# Patient Record
Sex: MALE | Race: ASIAN | NOT HISPANIC OR LATINO | URBAN - METROPOLITAN AREA
[De-identification: names, ages, dates, MRNs, and addresses within clinical notes are randomized per-mention and may not be internally consistent; named-entity substitution may affect disease eponyms.]

---

## 2017-03-09 ENCOUNTER — OUTPATIENT (OUTPATIENT)
Dept: OUTPATIENT SERVICES | Facility: HOSPITAL | Age: 67
LOS: 1 days | End: 2017-03-09
Payer: MEDICARE

## 2017-03-09 ENCOUNTER — APPOINTMENT (OUTPATIENT)
Dept: CARDIOLOGY | Facility: CLINIC | Age: 67
End: 2017-03-09

## 2017-03-09 VITALS
HEART RATE: 67 BPM | SYSTOLIC BLOOD PRESSURE: 155 MMHG | OXYGEN SATURATION: 98 % | DIASTOLIC BLOOD PRESSURE: 77 MMHG | WEIGHT: 195 LBS | RESPIRATION RATE: 18 BRPM | BODY MASS INDEX: 32.45 KG/M2 | TEMPERATURE: 98.2 F

## 2017-03-09 DIAGNOSIS — Z95.828 PRESENCE OF OTHER VASCULAR IMPLANTS AND GRAFTS: Chronic | ICD-10-CM

## 2017-03-09 DIAGNOSIS — Z95.1 PRESENCE OF AORTOCORONARY BYPASS GRAFT: Chronic | ICD-10-CM

## 2017-03-09 DIAGNOSIS — I20.9 ANGINA PECTORIS, UNSPECIFIED: ICD-10-CM

## 2017-03-09 DIAGNOSIS — Z00.8 ENCOUNTER FOR OTHER GENERAL EXAMINATION: ICD-10-CM

## 2017-03-09 DIAGNOSIS — Z95.5 PRESENCE OF CORONARY ANGIOPLASTY IMPLANT AND GRAFT: Chronic | ICD-10-CM

## 2017-03-09 PROCEDURE — G0463: CPT | Mod: 25

## 2017-03-09 PROCEDURE — 93005 ELECTROCARDIOGRAM TRACING: CPT

## 2017-03-09 PROCEDURE — 93010 ELECTROCARDIOGRAM REPORT: CPT

## 2017-03-30 ENCOUNTER — APPOINTMENT (OUTPATIENT)
Dept: VASCULAR SURGERY | Facility: CLINIC | Age: 67
End: 2017-03-30

## 2017-03-30 VITALS
BODY MASS INDEX: 32.49 KG/M2 | TEMPERATURE: 98.5 F | DIASTOLIC BLOOD PRESSURE: 71 MMHG | WEIGHT: 195 LBS | SYSTOLIC BLOOD PRESSURE: 137 MMHG | HEART RATE: 68 BPM | HEIGHT: 65 IN

## 2017-03-30 DIAGNOSIS — Z95.9 PRESENCE OF CARDIAC AND VASCULAR IMPLANT AND GRAFT, UNSPECIFIED: ICD-10-CM

## 2017-03-31 DIAGNOSIS — I20.9 ANGINA PECTORIS, UNSPECIFIED: ICD-10-CM

## 2017-03-31 DIAGNOSIS — I25.10 ATHEROSCLEROTIC HEART DISEASE OF NATIVE CORONARY ARTERY WITHOUT ANGINA PECTORIS: ICD-10-CM

## 2017-04-12 ENCOUNTER — RX RENEWAL (OUTPATIENT)
Age: 67
End: 2017-04-12

## 2017-04-15 ENCOUNTER — EMERGENCY (EMERGENCY)
Facility: HOSPITAL | Age: 67
LOS: 1 days | Discharge: HOME | End: 2017-04-15
Attending: EMERGENCY MEDICINE | Admitting: EMERGENCY MEDICINE
Payer: MEDICARE

## 2017-04-15 VITALS
DIASTOLIC BLOOD PRESSURE: 77 MMHG | TEMPERATURE: 98 F | HEART RATE: 68 BPM | OXYGEN SATURATION: 100 % | RESPIRATION RATE: 16 BRPM | SYSTOLIC BLOOD PRESSURE: 133 MMHG

## 2017-04-15 VITALS
HEART RATE: 62 BPM | RESPIRATION RATE: 16 BRPM | DIASTOLIC BLOOD PRESSURE: 59 MMHG | OXYGEN SATURATION: 100 % | TEMPERATURE: 98 F | SYSTOLIC BLOOD PRESSURE: 135 MMHG

## 2017-04-15 DIAGNOSIS — Z95.1 PRESENCE OF AORTOCORONARY BYPASS GRAFT: Chronic | ICD-10-CM

## 2017-04-15 DIAGNOSIS — Z95.828 PRESENCE OF OTHER VASCULAR IMPLANTS AND GRAFTS: Chronic | ICD-10-CM

## 2017-04-15 DIAGNOSIS — Z95.5 PRESENCE OF CORONARY ANGIOPLASTY IMPLANT AND GRAFT: Chronic | ICD-10-CM

## 2017-04-15 LAB
ALBUMIN SERPL ELPH-MCNC: 4 G/DL — SIGNIFICANT CHANGE UP (ref 3.3–5)
ALP SERPL-CCNC: 88 U/L — SIGNIFICANT CHANGE UP (ref 40–120)
ALT FLD-CCNC: 28 U/L — SIGNIFICANT CHANGE UP (ref 4–41)
APTT BLD: 29.6 SEC — SIGNIFICANT CHANGE UP (ref 27.5–37.4)
AST SERPL-CCNC: 18 U/L — SIGNIFICANT CHANGE UP (ref 4–40)
BASOPHILS # BLD AUTO: 0.05 K/UL — SIGNIFICANT CHANGE UP (ref 0–0.2)
BASOPHILS NFR BLD AUTO: 0.6 % — SIGNIFICANT CHANGE UP (ref 0–2)
BILIRUB SERPL-MCNC: 0.4 MG/DL — SIGNIFICANT CHANGE UP (ref 0.2–1.2)
BUN SERPL-MCNC: 24 MG/DL — HIGH (ref 7–23)
CALCIUM SERPL-MCNC: 9.6 MG/DL — SIGNIFICANT CHANGE UP (ref 8.4–10.5)
CHLORIDE SERPL-SCNC: 103 MMOL/L — SIGNIFICANT CHANGE UP (ref 98–107)
CK MB BLD-MCNC: 2.7 NG/ML — SIGNIFICANT CHANGE UP (ref 1–6.6)
CK SERPL-CCNC: 138 U/L — SIGNIFICANT CHANGE UP (ref 30–200)
CK SERPL-CCNC: 159 U/L — SIGNIFICANT CHANGE UP (ref 30–200)
CO2 SERPL-SCNC: 24 MMOL/L — SIGNIFICANT CHANGE UP (ref 22–31)
CREAT SERPL-MCNC: 1.11 MG/DL — SIGNIFICANT CHANGE UP (ref 0.5–1.3)
EOSINOPHIL # BLD AUTO: 0.2 K/UL — SIGNIFICANT CHANGE UP (ref 0–0.5)
EOSINOPHIL NFR BLD AUTO: 2.4 % — SIGNIFICANT CHANGE UP (ref 0–6)
GLUCOSE SERPL-MCNC: 166 MG/DL — HIGH (ref 70–99)
HCT VFR BLD CALC: 38.3 % — LOW (ref 39–50)
HGB BLD-MCNC: 12.4 G/DL — LOW (ref 13–17)
IMM GRANULOCYTES NFR BLD AUTO: 0.7 % — SIGNIFICANT CHANGE UP (ref 0–1.5)
INR BLD: 0.92 — SIGNIFICANT CHANGE UP (ref 0.88–1.17)
LYMPHOCYTES # BLD AUTO: 1.99 K/UL — SIGNIFICANT CHANGE UP (ref 1–3.3)
LYMPHOCYTES # BLD AUTO: 23.5 % — SIGNIFICANT CHANGE UP (ref 13–44)
MCHC RBC-ENTMCNC: 27.6 PG — SIGNIFICANT CHANGE UP (ref 27–34)
MCHC RBC-ENTMCNC: 32.4 % — SIGNIFICANT CHANGE UP (ref 32–36)
MCV RBC AUTO: 85.1 FL — SIGNIFICANT CHANGE UP (ref 80–100)
MONOCYTES # BLD AUTO: 0.58 K/UL — SIGNIFICANT CHANGE UP (ref 0–0.9)
MONOCYTES NFR BLD AUTO: 6.8 % — SIGNIFICANT CHANGE UP (ref 2–14)
NEUTROPHILS # BLD AUTO: 5.59 K/UL — SIGNIFICANT CHANGE UP (ref 1.8–7.4)
NEUTROPHILS NFR BLD AUTO: 66 % — SIGNIFICANT CHANGE UP (ref 43–77)
PLATELET # BLD AUTO: 263 K/UL — SIGNIFICANT CHANGE UP (ref 150–400)
PMV BLD: 9.9 FL — SIGNIFICANT CHANGE UP (ref 7–13)
POTASSIUM SERPL-MCNC: 3.9 MMOL/L — SIGNIFICANT CHANGE UP (ref 3.5–5.3)
POTASSIUM SERPL-SCNC: 3.9 MMOL/L — SIGNIFICANT CHANGE UP (ref 3.5–5.3)
PROT SERPL-MCNC: 7.4 G/DL — SIGNIFICANT CHANGE UP (ref 6–8.3)
PROTHROM AB SERPL-ACNC: 10.3 SEC — SIGNIFICANT CHANGE UP (ref 9.8–13.1)
RBC # BLD: 4.5 M/UL — SIGNIFICANT CHANGE UP (ref 4.2–5.8)
RBC # FLD: 15.9 % — HIGH (ref 10.3–14.5)
SODIUM SERPL-SCNC: 143 MMOL/L — SIGNIFICANT CHANGE UP (ref 135–145)
TROPONIN T SERPL-MCNC: < 0.06 NG/ML — SIGNIFICANT CHANGE UP (ref 0–0.06)
TROPONIN T SERPL-MCNC: < 0.06 NG/ML — SIGNIFICANT CHANGE UP (ref 0–0.06)
WBC # BLD: 8.47 K/UL — SIGNIFICANT CHANGE UP (ref 3.8–10.5)
WBC # FLD AUTO: 8.47 K/UL — SIGNIFICANT CHANGE UP (ref 3.8–10.5)

## 2017-04-15 PROCEDURE — 99285 EMERGENCY DEPT VISIT HI MDM: CPT | Mod: 25,GC

## 2017-04-15 PROCEDURE — 70450 CT HEAD/BRAIN W/O DYE: CPT | Mod: 26

## 2017-04-15 PROCEDURE — 71020: CPT | Mod: 26

## 2017-04-15 PROCEDURE — 72125 CT NECK SPINE W/O DYE: CPT | Mod: 26

## 2017-04-15 PROCEDURE — 70486 CT MAXILLOFACIAL W/O DYE: CPT | Mod: 26

## 2017-04-15 PROCEDURE — 93010 ELECTROCARDIOGRAM REPORT: CPT | Mod: 59

## 2017-04-15 PROCEDURE — 12013 RPR F/E/E/N/L/M 2.6-5.0 CM: CPT | Mod: GC

## 2017-04-15 RX ORDER — TETANUS TOXOID, REDUCED DIPHTHERIA TOXOID AND ACELLULAR PERTUSSIS VACCINE, ADSORBED 5; 2.5; 8; 8; 2.5 [IU]/.5ML; [IU]/.5ML; UG/.5ML; UG/.5ML; UG/.5ML
0.5 SUSPENSION INTRAMUSCULAR ONCE
Qty: 0 | Refills: 0 | Status: COMPLETED | OUTPATIENT
Start: 2017-04-15 | End: 2017-04-15

## 2017-04-15 RX ORDER — ACETAMINOPHEN 500 MG
650 TABLET ORAL ONCE
Qty: 0 | Refills: 0 | Status: COMPLETED | OUTPATIENT
Start: 2017-04-15 | End: 2017-04-15

## 2017-04-15 RX ADMIN — Medication 650 MILLIGRAM(S): at 10:21

## 2017-04-15 RX ADMIN — TETANUS TOXOID, REDUCED DIPHTHERIA TOXOID AND ACELLULAR PERTUSSIS VACCINE, ADSORBED 0.5 MILLILITER(S): 5; 2.5; 8; 8; 2.5 SUSPENSION INTRAMUSCULAR at 10:21

## 2017-04-15 NOTE — ED ADULT TRIAGE NOTE - CHIEF COMPLAINT QUOTE
Pt c/o L sided facial pain sp trip & fall hitting head/face on concrete floor. Denies LOC. Also c/o chest discomfort. States baby ASA daily. Swelling & bruising noted to L eye. Dried blood noted above L eye.

## 2017-04-15 NOTE — ED PROVIDER NOTE - PSH
S/P bypass graft of extremity  RLE- 11/30/15  S/P CABG x 3  3/28/16  S/P prostatectomy  1996  Stented coronary artery  x 3 cardiac stenrs placed in 2006

## 2017-04-15 NOTE — ED PROVIDER NOTE - PROGRESS NOTE DETAILS
pt evaluated by cardiology, pt now states that "my chest is sore from the fall" Daughter at bedside also states now "I think the pain is from the fall" .  Re exam tender, reproducible tenderness on ACW. Cardiology states pt may go home. Consult appreciated. Plan check second cardiac enzyme now. If neg dc home. w/o CP, CE neg x 2, stable for d/c

## 2017-04-15 NOTE — ED PROVIDER NOTE - PMH
Bronchospasm    CAD (coronary artery disease)  2 cardiac stents placed in 2006, 3V CABG 3/28/16  CHF (congestive heart failure)    Diabetic neuropathy    DM (diabetes mellitus)  type 2 - on insulin pump  HTN (hypertension)    Hypercholesteremia    Hypothyroid    Intermittent claudication    Iron deficiency anemia, unspecified iron deficiency anemia type    Mild intermittent asthma without complication    Obesity (BMI 30-39.9)    PAD (peripheral artery disease)  RLE bypass 11/30/15  Prostate cancer

## 2017-04-15 NOTE — ED PROVIDER NOTE - PHYSICAL EXAMINATION
Attending   pt in bed no acute distress. EOMI. +ecchymosis, swelling of the left eyebrow. +linear horizontal 1cm long lac along the left eyebrow not bleeding. no septal hematoma. nontender along the nose. nl jaw alignment. supple neck. nontender along midline cervical/thoracic and lumbar spine. normal S1-S2 No resp distress. able to speak in full and clear sentences. no wheeze, rales or stridor. soft nt abdomen. nl  bl hands. nl rom of bl hips/knees.   no focal deficits. no pedal edema. no calf tenderness. normal pulses bilateral feet. Attending   pt in bed no acute distress. EOMI. +ecchymosis, swelling of the left eyebrow. +linear horizontal 1cm long lac along the left eyebrow not bleeding. no septal hematoma. nontender along the nose. nl jaw alignment. supple neck. nontender along midline cervical/thoracic and lumbar spine. normal S1-S2 No resp distress. able to speak in full and clear sentences. no wheeze, rales or stridor.  nontender along chest wall, no ecchymosis. no crepitus. soft nt abdomen. nl  bl hands. nl rom of bl hips/knees.   no focal deficits. no pedal edema. no calf tenderness. normal pulses bilateral feet. Attending   pt in bed no acute distress. EOMI. +ecchymosis, swelling of the left eyebrow. +linear horizontal 1cm long lac along the left eyebrow not bleeding. no septal hematoma. nontender along the nose. nl jaw alignment. supple neck. nontender along midline cervical/thoracic and lumbar spine. normal S1-S2 No resp distress. able to speak in full and clear sentences. no wheeze, rales or stridor.  +tender along chest wall, no ecchymosis. no crepitus. soft nt abdomen. nl  bl hands. nl rom of bl hips/knees.   no focal deficits. no pedal edema. no calf tenderness. normal pulses bilateral feet.

## 2017-04-15 NOTE — ED PROVIDER NOTE - OBJECTIVE STATEMENT
Attending  65yo M from home with daughter sp fall last night at 1130pm.pt was with family, missed step and fell forward in the street. No loc. No cp/sob/palpitations before the fall. Able to get up, walk with daughter to the house, states he has had midsternal heaviness since last night. It was 4-10 max intensity  now its 2-10 heavy, no sob. not diaphoretic. pt on an aspirin.  also endorses pain of the head and neck stiffness from the fall. no numbness or weakness. no change in vision. no double vision or blurred vision. no slurred speech. unknown last tetanus. last stress test/echo was a year ago. PMHX HTN, HLD, CAD, hx CABG, DM Attending  67yo M from home with daughter sp fall last night at 1130pm.pt was with family, missed step and fell forward in the street. Hit face on the cement.  No loc. No cp/sob/palpitations before the fall. Able to get up, walk with daughter to the house, states he has had midsternal heaviness since last night. It was 4-10 max intensity  now its 2-10 heavy, no sob. not diaphoretic. pt on an aspirin.  also endorses pain of the head and neck stiffness from the fall. no numbness or weakness. no change in vision. no double vision or blurred vision. no slurred speech. unknown last tetanus. last stress test/echo was a year ago. PMHX HTN, HLD, CAD, hx CABG, DM

## 2017-04-17 ENCOUNTER — OUTPATIENT (OUTPATIENT)
Dept: OUTPATIENT SERVICES | Facility: HOSPITAL | Age: 67
LOS: 1 days | End: 2017-04-17

## 2017-04-17 VITALS
RESPIRATION RATE: 16 BRPM | SYSTOLIC BLOOD PRESSURE: 146 MMHG | WEIGHT: 201.06 LBS | OXYGEN SATURATION: 97 % | DIASTOLIC BLOOD PRESSURE: 60 MMHG | HEIGHT: 64 IN | TEMPERATURE: 98 F | HEART RATE: 66 BPM

## 2017-04-17 DIAGNOSIS — E03.9 HYPOTHYROIDISM, UNSPECIFIED: ICD-10-CM

## 2017-04-17 DIAGNOSIS — E11.9 TYPE 2 DIABETES MELLITUS WITHOUT COMPLICATIONS: ICD-10-CM

## 2017-04-17 DIAGNOSIS — I10 ESSENTIAL (PRIMARY) HYPERTENSION: ICD-10-CM

## 2017-04-17 DIAGNOSIS — Z95.5 PRESENCE OF CORONARY ANGIOPLASTY IMPLANT AND GRAFT: Chronic | ICD-10-CM

## 2017-04-17 DIAGNOSIS — I70.219 ATHEROSCLEROSIS OF NATIVE ARTERIES OF EXTREMITIES WITH INTERMITTENT CLAUDICATION, UNSPECIFIED EXTREMITY: ICD-10-CM

## 2017-04-17 DIAGNOSIS — Z95.828 PRESENCE OF OTHER VASCULAR IMPLANTS AND GRAFTS: Chronic | ICD-10-CM

## 2017-04-17 DIAGNOSIS — I70.209 UNSPECIFIED ATHEROSCLEROSIS OF NATIVE ARTERIES OF EXTREMITIES, UNSPECIFIED EXTREMITY: ICD-10-CM

## 2017-04-17 DIAGNOSIS — J45.20 MILD INTERMITTENT ASTHMA, UNCOMPLICATED: ICD-10-CM

## 2017-04-17 DIAGNOSIS — Z95.1 PRESENCE OF AORTOCORONARY BYPASS GRAFT: Chronic | ICD-10-CM

## 2017-04-17 LAB — HBA1C BLD-MCNC: 9.9 % — HIGH (ref 4–5.6)

## 2017-04-17 RX ORDER — SODIUM CHLORIDE 9 MG/ML
1000 INJECTION, SOLUTION INTRAVENOUS
Qty: 0 | Refills: 0 | Status: DISCONTINUED | OUTPATIENT
Start: 2017-04-25 | End: 2017-05-10

## 2017-04-17 NOTE — H&P PST ADULT - NEGATIVE OPHTHALMOLOGIC SYMPTOMS
no diplopia/no lacrimation L/no photophobia/no lacrimation R no photophobia/no blurred vision R/no pain R/no discharge L/no diplopia/no pain L

## 2017-04-17 NOTE — H&P PST ADULT - FAMILY HISTORY
Mother  Still living? Unknown  Family history of diabetes mellitus, Age at diagnosis: Age Unknown     Father  Still living? No  Family history of lung disease, Age at diagnosis: Age Unknown

## 2017-04-17 NOTE — H&P PST ADULT - NEGATIVE NEUROLOGICAL SYMPTOMS
no weakness/no paresthesias/no difficulty walking/no loss of sensation no tremors/no paresthesias/no facial palsy/no headache/no focal seizures/no weakness/no loss of sensation/no syncope/no transient paralysis/no vertigo/no generalized seizures/no difficulty walking/no confusion

## 2017-04-17 NOTE — H&P PST ADULT - NEGATIVE GENERAL SYMPTOMS
no anorexia/no sweating/no fever/no chills no malaise/no chills/no fever/no polyuria/no weight gain/no weight loss/no fatigue/no sweating/no polydipsia/no polyphagia/no anorexia

## 2017-04-17 NOTE — H&P PST ADULT - PROBLEM SELECTOR PLAN 3
instructed to consult endocrinologist for preop instructions for insulin pump. Pending endocrinology clearance.

## 2017-04-17 NOTE — H&P PST ADULT - FALLEN IN THE PAST
no 4/15/17 tripped and fell, evaluated in the ED at [facility], CXR CT done,, no head injury, had stiches placed above left eye./yes

## 2017-04-17 NOTE — H&P PST ADULT - NEGATIVE RESPIRATORY AND THORAX SYMPTOMS
no dyspnea/no wheezing/no cough no dyspnea/no hemoptysis/no wheezing/no pleuritic chest pain/no cough

## 2017-04-17 NOTE — H&P PST ADULT - MUSCULOSKELETAL
details… detailed exam ROM intact/normal strength/no joint swelling/no joint erythema ROM intact/no joint warmth/no calf tenderness/normal strength/no joint swelling/no joint erythema

## 2017-04-17 NOTE — H&P PST ADULT - PSH
S/P prostatectomy  1996  Stented coronary artery  x 3 cardiac stenrs placed in 2006 S/P bypass graft of extremity  RLE- 11/30/15  S/P CABG x 3  3/28/16  S/P prostatectomy  1996  Stented coronary artery  x 3 cardiac stenrs placed in 2006

## 2017-04-17 NOTE — H&P PST ADULT - PMH
Bronchospasm  uses proventil PRN, last used one yr ago  CAD (coronary artery disease)  2 cardiac stents placed in 2006, on baby aspirin  CHF (congestive heart failure)    Diabetic neuropathy    DM (diabetes mellitus)  type 2 - on insulin pump for 4 yrs  HTN (hypertension)    Hypercholesteremia    Hypothyroid    Intermittent claudication    Obesity (BMI 30-39.9)    PAD (peripheral artery disease)    Prostate cancer Anemia    Atherosclerosis of arteries of extremities  left leg  Bronchospasm    CAD (coronary artery disease)  3 cardiac stents placed in 2006 (mid LAD, Prox LAD, Prox to #2), 3V CABG 3/28/16  CHF (congestive heart failure)    Diabetic neuropathy    DM (diabetes mellitus)  type 2 - on insulin pump  HTN (hypertension)    Hypercholesteremia    Hypothyroid    Intermittent claudication    Iron deficiency anemia, unspecified iron deficiency anemia type    Mild intermittent asthma without complication    Obesity (BMI 30-39.9)    PAD (peripheral artery disease)  RLE bypass 11/30/15  Prostate cancer

## 2017-04-17 NOTE — H&P PST ADULT - NEGATIVE GASTROINTESTINAL SYMPTOMS
no nausea/no constipation/no diarrhea/no vomiting no melena/no nausea/no change in bowel habits/no vomiting/no diarrhea/no abdominal pain

## 2017-04-17 NOTE — H&P PST ADULT - NEGATIVE PSYCHIATRIC SYMPTOMS
no suicidal ideation/no depression/no anxiety/no insomnia no anxiety/no depression/no suicidal ideation

## 2017-04-17 NOTE — H&P PST ADULT - NSANTHOSAYNRD_GEN_A_CORE
No. REAGAN screening performed.  STOP BANG Legend: 0-2 = LOW Risk; 3-4 = INTERMEDIATE Risk; 5-8 = HIGH Risk

## 2017-04-17 NOTE — H&P PST ADULT - NEGATIVE ALLERGY TYPES
no outdoor environmental allergies/no reactions to medicines/no reactions to animals/no reactions to food/no reactions to insect bites/no indoor environmental allergies no reactions to food/no reactions to insect bites/no reactions to animals

## 2017-04-17 NOTE — H&P PST ADULT - NEGATIVE GENERAL GENITOURINARY SYMPTOMS
no nocturia/normal urinary frequency/no flank pain R/no dysuria/no hematuria/no renal colic/no flank pain L normal urinary frequency/no flank pain L/no bladder infections/no flank pain R/no incontinence/no dysuria/no nocturia/no renal colic/no hematuria

## 2017-04-17 NOTE — H&P PST ADULT - LAST CARDIAC ANGIOGRAM/IMAGING
2006, had cardiac angioplasty and 3 cardiac stents were placed. 2016 cardiac cath, followed by CABG 3/2016

## 2017-04-17 NOTE — H&P PST ADULT - PROBLEM SELECTOR PLAN 2
instructed to take blood pressure medication on the morning of procedure (amlodipine metoprolol and losartan). Pending cardiology clearance. Pt to continue aspirin.

## 2017-04-17 NOTE — H&P PST ADULT - NEGATIVE BREAST SYMPTOMS
no breast tenderness R/no breast tenderness L no breast lump R/no breast tenderness L/no breast lump L/no breast tenderness R

## 2017-04-17 NOTE — H&P PST ADULT - CONSTITUTIONAL DETAILS
well-groomed/no distress/well-developed/obese well-developed/no distress/well-nourished/obese/well-groomed

## 2017-04-17 NOTE — H&P PST ADULT - HISTORY OF PRESENT ILLNESS
66 year old male with Hx of atherosclerosis of arteries of extremities, with intermittent claudication, left, s/p right lower extremity angiogram with stent placement in 2016, developing left leg pain with ambulation, can walk 1/2 block, referred for surgical evaluation. Doppler done revealing decreased circulation to left leg as per patient. Pt is scheduled for left leg angiogram possible angioplasty possible stent for 4/25/17 66 year old male with Hx CAD, s/p three coronary stents in 2006, and CABG x3 in 3/2016, with atherosclerosis of arteries of extremities, with intermittent claudication, left, s/p RLE bypass 11/30/15, s/p failed angiogram to secondary to hypotension in 2016 at [facility],, developing left leg pain with ambulation, can walk 1/2 block, referred for surgical evaluation. Doppler done revealing decreased circulation to left leg as per patient. Pt is scheduled for left leg angiogram possible angioplasty possible stent for 4/25/17

## 2017-04-17 NOTE — H&P PST ADULT - GASTROINTESTINAL DETAILS
nontender/soft no masses palpable/soft/nontender/bowel sounds normal/no guarding/no rebound tenderness/no rigidity/no distention

## 2017-04-17 NOTE — H&P PST ADULT - VISION (WITH CORRECTIVE LENSES IF THE PATIENT USUALLY WEARS THEM):
Normal vision: sees adequately in most situations; can see medication labels, newsprint Partially impaired: cannot see medication labels or newsprint, but can see obstacles in path, and the surrounding layout; can count fingers at arm's length/glasses for reading and distance

## 2017-04-17 NOTE — H&P PST ADULT - NEGATIVE CARDIOVASCULAR SYMPTOMS
no dyspnea on exertion/no orthopnea/no palpitations no chest pain/no palpitations/no paroxysmal nocturnal dyspnea/no dyspnea on exertion/no orthopnea/no peripheral edema

## 2017-04-17 NOTE — H&P PST ADULT - NEGATIVE ENDOCRINE SYMPTOMS
DM type 2 on insulin pump for 4 yrs/no cold intolerance/no heat intolerance no heat intolerance/DM type 2 on insulin pump for 6 years, FS /no cold intolerance

## 2017-04-17 NOTE — H&P PST ADULT - NEGATIVE ENMT SYMPTOMS
no vertigo/no nasal congestion/no tinnitus/no dysphagia/no recurrent cold sores/no ear pain/no nose bleeds/no nasal obstruction/no dry mouth/no hearing difficulty/no nasal discharge/h/o bronchospasm/no throat pain no sinus symptoms/no nasal obstruction/no dysphagia/no ear pain/no nasal discharge/no recurrent cold sores/no hearing difficulty/no vertigo/no nasal congestion/no nose bleeds/no throat pain/no tinnitus

## 2017-04-17 NOTE — H&P PST ADULT - RS GEN PE MLT RESP DETAILS PC
respirations non-labored/no wheezes/airway patent/no chest wall tenderness/breath sounds equal no rales/no wheezes/no subcutaneous emphysema/airway patent/respirations non-labored/no rhonchi/good air movement/no intercostal retractions/breath sounds equal/no chest wall tenderness/clear to auscultation bilaterally

## 2017-04-17 NOTE — H&P PST ADULT - ASSESSMENT
64 yo male with multiple comorbidities and has DM type 2 on insulin pump  is scheduled for right leg femoral endarterectomy, possible distal bypass on 11/30/15. left lower extremity atherosclerosis with intermittent claudication

## 2017-04-17 NOTE — H&P PST ADULT - PROBLEM SELECTOR PLAN 1
Pt is scheduled for left leg angiogram possible angioplasty possible stent for 4/25/17. Preop instructions provided. Meets REAGAN risk criteria, OR booking notified. Surgical scrub given and pepcid. Pt stated understanding.

## 2017-04-25 ENCOUNTER — APPOINTMENT (OUTPATIENT)
Dept: VASCULAR SURGERY | Facility: HOSPITAL | Age: 67
End: 2017-04-25

## 2017-04-25 ENCOUNTER — OUTPATIENT (OUTPATIENT)
Dept: OUTPATIENT SERVICES | Facility: HOSPITAL | Age: 67
LOS: 1 days | Discharge: HOME | End: 2017-04-25
Payer: MEDICARE

## 2017-04-25 VITALS
DIASTOLIC BLOOD PRESSURE: 59 MMHG | OXYGEN SATURATION: 98 % | WEIGHT: 201.06 LBS | SYSTOLIC BLOOD PRESSURE: 134 MMHG | RESPIRATION RATE: 14 BRPM | TEMPERATURE: 98 F | HEART RATE: 62 BPM | HEIGHT: 64 IN

## 2017-04-25 VITALS
DIASTOLIC BLOOD PRESSURE: 59 MMHG | OXYGEN SATURATION: 98 % | HEART RATE: 59 BPM | SYSTOLIC BLOOD PRESSURE: 132 MMHG | RESPIRATION RATE: 16 BRPM

## 2017-04-25 DIAGNOSIS — I70.219 ATHEROSCLEROSIS OF NATIVE ARTERIES OF EXTREMITIES WITH INTERMITTENT CLAUDICATION, UNSPECIFIED EXTREMITY: ICD-10-CM

## 2017-04-25 DIAGNOSIS — Z95.828 PRESENCE OF OTHER VASCULAR IMPLANTS AND GRAFTS: Chronic | ICD-10-CM

## 2017-04-25 DIAGNOSIS — Z95.5 PRESENCE OF CORONARY ANGIOPLASTY IMPLANT AND GRAFT: Chronic | ICD-10-CM

## 2017-04-25 DIAGNOSIS — Z95.1 PRESENCE OF AORTOCORONARY BYPASS GRAFT: Chronic | ICD-10-CM

## 2017-04-25 PROCEDURE — 75710 ARTERY X-RAYS ARM/LEG: CPT | Mod: 26,GC

## 2017-04-25 PROCEDURE — 75625 CONTRAST EXAM ABDOMINL AORTA: CPT | Mod: 26

## 2017-04-25 PROCEDURE — 36246 INS CATH ABD/L-EXT ART 2ND: CPT | Mod: LT

## 2017-04-25 RX ORDER — FENTANYL CITRATE 50 UG/ML
25 INJECTION INTRAVENOUS
Qty: 0 | Refills: 0 | Status: DISCONTINUED | OUTPATIENT
Start: 2017-04-25 | End: 2017-04-25

## 2017-04-25 RX ORDER — SODIUM CHLORIDE 9 MG/ML
1000 INJECTION INTRAMUSCULAR; INTRAVENOUS; SUBCUTANEOUS
Qty: 0 | Refills: 0 | Status: DISCONTINUED | OUTPATIENT
Start: 2017-04-25 | End: 2017-05-10

## 2017-04-25 RX ORDER — ONDANSETRON 8 MG/1
4 TABLET, FILM COATED ORAL ONCE
Qty: 0 | Refills: 0 | Status: DISCONTINUED | OUTPATIENT
Start: 2017-04-25 | End: 2017-04-25

## 2017-04-25 RX ADMIN — SODIUM CHLORIDE 75 MILLILITER(S): 9 INJECTION INTRAMUSCULAR; INTRAVENOUS; SUBCUTANEOUS at 10:27

## 2017-04-25 NOTE — ASU PREOP CHECKLIST - COMMENTS
AAmoldapine-Losartan Metopropolol po taken today AAmoldapine-Losartan Metopropolol po taken today synthroid taken today

## 2017-04-25 NOTE — ASU DISCHARGE PLAN (ADULT/PEDIATRIC). - CONDITIONS AT DISCHARGE
call MD if fever greater than 101, increase in pain not relieved by medicine , any redness, swelling or drainage from incision site , numbness, tingling, unable to tolerate food, difficulty in urination

## 2017-04-25 NOTE — ASU DISCHARGE PLAN (ADULT/PEDIATRIC). - NOTIFY
Swelling that continues/Bleeding that does not stop/Numbness, color, or temperature change to extremity Persistent Nausea and Vomiting/Numbness, color, or temperature change to extremity/Increased Irritability or Sluggishness/Inability to Tolerate Liquids or Foods/Pain not relieved by Medications/Swelling that continues/Unable to Urinate/Bleeding that does not stop/Fever greater than 101/Numbness, tingling

## 2017-05-06 ENCOUNTER — NON-APPOINTMENT (OUTPATIENT)
Age: 67
End: 2017-05-06

## 2017-05-06 ENCOUNTER — APPOINTMENT (OUTPATIENT)
Dept: CARDIOLOGY | Facility: CLINIC | Age: 67
End: 2017-05-06

## 2017-05-06 VITALS
BODY MASS INDEX: 28.88 KG/M2 | OXYGEN SATURATION: 96 % | HEIGHT: 69 IN | DIASTOLIC BLOOD PRESSURE: 72 MMHG | SYSTOLIC BLOOD PRESSURE: 151 MMHG | HEART RATE: 63 BPM | WEIGHT: 195 LBS

## 2017-05-08 ENCOUNTER — OUTPATIENT (OUTPATIENT)
Dept: OUTPATIENT SERVICES | Facility: HOSPITAL | Age: 67
LOS: 1 days | End: 2017-05-08
Payer: COMMERCIAL

## 2017-05-08 ENCOUNTER — APPOINTMENT (OUTPATIENT)
Dept: CV DIAGNOSITCS | Facility: HOSPITAL | Age: 67
End: 2017-05-08

## 2017-05-08 ENCOUNTER — TRANSCRIPTION ENCOUNTER (OUTPATIENT)
Age: 67
End: 2017-05-08

## 2017-05-08 DIAGNOSIS — Z95.5 PRESENCE OF CORONARY ANGIOPLASTY IMPLANT AND GRAFT: Chronic | ICD-10-CM

## 2017-05-08 DIAGNOSIS — Z95.1 PRESENCE OF AORTOCORONARY BYPASS GRAFT: Chronic | ICD-10-CM

## 2017-05-08 DIAGNOSIS — R60.0 LOCALIZED EDEMA: ICD-10-CM

## 2017-05-08 DIAGNOSIS — Z95.828 PRESENCE OF OTHER VASCULAR IMPLANTS AND GRAFTS: Chronic | ICD-10-CM

## 2017-05-08 PROCEDURE — C8929: CPT

## 2017-05-08 PROCEDURE — 93306 TTE W/DOPPLER COMPLETE: CPT | Mod: 26

## 2017-05-10 ENCOUNTER — RX RENEWAL (OUTPATIENT)
Age: 67
End: 2017-05-10

## 2017-05-11 ENCOUNTER — APPOINTMENT (OUTPATIENT)
Dept: VASCULAR SURGERY | Facility: CLINIC | Age: 67
End: 2017-05-11

## 2017-05-11 VITALS
BODY MASS INDEX: 30.86 KG/M2 | WEIGHT: 192 LBS | SYSTOLIC BLOOD PRESSURE: 107 MMHG | HEART RATE: 64 BPM | DIASTOLIC BLOOD PRESSURE: 64 MMHG | TEMPERATURE: 97.6 F | HEIGHT: 66 IN

## 2017-05-11 VITALS — SYSTOLIC BLOOD PRESSURE: 109 MMHG | HEART RATE: 96 BPM | DIASTOLIC BLOOD PRESSURE: 67 MMHG

## 2017-05-11 DIAGNOSIS — R60.0 LOCALIZED EDEMA: ICD-10-CM

## 2017-05-11 RX ORDER — BUDESONIDE AND FORMOTEROL FUMARATE DIHYDRATE 160; 4.5 UG/1; UG/1
AEROSOL RESPIRATORY (INHALATION)
Refills: 0 | Status: ACTIVE | COMMUNITY

## 2017-05-24 ENCOUNTER — OUTPATIENT (OUTPATIENT)
Dept: OUTPATIENT SERVICES | Facility: HOSPITAL | Age: 67
LOS: 1 days | End: 2017-05-24

## 2017-05-24 VITALS
SYSTOLIC BLOOD PRESSURE: 120 MMHG | HEART RATE: 69 BPM | RESPIRATION RATE: 16 BRPM | WEIGHT: 199.96 LBS | HEIGHT: 64 IN | TEMPERATURE: 207 F | OXYGEN SATURATION: 97 % | DIASTOLIC BLOOD PRESSURE: 68 MMHG

## 2017-05-24 DIAGNOSIS — I70.219 ATHEROSCLEROSIS OF NATIVE ARTERIES OF EXTREMITIES WITH INTERMITTENT CLAUDICATION, UNSPECIFIED EXTREMITY: ICD-10-CM

## 2017-05-24 DIAGNOSIS — I70.209 UNSPECIFIED ATHEROSCLEROSIS OF NATIVE ARTERIES OF EXTREMITIES, UNSPECIFIED EXTREMITY: ICD-10-CM

## 2017-05-24 DIAGNOSIS — E11.9 TYPE 2 DIABETES MELLITUS WITHOUT COMPLICATIONS: ICD-10-CM

## 2017-05-24 DIAGNOSIS — Z95.828 PRESENCE OF OTHER VASCULAR IMPLANTS AND GRAFTS: Chronic | ICD-10-CM

## 2017-05-24 DIAGNOSIS — E03.9 HYPOTHYROIDISM, UNSPECIFIED: ICD-10-CM

## 2017-05-24 DIAGNOSIS — I10 ESSENTIAL (PRIMARY) HYPERTENSION: ICD-10-CM

## 2017-05-24 DIAGNOSIS — Z95.5 PRESENCE OF CORONARY ANGIOPLASTY IMPLANT AND GRAFT: Chronic | ICD-10-CM

## 2017-05-24 DIAGNOSIS — J44.9 CHRONIC OBSTRUCTIVE PULMONARY DISEASE, UNSPECIFIED: ICD-10-CM

## 2017-05-24 DIAGNOSIS — Z95.1 PRESENCE OF AORTOCORONARY BYPASS GRAFT: Chronic | ICD-10-CM

## 2017-05-24 LAB
BLD GP AB SCN SERPL QL: NEGATIVE — SIGNIFICANT CHANGE UP
BUN SERPL-MCNC: 25 MG/DL — HIGH (ref 7–23)
CALCIUM SERPL-MCNC: 10.2 MG/DL — SIGNIFICANT CHANGE UP (ref 8.4–10.5)
CHLORIDE SERPL-SCNC: 100 MMOL/L — SIGNIFICANT CHANGE UP (ref 98–107)
CO2 SERPL-SCNC: 25 MMOL/L — SIGNIFICANT CHANGE UP (ref 22–31)
CREAT SERPL-MCNC: 1.17 MG/DL — SIGNIFICANT CHANGE UP (ref 0.5–1.3)
GLUCOSE SERPL-MCNC: 132 MG/DL — HIGH (ref 70–99)
HBA1C BLD-MCNC: 9.3 % — HIGH (ref 4–5.6)
HCT VFR BLD CALC: 39.9 % — SIGNIFICANT CHANGE UP (ref 39–50)
HGB BLD-MCNC: 12.4 G/DL — LOW (ref 13–17)
MCHC RBC-ENTMCNC: 27.2 PG — SIGNIFICANT CHANGE UP (ref 27–34)
MCHC RBC-ENTMCNC: 31.1 % — LOW (ref 32–36)
MCV RBC AUTO: 87.5 FL — SIGNIFICANT CHANGE UP (ref 80–100)
PLATELET # BLD AUTO: 276 K/UL — SIGNIFICANT CHANGE UP (ref 150–400)
PMV BLD: 11 FL — SIGNIFICANT CHANGE UP (ref 7–13)
POTASSIUM SERPL-MCNC: 4.2 MMOL/L — SIGNIFICANT CHANGE UP (ref 3.5–5.3)
POTASSIUM SERPL-SCNC: 4.2 MMOL/L — SIGNIFICANT CHANGE UP (ref 3.5–5.3)
RBC # BLD: 4.56 M/UL — SIGNIFICANT CHANGE UP (ref 4.2–5.8)
RBC # FLD: 15.9 % — HIGH (ref 10.3–14.5)
RH IG SCN BLD-IMP: POSITIVE — SIGNIFICANT CHANGE UP
SODIUM SERPL-SCNC: 143 MMOL/L — SIGNIFICANT CHANGE UP (ref 135–145)
WBC # BLD: 6.2 K/UL — SIGNIFICANT CHANGE UP (ref 3.8–10.5)
WBC # FLD AUTO: 6.2 K/UL — SIGNIFICANT CHANGE UP (ref 3.8–10.5)

## 2017-05-24 RX ORDER — SODIUM CHLORIDE 9 MG/ML
1000 INJECTION, SOLUTION INTRAVENOUS
Qty: 0 | Refills: 0 | Status: DISCONTINUED | OUTPATIENT
Start: 2017-06-06 | End: 2017-06-07

## 2017-05-24 RX ORDER — INSULIN ASPART 100 [IU]/ML
0 INJECTION, SOLUTION SUBCUTANEOUS
Qty: 0 | Refills: 0 | COMMUNITY

## 2017-05-24 NOTE — H&P PST ADULT - VISION (WITH CORRECTIVE LENSES IF THE PATIENT USUALLY WEARS THEM):
glasses for reading and distance/Partially impaired: cannot see medication labels or newsprint, but can see obstacles in path, and the surrounding layout; can count fingers at arm's length

## 2017-05-24 NOTE — H&P PST ADULT - NEGATIVE NEUROLOGICAL SYMPTOMS
no difficulty walking/no tremors/no loss of consciousness/no hemiparesis/no syncope/no generalized seizures/no weakness/no transient paralysis/no headache/no focal seizures/no vertigo/no paresthesias/no confusion

## 2017-05-24 NOTE — H&P PST ADULT - PMH
Anemia    Atherosclerosis of arteries of extremities  left leg  Bronchospasm    CAD (coronary artery disease)  3 cardiac stents placed in 2006 (mid LAD, Prox LAD, Prox to #2), 3V CABG 3/28/16  CHF (congestive heart failure)    Diabetic neuropathy    DM (diabetes mellitus)  type 2 - on insulin pump  HTN (hypertension)    Hypercholesteremia    Hypothyroid    Intermittent claudication    Iron deficiency anemia, unspecified iron deficiency anemia type    Mild intermittent asthma without complication    Obesity (BMI 30-39.9)    PAD (peripheral artery disease)  RLE bypass 11/30/15  Prostate cancer Anemia    Atherosclerosis of arteries of extremities  left leg  Bronchospasm    CAD (coronary artery disease)  3 cardiac stents placed in 2006 (mid LAD, Prox LAD, Prox to #2), 3V CABG 3/28/16  CHF (congestive heart failure)    Diabetic neuropathy    DM (diabetes mellitus)  type 2 - on insulin pump  HTN (hypertension)    Hypercholesteremia    Hypothyroid    Intermittent claudication    Iron deficiency anemia, unspecified iron deficiency anemia type    Mild intermittent asthma without complication    Obesity (BMI 30-39.9)    PAD (peripheral artery disease)  RLE bypass 11/30/15  Prostate cancer    Sleep apnea  non-compliant

## 2017-05-24 NOTE — H&P PST ADULT - PROBLEM SELECTOR PLAN 1
Pt given pre-op instructions and Famotidine and Chlorhexidine and verbalized understanding.  Endo instructions for Pump requested - pt to see Endo 5/25/17.   CC requested with recent Stress/Echo   Pt to stay on ASA 81 mg po OD pre-op  OR Booking notified of Sleep apnea - non-compliant , Insulin pump and CAD stents x3 via fax.

## 2017-05-24 NOTE — H&P PST ADULT - HISTORY OF PRESENT ILLNESS
66 year old male with Hx CAD, s/p three coronary stents in 2006, and CABG x3 in 3/2016, with atherosclerosis of arteries of extremities, with intermittent claudication, left, s/p RLE bypass 11/30/15, s/p failed angiogram to secondary to hypotension in 2016 at [facility],, developing left leg pain with ambulation, can walk 1/2 block, referred for surgical evaluation. Doppler done revealing decreased circulation to left leg as per patient. Pt is scheduled for left leg angiogram possible angioplasty possible stent for 4/25/17 66 year old male with Hx CAD, s/p three coronary stents in 2006, and CABG x3 in 3/2016, with atherosclerosis of arteries of extremities, with intermittent claudication, left, s/p RLE bypass 11/30/15, s/p failed angiogram to secondary to hypotension in 2016 at [facility],, developing left leg pain with ambulation, can walk 1/2 block, referred for surgical evaluation. Doppler done revealing decreased circulation to left leg as per patient with left leg angiogram 4/25/17 and now for Left Femoral Endarterectomy Fem-Pop Bypass with Dr Loya 6/6/17.

## 2017-05-24 NOTE — H&P PST ADULT - GASTROINTESTINAL COMMENTS
Hx of small ventral hernia - pt denies c/o of pain or GI symptoms Noted small ventral hernia noted and easily reducible  - pt denies pain or GI symptoms

## 2017-05-24 NOTE — H&P PST ADULT - NEUROLOGICAL DETAILS
sensation intact/responds to verbal commands/normal strength/alert and oriented x 3/responds to pain

## 2017-05-24 NOTE — H&P PST ADULT - ENDOCRINE COMMENTS
Hx of Hypothyroid r/t KAY tx many years ago and regular f/u and TFT's - pt stable on current dose of Levothyroxine. Hx of DM T2 - on Insulin pump . Pt uncertain of basal dose but bolus 12-15u TID and followed regularly by Endo who will give pump instructions - A1c in "9's"

## 2017-05-24 NOTE — H&P PST ADULT - CARDIOVASCULAR COMMENTS
Pt c/o of PAD with right fem bypass - now to have left fem bypass. Pt c/o of pain with walking short distances for past several months.

## 2017-05-24 NOTE — H&P PST ADULT - MALLAMPATI CLASS
Class III - visualization of the soft palate and the base of the uvula Class III - visualization of the soft palate and the base of the uvula/with phonation

## 2017-05-31 ENCOUNTER — APPOINTMENT (OUTPATIENT)
Dept: CARDIOLOGY | Facility: CLINIC | Age: 67
End: 2017-05-31

## 2017-05-31 VITALS
SYSTOLIC BLOOD PRESSURE: 127 MMHG | HEART RATE: 62 BPM | HEIGHT: 66 IN | BODY MASS INDEX: 30.86 KG/M2 | OXYGEN SATURATION: 100 % | WEIGHT: 192 LBS | DIASTOLIC BLOOD PRESSURE: 68 MMHG

## 2017-05-31 RX ORDER — FUROSEMIDE 40 MG/1
40 TABLET ORAL DAILY
Qty: 30 | Refills: 3 | Status: ACTIVE | COMMUNITY

## 2017-05-31 RX ORDER — GUAIFENESIN AND DEXTROMETHORPHAN HYDROBROMIDE 100; 10 MG/5ML; MG/5ML
100-10 SOLUTION ORAL
Qty: 236 | Refills: 0 | Status: DISCONTINUED | COMMUNITY
Start: 2017-01-14 | End: 2017-05-31

## 2017-06-05 NOTE — ASU PATIENT PROFILE, ADULT - PMH
Anemia    Atherosclerosis of arteries of extremities  left leg  Bronchospasm    CAD (coronary artery disease)  3 cardiac stents placed in 2006 (mid LAD, Prox LAD, Prox to #2), 3V CABG 3/28/16  CHF (congestive heart failure)    Diabetic neuropathy    DM (diabetes mellitus)  type 2 - on insulin pump  HTN (hypertension)    Hypercholesteremia    Hypothyroid    Intermittent claudication    Iron deficiency anemia, unspecified iron deficiency anemia type    Mild intermittent asthma without complication    Obesity (BMI 30-39.9)    PAD (peripheral artery disease)  RLE bypass 11/30/15  Prostate cancer    Sleep apnea  non-compliant

## 2017-06-05 NOTE — ASU PATIENT PROFILE, ADULT - FALLEN IN THE PAST
yes/4/15/17 tripped and fell, evaluated in the ED at [facility], CXR CT done,, no head injury, had stiches placed above left eye.

## 2017-06-06 ENCOUNTER — APPOINTMENT (OUTPATIENT)
Dept: VASCULAR SURGERY | Facility: HOSPITAL | Age: 67
End: 2017-06-06

## 2017-06-06 ENCOUNTER — INPATIENT (INPATIENT)
Facility: HOSPITAL | Age: 67
LOS: 2 days | Discharge: HOME | End: 2017-06-09
Attending: SURGERY | Admitting: SURGERY
Payer: MEDICARE

## 2017-06-06 ENCOUNTER — RESULT REVIEW (OUTPATIENT)
Age: 67
End: 2017-06-06

## 2017-06-06 VITALS
RESPIRATION RATE: 16 BRPM | HEIGHT: 64 IN | DIASTOLIC BLOOD PRESSURE: 58 MMHG | OXYGEN SATURATION: 99 % | HEART RATE: 61 BPM | TEMPERATURE: 99 F | WEIGHT: 199.96 LBS | SYSTOLIC BLOOD PRESSURE: 145 MMHG

## 2017-06-06 DIAGNOSIS — Z95.828 PRESENCE OF OTHER VASCULAR IMPLANTS AND GRAFTS: Chronic | ICD-10-CM

## 2017-06-06 DIAGNOSIS — Z95.5 PRESENCE OF CORONARY ANGIOPLASTY IMPLANT AND GRAFT: Chronic | ICD-10-CM

## 2017-06-06 DIAGNOSIS — Z95.1 PRESENCE OF AORTOCORONARY BYPASS GRAFT: Chronic | ICD-10-CM

## 2017-06-06 DIAGNOSIS — I70.219 ATHEROSCLEROSIS OF NATIVE ARTERIES OF EXTREMITIES WITH INTERMITTENT CLAUDICATION, UNSPECIFIED EXTREMITY: ICD-10-CM

## 2017-06-06 PROCEDURE — 35355 RECHANNELING OF ARTERY: CPT | Mod: LT

## 2017-06-06 PROCEDURE — 88311 DECALCIFY TISSUE: CPT | Mod: 26

## 2017-06-06 PROCEDURE — 35302 RECHANNELING OF ARTERY: CPT | Mod: LT

## 2017-06-06 PROCEDURE — 88304 TISSUE EXAM BY PATHOLOGIST: CPT | Mod: 26

## 2017-06-06 RX ORDER — ATORVASTATIN CALCIUM 80 MG/1
80 TABLET, FILM COATED ORAL AT BEDTIME
Qty: 0 | Refills: 0 | Status: DISCONTINUED | OUTPATIENT
Start: 2017-06-06 | End: 2017-06-09

## 2017-06-06 RX ORDER — LOSARTAN POTASSIUM 100 MG/1
100 TABLET, FILM COATED ORAL DAILY
Qty: 0 | Refills: 0 | Status: DISCONTINUED | OUTPATIENT
Start: 2017-06-06 | End: 2017-06-09

## 2017-06-06 RX ORDER — DEXTROSE 50 % IN WATER 50 %
12.5 SYRINGE (ML) INTRAVENOUS ONCE
Qty: 0 | Refills: 0 | Status: DISCONTINUED | OUTPATIENT
Start: 2017-06-06 | End: 2017-06-07

## 2017-06-06 RX ORDER — HYDROMORPHONE HYDROCHLORIDE 2 MG/ML
0.5 INJECTION INTRAMUSCULAR; INTRAVENOUS; SUBCUTANEOUS
Qty: 0 | Refills: 0 | Status: DISCONTINUED | OUTPATIENT
Start: 2017-06-06 | End: 2017-06-06

## 2017-06-06 RX ORDER — DEXTROSE 50 % IN WATER 50 %
25 SYRINGE (ML) INTRAVENOUS ONCE
Qty: 0 | Refills: 0 | Status: DISCONTINUED | OUTPATIENT
Start: 2017-06-06 | End: 2017-06-07

## 2017-06-06 RX ORDER — INSULIN LISPRO 100/ML
VIAL (ML) SUBCUTANEOUS
Qty: 0 | Refills: 0 | Status: DISCONTINUED | OUTPATIENT
Start: 2017-06-06 | End: 2017-06-07

## 2017-06-06 RX ORDER — LEVOTHYROXINE SODIUM 125 MCG
150 TABLET ORAL DAILY
Qty: 0 | Refills: 0 | Status: DISCONTINUED | OUTPATIENT
Start: 2017-06-06 | End: 2017-06-09

## 2017-06-06 RX ORDER — METOPROLOL TARTRATE 50 MG
25 TABLET ORAL
Qty: 0 | Refills: 0 | Status: DISCONTINUED | OUTPATIENT
Start: 2017-06-06 | End: 2017-06-07

## 2017-06-06 RX ORDER — MORPHINE SULFATE 50 MG/1
2 CAPSULE, EXTENDED RELEASE ORAL EVERY 4 HOURS
Qty: 0 | Refills: 0 | Status: DISCONTINUED | OUTPATIENT
Start: 2017-06-06 | End: 2017-06-07

## 2017-06-06 RX ORDER — INSULIN LISPRO 100/ML
VIAL (ML) SUBCUTANEOUS AT BEDTIME
Qty: 0 | Refills: 0 | Status: DISCONTINUED | OUTPATIENT
Start: 2017-06-06 | End: 2017-06-07

## 2017-06-06 RX ORDER — ONDANSETRON 8 MG/1
4 TABLET, FILM COATED ORAL ONCE
Qty: 0 | Refills: 0 | Status: DISCONTINUED | OUTPATIENT
Start: 2017-06-06 | End: 2017-06-06

## 2017-06-06 RX ORDER — AMLODIPINE BESYLATE 2.5 MG/1
5 TABLET ORAL DAILY
Qty: 0 | Refills: 0 | Status: DISCONTINUED | OUTPATIENT
Start: 2017-06-06 | End: 2017-06-09

## 2017-06-06 RX ORDER — HEPARIN SODIUM 5000 [USP'U]/ML
5000 INJECTION INTRAVENOUS; SUBCUTANEOUS EVERY 8 HOURS
Qty: 0 | Refills: 0 | Status: DISCONTINUED | OUTPATIENT
Start: 2017-06-06 | End: 2017-06-09

## 2017-06-06 RX ORDER — SODIUM CHLORIDE 9 MG/ML
3 INJECTION INTRAMUSCULAR; INTRAVENOUS; SUBCUTANEOUS EVERY 8 HOURS
Qty: 0 | Refills: 0 | Status: DISCONTINUED | OUTPATIENT
Start: 2017-06-06 | End: 2017-06-09

## 2017-06-06 RX ORDER — GABAPENTIN 400 MG/1
300 CAPSULE ORAL EVERY 6 HOURS
Qty: 0 | Refills: 0 | Status: DISCONTINUED | OUTPATIENT
Start: 2017-06-06 | End: 2017-06-09

## 2017-06-06 RX ORDER — GLUCAGON INJECTION, SOLUTION 0.5 MG/.1ML
1 INJECTION, SOLUTION SUBCUTANEOUS ONCE
Qty: 0 | Refills: 0 | Status: DISCONTINUED | OUTPATIENT
Start: 2017-06-06 | End: 2017-06-07

## 2017-06-06 RX ORDER — ASPIRIN/CALCIUM CARB/MAGNESIUM 324 MG
81 TABLET ORAL DAILY
Qty: 0 | Refills: 0 | Status: DISCONTINUED | OUTPATIENT
Start: 2017-06-06 | End: 2017-06-09

## 2017-06-06 RX ORDER — SODIUM CHLORIDE 9 MG/ML
1000 INJECTION, SOLUTION INTRAVENOUS
Qty: 0 | Refills: 0 | Status: DISCONTINUED | OUTPATIENT
Start: 2017-06-06 | End: 2017-06-07

## 2017-06-06 RX ORDER — DEXTROSE 50 % IN WATER 50 %
1 SYRINGE (ML) INTRAVENOUS ONCE
Qty: 0 | Refills: 0 | Status: DISCONTINUED | OUTPATIENT
Start: 2017-06-06 | End: 2017-06-07

## 2017-06-06 RX ORDER — CEFAZOLIN SODIUM 1 G
2000 VIAL (EA) INJECTION EVERY 8 HOURS
Qty: 0 | Refills: 0 | Status: COMPLETED | OUTPATIENT
Start: 2017-06-06 | End: 2017-06-07

## 2017-06-06 RX ADMIN — SODIUM CHLORIDE 30 MILLILITER(S): 9 INJECTION, SOLUTION INTRAVENOUS at 10:47

## 2017-06-06 RX ADMIN — ATORVASTATIN CALCIUM 80 MILLIGRAM(S): 80 TABLET, FILM COATED ORAL at 21:32

## 2017-06-06 RX ADMIN — Medication: at 19:47

## 2017-06-06 RX ADMIN — Medication 100 MILLIGRAM(S): at 21:32

## 2017-06-06 RX ADMIN — Medication 25 MILLIGRAM(S): at 21:31

## 2017-06-06 RX ADMIN — Medication 1: at 23:33

## 2017-06-06 RX ADMIN — SODIUM CHLORIDE 3 MILLILITER(S): 9 INJECTION INTRAMUSCULAR; INTRAVENOUS; SUBCUTANEOUS at 21:31

## 2017-06-06 RX ADMIN — HEPARIN SODIUM 5000 UNIT(S): 5000 INJECTION INTRAVENOUS; SUBCUTANEOUS at 21:32

## 2017-06-06 NOTE — PROGRESS NOTE ADULT - ASSESSMENT
66M s/p LLE endarterectomy, doing well.  - Pain control  - Ambulate as tolerated  - Monitor access site for hematoma or swelling  - ISS  - IS/pulm toilet    Georgiana Randolph PGY1

## 2017-06-06 NOTE — PROGRESS NOTE ADULT - SUBJECTIVE AND OBJECTIVE BOX
Surgery Post-Op Note    Pre-Op Dx: Claudication  Procedure: Left femoral endarterectomy  Surgeon: Vincenzo    Subjective:   Pt seen and examined at the bedside. Pt w/ no complaints. Denies F/C/N/V. Pain controlled with medication.     Vital Signs Last 24 Hrs  T(C): 36.5, Max: 37 (06-06 @ 09:40)  T(F): 97.7, Max: 98.6 (06-06 @ 09:40)  HR: 72 (52 - 72)  BP: 125/59 (117/50 - 145/58)  BP(mean): --  RR: 15 (11 - 20)  SpO2: 98% (97% - 100%)    Physical Exam:  General: NAD, resting comfortably in bed  Neuro: A/O x 3, no focal deficits  Pulmonary: Nonlabored breathing, no respiratory distress  Incision: C/D/I; dressing taken down and replaced. No evidence of hematoma or swelling.   Extremities: Dopplerable Left PT pulse

## 2017-06-06 NOTE — ASU PREOP CHECKLIST - COMMENTS
symbicort, levothyroxine, losartan, metoprolol. famotidine symbicort, levothyroxine, losartan, metoprolol. famotidine at 7 am

## 2017-06-07 RX ORDER — INSULIN LISPRO 100/ML
VIAL (ML) SUBCUTANEOUS
Qty: 0 | Refills: 0 | Status: DISCONTINUED | OUTPATIENT
Start: 2017-06-07 | End: 2017-06-07

## 2017-06-07 RX ORDER — INSULIN ASPART 100 [IU]/ML
1 INJECTION, SOLUTION SUBCUTANEOUS
Qty: 0 | Refills: 0 | Status: DISCONTINUED | OUTPATIENT
Start: 2017-06-07 | End: 2017-06-08

## 2017-06-07 RX ORDER — TAMSULOSIN HYDROCHLORIDE 0.4 MG/1
0.4 CAPSULE ORAL AT BEDTIME
Qty: 0 | Refills: 0 | Status: DISCONTINUED | OUTPATIENT
Start: 2017-06-07 | End: 2017-06-09

## 2017-06-07 RX ORDER — TAMSULOSIN HYDROCHLORIDE 0.4 MG/1
0.4 CAPSULE ORAL ONCE
Qty: 0 | Refills: 0 | Status: COMPLETED | OUTPATIENT
Start: 2017-06-07 | End: 2017-06-07

## 2017-06-07 RX ORDER — INSULIN LISPRO 100/ML
VIAL (ML) SUBCUTANEOUS AT BEDTIME
Qty: 0 | Refills: 0 | Status: DISCONTINUED | OUTPATIENT
Start: 2017-06-07 | End: 2017-06-07

## 2017-06-07 RX ORDER — METOPROLOL TARTRATE 50 MG
25 TABLET ORAL
Qty: 0 | Refills: 0 | Status: DISCONTINUED | OUTPATIENT
Start: 2017-06-07 | End: 2017-06-09

## 2017-06-07 RX ORDER — SODIUM CHLORIDE 9 MG/ML
1000 INJECTION INTRAMUSCULAR; INTRAVENOUS; SUBCUTANEOUS
Qty: 0 | Refills: 0 | Status: DISCONTINUED | OUTPATIENT
Start: 2017-06-07 | End: 2017-06-09

## 2017-06-07 RX ADMIN — HEPARIN SODIUM 5000 UNIT(S): 5000 INJECTION INTRAVENOUS; SUBCUTANEOUS at 05:36

## 2017-06-07 RX ADMIN — Medication 2: at 07:52

## 2017-06-07 RX ADMIN — SODIUM CHLORIDE 3 MILLILITER(S): 9 INJECTION INTRAMUSCULAR; INTRAVENOUS; SUBCUTANEOUS at 15:45

## 2017-06-07 RX ADMIN — SODIUM CHLORIDE 30 MILLILITER(S): 9 INJECTION, SOLUTION INTRAVENOUS at 01:31

## 2017-06-07 RX ADMIN — TAMSULOSIN HYDROCHLORIDE 0.4 MILLIGRAM(S): 0.4 CAPSULE ORAL at 15:43

## 2017-06-07 RX ADMIN — SODIUM CHLORIDE 3 MILLILITER(S): 9 INJECTION INTRAMUSCULAR; INTRAVENOUS; SUBCUTANEOUS at 07:31

## 2017-06-07 RX ADMIN — LOSARTAN POTASSIUM 100 MILLIGRAM(S): 100 TABLET, FILM COATED ORAL at 05:34

## 2017-06-07 RX ADMIN — Medication 100 MILLIGRAM(S): at 05:34

## 2017-06-07 RX ADMIN — HEPARIN SODIUM 5000 UNIT(S): 5000 INJECTION INTRAVENOUS; SUBCUTANEOUS at 15:44

## 2017-06-07 RX ADMIN — Medication 4: at 11:55

## 2017-06-07 RX ADMIN — AMLODIPINE BESYLATE 5 MILLIGRAM(S): 2.5 TABLET ORAL at 05:35

## 2017-06-07 RX ADMIN — ATORVASTATIN CALCIUM 80 MILLIGRAM(S): 80 TABLET, FILM COATED ORAL at 23:53

## 2017-06-07 RX ADMIN — Medication 12: at 16:54

## 2017-06-07 RX ADMIN — SODIUM CHLORIDE 3 MILLILITER(S): 9 INJECTION INTRAMUSCULAR; INTRAVENOUS; SUBCUTANEOUS at 23:47

## 2017-06-07 RX ADMIN — Medication 25 MILLIGRAM(S): at 05:35

## 2017-06-07 RX ADMIN — HEPARIN SODIUM 5000 UNIT(S): 5000 INJECTION INTRAVENOUS; SUBCUTANEOUS at 23:53

## 2017-06-07 RX ADMIN — Medication 25 MILLIGRAM(S): at 19:44

## 2017-06-07 RX ADMIN — Medication 150 MICROGRAM(S): at 06:21

## 2017-06-07 RX ADMIN — Medication 81 MILLIGRAM(S): at 11:55

## 2017-06-07 RX ADMIN — TAMSULOSIN HYDROCHLORIDE 0.4 MILLIGRAM(S): 0.4 CAPSULE ORAL at 23:52

## 2017-06-07 NOTE — DISCHARGE NOTE ADULT - SECONDARY DIAGNOSIS.
Anemia CAD (coronary artery disease) CHF (congestive heart failure) HTN (hypertension) Hypothyroid DM (diabetes mellitus)

## 2017-06-07 NOTE — DISCHARGE NOTE ADULT - MEDICATION SUMMARY - MEDICATIONS TO TAKE
I will START or STAY ON the medications listed below when I get home from the hospital:    test strips  -- Monitor fingersticks 3x/day and at bedtime dispense 1 box  -- Indication: For Diabetes    alcohol swabs  -- Monitor fingersticks 3x/day and at bedtime dispense 1 box  -- Indication: For Diabetes    lancets  -- Monitor fingersticks 3x/day and at bedtime dispense 1 box  -- Indication: For Diabetes    3mm maria g needles  -- 3x/day and at bedtime dispense 1 box  -- Indication: For Diabetes    Aspirin Low Dose 81 mg oral delayed release tablet  -- 1 tab(s) by mouth once a day in morning  -- Indication: For Prevent ischemia    Tylenol 500 mg oral tablet  -- 1-2 tab(s) by mouth every 6 hours prn post procedural pain.  DO NOT EXCEED 4grams of Tylenol in any 24 hour period.  -- Indication: For PAin    losartan 100 mg oral tablet  -- 1 tab(s) by mouth once a day in morning  -- Indication: For HTN (hypertension)    gabapentin 300 mg oral capsule  -- 1 cap(s) by mouth , As Needed  -- Indication: For PAin    HumaLOG KwikPen 100 units/mL subcutaneous solution  -- 23 unit(s) subcutaneous 3 times a day (before meals). Dispense 3 pens  -- Indication: For Diabetes    Lantus Solostar Pen 100 units/mL subcutaneous solution  -- 60 unit(s) subcutaneous once a day (at bedtime)  -- Indication: For Diabetes    atorvastatin 80 mg oral tablet  -- 1 tab(s) by mouth once a day (at bedtime)  -- Indication: For HLD    metoprolol tartrate 25 mg oral tablet  -- 1 tab(s) by mouth 2 times a day  -- Indication: For HTN (hypertension)    budesonide-formoterol 160 mcg-4.5 mcg/inh inhalation aerosol  -- 2 puff(s) inhaled 2 times a day  -- Indication: For bronchodilation    amLODIPine 5 mg oral tablet  -- 1 tab(s) by mouth once a day in morning  -- Indication: For HTN (hypertension)    Feosol 325 mg (65 mg elemental iron) oral tablet  -- 1 tab(s) by mouth once a day in morning  -- Indication: For Supplement    Colace 100 mg oral capsule  -- 1 cap(s) by mouth once a day in morning  -- Indication: For Stool softner    Synthroid 150 mcg (0.15 mg) oral tablet  -- 1 tab(s) by mouth once a day in morning  -- Indication: For thyroid    cyanocobalamin 1000 mcg oral tablet  -- 1 tab(s) by mouth once a day in morning  -- Indication: For vitamin

## 2017-06-07 NOTE — DISCHARGE NOTE ADULT - HOSPITAL COURSE
66 year old male with Hx CAD, s/p three coronary stents in 2006, and CABG x3 in 3/2016, with atherosclerosis of arteries of extremities, with intermittent claudication, left, s/p RLE bypass 11/30/15, s/p failed angiogram to secondary to hypotension in 2016 at [facility], developing left leg pain with ambulation, can walk 1/2 block, referred for surgical evaluation. Doppler done revealing decreased circulation to left leg as per patient with left leg angiogram 4/25/17.   On 6/6 pt underwent elective Left Femoral Endarterectomy with Dr Loya.  Postoperatively, pt had PT signals.  On POD1, Marx was removed and pt voided.  Pt discharged home in stable condition. 66 year old male with Hx CAD, s/p three coronary stents in 2006, and CABG x3 in 3/2016, with atherosclerosis of arteries of extremities, with intermittent claudication, left, s/p RLE bypass 11/30/15, s/p failed angiogram to secondary to hypotension in 2016 at [facility], developing left leg pain with ambulation, can walk 1/2 block, referred for surgical evaluation. Doppler done revealing decreased circulation to left leg as per patient with left leg angiogram 4/25/17.   On 6/6 pt underwent elective Left Femoral Endarterectomy with Dr Loya.  Postoperatively, pt had PT signals.  On POD1, Marx was removed and pt voided.      Patient's hospital stay was complicated by hyperglycemia postoperatively. Patient is a type 2 diabetic on an insulin pump at home. During the inpatient stay an attempt was made to restart patient's home insulin pump however the pump was defective. Dr. Dexter (home Endocrinologist) assessed the patient while in patient and recommended starting patient on insulin injections until a new pump could be ordered. Patient received the new insulin regimen until fingersticks were below 200. Patient will be discharged home with lantus and humalog. Instructed to follow-up with Dr. Dexter in the outpatient setting within 7 days.     Pt discharged home in stable condition. 66 year old male with Hx CAD, s/p three coronary stents in 2006, and CABG x3 in 3/2016, with atherosclerosis of arteries of extremities, with intermittent claudication, left, s/p RLE bypass 11/30/15, s/p failed angiogram to secondary to hypotension in 2016 at [facility], developing left leg pain with ambulation, can walk 1/2 block, referred for surgical evaluation. Doppler done revealing decreased circulation to left leg as per patient with left leg angiogram 4/25/17.   On 6/6 pt underwent elective Left Femoral Endarterectomy with Dr Loya.  Postoperatively, pt had PT signals.  On POD1, Marx was removed and pt voided.      Patient's hospital stay was complicated by hyperglycemia postoperatively. Patient is a type 2 diabetic on an insulin pump at home. During the inpatient stay an attempt was made to restart patient's home insulin pump however the pump was defective. Dr. Dexter (home Endocrinologist) assessed the patient while in patient and recommended starting patient on insulin injections until a new pump could be ordered. Patient received the new insulin regimen until fingersticks were below 200. Patient will be discharged home with lantus and humalog. Instructed to follow-up with Dr. Dexter in the outpatient setting within 3 days.     Pt discharged home in stable condition. 66 year old male with Hx CAD, s/p three coronary stents in 2006, and CABG x3 in 3/2016, with atherosclerosis of arteries of extremities, with intermittent claudication, left, s/p RLE bypass 11/30/15, s/p failed angiogram to secondary to hypotension in 2016 at [facility], developing left leg pain with ambulation, can walk 1/2 block, referred for surgical evaluation. Doppler done revealing decreased circulation to left leg as per patient with left leg angiogram 4/25/17.   On 6/6 pt underwent elective Left Femoral Endarterectomy with Dr Loya.  Postoperatively, pt had PT signals.  On POD1, Marx was removed and pt voided.      Patient's hospital stay was complicated by hyperglycemia postoperatively. Patient is a type 2 diabetic on an insulin pump at home. During the inpatient stay an attempt was made to restart patient's home insulin pump however the pump was defective. Dr. Dexter (home Endocrinologist) assessed the patient while in patient and recommended starting patient on insulin injections until a new pump could be ordered. Patient received the new insulin regimen until fingersticks were approximately 200. Patient will be discharged home with lantus and humalog. Instructed to follow-up with Dr. Dexter in the outpatient setting within 3 days.     Pt discharged home in stable condition.

## 2017-06-07 NOTE — DISCHARGE NOTE ADULT - CARE PROVIDERS DIRECT ADDRESSES
,[email or web address]

## 2017-06-07 NOTE — DISCHARGE NOTE ADULT - PLAN OF CARE
s/p femoral endarterectomy WOUND CARE:  Keep incision clean and dry.  Cover with dry sterile gauze and change daily.  BATHING: Please do not submerge wound underwater. You may shower and/or sponge bathe.  DIET: Return to your usual diet.  NOTIFY YOUR SURGEON IF: You have any bleeding that does not stop, any pus draining from your wound(s), any fever (over 100.4 F) or chills, or if your pain is not controlled on your discharge pain medications.  FOLLOW-UP: Please follow up with your primary care physician in one week regarding your hospitalization   Please call Dr. Loya to make an appointment in one week [phone] Please follow-up with Dr. Dexter for continued management of diabetes. You will discharged on new insulin medications, please continue until instructed otherwise by Dr. Dexter. WOUND CARE:  Keep incision clean and dry.  Staples to be removed outpatient.   BATHING: Please do not submerge wound underwater. You may shower and/or sponge bathe.  DIET: Return to your usual diet.  NOTIFY YOUR SURGEON IF: You have any bleeding that does not stop, any pus draining from your wound(s), any fever (over 100.4 F) or chills, or if your pain is not controlled on your discharge pain medications.  FOLLOW-UP: Please follow up with your primary care physician in one week regarding your hospitalization   Please call Dr. Loya to make an appointment in one week [phone]

## 2017-06-07 NOTE — PROGRESS NOTE ADULT - ASSESSMENT
A/P 66M s/p LLE femoral endarterectomy  - C/w ASA/SQH  - c/w ancef A/P 66M s/p LLE femoral endarterectomy  - C/w ASA/SQH  - c/w ancef  - d/c salas   - adv to reg diet  - d/c planning

## 2017-06-07 NOTE — PROGRESS NOTE ADULT - SUBJECTIVE AND OBJECTIVE BOX
C Team Surgery    SUBJECTIVE:   EVELYN O/N    VITALS  T(C): 36.7, Max: 37 (06-06 @ 09:40)  HR: 89 (52 - 89)  BP: 122/58 (117/50 - 145/58)  BP(mean): --  RR: 18 (11 - 20)  SpO2: 98% (97% - 100%)  Wt(kg): --  CAPILLARY BLOOD GLUCOSE  261 (06 Jun 2017 23:09)  274 (06 Jun 2017 19:30)  167 (06 Jun 2017 09:40)      Is/Os    I & Os for current day (as of 06-07 @ 04:51)  =============================================  IN:    Oral Fluid: 440 ml    lactated ringers.: 135 ml    Total IN: 575 ml  ---------------------------------------------  OUT:    Indwelling Catheter - Urethral: 655 ml    Total OUT: 655 ml  ---------------------------------------------  Total NET: -80 ml      PHYSICAL EXAM:   General: NAD, Lying in bed comfortably  Neuro: alert, oriented x3  HEENT: NC/AT, EOMI  Cardio: RRR, nml S1/S2  Resp: Good effort, CTA b/l  GI/Abd: Soft, NT/ND, no rebound/guarding, no masses palpated    MEDICATIONS (STANDING): lactated ringers. 1000milliLiter(s) IV Continuous <Continuous>  heparin  Injectable 5000Unit(s) SubCutaneous every 8 hours  ceFAZolin   IVPB 2000milliGRAM(s) IV Intermittent every 8 hours  aspirin enteric coated 81milliGRAM(s) Oral daily  losartan 100milliGRAM(s) Oral daily  atorvastatin 80milliGRAM(s) Oral at bedtime  metoprolol 25milliGRAM(s) Oral two times a day  amLODIPine   Tablet 5milliGRAM(s) Oral daily  levothyroxine 150MICROGram(s) Oral daily  insulin lispro (HumaLOG) corrective regimen sliding scale  SubCutaneous three times a day before meals  insulin lispro (HumaLOG) corrective regimen sliding scale  SubCutaneous at bedtime  dextrose 5%. 1000milliLiter(s) IV Continuous <Continuous>  dextrose 50% Injectable 12.5Gram(s) IV Push once  dextrose 50% Injectable 25Gram(s) IV Push once  dextrose 50% Injectable 25Gram(s) IV Push once    MEDICATIONS (PRN):oxyCODONE  5 mG/acetaminophen 325 mG 1Tablet(s) Oral every 4 hours PRN Moderate Pain  morphine  - Injectable 2milliGRAM(s) IV Push every 4 hours PRN Severe Pain  gabapentin 300milliGRAM(s) Oral every 6 hours PRN neuropathy  dextrose Gel 1Dose(s) Oral once PRN Blood Glucose LESS THAN 70 milliGRAM(s)/deciliter  glucagon  Injectable 1milliGRAM(s) IntraMuscular once PRN Glucose LESS THAN 70 milligrams/deciliter C Team Surgery    SUBJECTIVE:   EVELYN O/N    VITALS  Vital Signs Last 24 Hrs  T(C): 37, Max: 37 (06-06 @ 09:40)  T(F): 98.6, Max: 98.6 (06-06 @ 09:40)  HR: 78 (52 - 89)  BP: 117/59 (117/50 - 145/58)  BP(mean): --  RR: 16 (11 - 20)  SpO2: 99% (97% - 100%)    I&O's Detail  I & Os for 24h ending 07 Jun 2017 07:00  =============================================  IN:    Oral Fluid: 440 ml    lactated ringers.: 135 ml    Total IN: 575 ml  ---------------------------------------------  OUT:    Indwelling Catheter - Urethral: 1055 ml    Total OUT: 1055 ml  ---------------------------------------------  Total NET: -480 ml    I & Os for current day (as of 07 Jun 2017 09:08)  =============================================  IN:    lactated ringers.: 240 ml    IV PiggyBack: 50 ml    Total IN: 290 ml  ---------------------------------------------  OUT:    Total OUT: 0 ml  ---------------------------------------------  Total NET: 290 ml      CAPILLARY BLOOD GLUCOSE  247 (07 Jun 2017 07:23)  261 (06 Jun 2017 23:09)  274 (06 Jun 2017 19:30)  167 (06 Jun 2017 09:40)      PHYSICAL EXAM:   General: NAD, Lying in bed comfortably  Neuro: alert, oriented x3  HEENT: NC/AT, EOMI  Cardio: RRR, nml S1/S2  Resp: Good effort, CTA b/l  GI/Abd: Soft, NT/ND, no rebound/guarding, no masses palpated  Incision: No evidence of hematoma or swelling.   Extremities: Dopplerable Left PT pulse    MEDICATIONS (STANDING): lactated ringers. 1000milliLiter(s) IV Continuous <Continuous>  heparin  Injectable 5000Unit(s) SubCutaneous every 8 hours  ceFAZolin   IVPB 2000milliGRAM(s) IV Intermittent every 8 hours  aspirin enteric coated 81milliGRAM(s) Oral daily  losartan 100milliGRAM(s) Oral daily  atorvastatin 80milliGRAM(s) Oral at bedtime  metoprolol 25milliGRAM(s) Oral two times a day  amLODIPine   Tablet 5milliGRAM(s) Oral daily  levothyroxine 150MICROGram(s) Oral daily  insulin lispro (HumaLOG) corrective regimen sliding scale  SubCutaneous three times a day before meals  insulin lispro (HumaLOG) corrective regimen sliding scale  SubCutaneous at bedtime  dextrose 5%. 1000milliLiter(s) IV Continuous <Continuous>  dextrose 50% Injectable 12.5Gram(s) IV Push once  dextrose 50% Injectable 25Gram(s) IV Push once  dextrose 50% Injectable 25Gram(s) IV Push once    MEDICATIONS (PRN):oxyCODONE  5 mG/acetaminophen 325 mG 1Tablet(s) Oral every 4 hours PRN Moderate Pain  morphine  - Injectable 2milliGRAM(s) IV Push every 4 hours PRN Severe Pain  gabapentin 300milliGRAM(s) Oral every 6 hours PRN neuropathy  dextrose Gel 1Dose(s) Oral once PRN Blood Glucose LESS THAN 70 milliGRAM(s)/deciliter  glucagon  Injectable 1milliGRAM(s) IntraMuscular once PRN Glucose LESS THAN 70 milligrams/deciliter

## 2017-06-07 NOTE — DISCHARGE NOTE ADULT - MEDICATION SUMMARY - MEDICATIONS TO STOP TAKING
I will STOP taking the medications listed below when I get home from the hospital:    NovoLOG 100 units/mL subcutaneous solution  --   5 units subcutaneous  via insulin pump every hour -----  pt. boluses prior to meals (depending on finger stick) 8 to 12 units  5/23/17 ; dosage to be verified    NovoLOG 100 units/mL subcutaneous solution  --  subcutaneous  - Insulin pump - basal dose uncertain - pt takes Bolus dose of 12-15u TID

## 2017-06-07 NOTE — PROVIDER CONTACT NOTE (OTHER) - ASSESSMENT
; Patient asymptomatic; Generally uses insulin pump but has been off of pump during shahzad=op periods; FS and ISS during this admit

## 2017-06-07 NOTE — DISCHARGE NOTE ADULT - CARE PROVIDER_API CALL
Enrico Loya), Surgery; Vascular Surgery  [address] [place] [address]  Phone: [phone]  Fax: [phone] Enrico Loya), Surgery; Vascular Surgery  [address] [place] [address]  Phone: [phone]  Fax: [phone]    Patti Dexter), EndocrinologyMetabDiabetes; Internal Medicine  [address] [place] [address]  Phone: [phone]  Fax: [phone]

## 2017-06-07 NOTE — PROVIDER CONTACT NOTE (OTHER) - ASSESSMENT
patient unable to adequately void post Marx removal @ 0730; abdomen soft; bladder non distended/nontender/ and non pain; patient has a hx of bladder CA and reports occasional difficulty voiding in the past  Bladder scan reveals 278 cc residual

## 2017-06-07 NOTE — DISCHARGE NOTE ADULT - CARE PLAN
Principal Discharge DX:	Atherosclerosis of arteries of extremities  Goal:	s/p femoral endarterectomy  Instructions for follow-up, activity and diet:	WOUND CARE:  Keep incision clean and dry.  Cover with dry sterile gauze and change daily.  BATHING: Please do not submerge wound underwater. You may shower and/or sponge bathe.  DIET: Return to your usual diet.  NOTIFY YOUR SURGEON IF: You have any bleeding that does not stop, any pus draining from your wound(s), any fever (over 100.4 F) or chills, or if your pain is not controlled on your discharge pain medications.  FOLLOW-UP: Please follow up with your primary care physician in one week regarding your hospitalization   Please call Dr. Loya to make an appointment in one week [phone]  Secondary Diagnosis:	Anemia  Secondary Diagnosis:	CAD (coronary artery disease)  Secondary Diagnosis:	CHF (congestive heart failure)  Secondary Diagnosis:	HTN (hypertension)  Secondary Diagnosis:	Hypothyroid  Secondary Diagnosis:	DM (diabetes mellitus) Principal Discharge DX:	Atherosclerosis of arteries of extremities  Goal:	s/p femoral endarterectomy  Instructions for follow-up, activity and diet:	WOUND CARE:  Keep incision clean and dry.  Cover with dry sterile gauze and change daily.  BATHING: Please do not submerge wound underwater. You may shower and/or sponge bathe.  DIET: Return to your usual diet.  NOTIFY YOUR SURGEON IF: You have any bleeding that does not stop, any pus draining from your wound(s), any fever (over 100.4 F) or chills, or if your pain is not controlled on your discharge pain medications.  FOLLOW-UP: Please follow up with your primary care physician in one week regarding your hospitalization   Please call Dr. Loya to make an appointment in one week [phone]  Secondary Diagnosis:	Anemia  Secondary Diagnosis:	CAD (coronary artery disease)  Secondary Diagnosis:	CHF (congestive heart failure)  Secondary Diagnosis:	HTN (hypertension)  Secondary Diagnosis:	Hypothyroid  Secondary Diagnosis:	DM (diabetes mellitus) Principal Discharge DX:	Atherosclerosis of arteries of extremities  Goal:	s/p femoral endarterectomy  Instructions for follow-up, activity and diet:	WOUND CARE:  Keep incision clean and dry.  Cover with dry sterile gauze and change daily.  BATHING: Please do not submerge wound underwater. You may shower and/or sponge bathe.  DIET: Return to your usual diet.  NOTIFY YOUR SURGEON IF: You have any bleeding that does not stop, any pus draining from your wound(s), any fever (over 100.4 F) or chills, or if your pain is not controlled on your discharge pain medications.  FOLLOW-UP: Please follow up with your primary care physician in one week regarding your hospitalization   Please call Dr. Loya to make an appointment in one week [phone]  Secondary Diagnosis:	Anemia  Secondary Diagnosis:	CAD (coronary artery disease)  Secondary Diagnosis:	CHF (congestive heart failure)  Secondary Diagnosis:	HTN (hypertension)  Secondary Diagnosis:	DM (diabetes mellitus)  Instructions for follow-up, activity and diet:	Please follow-up with Dr. Dexter for continued management of diabetes. You will discharged on new insulin medications, please continue until instructed otherwise by Dr. Dexter. Principal Discharge DX:	Atherosclerosis of arteries of extremities  Goal:	s/p femoral endarterectomy  Instructions for follow-up, activity and diet:	WOUND CARE:  Keep incision clean and dry.  Staples to be removed outpatient.   BATHING: Please do not submerge wound underwater. You may shower and/or sponge bathe.  DIET: Return to your usual diet.  NOTIFY YOUR SURGEON IF: You have any bleeding that does not stop, any pus draining from your wound(s), any fever (over 100.4 F) or chills, or if your pain is not controlled on your discharge pain medications.  FOLLOW-UP: Please follow up with your primary care physician in one week regarding your hospitalization   Please call Dr. Loya to make an appointment in one week [phone]  Secondary Diagnosis:	Anemia  Secondary Diagnosis:	CAD (coronary artery disease)  Secondary Diagnosis:	CHF (congestive heart failure)  Secondary Diagnosis:	HTN (hypertension)  Secondary Diagnosis:	DM (diabetes mellitus)  Instructions for follow-up, activity and diet:	Please follow-up with Dr. Dexter for continued management of diabetes. You will discharged on new insulin medications, please continue until instructed otherwise by Dr. Dexter. Principal Discharge DX:	Atherosclerosis of arteries of extremities  Goal:	s/p femoral endarterectomy  Instructions for follow-up, activity and diet:	WOUND CARE:  Keep incision clean and dry.  Staples to be removed outpatient.   BATHING: Please do not submerge wound underwater. You may shower and/or sponge bathe.  DIET: Return to your usual diet.  NOTIFY YOUR SURGEON IF: You have any bleeding that does not stop, any pus draining from your wound(s), any fever (over 100.4 F) or chills, or if your pain is not controlled on your discharge pain medications.  FOLLOW-UP: Please follow up with your primary care physician in one week regarding your hospitalization   Please call Dr. Loya to make an appointment in one week [phone]  Secondary Diagnosis:	Anemia  Secondary Diagnosis:	CAD (coronary artery disease)  Secondary Diagnosis:	CHF (congestive heart failure)  Secondary Diagnosis:	HTN (hypertension)  Secondary Diagnosis:	DM (diabetes mellitus)  Instructions for follow-up, activity and diet:	Please follow-up with Dr. Dexter for continued management of diabetes. You will discharged on new insulin medications, please continue until instructed otherwise by Dr. Dexter. Principal Discharge DX:	Atherosclerosis of arteries of extremities  Goal:	s/p femoral endarterectomy  Instructions for follow-up, activity and diet:	WOUND CARE:  Keep incision clean and dry.  Staples to be removed outpatient.   BATHING: Please do not submerge wound underwater. You may shower and/or sponge bathe.  DIET: Return to your usual diet.  NOTIFY YOUR SURGEON IF: You have any bleeding that does not stop, any pus draining from your wound(s), any fever (over 100.4 F) or chills, or if your pain is not controlled on your discharge pain medications.  FOLLOW-UP: Please follow up with your primary care physician in one week regarding your hospitalization   Please call Dr. Loya to make an appointment in one week [phone]  Secondary Diagnosis:	Anemia  Secondary Diagnosis:	CAD (coronary artery disease)  Secondary Diagnosis:	CHF (congestive heart failure)  Secondary Diagnosis:	HTN (hypertension)  Secondary Diagnosis:	DM (diabetes mellitus)  Instructions for follow-up, activity and diet:	Please follow-up with Dr. Dexter for continued management of diabetes. You will discharged on new insulin medications, please continue until instructed otherwise by Dr. Dexter. Principal Discharge DX:	Atherosclerosis of arteries of extremities  Goal:	s/p femoral endarterectomy  Instructions for follow-up, activity and diet:	WOUND CARE:  Keep incision clean and dry.  Staples to be removed outpatient.   BATHING: Please do not submerge wound underwater. You may shower and/or sponge bathe.  DIET: Return to your usual diet.  NOTIFY YOUR SURGEON IF: You have any bleeding that does not stop, any pus draining from your wound(s), any fever (over 100.4 F) or chills, or if your pain is not controlled on your discharge pain medications.  FOLLOW-UP: Please follow up with your primary care physician in one week regarding your hospitalization   Please call Dr. Loya to make an appointment in one week [phone]  Secondary Diagnosis:	Anemia  Secondary Diagnosis:	CAD (coronary artery disease)  Secondary Diagnosis:	CHF (congestive heart failure)  Secondary Diagnosis:	HTN (hypertension)  Secondary Diagnosis:	DM (diabetes mellitus)  Instructions for follow-up, activity and diet:	Please follow-up with Dr. Dexter for continued management of diabetes. You will discharged on new insulin medications, please continue until instructed otherwise by Dr. Dexter.

## 2017-06-08 DIAGNOSIS — I73.9 PERIPHERAL VASCULAR DISEASE, UNSPECIFIED: ICD-10-CM

## 2017-06-08 DIAGNOSIS — I25.10 ATHEROSCLEROTIC HEART DISEASE OF NATIVE CORONARY ARTERY WITHOUT ANGINA PECTORIS: ICD-10-CM

## 2017-06-08 DIAGNOSIS — I50.9 HEART FAILURE, UNSPECIFIED: ICD-10-CM

## 2017-06-08 LAB
ACETONE SERPL-MCNC: SIGNIFICANT CHANGE UP
B-OH-BUTYR SERPL-SCNC: 0.1 MMOL/L — SIGNIFICANT CHANGE UP (ref 0–0.4)
BASE EXCESS BLDV CALC-SCNC: 4.1 MMOL/L — SIGNIFICANT CHANGE UP
BUN SERPL-MCNC: 27 MG/DL — HIGH (ref 7–23)
BUN SERPL-MCNC: 30 MG/DL — HIGH (ref 7–23)
BUN SERPL-MCNC: 31 MG/DL — HIGH (ref 7–23)
CALCIUM SERPL-MCNC: 8.3 MG/DL — LOW (ref 8.4–10.5)
CALCIUM SERPL-MCNC: 8.7 MG/DL — SIGNIFICANT CHANGE UP (ref 8.4–10.5)
CALCIUM SERPL-MCNC: 9.1 MG/DL — SIGNIFICANT CHANGE UP (ref 8.4–10.5)
CHLORIDE SERPL-SCNC: 88 MMOL/L — LOW (ref 98–107)
CHLORIDE SERPL-SCNC: 92 MMOL/L — LOW (ref 98–107)
CHLORIDE SERPL-SCNC: 93 MMOL/L — LOW (ref 98–107)
CO2 SERPL-SCNC: 20 MMOL/L — LOW (ref 22–31)
CO2 SERPL-SCNC: 23 MMOL/L — SIGNIFICANT CHANGE UP (ref 22–31)
CO2 SERPL-SCNC: 23 MMOL/L — SIGNIFICANT CHANGE UP (ref 22–31)
CREAT SERPL-MCNC: 1.3 MG/DL — SIGNIFICANT CHANGE UP (ref 0.5–1.3)
CREAT SERPL-MCNC: 1.46 MG/DL — HIGH (ref 0.5–1.3)
CREAT SERPL-MCNC: 1.59 MG/DL — HIGH (ref 0.5–1.3)
GLUCOSE SERPL-MCNC: 317 MG/DL — HIGH (ref 70–99)
GLUCOSE SERPL-MCNC: 375 MG/DL — HIGH (ref 70–99)
GLUCOSE SERPL-MCNC: 378 MG/DL — HIGH (ref 70–99)
HCO3 BLDV-SCNC: 27 MMOL/L — SIGNIFICANT CHANGE UP (ref 20–27)
HCT VFR BLD CALC: 31.1 % — LOW (ref 39–50)
HCT VFR BLD CALC: 34.6 % — LOW (ref 39–50)
HGB BLD-MCNC: 10.8 G/DL — LOW (ref 13–17)
HGB BLD-MCNC: 9.7 G/DL — LOW (ref 13–17)
MAGNESIUM SERPL-MCNC: 1.5 MG/DL — LOW (ref 1.6–2.6)
MAGNESIUM SERPL-MCNC: 2.1 MG/DL — SIGNIFICANT CHANGE UP (ref 1.6–2.6)
MCHC RBC-ENTMCNC: 26.8 PG — LOW (ref 27–34)
MCHC RBC-ENTMCNC: 27.1 PG — SIGNIFICANT CHANGE UP (ref 27–34)
MCHC RBC-ENTMCNC: 31.2 % — LOW (ref 32–36)
MCHC RBC-ENTMCNC: 31.2 % — LOW (ref 32–36)
MCV RBC AUTO: 85.9 FL — SIGNIFICANT CHANGE UP (ref 80–100)
MCV RBC AUTO: 86.7 FL — SIGNIFICANT CHANGE UP (ref 80–100)
PCO2 BLDV: 58 MMHG — HIGH (ref 41–51)
PH BLDV: 7.33 PH — SIGNIFICANT CHANGE UP (ref 7.32–7.43)
PHOSPHATE SERPL-MCNC: 3.5 MG/DL — SIGNIFICANT CHANGE UP (ref 2.5–4.5)
PHOSPHATE SERPL-MCNC: 3.8 MG/DL — SIGNIFICANT CHANGE UP (ref 2.5–4.5)
PLATELET # BLD AUTO: 135 K/UL — LOW (ref 150–400)
PLATELET # BLD AUTO: 176 K/UL — SIGNIFICANT CHANGE UP (ref 150–400)
PMV BLD: 10.7 FL — SIGNIFICANT CHANGE UP (ref 7–13)
PMV BLD: 11.1 FL — SIGNIFICANT CHANGE UP (ref 7–13)
PO2 BLDV: 41 MMHG — HIGH (ref 35–40)
POTASSIUM SERPL-MCNC: 4.1 MMOL/L — SIGNIFICANT CHANGE UP (ref 3.5–5.3)
POTASSIUM SERPL-MCNC: 4.3 MMOL/L — SIGNIFICANT CHANGE UP (ref 3.5–5.3)
POTASSIUM SERPL-MCNC: 4.9 MMOL/L — SIGNIFICANT CHANGE UP (ref 3.5–5.3)
POTASSIUM SERPL-SCNC: 4.1 MMOL/L — SIGNIFICANT CHANGE UP (ref 3.5–5.3)
POTASSIUM SERPL-SCNC: 4.3 MMOL/L — SIGNIFICANT CHANGE UP (ref 3.5–5.3)
POTASSIUM SERPL-SCNC: 4.9 MMOL/L — SIGNIFICANT CHANGE UP (ref 3.5–5.3)
RBC # BLD: 3.62 M/UL — LOW (ref 4.2–5.8)
RBC # BLD: 3.99 M/UL — LOW (ref 4.2–5.8)
RBC # FLD: 15.4 % — HIGH (ref 10.3–14.5)
RBC # FLD: 15.6 % — HIGH (ref 10.3–14.5)
SAO2 % BLDV: 65.8 % — SIGNIFICANT CHANGE UP (ref 60–85)
SODIUM SERPL-SCNC: 130 MMOL/L — LOW (ref 135–145)
SODIUM SERPL-SCNC: 132 MMOL/L — LOW (ref 135–145)
SODIUM SERPL-SCNC: 132 MMOL/L — LOW (ref 135–145)
WBC # BLD: 6.23 K/UL — SIGNIFICANT CHANGE UP (ref 3.8–10.5)
WBC # BLD: 6.73 K/UL — SIGNIFICANT CHANGE UP (ref 3.8–10.5)
WBC # FLD AUTO: 6.23 K/UL — SIGNIFICANT CHANGE UP (ref 3.8–10.5)
WBC # FLD AUTO: 6.73 K/UL — SIGNIFICANT CHANGE UP (ref 3.8–10.5)

## 2017-06-08 RX ORDER — INSULIN LISPRO 100/ML
8 VIAL (ML) SUBCUTANEOUS
Qty: 0 | Refills: 0 | Status: DISCONTINUED | OUTPATIENT
Start: 2017-06-08 | End: 2017-06-08

## 2017-06-08 RX ORDER — INSULIN LISPRO 100/ML
16 VIAL (ML) SUBCUTANEOUS
Qty: 0 | Refills: 0 | Status: DISCONTINUED | OUTPATIENT
Start: 2017-06-08 | End: 2017-06-08

## 2017-06-08 RX ORDER — INSULIN LISPRO 100/ML
VIAL (ML) SUBCUTANEOUS AT BEDTIME
Qty: 0 | Refills: 0 | Status: DISCONTINUED | OUTPATIENT
Start: 2017-06-08 | End: 2017-06-09

## 2017-06-08 RX ORDER — INSULIN GLARGINE 100 [IU]/ML
45 INJECTION, SOLUTION SUBCUTANEOUS AT BEDTIME
Qty: 0 | Refills: 0 | Status: DISCONTINUED | OUTPATIENT
Start: 2017-06-08 | End: 2017-06-08

## 2017-06-08 RX ORDER — INSULIN LISPRO 100/ML
VIAL (ML) SUBCUTANEOUS
Qty: 0 | Refills: 0 | Status: DISCONTINUED | OUTPATIENT
Start: 2017-06-08 | End: 2017-06-09

## 2017-06-08 RX ORDER — SODIUM CHLORIDE 9 MG/ML
1000 INJECTION INTRAMUSCULAR; INTRAVENOUS; SUBCUTANEOUS ONCE
Qty: 0 | Refills: 0 | Status: COMPLETED | OUTPATIENT
Start: 2017-06-08 | End: 2017-06-08

## 2017-06-08 RX ORDER — INSULIN LISPRO 100/ML
8 VIAL (ML) SUBCUTANEOUS ONCE
Qty: 0 | Refills: 0 | Status: COMPLETED | OUTPATIENT
Start: 2017-06-08 | End: 2017-06-08

## 2017-06-08 RX ORDER — INSULIN LISPRO 100/ML
20 VIAL (ML) SUBCUTANEOUS
Qty: 0 | Refills: 0 | Status: DISCONTINUED | OUTPATIENT
Start: 2017-06-08 | End: 2017-06-09

## 2017-06-08 RX ORDER — MAGNESIUM SULFATE 500 MG/ML
2 VIAL (ML) INJECTION ONCE
Qty: 0 | Refills: 0 | Status: COMPLETED | OUTPATIENT
Start: 2017-06-08 | End: 2017-06-08

## 2017-06-08 RX ORDER — GLUCAGON INJECTION, SOLUTION 0.5 MG/.1ML
1 INJECTION, SOLUTION SUBCUTANEOUS ONCE
Qty: 0 | Refills: 0 | Status: DISCONTINUED | OUTPATIENT
Start: 2017-06-08 | End: 2017-06-09

## 2017-06-08 RX ORDER — INSULIN LISPRO 100/ML
VIAL (ML) SUBCUTANEOUS
Qty: 0 | Refills: 0 | Status: DISCONTINUED | OUTPATIENT
Start: 2017-06-08 | End: 2017-06-08

## 2017-06-08 RX ORDER — SODIUM CHLORIDE 9 MG/ML
1000 INJECTION, SOLUTION INTRAVENOUS
Qty: 0 | Refills: 0 | Status: DISCONTINUED | OUTPATIENT
Start: 2017-06-08 | End: 2017-06-09

## 2017-06-08 RX ORDER — INSULIN GLARGINE 100 [IU]/ML
60 INJECTION, SOLUTION SUBCUTANEOUS AT BEDTIME
Qty: 0 | Refills: 0 | Status: DISCONTINUED | OUTPATIENT
Start: 2017-06-08 | End: 2017-06-09

## 2017-06-08 RX ORDER — INSULIN LISPRO 100/ML
VIAL (ML) SUBCUTANEOUS AT BEDTIME
Qty: 0 | Refills: 0 | Status: DISCONTINUED | OUTPATIENT
Start: 2017-06-08 | End: 2017-06-08

## 2017-06-08 RX ORDER — DEXTROSE 50 % IN WATER 50 %
25 SYRINGE (ML) INTRAVENOUS ONCE
Qty: 0 | Refills: 0 | Status: DISCONTINUED | OUTPATIENT
Start: 2017-06-08 | End: 2017-06-09

## 2017-06-08 RX ORDER — HUMAN INSULIN 100 [IU]/ML
12 INJECTION, SUSPENSION SUBCUTANEOUS ONCE
Qty: 0 | Refills: 0 | Status: COMPLETED | OUTPATIENT
Start: 2017-06-08 | End: 2017-06-08

## 2017-06-08 RX ORDER — INSULIN LISPRO 100/ML
4 VIAL (ML) SUBCUTANEOUS ONCE
Qty: 0 | Refills: 0 | Status: COMPLETED | OUTPATIENT
Start: 2017-06-08 | End: 2017-06-08

## 2017-06-08 RX ORDER — DEXTROSE 50 % IN WATER 50 %
1 SYRINGE (ML) INTRAVENOUS ONCE
Qty: 0 | Refills: 0 | Status: DISCONTINUED | OUTPATIENT
Start: 2017-06-08 | End: 2017-06-09

## 2017-06-08 RX ORDER — DEXTROSE 50 % IN WATER 50 %
12.5 SYRINGE (ML) INTRAVENOUS ONCE
Qty: 0 | Refills: 0 | Status: DISCONTINUED | OUTPATIENT
Start: 2017-06-08 | End: 2017-06-09

## 2017-06-08 RX ORDER — INSULIN GLARGINE 100 [IU]/ML
30 INJECTION, SOLUTION SUBCUTANEOUS AT BEDTIME
Qty: 0 | Refills: 0 | Status: DISCONTINUED | OUTPATIENT
Start: 2017-06-08 | End: 2017-06-08

## 2017-06-08 RX ADMIN — SODIUM CHLORIDE 75 MILLILITER(S): 9 INJECTION INTRAMUSCULAR; INTRAVENOUS; SUBCUTANEOUS at 22:25

## 2017-06-08 RX ADMIN — Medication 8: at 07:36

## 2017-06-08 RX ADMIN — HUMAN INSULIN 12 UNIT(S): 100 INJECTION, SUSPENSION SUBCUTANEOUS at 01:28

## 2017-06-08 RX ADMIN — Medication 50 GRAM(S): at 08:03

## 2017-06-08 RX ADMIN — Medication 8 UNIT(S): at 12:12

## 2017-06-08 RX ADMIN — Medication 8 UNIT(S): at 18:08

## 2017-06-08 RX ADMIN — Medication 25 MILLIGRAM(S): at 18:44

## 2017-06-08 RX ADMIN — SODIUM CHLORIDE 1000 MILLILITER(S): 9 INJECTION INTRAMUSCULAR; INTRAVENOUS; SUBCUTANEOUS at 03:26

## 2017-06-08 RX ADMIN — HEPARIN SODIUM 5000 UNIT(S): 5000 INJECTION INTRAVENOUS; SUBCUTANEOUS at 13:10

## 2017-06-08 RX ADMIN — INSULIN GLARGINE 60 UNIT(S): 100 INJECTION, SOLUTION SUBCUTANEOUS at 21:56

## 2017-06-08 RX ADMIN — Medication 81 MILLIGRAM(S): at 12:12

## 2017-06-08 RX ADMIN — SODIUM CHLORIDE 3 MILLILITER(S): 9 INJECTION INTRAMUSCULAR; INTRAVENOUS; SUBCUTANEOUS at 13:11

## 2017-06-08 RX ADMIN — Medication 150 MICROGRAM(S): at 05:43

## 2017-06-08 RX ADMIN — ATORVASTATIN CALCIUM 80 MILLIGRAM(S): 80 TABLET, FILM COATED ORAL at 22:00

## 2017-06-08 RX ADMIN — Medication 16 UNIT(S): at 16:37

## 2017-06-08 RX ADMIN — Medication 6: at 21:55

## 2017-06-08 RX ADMIN — SODIUM CHLORIDE 75 MILLILITER(S): 9 INJECTION INTRAMUSCULAR; INTRAVENOUS; SUBCUTANEOUS at 10:55

## 2017-06-08 RX ADMIN — HEPARIN SODIUM 5000 UNIT(S): 5000 INJECTION INTRAVENOUS; SUBCUTANEOUS at 05:43

## 2017-06-08 RX ADMIN — AMLODIPINE BESYLATE 5 MILLIGRAM(S): 2.5 TABLET ORAL at 05:43

## 2017-06-08 RX ADMIN — HEPARIN SODIUM 5000 UNIT(S): 5000 INJECTION INTRAVENOUS; SUBCUTANEOUS at 22:00

## 2017-06-08 RX ADMIN — TAMSULOSIN HYDROCHLORIDE 0.4 MILLIGRAM(S): 0.4 CAPSULE ORAL at 22:00

## 2017-06-08 RX ADMIN — LOSARTAN POTASSIUM 100 MILLIGRAM(S): 100 TABLET, FILM COATED ORAL at 05:43

## 2017-06-08 RX ADMIN — SODIUM CHLORIDE 3 MILLILITER(S): 9 INJECTION INTRAMUSCULAR; INTRAVENOUS; SUBCUTANEOUS at 05:41

## 2017-06-08 RX ADMIN — Medication 4 UNIT(S): at 04:26

## 2017-06-08 RX ADMIN — Medication: at 16:37

## 2017-06-08 RX ADMIN — Medication: at 12:12

## 2017-06-08 RX ADMIN — SODIUM CHLORIDE 75 MILLILITER(S): 9 INJECTION INTRAMUSCULAR; INTRAVENOUS; SUBCUTANEOUS at 04:27

## 2017-06-08 RX ADMIN — SODIUM CHLORIDE 3 MILLILITER(S): 9 INJECTION INTRAMUSCULAR; INTRAVENOUS; SUBCUTANEOUS at 21:59

## 2017-06-08 RX ADMIN — Medication 25 MILLIGRAM(S): at 05:43

## 2017-06-08 NOTE — PROVIDER CONTACT NOTE (OTHER) - ACTION/TREATMENT ORDERED:
PA aware, will place order for additional insulin to cover elevated FS. Will continue to monitor.
flomax x 1 dose ordered and will continue to closely monitor patient
provider to adjust SS orders; will administer dose and recheck FS x 1 hour
will recheck BG at 22:00, will continue to monitor.

## 2017-06-08 NOTE — PROGRESS NOTE ADULT - SUBJECTIVE AND OBJECTIVE BOX
Vascular Surgery daily Progress note    S:    T(C): 36.7, Max: 37 (06-07 @ 05:27)  HR: 95 (65 - 95)  BP: 116/52 (110/56 - 122/57)  RR: 20 (16 - 20)  SpO2: 93% (93% - 99%)  Wt(kg): --  I&O's Detail  I & Os for 24h ending 07 Jun 2017 07:00  =============================================  IN:    Oral Fluid: 440 ml    lactated ringers.: 135 ml    Total IN: 575 ml  ---------------------------------------------  OUT:    Indwelling Catheter - Urethral: 1055 ml    Total OUT: 1055 ml  ---------------------------------------------  Total NET: -480 ml    I & Os for current day (as of 08 Jun 2017 04:17)  =============================================  IN:    Oral Fluid: 480 ml    lactated ringers.: 300 ml    sodium chloride 0.9%.: 150 ml    IV PiggyBack: 50 ml    Total IN: 980 ml  ---------------------------------------------  OUT:    Voided: 750 ml    Total OUT: 750 ml  ---------------------------------------------  Total NET: 230 ml      Gen:  MSK/VASC: Vascular Surgery daily Progress note    S: no acute events overnight     T(C): 36.7, Max: 37 (06-07 @ 05:27)  HR: 95 (65 - 95)  BP: 116/52 (110/56 - 122/57)  RR: 20 (16 - 20)  SpO2: 93% (93% - 99%)  Wt(kg): --  I&O's Detail  I & Os for 24h ending 07 Jun 2017 07:00  =============================================  IN:    Oral Fluid: 440 ml    lactated ringers.: 135 ml    Total IN: 575 ml  ---------------------------------------------  OUT:    Indwelling Catheter - Urethral: 1055 ml    Total OUT: 1055 ml  ---------------------------------------------  Total NET: -480 ml    I & Os for current day (as of 08 Jun 2017 04:17)  =============================================  IN:    Oral Fluid: 480 ml    lactated ringers.: 300 ml    sodium chloride 0.9%.: 150 ml    IV PiggyBack: 50 ml    Total IN: 980 ml  ---------------------------------------------  OUT:    Voided: 750 ml    Total OUT: 750 ml  ---------------------------------------------  Total NET: 230 ml      Gen:NAD   MSK/VASC: DOPP L PT

## 2017-06-08 NOTE — PROGRESS NOTE ADULT - SUBJECTIVE AND OBJECTIVE BOX
Event Note Vascular  Spoken with endocrinology regarding continuously elevated fingersticks in the 400s s/p insulin pump. Endocrine recommending sending off DKA lab workup as well as IVF and stat dose of NPH. will continue to reassess. SICU consulted for further recommendations on possible insulin drip

## 2017-06-08 NOTE — PROGRESS NOTE ADULT - ASSESSMENT
A/P 66M s/p LLE femoral endarterectomy A/P 66M s/p LLE femoral endarterectomy  pain control   ASA   SQH  CCD   F/U FSG   Dispo planning

## 2017-06-08 NOTE — DIETITIAN INITIAL EVALUATION ADULT. - NS AS NUTRI INTERV MEALS SNACK3
General/healthful diet/1) Continue Consistent Carbohydrate (evening snack) diet, DASH/TLC (cholesterol and sodium restricted) restriction     2) Suggest outpatient follow-up with an endocrinologist and Registered Dietitian to ensure long-term diabetes diet comprehension and adherence./Carbohydrate - modified diet

## 2017-06-08 NOTE — CONSULT NOTE ADULT - ASSESSMENT
Assessment  DMT2: 66y Male with DM T2 with hyperglycemia with neuropathy blood sugars running high, on insulin pump sugars high. HTN: uncontrolled on meds.  PAD: s/p Sx  HLD:  on statin, tolerating.  CAD: asymptomatic on meds.  CKD: Stable, ESRD: on hemodialysis.  CAD: Asymptomatic, on meds, monitered.  Hypothyroidism: on synthroid 150 mcg po len

## 2017-06-08 NOTE — CONSULT NOTE ADULT - PROBLEM SELECTOR RECOMMENDATION 9
Will DC insulin pump for now.  Will start him on Lantus 30u qhs, Humalog 8u before each meal, continue Humalog coverage, monitor FS FU  Patient counseled to continue basal bolus insulin untill he gets new pump, will get in touch with [employer] for new pump.  DC home on current insulin regimen if FS around 200  Discussed with patient and vascular team.

## 2017-06-08 NOTE — CONSULT NOTE ADULT - SUBJECTIVE AND OBJECTIVE BOX
HPI:  66 year old male with Hx CAD, s/p three coronary stents in 2006, and CABG x3 in 3/2016, with atherosclerosis of arteries of extremities, with intermittent claudication, left, s/p RLE bypass 11/30/15, s/p failed angiogram to secondary to hypotension in 2016 at [facility],, developing left leg pain with ambulation, can walk 1/2 block, referred for surgical evaluation. Doppler done revealing decreased circulation to left leg as per patient with left leg angiogram 4/25/17 and now for Left Femoral Endarterectomy Fem-Pop Bypass with Dr Loya 6/6/17. (24 May 2017 07:18)  Patient has diabetes for long time, insulin pump, no recent hypoglycemic episodes, no polyuria polydipsia. Patient follows up with me as out patient, his insulin pump was malfunctioning recently, in the process of getting a new pump, came to [facility] for vascular procedure    PAST MEDICAL & SURGICAL HISTORY:  Sleep apnea: non-compliant  Anemia  Atherosclerosis of arteries of extremities: left leg  Mild intermittent asthma without complication  Iron deficiency anemia, unspecified iron deficiency anemia type  Prostate cancer  Bronchospasm  Obesity (BMI 30-39.9)  PAD (peripheral artery disease): RLE bypass 11/30/15  CAD (coronary artery disease): 3 cardiac stents placed in 2006 (mid LAD, Prox LAD, Prox to #2), 3V CABG 3/28/16  S/P prostatectomy  Hypothyroid  Intermittent claudication  Diabetic neuropathy  DM (diabetes mellitus): type 2 - on insulin pump  CHF (congestive heart failure)  Hypercholesteremia  HTN (hypertension)  S/P bypass graft of extremity: RLE- 11/30/15  S/P CABG x 3: 3/28/16  Stented coronary artery: x 3 cardiac stenrs placed in 2006  S/P prostatectomy: 1996      FAMILY HISTORY:  Family history of lung disease: father  Family history of diabetes mellitus: mother  No pertinent family history in first degree relatives      Social History:    Outpatient Medications:    MEDICATIONS  (STANDING):  heparin  Injectable 5000Unit(s) SubCutaneous every 8 hours  sodium chloride 0.9% lock flush 3milliLiter(s) IV Push every 8 hours  aspirin enteric coated 81milliGRAM(s) Oral daily  losartan 100milliGRAM(s) Oral daily  atorvastatin 80milliGRAM(s) Oral at bedtime  amLODIPine   Tablet 5milliGRAM(s) Oral daily  levothyroxine 150MICROGram(s) Oral daily  tamsulosin 0.4milliGRAM(s) Oral at bedtime  metoprolol 25milliGRAM(s) Oral two times a day  sodium chloride 0.9%. 1000milliLiter(s) IV Continuous <Continuous>  insulin lispro (HumaLOG) corrective regimen sliding scale  SubCutaneous three times a day before meals  insulin lispro (HumaLOG) corrective regimen sliding scale  SubCutaneous at bedtime  insulin glargine Injectable (LANTUS) 30Unit(s) SubCutaneous at bedtime  insulin lispro Injectable (HumaLOG) 8Unit(s) SubCutaneous three times a day before meals  dextrose 5%. 1000milliLiter(s) IV Continuous <Continuous>  dextrose 50% Injectable 12.5Gram(s) IV Push once  dextrose 50% Injectable 25Gram(s) IV Push once  dextrose 50% Injectable 25Gram(s) IV Push once    MEDICATIONS  (PRN):  oxyCODONE  5 mG/acetaminophen 325 mG 1Tablet(s) Oral every 4 hours PRN Moderate Pain  gabapentin 300milliGRAM(s) Oral every 6 hours PRN neuropathy  oxyCODONE  5 mG/acetaminophen 325 mG 2Tablet(s) Oral every 4 hours PRN Severe Pain (7 - 10)  dextrose Gel 1Dose(s) Oral once PRN Blood Glucose LESS THAN 70 milliGRAM(s)/deciLiter  glucagon  Injectable 1milliGRAM(s) IntraMuscular once PRN Glucose <70 milliGRAM(s)/deciLiter      Allergies    No Known Allergies    Intolerances      Review of Systems:  Constitutional: No fever  Eyes: No blurry vision  Neuro: No tremors  HEENT: No pain, no neck swelling  Cardiovascular: No chest pain, palpitations  Respiratory: No SOB, no cough  GI: No nausea, vomiting, abdominal pain  : No dysuria  Skin: no rash  MSK: Has leg swelling, no groin hematoma. no foot ulcers.  Psych: no depression  Endocrine: no polyuria, polydipsia  Hem/lymph: no swelling  Osteoporosis: no fractures    ALL OTHER SYSTEMS REVIEWED AND NEGATIVE    UNABLE TO OBTAIN    PHYSICAL EXAM:  VITALS: T(C): 36.7  T(F): 98, Max: 98.2 (06-07 @ 10:24)  HR: 99 (65 - 99)  BP: 113/58 (110/56 - 122/57)  RR:  (16 - 20)  SpO2:  (93% - 99%)  Wt(kg): --  GENERAL: NAD, well-groomed, well-developed  EYES: No proptosis, no lid lag, anicteric  HEENT:  Atraumatic, Normocephalic, moist mucous membranes  THYROID: Normal size, no palpable nodules  RESPIRATORY: Clear to auscultation bilaterally; No rales, rhonchi, wheezing  CARDIOVASCULAR: Si S2, No murmurs;  GI: Soft, non distended, normal bowel sounds  SKIN: Dry, intact, No rashes or lesions  MUSCULOSKELETAL: Has BL lower ext. edema.  NEURO:  no tremor, sensation decreased in feet BL,  PSYCH: Awake and alert  CUSHING'S SIGNS: no striae    CAPILLARY BLOOD GLUCOSE  322 (06-08 @ 07:18)  336 (06-08 @ 03:45)  441 (06-08 @ 00:47)  451 (06-07 @ 21:57)  429 (06-07 @ 20:32)  440 (06-07 @ 16:23)  347 (06-07 @ 11:48)  247 (06-07 @ 07:23)  261 (06-06 @ 23:09)  274 (06-06 @ 19:30)  167 (06-06 @ 09:40)                            9.7    6.23  )-----------( 135      ( 08 Jun 2017 05:25 )             31.1       06-08    132<L>  |  93<L>  |  27<H>  ----------------------------<  317<H>  4.3   |  23  |  1.30    EGFR if : 65  EGFR if non : 56    Ca    8.3<L>      06-08  Mg     1.5     06-08  Phos  3.8     06-08        Thyroid Function Tests:      Hemoglobin A1C, Whole Blood: 9.3 % <H> [4.0 - 5.6] (05-24 @ 10:19)  Hemoglobin A1C, Whole Blood: 9.9 % <H> [4.0 - 5.6] (04-17 @ 07:30)          Radiology:

## 2017-06-08 NOTE — DIETITIAN INITIAL EVALUATION ADULT. - OTHER INFO
Nutrition consult received for HbA1C >7%; admitted for atherosclerosis of native artery of extremity with intermittent claudication. S/p Left femoral endarterectomy on 6/6. Met with patient and daughter at bedside. Reports good appetite/PO; denies food allergies, GI distress (nausea/vomiting/constipation/diarrhea), or issues with chewing/swallowing. Patient explains that he has had 33 year history of Type 2 DM and demonstrates excellent knowledge on carbohydrate counting. Patient has an insulin pump and was well-versed in inputting amount of carbs consumed in accordance to units of insulin needed. He explains that more recently however, since the use of a new pump and leading up to this admission, blood glucose has been higher than usual and somewhat uncontrolled therefore is anticipating a new pump to arrive next week. He checked Finger sticks 4 to 5x/day PTA; usually averages 140's to 180's however has been as low as 75 to as high as 250mg/dl of late. RN DELORES continues to follow-up with patient for assistance of insulin pump settings and teaching.

## 2017-06-08 NOTE — CONSULT NOTE ADULT - SUBJECTIVE AND OBJECTIVE BOX
SICU Consultation Note  =====================================================  HPI: 66y male  h/o HTN, CAD s/p stents x3, s/p CABG x 3, PAD, DM on insulin pump at home, POD 2 s/p L femoral endarterectomy. Pt was on ISS until 6:30pm yesterday, then pump was restarted. However, pump noted to be malfunctioning and was switched off. SICU called for hyperglycemia to 400s, r/o DKA.    HPI:  66 year old male with Hx CAD, s/p three coronary stents in 2006, and CABG x3 in 3/2016, with atherosclerosis of arteries of extremities, with intermittent claudication, left, s/p RLE bypass 11/30/15, s/p failed angiogram to secondary to hypotension in 2016 at [facility],, developing left leg pain with ambulation, can walk 1/2 block, referred for surgical evaluation. Doppler done revealing decreased circulation to left leg as per patient with left leg angiogram 4/25/17 and now for Left Femoral Endarterectomy Fem-Pop Bypass with Dr Loya 6/6/17. (24 May 2017 07:18)    Allergies:   PAST MEDICAL & SURGICAL HISTORY:  Sleep apnea: non-compliant  Anemia  Atherosclerosis of arteries of extremities: left leg  Mild intermittent asthma without complication  Iron deficiency anemia, unspecified iron deficiency anemia type  Prostate cancer  Bronchospasm  Obesity (BMI 30-39.9)  PAD (peripheral artery disease): RLE bypass 11/30/15  CAD (coronary artery disease): 3 cardiac stents placed in 2006 (mid LAD, Prox LAD, Prox to #2), 3V CABG 3/28/16  S/P prostatectomy  Hypothyroid  Intermittent claudication  Diabetic neuropathy  DM (diabetes mellitus): type 2 - on insulin pump  CHF (congestive heart failure)  Hypercholesteremia  HTN (hypertension)  S/P bypass graft of extremity: RLE- 11/30/15  S/P CABG x 3: 3/28/16  Stented coronary artery: x 3 cardiac stenrs placed in 2006  S/P prostatectomy: 1996    FAMILY HISTORY:  Family history of lung disease: father  Family history of diabetes mellitus: mother  No pertinent family history in first degree relatives    ADVANCE DIRECTIVES: Presumed Full Code    REVIEW OF SYSTEMS:  General: Non-Contributory  Skin/Breast: Non-Contributory  Ophthalmologic: Non-Contributory  ENMT: Non-Contributory  Respiratory and Thorax: no shortness of breath  Cardiovascular: no chest pain  Gastrointestinal: no abd pain or vomiting  Genitourinary: Non-Contributory  Musculoskeletal: Non-Contributory  Neurological: Non-Contributory  Psychiatric: Non-Contributory  Hematology/Lymphatics: Non-Contributory  Endocrine: Non-Contributory  Allergic/Immunologic: Non-Contributory    CURRENT MEDICATIONS:   --------------------------------------------------------------------------------------  Neurologic Medications  oxyCODONE  5 mG/acetaminophen 325 mG 1Tablet(s) Oral every 4 hours PRN Moderate Pain  gabapentin 300milliGRAM(s) Oral every 6 hours PRN neuropathy  oxyCODONE  5 mG/acetaminophen 325 mG 2Tablet(s) Oral every 4 hours PRN Severe Pain (7 - 10)    Respiratory Medications    Cardiovascular Medications  losartan 100milliGRAM(s) Oral daily  amLODIPine   Tablet 5milliGRAM(s) Oral daily  tamsulosin 0.4milliGRAM(s) Oral at bedtime  metoprolol 25milliGRAM(s) Oral two times a day    Gastrointestinal Medications  sodium chloride 0.9%. 1000milliLiter(s) IV Continuous <Continuous>    Genitourinary Medications    Hematologic/Oncologic Medications  heparin  Injectable 5000Unit(s) SubCutaneous every 8 hours  aspirin enteric coated 81milliGRAM(s) Oral daily    Antimicrobial/Immunologic Medications    Endocrine/Metabolic Medications  atorvastatin 80milliGRAM(s) Oral at bedtime  levothyroxine 150MICROGram(s) Oral daily  insulin aspart (NovoLOG) Pump 1Each SubCutaneous Continuous Pump    --------------------------------------------------------------------------------------    VITAL SIGNS, INS/OUTS (last 24 hours):  --------------------------------------------------------------------------------------  T(C): 36.7, Max: 37 (06-07 @ 05:27)  HR: 95 (65 - 95)  BP: 116/52 (110/56 - 122/57)  BP(mean): --  ABP: --  ABP(mean): --  RR: 20 (16 - 20)  SpO2: 93% (93% - 99%)  Wt(kg): --  CVP(mm Hg): --  CI: --  CAPILLARY BLOOD GLUCOSE  441 (08 Jun 2017 00:47)  451 (07 Jun 2017 21:57)  429 (07 Jun 2017 20:32)  440 (07 Jun 2017 16:23)  347 (07 Jun 2017 11:48)  247 (07 Jun 2017 07:23)   N/A    I & Os for 24h ending 06-07 @ 07:00  =============================================  IN:    Oral Fluid: 440 ml    lactated ringers.: 135 ml    Total IN: 575 ml  ---------------------------------------------  OUT:    Indwelling Catheter - Urethral: 1055 ml    Total OUT: 1055 ml  ---------------------------------------------  Total NET: -480 ml    I & Os for current day (as of 06-08 @ 02:11)  =============================================  IN:    Oral Fluid: 480 ml    lactated ringers.: 300 ml    IV PiggyBack: 50 ml    Total IN: 830 ml  ---------------------------------------------  OUT:    Voided: 750 ml    Total OUT: 750 ml  ---------------------------------------------  Total NET: 80 ml    --------------------------------------------------------------------------------------    EXAM  NEUROLOGY  RASS: 0   	GCS:  15  Exam: Normal, NAD, alert, oriented x 3, no focal deficits    HEENT  Exam: Normocephalic, atraumatic.    RESPIRATORY  Exam: Lungs clear to auscultation, Normal expansion/effort.     CARDIOVASCULAR  Exam: S1, S2.  Regular rate and rhythm.     GI/NUTRITION  Exam: Abdomen soft, Non-tender. Distended.  Wound: L groin dsg clean/dry/intact, no signs of infection.    VASCULAR  Exam: Extremities warm, pink, well-perfused.    MUSCULOSKELETAL  Exam: All extremities moving spontaneously without limitations.    SKIN:  Exam: Good skin turgor, no skin breakdown.    METABOLIC/FLUIDS/ELECTROLYTES  sodium chloride 0.9%. 1000milliLiter(s) IV Continuous <Continuous>    HEMATOLOGIC  [x] DVT Prophylaxis: heparin  Injectable 5000Unit(s) SubCutaneous every 8 hours  aspirin enteric coated 81milliGRAM(s) Oral daily    Transfusions:	[] PRBC	[] Platelets		[] FFP	[] Cryoprecipitate    Tubes/Lines/Drains  [x] Peripheral IV  [] Central Venous Line     	[] R	[] L	[] IJ	[] Fem	[] SC	Date Placed:   [] Arterial Line		[] R	[] L	[] Fem	[] Rad	[] Ax	Date Placed:   [] PICC:         	[] Midline		[] Mediport  [] Urinary Catheter		Date Placed:     LABS  --------------------------------------------------------------------------------------    BMP (06-08 @ 00:30)       130<L>  |  88<L>   |  31<H> 			Ca++ --      Ca 9.1          ---------------------------------( 375<H>		Mg 1.5<L>       4.1     |  23      |  1.59<H>			Ph 3.8       VBG (06-08 @ 00:30)     7.33 / 58<H> / 41<H> / 27 / 4.1 / 65.8%      Lactate: --    -------------------------------------------------------------------------------------    ASSESSMENT:  66y Male with hyperglycemia in the setting on insulin pump malfunction. Normal pH. Presentation not consistent with DKA.    PLAN:  Neurologic: No acute issue.  Respiratory: ISS, ambulate as tolerated.  Cardiovascular: Cont aspirin, metoprolol, amlodipine, losartan.  Gastrointestinal/Nutrition: Cont diabetic diet, dietary c/s.  Renal/Genitourinary: IVF, trend Cr.  Hematologic: HSQ for DVT ppx.  Infectious Disease: No acute issue.  Tubes/Lines/Drains: No change.  Endocrine: Hold insulin pump, give NPH insulin, repeat FS in 1-2 hours. Follow up endocrine recs. Cont levothyroxine.  Disposition: Currently no acute need for intensive care. Will follow.    --------------------------------------------------------------------------------------    Critical Care Diagnoses:  Hyperglycemia  Hyponatremia  JOSE LUIS

## 2017-06-09 VITALS
DIASTOLIC BLOOD PRESSURE: 50 MMHG | SYSTOLIC BLOOD PRESSURE: 96 MMHG | HEART RATE: 82 BPM | TEMPERATURE: 98 F | OXYGEN SATURATION: 97 % | RESPIRATION RATE: 20 BRPM

## 2017-06-09 LAB
BUN SERPL-MCNC: 28 MG/DL — HIGH (ref 7–23)
CALCIUM SERPL-MCNC: 8.7 MG/DL — SIGNIFICANT CHANGE UP (ref 8.4–10.5)
CHLORIDE SERPL-SCNC: 100 MMOL/L — SIGNIFICANT CHANGE UP (ref 98–107)
CO2 SERPL-SCNC: 24 MMOL/L — SIGNIFICANT CHANGE UP (ref 22–31)
CREAT SERPL-MCNC: 1.24 MG/DL — SIGNIFICANT CHANGE UP (ref 0.5–1.3)
GLUCOSE SERPL-MCNC: 203 MG/DL — HIGH (ref 70–99)
HCT VFR BLD CALC: 33.2 % — LOW (ref 39–50)
HGB BLD-MCNC: 10.3 G/DL — LOW (ref 13–17)
MAGNESIUM SERPL-MCNC: 2.1 MG/DL — SIGNIFICANT CHANGE UP (ref 1.6–2.6)
MCHC RBC-ENTMCNC: 26.8 PG — LOW (ref 27–34)
MCHC RBC-ENTMCNC: 31 % — LOW (ref 32–36)
MCV RBC AUTO: 86.5 FL — SIGNIFICANT CHANGE UP (ref 80–100)
PHOSPHATE SERPL-MCNC: 2.9 MG/DL — SIGNIFICANT CHANGE UP (ref 2.5–4.5)
PLATELET # BLD AUTO: 188 K/UL — SIGNIFICANT CHANGE UP (ref 150–400)
PMV BLD: 10.3 FL — SIGNIFICANT CHANGE UP (ref 7–13)
POTASSIUM SERPL-MCNC: 4.2 MMOL/L — SIGNIFICANT CHANGE UP (ref 3.5–5.3)
POTASSIUM SERPL-SCNC: 4.2 MMOL/L — SIGNIFICANT CHANGE UP (ref 3.5–5.3)
RBC # BLD: 3.84 M/UL — LOW (ref 4.2–5.8)
RBC # FLD: 15.3 % — HIGH (ref 10.3–14.5)
SODIUM SERPL-SCNC: 137 MMOL/L — SIGNIFICANT CHANGE UP (ref 135–145)
WBC # BLD: 5.9 K/UL — SIGNIFICANT CHANGE UP (ref 3.8–10.5)
WBC # FLD AUTO: 5.9 K/UL — SIGNIFICANT CHANGE UP (ref 3.8–10.5)

## 2017-06-09 RX ORDER — ENOXAPARIN SODIUM 100 MG/ML
60 INJECTION SUBCUTANEOUS
Qty: 1 | Refills: 0 | OUTPATIENT
Start: 2017-06-09 | End: 2017-06-16

## 2017-06-09 RX ORDER — INSULIN ASPART 100 [IU]/ML
0 INJECTION, SOLUTION SUBCUTANEOUS
Qty: 0 | Refills: 0 | COMMUNITY

## 2017-06-09 RX ORDER — ISOPROPYL ALCOHOL, BENZOCAINE .7; .06 ML/ML; ML/ML
1 SWAB TOPICAL
Qty: 1 | Refills: 0 | OUTPATIENT
Start: 2017-06-09 | End: 2017-06-10

## 2017-06-09 RX ORDER — INSULIN LISPRO 100/ML
23 VIAL (ML) SUBCUTANEOUS
Qty: 1 | Refills: 0 | OUTPATIENT
Start: 2017-06-09 | End: 2017-06-23

## 2017-06-09 RX ORDER — INSULIN LISPRO 100/ML
23 VIAL (ML) SUBCUTANEOUS
Qty: 0 | Refills: 0 | Status: DISCONTINUED | OUTPATIENT
Start: 2017-06-09 | End: 2017-06-09

## 2017-06-09 RX ORDER — FUROSEMIDE 40 MG
1 TABLET ORAL
Qty: 0 | Refills: 0 | COMMUNITY

## 2017-06-09 RX ADMIN — LOSARTAN POTASSIUM 100 MILLIGRAM(S): 100 TABLET, FILM COATED ORAL at 05:49

## 2017-06-09 RX ADMIN — Medication 8: at 08:07

## 2017-06-09 RX ADMIN — Medication 20 UNIT(S): at 08:08

## 2017-06-09 RX ADMIN — HEPARIN SODIUM 5000 UNIT(S): 5000 INJECTION INTRAVENOUS; SUBCUTANEOUS at 05:50

## 2017-06-09 RX ADMIN — Medication 10: at 12:02

## 2017-06-09 RX ADMIN — Medication 150 MICROGRAM(S): at 05:49

## 2017-06-09 RX ADMIN — SODIUM CHLORIDE 3 MILLILITER(S): 9 INJECTION INTRAMUSCULAR; INTRAVENOUS; SUBCUTANEOUS at 05:45

## 2017-06-09 RX ADMIN — Medication 81 MILLIGRAM(S): at 12:02

## 2017-06-09 RX ADMIN — SODIUM CHLORIDE 3 MILLILITER(S): 9 INJECTION INTRAMUSCULAR; INTRAVENOUS; SUBCUTANEOUS at 13:08

## 2017-06-09 RX ADMIN — Medication 23 UNIT(S): at 12:02

## 2017-06-09 RX ADMIN — AMLODIPINE BESYLATE 5 MILLIGRAM(S): 2.5 TABLET ORAL at 05:51

## 2017-06-09 NOTE — PROGRESS NOTE ADULT - PROBLEM SELECTOR PLAN 1
Will continue current insulin regimen for now. Will continue monitoring FS, log, will Follow up.  Patient counseled for compliance with consistent low carb diet.

## 2017-06-09 NOTE — PROGRESS NOTE ADULT - SUBJECTIVE AND OBJECTIVE BOX
Chief complaint  Patient is a 66y old  Male who presents with a chief complaint of elective left femoral endarterectomy (07 Jun 2017 09:25)   Review of systems  Patient in bed. Denies any fever. Denies any low sugar/hypoglycemia.  General Well, no polyuria/polydipsia, no hypoglycemic events, no new events  HEENT Denies dysphagia or lumps  in the neck, denies neck pain.  Cardiac Denies chest pain,  no palpitations  Respiratory Has shortness of breath, denies cough  GI Denies nausea, denies vomiting, denies abdominal pain  MSK Has leg swelling no pain  Skin Denies open lesions, rashes    Labs and Fingesticks    CAPILLARY BLOOD GLUCOSE  264 (09 Jun 2017 11:49)  209 (09 Jun 2017 07:27)  317 (08 Jun 2017 21:45)  454 (08 Jun 2017 18:39)  447 (08 Jun 2017 17:59)  421 (08 Jun 2017 16:30)  380 (08 Jun 2017 11:53)  322 (08 Jun 2017 07:18)  336 (08 Jun 2017 03:45)  441 (08 Jun 2017 00:47)  451 (07 Jun 2017 21:57)  429 (07 Jun 2017 20:32)  440 (07 Jun 2017 16:23)  347 (07 Jun 2017 11:48)  247 (07 Jun 2017 07:23)  261 (06 Jun 2017 23:09)  274 (06 Jun 2017 19:30)        Calcium, Total Serum: 8.7 (06-09 @ 05:35)  Calcium, Total Serum: 8.7 (06-08 @ 11:45)  Calcium, Total Serum: 8.3 <L> (06-08 @ 05:25)  Calcium, Total Serum: 9.1 (06-08 @ 00:30)          06-09    137  |  100  |  28<H>  ----------------------------<  203<H>  4.2   |  24  |  1.24    Ca    8.7      09 Jun 2017 05:35  Phos  2.9     06-09  Mg     2.1     06-09                          10.3   5.90  )-----------( 188      ( 09 Jun 2017 05:35 )             33.2     Medications  MEDICATIONS  (STANDING):  heparin  Injectable 5000Unit(s) SubCutaneous every 8 hours  sodium chloride 0.9% lock flush 3milliLiter(s) IV Push every 8 hours  aspirin enteric coated 81milliGRAM(s) Oral daily  losartan 100milliGRAM(s) Oral daily  atorvastatin 80milliGRAM(s) Oral at bedtime  amLODIPine   Tablet 5milliGRAM(s) Oral daily  levothyroxine 150MICROGram(s) Oral daily  tamsulosin 0.4milliGRAM(s) Oral at bedtime  metoprolol 25milliGRAM(s) Oral two times a day  dextrose 5%. 1000milliLiter(s) IV Continuous <Continuous>  dextrose 50% Injectable 12.5Gram(s) IV Push once  dextrose 50% Injectable 25Gram(s) IV Push once  dextrose 50% Injectable 25Gram(s) IV Push once  insulin glargine Injectable (LANTUS) 60Unit(s) SubCutaneous at bedtime  insulin lispro (HumaLOG) corrective regimen sliding scale  SubCutaneous three times a day before meals  insulin lispro (HumaLOG) corrective regimen sliding scale  SubCutaneous at bedtime  insulin lispro Injectable (HumaLOG) 23Unit(s) SubCutaneous three times a day before meals      Physical Exam  General: Patient comfortable in bed  Vital Signs Last 12 Hrs  T(F): 98.2, Max: 98.4 (06-09 @ 05:43)  HR: 82 (82 - 95)  BP: 96/50 (96/50 - 108/53)  BP(mean): --  RR: 20 (18 - 20)  SpO2: 97% (97% - 99%)  Wt(kg): --  Neck: No palpable thyroid nodules.  CVS: S1S2, No murmurs  Respiratory: No wheezing, no crepitations  GI: Abdomen soft, bowel sounds positive  Musculoskeletal: Positive edema lower extremities bilaterally  Skin: No skin rashes, no ecchimosis    Diagnostics

## 2017-06-09 NOTE — PROGRESS NOTE ADULT - SUBJECTIVE AND OBJECTIVE BOX
Vascular Surgery daily Progress note    S: no acute events overnight.     T(C): 36.7, Max: 37 (06-07 @ 05:27)  HR: 95 (65 - 95)  BP: 116/52 (110/56 - 122/57)  RR: 20 (16 - 20)  SpO2: 93% (93% - 99%)  Wt(kg): --  I&O's Detail  I & Os for 24h ending 07 Jun 2017 07:00  =============================================  IN:    Oral Fluid: 440 ml    lactated ringers.: 135 ml    Total IN: 575 ml  ---------------------------------------------  OUT:    Indwelling Catheter - Urethral: 1055 ml    Total OUT: 1055 ml  ---------------------------------------------  Total NET: -480 ml    I & Os for current day (as of 08 Jun 2017 04:17)  =============================================  IN:    Oral Fluid: 480 ml    lactated ringers.: 300 ml    sodium chloride 0.9%.: 150 ml    IV PiggyBack: 50 ml    Total IN: 980 ml  ---------------------------------------------  OUT:    Voided: 750 ml    Total OUT: 750 ml  ---------------------------------------------  Total NET: 230 ml      Gen:NAD   MSK/VASC: DOPP L PT

## 2017-06-13 LAB — SURGICAL PATHOLOGY STUDY: SIGNIFICANT CHANGE UP

## 2017-06-22 ENCOUNTER — TRANSCRIPTION ENCOUNTER (OUTPATIENT)
Age: 67
End: 2017-06-22

## 2017-06-22 ENCOUNTER — APPOINTMENT (OUTPATIENT)
Dept: VASCULAR SURGERY | Facility: CLINIC | Age: 67
End: 2017-06-22

## 2017-06-22 VITALS — SYSTOLIC BLOOD PRESSURE: 121 MMHG | HEART RATE: 62 BPM | DIASTOLIC BLOOD PRESSURE: 69 MMHG

## 2017-06-22 VITALS
WEIGHT: 192 LBS | SYSTOLIC BLOOD PRESSURE: 117 MMHG | TEMPERATURE: 98.1 F | HEART RATE: 62 BPM | HEIGHT: 66 IN | DIASTOLIC BLOOD PRESSURE: 77 MMHG | BODY MASS INDEX: 30.86 KG/M2

## 2017-07-27 ENCOUNTER — APPOINTMENT (OUTPATIENT)
Dept: VASCULAR SURGERY | Facility: CLINIC | Age: 67
End: 2017-07-27
Payer: MEDICARE

## 2017-07-27 VITALS
DIASTOLIC BLOOD PRESSURE: 82 MMHG | HEART RATE: 66 BPM | SYSTOLIC BLOOD PRESSURE: 130 MMHG | BODY MASS INDEX: 30.86 KG/M2 | TEMPERATURE: 98.3 F | WEIGHT: 192 LBS | HEIGHT: 66 IN

## 2017-07-27 PROCEDURE — 99024 POSTOP FOLLOW-UP VISIT: CPT

## 2017-07-27 PROCEDURE — 93923 UPR/LXTR ART STDY 3+ LVLS: CPT

## 2017-08-16 NOTE — H&P PST ADULT - VENOUS THROMBOEMBOLISM HISTORY
Patient assessed at 1615 due to recent BP on admission 144/53, HR 88, temperature 36.7, respirations 17. Patient denies any history of elevated BP in this pregnancy and denies any complication with last pregnancy. Patient denies any headache, blur vision and/or dizziness. Patient reports pain, cramping.   repeated vital signs at 1615 HR 68, /72 manually, respirations 18.  labs:                      11.3   11.96 )-----------( 185      ( 15 Aug 2017 20:10 )             35.2   31y/o  0day postpartum  @ 1246. EBL 200cc. 2nd degree laceration.  Assessment. Alert and orientedx3. Lung sounds clear bilaterally. Abdomen soft, nontender and nondistended. Positive bowel sounds. 2nd degree laceration WNL. Lochia moderate rubra. Negative caterina's sign noted bilaterally to lower extremities.   Plan: Patient to be medicated with acetaminophen 975mg. Recheck vitals signs 1 hour after pain medication given. No [facility] labs needed at this time. Will continue to monitor. Dr. ALEJANDRA Mercedes notified of patient's status, history and assessment and agrees with plan. prior major surgery

## 2017-09-06 ENCOUNTER — NON-APPOINTMENT (OUTPATIENT)
Age: 67
End: 2017-09-06

## 2017-09-06 ENCOUNTER — APPOINTMENT (OUTPATIENT)
Dept: CARDIOLOGY | Facility: CLINIC | Age: 67
End: 2017-09-06
Payer: MEDICARE

## 2017-09-06 VITALS
HEART RATE: 67 BPM | BODY MASS INDEX: 31.18 KG/M2 | SYSTOLIC BLOOD PRESSURE: 152 MMHG | HEIGHT: 66 IN | OXYGEN SATURATION: 99 % | DIASTOLIC BLOOD PRESSURE: 73 MMHG | WEIGHT: 194 LBS

## 2017-09-06 PROCEDURE — 93000 ELECTROCARDIOGRAM COMPLETE: CPT

## 2017-09-06 PROCEDURE — 99214 OFFICE O/P EST MOD 30 MIN: CPT

## 2017-09-18 ENCOUNTER — RX RENEWAL (OUTPATIENT)
Age: 67
End: 2017-09-18

## 2017-10-14 ENCOUNTER — RX RENEWAL (OUTPATIENT)
Age: 67
End: 2017-10-14

## 2018-03-08 ENCOUNTER — APPOINTMENT (OUTPATIENT)
Dept: VASCULAR SURGERY | Facility: CLINIC | Age: 68
End: 2018-03-08
Payer: MEDICARE

## 2018-03-08 VITALS
HEART RATE: 70 BPM | TEMPERATURE: 97.8 F | DIASTOLIC BLOOD PRESSURE: 71 MMHG | BODY MASS INDEX: 30.86 KG/M2 | SYSTOLIC BLOOD PRESSURE: 148 MMHG | HEIGHT: 66 IN | WEIGHT: 192 LBS

## 2018-03-08 VITALS — DIASTOLIC BLOOD PRESSURE: 77 MMHG | HEART RATE: 75 BPM | SYSTOLIC BLOOD PRESSURE: 146 MMHG

## 2018-03-08 PROCEDURE — 99212 OFFICE O/P EST SF 10 MIN: CPT

## 2018-03-08 PROCEDURE — 93923 UPR/LXTR ART STDY 3+ LVLS: CPT

## 2018-07-02 ENCOUNTER — RX RENEWAL (OUTPATIENT)
Age: 68
End: 2018-07-02

## 2018-07-03 ENCOUNTER — LABORATORY RESULT (OUTPATIENT)
Age: 68
End: 2018-07-03

## 2018-07-03 ENCOUNTER — NON-APPOINTMENT (OUTPATIENT)
Age: 68
End: 2018-07-03

## 2018-07-03 ENCOUNTER — APPOINTMENT (OUTPATIENT)
Dept: VASCULAR SURGERY | Facility: CLINIC | Age: 68
End: 2018-07-03
Payer: MEDICARE

## 2018-07-03 ENCOUNTER — APPOINTMENT (OUTPATIENT)
Dept: CARDIOLOGY | Facility: CLINIC | Age: 68
End: 2018-07-03
Payer: MEDICARE

## 2018-07-03 VITALS
WEIGHT: 189 LBS | OXYGEN SATURATION: 98 % | HEART RATE: 64 BPM | DIASTOLIC BLOOD PRESSURE: 71 MMHG | SYSTOLIC BLOOD PRESSURE: 161 MMHG | BODY MASS INDEX: 30.37 KG/M2 | HEIGHT: 66 IN

## 2018-07-03 VITALS
HEART RATE: 75 BPM | WEIGHT: 189 LBS | HEIGHT: 66 IN | TEMPERATURE: 97.8 F | SYSTOLIC BLOOD PRESSURE: 118 MMHG | BODY MASS INDEX: 30.37 KG/M2 | DIASTOLIC BLOOD PRESSURE: 67 MMHG

## 2018-07-03 PROCEDURE — 99213 OFFICE O/P EST LOW 20 MIN: CPT

## 2018-07-03 PROCEDURE — 93000 ELECTROCARDIOGRAM COMPLETE: CPT

## 2018-07-03 PROCEDURE — 99204 OFFICE O/P NEW MOD 45 MIN: CPT

## 2018-07-05 ENCOUNTER — APPOINTMENT (OUTPATIENT)
Dept: VASCULAR SURGERY | Facility: CLINIC | Age: 68
End: 2018-07-05
Payer: MEDICARE

## 2018-07-05 VITALS
HEART RATE: 61 BPM | BODY MASS INDEX: 30.37 KG/M2 | HEIGHT: 66 IN | SYSTOLIC BLOOD PRESSURE: 110 MMHG | WEIGHT: 189 LBS | TEMPERATURE: 98.1 F | DIASTOLIC BLOOD PRESSURE: 59 MMHG

## 2018-07-05 VITALS — DIASTOLIC BLOOD PRESSURE: 53 MMHG | SYSTOLIC BLOOD PRESSURE: 119 MMHG | HEART RATE: 72 BPM

## 2018-07-05 PROCEDURE — 99212 OFFICE O/P EST SF 10 MIN: CPT

## 2018-07-12 ENCOUNTER — OUTPATIENT (OUTPATIENT)
Dept: OUTPATIENT SERVICES | Facility: HOSPITAL | Age: 68
LOS: 1 days | End: 2018-07-12

## 2018-07-12 VITALS
TEMPERATURE: 97 F | DIASTOLIC BLOOD PRESSURE: 60 MMHG | WEIGHT: 203.05 LBS | HEART RATE: 62 BPM | RESPIRATION RATE: 18 BRPM | OXYGEN SATURATION: 97 % | HEIGHT: 64.5 IN | SYSTOLIC BLOOD PRESSURE: 130 MMHG

## 2018-07-12 DIAGNOSIS — I73.9 PERIPHERAL VASCULAR DISEASE, UNSPECIFIED: ICD-10-CM

## 2018-07-12 DIAGNOSIS — I10 ESSENTIAL (PRIMARY) HYPERTENSION: ICD-10-CM

## 2018-07-12 DIAGNOSIS — Z86.79 PERSONAL HISTORY OF OTHER DISEASES OF THE CIRCULATORY SYSTEM: Chronic | ICD-10-CM

## 2018-07-12 DIAGNOSIS — E11.9 TYPE 2 DIABETES MELLITUS WITHOUT COMPLICATIONS: ICD-10-CM

## 2018-07-12 DIAGNOSIS — Z95.1 PRESENCE OF AORTOCORONARY BYPASS GRAFT: Chronic | ICD-10-CM

## 2018-07-12 DIAGNOSIS — J44.9 CHRONIC OBSTRUCTIVE PULMONARY DISEASE, UNSPECIFIED: ICD-10-CM

## 2018-07-12 DIAGNOSIS — Z95.5 PRESENCE OF CORONARY ANGIOPLASTY IMPLANT AND GRAFT: Chronic | ICD-10-CM

## 2018-07-12 DIAGNOSIS — Z95.828 PRESENCE OF OTHER VASCULAR IMPLANTS AND GRAFTS: Chronic | ICD-10-CM

## 2018-07-12 DIAGNOSIS — E03.9 HYPOTHYROIDISM, UNSPECIFIED: ICD-10-CM

## 2018-07-12 DIAGNOSIS — I25.10 ATHEROSCLEROTIC HEART DISEASE OF NATIVE CORONARY ARTERY WITHOUT ANGINA PECTORIS: ICD-10-CM

## 2018-07-12 LAB
BUN SERPL-MCNC: 56 MG/DL — HIGH (ref 7–23)
CALCIUM SERPL-MCNC: 10 MG/DL — SIGNIFICANT CHANGE UP (ref 8.4–10.5)
CHLORIDE SERPL-SCNC: 100 MMOL/L — SIGNIFICANT CHANGE UP (ref 98–107)
CO2 SERPL-SCNC: 25 MMOL/L — SIGNIFICANT CHANGE UP (ref 22–31)
CREAT SERPL-MCNC: 1.46 MG/DL — HIGH (ref 0.5–1.3)
GLUCOSE SERPL-MCNC: 80 MG/DL — SIGNIFICANT CHANGE UP (ref 70–99)
POTASSIUM SERPL-MCNC: 4.5 MMOL/L — SIGNIFICANT CHANGE UP (ref 3.5–5.3)
POTASSIUM SERPL-SCNC: 4.5 MMOL/L — SIGNIFICANT CHANGE UP (ref 3.5–5.3)
SODIUM SERPL-SCNC: 140 MMOL/L — SIGNIFICANT CHANGE UP (ref 135–145)

## 2018-07-12 RX ORDER — LOSARTAN POTASSIUM 100 MG/1
1 TABLET, FILM COATED ORAL
Qty: 0 | Refills: 0 | COMMUNITY

## 2018-07-12 RX ORDER — ATORVASTATIN CALCIUM 80 MG/1
1 TABLET, FILM COATED ORAL
Qty: 0 | Refills: 0 | COMMUNITY

## 2018-07-12 RX ORDER — METOPROLOL TARTRATE 50 MG
1 TABLET ORAL
Qty: 0 | Refills: 0 | COMMUNITY

## 2018-07-12 RX ORDER — AMLODIPINE BESYLATE 2.5 MG/1
1 TABLET ORAL
Qty: 0 | Refills: 0 | COMMUNITY

## 2018-07-12 RX ORDER — FERROUS SULFATE 325(65) MG
1 TABLET ORAL
Qty: 0 | Refills: 0 | COMMUNITY

## 2018-07-12 RX ORDER — LEVOTHYROXINE SODIUM 125 MCG
1 TABLET ORAL
Qty: 0 | Refills: 0 | COMMUNITY

## 2018-07-12 RX ORDER — ASPIRIN/CALCIUM CARB/MAGNESIUM 324 MG
1 TABLET ORAL
Qty: 0 | Refills: 0 | COMMUNITY

## 2018-07-12 RX ORDER — GABAPENTIN 400 MG/1
1 CAPSULE ORAL
Qty: 0 | Refills: 0 | COMMUNITY

## 2018-07-12 RX ORDER — ACETAMINOPHEN 500 MG
2 TABLET ORAL
Qty: 0 | Refills: 0 | COMMUNITY

## 2018-07-12 RX ORDER — PREGABALIN 225 MG/1
1 CAPSULE ORAL
Qty: 0 | Refills: 0 | COMMUNITY

## 2018-07-12 RX ORDER — BUDESONIDE AND FORMOTEROL FUMARATE DIHYDRATE 160; 4.5 UG/1; UG/1
2 AEROSOL RESPIRATORY (INHALATION)
Qty: 0 | Refills: 0 | COMMUNITY

## 2018-07-12 RX ORDER — DOCUSATE SODIUM 100 MG
1 CAPSULE ORAL
Qty: 0 | Refills: 0 | COMMUNITY

## 2018-07-12 NOTE — H&P PST ADULT - NEGATIVE NEUROLOGICAL SYMPTOMS
no hemiparesis/no difficulty walking/no loss of consciousness/no focal seizures/no tremors/no vertigo/no paresthesias/no confusion/no generalized seizures/no headache/no syncope/no transient paralysis/no weakness

## 2018-07-12 NOTE — H&P PST ADULT - ENDOCRINE COMMENTS
Hx of Hypothyroid r/t KAY tx many years ago and regular f/u and TFT's - pt stable on current dose of Levothyroxine. Hx of DM T2 - on Insulin pump . A1C 10.4

## 2018-07-12 NOTE — H&P PST ADULT - NEGATIVE ENMT SYMPTOMS
no hearing difficulty/no ear pain/no throat pain/no dysphagia/no vertigo/no tinnitus/no nose bleeds/no sinus symptoms

## 2018-07-12 NOTE — H&P PST ADULT - NEGATIVE OPHTHALMOLOGIC SYMPTOMS
no photophobia/no blurred vision R/no pain R/no loss of vision L/no loss of vision R/no diplopia/no pain L/no blurred vision L

## 2018-07-12 NOTE — H&P PST ADULT - PROBLEM SELECTOR PLAN 2
cardiac cath 2016, stress and echo 2017 in chart. OR booking notified of cardiac stents (pt does not have cards). To continue aspirin. Pending cardiac evaluation due to Hx of CAD CABG and cardiac stents. Pt states he was evaluated last Tuesday.

## 2018-07-12 NOTE — H&P PST ADULT - VISION (WITH CORRECTIVE LENSES IF THE PATIENT USUALLY WEARS THEM):
Partially impaired: cannot see medication labels or newsprint, but can see obstacles in path, and the surrounding layout; can count fingers at arm's length/reading glass

## 2018-07-12 NOTE — H&P PST ADULT - MALLAMPATI CLASS
Class III - visualization of the soft palate and the base of the uvula/with phonation Class I (easy) - visualization of the soft palate, fauces, uvula, and both anterior and posterior pillars

## 2018-07-12 NOTE — H&P PST ADULT - RS GEN PE MLT RESP DETAILS PC
breath sounds equal/airway patent/no rales/clear to auscultation bilaterally/respirations non-labored/no rhonchi/no wheezes/no chest wall tenderness

## 2018-07-12 NOTE — H&P PST ADULT - HISTORY OF PRESENT ILLNESS
66 year old male with Hx CAD, s/p three coronary stents in 2006, and CABG x3 in 3/2016, with atherosclerosis of arteries of extremities, with intermittent claudication, left, s/p RLE bypass 11/30/15, s/p failed angiogram to secondary to hypotension in 2016 at [facility], s/p Left femoral endarterectomy Fem-Pop bypass in 6/2017, complaining of left leg pain with ambulation x 4 months. Pt is scheduled for left lower extremity angiogram for 7/17/18

## 2018-07-12 NOTE — H&P PST ADULT - PSH
History of femoral angiogram  Left femoral endarterectomy Fem-Pop bypass 6/2017  S/P bypass graft of extremity  RLE- 11/30/15  S/P CABG x 3  3/28/16  S/P prostatectomy  1996  Stented coronary artery  x 3 cardiac stents placed in 2006

## 2018-07-12 NOTE — H&P PST ADULT - PMH
Anemia    Atherosclerosis of arteries of extremities  left leg  Bronchospasm    CAD (coronary artery disease)  3 cardiac stents placed in 2006 (mid LAD, Prox LAD, Prox to #2), 3V CABG 3/28/16  CHF (congestive heart failure)    COPD (chronic obstructive pulmonary disease)    Diabetic neuropathy    DM (diabetes mellitus)  type 2 - on insulin pump  HTN (hypertension)    Hypercholesteremia    Hypothyroid    Intermittent claudication    Iron deficiency anemia, unspecified iron deficiency anemia type    Mild intermittent asthma without complication    Obesity (BMI 30-39.9)    PAD (peripheral artery disease)  RLE bypass 11/30/15  Prostate cancer    Sleep apnea  non-compliant

## 2018-07-12 NOTE — H&P PST ADULT - PROBLEM SELECTOR PLAN 1
Pt is scheduled for left lower extremity angiogram for 7/17/18. Preop instructions, pepcid, and surgical scrub provided. Pt stated understanding. REAGAN precaution, OR booking notified. Pt is almost finished with cephalexin for left great toe infection. Dr Loya is aware. Pending medical evaluation from podiatrist. Pt was evaluated last week.

## 2018-07-12 NOTE — H&P PST ADULT - PROBLEM SELECTOR PLAN 5
instructed to take Symbicort inhaler on the morning of procedure. instructed to take levothyroxine the morning of procedure.

## 2018-07-12 NOTE — H&P PST ADULT - EKG AND INTERPRETATION
ekg from PMD 7/11/18, comparison in chart from 4/15/17. Echo and stress test in chart from 2017 ekg from PMD 7/11/18, comparison in chart from 4/15/17.

## 2018-07-12 NOTE — H&P PST ADULT - NEUROLOGICAL DETAILS
responds to pain/normal strength/alert and oriented x 3/responds to verbal commands/sensation intact

## 2018-07-12 NOTE — H&P PST ADULT - PROBLEM SELECTOR PLAN 4
on insulin pump. A1C 10.4. Instructed patient to obtain endocrinology evaluation. Insulin pump instructions as per endocrinologist. OR booking notified of insulin pump

## 2018-07-16 ENCOUNTER — APPOINTMENT (OUTPATIENT)
Dept: CARDIOLOGY | Facility: CLINIC | Age: 68
End: 2018-07-16
Payer: MEDICARE

## 2018-07-16 ENCOUNTER — NON-APPOINTMENT (OUTPATIENT)
Age: 68
End: 2018-07-16

## 2018-07-16 VITALS
SYSTOLIC BLOOD PRESSURE: 148 MMHG | HEIGHT: 66 IN | OXYGEN SATURATION: 99 % | HEART RATE: 59 BPM | BODY MASS INDEX: 30.37 KG/M2 | WEIGHT: 189 LBS | DIASTOLIC BLOOD PRESSURE: 77 MMHG

## 2018-07-16 DIAGNOSIS — I25.10 ATHEROSCLEROTIC HEART DISEASE OF NATIVE CORONARY ARTERY W/OUT ANGINA PECTORIS: ICD-10-CM

## 2018-07-16 PROCEDURE — 99214 OFFICE O/P EST MOD 30 MIN: CPT

## 2018-07-16 PROCEDURE — 93000 ELECTROCARDIOGRAM COMPLETE: CPT

## 2018-07-16 RX ORDER — BACITRACIN 500 [USP'U]/G
OINTMENT OPHTHALMIC
Refills: 0 | Status: ACTIVE | COMMUNITY

## 2018-07-16 RX ORDER — METOLAZONE 5 MG/1
5 TABLET ORAL
Refills: 0 | Status: ACTIVE | COMMUNITY

## 2018-07-16 RX ORDER — GABAPENTIN 300 MG
300 TABLET ORAL
Refills: 0 | Status: ACTIVE | COMMUNITY

## 2018-07-16 RX ORDER — CEPHALEXIN 750 MG/1
CAPSULE ORAL
Refills: 0 | Status: DISCONTINUED | COMMUNITY
End: 2018-07-16

## 2018-07-19 ENCOUNTER — INPATIENT (INPATIENT)
Facility: HOSPITAL | Age: 68
LOS: 12 days | Discharge: HOME | End: 2018-08-01
Attending: SURGERY | Admitting: SURGERY
Payer: MEDICARE

## 2018-07-19 VITALS
SYSTOLIC BLOOD PRESSURE: 152 MMHG | HEART RATE: 70 BPM | RESPIRATION RATE: 16 BRPM | DIASTOLIC BLOOD PRESSURE: 72 MMHG | OXYGEN SATURATION: 100 % | TEMPERATURE: 98 F

## 2018-07-19 DIAGNOSIS — Z95.5 PRESENCE OF CORONARY ANGIOPLASTY IMPLANT AND GRAFT: Chronic | ICD-10-CM

## 2018-07-19 DIAGNOSIS — E03.9 HYPOTHYROIDISM, UNSPECIFIED: ICD-10-CM

## 2018-07-19 DIAGNOSIS — I50.9 HEART FAILURE, UNSPECIFIED: ICD-10-CM

## 2018-07-19 DIAGNOSIS — I73.9 PERIPHERAL VASCULAR DISEASE, UNSPECIFIED: ICD-10-CM

## 2018-07-19 DIAGNOSIS — L08.9 LOCAL INFECTION OF THE SKIN AND SUBCUTANEOUS TISSUE, UNSPECIFIED: ICD-10-CM

## 2018-07-19 DIAGNOSIS — Z95.1 PRESENCE OF AORTOCORONARY BYPASS GRAFT: Chronic | ICD-10-CM

## 2018-07-19 DIAGNOSIS — Z95.828 PRESENCE OF OTHER VASCULAR IMPLANTS AND GRAFTS: Chronic | ICD-10-CM

## 2018-07-19 DIAGNOSIS — E11.9 TYPE 2 DIABETES MELLITUS WITHOUT COMPLICATIONS: ICD-10-CM

## 2018-07-19 DIAGNOSIS — J45.20 MILD INTERMITTENT ASTHMA, UNCOMPLICATED: ICD-10-CM

## 2018-07-19 DIAGNOSIS — N18.3 CHRONIC KIDNEY DISEASE, STAGE 3 (MODERATE): ICD-10-CM

## 2018-07-19 DIAGNOSIS — Z86.79 PERSONAL HISTORY OF OTHER DISEASES OF THE CIRCULATORY SYSTEM: Chronic | ICD-10-CM

## 2018-07-19 PROBLEM — D64.9 ANEMIA, UNSPECIFIED: Chronic | Status: ACTIVE | Noted: 2017-04-17

## 2018-07-19 PROBLEM — J44.9 CHRONIC OBSTRUCTIVE PULMONARY DISEASE, UNSPECIFIED: Chronic | Status: ACTIVE | Noted: 2018-07-12

## 2018-07-19 PROBLEM — G47.30 SLEEP APNEA, UNSPECIFIED: Chronic | Status: ACTIVE | Noted: 2017-05-24

## 2018-07-19 PROBLEM — I70.209 UNSPECIFIED ATHEROSCLEROSIS OF NATIVE ARTERIES OF EXTREMITIES, UNSPECIFIED EXTREMITY: Chronic | Status: ACTIVE | Noted: 2017-04-17

## 2018-07-19 LAB
ALBUMIN SERPL ELPH-MCNC: 4.3 G/DL — SIGNIFICANT CHANGE UP (ref 3.3–5)
ALP SERPL-CCNC: 78 U/L — SIGNIFICANT CHANGE UP (ref 40–120)
ALT FLD-CCNC: 33 U/L — SIGNIFICANT CHANGE UP (ref 4–41)
AST SERPL-CCNC: 20 U/L — SIGNIFICANT CHANGE UP (ref 4–40)
BASE EXCESS BLDV CALC-SCNC: 2 MMOL/L — SIGNIFICANT CHANGE UP
BASOPHILS # BLD AUTO: 0.05 K/UL — SIGNIFICANT CHANGE UP (ref 0–0.2)
BASOPHILS NFR BLD AUTO: 0.5 % — SIGNIFICANT CHANGE UP (ref 0–2)
BILIRUB SERPL-MCNC: 0.4 MG/DL — SIGNIFICANT CHANGE UP (ref 0.2–1.2)
BLOOD GAS VENOUS - CREATININE: 1.69 MG/DL — HIGH (ref 0.5–1.3)
BUN SERPL-MCNC: 67 MG/DL — HIGH (ref 7–23)
CALCIUM SERPL-MCNC: 10 MG/DL — SIGNIFICANT CHANGE UP (ref 8.4–10.5)
CHLORIDE BLDV-SCNC: 103 MMOL/L — SIGNIFICANT CHANGE UP (ref 96–108)
CHLORIDE SERPL-SCNC: 97 MMOL/L — LOW (ref 98–107)
CO2 SERPL-SCNC: 24 MMOL/L — SIGNIFICANT CHANGE UP (ref 22–31)
CREAT SERPL-MCNC: 1.74 MG/DL — HIGH (ref 0.5–1.3)
CRP SERPL-MCNC: 7 MG/L — HIGH
EOSINOPHIL # BLD AUTO: 0.87 K/UL — HIGH (ref 0–0.5)
EOSINOPHIL NFR BLD AUTO: 8.6 % — HIGH (ref 0–6)
ERYTHROCYTE [SEDIMENTATION RATE] IN BLOOD: 79 MM/HR — HIGH (ref 1–15)
GAS PNL BLDV: 133 MMOL/L — LOW (ref 136–146)
GLUCOSE BLDC GLUCOMTR-MCNC: 143 MG/DL — HIGH (ref 70–99)
GLUCOSE BLDC GLUCOMTR-MCNC: 306 MG/DL — HIGH (ref 70–99)
GLUCOSE BLDV-MCNC: 88 — SIGNIFICANT CHANGE UP (ref 70–99)
GLUCOSE SERPL-MCNC: 92 MG/DL — SIGNIFICANT CHANGE UP (ref 70–99)
HCO3 BLDV-SCNC: 25 MMOL/L — SIGNIFICANT CHANGE UP (ref 20–27)
HCT VFR BLD CALC: 35.2 % — LOW (ref 39–50)
HCT VFR BLDV CALC: 36.4 % — LOW (ref 39–51)
HGB BLD-MCNC: 11.3 G/DL — LOW (ref 13–17)
HGB BLDV-MCNC: 11.8 G/DL — LOW (ref 13–17)
IMM GRANULOCYTES # BLD AUTO: 0.06 # — SIGNIFICANT CHANGE UP
IMM GRANULOCYTES NFR BLD AUTO: 0.6 % — SIGNIFICANT CHANGE UP (ref 0–1.5)
LACTATE BLDV-MCNC: 1.4 MMOL/L — SIGNIFICANT CHANGE UP (ref 0.5–2)
LYMPHOCYTES # BLD AUTO: 1.95 K/UL — SIGNIFICANT CHANGE UP (ref 1–3.3)
LYMPHOCYTES # BLD AUTO: 19.4 % — SIGNIFICANT CHANGE UP (ref 13–44)
MCHC RBC-ENTMCNC: 26.5 PG — LOW (ref 27–34)
MCHC RBC-ENTMCNC: 32.1 % — SIGNIFICANT CHANGE UP (ref 32–36)
MCV RBC AUTO: 82.6 FL — SIGNIFICANT CHANGE UP (ref 80–100)
MONOCYTES # BLD AUTO: 0.87 K/UL — SIGNIFICANT CHANGE UP (ref 0–0.9)
MONOCYTES NFR BLD AUTO: 8.6 % — SIGNIFICANT CHANGE UP (ref 2–14)
NEUTROPHILS # BLD AUTO: 6.26 K/UL — SIGNIFICANT CHANGE UP (ref 1.8–7.4)
NEUTROPHILS NFR BLD AUTO: 62.3 % — SIGNIFICANT CHANGE UP (ref 43–77)
NRBC # FLD: 0 — SIGNIFICANT CHANGE UP
PCO2 BLDV: 43 MMHG — SIGNIFICANT CHANGE UP (ref 41–51)
PH BLDV: 7.41 PH — SIGNIFICANT CHANGE UP (ref 7.32–7.43)
PLATELET # BLD AUTO: 258 K/UL — SIGNIFICANT CHANGE UP (ref 150–400)
PMV BLD: 10.4 FL — SIGNIFICANT CHANGE UP (ref 7–13)
PO2 BLDV: < 24 MMHG — LOW (ref 35–40)
POTASSIUM BLDV-SCNC: 4.4 MMOL/L — SIGNIFICANT CHANGE UP (ref 3.4–4.5)
POTASSIUM SERPL-MCNC: 4.6 MMOL/L — SIGNIFICANT CHANGE UP (ref 3.5–5.3)
POTASSIUM SERPL-SCNC: 4.6 MMOL/L — SIGNIFICANT CHANGE UP (ref 3.5–5.3)
PROT SERPL-MCNC: 8 G/DL — SIGNIFICANT CHANGE UP (ref 6–8.3)
RBC # BLD: 4.26 M/UL — SIGNIFICANT CHANGE UP (ref 4.2–5.8)
RBC # FLD: 16 % — HIGH (ref 10.3–14.5)
SAO2 % BLDV: 28.2 % — LOW (ref 60–85)
SODIUM SERPL-SCNC: 135 MMOL/L — SIGNIFICANT CHANGE UP (ref 135–145)
WBC # BLD: 10.06 K/UL — SIGNIFICANT CHANGE UP (ref 3.8–10.5)
WBC # FLD AUTO: 10.06 K/UL — SIGNIFICANT CHANGE UP (ref 3.8–10.5)

## 2018-07-19 PROCEDURE — 73620 X-RAY EXAM OF FOOT: CPT | Mod: 26,LT

## 2018-07-19 RX ORDER — DICLOFENAC SODIUM 75 MG/1
1 TABLET, DELAYED RELEASE ORAL
Qty: 0 | Refills: 0 | COMMUNITY

## 2018-07-19 RX ORDER — OXYCODONE AND ACETAMINOPHEN 5; 325 MG/1; MG/1
1 TABLET ORAL ONCE
Qty: 0 | Refills: 0 | Status: DISCONTINUED | OUTPATIENT
Start: 2018-07-19 | End: 2018-07-19

## 2018-07-19 RX ORDER — METOPROLOL TARTRATE 50 MG
1 TABLET ORAL
Qty: 0 | Refills: 0 | COMMUNITY

## 2018-07-19 RX ORDER — MORPHINE SULFATE 50 MG/1
2 CAPSULE, EXTENDED RELEASE ORAL EVERY 4 HOURS
Qty: 0 | Refills: 0 | Status: DISCONTINUED | OUTPATIENT
Start: 2018-07-19 | End: 2018-07-26

## 2018-07-19 RX ORDER — BUDESONIDE AND FORMOTEROL FUMARATE DIHYDRATE 160; 4.5 UG/1; UG/1
2 AEROSOL RESPIRATORY (INHALATION)
Qty: 0 | Refills: 0 | Status: DISCONTINUED | OUTPATIENT
Start: 2018-07-19 | End: 2018-08-01

## 2018-07-19 RX ORDER — FUROSEMIDE 40 MG
40 TABLET ORAL DAILY
Qty: 0 | Refills: 0 | Status: DISCONTINUED | OUTPATIENT
Start: 2018-07-19 | End: 2018-07-24

## 2018-07-19 RX ORDER — LEVOTHYROXINE SODIUM 125 MCG
150 TABLET ORAL DAILY
Qty: 0 | Refills: 0 | Status: DISCONTINUED | OUTPATIENT
Start: 2018-07-19 | End: 2018-08-01

## 2018-07-19 RX ORDER — SODIUM CHLORIDE 9 MG/ML
1000 INJECTION, SOLUTION INTRAVENOUS
Qty: 0 | Refills: 0 | Status: DISCONTINUED | OUTPATIENT
Start: 2018-07-19 | End: 2018-08-01

## 2018-07-19 RX ORDER — LOSARTAN POTASSIUM 100 MG/1
100 TABLET, FILM COATED ORAL DAILY
Qty: 0 | Refills: 0 | Status: DISCONTINUED | OUTPATIENT
Start: 2018-07-19 | End: 2018-07-24

## 2018-07-19 RX ORDER — ATORVASTATIN CALCIUM 80 MG/1
80 TABLET, FILM COATED ORAL AT BEDTIME
Qty: 0 | Refills: 0 | Status: DISCONTINUED | OUTPATIENT
Start: 2018-07-19 | End: 2018-08-01

## 2018-07-19 RX ORDER — HEPARIN SODIUM 5000 [USP'U]/ML
5000 INJECTION INTRAVENOUS; SUBCUTANEOUS EVERY 12 HOURS
Qty: 0 | Refills: 0 | Status: DISCONTINUED | OUTPATIENT
Start: 2018-07-19 | End: 2018-07-27

## 2018-07-19 RX ORDER — PREGABALIN 225 MG/1
1000 CAPSULE ORAL DAILY
Qty: 0 | Refills: 0 | Status: DISCONTINUED | OUTPATIENT
Start: 2018-07-19 | End: 2018-08-01

## 2018-07-19 RX ORDER — DEXTROSE 50 % IN WATER 50 %
15 SYRINGE (ML) INTRAVENOUS ONCE
Qty: 0 | Refills: 0 | Status: DISCONTINUED | OUTPATIENT
Start: 2018-07-19 | End: 2018-08-01

## 2018-07-19 RX ORDER — FERROUS SULFATE 325(65) MG
325 TABLET ORAL DAILY
Qty: 0 | Refills: 0 | Status: DISCONTINUED | OUTPATIENT
Start: 2018-07-19 | End: 2018-08-01

## 2018-07-19 RX ORDER — GABAPENTIN 400 MG/1
300 CAPSULE ORAL DAILY
Qty: 0 | Refills: 0 | Status: DISCONTINUED | OUTPATIENT
Start: 2018-07-19 | End: 2018-08-01

## 2018-07-19 RX ORDER — METOPROLOL TARTRATE 50 MG
25 TABLET ORAL
Qty: 0 | Refills: 0 | Status: DISCONTINUED | OUTPATIENT
Start: 2018-07-19 | End: 2018-08-01

## 2018-07-19 RX ORDER — MORPHINE SULFATE 50 MG/1
2 CAPSULE, EXTENDED RELEASE ORAL ONCE
Qty: 0 | Refills: 0 | Status: DISCONTINUED | OUTPATIENT
Start: 2018-07-19 | End: 2018-07-19

## 2018-07-19 RX ORDER — OXYCODONE AND ACETAMINOPHEN 5; 325 MG/1; MG/1
1 TABLET ORAL EVERY 6 HOURS
Qty: 0 | Refills: 0 | Status: DISCONTINUED | OUTPATIENT
Start: 2018-07-19 | End: 2018-07-26

## 2018-07-19 RX ORDER — VANCOMYCIN HCL 1 G
1000 VIAL (EA) INTRAVENOUS EVERY 24 HOURS
Qty: 0 | Refills: 0 | Status: DISCONTINUED | OUTPATIENT
Start: 2018-07-19 | End: 2018-07-24

## 2018-07-19 RX ORDER — GLUCAGON INJECTION, SOLUTION 0.5 MG/.1ML
1 INJECTION, SOLUTION SUBCUTANEOUS ONCE
Qty: 0 | Refills: 0 | Status: DISCONTINUED | OUTPATIENT
Start: 2018-07-19 | End: 2018-08-01

## 2018-07-19 RX ORDER — DOCUSATE SODIUM 100 MG
100 CAPSULE ORAL DAILY
Qty: 0 | Refills: 0 | Status: DISCONTINUED | OUTPATIENT
Start: 2018-07-19 | End: 2018-08-01

## 2018-07-19 RX ORDER — INSULIN LISPRO 100/ML
6 VIAL (ML) SUBCUTANEOUS
Qty: 0 | Refills: 0 | Status: DISCONTINUED | OUTPATIENT
Start: 2018-07-19 | End: 2018-07-20

## 2018-07-19 RX ORDER — PIPERACILLIN AND TAZOBACTAM 4; .5 G/20ML; G/20ML
3.38 INJECTION, POWDER, LYOPHILIZED, FOR SOLUTION INTRAVENOUS EVERY 8 HOURS
Qty: 0 | Refills: 0 | Status: DISCONTINUED | OUTPATIENT
Start: 2018-07-19 | End: 2018-07-24

## 2018-07-19 RX ORDER — ASPIRIN/CALCIUM CARB/MAGNESIUM 324 MG
81 TABLET ORAL DAILY
Qty: 0 | Refills: 0 | Status: DISCONTINUED | OUTPATIENT
Start: 2018-07-19 | End: 2018-08-01

## 2018-07-19 RX ORDER — FERROUS SULFATE 325(65) MG
1 TABLET ORAL
Qty: 0 | Refills: 0 | COMMUNITY

## 2018-07-19 RX ORDER — PIPERACILLIN AND TAZOBACTAM 4; .5 G/20ML; G/20ML
3.38 INJECTION, POWDER, LYOPHILIZED, FOR SOLUTION INTRAVENOUS EVERY 8 HOURS
Qty: 0 | Refills: 0 | Status: DISCONTINUED | OUTPATIENT
Start: 2018-07-19 | End: 2018-07-19

## 2018-07-19 RX ORDER — CEPHALEXIN 500 MG
0 CAPSULE ORAL
Qty: 0 | Refills: 0 | COMMUNITY

## 2018-07-19 RX ORDER — DEXTROSE 50 % IN WATER 50 %
12.5 SYRINGE (ML) INTRAVENOUS ONCE
Qty: 0 | Refills: 0 | Status: DISCONTINUED | OUTPATIENT
Start: 2018-07-19 | End: 2018-08-01

## 2018-07-19 RX ORDER — INSULIN GLARGINE 100 [IU]/ML
18 INJECTION, SOLUTION SUBCUTANEOUS AT BEDTIME
Qty: 0 | Refills: 0 | Status: DISCONTINUED | OUTPATIENT
Start: 2018-07-19 | End: 2018-07-20

## 2018-07-19 RX ORDER — INSULIN LISPRO 100/ML
VIAL (ML) SUBCUTANEOUS
Qty: 0 | Refills: 0 | Status: DISCONTINUED | OUTPATIENT
Start: 2018-07-19 | End: 2018-08-01

## 2018-07-19 RX ORDER — DEXTROSE 50 % IN WATER 50 %
25 SYRINGE (ML) INTRAVENOUS ONCE
Qty: 0 | Refills: 0 | Status: DISCONTINUED | OUTPATIENT
Start: 2018-07-19 | End: 2018-08-01

## 2018-07-19 RX ADMIN — Medication 40 MILLIGRAM(S): at 17:53

## 2018-07-19 RX ADMIN — OXYCODONE AND ACETAMINOPHEN 1 TABLET(S): 5; 325 TABLET ORAL at 08:51

## 2018-07-19 RX ADMIN — OXYCODONE AND ACETAMINOPHEN 1 TABLET(S): 5; 325 TABLET ORAL at 06:13

## 2018-07-19 RX ADMIN — INSULIN GLARGINE 18 UNIT(S): 100 INJECTION, SOLUTION SUBCUTANEOUS at 22:45

## 2018-07-19 RX ADMIN — MORPHINE SULFATE 2 MILLIGRAM(S): 50 CAPSULE, EXTENDED RELEASE ORAL at 17:55

## 2018-07-19 RX ADMIN — Medication 325 MILLIGRAM(S): at 17:53

## 2018-07-19 RX ADMIN — Medication 100 MILLIGRAM(S): at 07:15

## 2018-07-19 RX ADMIN — HEPARIN SODIUM 5000 UNIT(S): 5000 INJECTION INTRAVENOUS; SUBCUTANEOUS at 18:38

## 2018-07-19 RX ADMIN — Medication 81 MILLIGRAM(S): at 17:53

## 2018-07-19 RX ADMIN — MORPHINE SULFATE 2 MILLIGRAM(S): 50 CAPSULE, EXTENDED RELEASE ORAL at 10:14

## 2018-07-19 RX ADMIN — Medication 6 UNIT(S): at 17:54

## 2018-07-19 RX ADMIN — Medication 250 MILLIGRAM(S): at 18:38

## 2018-07-19 RX ADMIN — OXYCODONE AND ACETAMINOPHEN 1 TABLET(S): 5; 325 TABLET ORAL at 22:00

## 2018-07-19 RX ADMIN — BUDESONIDE AND FORMOTEROL FUMARATE DIHYDRATE 2 PUFF(S): 160; 4.5 AEROSOL RESPIRATORY (INHALATION) at 22:06

## 2018-07-19 RX ADMIN — PIPERACILLIN AND TAZOBACTAM 25 GRAM(S): 4; .5 INJECTION, POWDER, LYOPHILIZED, FOR SOLUTION INTRAVENOUS at 21:52

## 2018-07-19 RX ADMIN — GABAPENTIN 300 MILLIGRAM(S): 400 CAPSULE ORAL at 17:55

## 2018-07-19 RX ADMIN — MORPHINE SULFATE 2 MILLIGRAM(S): 50 CAPSULE, EXTENDED RELEASE ORAL at 09:49

## 2018-07-19 RX ADMIN — OXYCODONE AND ACETAMINOPHEN 1 TABLET(S): 5; 325 TABLET ORAL at 07:33

## 2018-07-19 RX ADMIN — ATORVASTATIN CALCIUM 80 MILLIGRAM(S): 80 TABLET, FILM COATED ORAL at 21:52

## 2018-07-19 RX ADMIN — OXYCODONE AND ACETAMINOPHEN 1 TABLET(S): 5; 325 TABLET ORAL at 22:54

## 2018-07-19 RX ADMIN — PREGABALIN 1000 MICROGRAM(S): 225 CAPSULE ORAL at 17:53

## 2018-07-19 RX ADMIN — OXYCODONE AND ACETAMINOPHEN 1 TABLET(S): 5; 325 TABLET ORAL at 07:15

## 2018-07-19 RX ADMIN — MORPHINE SULFATE 2 MILLIGRAM(S): 50 CAPSULE, EXTENDED RELEASE ORAL at 18:31

## 2018-07-19 NOTE — CONSULT NOTE ADULT - SUBJECTIVE AND OBJECTIVE BOX
Patient is a 67y old  Male who presents with a chief complaint of     HPI:  66 yo M former smoker, hx IDDM2, hypothyroidism, HTN, HLD, COPD, CAD s/p stent & CABG x 3, atherosclerosis LE with intermittent claudication L leg, RLE bypass, femoral endartectomy fem-pop bypass 2017, presenting with L great toe pain s/p paronychia drainage and toenail removal. Pt describes 10/10 throbbing toe pain unrelieved by diclofenac or naproxen. Pt states he soaked foot in epsom salt one day ago before pain acutely worsened. Denies fevers/chills. No foot/calf pain. Reports L leg swelling/redness unchanged from baseline. On keflex BID. (19 Jul 2018 16:29)    ER vitals:  T 96.2, P 72, /57, WBC 10.  ESR 79, CRP 7.0.  Pt received clinda x 1 in ER.        REVIEW OF SYSTEMS:    CONSTITUTIONAL: No fever, weight loss, or fatigue  EYES: No eye pain, visual disturbances, or discharge  ENMT:  No sore throat  NECK: No pain or stiffness  RESPIRATORY: No cough, wheezing, chills or hemoptysis; No shortness of breath  CARDIOVASCULAR: No chest pain, palpitations, dizziness, or leg swelling  GASTROINTESTINAL: No abdominal or epigastric pain. No nausea, vomiting, or hematemesis; No diarrhea or constipation. No melena or hematochezia.  GENITOURINARY: No dysuria, frequency, hematuria, or incontinence  NEUROLOGICAL: No headaches, memory loss, loss of strength, numbness, or tremors  SKIN: No itching, burning, rashes, or lesions   LYMPH NODES: No enlarged glands  MUSCULOSKELETAL: No joint pain or swelling; No muscle, back, or extremity pain      PAST MEDICAL & SURGICAL HISTORY:  COPD (chronic obstructive pulmonary disease)  Sleep apnea: non-compliant  Anemia  Atherosclerosis of arteries of extremities: left leg  Mild intermittent asthma without complication  Iron deficiency anemia, unspecified iron deficiency anemia type  Prostate cancer  Bronchospasm  Obesity (BMI 30-39.9)  PAD (peripheral artery disease): RLE bypass 11/30/15  CAD (coronary artery disease): 3 cardiac stents placed in 2006 (mid LAD, Prox LAD, Prox to #2), 3V CABG 3/28/16  Hypothyroid  Intermittent claudication  Diabetic neuropathy  DM (diabetes mellitus): type 2 - on insulin pump  CHF (congestive heart failure)  Hypercholesteremia  HTN (hypertension)  History of femoral angiogram: Left femoral endarterectomy Fem-Pop bypass 6/2017  S/P bypass graft of extremity: RLE- 11/30/15  S/P CABG x 3: 3/28/16  Stented coronary artery: x 3 cardiac stents placed in 2006  S/P prostatectomy: 1996      Allergies    No Known Allergies    Intolerances        FAMILY HISTORY:  No pertinent family history in first degree relatives      SOCIAL HISTORY:        MEDICATIONS  (STANDING):  aspirin enteric coated 81 milliGRAM(s) Oral daily  atorvastatin 80 milliGRAM(s) Oral at bedtime  buDESOnide 160 MICROgram(s)/formoterol 4.5 MICROgram(s) Inhaler 2 Puff(s) Inhalation two times a day  cyanocobalamin 1000 MICROGram(s) Oral daily  dextrose 5%. 1000 milliLiter(s) (50 mL/Hr) IV Continuous <Continuous>  dextrose 50% Injectable 12.5 Gram(s) IV Push once  dextrose 50% Injectable 25 Gram(s) IV Push once  dextrose 50% Injectable 25 Gram(s) IV Push once  ferrous    sulfate 325 milliGRAM(s) Oral daily  furosemide    Tablet 40 milliGRAM(s) Oral daily  gabapentin 300 milliGRAM(s) Oral daily  insulin glargine Injectable (LANTUS) 18 Unit(s) SubCutaneous at bedtime  insulin lispro (HumaLOG) corrective regimen sliding scale   SubCutaneous three times a day before meals  insulin lispro Injectable (HumaLOG) 6 Unit(s) SubCutaneous three times a day before meals  levothyroxine 150 MICROGram(s) Oral daily  losartan 100 milliGRAM(s) Oral daily  metolazone 5 milliGRAM(s) Oral daily  metoprolol tartrate 25 milliGRAM(s) Oral two times a day    MEDICATIONS  (PRN):  dextrose 40% Gel 15 Gram(s) Oral once PRN Blood Glucose LESS THAN 70 milliGRAM(s)/deciLiter  docusate sodium 100 milliGRAM(s) Oral daily PRN Constipation  glucagon  Injectable 1 milliGRAM(s) IntraMuscular once PRN Glucose <70 milliGRAM(s)/deciLiter      Vital Signs Last 24 Hrs  T(C): 35.7 (19 Jul 2018 11:04), Max: 36.4 (19 Jul 2018 04:59)  T(F): 96.2 (19 Jul 2018 11:04), Max: 97.6 (19 Jul 2018 04:59)  HR: 72 (19 Jul 2018 11:04) (68 - 72)  BP: 140/57 (19 Jul 2018 11:04) (140/57 - 152/72)  BP(mean): --  RR: 16 (19 Jul 2018 11:04) (16 - 17)  SpO2: 100% (19 Jul 2018 11:04) (100% - 100%)    PHYSICAL EXAM:    GENERAL: NAD, well-groomed  HEAD:  Atraumatic, Normocephalic  EYES: EOMI, PERRLA, conjunctiva and sclera clear  ENMT: No tonsillar erythema, exudates, or enlargement; Moist mucous membranes  NECK: Supple, No JVD  CHEST/LUNG: Clear to percussion bilaterally; No rales, rhonchi, wheezing, or rubs  HEART: Regular rate and rhythm; No murmurs, rubs, or gallops  ABDOMEN: Soft, Nontender, Nondistended; Bowel sounds present  EXTREMITIES:  2+ Peripheral Pulses, No clubbing, cyanosis, or edema  LYMPH: No lymphadenopathy noted  SKIN: No rashes or lesions    LABS:  CBC Full  -  ( 19 Jul 2018 07:10 )  WBC Count : 10.06 K/uL  Hemoglobin : 11.3 g/dL  Hematocrit : 35.2 %  Platelet Count - Automated : 258 K/uL  Mean Cell Volume : 82.6 fL  Mean Cell Hemoglobin : 26.5 pg  Mean Cell Hemoglobin Concentration : 32.1 %  Auto Neutrophil # : 6.26 K/uL  Auto Lymphocyte # : 1.95 K/uL  Auto Monocyte # : 0.87 K/uL  Auto Eosinophil # : 0.87 K/uL  Auto Basophil # : 0.05 K/uL  Auto Neutrophil % : 62.3 %  Auto Lymphocyte % : 19.4 %  Auto Monocyte % : 8.6 %  Auto Eosinophil % : 8.6 %  Auto Basophil % : 0.5 %      07-19    135  |  97<L>  |  67<H>  ----------------------------<  92  4.6   |  24  |  1.74<H>    Ca    10.0      19 Jul 2018 07:10    TPro  8.0  /  Alb  4.3  /  TBili  0.4  /  DBili  x   /  AST  20  /  ALT  33  /  AlkPhos  78  07-19      LIVER FUNCTIONS - ( 19 Jul 2018 07:10 )  Alb: 4.3 g/dL / Pro: 8.0 g/dL / ALK PHOS: 78 u/L / ALT: 33 u/L / AST: 20 u/L / GGT: x                               MICROBIOLOGY:                    RADIOLOGY:    < from: Xray Foot AP + Lateral, Left (07.19.18 @ 06:22) >    IMPRESSION:  No tracking soft tissue gas collections, radiopaque foreign bodies, or   gross radiographic evidence for osteomyelitis.    No fractures or dislocations.    Tarsometatarsal alignment maintained without evidence for a Lisfranc   injury.    Preserved visualized joint spaces. No joint margin erosions or   intra-articular or periarticular calcifications.    Small calcaneal enthesophytes.     No lytic or blastic lesions.    Vascular calcium lesions.    < end of copied text > Patient is a 67y old  Male who presents with a chief complaint of     HPI:  68 yo M former smoker, hx IDDM2, hypothyroidism, HTN, HLD, COPD, CAD s/p stent & CABG x 3, atherosclerosis LE with intermittent claudication L leg, RLE bypass, femoral endartectomy fem-pop bypass 2017, presenting with L great toe pain s/p paronychia drainage and toenail removal. Pt describes 10/10 throbbing toe pain unrelieved by diclofenac or naproxen. Pt states he soaked foot in epsom salt one day ago before pain acutely worsened. Denies fevers/chills. No foot/calf pain. Reports L leg swelling/redness unchanged from baseline. On keflex BID. (19 Jul 2018 16:29)    ER vitals:  T 96.2, P 72, /57, WBC 10.  ESR 79, CRP 7.0.  Pt received clinda x 1 in ER.  Pt had recently been on approximately 3 weeks of abx (keflex) as an outpatient for Left nail infection.  PT states it was not getting better, and podiatrist removed nail on Tuesday.  As per patient, he noted some pus underneath nail bed.  Pt came to ER, as he has been having intolerable pain since procedure.  He c/o rigors/chills currently.  Foot has been swollen and tender.  No recent travel, or foot trauma.      He recently saw his Vascular surgeon Dr. Loya, and pending eventual angiogram.      REVIEW OF SYSTEMS:    CONSTITUTIONAL: No fever, weight loss, or fatigue  EYES: No eye pain, visual disturbances, or discharge  ENMT:  No sore throat  NECK: No pain or stiffness  RESPIRATORY: No cough, wheezing, chills or hemoptysis; No shortness of breath  CARDIOVASCULAR: No chest pain, palpitations, dizziness, or leg swelling  GASTROINTESTINAL: No abdominal or epigastric pain. No nausea, vomiting, or hematemesis; No diarrhea or constipation. No melena or hematochezia.  GENITOURINARY: No dysuria, frequency, hematuria, or incontinence  NEUROLOGICAL: No headaches, memory loss, loss of strength, numbness, or tremors  SKIN: No itching, burning, rashes, or lesions   LYMPH NODES: No enlarged glands  MUSCULOSKELETAL: Left foot pain and swelling, L toenail avulsion      PAST MEDICAL & SURGICAL HISTORY:  COPD (chronic obstructive pulmonary disease)  Sleep apnea: non-compliant  Anemia  Atherosclerosis of arteries of extremities: left leg  Mild intermittent asthma without complication  Iron deficiency anemia, unspecified iron deficiency anemia type  Prostate cancer  Bronchospasm  Obesity (BMI 30-39.9)  PAD (peripheral artery disease): RLE bypass 11/30/15  CAD (coronary artery disease): 3 cardiac stents placed in 2006 (mid LAD, Prox LAD, Prox to #2), 3V CABG 3/28/16  Hypothyroid  Intermittent claudication  Diabetic neuropathy  DM (diabetes mellitus): type 2 - on insulin pump  CHF (congestive heart failure)  Hypercholesteremia  HTN (hypertension)  History of femoral angiogram: Left femoral endarterectomy Fem-Pop bypass 6/2017  S/P bypass graft of extremity: RLE- 11/30/15  S/P CABG x 3: 3/28/16  Stented coronary artery: x 3 cardiac stents placed in 2006  S/P prostatectomy: 1996      Allergies    No Known Allergies    Intolerances        FAMILY HISTORY:  No pertinent family history in first degree relatives      SOCIAL HISTORY:  former smoker, ivdu, etoh      MEDICATIONS  (STANDING):  aspirin enteric coated 81 milliGRAM(s) Oral daily  atorvastatin 80 milliGRAM(s) Oral at bedtime  buDESOnide 160 MICROgram(s)/formoterol 4.5 MICROgram(s) Inhaler 2 Puff(s) Inhalation two times a day  cyanocobalamin 1000 MICROGram(s) Oral daily  dextrose 5%. 1000 milliLiter(s) (50 mL/Hr) IV Continuous <Continuous>  dextrose 50% Injectable 12.5 Gram(s) IV Push once  dextrose 50% Injectable 25 Gram(s) IV Push once  dextrose 50% Injectable 25 Gram(s) IV Push once  ferrous    sulfate 325 milliGRAM(s) Oral daily  furosemide    Tablet 40 milliGRAM(s) Oral daily  gabapentin 300 milliGRAM(s) Oral daily  insulin glargine Injectable (LANTUS) 18 Unit(s) SubCutaneous at bedtime  insulin lispro (HumaLOG) corrective regimen sliding scale   SubCutaneous three times a day before meals  insulin lispro Injectable (HumaLOG) 6 Unit(s) SubCutaneous three times a day before meals  levothyroxine 150 MICROGram(s) Oral daily  losartan 100 milliGRAM(s) Oral daily  metolazone 5 milliGRAM(s) Oral daily  metoprolol tartrate 25 milliGRAM(s) Oral two times a day    MEDICATIONS  (PRN):  dextrose 40% Gel 15 Gram(s) Oral once PRN Blood Glucose LESS THAN 70 milliGRAM(s)/deciLiter  docusate sodium 100 milliGRAM(s) Oral daily PRN Constipation  glucagon  Injectable 1 milliGRAM(s) IntraMuscular once PRN Glucose <70 milliGRAM(s)/deciLiter      Vital Signs Last 24 Hrs  T(C): 35.7 (19 Jul 2018 11:04), Max: 36.4 (19 Jul 2018 04:59)  T(F): 96.2 (19 Jul 2018 11:04), Max: 97.6 (19 Jul 2018 04:59)  HR: 72 (19 Jul 2018 11:04) (68 - 72)  BP: 140/57 (19 Jul 2018 11:04) (140/57 - 152/72)  BP(mean): --  RR: 16 (19 Jul 2018 11:04) (16 - 17)  SpO2: 100% (19 Jul 2018 11:04) (100% - 100%)    PHYSICAL EXAM:    GENERAL: NAD, well-groomed  HEAD:  Atraumatic, Normocephalic  EYES: EOMI, PERRLA, conjunctiva and sclera clear  ENMT: No tonsillar erythema, exudates, or enlargement; Moist mucous membranes  NECK: Supple, No JVD  CHEST/LUNG: Clear to percussion bilaterally; No rales, rhonchi, wheezing, or rubs  HEART: Regular rate and rhythm; No murmurs, rubs, or gallops  ABDOMEN: Soft, Nontender, Nondistended; Bowel sounds present  EXTREMITIES:  Lft foot pedal edema/swelling/tenderness/purplish in color.  L nail avulsion, with small blister at hallux tip, serous drainage.  LYMPH: No lymphadenopathy noted  SKIN: No rashes or lesions    LABS:  CBC Full  -  ( 19 Jul 2018 07:10 )  WBC Count : 10.06 K/uL  Hemoglobin : 11.3 g/dL  Hematocrit : 35.2 %  Platelet Count - Automated : 258 K/uL  Mean Cell Volume : 82.6 fL  Mean Cell Hemoglobin : 26.5 pg  Mean Cell Hemoglobin Concentration : 32.1 %  Auto Neutrophil # : 6.26 K/uL  Auto Lymphocyte # : 1.95 K/uL  Auto Monocyte # : 0.87 K/uL  Auto Eosinophil # : 0.87 K/uL  Auto Basophil # : 0.05 K/uL  Auto Neutrophil % : 62.3 %  Auto Lymphocyte % : 19.4 %  Auto Monocyte % : 8.6 %  Auto Eosinophil % : 8.6 %  Auto Basophil % : 0.5 %      07-19    135  |  97<L>  |  67<H>  ----------------------------<  92  4.6   |  24  |  1.74<H>    Ca    10.0      19 Jul 2018 07:10    TPro  8.0  /  Alb  4.3  /  TBili  0.4  /  DBili  x   /  AST  20  /  ALT  33  /  AlkPhos  78  07-19      LIVER FUNCTIONS - ( 19 Jul 2018 07:10 )  Alb: 4.3 g/dL / Pro: 8.0 g/dL / ALK PHOS: 78 u/L / ALT: 33 u/L / AST: 20 u/L / GGT: x                               MICROBIOLOGY:                    RADIOLOGY:    < from: Xray Foot AP + Lateral, Left (07.19.18 @ 06:22) >    IMPRESSION:  No tracking soft tissue gas collections, radiopaque foreign bodies, or   gross radiographic evidence for osteomyelitis.    No fractures or dislocations.    Tarsometatarsal alignment maintained without evidence for a Lisfranc   injury.    Preserved visualized joint spaces. No joint margin erosions or   intra-articular or periarticular calcifications.    Small calcaneal enthesophytes.     No lytic or blastic lesions.    Vascular calcium lesions.    < end of copied text >

## 2018-07-19 NOTE — ED PROVIDER NOTE - PROGRESS NOTE DETAILS
Itzel: patient not yet seen by vascular. Re paged and discussed with resident. Podiatry to see patient as well Podiatry and Vascular paged again and on way to see pt shortly- foot with nail off from prior, toe slightly swollen but no warmth, red streaks. Pt very well appearing. Itzel: patient's endocrinologist and pcp desires patient admission for IV abx, close sugar monitoring and observation. To be admitted under Dr. Cooper's service with podiatry and vascular following

## 2018-07-19 NOTE — CONSULT NOTE ADULT - SUBJECTIVE AND OBJECTIVE BOX
General Surgery Consult  Consulting surgical team: Vascular Surgery  Consulting attending: Dr. Puente    HPI:  66 yo M former smoker, hx IDDM2, hypothyroidism, HTN, HLD, COPD, CAD s/p stent & CABG x 3, atherosclerosis LE with intermittent claudication L leg, RLE bypass, femoral endartectomy fem-pop bypass 2017, presenting with L great toe pain s/p paronychia drainage and toenail removal. Pt describes 10/10 throbbing toe pain unrelieved by diclofenac or naproxen. Pt states he soaked foot in epsom salt one day ago before pain acutely worsened. Denies fevers/chills. No foot/calf pain. Reports L leg swelling/redness unchanged from baseline. On keflex BID. (19 Jul 2018 16:29)      PAST MEDICAL HISTORY:  COPD (chronic obstructive pulmonary disease)  Sleep apnea  Anemia  Atherosclerosis of arteries of extremities  Mild intermittent asthma without complication  Iron deficiency anemia, unspecified iron deficiency anemia type  Prostate cancer  Bronchospasm  Obesity (BMI 30-39.9)  PAD (peripheral artery disease)  CAD (coronary artery disease)  S/P prostatectomy  Hypothyroid  Intermittent claudication  Diabetic neuropathy  DM (diabetes mellitus)  CHF (congestive heart failure)  Hypercholesteremia  HTN (hypertension)      PAST SURGICAL HISTORY:  History of femoral angiogram  S/P bypass graft of extremity  S/P CABG x 3  Stented coronary artery  S/P prostatectomy      MEDICATIONS:  aspirin enteric coated 81 milliGRAM(s) Oral daily  atorvastatin 80 milliGRAM(s) Oral at bedtime  buDESOnide 160 MICROgram(s)/formoterol 4.5 MICROgram(s) Inhaler 2 Puff(s) Inhalation two times a day  cyanocobalamin 1000 MICROGram(s) Oral daily  dextrose 40% Gel 15 Gram(s) Oral once PRN  dextrose 5%. 1000 milliLiter(s) IV Continuous <Continuous>  dextrose 50% Injectable 12.5 Gram(s) IV Push once  dextrose 50% Injectable 25 Gram(s) IV Push once  dextrose 50% Injectable 25 Gram(s) IV Push once  docusate sodium 100 milliGRAM(s) Oral daily PRN  ferrous    sulfate 325 milliGRAM(s) Oral daily  furosemide    Tablet 40 milliGRAM(s) Oral daily  gabapentin 300 milliGRAM(s) Oral daily  glucagon  Injectable 1 milliGRAM(s) IntraMuscular once PRN  heparin  Injectable 5000 Unit(s) SubCutaneous every 12 hours  insulin glargine Injectable (LANTUS) 18 Unit(s) SubCutaneous at bedtime  insulin lispro (HumaLOG) corrective regimen sliding scale   SubCutaneous three times a day before meals  insulin lispro Injectable (HumaLOG) 6 Unit(s) SubCutaneous three times a day before meals  levothyroxine 150 MICROGram(s) Oral daily  losartan 100 milliGRAM(s) Oral daily  metolazone 5 milliGRAM(s) Oral daily  metoprolol tartrate 25 milliGRAM(s) Oral two times a day  morphine  - Injectable 2 milliGRAM(s) IV Push every 4 hours PRN  oxyCODONE    5 mG/acetaminophen 325 mG 1 Tablet(s) Oral every 6 hours PRN  piperacillin/tazobactam IVPB. 3.375 Gram(s) IV Intermittent every 8 hours  vancomycin  IVPB 1000 milliGRAM(s) IV Intermittent every 24 hours      ALLERGIES:  No Known Allergies      VITALS & I/Os:  Vital Signs Last 24 Hrs  T(C): 35.7 (19 Jul 2018 11:04), Max: 36.4 (19 Jul 2018 04:59)  T(F): 96.2 (19 Jul 2018 11:04), Max: 97.6 (19 Jul 2018 04:59)  HR: 102 (19 Jul 2018 16:29) (68 - 102)  BP: 156/77 (19 Jul 2018 16:29) (140/57 - 156/77)  BP(mean): --  RR: 18 (19 Jul 2018 16:29) (16 - 18)  SpO2: 100% (19 Jul 2018 16:29) (100% - 100%)    I&O's Summary      PHYSICAL EXAM:  General: No acute distress  Respiratory: Nonlabored  Cardiovascular: RRR  Abdominal: Soft, nondistended, nontender. No rebound or guarding. No organomegaly, no palpable mass.  Extremities: Warm    LABS:                        11.3   10.06 )-----------( 258      ( 19 Jul 2018 07:10 )             35.2     07-19    135  |  97<L>  |  67<H>  ----------------------------<  92  4.6   |  24  |  1.74<H>    Ca    10.0      19 Jul 2018 07:10    TPro  8.0  /  Alb  4.3  /  TBili  0.4  /  DBili  x   /  AST  20  /  ALT  33  /  AlkPhos  78  07-19    Lactate:  07-19 @ 07:10  1.4                  IMAGING: General Surgery Consult  Consulting surgical team: Vascular Surgery  Consulting attending: Dr. Puente    HPI:  68 yo M former smoker, hx IDDM2, hypothyroidism, HTN, HLD, COPD, CAD s/p stent & CABG x 3 (3/28/2016, Barbara), R common femoral artery endarterectomy (11/20/2015), BLE angiogram (7/20/2015, Vincenzo), LLE angiogram (4/25/2017, Vincenzo), L femoral endarterectomy (6/6/2017, Vincenzo), presenting with L great toe pain x 1 month. The patient has been seen by podiatry multiple times over the last month for his toe pain. When his pain began, he was prescribed antibiotics, but that patient states that his pain did not improve. The patient returned to the podiatrist 2 days ago and his nail was removed because of concern for perionychial infection. The patient states that following that procedure, his pain has increased and there is now swelling in his foot.       PAST MEDICAL HISTORY:  COPD (chronic obstructive pulmonary disease)  Sleep apnea  Anemia  Atherosclerosis of arteries of extremities  Mild intermittent asthma without complication  Iron deficiency anemia, unspecified iron deficiency anemia type  Prostate cancer  Bronchospasm  Obesity (BMI 30-39.9)  PAD (peripheral artery disease)  CAD (coronary artery disease)  S/P prostatectomy  Hypothyroid  Intermittent claudication  Diabetic neuropathy  DM (diabetes mellitus)  CHF (congestive heart failure)  Hypercholesteremia  HTN (hypertension)      PAST SURGICAL HISTORY:  History of femoral angiogram  S/P bypass graft of extremity  S/P CABG x 3  Stented coronary artery  S/P prostatectomy      MEDICATIONS:  aspirin enteric coated 81 milliGRAM(s) Oral daily  atorvastatin 80 milliGRAM(s) Oral at bedtime  buDESOnide 160 MICROgram(s)/formoterol 4.5 MICROgram(s) Inhaler 2 Puff(s) Inhalation two times a day  cyanocobalamin 1000 MICROGram(s) Oral daily  dextrose 40% Gel 15 Gram(s) Oral once PRN  dextrose 5%. 1000 milliLiter(s) IV Continuous <Continuous>  dextrose 50% Injectable 12.5 Gram(s) IV Push once  dextrose 50% Injectable 25 Gram(s) IV Push once  dextrose 50% Injectable 25 Gram(s) IV Push once  docusate sodium 100 milliGRAM(s) Oral daily PRN  ferrous    sulfate 325 milliGRAM(s) Oral daily  furosemide    Tablet 40 milliGRAM(s) Oral daily  gabapentin 300 milliGRAM(s) Oral daily  glucagon  Injectable 1 milliGRAM(s) IntraMuscular once PRN  heparin  Injectable 5000 Unit(s) SubCutaneous every 12 hours  insulin glargine Injectable (LANTUS) 18 Unit(s) SubCutaneous at bedtime  insulin lispro (HumaLOG) corrective regimen sliding scale   SubCutaneous three times a day before meals  insulin lispro Injectable (HumaLOG) 6 Unit(s) SubCutaneous three times a day before meals  levothyroxine 150 MICROGram(s) Oral daily  losartan 100 milliGRAM(s) Oral daily  metolazone 5 milliGRAM(s) Oral daily  metoprolol tartrate 25 milliGRAM(s) Oral two times a day  morphine  - Injectable 2 milliGRAM(s) IV Push every 4 hours PRN  oxyCODONE    5 mG/acetaminophen 325 mG 1 Tablet(s) Oral every 6 hours PRN  piperacillin/tazobactam IVPB. 3.375 Gram(s) IV Intermittent every 8 hours  vancomycin  IVPB 1000 milliGRAM(s) IV Intermittent every 24 hours      ALLERGIES:  No Known Allergies      VITALS & I/Os:  Vital Signs Last 24 Hrs  T(C): 35.7 (19 Jul 2018 11:04), Max: 36.4 (19 Jul 2018 04:59)  T(F): 96.2 (19 Jul 2018 11:04), Max: 97.6 (19 Jul 2018 04:59)  HR: 102 (19 Jul 2018 16:29) (68 - 102)  BP: 156/77 (19 Jul 2018 16:29) (140/57 - 156/77)  BP(mean): --  RR: 18 (19 Jul 2018 16:29) (16 - 18)  SpO2: 100% (19 Jul 2018 16:29) (100% - 100%)    I&O's Summary      PHYSICAL EXAM:  General: mild distress, AOx3  Respiratory: Nonlabored  Cardiovascular: normotensive, regular rate   Extremities: L great toe s/p nail removal, blistering around area of nail bed without any purulence, erythema extending from toes to ankle  Vascular: R dopp PT, DP, pop and fem; L dop PT, AT, fem    LABS:                        11.3   10.06 )-----------( 258      ( 19 Jul 2018 07:10 )             35.2     07-19    135  |  97<L>  |  67<H>  ----------------------------<  92  4.6   |  24  |  1.74<H>    Ca    10.0      19 Jul 2018 07:10    TPro  8.0  /  Alb  4.3  /  TBili  0.4  /  DBili  x   /  AST  20  /  ALT  33  /  AlkPhos  78  07-19    Lactate:  07-19 @ 07:10  1.4                  IMAGING: General Surgery Consult  Consulting surgical team: Vascular Surgery  Consulting attending: Dr. Loya    HPI:  68 yo M former smoker, hx IDDM2, hypothyroidism, HTN, HLD, COPD, CAD s/p stent & CABG x 3 (3/28/2016, Barbara), R common femoral artery endarterectomy (11/20/2015), BLE angiogram (7/20/2015, Vincenzo), LLE angiogram (4/25/2017, Vincenzo), L femoral endarterectomy (6/6/2017, Vincenzo), presenting with L great toe pain x 1 month. The patient has been seen by podiatry multiple times over the last month for his toe pain. When his pain began, he was prescribed antibiotics, but that patient states that his pain did not improve. The patient returned to the podiatrist 2 days ago and his nail was removed because of concern for perionychial infection. The patient states that following that procedure, his pain has increased and there is now swelling in his foot.       PAST MEDICAL HISTORY:  COPD (chronic obstructive pulmonary disease)  Sleep apnea  Anemia  Atherosclerosis of arteries of extremities  Mild intermittent asthma without complication  Iron deficiency anemia, unspecified iron deficiency anemia type  Prostate cancer  Bronchospasm  Obesity (BMI 30-39.9)  PAD (peripheral artery disease)  CAD (coronary artery disease)  S/P prostatectomy  Hypothyroid  Intermittent claudication  Diabetic neuropathy  DM (diabetes mellitus)  CHF (congestive heart failure)  Hypercholesteremia  HTN (hypertension)      PAST SURGICAL HISTORY:  History of femoral angiogram  S/P bypass graft of extremity  S/P CABG x 3  Stented coronary artery  S/P prostatectomy      MEDICATIONS:  aspirin enteric coated 81 milliGRAM(s) Oral daily  atorvastatin 80 milliGRAM(s) Oral at bedtime  buDESOnide 160 MICROgram(s)/formoterol 4.5 MICROgram(s) Inhaler 2 Puff(s) Inhalation two times a day  cyanocobalamin 1000 MICROGram(s) Oral daily  dextrose 40% Gel 15 Gram(s) Oral once PRN  dextrose 5%. 1000 milliLiter(s) IV Continuous <Continuous>  dextrose 50% Injectable 12.5 Gram(s) IV Push once  dextrose 50% Injectable 25 Gram(s) IV Push once  dextrose 50% Injectable 25 Gram(s) IV Push once  docusate sodium 100 milliGRAM(s) Oral daily PRN  ferrous    sulfate 325 milliGRAM(s) Oral daily  furosemide    Tablet 40 milliGRAM(s) Oral daily  gabapentin 300 milliGRAM(s) Oral daily  glucagon  Injectable 1 milliGRAM(s) IntraMuscular once PRN  heparin  Injectable 5000 Unit(s) SubCutaneous every 12 hours  insulin glargine Injectable (LANTUS) 18 Unit(s) SubCutaneous at bedtime  insulin lispro (HumaLOG) corrective regimen sliding scale   SubCutaneous three times a day before meals  insulin lispro Injectable (HumaLOG) 6 Unit(s) SubCutaneous three times a day before meals  levothyroxine 150 MICROGram(s) Oral daily  losartan 100 milliGRAM(s) Oral daily  metolazone 5 milliGRAM(s) Oral daily  metoprolol tartrate 25 milliGRAM(s) Oral two times a day  morphine  - Injectable 2 milliGRAM(s) IV Push every 4 hours PRN  oxyCODONE    5 mG/acetaminophen 325 mG 1 Tablet(s) Oral every 6 hours PRN  piperacillin/tazobactam IVPB. 3.375 Gram(s) IV Intermittent every 8 hours  vancomycin  IVPB 1000 milliGRAM(s) IV Intermittent every 24 hours      ALLERGIES:  No Known Allergies      VITALS & I/Os:  Vital Signs Last 24 Hrs  T(C): 35.7 (19 Jul 2018 11:04), Max: 36.4 (19 Jul 2018 04:59)  T(F): 96.2 (19 Jul 2018 11:04), Max: 97.6 (19 Jul 2018 04:59)  HR: 102 (19 Jul 2018 16:29) (68 - 102)  BP: 156/77 (19 Jul 2018 16:29) (140/57 - 156/77)  BP(mean): --  RR: 18 (19 Jul 2018 16:29) (16 - 18)  SpO2: 100% (19 Jul 2018 16:29) (100% - 100%)    I&O's Summary      PHYSICAL EXAM:  General: mild distress, AOx3  Respiratory: Nonlabored  Cardiovascular: normotensive, regular rate   Extremities: L great toe s/p nail removal, blistering around area of nail bed without any purulence, erythema extending from toes to ankle  Vascular: R dopp PT, DP, pop and fem; L dop PT, AT, fem    LABS:                        11.3   10.06 )-----------( 258      ( 19 Jul 2018 07:10 )             35.2     07-19    135  |  97<L>  |  67<H>  ----------------------------<  92  4.6   |  24  |  1.74<H>    Ca    10.0      19 Jul 2018 07:10    TPro  8.0  /  Alb  4.3  /  TBili  0.4  /  DBili  x   /  AST  20  /  ALT  33  /  AlkPhos  78  07-19    Lactate:  07-19 @ 07:10  1.4                  IMAGING:

## 2018-07-19 NOTE — CONSULT NOTE ADULT - ASSESSMENT
67 year old male LF s/p nail avulsion w/ ischemic pains   - Lanced serous bullae- 2 cc serous drainage. No purulence. Dressed with gauze and betadine  - Low concern for infectious process, more consistent with ischemia  - Dopplerable PT non-dopplerable DP left foot   - WBC 10.06, ESR 79, CRP 7.0, X-RAY: no OM, gas  - Pt may be discharged from podiatric standpoint on PO Clinda, however would like vascular to evaluate to determine if want admitted  - Will d/w attending  - Will follow if admitted
66 yo M former smoker, hx IDDM2, hypothyroidism, HTN, HLD, COPD, CAD s/p stent & CABG x 3 (3/28/2016, Barbara), R common femoral artery endarterectomy (11/20/2015), BLE angiogram (7/20/2015, Vincenzo), LLE angiogram (4/25/2017, Vincenzo), L femoral endarterectomy (6/6/2017, Vincenzo), presenting with L great toe pain x 1 month.     Plan:   - LLE angiogram inpatient versus outpatient  - Would need risk stratification prior to angiogram  - Will discuss with Dr. Vincenzo Charlton, PGY2  [id number]
68 yo M former smoker, hx IDDM2, hypothyroidism, HTN, HLD, COPD, CAD s/p stent & CABG x 3, atherosclerosis LE with intermittent claudication L leg, RLE bypass, femoral endartectomy fem-pop bypass 2017, presenting with L great toe pain s/p paronychia drainage and toenail removal. Pt describes 10/10 throbbing toe pain unrelieved by diclofenac or naproxen. Pt states he soaked foot in epsom salt one day ago before pain acutely worsened. Denies fevers/chills. No foot/calf pain. Reports L leg swelling/redness unchanged from baseline. On keflex BID. (19 Jul 2018 16:29)    ER vitals:  T 96.2, P 72, /57, WBC 10.  ESR 79, CRP 7.0.  Pt received clinda x 1 in ER.  Pt had recently been on approximately 3 weeks of abx (keflex) as an outpatient for Left nail infection.  PT states it was not getting better, and podiatrist removed nail on Tuesday.  As per patient, he noted some pus underneath nail bed.  Pt came to ER, as he has been having intolerable pain since procedure.  He c/o rigors/chills currently.  Foot has been swollen and tender.  No recent travel, or foot trauma.      He recently saw his Vascular surgeon Dr. Loya, and pending eventual angiogram.    Problem/Plan - 1:    ·	Left foot cellulitis    - Recent L nail avulsion, c/o worsening pain, swelling/tenderness. S/p recent course of keflex x 3 weeks.      - Start broad spectrum abx with vanco/zosyn, given h/o diabetes    - Check blood cultures.  Trend inflammatory markers    - Podiatry follow up appreciated.  No acute intervention.  Vascular f/u    Will follow    Yesika Rodríguez  [phone]

## 2018-07-19 NOTE — ED ADULT NURSE NOTE - CHIEF COMPLAINT QUOTE
Pt arrives to ED c/o left great toe pain starting at 18:00.  Pt had a whole nail removed by Podiatrist today and everything was fine until he tried to clean it with epsom salt.  Toe is wrapped from home.  Pt took 2 tabs Naproxen at 22:00.  fs= 117

## 2018-07-19 NOTE — ED PROVIDER NOTE - OBJECTIVE STATEMENT
66 yo M former smoker, hx IDDM2, hypothyroidism, HTN, HLD, COPD, CAD s/p stent & CABG x 3, atherosclerosis LE with intermittent claudication L leg, RLE bypass, femoral endartectomy fem-pop bypass 2017, presenting with L great toe pain s/p paronychia drainage and toenail removal. Pt describes 10/10 throbbing toe pain unrelieved by diclofenac or naproxen. Pt states he soaked foot in epsom salt one day ago before pain acutely worsened. Denies fevers/chills. No foot/calf pain. Reports L leg swelling/redness unchanged from baseline. On keflex BID.     Dr. Jesusita Jimenez- PMD   Dr. Tiwari- foot surgeon  Dr. Loya- Vascular Surgeon  Dr. Chico Ferro- cardiologist   Dr. Patti Dexter- endocrinologist

## 2018-07-19 NOTE — ED PROVIDER NOTE - NS ED ROS FT
CONST: no fevers, no chills, no trauma  EYES: no pain, no visual disturbances  ENT: no sore throat, no epistaxis, no rhinorrhea, no hearing changes  CV: no chest pain, no palpitations, no orthopnea, no extremity pain or swelling  RESP: no shortness of breath, no cough, no sputum, no pleurisy, no wheezing  ABD: no abdominal pain, no nausea, no vomiting, no diarrhea, no black or bloody stool  : no dysuria, no hematuria, no frequency, no urgency  MSK: no back pain, no neck pain, + toe pain  NEURO: no headache, no sensory disturbances, no focal weakness, no dizziness  HEME: no easy bleeding or bruising  SKIN: no diaphoresis, no rash

## 2018-07-19 NOTE — ED PROVIDER NOTE - MEDICAL DECISION MAKING DETAILS
66 yo M presenting with toe pain s/p paronychia drainage and toenail removal 2 days ago- pain control, xray, podiatry consult

## 2018-07-19 NOTE — ED PROVIDER NOTE - ATTENDING CONTRIBUTION TO CARE
66 y/o M with h/o DM on insulin, hypothyroidism, HTN, hyperlipidemia, COPD, CAD s/p 3v CABG, s/p RLE fem-pop bypass 2017, PVD with LLE claudication pending outpatient LLE angiogram (vascular surgeon Dr Loya) here with left great toe pain.  Pt has been treated for infection of L great toe by his outpatient podiatrist (Dr Tiwari) with PO keflex for the past 3 weeks.  On Tuesday he underwent removal of the toe nail for ?paronychia.  Yesterday afternoon pt reports he attempted to soak the foot in epsom salt water as directed by his podiatrist, but the pain to the toe has worsened since then.  This morning pt also reports worsening swelling to the left foot and redness (worse than baseline) with new blister formation to the medial and distal aspect of left 1st toe.  Pt has been taking naproxen at home without any relief.  No fever, chills, other leg or foot pain, fall or injury.  Pt is still taking keflex BID.  Well appearing, standing next to stretcher, awake and alert, nontoxic.  AF/VSS.  Lungs cta bl.  Cards nl S1/S2, RRR, no MRG.  Abd soft ntnd.  No pedal edema or calf tenderness.  L foot with redness, edema diffusely, 1+ palpable DP pulse to L foot, L 1st digit diffusely tender to palpation with area of blistering and fluctuance to medial and distal nail bed.  No active bleeding, crepitus, or discharge.  No gross deformities.  Plan for pain control, xray, podiatry consult for poss abscess vs worsening cellulitis vs worsening of known vascular disease.

## 2018-07-19 NOTE — ED PROVIDER NOTE - SHIFT CHANGE DETAILS
Pt is pending vascular and podiatry consult for chronic LLE PVD and claudication.  Follow-up recommendations and dispo accordingly.

## 2018-07-19 NOTE — H&P ADULT - NEGATIVE GASTROINTESTINAL SYMPTOMS
no vomiting/no melena/no constipation/no change in bowel habits/no flatulence/no diarrhea/no abdominal pain/no nausea

## 2018-07-19 NOTE — H&P ADULT - NSHPOUTPATIENTPROVIDERS_GEN_ALL_CORE
Dr. Jesusita Jimenez- PMD   	Dr. Tiwari- foot surgeon  	Dr. Loya- Vascular Surgeon  	Dr. Chico Ferro- cardiologist   Dr. Patti Dexter-

## 2018-07-19 NOTE — ED ADULT TRIAGE NOTE - CHIEF COMPLAINT QUOTE
Pt arrives to ED c/o left great toe pain starting at 18:00.  Pt had a whole nail removed by Podiatrist today and everything was fine until he tried to clean it with epsom salt.  Toe is wrapped from home.  fs= 117 Pt arrives to ED c/o left great toe pain starting at 18:00.  Pt had a whole nail removed by Podiatrist today and everything was fine until he tried to clean it with epsom salt.  Toe is wrapped from home.  Pt took 2 tabs Naproxen at 22:00.  fs= 117

## 2018-07-19 NOTE — CONSULT NOTE ADULT - SUBJECTIVE AND OBJECTIVE BOX
Podiatry pager #: [phone]/ [phone]    Patient is a 67y old  Male who presents with a chief complaint of toe pain and swelling s/p toenail removal 2 days ago for paronychia.     ED HPI:   66 yo M former smoker, hx IDDM2, hypothyroidism, HTN, HLD, COPD, CAD s/p stent & CABG x 3, atherosclerosis LE with intermittent claudication L leg, RLE bypass, femoral endartectomy fem-pop bypass 2017, presenting with L great toe pain s/p paronychia drainage and toenail removal. Pt describes 10/10 throbbing toe pain unrelieved by diclofenac or naproxen. Pt states he soaked foot in epsom salt one day ago before pain acutely worsened. Denies fevers/chills. No foot/calf pain. Reports L leg swelling/redness unchanged from baseline. On keflex BID.     PAST MEDICAL & SURGICAL HISTORY:  COPD (chronic obstructive pulmonary disease)  Sleep apnea: non-compliant  Anemia   Atherosclerosis of arteries of extremities: left leg  Mild intermittent asthma without complication  Iron deficiency anemia, unspecified iron deficiency anemia type  Prostate cancer  Bronchospasm  Obesity (BMI 30-39.9)  PAD (peripheral artery disease): RLE bypass 11/30/15  CAD (coronary artery disease): 3 cardiac stents placed in 2006 (mid LAD, Prox LAD, Prox to #2), 3V CABG 3/28/16  Hypothyroid  Intermittent claudication  Diabetic neuropathy  DM (diabetes mellitus): type 2 - on insulin pump  CHF (congestive heart failure)  Hypercholesteremia  HTN (hypertension)  History of femoral angiogram: Left femoral endarterectomy Fem-Pop bypass 6/2017  S/P bypass graft of extremity: RLE- 11/30/15  S/P CABG x 3: 3/28/16  Stented coronary artery: x 3 cardiac stents placed in 2006  S/P prostatectomy: 1996      MEDICATIONS  (STANDING):    MEDICATIONS  (PRN):      Allergies    No Known Allergies    Intolerances        VITALS:    Vital Signs Last 24 Hrs  T(C): 35.6 (19 Jul 2018 07:31), Max: 36.4 (19 Jul 2018 04:59)  T(F): 96 (19 Jul 2018 07:31), Max: 97.6 (19 Jul 2018 04:59)  HR: 68 (19 Jul 2018 07:31) (68 - 70)  BP: 147/65 (19 Jul 2018 07:31) (147/65 - 152/72)  BP(mean): --  RR: 17 (19 Jul 2018 07:31) (16 - 17)  SpO2: 100% (19 Jul 2018 04:59) (100% - 100%)    LABS:                          11.3   10.06 )-----------( 258      ( 19 Jul 2018 07:10 )             35.2       07-19    135  |  97<L>  |  67<H>  ----------------------------<  92  4.6   |  24  |  1.74<H>    Ca    10.0      19 Jul 2018 07:10    TPro  8.0  /  Alb  4.3  /  TBili  0.4  /  DBili  x   /  AST  20  /  ALT  33  /  AlkPhos  78  07-19      CAPILLARY BLOOD GLUCOSE      POCT Blood Glucose.: 117 mg/dL (19 Jul 2018 05:00)    LOWER EXTREMITY PHYSICAL EXAM:    Vasular: DP/PT 0/4, B/L, CFT <3 seconds B/L, left foot slightly cool, no warmth to digits or midfoot left  Neuro: Epicritic sensation intact  Musculoskeletal/Ortho: no gross pedal deformities   Skin: S/p left hallux nail avulsion, granular wound bed, serous lateral hallux bullae. Periwound erythema more c/w ischemia    RADIOLOGY & ADDITIONAL STUDIES:  < from: Xray Foot AP + Lateral, Left (07.19.18 @ 06:22) >  EXAM:  RAD FOOT 2 VIEWS LEFT        PROCEDURE DATE:  Jul 19 2018     ******PRELIMINARY REPORT******    ******PRELIMINARY REPORT******            INTERPRETATION:  no soft tissue gas  No radiographic evidence of acute osteomyelitis

## 2018-07-19 NOTE — H&P ADULT - RS GEN PE MLT RESP DETAILS PC
no subcutaneous emphysema/no rhonchi/clear to auscultation bilaterally/respirations non-labored/good air movement/normal/airway patent/no rales/breath sounds equal

## 2018-07-19 NOTE — H&P ADULT - HISTORY OF PRESENT ILLNESS
68 yo M former smoker, hx IDDM2, hypothyroidism, HTN, HLD, COPD, CAD s/p stent & CABG x 3, atherosclerosis LE with intermittent claudication L leg, RLE bypass, femoral endartectomy fem-pop bypass 2017, presenting with L great toe pain s/p paronychia drainage and toenail removal. Pt describes 10/10 throbbing toe pain unrelieved by diclofenac or naproxen. Pt states he soaked foot in epsom salt one day ago before pain acutely worsened. Denies fevers/chills. No foot/calf pain. Reports L leg swelling/redness unchanged from baseline. On keflex BID.

## 2018-07-19 NOTE — H&P ADULT - GASTROINTESTINAL DETAILS
soft/nontender/no distention/normal/bowel sounds normal/no bruit/no masses palpable/no rebound tenderness

## 2018-07-20 DIAGNOSIS — I25.10 ATHEROSCLEROTIC HEART DISEASE OF NATIVE CORONARY ARTERY WITHOUT ANGINA PECTORIS: ICD-10-CM

## 2018-07-20 LAB
GLUCOSE BLDC GLUCOMTR-MCNC: 297 MG/DL — HIGH (ref 70–99)
GLUCOSE BLDC GLUCOMTR-MCNC: 325 MG/DL — HIGH (ref 70–99)
GLUCOSE BLDC GLUCOMTR-MCNC: 334 MG/DL — HIGH (ref 70–99)
GLUCOSE BLDC GLUCOMTR-MCNC: 377 MG/DL — HIGH (ref 70–99)
HBA1C BLD-MCNC: 9.1 % — HIGH (ref 4–5.6)
SPECIMEN SOURCE: SIGNIFICANT CHANGE UP
SPECIMEN SOURCE: SIGNIFICANT CHANGE UP

## 2018-07-20 RX ORDER — INSULIN LISPRO 100/ML
12 VIAL (ML) SUBCUTANEOUS
Qty: 0 | Refills: 0 | Status: DISCONTINUED | OUTPATIENT
Start: 2018-07-20 | End: 2018-07-21

## 2018-07-20 RX ORDER — INSULIN GLARGINE 100 [IU]/ML
45 INJECTION, SOLUTION SUBCUTANEOUS AT BEDTIME
Qty: 0 | Refills: 0 | Status: DISCONTINUED | OUTPATIENT
Start: 2018-07-20 | End: 2018-07-21

## 2018-07-20 RX ADMIN — LOSARTAN POTASSIUM 100 MILLIGRAM(S): 100 TABLET, FILM COATED ORAL at 05:27

## 2018-07-20 RX ADMIN — GABAPENTIN 300 MILLIGRAM(S): 400 CAPSULE ORAL at 11:38

## 2018-07-20 RX ADMIN — PIPERACILLIN AND TAZOBACTAM 25 GRAM(S): 4; .5 INJECTION, POWDER, LYOPHILIZED, FOR SOLUTION INTRAVENOUS at 05:25

## 2018-07-20 RX ADMIN — OXYCODONE AND ACETAMINOPHEN 1 TABLET(S): 5; 325 TABLET ORAL at 05:23

## 2018-07-20 RX ADMIN — Medication 5: at 17:37

## 2018-07-20 RX ADMIN — PREGABALIN 1000 MICROGRAM(S): 225 CAPSULE ORAL at 11:38

## 2018-07-20 RX ADMIN — PIPERACILLIN AND TAZOBACTAM 25 GRAM(S): 4; .5 INJECTION, POWDER, LYOPHILIZED, FOR SOLUTION INTRAVENOUS at 22:11

## 2018-07-20 RX ADMIN — Medication 325 MILLIGRAM(S): at 11:39

## 2018-07-20 RX ADMIN — Medication 40 MILLIGRAM(S): at 05:22

## 2018-07-20 RX ADMIN — MORPHINE SULFATE 2 MILLIGRAM(S): 50 CAPSULE, EXTENDED RELEASE ORAL at 07:30

## 2018-07-20 RX ADMIN — Medication 4: at 08:36

## 2018-07-20 RX ADMIN — Medication 12 UNIT(S): at 17:37

## 2018-07-20 RX ADMIN — Medication 3: at 12:41

## 2018-07-20 RX ADMIN — INSULIN GLARGINE 45 UNIT(S): 100 INJECTION, SOLUTION SUBCUTANEOUS at 23:14

## 2018-07-20 RX ADMIN — OXYCODONE AND ACETAMINOPHEN 1 TABLET(S): 5; 325 TABLET ORAL at 23:11

## 2018-07-20 RX ADMIN — Medication 25 MILLIGRAM(S): at 05:27

## 2018-07-20 RX ADMIN — Medication 6 UNIT(S): at 08:36

## 2018-07-20 RX ADMIN — OXYCODONE AND ACETAMINOPHEN 1 TABLET(S): 5; 325 TABLET ORAL at 22:11

## 2018-07-20 RX ADMIN — OXYCODONE AND ACETAMINOPHEN 1 TABLET(S): 5; 325 TABLET ORAL at 15:44

## 2018-07-20 RX ADMIN — Medication 25 MILLIGRAM(S): at 18:27

## 2018-07-20 RX ADMIN — Medication 100 MILLIGRAM(S): at 13:38

## 2018-07-20 RX ADMIN — ATORVASTATIN CALCIUM 80 MILLIGRAM(S): 80 TABLET, FILM COATED ORAL at 22:11

## 2018-07-20 RX ADMIN — MORPHINE SULFATE 2 MILLIGRAM(S): 50 CAPSULE, EXTENDED RELEASE ORAL at 12:10

## 2018-07-20 RX ADMIN — PIPERACILLIN AND TAZOBACTAM 25 GRAM(S): 4; .5 INJECTION, POWDER, LYOPHILIZED, FOR SOLUTION INTRAVENOUS at 13:35

## 2018-07-20 RX ADMIN — HEPARIN SODIUM 5000 UNIT(S): 5000 INJECTION INTRAVENOUS; SUBCUTANEOUS at 05:25

## 2018-07-20 RX ADMIN — Medication 250 MILLIGRAM(S): at 18:26

## 2018-07-20 RX ADMIN — Medication 150 MICROGRAM(S): at 05:22

## 2018-07-20 RX ADMIN — BUDESONIDE AND FORMOTEROL FUMARATE DIHYDRATE 2 PUFF(S): 160; 4.5 AEROSOL RESPIRATORY (INHALATION) at 22:11

## 2018-07-20 RX ADMIN — BUDESONIDE AND FORMOTEROL FUMARATE DIHYDRATE 2 PUFF(S): 160; 4.5 AEROSOL RESPIRATORY (INHALATION) at 11:35

## 2018-07-20 RX ADMIN — MORPHINE SULFATE 2 MILLIGRAM(S): 50 CAPSULE, EXTENDED RELEASE ORAL at 11:35

## 2018-07-20 RX ADMIN — MORPHINE SULFATE 2 MILLIGRAM(S): 50 CAPSULE, EXTENDED RELEASE ORAL at 07:44

## 2018-07-20 RX ADMIN — OXYCODONE AND ACETAMINOPHEN 1 TABLET(S): 5; 325 TABLET ORAL at 16:10

## 2018-07-20 RX ADMIN — HEPARIN SODIUM 5000 UNIT(S): 5000 INJECTION INTRAVENOUS; SUBCUTANEOUS at 18:26

## 2018-07-20 RX ADMIN — OXYCODONE AND ACETAMINOPHEN 1 TABLET(S): 5; 325 TABLET ORAL at 06:15

## 2018-07-20 RX ADMIN — Medication 12 UNIT(S): at 12:41

## 2018-07-20 RX ADMIN — Medication 81 MILLIGRAM(S): at 11:39

## 2018-07-20 NOTE — PROGRESS NOTE ADULT - PROBLEM SELECTOR PLAN 1
Will increase Lantus to 42 units at bed time.  Will increase Humalog to 10 units before each meal in addition to Humalog correction scale coverage.  Patient counseled for compliance with consistent low carb diet. Will increase Lantus to 45 units at bed time.  Will increase Humalog to 12 units before each meal in addition to Humalog correction scale coverage.  Patient counseled for compliance with consistent low carb diet.

## 2018-07-20 NOTE — PROGRESS NOTE ADULT - ASSESSMENT
68 yo M former smoker, hx IDDM2, hypothyroidism, HTN, HLD, COPD, CAD s/p stent & CABG x 3, atherosclerosis LE with intermittent claudication L leg, RLE bypass, femoral endartectomy fem-pop bypass 2017, presenting with L great toe pain s/p paronychia drainage and toenail removal. Pt describes 10/10 throbbing toe pain unrelieved by diclofenac or naproxen. Pt states he soaked foot in epsom salt one day ago before pain acutely worsened. Denies fevers/chills. No foot/calf pain. Reports L leg swelling/redness unchanged from baseline. On keflex BID. (19 Jul 2018 16:29)    ER vitals:  T 96.2, P 72, /57, WBC 10.  ESR 79, CRP 7.0.  Pt received clinda x 1 in ER.  Pt had recently been on approximately 3 weeks of abx (keflex) as an outpatient for Left nail infection.  PT states it was not getting better, and podiatrist removed nail on Tuesday.  As per patient, he noted some pus underneath nail bed.  Pt came to ER, as he has been having intolerable pain since procedure.  He c/o rigors/chills currently.  Foot has been swollen and tender.  No recent travel, or foot trauma.      He recently saw his Vascular surgeon Dr. Loya, and pending eventual angiogram.    Problem/Plan - 1:    ·	Left foot cellulitis    - Recent L nail avulsion, c/o worsening pain, swelling/tenderness. S/p recent course of keflex x 3 weeks.      - Start broad spectrum abx with vanco/zosyn, given h/o diabetes    - Check blood cultures.  Trend inflammatory markers    - Podiatry follow up appreciated.  No acute intervention.  Vascular f/u    - MRI L foot to r/o OM    Will follow    Yesika Rodríguez  [phone]

## 2018-07-20 NOTE — PROGRESS NOTE ADULT - SUBJECTIVE AND OBJECTIVE BOX
HPI:  68 yo M former smoker, hx IDDM2, hypothyroidism, HTN, HLD, COPD, CAD s/p stent & CABG x 3, atherosclerosis LE with intermittent claudication L leg, RLE bypass, femoral endartectomy fem-pop bypass 2017, presenting with L great toe pain s/p paronychia drainage and toenail removal. Pt describes 10/10 throbbing toe pain unrelieved by diclofenac or naproxen. Pt states he soaked foot in epsom salt one day ago before pain acutely worsened. Denies fevers/chills. No foot/calf pain. Reports L leg swelling/redness unchanged from baseline. On keflex BID. (19 Jul 2018 16:29)  Patient has history of diabetes,  on insulin pump as out patient, non compliant with low carb diet, no recent hypoglycemic episodes, no polyuria polydipsia. Patient follows up with me for diabetes management.    PAST MEDICAL & SURGICAL HISTORY:  COPD (chronic obstructive pulmonary disease)  Sleep apnea: non-compliant  Anemia  Atherosclerosis of arteries of extremities: left leg  Mild intermittent asthma without complication  Iron deficiency anemia, unspecified iron deficiency anemia type  Prostate cancer  Bronchospasm  Obesity (BMI 30-39.9)  PAD (peripheral artery disease): RLE bypass 11/30/15  CAD (coronary artery disease): 3 cardiac stents placed in 2006 (mid LAD, Prox LAD, Prox to #2), 3V CABG 3/28/16  Hypothyroid  Intermittent claudication  Diabetic neuropathy  DM (diabetes mellitus): type 2 - on insulin pump  CHF (congestive heart failure)  Hypercholesteremia  HTN (hypertension)  History of femoral angiogram: Left femoral endarterectomy Fem-Pop bypass 6/2017  S/P bypass graft of extremity: RLE- 11/30/15  S/P CABG x 3: 3/28/16  Stented coronary artery: x 3 cardiac stents placed in 2006  S/P prostatectomy: 1996      FAMILY HISTORY:  No pertinent family history in first degree relatives      Social History:    Outpatient Medications:    MEDICATIONS  (STANDING):  aspirin enteric coated 81 milliGRAM(s) Oral daily  atorvastatin 80 milliGRAM(s) Oral at bedtime  buDESOnide 160 MICROgram(s)/formoterol 4.5 MICROgram(s) Inhaler 2 Puff(s) Inhalation two times a day  cyanocobalamin 1000 MICROGram(s) Oral daily  dextrose 5%. 1000 milliLiter(s) (50 mL/Hr) IV Continuous <Continuous>  dextrose 50% Injectable 12.5 Gram(s) IV Push once  dextrose 50% Injectable 25 Gram(s) IV Push once  dextrose 50% Injectable 25 Gram(s) IV Push once  ferrous    sulfate 325 milliGRAM(s) Oral daily  furosemide    Tablet 40 milliGRAM(s) Oral daily  gabapentin 300 milliGRAM(s) Oral daily  heparin  Injectable 5000 Unit(s) SubCutaneous every 12 hours  insulin glargine Injectable (LANTUS) 45 Unit(s) SubCutaneous at bedtime  insulin lispro (HumaLOG) corrective regimen sliding scale   SubCutaneous three times a day before meals  insulin lispro Injectable (HumaLOG) 12 Unit(s) SubCutaneous three times a day before meals  levothyroxine 150 MICROGram(s) Oral daily  losartan 100 milliGRAM(s) Oral daily  metolazone 5 milliGRAM(s) Oral daily  metoprolol tartrate 25 milliGRAM(s) Oral two times a day  piperacillin/tazobactam IVPB. 3.375 Gram(s) IV Intermittent every 8 hours  vancomycin  IVPB 1000 milliGRAM(s) IV Intermittent every 24 hours    MEDICATIONS  (PRN):  dextrose 40% Gel 15 Gram(s) Oral once PRN Blood Glucose LESS THAN 70 milliGRAM(s)/deciLiter  docusate sodium 100 milliGRAM(s) Oral daily PRN Constipation  glucagon  Injectable 1 milliGRAM(s) IntraMuscular once PRN Glucose <70 milliGRAM(s)/deciLiter  morphine  - Injectable 2 milliGRAM(s) IV Push every 4 hours PRN Severe Pain (7 - 10)  oxyCODONE    5 mG/acetaminophen 325 mG 1 Tablet(s) Oral every 6 hours PRN Moderate Pain (4 - 6)      Allergies    No Known Allergies    Intolerances      Review of Systems:  Constitutional: No fever, no chills  Eyes: No blurry vision  Neuro: No tremors  HEENT: No pain, no neck swelling  Cardiovascular: No chest pain, no palpitations  Respiratory: Has SOB, no cough  GI: No nausea, vomiting, abdominal pain  : No dysuria  Skin: no rash  MSK: Has leg swelling.  Psych: no depression  Endocrine: no polyuria, polydipsia    ALL OTHER SYSTEMS REVIEWED AND NEGATIVE    UNABLE TO OBTAIN    PHYSICAL EXAM:  VITALS: T(C): 36.9 (07-20-18 @ 05:20)  T(F): 98.5 (07-20-18 @ 05:20), Max: 98.9 (07-19-18 @ 21:29)  HR: 69 (07-20-18 @ 11:32) (65 - 106)  BP: 110/56 (07-20-18 @ 11:32) (110/56 - 132/89)  RR:  (18 - 20)  SpO2:  (100% - 100%)  Wt(kg): --  GENERAL: NAD, well-groomed, well-developed  EYES: No proptosis, no lid lag  HEENT:  Atraumatic, Normocephalic  THYROID: Normal size, no palpable nodules  RESPIRATORY: Clear to auscultation bilaterally; No rales, rhonchi, wheezing  CARDIOVASCULAR: Si S2, No murmurs;  GI: Soft, non distended, normal bowel sounds  SKIN: Dry, intact, No rashes or lesions  MUSCULOSKELETAL: Foot wound, Has BL lower extremity edema.  NEURO:  no tremor, sensation decreased in feet BL,    POCT Blood Glucose.: 297 mg/dL (07-20-18 @ 12:35)  POCT Blood Glucose.: 334 mg/dL (07-20-18 @ 08:15)  POCT Blood Glucose.: 306 mg/dL (07-19-18 @ 22:34)  POCT Blood Glucose.: 143 mg/dL (07-19-18 @ 17:17)  POCT Blood Glucose.: 96 mg/dL (07-19-18 @ 13:11)  POCT Blood Glucose.: 117 mg/dL (07-19-18 @ 05:00)                            11.3   10.06 )-----------( 258      ( 19 Jul 2018 07:10 )             35.2       07-19    135  |  97<L>  |  67<H>  ----------------------------<  92  4.6   |  24  |  1.74<H>    EGFR if : 45  EGFR if non : 39    Ca    10.0      07-19    TPro  8.0  /  Alb  4.3  /  TBili  0.4  /  DBili  x   /  AST  20  /  ALT  33  /  AlkPhos  78  07-19      Thyroid Function Tests:      Hemoglobin A1C, Whole Blood: 9.1 % <H> [4.0 - 5.6] (07-20-18 @ 05:16)          Radiology:

## 2018-07-20 NOTE — PROGRESS NOTE ADULT - SUBJECTIVE AND OBJECTIVE BOX
MARK [id number]  67y  Male  Local infection of skin and subcutaneous tissue  H/o or current diagnosis of HF- ACEI/ARB contraindication unknown  H/o or current diagnosis of HF- Contraindication to ACEI/ARBs  H/o or current diagnosis of HF- ACEI/ARB contraindication unknown  H/o or current diagnosis of HF- ACEI/ARB contraindication unknown  H/o or current diagnosis of HF- Contraindication to ACEI/ARBs  H/o or current diagnosis of HF- ACEI/ARB contraindication unknown  No pertinent family history in first degree relatives  Family history of lung disease  Family history of diabetes mellitus  No pertinent family history in first degree relatives  Handoff  MEWS Score  COPD (chronic obstructive pulmonary disease)  Sleep apnea  Anemia  Atherosclerosis of arteries of extremities  Mild intermittent asthma without complication  Iron deficiency anemia, unspecified iron deficiency anemia type  Prostate cancer  Bronchospasm  Obesity (BMI 30-39.9)  PAD (peripheral artery disease)  CAD (coronary artery disease)  S/P prostatectomy  Hypothyroid  Intermittent claudication  Diabetic neuropathy  DM (diabetes mellitus)  CHF (congestive heart failure)  Hypercholesteremia  HTN (hypertension)  Toe infection  Mild intermittent asthma without complication  Hypothyroid  CHF (congestive heart failure)  CKD (chronic kidney disease), stage III  PAD (peripheral artery disease)  DM (diabetes mellitus)  Toe infection  History of femoral angiogram  S/P bypass graft of extremity  S/P CABG x 3  Stented coronary artery  S/P prostatectomy  LEFT TOE PAIN  90+    aspirin enteric coated 81 milliGRAM(s) Oral daily  atorvastatin 80 milliGRAM(s) Oral at bedtime  buDESOnide 160 MICROgram(s)/formoterol 4.5 MICROgram(s) Inhaler 2 Puff(s) Inhalation two times a day  cyanocobalamin 1000 MICROGram(s) Oral daily  dextrose 40% Gel 15 Gram(s) Oral once PRN  dextrose 5%. 1000 milliLiter(s) IV Continuous <Continuous>  dextrose 50% Injectable 12.5 Gram(s) IV Push once  dextrose 50% Injectable 25 Gram(s) IV Push once  dextrose 50% Injectable 25 Gram(s) IV Push once  docusate sodium 100 milliGRAM(s) Oral daily PRN  ferrous    sulfate 325 milliGRAM(s) Oral daily  furosemide    Tablet 40 milliGRAM(s) Oral daily  gabapentin 300 milliGRAM(s) Oral daily  glucagon  Injectable 1 milliGRAM(s) IntraMuscular once PRN  heparin  Injectable 5000 Unit(s) SubCutaneous every 12 hours  insulin glargine Injectable (LANTUS) 18 Unit(s) SubCutaneous at bedtime  insulin lispro (HumaLOG) corrective regimen sliding scale   SubCutaneous three times a day before meals  insulin lispro Injectable (HumaLOG) 6 Unit(s) SubCutaneous three times a day before meals  levothyroxine 150 MICROGram(s) Oral daily  losartan 100 milliGRAM(s) Oral daily  metolazone 5 milliGRAM(s) Oral daily  metoprolol tartrate 25 milliGRAM(s) Oral two times a day  morphine  - Injectable 2 milliGRAM(s) IV Push every 4 hours PRN  oxyCODONE    5 mG/acetaminophen 325 mG 1 Tablet(s) Oral every 6 hours PRN  piperacillin/tazobactam IVPB. 3.375 Gram(s) IV Intermittent every 8 hours  vancomycin  IVPB 1000 milliGRAM(s) IV Intermittent every 24 hours  No Known Allergies    CBC Full  -  ( 19 Jul 2018 07:10 )  WBC Count : 10.06 K/uL  Hemoglobin : 11.3 g/dL  Hematocrit : 35.2 %  Platelet Count - Automated : 258 K/uL  Mean Cell Volume : 82.6 fL  Mean Cell Hemoglobin : 26.5 pg  Mean Cell Hemoglobin Concentration : 32.1 %  Auto Neutrophil # : 6.26 K/uL  Auto Lymphocyte # : 1.95 K/uL  Auto Monocyte # : 0.87 K/uL  Auto Eosinophil # : 0.87 K/uL  Auto Basophil # : 0.05 K/uL  Auto Neutrophil % : 62.3 %  Auto Lymphocyte % : 19.4 %  Auto Monocyte % : 8.6 %  Auto Eosinophil % : 8.6 %  Auto Basophil % : 0.5 %    Patient visited at bedside stable left hallux with no signs of ascending cellulitis  patient awaiting revacularization continue betadine and gauze daily dressing changes. No signs of necrosis  podiatry to follow 2 times a week  reconsult podiatry sooner if needed or signs of gangrene or necrosis

## 2018-07-20 NOTE — PROGRESS NOTE ADULT - SUBJECTIVE AND OBJECTIVE BOX
NEPHROLOGY-[facility] [phone]        Patient seen and examined in the room.  He was ambulating freely        MEDICATIONS  (STANDING):  aspirin enteric coated 81 milliGRAM(s) Oral daily  atorvastatin 80 milliGRAM(s) Oral at bedtime  buDESOnide 160 MICROgram(s)/formoterol 4.5 MICROgram(s) Inhaler 2 Puff(s) Inhalation two times a day  cyanocobalamin 1000 MICROGram(s) Oral daily  dextrose 5%. 1000 milliLiter(s) (50 mL/Hr) IV Continuous <Continuous>  dextrose 50% Injectable 12.5 Gram(s) IV Push once  dextrose 50% Injectable 25 Gram(s) IV Push once  dextrose 50% Injectable 25 Gram(s) IV Push once  ferrous    sulfate 325 milliGRAM(s) Oral daily  furosemide    Tablet 40 milliGRAM(s) Oral daily  gabapentin 300 milliGRAM(s) Oral daily  heparin  Injectable 5000 Unit(s) SubCutaneous every 12 hours  insulin glargine Injectable (LANTUS) 45 Unit(s) SubCutaneous at bedtime  insulin lispro (HumaLOG) corrective regimen sliding scale   SubCutaneous three times a day before meals  insulin lispro Injectable (HumaLOG) 12 Unit(s) SubCutaneous three times a day before meals  levothyroxine 150 MICROGram(s) Oral daily  losartan 100 milliGRAM(s) Oral daily  metolazone 5 milliGRAM(s) Oral daily  metoprolol tartrate 25 milliGRAM(s) Oral two times a day  piperacillin/tazobactam IVPB. 3.375 Gram(s) IV Intermittent every 8 hours  vancomycin  IVPB 1000 milliGRAM(s) IV Intermittent every 24 hours      VITAL:  T(C): , Max: 37.2 (07-19-18 @ 21:29)  T(F): , Max: 98.9 (07-19-18 @ 21:29)  HR: 94 (07-20-18 @ 18:16)  BP: 132/72 (07-20-18 @ 18:16)  BP(mean): --  RR: 18 (07-20-18 @ 11:32)  SpO2: 100% (07-20-18 @ 05:20)  Wt(kg): --    I and O's:        PHYSICAL EXAM:    Constitutional: NAD  HEENT: PERRLA    Neck:  No JVD  Respiratory: CTAB/L  Cardiovascular: S1 and S2  Gastrointestinal: BS+, soft, NT/ND  Extremities: No peripheral edema  Neurological: A/O x 3, no focal deficits  Psychiatric: Normal mood, normal affect  : No Marx  Skin: No rashes  Access: Not applicable    LABS:                        11.3   10.06 )-----------( 258      ( 19 Jul 2018 07:10 )             35.2     07-19    135  |  97<L>  |  67<H>  ----------------------------<  92  4.6   |  24  |  1.74<H>    Ca    10.0      19 Jul 2018 07:10    TPro  8.0  /  Alb  4.3  /  TBili  0.4  /  DBili  x   /  AST  20  /  ALT  33  /  AlkPhos  78  07-19          Urine Studies:          RADIOLOGY & ADDITIONAL STUDIES:

## 2018-07-20 NOTE — PROGRESS NOTE ADULT - ASSESSMENT
Assessment  DMT2: 67y Male with DM T2 with hyperglycemia on basal bolus insulin, insulin pump DC, blood sugars running high, no hypoglycemic episode,  eating meals, non compliant with low carb diet.  Foot Wound: On antibiotics, wound care.  CAD: On medications, stable, monitored.  Hypothyroidism:  On synthroid 150 mcg po daily, asymptomatic.  HTN: Controlled, On med.  CKD: Monitor labs/BMP,

## 2018-07-20 NOTE — PROGRESS NOTE ADULT - SUBJECTIVE AND OBJECTIVE BOX
Infectious Diseases progress note:    Subjective: Pain improved on pain medication.  No fever.  Swelling somewhat improved.    ROS:  CONSTITUTIONAL:  No fever, chills, rigors  CARDIOVASCULAR:  No chest pain or palpitations  RESPIRATORY:   No SOB, cough, dyspnea on exertion.  No wheezing  GASTROINTESTINAL:  No abd pain, N/V, diarrhea/constipation  EXTREMITIES:  No swelling or joint pain  GENITOURINARY:  No burning on urination, increased frequency or urgency.  No flank pain  NEUROLOGIC:  No HA, visual disturbances  SKIN: No rashes    Allergies    No Known Allergies    Intolerances        ANTIBIOTICS/RELEVANT:  antimicrobials  piperacillin/tazobactam IVPB. 3.375 Gram(s) IV Intermittent every 8 hours  vancomycin  IVPB 1000 milliGRAM(s) IV Intermittent every 24 hours    immunologic:    OTHER:  aspirin enteric coated 81 milliGRAM(s) Oral daily  atorvastatin 80 milliGRAM(s) Oral at bedtime  buDESOnide 160 MICROgram(s)/formoterol 4.5 MICROgram(s) Inhaler 2 Puff(s) Inhalation two times a day  cyanocobalamin 1000 MICROGram(s) Oral daily  dextrose 40% Gel 15 Gram(s) Oral once PRN  dextrose 5%. 1000 milliLiter(s) IV Continuous <Continuous>  dextrose 50% Injectable 12.5 Gram(s) IV Push once  dextrose 50% Injectable 25 Gram(s) IV Push once  dextrose 50% Injectable 25 Gram(s) IV Push once  docusate sodium 100 milliGRAM(s) Oral daily PRN  ferrous    sulfate 325 milliGRAM(s) Oral daily  furosemide    Tablet 40 milliGRAM(s) Oral daily  gabapentin 300 milliGRAM(s) Oral daily  glucagon  Injectable 1 milliGRAM(s) IntraMuscular once PRN  heparin  Injectable 5000 Unit(s) SubCutaneous every 12 hours  insulin glargine Injectable (LANTUS) 45 Unit(s) SubCutaneous at bedtime  insulin lispro (HumaLOG) corrective regimen sliding scale   SubCutaneous three times a day before meals  insulin lispro Injectable (HumaLOG) 12 Unit(s) SubCutaneous three times a day before meals  levothyroxine 150 MICROGram(s) Oral daily  losartan 100 milliGRAM(s) Oral daily  metolazone 5 milliGRAM(s) Oral daily  metoprolol tartrate 25 milliGRAM(s) Oral two times a day  morphine  - Injectable 2 milliGRAM(s) IV Push every 4 hours PRN  oxyCODONE    5 mG/acetaminophen 325 mG 1 Tablet(s) Oral every 6 hours PRN      Objective:  Vital Signs Last 24 Hrs  T(C): 36.9 (20 Jul 2018 05:20), Max: 37.2 (19 Jul 2018 21:29)  T(F): 98.5 (20 Jul 2018 05:20), Max: 98.9 (19 Jul 2018 21:29)  HR: 69 (20 Jul 2018 11:32) (65 - 106)  BP: 110/56 (20 Jul 2018 11:32) (110/56 - 156/77)  BP(mean): --  RR: 18 (20 Jul 2018 11:32) (18 - 20)  SpO2: 100% (20 Jul 2018 05:20) (100% - 100%)    PHYSICAL EXAM:  Constitutional:NAD  Eyes:DIMAS, EOMI  Ear/Nose/Throat: no thrush, mucositis.  Moist mucous membranes	  Neck:no JVD, no lymphadenopathy, supple  Respiratory: CTA brad  Cardiovascular: S1S2 RRR, no murmurs  Gastrointestinal:soft, nontender,  nondistended (+) BS  Extremities:no e/e/c  Skin:  L foot toe avulsion, dressing in place, c/d/i        LABS:                        11.3   10.06 )-----------( 258      ( 19 Jul 2018 07:10 )             35.2     07-19    135  |  97<L>  |  67<H>  ----------------------------<  92  4.6   |  24  |  1.74<H>    Ca    10.0      19 Jul 2018 07:10    TPro  8.0  /  Alb  4.3  /  TBili  0.4  /  DBili  x   /  AST  20  /  ALT  33  /  AlkPhos  78  07-19        MICROBIOLOGY:            RADIOLOGY & ADDITIONAL STUDIES:    < from: Xray Foot AP + Lateral, Left (07.19.18 @ 06:22) >  IMPRESSION:  No tracking soft tissue gas collections, radiopaque foreign bodies, or   gross radiographic evidence for osteomyelitis.    No fractures or dislocations.    Tarsometatarsal alignment maintained without evidence for a Lisfranc   injury.    Preserved visualized joint spaces. No joint margin erosions or   intra-articular or periarticular calcifications.    Small calcaneal enthesophytes.     No lytic or blastic lesions.    Vascular calcium lesions.    < end of copied text >

## 2018-07-20 NOTE — PROGRESS NOTE ADULT - ASSESSMENT
A 67-year-old gentleman with a past medical history of hypertension, diabetes, peripheral vascular disease, coronary artery disease, and COPD who presents to the hospital for evaluation of unrelenting pain.  The patient was found to have cellulitis of his foot after an Epsom salt bath.  The patient with acute-on-chronic renal failure, which is likely related to the NSAIDs in addition to any inflammatory response from the cellulitis.    1.	Renal:  Continue with avoiding nonsteroidals; morphine for pain and monitor for constipation.  For now, no need for a renal sonogram.  Little to suspect postobstruction as he has had a prostatectomy.  No reason to suspect acute interstitial nephritis.  Quantify proteinuria  2.	Infectious Disease:  Continue with IV antibiotics.  Check vancomycin after the third dose.  Blood cultures are negative at present  3.	Podiatry:  Continue with podiatry followup.  4.         CVS-No reason to stop Cozaar

## 2018-07-20 NOTE — PROGRESS NOTE ADULT - ASSESSMENT
68 yo M former smoker, hx IDDM2, hypothyroidism, HTN, HLD, COPD, CAD s/p stent & CABG x 3, atherosclerosis LE with intermittent claudication L leg, RLE bypass, femoral endartectomy fem-pop bypass 2017, presenting with L great toe pain s/p paronychia drainage and toenail removal. Pt describes 10/10 throbbing toe pain unrelieved by diclofenac or naproxen. Pt states he soaked foot in epsom salt one day ago before pain acutely worsened. Denies fevers/chills. No foot/calf pain. Reports L leg swelling/redness unchanged from baseline. On keflex BID.      Problem/Plan - 1:  ·  Problem: Toe infection.  Plan: S/P I&D . IV Abxs. Podiatry helping .ID consult noted. MRI ordered.       Problem/Plan - 2:  ·  Problem: DM (diabetes mellitus).  Plan: Endo consult noted and will follow recommendations.      Problem/Plan - 3:  ·  Problem: PAD (peripheral artery disease).  Plan: Vascular consult noted. Possible Angio.      Problem/Plan - 4:  ·  Problem: CKD (chronic kidney disease), stage III.  Plan: With JOSE LUIS . Has been taking NSAID . Renal following.      Problem/Plan - 5:  ·  Problem: CHF (congestive heart failure).  Plan: Not in acute CHF .      Problem/Plan - 6:  Problem: Hypothyroid. Plan: Synthroid .     Problem/Plan - 7:  ·  Problem: Mild intermittent asthma without complication.  Plan: continue Inhalor.

## 2018-07-20 NOTE — PROGRESS NOTE ADULT - SUBJECTIVE AND OBJECTIVE BOX
INTERVAL HPI/OVERNIGHT EVENTS: My left foot pain is 70 %better now.   Vital Signs Last 24 Hrs  T(C): 36.9 (20 Jul 2018 05:20), Max: 37.2 (19 Jul 2018 21:29)  T(F): 98.5 (20 Jul 2018 05:20), Max: 98.9 (19 Jul 2018 21:29)  HR: 69 (20 Jul 2018 11:32) (65 - 106)  BP: 110/56 (20 Jul 2018 11:32) (110/56 - 156/77)  BP(mean): --  RR: 18 (20 Jul 2018 11:32) (18 - 20)  SpO2: 100% (20 Jul 2018 05:20) (100% - 100%)  I&O's Summary    MEDICATIONS  (STANDING):  aspirin enteric coated 81 milliGRAM(s) Oral daily  atorvastatin 80 milliGRAM(s) Oral at bedtime  buDESOnide 160 MICROgram(s)/formoterol 4.5 MICROgram(s) Inhaler 2 Puff(s) Inhalation two times a day  cyanocobalamin 1000 MICROGram(s) Oral daily  dextrose 5%. 1000 milliLiter(s) (50 mL/Hr) IV Continuous <Continuous>  dextrose 50% Injectable 12.5 Gram(s) IV Push once  dextrose 50% Injectable 25 Gram(s) IV Push once  dextrose 50% Injectable 25 Gram(s) IV Push once  ferrous    sulfate 325 milliGRAM(s) Oral daily  furosemide    Tablet 40 milliGRAM(s) Oral daily  gabapentin 300 milliGRAM(s) Oral daily  heparin  Injectable 5000 Unit(s) SubCutaneous every 12 hours  insulin glargine Injectable (LANTUS) 45 Unit(s) SubCutaneous at bedtime  insulin lispro (HumaLOG) corrective regimen sliding scale   SubCutaneous three times a day before meals  insulin lispro Injectable (HumaLOG) 12 Unit(s) SubCutaneous three times a day before meals  levothyroxine 150 MICROGram(s) Oral daily  losartan 100 milliGRAM(s) Oral daily  metolazone 5 milliGRAM(s) Oral daily  metoprolol tartrate 25 milliGRAM(s) Oral two times a day  piperacillin/tazobactam IVPB. 3.375 Gram(s) IV Intermittent every 8 hours  vancomycin  IVPB 1000 milliGRAM(s) IV Intermittent every 24 hours    MEDICATIONS  (PRN):  dextrose 40% Gel 15 Gram(s) Oral once PRN Blood Glucose LESS THAN 70 milliGRAM(s)/deciLiter  docusate sodium 100 milliGRAM(s) Oral daily PRN Constipation  glucagon  Injectable 1 milliGRAM(s) IntraMuscular once PRN Glucose <70 milliGRAM(s)/deciLiter  morphine  - Injectable 2 milliGRAM(s) IV Push every 4 hours PRN Severe Pain (7 - 10)  oxyCODONE    5 mG/acetaminophen 325 mG 1 Tablet(s) Oral every 6 hours PRN Moderate Pain (4 - 6)    LABS:                        11.3   10.06 )-----------( 258      ( 19 Jul 2018 07:10 )             35.2     07-19    135  |  97<L>  |  67<H>  ----------------------------<  92  4.6   |  24  |  1.74<H>    Ca    10.0      19 Jul 2018 07:10    TPro  8.0  /  Alb  4.3  /  TBili  0.4  /  DBili  x   /  AST  20  /  ALT  33  /  AlkPhos  78  07-19        CAPILLARY BLOOD GLUCOSE      POCT Blood Glucose.: 297 mg/dL (20 Jul 2018 12:35)  POCT Blood Glucose.: 334 mg/dL (20 Jul 2018 08:15)  POCT Blood Glucose.: 306 mg/dL (19 Jul 2018 22:34)  POCT Blood Glucose.: 143 mg/dL (19 Jul 2018 17:17)          REVIEW OF SYSTEMS:  CONSTITUTIONAL: No fever, weight loss, or fatigue  EYES: No eye pain, visual disturbances, or discharge  ENMT:  No difficulty hearing, tinnitus, vertigo; No sinus or throat pain  NECK: No pain or stiffness  BREASTS: No pain, masses, or nipple discharge  RESPIRATORY: No cough, wheezing, chills or hemoptysis; No shortness of breath  CARDIOVASCULAR: No chest pain, palpitations, dizziness, or leg swelling  GASTROINTESTINAL: No abdominal or epigastric pain. No nausea, vomiting, or hematemesis; No diarrhea or constipation. No melena or hematochezia.  GENITOURINARY: No dysuria, frequency, hematuria, or incontinence  NEUROLOGICAL: No headaches, memory loss, loss of strength, numbness, or tremors  SKIN: No itching, burning, rashes, or lesions   LYMPH NODES: No enlarged glands  ENDOCRINE: No heat or cold intolerance; No hair loss  MUSCULOSKELETAL: left foot  pain  PSYCHIATRIC: No depression, anxiety, mood swings, or difficulty sleeping  HEME/LYMPH: No easy bruising, or bleeding gums  ALLERY AND IMMUNOLOGIC: No hives or eczema    RADIOLOGY & ADDITIONAL TESTS:    Consultant(s) Notes Reviewed:  [x ] YES  [ ] NO    PHYSICAL EXAM:  GENERAL: NAD, well-groomed, well-developed,not in any distress ,  HEAD:  Atraumatic, Normocephalic  EYES: EOMI, PERRLA, conjunctiva and sclera clear  ENMT: No tonsillar erythema, exudates, or enlargement; Moist mucous membranes, Good dentition, No lesions  NECK: Supple, No JVD, Normal thyroid  NERVOUS SYSTEM:  Alert & Oriented X3, No focal deficit   CHEST/LUNG: Good air entry bilateral with no  rales, rhonchi, wheezing, or rubs  HEART: Regular rate and rhythm; No murmurs, rubs, or gallops  ABDOMEN: Soft, Nontender, Nondistended; Bowel sounds present  EXTREMITIES: left foot dressed .    Care Discussed with Consultants/Other Providers [ x] YES  [ ] NO

## 2018-07-21 ENCOUNTER — TRANSCRIPTION ENCOUNTER (OUTPATIENT)
Age: 68
End: 2018-07-21

## 2018-07-21 LAB
BUN SERPL-MCNC: 67 MG/DL — HIGH (ref 7–23)
CALCIUM SERPL-MCNC: 9.6 MG/DL — SIGNIFICANT CHANGE UP (ref 8.4–10.5)
CHLORIDE SERPL-SCNC: 93 MMOL/L — LOW (ref 98–107)
CO2 SERPL-SCNC: 22 MMOL/L — SIGNIFICANT CHANGE UP (ref 22–31)
CREAT ?TM UR-MCNC: 81.5 MG/DL — SIGNIFICANT CHANGE UP
CREAT SERPL-MCNC: 1.88 MG/DL — HIGH (ref 0.5–1.3)
GLUCOSE BLDC GLUCOMTR-MCNC: 281 MG/DL — HIGH (ref 70–99)
GLUCOSE BLDC GLUCOMTR-MCNC: 287 MG/DL — HIGH (ref 70–99)
GLUCOSE BLDC GLUCOMTR-MCNC: 313 MG/DL — HIGH (ref 70–99)
GLUCOSE BLDC GLUCOMTR-MCNC: 316 MG/DL — HIGH (ref 70–99)
GLUCOSE SERPL-MCNC: 318 MG/DL — HIGH (ref 70–99)
HCT VFR BLD CALC: 36.2 % — LOW (ref 39–50)
HGB BLD-MCNC: 11.4 G/DL — LOW (ref 13–17)
MAGNESIUM SERPL-MCNC: 2.5 MG/DL — SIGNIFICANT CHANGE UP (ref 1.6–2.6)
MCHC RBC-ENTMCNC: 26.9 PG — LOW (ref 27–34)
MCHC RBC-ENTMCNC: 31.5 % — LOW (ref 32–36)
MCV RBC AUTO: 85.4 FL — SIGNIFICANT CHANGE UP (ref 80–100)
NRBC # FLD: 0 — SIGNIFICANT CHANGE UP
PHOSPHATE SERPL-MCNC: 4.5 MG/DL — SIGNIFICANT CHANGE UP (ref 2.5–4.5)
PLATELET # BLD AUTO: 257 K/UL — SIGNIFICANT CHANGE UP (ref 150–400)
PMV BLD: 11.4 FL — SIGNIFICANT CHANGE UP (ref 7–13)
POTASSIUM SERPL-MCNC: 4.9 MMOL/L — SIGNIFICANT CHANGE UP (ref 3.5–5.3)
POTASSIUM SERPL-SCNC: 4.9 MMOL/L — SIGNIFICANT CHANGE UP (ref 3.5–5.3)
PROT UR-MCNC: 16.4 MG/DL — SIGNIFICANT CHANGE UP
RBC # BLD: 4.24 M/UL — SIGNIFICANT CHANGE UP (ref 4.2–5.8)
RBC # FLD: 16.3 % — HIGH (ref 10.3–14.5)
SODIUM SERPL-SCNC: 128 MMOL/L — LOW (ref 135–145)
VANCOMYCIN TROUGH SERPL-MCNC: 12.7 UG/ML — SIGNIFICANT CHANGE UP (ref 10–20)
WBC # BLD: 8.88 K/UL — SIGNIFICANT CHANGE UP (ref 3.8–10.5)
WBC # FLD AUTO: 8.88 K/UL — SIGNIFICANT CHANGE UP (ref 3.8–10.5)

## 2018-07-21 RX ORDER — SENNA PLUS 8.6 MG/1
2 TABLET ORAL AT BEDTIME
Qty: 0 | Refills: 0 | Status: DISCONTINUED | OUTPATIENT
Start: 2018-07-21 | End: 2018-08-01

## 2018-07-21 RX ORDER — INSULIN GLARGINE 100 [IU]/ML
50 INJECTION, SOLUTION SUBCUTANEOUS AT BEDTIME
Qty: 0 | Refills: 0 | Status: DISCONTINUED | OUTPATIENT
Start: 2018-07-21 | End: 2018-07-22

## 2018-07-21 RX ORDER — INSULIN GLARGINE 100 [IU]/ML
15 INJECTION, SOLUTION SUBCUTANEOUS EVERY MORNING
Qty: 0 | Refills: 0 | Status: DISCONTINUED | OUTPATIENT
Start: 2018-07-22 | End: 2018-07-22

## 2018-07-21 RX ORDER — INSULIN GLARGINE 100 [IU]/ML
50 INJECTION, SOLUTION SUBCUTANEOUS AT BEDTIME
Qty: 0 | Refills: 0 | Status: DISCONTINUED | OUTPATIENT
Start: 2018-07-21 | End: 2018-07-21

## 2018-07-21 RX ORDER — POLYETHYLENE GLYCOL 3350 17 G/17G
17 POWDER, FOR SOLUTION ORAL ONCE
Qty: 0 | Refills: 0 | Status: COMPLETED | OUTPATIENT
Start: 2018-07-21 | End: 2018-07-21

## 2018-07-21 RX ORDER — INSULIN LISPRO 100/ML
18 VIAL (ML) SUBCUTANEOUS
Qty: 0 | Refills: 0 | Status: DISCONTINUED | OUTPATIENT
Start: 2018-07-21 | End: 2018-07-22

## 2018-07-21 RX ORDER — OXYCODONE HYDROCHLORIDE 5 MG/1
10 TABLET ORAL EVERY 12 HOURS
Qty: 0 | Refills: 0 | Status: DISCONTINUED | OUTPATIENT
Start: 2018-07-21 | End: 2018-07-22

## 2018-07-21 RX ADMIN — OXYCODONE AND ACETAMINOPHEN 1 TABLET(S): 5; 325 TABLET ORAL at 08:57

## 2018-07-21 RX ADMIN — PIPERACILLIN AND TAZOBACTAM 25 GRAM(S): 4; .5 INJECTION, POWDER, LYOPHILIZED, FOR SOLUTION INTRAVENOUS at 21:04

## 2018-07-21 RX ADMIN — OXYCODONE AND ACETAMINOPHEN 1 TABLET(S): 5; 325 TABLET ORAL at 15:30

## 2018-07-21 RX ADMIN — Medication 40 MILLIGRAM(S): at 05:04

## 2018-07-21 RX ADMIN — HEPARIN SODIUM 5000 UNIT(S): 5000 INJECTION INTRAVENOUS; SUBCUTANEOUS at 05:04

## 2018-07-21 RX ADMIN — Medication 18 UNIT(S): at 12:42

## 2018-07-21 RX ADMIN — LOSARTAN POTASSIUM 100 MILLIGRAM(S): 100 TABLET, FILM COATED ORAL at 05:04

## 2018-07-21 RX ADMIN — Medication 81 MILLIGRAM(S): at 12:42

## 2018-07-21 RX ADMIN — Medication 18 UNIT(S): at 17:37

## 2018-07-21 RX ADMIN — Medication 325 MILLIGRAM(S): at 12:42

## 2018-07-21 RX ADMIN — BUDESONIDE AND FORMOTEROL FUMARATE DIHYDRATE 2 PUFF(S): 160; 4.5 AEROSOL RESPIRATORY (INHALATION) at 08:51

## 2018-07-21 RX ADMIN — Medication 3: at 17:37

## 2018-07-21 RX ADMIN — Medication 4: at 12:43

## 2018-07-21 RX ADMIN — PREGABALIN 1000 MICROGRAM(S): 225 CAPSULE ORAL at 12:42

## 2018-07-21 RX ADMIN — Medication 12 UNIT(S): at 08:51

## 2018-07-21 RX ADMIN — OXYCODONE AND ACETAMINOPHEN 1 TABLET(S): 5; 325 TABLET ORAL at 09:45

## 2018-07-21 RX ADMIN — OXYCODONE AND ACETAMINOPHEN 1 TABLET(S): 5; 325 TABLET ORAL at 14:32

## 2018-07-21 RX ADMIN — Medication 25 MILLIGRAM(S): at 05:04

## 2018-07-21 RX ADMIN — GABAPENTIN 300 MILLIGRAM(S): 400 CAPSULE ORAL at 12:42

## 2018-07-21 RX ADMIN — SENNA PLUS 2 TABLET(S): 8.6 TABLET ORAL at 21:04

## 2018-07-21 RX ADMIN — Medication 25 MILLIGRAM(S): at 17:38

## 2018-07-21 RX ADMIN — Medication 150 MICROGRAM(S): at 05:04

## 2018-07-21 RX ADMIN — POLYETHYLENE GLYCOL 3350 17 GRAM(S): 17 POWDER, FOR SOLUTION ORAL at 21:04

## 2018-07-21 RX ADMIN — OXYCODONE HYDROCHLORIDE 10 MILLIGRAM(S): 5 TABLET ORAL at 18:11

## 2018-07-21 RX ADMIN — Medication 4: at 08:51

## 2018-07-21 RX ADMIN — PIPERACILLIN AND TAZOBACTAM 25 GRAM(S): 4; .5 INJECTION, POWDER, LYOPHILIZED, FOR SOLUTION INTRAVENOUS at 13:26

## 2018-07-21 RX ADMIN — ATORVASTATIN CALCIUM 80 MILLIGRAM(S): 80 TABLET, FILM COATED ORAL at 21:04

## 2018-07-21 RX ADMIN — HEPARIN SODIUM 5000 UNIT(S): 5000 INJECTION INTRAVENOUS; SUBCUTANEOUS at 18:11

## 2018-07-21 RX ADMIN — BUDESONIDE AND FORMOTEROL FUMARATE DIHYDRATE 2 PUFF(S): 160; 4.5 AEROSOL RESPIRATORY (INHALATION) at 21:05

## 2018-07-21 RX ADMIN — INSULIN GLARGINE 50 UNIT(S): 100 INJECTION, SOLUTION SUBCUTANEOUS at 22:36

## 2018-07-21 RX ADMIN — Medication 250 MILLIGRAM(S): at 18:41

## 2018-07-21 RX ADMIN — PIPERACILLIN AND TAZOBACTAM 25 GRAM(S): 4; .5 INJECTION, POWDER, LYOPHILIZED, FOR SOLUTION INTRAVENOUS at 05:03

## 2018-07-21 NOTE — DISCHARGE NOTE ADULT - HOME CARE AGENCY
[facility]- [phone]. This agency will send a nurse to your home to evaluate your care.

## 2018-07-21 NOTE — DISCHARGE NOTE ADULT - HOSPITAL COURSE
68 yo M former smoker, hx IDDM2, hypothyroidism, HTN, HLD, COPD, CAD s/p stent & CABG x 3, atherosclerosis LE with intermittent claudication L leg, RLE bypass, femoral endartectomy fem-pop bypass 2017, presenting with L great toe pain s/p paronychia drainage and toenail removal. Pt describes 10/10 throbbing toe pain unrelieved by diclofenac or naproxen. Pt states he soaked foot in epsom salt one day ago before pain acutely worsened. Denies fevers/chills. No foot/calf pain. Reports L leg swelling/redness unchanged from baseline. On keflex BID. Xray showed: No tracking soft tissue gas collections, radiopaque foreign bodies, or gross radiographic evidence for osteomyelitis. No fractures or dislocations. Tarsometatarsal alignment maintained without evidence for a Lisfranc injury. Preserved visualized joint spaces. No joint margin erosions or intra-articular or periarticular calcifications. Small calcaneal enthesophytes. No lytic or blastic lesions. Vascular calcium lesions. Pt evaluated by Podiatry: Lanced serous bullae- 2 cc serous drainage. No purulence. Dressed with gauze and betadine. ID consulted started of IV Abx . MRI 68 yo M former smoker, hx IDDM2, hypothyroidism, HTN, HLD, COPD, CAD s/p stent & CABG x 3, atherosclerosis LE with intermittent claudication L leg, RLE bypass, femoral endartectomy fem-pop bypass 2017, presenting with L great toe pain s/p paronychia drainage and toenail removal. Pt describes 10/10 throbbing toe pain unrelieved by diclofenac or naproxen. Pt states he soaked foot in epsom salt one day ago before pain acutely worsened. Denies fevers/chills. No foot/calf pain. Reports L leg swelling/redness unchanged from baseline. On keflex BID. Xray showed: No tracking soft tissue gas collections, radiopaque foreign bodies, or gross radiographic evidence for osteomyelitis. No fractures or dislocations. Tarsometatarsal alignment maintained without evidence for a Lisfranc injury. Preserved visualized joint spaces. No joint margin erosions or intra-articular or periarticular calcifications. Small calcaneal enthesophytes. No lytic or blastic lesions. Vascular calcium lesions. Pt evaluated by Podiatry: Lanced serous bullae- 2 cc serous drainage. No purulence. Dressed with gauze and betadine. ID consulted started of IV Abx . MRI showed no evidence of OM.     Pt noted to have PVD-- plan to have LE angiogram once cleared by renal. Pt transferred from ADS service to vascular on 7/25/18. 68 yo M former smoker, hx IDDM2, hypothyroidism, HTN, HLD, COPD, CAD s/p stent & CABG x 3, atherosclerosis LE with intermittent claudication L leg, RLE bypass, femoral endartectomy fem-pop bypass 2017, presenting with L great toe pain s/p paronychia drainage and toenail removal. Pt describes 10/10 throbbing toe pain unrelieved by diclofenac or naproxen. Pt states he soaked foot in epsom salt one day ago before pain acutely worsened. Denies fevers/chills. No foot/calf pain. Reports L leg swelling/redness unchanged from baseline. On keflex BID. Xray showed: No tracking soft tissue gas collections, radiopaque foreign bodies, or gross radiographic evidence for osteomyelitis. No fractures or dislocations. Tarsometatarsal alignment maintained without evidence for a Lisfranc injury. Preserved visualized joint spaces. No joint margin erosions or intra-articular or periarticular calcifications. Small calcaneal enthesophytes. No lytic or blastic lesions. Vascular calcium lesions. Pt evaluated by Podiatry: Lanced serous bullae- 2 cc serous drainage. No purulence. Dressed with gauze and betadine. ID consulted started of IV Abx . MRI showed no evidence of OM.     Pt noted to have PVD-- plan to have LE angiogram once cleared by renal. Pt transferred from ADS service to vascular on 7/25/18.        Pt underwent LLE diagnostic angiogram with R common femoral artery with 5 fr Mynx on 7/31. 66 yo M former smoker, hx IDDM2, hypothyroidism, HTN, HLD, COPD, CAD s/p stent & CABG x 3, atherosclerosis LE with intermittent claudication L leg, RLE bypass, femoral endartectomy fem-pop bypass 2017, presenting with L great toe pain s/p paronychia drainage and toenail removal. Pt describes 10/10 throbbing toe pain unrelieved by diclofenac or naproxen. Pt states he soaked foot in epsom salt one day ago before pain acutely worsened. Denies fevers/chills. No foot/calf pain. Reports L leg swelling/redness unchanged from baseline. On keflex BID. Xray showed: No tracking soft tissue gas collections, radiopaque foreign bodies, or gross radiographic evidence for osteomyelitis. No fractures or dislocations. Tarsometatarsal alignment maintained without evidence for a Lisfranc injury. Preserved visualized joint spaces. No joint margin erosions or intra-articular or periarticular calcifications. Small calcaneal enthesophytes. No lytic or blastic lesions. Vascular calcium lesions. Pt evaluated by Podiatry: Lanced serous bullae- 2 cc serous drainage. No purulence. Dressed with gauze and betadine. ID consulted started of IV Abx . MRI showed no evidence of OM.     Pt noted to have PVD-- plan to have LE angiogram once cleared by renal. Pt transferred from ADS service to vascular on 7/25/18.        Pt underwent LLE diagnostic angiogram with R common femoral artery with 5 fr Mynx on 7/31. During hospital course patients diet was advanced as tolerated.  At this time, pt is tolerating a regular diet, ambulating and voiding.  Pt has been deemed stable for discharge at this time. 68 yo M former smoker, hx IDDM2, hypothyroidism, HTN, HLD, COPD, CAD s/p stent & CABG x 3, atherosclerosis LE with intermittent claudication L leg, RLE bypass, femoral endartectomy fem-pop bypass 2017, presenting with L great toe pain s/p paronychia drainage and toenail removal. Pt describes 10/10 throbbing toe pain unrelieved by diclofenac or naproxen. Pt states he soaked foot in epsom salt one day ago before pain acutely worsened. Denies fevers/chills. No foot/calf pain. Reports L leg swelling/redness unchanged from baseline. On keflex BID. Xray showed: No tracking soft tissue gas collections, radiopaque foreign bodies, or gross radiographic evidence for osteomyelitis. No fractures or dislocations. Tarsometatarsal alignment maintained without evidence for a Lisfranc injury. Preserved visualized joint spaces. No joint margin erosions or intra-articular or periarticular calcifications. Small calcaneal enthesophytes. No lytic or blastic lesions. Vascular calcium lesions. Pt evaluated by Podiatry: Lanced serous bullae- 2 cc serous drainage. No purulence. Dressed with gauze and betadine. ID consulted started of IV Abx . MRI showed no evidence of OM.     Pt noted to have PVD-- plan to have LE angiogram once cleared by renal. Pt transferred from ADS service to vascular on 7/25/18.        Pt underwent LLE diagnostic angiogram with R common femoral artery with 5 fr Mynx on 7/31. During hospital course patients diet was advanced as tolerated.  At this time, pt is tolerating a regular diet, ambulating and voiding.  Pt to start new diabetes regimen, pump has been disabled. Pt has been deemed stable for discharge at this time.

## 2018-07-21 NOTE — DISCHARGE NOTE ADULT - MEDICATION SUMMARY - MEDICATIONS TO STOP TAKING
I will STOP taking the medications listed below when I get home from the hospital:    NovoLOG 100 units/mL subcutaneous solution  -- Via pump

## 2018-07-21 NOTE — PROGRESS NOTE ADULT - SUBJECTIVE AND OBJECTIVE BOX
INTERVAL HPI/OVERNIGHT EVENTS: I am still hurting a lot.   Vital Signs Last 24 Hrs  T(C): 36.7 (21 Jul 2018 12:16), Max: 36.9 (21 Jul 2018 05:02)  T(F): 98.1 (21 Jul 2018 12:16), Max: 98.5 (21 Jul 2018 05:02)  HR: 70 (21 Jul 2018 12:16) (70 - 94)  BP: 116/49 (21 Jul 2018 12:16) (110/51 - 132/72)  BP(mean): --  RR: 17 (21 Jul 2018 12:16) (16 - 18)  SpO2: 99% (21 Jul 2018 12:16) (99% - 100%)  I&O's Summary    MEDICATIONS  (STANDING):  aspirin enteric coated 81 milliGRAM(s) Oral daily  atorvastatin 80 milliGRAM(s) Oral at bedtime  buDESOnide 160 MICROgram(s)/formoterol 4.5 MICROgram(s) Inhaler 2 Puff(s) Inhalation two times a day  cyanocobalamin 1000 MICROGram(s) Oral daily  dextrose 5%. 1000 milliLiter(s) (50 mL/Hr) IV Continuous <Continuous>  dextrose 50% Injectable 12.5 Gram(s) IV Push once  dextrose 50% Injectable 25 Gram(s) IV Push once  dextrose 50% Injectable 25 Gram(s) IV Push once  ferrous    sulfate 325 milliGRAM(s) Oral daily  furosemide    Tablet 40 milliGRAM(s) Oral daily  gabapentin 300 milliGRAM(s) Oral daily  heparin  Injectable 5000 Unit(s) SubCutaneous every 12 hours  insulin glargine Injectable (LANTUS) 50 Unit(s) SubCutaneous at bedtime  insulin lispro (HumaLOG) corrective regimen sliding scale   SubCutaneous three times a day before meals  insulin lispro Injectable (HumaLOG) 18 Unit(s) SubCutaneous three times a day with meals  levothyroxine 150 MICROGram(s) Oral daily  losartan 100 milliGRAM(s) Oral daily  metolazone 5 milliGRAM(s) Oral daily  metoprolol tartrate 25 milliGRAM(s) Oral two times a day  oxyCODONE  ER Tablet 10 milliGRAM(s) Oral every 12 hours  piperacillin/tazobactam IVPB. 3.375 Gram(s) IV Intermittent every 8 hours  vancomycin  IVPB 1000 milliGRAM(s) IV Intermittent every 24 hours    MEDICATIONS  (PRN):  dextrose 40% Gel 15 Gram(s) Oral once PRN Blood Glucose LESS THAN 70 milliGRAM(s)/deciLiter  docusate sodium 100 milliGRAM(s) Oral daily PRN Constipation  glucagon  Injectable 1 milliGRAM(s) IntraMuscular once PRN Glucose <70 milliGRAM(s)/deciLiter  morphine  - Injectable 2 milliGRAM(s) IV Push every 4 hours PRN Severe Pain (7 - 10)  oxyCODONE    5 mG/acetaminophen 325 mG 1 Tablet(s) Oral every 6 hours PRN Moderate Pain (4 - 6)    LABS:                        11.4   8.88  )-----------( 257      ( 21 Jul 2018 07:30 )             36.2     07-21    128<L>  |  93<L>  |  67<H>  ----------------------------<  318<H>  4.9   |  22  |  1.88<H>    Ca    9.6      21 Jul 2018 07:30  Phos  4.5     07-21  Mg     2.5     07-21          CAPILLARY BLOOD GLUCOSE      POCT Blood Glucose.: 316 mg/dL (21 Jul 2018 12:31)  POCT Blood Glucose.: 313 mg/dL (21 Jul 2018 08:24)  POCT Blood Glucose.: 325 mg/dL (20 Jul 2018 23:13)  POCT Blood Glucose.: 377 mg/dL (20 Jul 2018 17:30)          REVIEW OF SYSTEMS:  CONSTITUTIONAL: No fever, weight loss, or fatigue  EYES: No eye pain, visual disturbances, or discharge  ENMT:  No difficulty hearing, tinnitus, vertigo; No sinus or throat pain  NECK: No pain or stiffness  BREASTS: No pain, masses, or nipple discharge  RESPIRATORY: No cough, wheezing, chills or hemoptysis; No shortness of breath  CARDIOVASCULAR: No chest pain, palpitations, dizziness, or leg swelling  GASTROINTESTINAL: No abdominal or epigastric pain. No nausea, vomiting, or hematemesis; No diarrhea or constipation. No melena or hematochezia.  GENITOURINARY: No dysuria, frequency, hematuria, or incontinence  NEUROLOGICAL: No headaches, memory loss, loss of strength, numbness, or tremors  SKIN: No itching, burning, rashes, or lesions   LYMPH NODES: No enlarged glands  ENDOCRINE: No heat or cold intolerance; No hair loss  MUSCULOSKELETAL: left foot  pain  PSYCHIATRIC: No depression, anxiety, mood swings, or difficulty sleeping  HEME/LYMPH: No easy bruising, or bleeding gums  ALLERY AND IMMUNOLOGIC: No hives or eczema    RADIOLOGY & ADDITIONAL TESTS:    Consultant(s) Notes Reviewed:  [x ] YES  [ ] NO    PHYSICAL EXAM:  GENERAL: NAD, well-groomed, well-developed, not in any distress ,  HEAD:  Atraumatic, Normocephalic  EYES: EOMI, PERRLA, conjunctiva and sclera clear  ENMT: No tonsillar erythema, exudates, or enlargement; Moist mucous membranes, Good dentition, No lesions  NECK: Supple, No JVD, Normal thyroid  NERVOUS SYSTEM:  Alert & Oriented X3, No focal deficit   CHEST/LUNG: Good air entry bilateral with no  rales, rhonchi, wheezing, or rubs  HEART: Regular rate and rhythm; No murmurs, rubs, or gallops  ABDOMEN: Soft, Nontender, Nondistended; Bowel sounds present  EXTREMITIES: left foot dressed     Care Discussed with Consultants/Other Providers [ x] YES  [ ] NO

## 2018-07-21 NOTE — PROGRESS NOTE ADULT - SUBJECTIVE AND OBJECTIVE BOX
Chief complaint  Patient is a 67y old  Male who presents with a chief complaint of left foot swelling right great toe swelling (21 Jul 2018 07:34)   Review of systems  Patient in bed, looks comfortable, no fever, no hypoglycemia.    Labs and Fingersticks  CAPILLARY BLOOD GLUCOSE      POCT Blood Glucose.: 313 mg/dL (21 Jul 2018 08:24)  POCT Blood Glucose.: 325 mg/dL (20 Jul 2018 23:13)  POCT Blood Glucose.: 377 mg/dL (20 Jul 2018 17:30)  POCT Blood Glucose.: 297 mg/dL (20 Jul 2018 12:35)        Hemoglobin A1C, Whole Blood: 9.1 <H> (07-20 @ 05:16)    Calcium, Total Serum: 9.6 (07-21 @ 07:30)          07-21    128<L>  |  93<L>  |  67<H>  ----------------------------<  318<H>  4.9   |  22  |  1.88<H>    Ca    9.6      21 Jul 2018 07:30  Phos  4.5     07-21  Mg     2.5     07-21                          11.4   8.88  )-----------( 257      ( 21 Jul 2018 07:30 )             36.2     Medications  MEDICATIONS  (STANDING):  aspirin enteric coated 81 milliGRAM(s) Oral daily  atorvastatin 80 milliGRAM(s) Oral at bedtime  buDESOnide 160 MICROgram(s)/formoterol 4.5 MICROgram(s) Inhaler 2 Puff(s) Inhalation two times a day  cyanocobalamin 1000 MICROGram(s) Oral daily  dextrose 5%. 1000 milliLiter(s) (50 mL/Hr) IV Continuous <Continuous>  dextrose 50% Injectable 12.5 Gram(s) IV Push once  dextrose 50% Injectable 25 Gram(s) IV Push once  dextrose 50% Injectable 25 Gram(s) IV Push once  ferrous    sulfate 325 milliGRAM(s) Oral daily  furosemide    Tablet 40 milliGRAM(s) Oral daily  gabapentin 300 milliGRAM(s) Oral daily  heparin  Injectable 5000 Unit(s) SubCutaneous every 12 hours  insulin glargine Injectable (LANTUS) 50 Unit(s) SubCutaneous at bedtime  insulin lispro (HumaLOG) corrective regimen sliding scale   SubCutaneous three times a day before meals  insulin lispro Injectable (HumaLOG) 18 Unit(s) SubCutaneous three times a day with meals  levothyroxine 150 MICROGram(s) Oral daily  losartan 100 milliGRAM(s) Oral daily  metolazone 5 milliGRAM(s) Oral daily  metoprolol tartrate 25 milliGRAM(s) Oral two times a day  piperacillin/tazobactam IVPB. 3.375 Gram(s) IV Intermittent every 8 hours  vancomycin  IVPB 1000 milliGRAM(s) IV Intermittent every 24 hours      Physical Exam  General: Patient comfortable in bed  Vital Signs Last 12 Hrs  T(F): 98.5 (07-21-18 @ 05:02), Max: 98.5 (07-21-18 @ 05:02)  HR: 77 (07-21-18 @ 05:02) (77 - 77)  BP: 119/60 (07-21-18 @ 05:02) (119/60 - 119/60)  BP(mean): --  RR: 16 (07-21-18 @ 05:02) (16 - 16)  SpO2: 100% (07-21-18 @ 05:02) (100% - 100%)  Neck: No palpable thyroid nodules.  CVS: S1S2, No murmurs  Respiratory: No wheezing, no crepitations  GI: Abdomen soft, bowel sounds positive  Musculoskeletal:  edema lower extremities.   Skin: No skin rashes, no ecchymosis    Diagnostics    Hemoglobin A1C, Whole Blood: Routine (07-19 @ 11:09)

## 2018-07-21 NOTE — DISCHARGE NOTE ADULT - CARE PROVIDER_API CALL
Enrico Loya), Surgery; Vascular Surgery  [id number] [place] [address]  Phone: [phone]  Fax: [phone] Enrico Loya), Surgery; Vascular Surgery  [address] [address] [place] [address]  Phone: [phone]  Fax: [phone]    Patti Dexter), EndocrinologyMetabDiabetes; Internal Medicine  [address] [place] [address]  Phone: [phone]  Fax: [phone]

## 2018-07-21 NOTE — PROGRESS NOTE ADULT - ASSESSMENT
68 yo M former smoker, hx IDDM2, hypothyroidism, HTN, HLD, COPD, CAD s/p stent & CABG x 3, atherosclerosis LE with intermittent claudication L leg, RLE bypass, femoral endartectomy fem-pop bypass 2017, presenting with L great toe pain s/p paronychia drainage and toenail removal. Pt describes 10/10 throbbing toe pain unrelieved by diclofenac or naproxen. Pt states he soaked foot in epsom salt one day ago before pain acutely worsened. Denies fevers/chills. No foot/calf pain. Reports L leg swelling/redness unchanged from baseline. On keflex BID.      Problem/Plan - 1:  ·  Problem: Toe infection.  Plan: S/P I&D . IV Abxs. Podiatry helping .ID consult noted. MRI ordered.       Problem/Plan - 2:  ·  Problem: DM (diabetes mellitus).  Plan: Endo consult noted and will follow recommendations.      Problem/Plan - 3:  ·  Problem: PAD (peripheral artery disease).  Plan: Vascular following. Possible Angio.      Problem/Plan - 4:  ·  Problem: CKD (chronic kidney disease), stage III.  Plan: With JOSE LUIS . Has been taking NSAID . Renal following.      Problem/Plan - 5:  ·  Problem: CHF (congestive heart failure).  Plan: Not in acute CHF .      Problem/Plan - 6:  Problem: Hypothyroid. Plan: Synthroid .     Problem/Plan - 7:  ·  Problem: Foot Pain .  Plan: Infection and PVD . Oxycontin added and continuing PRN pain meds.      Problem/Plan - 8:  ·  Problem: Mild intermittent asthma without complication.  Plan: continue Inhalor.

## 2018-07-21 NOTE — DISCHARGE NOTE ADULT - ADDITIONAL INSTRUCTIONS
WOUND CARE: none   ANTIBIOTICS: as recommended per ID   WEIGHT BEAR: weight bear as tolerated   FOLLOW UP: in 1 week at [facility] with Dr. Witt. Call  to schedule appointment WOUND CARE:  Please keep incisions clean and dry. Please do not Scrub or rub incisions. Do not use lotion or powder on incisions.   BATHING: You may shower and/or sponge bathe. You may use warm soapy water in the shower and rinse, pat dry.  ACTIVITY: No heavy lifting or straining. Otherwise, you may return to your usual level of physical activity. If you are taking narcotic pain medication DO NOT drive a car, operate machinery or make important decisions.  DIET: Return to your usual diet.  NOTIFY YOUR SURGEON IF: You have any bleeding that does not stop, increased pain at surgical site, any fever (over 100.4 F) persistent nausea/vomiting, or if your pain is not controlled on your discharge pain medications.  Please follow up with your primary care physician in 1-2 weeks regarding your hospitalization.  Please follow up with your surgeon, Dr. Winters in 1 week. Please call office to make an appointment.   Please follow up in 1 week at [facility] with Dr. Witt. Call  to schedule appointment

## 2018-07-21 NOTE — PROGRESS NOTE ADULT - PROBLEM SELECTOR PLAN 1
Will increase Lantus to 50 units at bed time, 15u am.  Will increase Humalog to 18 units before each meal in addition to Humalog correction scale coverage.  Patient counseled for compliance with consistent low carb diet.

## 2018-07-21 NOTE — DISCHARGE NOTE ADULT - PLAN OF CARE
wound healing continue betadine and gauze daily dressing changes. Call to make a follow up appt with your podiatrist in 1 week Glucose cortol Your Hg A1c 9.1%, goal below 7% stable renal function avoid nonsteroidal inflammatory medications s/a Motrin, Advil or Aleve; Symptoms control s/p LLE diagnostic angiogram. Plan is for patient to return for bypass with Dr. Loya. Please follow up with Dr. Loya in 1 week to schedule your surgery s/p LLE diagnostic angiogram Plan is for patient to return for bypass with Dr. Loya. Please call Dr. Loya at discharge to schedule your appointment Glucose control Your Hg A1c 9.1%, goal below 7%    Take your lantus 20U before breakfast and 60 units at bedtime.  Do not use insulin pump.  Use novolog pen 20U 3 times a day before meals.     ** You must follow up with  within 5 days of discharge for diabetes management on your new medications. **

## 2018-07-21 NOTE — DISCHARGE NOTE ADULT - CARE PLAN
Principal Discharge DX:	Diabetic ulcer of toe of left foot, limited to breakdown of skin  Goal:	wound healing  Assessment and plan of treatment:	continue betadine and gauze daily dressing changes. Call to make a follow up appt with your podiatrist in 1 week  Secondary Diagnosis:	DM (diabetes mellitus)  Goal:	Glucose cortol  Assessment and plan of treatment:	Your Hg A1c 9.1%, goal below 7%  Secondary Diagnosis:	CKD (chronic kidney disease), stage III  Goal:	stable renal function  Assessment and plan of treatment:	avoid nonsteroidal inflammatory medications s/a Motrin, Advil or Aleve;  Secondary Diagnosis:	CAD (coronary artery disease)  Goal:	Symptoms control  Secondary Diagnosis:	Atherosclerosis of arteries of extremities Principal Discharge DX:	Diabetic ulcer of toe of left foot, limited to breakdown of skin  Goal:	wound healing  Assessment and plan of treatment:	continue betadine and gauze daily dressing changes. Call to make a follow up appt with your podiatrist in 1 week  Secondary Diagnosis:	DM (diabetes mellitus)  Goal:	Glucose cortol  Assessment and plan of treatment:	Your Hg A1c 9.1%, goal below 7%  Secondary Diagnosis:	CKD (chronic kidney disease), stage III  Goal:	stable renal function  Assessment and plan of treatment:	avoid nonsteroidal inflammatory medications s/a Motrin, Advil or Aleve;  Secondary Diagnosis:	CAD (coronary artery disease)  Goal:	Symptoms control  Secondary Diagnosis:	Atherosclerosis of arteries of extremities  Goal:	s/p LLE diagnostic angiogram.  Assessment and plan of treatment:	Plan is for patient to return for bypass with Dr. Loya. Please follow up with Dr. Loya in 1 week to schedule your surgery Principal Discharge DX:	Diabetic ulcer of toe of left foot, limited to breakdown of skin  Goal:	wound healing  Assessment and plan of treatment:	continue betadine and gauze daily dressing changes. Call to make a follow up appt with your podiatrist in 1 week  Secondary Diagnosis:	DM (diabetes mellitus)  Goal:	Glucose cortol  Assessment and plan of treatment:	Your Hg A1c 9.1%, goal below 7%  Secondary Diagnosis:	CKD (chronic kidney disease), stage III  Goal:	stable renal function  Assessment and plan of treatment:	avoid nonsteroidal inflammatory medications s/a Motrin, Advil or Aleve;  Secondary Diagnosis:	CAD (coronary artery disease)  Goal:	Symptoms control  Secondary Diagnosis:	Atherosclerosis of arteries of extremities  Goal:	s/p LLE diagnostic angiogram  Assessment and plan of treatment:	Plan is for patient to return for bypass with Dr. Loya. Please call Dr. Loya at discharge to schedule your appointment Principal Discharge DX:	Diabetic ulcer of toe of left foot, limited to breakdown of skin  Goal:	wound healing  Assessment and plan of treatment:	continue betadine and gauze daily dressing changes. Call to make a follow up appt with your podiatrist in 1 week  Secondary Diagnosis:	DM (diabetes mellitus)  Goal:	Glucose control  Assessment and plan of treatment:	Your Hg A1c 9.1%, goal below 7%    Take your lantus 20U before breakfast and 60 units at bedtime.  Do not use insulin pump.  Use novolog pen 20U 3 times a day before meals.     ** You must follow up with  within 5 days of discharge for diabetes management on your new medications. **  Secondary Diagnosis:	CKD (chronic kidney disease), stage III  Goal:	stable renal function  Assessment and plan of treatment:	avoid nonsteroidal inflammatory medications s/a Motrin, Advil or Aleve;  Secondary Diagnosis:	CAD (coronary artery disease)  Goal:	Symptoms control  Secondary Diagnosis:	Atherosclerosis of arteries of extremities  Goal:	s/p LLE diagnostic angiogram  Assessment and plan of treatment:	Plan is for patient to return for bypass with Dr. Loya. Please call Dr. Loya at discharge to schedule your appointment

## 2018-07-21 NOTE — PROGRESS NOTE ADULT - ASSESSMENT
Assessment  DMT2: 67y Male with DM T2 with hyperglycemia on basal bolus insulin, sose was adjusted yesterday,  blood sugars still running high, no hypoglycemic episode,  eating meals, non compliant with low carb diet.  Foot Wound: On antibiotics, wound care.  CAD: On medications, stable, monitored.  Hypothyroidism:  On synthroid 150 mcg po daily, asymptomatic.  HTN: Controlled, On med.  CKD: Monitor labs/BMP,

## 2018-07-21 NOTE — DISCHARGE NOTE ADULT - PATIENT PORTAL LINK FT
You can access the [facility] Patient Portal, offered by [facility], by registering with the following website: [email or web address]

## 2018-07-21 NOTE — DISCHARGE NOTE ADULT - SECONDARY DIAGNOSIS.
DM (diabetes mellitus) CKD (chronic kidney disease), stage III CAD (coronary artery disease) Atherosclerosis of arteries of extremities

## 2018-07-21 NOTE — DISCHARGE NOTE ADULT - MEDICATION SUMMARY - MEDICATIONS TO TAKE
I will START or STAY ON the medications listed below when I get home from the hospital:    BD maria g pen needles 4mm  -- 1   -- Indication: For Diabetes    alcohol swab pads  -- use as directed  -- Indication: For Diabetes    lancets  -- Test blood sugar 4 times daily  -- Indication: For Diabetes    diclofenac sodium 75 mg oral delayed release tablet  -- 1 tab(s) by mouth 3 times a day, As Needed  -- Indication: For PAin    Aspir 81 oral delayed release tablet  -- 1 tab(s) by mouth once a day in Am  -- Indication: For Antiplatelet    acetaminophen 325 mg oral tablet  -- 2 tab(s) by mouth every 6 hours, As needed, Mild Pain (1 - 3)  -- Indication: For PAin    naproxen 250 mg oral tablet  -- 1 tab(s) by mouth once a day, As Needed  for pain   -- Indication: For PAin    losartan 100 mg oral tablet  -- 1 tab(s) by mouth once a day in Am  -- Indication: For Hypertension    gabapentin 300 mg oral capsule  -- 1 cap(s) by mouth once a day, As Needed  -- Indication: For PAin    Basaglar KwikPen 100 units/mL subcutaneous solution  -- 60 unit(s) subcutaneous once a day (at bedtime)  and 20 Units once a day before breakfast  -- Do not drink alcoholic beverages when taking this medication.  It is very important that you take or use this exactly as directed.  Do not skip doses or discontinue unless directed by your doctor.  Keep in refrigerator.  Do not freeze.    -- Indication: For Diabetes    NovoLOG FlexPen 100 units/mL injectable solution  -- 20 unit(s) injectable 3 times a day (before meals) 10-15 minutes before meals  -- Check with your doctor before becoming pregnant.  Do not drink alcoholic beverages when taking this medication.  Keep in refrigerator.  Do not freeze.  Obtain medical advice before taking any non-prescription drugs as some may affect the action of this medication.    -- Indication: For Diabetes    atorvastatin 80 mg oral tablet  -- 1 tab(s) by mouth once a day at night  -- Indication: For Hyperlipidemia    metoprolol tartrate 25 mg oral tablet  -- 1 tab(s) by mouth 2 times a day  -- Indication: For Hypertension    Symbicort 160 mcg-4.5 mcg/inh inhalation aerosol  -- 2 puff(s) inhaled 2 times a day  -- Indication: For bronchodilator    furosemide 40 mg oral tablet  -- 1 tab(s) by mouth once a day in Am  -- Indication: For Diuretic    metOLazone 5 mg oral tablet  -- 1 tab(s) by mouth once a day  -- Indication: For Diuretic    ferrous sulfate 325 mg (65 mg elemental iron) oral tablet  -- 1 tab(s) by mouth once a day  -- Indication: For supplement    senna oral tablet  -- 2 tab(s) by mouth once a day (at bedtime)  -- Indication: For Constipation    Colace 100 mg oral capsule  -- orally once a day, As Needed  -- Indication: For Constipation    levothyroxine 150 mcg (0.15 mg) oral tablet  -- 1 tab(s) by mouth once a day Am  -- Indication: For Hypothyroid    Vitamin B-12 1000 mcg oral tablet  -- 1 tab(s) by mouth once a day in Am  -- Indication: For supplement

## 2018-07-21 NOTE — DISCHARGE NOTE ADULT - CARE PROVIDERS DIRECT ADDRESSES
[email or web address]

## 2018-07-22 LAB
GLUCOSE BLDC GLUCOMTR-MCNC: 158 MG/DL — HIGH (ref 70–99)
GLUCOSE BLDC GLUCOMTR-MCNC: 210 MG/DL — HIGH (ref 70–99)
GLUCOSE BLDC GLUCOMTR-MCNC: 233 MG/DL — HIGH (ref 70–99)
GLUCOSE BLDC GLUCOMTR-MCNC: 303 MG/DL — HIGH (ref 70–99)
OSMOLALITY SERPL: 310 MOSMO/KG — HIGH (ref 275–295)
OSMOLALITY UR: 325 MOSMO/KG — SIGNIFICANT CHANGE UP (ref 50–1200)

## 2018-07-22 RX ORDER — SODIUM CHLORIDE 9 MG/ML
1000 INJECTION INTRAMUSCULAR; INTRAVENOUS; SUBCUTANEOUS
Qty: 0 | Refills: 0 | Status: DISCONTINUED | OUTPATIENT
Start: 2018-07-22 | End: 2018-07-27

## 2018-07-22 RX ORDER — INSULIN GLARGINE 100 [IU]/ML
25 INJECTION, SOLUTION SUBCUTANEOUS EVERY MORNING
Qty: 0 | Refills: 0 | Status: DISCONTINUED | OUTPATIENT
Start: 2018-07-22 | End: 2018-07-23

## 2018-07-22 RX ORDER — INSULIN LISPRO 100/ML
25 VIAL (ML) SUBCUTANEOUS
Qty: 0 | Refills: 0 | Status: DISCONTINUED | OUTPATIENT
Start: 2018-07-22 | End: 2018-07-23

## 2018-07-22 RX ORDER — POLYETHYLENE GLYCOL 3350 17 G/17G
17 POWDER, FOR SOLUTION ORAL DAILY
Qty: 0 | Refills: 0 | Status: DISCONTINUED | OUTPATIENT
Start: 2018-07-22 | End: 2018-08-01

## 2018-07-22 RX ORDER — INSULIN GLARGINE 100 [IU]/ML
60 INJECTION, SOLUTION SUBCUTANEOUS AT BEDTIME
Qty: 0 | Refills: 0 | Status: DISCONTINUED | OUTPATIENT
Start: 2018-07-22 | End: 2018-07-29

## 2018-07-22 RX ORDER — OXYCODONE HYDROCHLORIDE 5 MG/1
15 TABLET ORAL EVERY 12 HOURS
Qty: 0 | Refills: 0 | Status: DISCONTINUED | OUTPATIENT
Start: 2018-07-22 | End: 2018-07-28

## 2018-07-22 RX ADMIN — OXYCODONE HYDROCHLORIDE 10 MILLIGRAM(S): 5 TABLET ORAL at 05:46

## 2018-07-22 RX ADMIN — Medication 18 UNIT(S): at 12:08

## 2018-07-22 RX ADMIN — OXYCODONE AND ACETAMINOPHEN 1 TABLET(S): 5; 325 TABLET ORAL at 10:09

## 2018-07-22 RX ADMIN — PIPERACILLIN AND TAZOBACTAM 25 GRAM(S): 4; .5 INJECTION, POWDER, LYOPHILIZED, FOR SOLUTION INTRAVENOUS at 21:26

## 2018-07-22 RX ADMIN — Medication 325 MILLIGRAM(S): at 11:40

## 2018-07-22 RX ADMIN — POLYETHYLENE GLYCOL 3350 17 GRAM(S): 17 POWDER, FOR SOLUTION ORAL at 20:02

## 2018-07-22 RX ADMIN — HEPARIN SODIUM 5000 UNIT(S): 5000 INJECTION INTRAVENOUS; SUBCUTANEOUS at 17:50

## 2018-07-22 RX ADMIN — Medication 4: at 12:09

## 2018-07-22 RX ADMIN — Medication 25 MILLIGRAM(S): at 05:47

## 2018-07-22 RX ADMIN — INSULIN GLARGINE 60 UNIT(S): 100 INJECTION, SOLUTION SUBCUTANEOUS at 22:35

## 2018-07-22 RX ADMIN — INSULIN GLARGINE 15 UNIT(S): 100 INJECTION, SOLUTION SUBCUTANEOUS at 08:20

## 2018-07-22 RX ADMIN — OXYCODONE HYDROCHLORIDE 10 MILLIGRAM(S): 5 TABLET ORAL at 06:46

## 2018-07-22 RX ADMIN — SENNA PLUS 2 TABLET(S): 8.6 TABLET ORAL at 21:26

## 2018-07-22 RX ADMIN — Medication 18 UNIT(S): at 08:21

## 2018-07-22 RX ADMIN — BUDESONIDE AND FORMOTEROL FUMARATE DIHYDRATE 2 PUFF(S): 160; 4.5 AEROSOL RESPIRATORY (INHALATION) at 20:02

## 2018-07-22 RX ADMIN — LOSARTAN POTASSIUM 100 MILLIGRAM(S): 100 TABLET, FILM COATED ORAL at 05:47

## 2018-07-22 RX ADMIN — PIPERACILLIN AND TAZOBACTAM 25 GRAM(S): 4; .5 INJECTION, POWDER, LYOPHILIZED, FOR SOLUTION INTRAVENOUS at 14:54

## 2018-07-22 RX ADMIN — PIPERACILLIN AND TAZOBACTAM 25 GRAM(S): 4; .5 INJECTION, POWDER, LYOPHILIZED, FOR SOLUTION INTRAVENOUS at 05:46

## 2018-07-22 RX ADMIN — MORPHINE SULFATE 2 MILLIGRAM(S): 50 CAPSULE, EXTENDED RELEASE ORAL at 14:54

## 2018-07-22 RX ADMIN — GABAPENTIN 300 MILLIGRAM(S): 400 CAPSULE ORAL at 11:40

## 2018-07-22 RX ADMIN — Medication 25 UNIT(S): at 17:47

## 2018-07-22 RX ADMIN — OXYCODONE AND ACETAMINOPHEN 1 TABLET(S): 5; 325 TABLET ORAL at 10:43

## 2018-07-22 RX ADMIN — Medication 2: at 17:47

## 2018-07-22 RX ADMIN — Medication 250 MILLIGRAM(S): at 17:59

## 2018-07-22 RX ADMIN — Medication 25 MILLIGRAM(S): at 17:53

## 2018-07-22 RX ADMIN — Medication 40 MILLIGRAM(S): at 05:47

## 2018-07-22 RX ADMIN — PREGABALIN 1000 MICROGRAM(S): 225 CAPSULE ORAL at 11:39

## 2018-07-22 RX ADMIN — Medication 2: at 08:21

## 2018-07-22 RX ADMIN — MORPHINE SULFATE 2 MILLIGRAM(S): 50 CAPSULE, EXTENDED RELEASE ORAL at 15:10

## 2018-07-22 RX ADMIN — Medication 81 MILLIGRAM(S): at 11:39

## 2018-07-22 RX ADMIN — OXYCODONE HYDROCHLORIDE 15 MILLIGRAM(S): 5 TABLET ORAL at 17:58

## 2018-07-22 RX ADMIN — ATORVASTATIN CALCIUM 80 MILLIGRAM(S): 80 TABLET, FILM COATED ORAL at 21:26

## 2018-07-22 RX ADMIN — BUDESONIDE AND FORMOTEROL FUMARATE DIHYDRATE 2 PUFF(S): 160; 4.5 AEROSOL RESPIRATORY (INHALATION) at 08:23

## 2018-07-22 RX ADMIN — SODIUM CHLORIDE 75 MILLILITER(S): 9 INJECTION INTRAMUSCULAR; INTRAVENOUS; SUBCUTANEOUS at 12:01

## 2018-07-22 RX ADMIN — Medication 150 MICROGRAM(S): at 05:47

## 2018-07-22 RX ADMIN — HEPARIN SODIUM 5000 UNIT(S): 5000 INJECTION INTRAVENOUS; SUBCUTANEOUS at 05:47

## 2018-07-22 NOTE — PROGRESS NOTE ADULT - ASSESSMENT
68 yo M former smoker, hx IDDM2, hypothyroidism, HTN, HLD, COPD, CAD s/p stent & CABG x 3, atherosclerosis LE with intermittent claudication L leg, RLE bypass, femoral endartectomy fem-pop bypass 2017, presenting with L great toe pain s/p paronychia drainage and toenail removal. Pt describes 10/10 throbbing toe pain unrelieved by diclofenac or naproxen. Pt states he soaked foot in epsom salt one day ago before pain acutely worsened. Denies fevers/chills. No foot/calf pain. Reports L leg swelling/redness unchanged from baseline. On keflex BID.      Problem/Plan - 1:  ·  Problem: Toe infection.  Plan: S/P I&D . IV Abxs. Podiatry helping .ID consult noted. MRI ordered.       Problem/Plan - 2:  ·  Problem: DM (diabetes mellitus).  Plan: Endo consult noted and will follow recommendations.      Problem/Plan - 3:  ·  Problem: PAD (peripheral artery disease).  Plan: Vascular following. Possible Angio.      Problem/Plan - 4:  ·  Problem: CKD (chronic kidney disease), stage III.  Plan: With JOSE LUIS . Has been taking NSAID . Renal following.      Problem/Plan - 5:  ·  Problem: CHF (congestive heart failure).  Plan: Not in acute CHF .      Problem/Plan - 6:  Problem: Hypothyroid. Plan: Synthroid .     Problem/Plan - 7:  ·  Problem: Foot Pain .  Plan: Sec to  Infection and PVD . Increasing Oxycontin and continuing PRN pain meds.      Problem/Plan - 8:  ·  Problem: Mild intermittent asthma without complication.  Plan: continue Inhalor.

## 2018-07-22 NOTE — PROGRESS NOTE ADULT - SUBJECTIVE AND OBJECTIVE BOX
Chief complaint  Patient is a 67y old  Male who presents with a chief complaint of left foot swelling right great toe swelling (21 Jul 2018 07:34)   Review of systems  Patient in bed, looks comfortable, no fever, c/o foot pain, no hypoglycemia.    Labs and Fingersticks  CAPILLARY BLOOD GLUCOSE      POCT Blood Glucose.: 303 mg/dL (22 Jul 2018 12:01)  POCT Blood Glucose.: 233 mg/dL (22 Jul 2018 08:13)  POCT Blood Glucose.: 287 mg/dL (21 Jul 2018 22:28)  POCT Blood Glucose.: 281 mg/dL (21 Jul 2018 17:20)          Calcium, Total Serum: 9.6 (07-21 @ 07:30)          07-21    128<L>  |  93<L>  |  67<H>  ----------------------------<  318<H>  4.9   |  22  |  1.88<H>    Ca    9.6      21 Jul 2018 07:30  Phos  4.5     07-21  Mg     2.5     07-21                          11.4   8.88  )-----------( 257      ( 21 Jul 2018 07:30 )             36.2     Medications  MEDICATIONS  (STANDING):  aspirin enteric coated 81 milliGRAM(s) Oral daily  atorvastatin 80 milliGRAM(s) Oral at bedtime  buDESOnide 160 MICROgram(s)/formoterol 4.5 MICROgram(s) Inhaler 2 Puff(s) Inhalation two times a day  cyanocobalamin 1000 MICROGram(s) Oral daily  dextrose 5%. 1000 milliLiter(s) (50 mL/Hr) IV Continuous <Continuous>  dextrose 50% Injectable 12.5 Gram(s) IV Push once  dextrose 50% Injectable 25 Gram(s) IV Push once  dextrose 50% Injectable 25 Gram(s) IV Push once  ferrous    sulfate 325 milliGRAM(s) Oral daily  furosemide    Tablet 40 milliGRAM(s) Oral daily  gabapentin 300 milliGRAM(s) Oral daily  heparin  Injectable 5000 Unit(s) SubCutaneous every 12 hours  insulin glargine Injectable (LANTUS) 60 Unit(s) SubCutaneous at bedtime  insulin glargine Injectable (LANTUS) 25 Unit(s) SubCutaneous every morning  insulin lispro (HumaLOG) corrective regimen sliding scale   SubCutaneous three times a day before meals  insulin lispro Injectable (HumaLOG) 25 Unit(s) SubCutaneous three times a day with meals  levothyroxine 150 MICROGram(s) Oral daily  losartan 100 milliGRAM(s) Oral daily  metolazone 5 milliGRAM(s) Oral daily  metoprolol tartrate 25 milliGRAM(s) Oral two times a day  oxyCODONE  ER Tablet 15 milliGRAM(s) Oral every 12 hours  piperacillin/tazobactam IVPB. 3.375 Gram(s) IV Intermittent every 8 hours  senna 2 Tablet(s) Oral at bedtime  sodium chloride 0.9%. 1000 milliLiter(s) (75 mL/Hr) IV Continuous <Continuous>  vancomycin  IVPB 1000 milliGRAM(s) IV Intermittent every 24 hours      Physical Exam  General: Patient comfortable in bed  Vital Signs Last 12 Hrs  T(F): 97.8 (07-22-18 @ 14:18), Max: 98.2 (07-22-18 @ 05:44)  HR: 78 (07-22-18 @ 14:55) (74 - 92)  BP: 133/54 (07-22-18 @ 14:55) (123/52 - 152/57)  BP(mean): --  RR: 18 (07-22-18 @ 14:18) (18 - 18)  SpO2: 98% (07-22-18 @ 14:18) (98% - 100%)  Neck: No palpable thyroid nodules.  CVS: S1S2, No murmurs  Respiratory: No wheezing, no crepitations  GI: Abdomen soft, bowel sounds positive  Musculoskeletal:  edema lower extremities.   Skin: No skin rashes, no ecchymosis    Diagnostics    Hemoglobin A1C, Whole Blood: Routine (07-19 @ 11:09)

## 2018-07-22 NOTE — PROGRESS NOTE ADULT - SUBJECTIVE AND OBJECTIVE BOX
Infectious Diseases progress note:    Subjective:  c/o pain in L hallux.  Took percocet earlier.  No fevers/chills    ROS:  CONSTITUTIONAL:  No fever, chills, rigors  CARDIOVASCULAR:  No chest pain or palpitations  RESPIRATORY:   No SOB, cough, dyspnea on exertion.  No wheezing  GASTROINTESTINAL:  No abd pain, N/V, diarrhea/constipation  EXTREMITIES:  No swelling or joint pain  GENITOURINARY:  No burning on urination, increased frequency or urgency.  No flank pain  NEUROLOGIC:  No HA, visual disturbances  SKIN: No rashes    Allergies    No Known Allergies    Intolerances        ANTIBIOTICS/RELEVANT:  antimicrobials  piperacillin/tazobactam IVPB. 3.375 Gram(s) IV Intermittent every 8 hours  vancomycin  IVPB 1000 milliGRAM(s) IV Intermittent every 24 hours    immunologic:    OTHER:  aspirin enteric coated 81 milliGRAM(s) Oral daily  atorvastatin 80 milliGRAM(s) Oral at bedtime  buDESOnide 160 MICROgram(s)/formoterol 4.5 MICROgram(s) Inhaler 2 Puff(s) Inhalation two times a day  cyanocobalamin 1000 MICROGram(s) Oral daily  dextrose 40% Gel 15 Gram(s) Oral once PRN  dextrose 5%. 1000 milliLiter(s) IV Continuous <Continuous>  dextrose 50% Injectable 12.5 Gram(s) IV Push once  dextrose 50% Injectable 25 Gram(s) IV Push once  dextrose 50% Injectable 25 Gram(s) IV Push once  docusate sodium 100 milliGRAM(s) Oral daily PRN  ferrous    sulfate 325 milliGRAM(s) Oral daily  furosemide    Tablet 40 milliGRAM(s) Oral daily  gabapentin 300 milliGRAM(s) Oral daily  glucagon  Injectable 1 milliGRAM(s) IntraMuscular once PRN  heparin  Injectable 5000 Unit(s) SubCutaneous every 12 hours  insulin glargine Injectable (LANTUS) 15 Unit(s) SubCutaneous every morning  insulin glargine Injectable (LANTUS) 50 Unit(s) SubCutaneous at bedtime  insulin lispro (HumaLOG) corrective regimen sliding scale   SubCutaneous three times a day before meals  insulin lispro Injectable (HumaLOG) 18 Unit(s) SubCutaneous three times a day with meals  levothyroxine 150 MICROGram(s) Oral daily  losartan 100 milliGRAM(s) Oral daily  metolazone 5 milliGRAM(s) Oral daily  metoprolol tartrate 25 milliGRAM(s) Oral two times a day  morphine  - Injectable 2 milliGRAM(s) IV Push every 4 hours PRN  oxyCODONE    5 mG/acetaminophen 325 mG 1 Tablet(s) Oral every 6 hours PRN  oxyCODONE  ER Tablet 10 milliGRAM(s) Oral every 12 hours  senna 2 Tablet(s) Oral at bedtime  sodium chloride 0.9%. 1000 milliLiter(s) IV Continuous <Continuous>      Objective:  Vital Signs Last 24 Hrs  T(C): 36.8 (22 Jul 2018 05:44), Max: 36.8 (22 Jul 2018 05:44)  T(F): 98.2 (22 Jul 2018 05:44), Max: 98.2 (22 Jul 2018 05:44)  HR: 92 (22 Jul 2018 05:44) (73 - 92)  BP: 152/57 (22 Jul 2018 05:44) (121/51 - 152/57)  BP(mean): --  RR: 18 (22 Jul 2018 05:44) (17 - 18)  SpO2: 100% (22 Jul 2018 05:44) (100% - 100%)    PHYSICAL EXAM:  Constitutional:NAD  Eyes:DIMAS, EOMI  Ear/Nose/Throat: no thrush, mucositis.  Moist mucous membranes	  Neck:no JVD, no lymphadenopathy, supple  Respiratory: CTA brad  Cardiovascular: S1S2 RRR, no murmurs  Gastrointestinal:soft, nontender,  nondistended (+) BS  Extremities:no e/e/c  Skin:  L foot dsg in place        LABS:                        11.4   8.88  )-----------( 257      ( 21 Jul 2018 07:30 )             36.2     07-21    128<L>  |  93<L>  |  67<H>  ----------------------------<  318<H>  4.9   |  22  |  1.88<H>    Ca    9.6      21 Jul 2018 07:30  Phos  4.5     07-21  Mg     2.5     07-21        Vancomycin Level, Trough: 12.7 ug/mL (07-21 @ 17:07)              MICROBIOLOGY:    Culture - Blood (07.19.18 @ 19:02)    Culture - Blood:   NO ORGANISMS ISOLATED  NO ORGANISMS ISOLATED AT 48 HRS.    Specimen Source: BLOOD VENOUS    Culture - Blood (07.19.18 @ 19:02)    Culture - Blood:   NO ORGANISMS ISOLATED  NO ORGANISMS ISOLATED AT 48 HRS.    Specimen Source: BLOOD PERIPHERAL          RADIOLOGY & ADDITIONAL STUDIES:    < from: Xray Foot AP + Lateral, Left (07.19.18 @ 06:22) >  IMPRESSION:  No tracking soft tissue gas collections, radiopaque foreign bodies, or   gross radiographic evidence for osteomyelitis.    No fractures or dislocations.    Tarsometatarsal alignment maintained without evidence for a Lisfranc   injury.    Preserved visualized joint spaces. No joint margin erosions or   intra-articular or periarticular calcifications.    Small calcaneal enthesophytes.     No lytic or blastic lesions.    Vascular calcium lesions.    < end of copied text >

## 2018-07-22 NOTE — PROGRESS NOTE ADULT - ASSESSMENT
Assessment  DMT2: 67y Male with DM T2 with hyperglycemia on basal bolus insulin, dose was adjusted yesterday,  blood sugars still running high, no hypoglycemic episode,  eating meals, non compliant with low carb diet.  Foot Wound: On antibiotics, wound care.  CAD: On medications, stable, monitored.  Hypothyroidism:  On synthroid 150 mcg po daily, asymptomatic.  HTN: Controlled, On med.  CKD: Monitor labs/BMP,

## 2018-07-22 NOTE — PROGRESS NOTE ADULT - ASSESSMENT
66 yo M former smoker, hx IDDM2, hypothyroidism, HTN, HLD, COPD, CAD s/p stent & CABG x 3, atherosclerosis LE with intermittent claudication L leg, RLE bypass, femoral endartectomy fem-pop bypass 2017, presenting with L great toe pain s/p paronychia drainage and toenail removal. Pt describes 10/10 throbbing toe pain unrelieved by diclofenac or naproxen. Pt states he soaked foot in epsom salt one day ago before pain acutely worsened. Denies fevers/chills. No foot/calf pain. Reports L leg swelling/redness unchanged from baseline. On keflex BID. (19 Jul 2018 16:29)    ER vitals:  T 96.2, P 72, /57, WBC 10.  ESR 79, CRP 7.0.  Pt received clinda x 1 in ER.  Pt had recently been on approximately 3 weeks of abx (keflex) as an outpatient for Left nail infection.  PT states it was not getting better, and podiatrist removed nail on Tuesday.  As per patient, he noted some pus underneath nail bed.  Pt came to ER, as he has been having intolerable pain since procedure.  He c/o rigors/chills currently.  Foot has been swollen and tender.  No recent travel, or foot trauma.      He recently saw his Vascular surgeon Dr. Loya, and pending eventual angiogram.    Problem/Plan - 1:    ·	Left foot cellulitis    - Recent L nail avulsion, c/o worsening pain, swelling/tenderness. S/p recent course of keflex x 3 weeks.      - Start broad spectrum abx with vanco/zosyn, given h/o diabetes.  Vanco trough 12.  Will continue present dose.  Cr rising.  Check repeat trough tomorrow    - Check blood cultures.  Trend inflammatory markers    - Podiatry follow up appreciated.  No acute intervention.  Vascular f/u for angiogram    - MRI L foot to r/o OM    Will follow    Yesika Rodríguez  [phone]

## 2018-07-22 NOTE — PROGRESS NOTE ADULT - SUBJECTIVE AND OBJECTIVE BOX
INTERVAL HPI/OVERNIGHT EVENTS: My foot still hurting.   Vital Signs Last 24 Hrs  T(C): 36.6 (22 Jul 2018 14:18), Max: 36.8 (22 Jul 2018 05:44)  T(F): 97.8 (22 Jul 2018 14:18), Max: 98.2 (22 Jul 2018 05:44)  HR: 78 (22 Jul 2018 14:55) (73 - 92)  BP: 133/54 (22 Jul 2018 14:55) (121/51 - 152/57)  BP(mean): --  RR: 18 (22 Jul 2018 14:18) (17 - 18)  SpO2: 98% (22 Jul 2018 14:18) (98% - 100%)  I&O's Summary    MEDICATIONS  (STANDING):  aspirin enteric coated 81 milliGRAM(s) Oral daily  atorvastatin 80 milliGRAM(s) Oral at bedtime  buDESOnide 160 MICROgram(s)/formoterol 4.5 MICROgram(s) Inhaler 2 Puff(s) Inhalation two times a day  cyanocobalamin 1000 MICROGram(s) Oral daily  dextrose 5%. 1000 milliLiter(s) (50 mL/Hr) IV Continuous <Continuous>  dextrose 50% Injectable 12.5 Gram(s) IV Push once  dextrose 50% Injectable 25 Gram(s) IV Push once  dextrose 50% Injectable 25 Gram(s) IV Push once  ferrous    sulfate 325 milliGRAM(s) Oral daily  furosemide    Tablet 40 milliGRAM(s) Oral daily  gabapentin 300 milliGRAM(s) Oral daily  heparin  Injectable 5000 Unit(s) SubCutaneous every 12 hours  insulin glargine Injectable (LANTUS) 15 Unit(s) SubCutaneous every morning  insulin glargine Injectable (LANTUS) 50 Unit(s) SubCutaneous at bedtime  insulin lispro (HumaLOG) corrective regimen sliding scale   SubCutaneous three times a day before meals  insulin lispro Injectable (HumaLOG) 18 Unit(s) SubCutaneous three times a day with meals  levothyroxine 150 MICROGram(s) Oral daily  losartan 100 milliGRAM(s) Oral daily  metolazone 5 milliGRAM(s) Oral daily  metoprolol tartrate 25 milliGRAM(s) Oral two times a day  oxyCODONE  ER Tablet 10 milliGRAM(s) Oral every 12 hours  piperacillin/tazobactam IVPB. 3.375 Gram(s) IV Intermittent every 8 hours  senna 2 Tablet(s) Oral at bedtime  sodium chloride 0.9%. 1000 milliLiter(s) (75 mL/Hr) IV Continuous <Continuous>  vancomycin  IVPB 1000 milliGRAM(s) IV Intermittent every 24 hours    MEDICATIONS  (PRN):  dextrose 40% Gel 15 Gram(s) Oral once PRN Blood Glucose LESS THAN 70 milliGRAM(s)/deciLiter  docusate sodium 100 milliGRAM(s) Oral daily PRN Constipation  glucagon  Injectable 1 milliGRAM(s) IntraMuscular once PRN Glucose <70 milliGRAM(s)/deciLiter  morphine  - Injectable 2 milliGRAM(s) IV Push every 4 hours PRN Severe Pain (7 - 10)  oxyCODONE    5 mG/acetaminophen 325 mG 1 Tablet(s) Oral every 6 hours PRN Moderate Pain (4 - 6)    LABS:                        11.4   8.88  )-----------( 257      ( 21 Jul 2018 07:30 )             36.2     07-21    128<L>  |  93<L>  |  67<H>  ----------------------------<  318<H>  4.9   |  22  |  1.88<H>    Ca    9.6      21 Jul 2018 07:30  Phos  4.5     07-21  Mg     2.5     07-21          CAPILLARY BLOOD GLUCOSE      POCT Blood Glucose.: 303 mg/dL (22 Jul 2018 12:01)  POCT Blood Glucose.: 233 mg/dL (22 Jul 2018 08:13)  POCT Blood Glucose.: 287 mg/dL (21 Jul 2018 22:28)  POCT Blood Glucose.: 281 mg/dL (21 Jul 2018 17:20)          REVIEW OF SYSTEMS:  CONSTITUTIONAL: No fever, weight loss, or fatigue  EYES: No eye pain, visual disturbances, or discharge  ENMT:  No difficulty hearing, tinnitus, vertigo; No sinus or throat pain  NECK: No pain or stiffness  BREASTS: No pain, masses, or nipple discharge  RESPIRATORY: No cough, wheezing, chills or hemoptysis; No shortness of breath  CARDIOVASCULAR: No chest pain, palpitations, dizziness, or leg swelling  GASTROINTESTINAL: No abdominal or epigastric pain. No nausea, vomiting, or hematemesis; No diarrhea or constipation. No melena or hematochezia.  GENITOURINARY: No dysuria, frequency, hematuria, or incontinence  NEUROLOGICAL: No headaches, memory loss, loss of strength, numbness, or tremors  SKIN: No itching, burning, rashes, or lesions   LYMPH NODES: No enlarged glands  ENDOCRINE: No heat or cold intolerance; No hair loss  MUSCULOSKELETAL: left foot  pain  PSYCHIATRIC: No depression, anxiety, mood swings, or difficulty sleeping  HEME/LYMPH: No easy bruising, or bleeding gums  ALLERY AND IMMUNOLOGIC: No hives or eczema    RADIOLOGY & ADDITIONAL TESTS:    Consultant(s) Notes Reviewed:  [x ] YES  [ ] NO    PHYSICAL EXAM:  GENERAL: NAD, well-groomed, well-developed,not in any distress ,  HEAD:  Atraumatic, Normocephalic  EYES: EOMI, PERRLA, conjunctiva and sclera clear  ENMT: No tonsillar erythema, exudates, or enlargement; Moist mucous membranes, Good dentition, No lesions  NECK: Supple, No JVD, Normal thyroid  NERVOUS SYSTEM:  Alert & Oriented X3, No focal deficit   CHEST/LUNG: Good air entry bilateral with no  rales, rhonchi, wheezing, or rubs  HEART: Regular rate and rhythm; No murmurs, rubs, or gallops  ABDOMEN: Soft, Nontender, Nondistended; Bowel sounds present  EXTREMITIES:  left foot dressed     Care Discussed with Consultants/Other Providers [ x] YES  [ ] NO

## 2018-07-22 NOTE — PROGRESS NOTE ADULT - SUBJECTIVE AND OBJECTIVE BOX
MARK [id number]  67y  Male  Local infection of skin and subcutaneous tissue  H/o or current diagnosis of HF- ACEI/ARB contraindication unknown  H/o or current diagnosis of HF- Contraindication to ACEI/ARBs  H/o or current diagnosis of HF- ACEI/ARB contraindication unknown  H/o or current diagnosis of HF- ACEI/ARB contraindication unknown  H/o or current diagnosis of HF- Contraindication to ACEI/ARBs  H/o or current diagnosis of HF- ACEI/ARB contraindication unknown  No pertinent family history in first degree relatives  Family history of lung disease  Family history of diabetes mellitus  No pertinent family history in first degree relatives  Handoff  MEWS Score  COPD (chronic obstructive pulmonary disease)  Sleep apnea  Anemia  Atherosclerosis of arteries of extremities  Mild intermittent asthma without complication  Iron deficiency anemia, unspecified iron deficiency anemia type  Prostate cancer  Bronchospasm  Obesity (BMI 30-39.9)  PAD (peripheral artery disease)  CAD (coronary artery disease)  S/P prostatectomy  Hypothyroid  Intermittent claudication  Diabetic neuropathy  DM (diabetes mellitus)  CHF (congestive heart failure)  Hypercholesteremia  HTN (hypertension)  Diabetic ulcer of toe of left foot, limited to breakdown of skin  Toe infection  CAD (coronary artery disease)  Mild intermittent asthma without complication  Hypothyroid  CHF (congestive heart failure)  CKD (chronic kidney disease), stage III  PAD (peripheral artery disease)  DM (diabetes mellitus)  Toe infection  History of femoral angiogram  S/P bypass graft of extremity  S/P CABG x 3  Stented coronary artery  S/P prostatectomy  LEFT TOE PAIN  90+  Atherosclerosis of arteries of extremities  CAD (coronary artery disease)  CKD (chronic kidney disease), stage III  DM (diabetes mellitus)    aspirin enteric coated 81 milliGRAM(s) Oral daily  atorvastatin 80 milliGRAM(s) Oral at bedtime  buDESOnide 160 MICROgram(s)/formoterol 4.5 MICROgram(s) Inhaler 2 Puff(s) Inhalation two times a day  cyanocobalamin 1000 MICROGram(s) Oral daily  dextrose 40% Gel 15 Gram(s) Oral once PRN  dextrose 5%. 1000 milliLiter(s) IV Continuous <Continuous>  dextrose 50% Injectable 12.5 Gram(s) IV Push once  dextrose 50% Injectable 25 Gram(s) IV Push once  dextrose 50% Injectable 25 Gram(s) IV Push once  docusate sodium 100 milliGRAM(s) Oral daily PRN  ferrous    sulfate 325 milliGRAM(s) Oral daily  furosemide    Tablet 40 milliGRAM(s) Oral daily  gabapentin 300 milliGRAM(s) Oral daily  glucagon  Injectable 1 milliGRAM(s) IntraMuscular once PRN  heparin  Injectable 5000 Unit(s) SubCutaneous every 12 hours  insulin glargine Injectable (LANTUS) 15 Unit(s) SubCutaneous every morning  insulin glargine Injectable (LANTUS) 50 Unit(s) SubCutaneous at bedtime  insulin lispro (HumaLOG) corrective regimen sliding scale   SubCutaneous three times a day before meals  insulin lispro Injectable (HumaLOG) 18 Unit(s) SubCutaneous three times a day with meals  levothyroxine 150 MICROGram(s) Oral daily  losartan 100 milliGRAM(s) Oral daily  metolazone 5 milliGRAM(s) Oral daily  metoprolol tartrate 25 milliGRAM(s) Oral two times a day  morphine  - Injectable 2 milliGRAM(s) IV Push every 4 hours PRN  oxyCODONE    5 mG/acetaminophen 325 mG 1 Tablet(s) Oral every 6 hours PRN  oxyCODONE  ER Tablet 10 milliGRAM(s) Oral every 12 hours  piperacillin/tazobactam IVPB. 3.375 Gram(s) IV Intermittent every 8 hours  senna 2 Tablet(s) Oral at bedtime  vancomycin  IVPB 1000 milliGRAM(s) IV Intermittent every 24 hours  No Known Allergies    CBC Full  -  ( 21 Jul 2018 07:30 )  WBC Count : 8.88 K/uL  Hemoglobin : 11.4 g/dL  Hematocrit : 36.2 %  Platelet Count - Automated : 257 K/uL  Mean Cell Volume : 85.4 fL  Mean Cell Hemoglobin : 26.9 pg  Mean Cell Hemoglobin Concentration : 31.5 %  Auto Neutrophil # : x  Auto Lymphocyte # : x  Auto Monocyte # : x  Auto Eosinophil # : x  Auto Basophil # : x  Auto Neutrophil % : x  Auto Lymphocyte % : x  Auto Monocyte % : x  Auto Eosinophil % : x  Auto Basophil % : x  Patioent visisted at bedside with pain left hallux. Patient relates iuncreased pain last night. Patient had nail avullsion by outside podiatrist with PVD and ischemic pains. Patient has no signs of ascending cellulitis no draiange no erythema with pain at left hallux consistent with ischemic disease. Patient will discuss vascular plan with Dr. Loya. Possible revascularization. Continue daily dressing changes. Podiatry to follow

## 2018-07-22 NOTE — PROGRESS NOTE ADULT - SUBJECTIVE AND OBJECTIVE BOX
Patient seen and examined    REVIEW OF SYSTEMS:  As per HPI, otherwise 8 full 10 ROS were unremarkable    MEDICATIONS  (STANDING):  aspirin enteric coated 81 milliGRAM(s) Oral daily  atorvastatin 80 milliGRAM(s) Oral at bedtime  buDESOnide 160 MICROgram(s)/formoterol 4.5 MICROgram(s) Inhaler 2 Puff(s) Inhalation two times a day  cyanocobalamin 1000 MICROGram(s) Oral daily  dextrose 5%. 1000 milliLiter(s) (50 mL/Hr) IV Continuous <Continuous>  dextrose 50% Injectable 12.5 Gram(s) IV Push once  dextrose 50% Injectable 25 Gram(s) IV Push once  dextrose 50% Injectable 25 Gram(s) IV Push once  ferrous    sulfate 325 milliGRAM(s) Oral daily  furosemide    Tablet 40 milliGRAM(s) Oral daily  gabapentin 300 milliGRAM(s) Oral daily  heparin  Injectable 5000 Unit(s) SubCutaneous every 12 hours  insulin glargine Injectable (LANTUS) 15 Unit(s) SubCutaneous every morning  insulin glargine Injectable (LANTUS) 50 Unit(s) SubCutaneous at bedtime  insulin lispro (HumaLOG) corrective regimen sliding scale   SubCutaneous three times a day before meals  insulin lispro Injectable (HumaLOG) 18 Unit(s) SubCutaneous three times a day with meals  levothyroxine 150 MICROGram(s) Oral daily  losartan 100 milliGRAM(s) Oral daily  metolazone 5 milliGRAM(s) Oral daily  metoprolol tartrate 25 milliGRAM(s) Oral two times a day  oxyCODONE  ER Tablet 10 milliGRAM(s) Oral every 12 hours  piperacillin/tazobactam IVPB. 3.375 Gram(s) IV Intermittent every 8 hours  senna 2 Tablet(s) Oral at bedtime  sodium chloride 0.9%. 1000 milliLiter(s) (75 mL/Hr) IV Continuous <Continuous>  vancomycin  IVPB 1000 milliGRAM(s) IV Intermittent every 24 hours      VITAL:  T(C): , Max: 36.8 (07-22-18 @ 05:44)  T(F): , Max: 98.2 (07-22-18 @ 05:44)  HR: 74 (07-22-18 @ 14:18)  BP: 123/52 (07-22-18 @ 14:18)  BP(mean): --  RR: 18 (07-22-18 @ 14:18)  SpO2: 98% (07-22-18 @ 14:18)  Wt(kg): --    I and O's:        PHYSICAL EXAM:    Constitutional: NAD  HEENT: PERRLA, EOMI,  MMM  Neck: No LAD, No JVD  Respiratory: CTAB  Cardiovascular: S1 and S2  Gastrointestinal: BS+, soft, NT/ND  Extremities: No peripheral edema  Neurological: A/O x 3, no focal deficits  Psychiatric: Normal mood, normal affect  : No Marx  Skin: No rashes  Access: Not applicable    LABS:                        11.4   8.88  )-----------( 257      ( 21 Jul 2018 07:30 )             36.2     07-21    128<L>  |  93<L>  |  67<H>  ----------------------------<  318<H>  4.9   |  22  |  1.88<H>    Ca    9.6      21 Jul 2018 07:30  Phos  4.5     07-21  Mg     2.5     07-21        Urine Studies:    Protein, Random Urine: 16.4 mg/dL (07-21 @ 03:30)  Creatinine, Random Urine: 81.50 mg/dL (07-21 @ 03:30)    Assessment and Plan:   A 67-year-old gentleman with a past medical history of hypertension, diabetes, peripheral vascular disease, coronary artery disease, and COPD who presents to the hospital for evaluation of unrelenting pain.  The patient was found to have cellulitis of his foot after an Epsom salt bath.  Patient admitted with JOSE LUIS on CKD    Renal:  JOSE LUIS on CKD:  likely from related recent NSAID use +/- inflammatory response to cellulitis.    ID:  Continue with IV antibiotics for now; may need to adjust mixture depending on next Na+ level  CVS:  BPs acceptable  Podiatry:  Continue with podiatry followup/recs  Hyponatremia:  ?etiology at this time ?SIADH related to pain; Na+ trending down as of late; BMP pending    Recommendation:  -BMP in AM; trend Na+ level  -Check Urine/Serum osm; can check TSH and cortisol level as well  -If Na+ continues to trend downward, would favor changing IV abx mixed with NS solution rather than dextrose   -No objection to continue ARB for now given stable renal function   -Pain management per primary team  -Avoid NSAIDS/nephrotoxins as able

## 2018-07-22 NOTE — PROGRESS NOTE ADULT - PROBLEM SELECTOR PLAN 1
Will increase Lantus to 60 units at bed time, 25u am.  Will increase Humalog to 25 units before each meal in addition to Humalog correction scale coverage.  Discussed with patient and primary team.  Patient counseled for compliance with consistent low carb diet.

## 2018-07-23 LAB
BUN SERPL-MCNC: 71 MG/DL — HIGH (ref 7–23)
CALCIUM SERPL-MCNC: 9 MG/DL — SIGNIFICANT CHANGE UP (ref 8.4–10.5)
CHLORIDE SERPL-SCNC: 92 MMOL/L — LOW (ref 98–107)
CO2 SERPL-SCNC: 20 MMOL/L — LOW (ref 22–31)
CORTIS SERPL-MCNC: 4.9 UG/DL — SIGNIFICANT CHANGE UP (ref 2.7–18.4)
CREAT SERPL-MCNC: 2.02 MG/DL — HIGH (ref 0.5–1.3)
EOSINOPHIL NFR URNS MANUAL: SIGNIFICANT CHANGE UP (ref 0–0)
GLUCOSE BLDC GLUCOMTR-MCNC: 107 MG/DL — HIGH (ref 70–99)
GLUCOSE BLDC GLUCOMTR-MCNC: 137 MG/DL — HIGH (ref 70–99)
GLUCOSE BLDC GLUCOMTR-MCNC: 138 MG/DL — HIGH (ref 70–99)
GLUCOSE BLDC GLUCOMTR-MCNC: 190 MG/DL — HIGH (ref 70–99)
GLUCOSE BLDC GLUCOMTR-MCNC: 241 MG/DL — HIGH (ref 70–99)
GLUCOSE SERPL-MCNC: 182 MG/DL — HIGH (ref 70–99)
HCT VFR BLD CALC: 29.8 % — LOW (ref 39–50)
HGB BLD-MCNC: 9.8 G/DL — LOW (ref 13–17)
MAGNESIUM SERPL-MCNC: 2.5 MG/DL — SIGNIFICANT CHANGE UP (ref 1.6–2.6)
MCHC RBC-ENTMCNC: 27.8 PG — SIGNIFICANT CHANGE UP (ref 27–34)
MCHC RBC-ENTMCNC: 32.9 % — SIGNIFICANT CHANGE UP (ref 32–36)
MCV RBC AUTO: 84.4 FL — SIGNIFICANT CHANGE UP (ref 80–100)
NRBC # FLD: 0 — SIGNIFICANT CHANGE UP
PHOSPHATE SERPL-MCNC: 4.7 MG/DL — HIGH (ref 2.5–4.5)
PLATELET # BLD AUTO: 260 K/UL — SIGNIFICANT CHANGE UP (ref 150–400)
PMV BLD: 11.8 FL — SIGNIFICANT CHANGE UP (ref 7–13)
POTASSIUM SERPL-MCNC: 4.7 MMOL/L — SIGNIFICANT CHANGE UP (ref 3.5–5.3)
POTASSIUM SERPL-SCNC: 4.7 MMOL/L — SIGNIFICANT CHANGE UP (ref 3.5–5.3)
RBC # BLD: 3.53 M/UL — LOW (ref 4.2–5.8)
RBC # FLD: 16.1 % — HIGH (ref 10.3–14.5)
SODIUM SERPL-SCNC: 130 MMOL/L — LOW (ref 135–145)
TSH SERPL-MCNC: 0.85 UIU/ML — SIGNIFICANT CHANGE UP (ref 0.27–4.2)
WBC # BLD: 10.12 K/UL — SIGNIFICANT CHANGE UP (ref 3.8–10.5)
WBC # FLD AUTO: 10.12 K/UL — SIGNIFICANT CHANGE UP (ref 3.8–10.5)

## 2018-07-23 PROCEDURE — 73718 MRI LOWER EXTREMITY W/O DYE: CPT | Mod: 26,LT

## 2018-07-23 RX ORDER — INSULIN LISPRO 100/ML
28 VIAL (ML) SUBCUTANEOUS
Qty: 0 | Refills: 0 | Status: DISCONTINUED | OUTPATIENT
Start: 2018-07-23 | End: 2018-07-24

## 2018-07-23 RX ORDER — INSULIN GLARGINE 100 [IU]/ML
30 INJECTION, SOLUTION SUBCUTANEOUS EVERY MORNING
Qty: 0 | Refills: 0 | Status: DISCONTINUED | OUTPATIENT
Start: 2018-07-23 | End: 2018-07-29

## 2018-07-23 RX ADMIN — Medication 325 MILLIGRAM(S): at 12:37

## 2018-07-23 RX ADMIN — BUDESONIDE AND FORMOTEROL FUMARATE DIHYDRATE 2 PUFF(S): 160; 4.5 AEROSOL RESPIRATORY (INHALATION) at 21:38

## 2018-07-23 RX ADMIN — Medication 81 MILLIGRAM(S): at 12:37

## 2018-07-23 RX ADMIN — INSULIN GLARGINE 25 UNIT(S): 100 INJECTION, SOLUTION SUBCUTANEOUS at 08:58

## 2018-07-23 RX ADMIN — Medication 250 MILLIGRAM(S): at 17:45

## 2018-07-23 RX ADMIN — Medication 28 UNIT(S): at 12:37

## 2018-07-23 RX ADMIN — Medication 10 MILLIGRAM(S): at 13:34

## 2018-07-23 RX ADMIN — ATORVASTATIN CALCIUM 80 MILLIGRAM(S): 80 TABLET, FILM COATED ORAL at 21:35

## 2018-07-23 RX ADMIN — Medication 25 MILLIGRAM(S): at 17:46

## 2018-07-23 RX ADMIN — Medication 28 UNIT(S): at 18:05

## 2018-07-23 RX ADMIN — GABAPENTIN 300 MILLIGRAM(S): 400 CAPSULE ORAL at 12:37

## 2018-07-23 RX ADMIN — OXYCODONE AND ACETAMINOPHEN 1 TABLET(S): 5; 325 TABLET ORAL at 21:38

## 2018-07-23 RX ADMIN — OXYCODONE AND ACETAMINOPHEN 1 TABLET(S): 5; 325 TABLET ORAL at 22:25

## 2018-07-23 RX ADMIN — Medication 2: at 08:57

## 2018-07-23 RX ADMIN — Medication 40 MILLIGRAM(S): at 05:14

## 2018-07-23 RX ADMIN — OXYCODONE HYDROCHLORIDE 15 MILLIGRAM(S): 5 TABLET ORAL at 05:14

## 2018-07-23 RX ADMIN — MORPHINE SULFATE 2 MILLIGRAM(S): 50 CAPSULE, EXTENDED RELEASE ORAL at 18:54

## 2018-07-23 RX ADMIN — Medication 150 MICROGRAM(S): at 05:14

## 2018-07-23 RX ADMIN — LOSARTAN POTASSIUM 100 MILLIGRAM(S): 100 TABLET, FILM COATED ORAL at 05:14

## 2018-07-23 RX ADMIN — OXYCODONE HYDROCHLORIDE 15 MILLIGRAM(S): 5 TABLET ORAL at 06:14

## 2018-07-23 RX ADMIN — POLYETHYLENE GLYCOL 3350 17 GRAM(S): 17 POWDER, FOR SOLUTION ORAL at 12:37

## 2018-07-23 RX ADMIN — INSULIN GLARGINE 60 UNIT(S): 100 INJECTION, SOLUTION SUBCUTANEOUS at 22:47

## 2018-07-23 RX ADMIN — OXYCODONE AND ACETAMINOPHEN 1 TABLET(S): 5; 325 TABLET ORAL at 02:49

## 2018-07-23 RX ADMIN — HEPARIN SODIUM 5000 UNIT(S): 5000 INJECTION INTRAVENOUS; SUBCUTANEOUS at 05:14

## 2018-07-23 RX ADMIN — OXYCODONE HYDROCHLORIDE 15 MILLIGRAM(S): 5 TABLET ORAL at 17:46

## 2018-07-23 RX ADMIN — OXYCODONE AND ACETAMINOPHEN 1 TABLET(S): 5; 325 TABLET ORAL at 03:49

## 2018-07-23 RX ADMIN — Medication 1: at 12:37

## 2018-07-23 RX ADMIN — HEPARIN SODIUM 5000 UNIT(S): 5000 INJECTION INTRAVENOUS; SUBCUTANEOUS at 17:46

## 2018-07-23 RX ADMIN — PIPERACILLIN AND TAZOBACTAM 25 GRAM(S): 4; .5 INJECTION, POWDER, LYOPHILIZED, FOR SOLUTION INTRAVENOUS at 16:58

## 2018-07-23 RX ADMIN — PREGABALIN 1000 MICROGRAM(S): 225 CAPSULE ORAL at 12:37

## 2018-07-23 RX ADMIN — BUDESONIDE AND FORMOTEROL FUMARATE DIHYDRATE 2 PUFF(S): 160; 4.5 AEROSOL RESPIRATORY (INHALATION) at 08:58

## 2018-07-23 RX ADMIN — Medication 25 MILLIGRAM(S): at 05:14

## 2018-07-23 RX ADMIN — Medication 25 UNIT(S): at 08:56

## 2018-07-23 RX ADMIN — MORPHINE SULFATE 2 MILLIGRAM(S): 50 CAPSULE, EXTENDED RELEASE ORAL at 19:24

## 2018-07-23 RX ADMIN — PIPERACILLIN AND TAZOBACTAM 25 GRAM(S): 4; .5 INJECTION, POWDER, LYOPHILIZED, FOR SOLUTION INTRAVENOUS at 05:14

## 2018-07-23 RX ADMIN — SENNA PLUS 2 TABLET(S): 8.6 TABLET ORAL at 21:35

## 2018-07-23 NOTE — PROGRESS NOTE ADULT - SUBJECTIVE AND OBJECTIVE BOX
pt seen at bedside in NAD. dressing to left foot c/d/i  left hallux ulceration noted at nail bed fibrotic and edema with erythema of foot and toe  no pus tender with palpation  applied betadine with DSD  follow up MRi and vasc plan  continue abx  will follow   may require amputation with this admission id MRI positive for OM

## 2018-07-23 NOTE — PROGRESS NOTE ADULT - ASSESSMENT
A 67-year-old gentleman with a past medical history of hypertension, diabetes, peripheral vascular disease, coronary artery disease, and COPD who presents to the hospital for evaluation of unrelenting pain.  The patient was found to have cellulitis of his foot after an Epsom salt bath.  Patient admitted with JOSE LUIS on CKD    Renal:  JOSE LUIS on CKD:  likely from related recent NSAID use +/- inflammatory response to cellulitis.   Unfortunately he has ATN now  ID:  Continue with IV antibiotics for now; may need to adjust mixture depending on next Na+ level  CVS:  BPs acceptable  Podiatry:  Continue with podiatry follow up/recs  Hyponatremia:   From JOSE LUIS    Recommendation:  -BMP in AM; D/C the zaroxolyn   - Hyponatremia from JOSE LUIS  - No objection to continue ARB for now  - Check urine for eosinophils  -Pain management per primary team  -Avoid NSAIDS/nephrotoxins as able

## 2018-07-23 NOTE — PROGRESS NOTE ADULT - SUBJECTIVE AND OBJECTIVE BOX
Infectious Diseases progress note:    Subjective:  No new complaints.  Afebrile.      ROS:  CONSTITUTIONAL:  No fever, chills, rigors  CARDIOVASCULAR:  No chest pain or palpitations  RESPIRATORY:   No SOB, cough, dyspnea on exertion.  No wheezing  GASTROINTESTINAL:  No abd pain, N/V, diarrhea/constipation  EXTREMITIES:  No swelling or joint pain  GENITOURINARY:  No burning on urination, increased frequency or urgency.  No flank pain  NEUROLOGIC:  No HA, visual disturbances  SKIN: No rashes    Allergies    No Known Allergies    Intolerances        ANTIBIOTICS/RELEVANT:  antimicrobials  piperacillin/tazobactam IVPB. 3.375 Gram(s) IV Intermittent every 8 hours  vancomycin  IVPB 1000 milliGRAM(s) IV Intermittent every 24 hours    immunologic:    OTHER:  aspirin enteric coated 81 milliGRAM(s) Oral daily  atorvastatin 80 milliGRAM(s) Oral at bedtime  buDESOnide 160 MICROgram(s)/formoterol 4.5 MICROgram(s) Inhaler 2 Puff(s) Inhalation two times a day  cyanocobalamin 1000 MICROGram(s) Oral daily  dextrose 40% Gel 15 Gram(s) Oral once PRN  dextrose 5%. 1000 milliLiter(s) IV Continuous <Continuous>  dextrose 50% Injectable 12.5 Gram(s) IV Push once  dextrose 50% Injectable 25 Gram(s) IV Push once  dextrose 50% Injectable 25 Gram(s) IV Push once  docusate sodium 100 milliGRAM(s) Oral daily PRN  ferrous    sulfate 325 milliGRAM(s) Oral daily  furosemide    Tablet 40 milliGRAM(s) Oral daily  gabapentin 300 milliGRAM(s) Oral daily  glucagon  Injectable 1 milliGRAM(s) IntraMuscular once PRN  heparin  Injectable 5000 Unit(s) SubCutaneous every 12 hours  insulin glargine Injectable (LANTUS) 60 Unit(s) SubCutaneous at bedtime  insulin glargine Injectable (LANTUS) 30 Unit(s) SubCutaneous every morning  insulin lispro (HumaLOG) corrective regimen sliding scale   SubCutaneous three times a day before meals  insulin lispro Injectable (HumaLOG) 28 Unit(s) SubCutaneous three times a day with meals  levothyroxine 150 MICROGram(s) Oral daily  losartan 100 milliGRAM(s) Oral daily  metolazone 5 milliGRAM(s) Oral daily  metoprolol tartrate 25 milliGRAM(s) Oral two times a day  morphine  - Injectable 2 milliGRAM(s) IV Push every 4 hours PRN  oxyCODONE    5 mG/acetaminophen 325 mG 1 Tablet(s) Oral every 6 hours PRN  oxyCODONE  ER Tablet 15 milliGRAM(s) Oral every 12 hours  polyethylene glycol 3350 17 Gram(s) Oral daily  senna 2 Tablet(s) Oral at bedtime  sodium chloride 0.9%. 1000 milliLiter(s) IV Continuous <Continuous>      Objective:  Vital Signs Last 24 Hrs  T(C): 36.8 (23 Jul 2018 12:28), Max: 37.4 (22 Jul 2018 21:24)  T(F): 98.3 (23 Jul 2018 12:28), Max: 99.3 (22 Jul 2018 21:24)  HR: 68 (23 Jul 2018 12:28) (68 - 87)  BP: 115/63 (23 Jul 2018 05:13) (114/57 - 135/61)  BP(mean): --  RR: 18 (23 Jul 2018 12:28) (18 - 18)  SpO2: 96% (23 Jul 2018 12:28) (96% - 99%)    PHYSICAL EXAM:  Constitutional:NAD  Eyes:DIMAS, EOMI  Ear/Nose/Throat: no thrush, mucositis.  Moist mucous membranes	  Neck:no JVD, no lymphadenopathy, supple  Respiratory: CTA brad  Cardiovascular: S1S2 RRR, no murmurs  Gastrointestinal:soft, nontender,  nondistended (+) BS  Extremities:no e/e/c  Skin:  lft foot swelling, slightly improved.  Dsg c/d/i        LABS:                        9.8    10.12 )-----------( 260      ( 23 Jul 2018 05:00 )             29.8     07-23    130<L>  |  92<L>  |  71<H>  ----------------------------<  182<H>  4.7   |  20<L>  |  2.02<H>    Ca    9.0      23 Jul 2018 05:00  Phos  4.7     07-23  Mg     2.5     07-23                  Vancomycin Level, Trough: 12.7 ug/mL (07-21 @ 17:07)              MICROBIOLOGY:    Culture - Blood (07.19.18 @ 19:02)    Culture - Blood:   NO ORGANISMS ISOLATED  NO ORGANISMS ISOLATED AT 72 HRS.    Specimen Source: BLOOD VENOUS    Culture - Blood (07.19.18 @ 19:02)    Culture - Blood:   NO ORGANISMS ISOLATED  NO ORGANISMS ISOLATED AT 72 HRS.    Specimen Source: BLOOD PERIPHERAL          RADIOLOGY & ADDITIONAL STUDIES:    < from: MR Foot No Cont, Left (07.23.18 @ 08:40) >    IMPRESSION:    1.  Soft tissue swelling and first digit skin bleb, nonspecific but can   be seen with cellulitis in the appropriate clinical setting.  2.  No drainable fluid collection.  3.  No definite bone marrow signal abnormality to suggest osteomyelitis     < end of copied text >

## 2018-07-23 NOTE — PROGRESS NOTE ADULT - ASSESSMENT
68 yo M former smoker, hx IDDM2, hypothyroidism, HTN, HLD, COPD, CAD s/p stent & CABG x 3, atherosclerosis LE with intermittent claudication L leg, RLE bypass, femoral endartectomy fem-pop bypass 2017, presenting with L great toe pain s/p paronychia drainage and toenail removal. Pt describes 10/10 throbbing toe pain unrelieved by diclofenac or naproxen. Pt states he soaked foot in epsom salt one day ago before pain acutely worsened. Denies fevers/chills. No foot/calf pain. Reports L leg swelling/redness unchanged from baseline. On keflex BID. (19 Jul 2018 16:29)    ER vitals:  T 96.2, P 72, /57, WBC 10.  ESR 79, CRP 7.0.  Pt received clinda x 1 in ER.  Pt had recently been on approximately 3 weeks of abx (keflex) as an outpatient for Left nail infection.  PT states it was not getting better, and podiatrist removed nail on Tuesday.  As per patient, he noted some pus underneath nail bed.  Pt came to ER, as he has been having intolerable pain since procedure.  He c/o rigors/chills currently.  Foot has been swollen and tender.  No recent travel, or foot trauma.      He recently saw his Vascular surgeon Dr. Loya, and pending eventual angiogram.    Problem/Plan - 1:    ·	Left foot cellulitis    - Recent L nail avulsion, c/o worsening pain, swelling/tenderness. S/p recent course of keflex x 3 weeks.      - Start broad spectrum abx with vanco/zosyn, given h/o diabetes.  Vanco trough 12.  Will continue present dose.  Cr rising.  Check repeat trough tomorrow    - blood cultures - NGTD.  Trend inflammatory markers    - Podiatry follow up appreciated.  No acute intervention.  Vascular f/u for angiogram    - MRI L foot no OM, possible cellulitis.  Cont abx x 7 day course    Will follow    Yesika Rodríguez  [phone]

## 2018-07-23 NOTE — PROGRESS NOTE ADULT - SUBJECTIVE AND OBJECTIVE BOX
Chief complaint  Patient is a 67y old  Male who presents with a chief complaint of left foot swelling right great toe swelling (21 Jul 2018 07:34)   Review of systems  Patient in bed, looks comfortable, no fever, no hypoglycemia.    Labs and Fingersticks  CAPILLARY BLOOD GLUCOSE      POCT Blood Glucose.: 190 mg/dL (23 Jul 2018 12:02)  POCT Blood Glucose.: 241 mg/dL (23 Jul 2018 08:47)  POCT Blood Glucose.: 138 mg/dL (23 Jul 2018 01:19)  POCT Blood Glucose.: 158 mg/dL (22 Jul 2018 22:15)  POCT Blood Glucose.: 210 mg/dL (22 Jul 2018 17:32)          Calcium, Total Serum: 9.0 (07-23 @ 05:00)          07-23    130<L>  |  92<L>  |  71<H>  ----------------------------<  182<H>  4.7   |  20<L>  |  2.02<H>    Ca    9.0      23 Jul 2018 05:00  Phos  4.7     07-23  Mg     2.5     07-23                          9.8    10.12 )-----------( 260      ( 23 Jul 2018 05:00 )             29.8     Medications  MEDICATIONS  (STANDING):  aspirin enteric coated 81 milliGRAM(s) Oral daily  atorvastatin 80 milliGRAM(s) Oral at bedtime  buDESOnide 160 MICROgram(s)/formoterol 4.5 MICROgram(s) Inhaler 2 Puff(s) Inhalation two times a day  cyanocobalamin 1000 MICROGram(s) Oral daily  dextrose 5%. 1000 milliLiter(s) (50 mL/Hr) IV Continuous <Continuous>  dextrose 50% Injectable 12.5 Gram(s) IV Push once  dextrose 50% Injectable 25 Gram(s) IV Push once  dextrose 50% Injectable 25 Gram(s) IV Push once  ferrous    sulfate 325 milliGRAM(s) Oral daily  furosemide    Tablet 40 milliGRAM(s) Oral daily  gabapentin 300 milliGRAM(s) Oral daily  heparin  Injectable 5000 Unit(s) SubCutaneous every 12 hours  insulin glargine Injectable (LANTUS) 30 Unit(s) SubCutaneous every morning  insulin glargine Injectable (LANTUS) 60 Unit(s) SubCutaneous at bedtime  insulin lispro (HumaLOG) corrective regimen sliding scale   SubCutaneous three times a day before meals  insulin lispro Injectable (HumaLOG) 28 Unit(s) SubCutaneous three times a day with meals  levothyroxine 150 MICROGram(s) Oral daily  losartan 100 milliGRAM(s) Oral daily  metolazone 5 milliGRAM(s) Oral daily  metoprolol tartrate 25 milliGRAM(s) Oral two times a day  oxyCODONE  ER Tablet 15 milliGRAM(s) Oral every 12 hours  piperacillin/tazobactam IVPB. 3.375 Gram(s) IV Intermittent every 8 hours  polyethylene glycol 3350 17 Gram(s) Oral daily  senna 2 Tablet(s) Oral at bedtime  sodium chloride 0.9%. 1000 milliLiter(s) (75 mL/Hr) IV Continuous <Continuous>  vancomycin  IVPB 1000 milliGRAM(s) IV Intermittent every 24 hours      Physical Exam  General: Patient comfortable in bed  Vital Signs Last 12 Hrs  T(F): 98.3 (07-23-18 @ 12:28), Max: 98.5 (07-23-18 @ 05:13)  HR: 73 (07-23-18 @ 13:17) (68 - 81)  BP: 114/55 (07-23-18 @ 13:17) (114/55 - 115/63)  BP(mean): --  RR: 18 (07-23-18 @ 13:17) (18 - 18)  SpO2: 100% (07-23-18 @ 13:17) (96% - 100%)  Neck: No palpable thyroid nodules.  CVS: S1S2, No murmurs  Respiratory: No wheezing, no crepitations  GI: Abdomen soft, bowel sounds positive  Musculoskeletal:  edema lower extremities.   Skin: No skin rashes, no ecchymosis    Diagnostics    Hemoglobin A1C, Whole Blood: Routine  Cancel Reason: Auto-cancelled after 3 days - specimen not received. (07-19 @ 11:09)

## 2018-07-23 NOTE — PROGRESS NOTE ADULT - SUBJECTIVE AND OBJECTIVE BOX
INTERVAL HPI/OVERNIGHT EVENTS: no new concerns . Pain since morning much less   Vital Signs Last 24 Hrs  T(C): 36.8 (23 Jul 2018 12:28), Max: 37.4 (22 Jul 2018 21:24)  T(F): 98.3 (23 Jul 2018 12:28), Max: 99.3 (22 Jul 2018 21:24)  HR: 73 (23 Jul 2018 13:17) (68 - 87)  BP: 114/55 (23 Jul 2018 13:17) (114/55 - 135/61)  BP(mean): --  RR: 18 (23 Jul 2018 13:17) (18 - 18)  SpO2: 100% (23 Jul 2018 13:17) (96% - 100%)  I&O's Summary    MEDICATIONS  (STANDING):  aspirin enteric coated 81 milliGRAM(s) Oral daily  atorvastatin 80 milliGRAM(s) Oral at bedtime  buDESOnide 160 MICROgram(s)/formoterol 4.5 MICROgram(s) Inhaler 2 Puff(s) Inhalation two times a day  cyanocobalamin 1000 MICROGram(s) Oral daily  dextrose 5%. 1000 milliLiter(s) (50 mL/Hr) IV Continuous <Continuous>  dextrose 50% Injectable 12.5 Gram(s) IV Push once  dextrose 50% Injectable 25 Gram(s) IV Push once  dextrose 50% Injectable 25 Gram(s) IV Push once  ferrous    sulfate 325 milliGRAM(s) Oral daily  furosemide    Tablet 40 milliGRAM(s) Oral daily  gabapentin 300 milliGRAM(s) Oral daily  heparin  Injectable 5000 Unit(s) SubCutaneous every 12 hours  insulin glargine Injectable (LANTUS) 30 Unit(s) SubCutaneous every morning  insulin glargine Injectable (LANTUS) 60 Unit(s) SubCutaneous at bedtime  insulin lispro (HumaLOG) corrective regimen sliding scale   SubCutaneous three times a day before meals  insulin lispro Injectable (HumaLOG) 28 Unit(s) SubCutaneous three times a day with meals  levothyroxine 150 MICROGram(s) Oral daily  losartan 100 milliGRAM(s) Oral daily  metolazone 5 milliGRAM(s) Oral daily  metoprolol tartrate 25 milliGRAM(s) Oral two times a day  oxyCODONE  ER Tablet 15 milliGRAM(s) Oral every 12 hours  piperacillin/tazobactam IVPB. 3.375 Gram(s) IV Intermittent every 8 hours  polyethylene glycol 3350 17 Gram(s) Oral daily  senna 2 Tablet(s) Oral at bedtime  sodium chloride 0.9%. 1000 milliLiter(s) (75 mL/Hr) IV Continuous <Continuous>  vancomycin  IVPB 1000 milliGRAM(s) IV Intermittent every 24 hours    MEDICATIONS  (PRN):  dextrose 40% Gel 15 Gram(s) Oral once PRN Blood Glucose LESS THAN 70 milliGRAM(s)/deciLiter  docusate sodium 100 milliGRAM(s) Oral daily PRN Constipation  glucagon  Injectable 1 milliGRAM(s) IntraMuscular once PRN Glucose <70 milliGRAM(s)/deciLiter  morphine  - Injectable 2 milliGRAM(s) IV Push every 4 hours PRN Severe Pain (7 - 10)  oxyCODONE    5 mG/acetaminophen 325 mG 1 Tablet(s) Oral every 6 hours PRN Moderate Pain (4 - 6)    LABS:                        9.8    10.12 )-----------( 260      ( 23 Jul 2018 05:00 )             29.8     07-23    130<L>  |  92<L>  |  71<H>  ----------------------------<  182<H>  4.7   |  20<L>  |  2.02<H>    Ca    9.0      23 Jul 2018 05:00  Phos  4.7     07-23  Mg     2.5     07-23          CAPILLARY BLOOD GLUCOSE      POCT Blood Glucose.: 190 mg/dL (23 Jul 2018 12:02)  POCT Blood Glucose.: 241 mg/dL (23 Jul 2018 08:47)  POCT Blood Glucose.: 138 mg/dL (23 Jul 2018 01:19)  POCT Blood Glucose.: 158 mg/dL (22 Jul 2018 22:15)  POCT Blood Glucose.: 210 mg/dL (22 Jul 2018 17:32)          REVIEW OF SYSTEMS:  CONSTITUTIONAL: No fever, weight loss, or fatigue  EYES: No eye pain, visual disturbances, or discharge  ENMT:  No difficulty hearing, tinnitus, vertigo; No sinus or throat pain  NECK: No pain or stiffness  BREASTS: No pain, masses, or nipple discharge  RESPIRATORY: No cough, wheezing, chills or hemoptysis; No shortness of breath  CARDIOVASCULAR: No chest pain, palpitations, dizziness, or leg swelling  GASTROINTESTINAL: No abdominal or epigastric pain. No nausea, vomiting, or hematemesis; No diarrhea or constipation. No melena or hematochezia.  GENITOURINARY: No dysuria, frequency, hematuria, or incontinence  NEUROLOGICAL: No headaches, memory loss, loss of strength, numbness, or tremors    Consultant(s) Notes Reviewed:  [x ] YES  [ ] NO    PHYSICAL EXAM:  GENERAL: NAD, well-groomed, well-developed,not in any distress ,  HEAD:  Atraumatic, Normocephalic  EYES: EOMI, PERRLA, conjunctiva and sclera clear  ENMT: No tonsillar erythema, exudates, or enlargement; Moist mucous membranes, Good dentition, No lesions  NECK: Supple, No JVD, Normal thyroid  NERVOUS SYSTEM:  Alert & Oriented X3, No focal deficit   CHEST/LUNG: Good air entry bilateral with no  rales, rhonchi, wheezing, or rubs  HEART: Regular rate and rhythm; No murmurs, rubs, or gallops  ABDOMEN: Soft, Nontender, Nondistended; Bowel sounds present  EXTREMITIES:  left foot dressed < from: MR Foot No Cont, Left (07.23.18 @ 08:40) >      < end of copied text >      Care Discussed with Consultants/Other Providers [ x] YES  [ ] NO

## 2018-07-23 NOTE — PROGRESS NOTE ADULT - ASSESSMENT
Assessment  DMT2: 67y Male with DM T2 with hyperglycemia on basal bolus insulin, dose was adjusted yesterday,  blood sugars improving, still not at target, no hypoglycemic episode,  eating meals, non compliant with low carb diet.  Foot Wound: On antibiotics, wound care.  CAD: On medications, stable, monitored.  Hypothyroidism:  On synthroid 150 mcg po daily, asymptomatic.  HTN: Controlled, On med.  CKD: Monitor labs/BMP,

## 2018-07-23 NOTE — PROGRESS NOTE ADULT - SUBJECTIVE AND OBJECTIVE BOX
[facility] [phone]        Patient seen and examined in bed.  He felt well.  No NV        MEDICATIONS  (STANDING):  aspirin enteric coated 81 milliGRAM(s) Oral daily  atorvastatin 80 milliGRAM(s) Oral at bedtime  buDESOnide 160 MICROgram(s)/formoterol 4.5 MICROgram(s) Inhaler 2 Puff(s) Inhalation two times a day  cyanocobalamin 1000 MICROGram(s) Oral daily  dextrose 5%. 1000 milliLiter(s) (50 mL/Hr) IV Continuous <Continuous>  dextrose 50% Injectable 12.5 Gram(s) IV Push once  dextrose 50% Injectable 25 Gram(s) IV Push once  dextrose 50% Injectable 25 Gram(s) IV Push once  ferrous    sulfate 325 milliGRAM(s) Oral daily  furosemide    Tablet 40 milliGRAM(s) Oral daily  gabapentin 300 milliGRAM(s) Oral daily  heparin  Injectable 5000 Unit(s) SubCutaneous every 12 hours  insulin glargine Injectable (LANTUS) 30 Unit(s) SubCutaneous every morning  insulin glargine Injectable (LANTUS) 60 Unit(s) SubCutaneous at bedtime  insulin lispro (HumaLOG) corrective regimen sliding scale   SubCutaneous three times a day before meals  insulin lispro Injectable (HumaLOG) 28 Unit(s) SubCutaneous three times a day with meals  levothyroxine 150 MICROGram(s) Oral daily  losartan 100 milliGRAM(s) Oral daily  metolazone 5 milliGRAM(s) Oral daily  metoprolol tartrate 25 milliGRAM(s) Oral two times a day  oxyCODONE  ER Tablet 15 milliGRAM(s) Oral every 12 hours  piperacillin/tazobactam IVPB. 3.375 Gram(s) IV Intermittent every 8 hours  polyethylene glycol 3350 17 Gram(s) Oral daily  senna 2 Tablet(s) Oral at bedtime  sodium chloride 0.9%. 1000 milliLiter(s) (75 mL/Hr) IV Continuous <Continuous>  vancomycin  IVPB 1000 milliGRAM(s) IV Intermittent every 24 hours      VITAL:  T(C): , Max: 37.4 (07-22-18 @ 21:24)  T(F): , Max: 99.3 (07-22-18 @ 21:24)  HR: 73 (07-23-18 @ 13:17)  BP: 114/55 (07-23-18 @ 13:17)  BP(mean): --  RR: 18 (07-23-18 @ 13:17)  SpO2: 100% (07-23-18 @ 13:17)  Wt(kg): --    I and O's:        PHYSICAL EXAM:    Constitutional: NAD; obese  HEENT: PERRLA    Neck:  No JVD  Respiratory: CTAB/L  Cardiovascular: S1 and S2  Gastrointestinal: BS+, soft, NT/ND  Extremities: No peripheral edema  Neurological: A/O x 3, no focal deficits  Psychiatric: Normal mood, normal affect  : No Marx  Skin: No rashes  Access: Not applicable    LABS:                        9.8    10.12 )-----------( 260      ( 23 Jul 2018 05:00 )             29.8     07-23    130<L>  |  92<L>  |  71<H>  ----------------------------<  182<H>  4.7   |  20<L>  |  2.02<H>    Ca    9.0      23 Jul 2018 05:00  Phos  4.7     07-23  Mg     2.5     07-23            Urine Studies:    Osmolality, Random Urine: 325 mosmo/kg (07-22 @ 17:46)        RADIOLOGY & ADDITIONAL STUDIES:    < from: MR Foot No Cont, Left (07.23.18 @ 08:40) >    EXAM:  MR FOOT LT        PROCEDURE DATE:  Jul 23 2018         INTERPRETATION:  MR FOOT LT dated 7/23/2018 8:40 AM     INDICATION: Toe infection. Pain and swelling.    COMPARISON: Foot radiographs dated 7/19/2018    TECHNIQUE: Multi-sequential, multiplanar MRI imaging of the midfoot and   forefoot was performed per standard protocol.     FINDINGS: Several sequences are mildly degraded by motion artifact.    SUBCUTANEOUS SOFT TISSUES: There is moderate dorsal soft tissue swelling   about the forefoot extending into the toes. There is a subcutaneous bleb   overlying the lateral aspect of the great toe. There is no drainable   fluid collection. There is mild skin irregularity overlying the dorsal   aspect of the first toe laterally suspicious for mild skin ulceration.  BONE MARROW: There is no confluent decreased T1 signal to suggest   osteomyelitis. No reactive bone marrow edema seen. There is no fracture.   Mild DJD at the third TMT.  SYNOVIUM/ JOINT FLUID: There is a small first metatarsophalangeal joint   effusion.  TENDONS: The tendons are intact. No full-thickness tear or retraction is   noted.  MUSCLES: There is mild fatty infiltration of the intrinsic musculature   the foot. No intramuscular hematoma is identified.  NEUROVASCULAR STRUCTURES: Preserved    IMPRESSION:    1.  Soft tissue swelling and first digit skin bleb, nonspecific but can   be seen with cellulitis in the appropriate clinical setting.  2.  No drainable fluid collection.  3.  No definite bone marrow signal abnormality to suggest osteomyelitis                   JUAN CARLOS GARCIA M.D., ATTENDING RADIOLOGIST  This document has been electronically signed. Jul 23 2018 10:40AM                  < end of copied text >

## 2018-07-23 NOTE — PROGRESS NOTE ADULT - ASSESSMENT
68 yo M former smoker, hx IDDM2, hypothyroidism, HTN, HLD, COPD, CAD s/p stent & CABG x 3, atherosclerosis LE with intermittent claudication L leg, RLE bypass, femoral endartectomy fem-pop bypass 2017, presenting with L great toe pain s/p paronychia drainage and toenail removal. Pt describes 10/10 throbbing toe pain unrelieved by diclofenac or naproxen. Pt states he soaked foot in epsom salt one day ago before pain acutely worsened. Denies fevers/chills. No foot/calf pain. Reports L leg swelling/redness unchanged from baseline. On keflex BID.      Problem/Plan - 1:  ·  Problem: Toe infection.  Plan: S/P I&D . IV Abxs. Podiatry helping .ID consult noted. MRI ..< from: MR Foot No Cont, Left (07.23.18 @ 08:40) >    IMPRESSION:    1.  Soft tissue swelling and first digit skin bleb, nonspecific but can   be seen with cellulitis in the appropriate clinical setting.  2.  No drainable fluid collection.  3.  No definite bone marrow signal abnormality to suggest osteomyelitis     < end of copied text >       Problem/Plan - 2:  ·  Problem: DM (diabetes mellitus).  Plan: Endo helping and will follow recommendations.      Problem/Plan - 3:  ·  Problem: PAD (peripheral artery disease).  Plan: Vascular following. Possible Angio once Cleared by Renal.       Problem/Plan - 4:  ·  Problem: CKD (chronic kidney disease), stage III.  Plan: With JOSE [facility]IS . Has been taking NSAID . Renal following.      Problem/Plan - 5:  ·  Problem: CHF (congestive heart failure).  Plan: Not in acute CHF .      Problem/Plan - 6:  Problem: Hypothyroid. Plan: Synthroid .     Problem/Plan - 7:  ·  Problem: Foot Pain .  Plan: Sec to  Infection and PVD . Increasing Oxycontin and continuing PRN pain meds.      Problem/Plan - 8:  ·  Problem: Mild intermittent asthma without complication.  Plan: continue Inhalor.

## 2018-07-23 NOTE — PROGRESS NOTE ADULT - PROBLEM SELECTOR PLAN 1
Will increase Lantus to 60 units at bed time, 30u am.  Will increase Humalog to 28 units before each meal in addition to Humalog correction scale coverage.  Discussed with patient and primary team.  Patient counseled for compliance with consistent low carb diet.

## 2018-07-24 LAB
BACTERIA BLD CULT: SIGNIFICANT CHANGE UP
BACTERIA BLD CULT: SIGNIFICANT CHANGE UP
BUN SERPL-MCNC: 75 MG/DL — HIGH (ref 7–23)
CALCIUM SERPL-MCNC: 9.2 MG/DL — SIGNIFICANT CHANGE UP (ref 8.4–10.5)
CHLORIDE SERPL-SCNC: 91 MMOL/L — LOW (ref 98–107)
CO2 SERPL-SCNC: 22 MMOL/L — SIGNIFICANT CHANGE UP (ref 22–31)
CREAT SERPL-MCNC: 2.59 MG/DL — HIGH (ref 0.5–1.3)
GLUCOSE BLDC GLUCOMTR-MCNC: 104 MG/DL — HIGH (ref 70–99)
GLUCOSE BLDC GLUCOMTR-MCNC: 174 MG/DL — HIGH (ref 70–99)
GLUCOSE BLDC GLUCOMTR-MCNC: 192 MG/DL — HIGH (ref 70–99)
GLUCOSE BLDC GLUCOMTR-MCNC: 280 MG/DL — HIGH (ref 70–99)
GLUCOSE SERPL-MCNC: 147 MG/DL — HIGH (ref 70–99)
HCT VFR BLD CALC: 35.5 % — LOW (ref 39–50)
HGB BLD-MCNC: 11.2 G/DL — LOW (ref 13–17)
MAGNESIUM SERPL-MCNC: 3 MG/DL — HIGH (ref 1.6–2.6)
MCHC RBC-ENTMCNC: 27.3 PG — SIGNIFICANT CHANGE UP (ref 27–34)
MCHC RBC-ENTMCNC: 31.5 % — LOW (ref 32–36)
MCV RBC AUTO: 86.4 FL — SIGNIFICANT CHANGE UP (ref 80–100)
NRBC # FLD: 0 — SIGNIFICANT CHANGE UP
PHOSPHATE SERPL-MCNC: 6.3 MG/DL — HIGH (ref 2.5–4.5)
PLATELET # BLD AUTO: 276 K/UL — SIGNIFICANT CHANGE UP (ref 150–400)
PMV BLD: 10.6 FL — SIGNIFICANT CHANGE UP (ref 7–13)
POTASSIUM SERPL-MCNC: 4 MMOL/L — SIGNIFICANT CHANGE UP (ref 3.5–5.3)
POTASSIUM SERPL-SCNC: 4 MMOL/L — SIGNIFICANT CHANGE UP (ref 3.5–5.3)
RBC # BLD: 4.11 M/UL — LOW (ref 4.2–5.8)
RBC # FLD: 16 % — HIGH (ref 10.3–14.5)
SODIUM SERPL-SCNC: 131 MMOL/L — LOW (ref 135–145)
WBC # BLD: 10.15 K/UL — SIGNIFICANT CHANGE UP (ref 3.8–10.5)
WBC # FLD AUTO: 10.15 K/UL — SIGNIFICANT CHANGE UP (ref 3.8–10.5)

## 2018-07-24 RX ORDER — INSULIN LISPRO 100/ML
30 VIAL (ML) SUBCUTANEOUS
Qty: 0 | Refills: 0 | Status: DISCONTINUED | OUTPATIENT
Start: 2018-07-24 | End: 2018-07-29

## 2018-07-24 RX ADMIN — PREGABALIN 1000 MICROGRAM(S): 225 CAPSULE ORAL at 12:30

## 2018-07-24 RX ADMIN — Medication 150 MICROGRAM(S): at 07:42

## 2018-07-24 RX ADMIN — OXYCODONE AND ACETAMINOPHEN 1 TABLET(S): 5; 325 TABLET ORAL at 12:30

## 2018-07-24 RX ADMIN — INSULIN GLARGINE 60 UNIT(S): 100 INJECTION, SOLUTION SUBCUTANEOUS at 22:44

## 2018-07-24 RX ADMIN — PIPERACILLIN AND TAZOBACTAM 25 GRAM(S): 4; .5 INJECTION, POWDER, LYOPHILIZED, FOR SOLUTION INTRAVENOUS at 00:55

## 2018-07-24 RX ADMIN — Medication 28 UNIT(S): at 12:31

## 2018-07-24 RX ADMIN — OXYCODONE AND ACETAMINOPHEN 1 TABLET(S): 5; 325 TABLET ORAL at 22:44

## 2018-07-24 RX ADMIN — Medication 30 UNIT(S): at 17:32

## 2018-07-24 RX ADMIN — Medication 100 MILLIGRAM(S): at 17:32

## 2018-07-24 RX ADMIN — Medication 81 MILLIGRAM(S): at 12:30

## 2018-07-24 RX ADMIN — MORPHINE SULFATE 2 MILLIGRAM(S): 50 CAPSULE, EXTENDED RELEASE ORAL at 20:05

## 2018-07-24 RX ADMIN — GABAPENTIN 300 MILLIGRAM(S): 400 CAPSULE ORAL at 12:30

## 2018-07-24 RX ADMIN — Medication 25 MILLIGRAM(S): at 17:32

## 2018-07-24 RX ADMIN — LOSARTAN POTASSIUM 100 MILLIGRAM(S): 100 TABLET, FILM COATED ORAL at 07:42

## 2018-07-24 RX ADMIN — Medication 1: at 09:03

## 2018-07-24 RX ADMIN — HEPARIN SODIUM 5000 UNIT(S): 5000 INJECTION INTRAVENOUS; SUBCUTANEOUS at 07:43

## 2018-07-24 RX ADMIN — OXYCODONE AND ACETAMINOPHEN 1 TABLET(S): 5; 325 TABLET ORAL at 13:00

## 2018-07-24 RX ADMIN — Medication 3: at 12:30

## 2018-07-24 RX ADMIN — Medication 1: at 17:32

## 2018-07-24 RX ADMIN — BUDESONIDE AND FORMOTEROL FUMARATE DIHYDRATE 2 PUFF(S): 160; 4.5 AEROSOL RESPIRATORY (INHALATION) at 09:04

## 2018-07-24 RX ADMIN — MORPHINE SULFATE 2 MILLIGRAM(S): 50 CAPSULE, EXTENDED RELEASE ORAL at 19:48

## 2018-07-24 RX ADMIN — Medication 40 MILLIGRAM(S): at 07:42

## 2018-07-24 RX ADMIN — ATORVASTATIN CALCIUM 80 MILLIGRAM(S): 80 TABLET, FILM COATED ORAL at 22:44

## 2018-07-24 RX ADMIN — HEPARIN SODIUM 5000 UNIT(S): 5000 INJECTION INTRAVENOUS; SUBCUTANEOUS at 17:33

## 2018-07-24 RX ADMIN — OXYCODONE HYDROCHLORIDE 15 MILLIGRAM(S): 5 TABLET ORAL at 17:32

## 2018-07-24 RX ADMIN — Medication 25 MILLIGRAM(S): at 07:42

## 2018-07-24 RX ADMIN — MORPHINE SULFATE 2 MILLIGRAM(S): 50 CAPSULE, EXTENDED RELEASE ORAL at 23:39

## 2018-07-24 RX ADMIN — OXYCODONE AND ACETAMINOPHEN 1 TABLET(S): 5; 325 TABLET ORAL at 23:30

## 2018-07-24 RX ADMIN — INSULIN GLARGINE 30 UNIT(S): 100 INJECTION, SOLUTION SUBCUTANEOUS at 09:11

## 2018-07-24 RX ADMIN — OXYCODONE HYDROCHLORIDE 15 MILLIGRAM(S): 5 TABLET ORAL at 07:42

## 2018-07-24 RX ADMIN — Medication 325 MILLIGRAM(S): at 12:30

## 2018-07-24 RX ADMIN — Medication 28 UNIT(S): at 09:03

## 2018-07-24 RX ADMIN — BUDESONIDE AND FORMOTEROL FUMARATE DIHYDRATE 2 PUFF(S): 160; 4.5 AEROSOL RESPIRATORY (INHALATION) at 20:06

## 2018-07-24 NOTE — PROGRESS NOTE ADULT - PROBLEM SELECTOR PLAN 1
Will increase Lantus to 60 units at bed time, 30u am.  Will increase Humalog to 30 units before each meal in addition to Humalog correction scale coverage.  Will DC insulin pump as out patient and DC home on current insulin regimen, FU 2 w  Discussed with patient and primary team.  Patient counseled for compliance with consistent low carb diet.

## 2018-07-24 NOTE — PROGRESS NOTE ADULT - ASSESSMENT
67 year old male s/p hallux nail avulsion   - patient seen and evaluated.  - Bullae zackery and 2 cc serous fluid expressed.  - applied betadine with DSD  - Recommend ID c/c for discharge abx.   - Pt to have angio outpatient with Dr. Loya  - No pod surgical intervention at this time until revascularized.   - D/w attending.

## 2018-07-24 NOTE — PROGRESS NOTE ADULT - ASSESSMENT
68 yo M former smoker, hx IDDM2, hypothyroidism, HTN, HLD, COPD, CAD s/p stent & CABG x 3, atherosclerosis LE with intermittent claudication L leg, RLE bypass, femoral endartectomy fem-pop bypass 2017, presenting with L great toe pain s/p paronychia drainage and toenail removal. Pt describes 10/10 throbbing toe pain unrelieved by diclofenac or naproxen. Pt states he soaked foot in epsom salt one day ago before pain acutely worsened. Denies fevers/chills. No foot/calf pain. Reports L leg swelling/redness unchanged from baseline. On keflex BID. (19 Jul 2018 16:29)    ER vitals:  T 96.2, P 72, /57, WBC 10.  ESR 79, CRP 7.0.  Pt received clinda x 1 in ER.  Pt had recently been on approximately 3 weeks of abx (keflex) as an outpatient for Left nail infection.  PT states it was not getting better, and podiatrist removed nail on Tuesday.  As per patient, he noted some pus underneath nail bed.  Pt came to ER, as he has been having intolerable pain since procedure.  He c/o rigors/chills currently.  Foot has been swollen and tender.  No recent travel, or foot trauma.      He recently saw his Vascular surgeon Dr. Loya, and pending eventual angiogram.    Problem/Plan - 1:    ·	Left foot cellulitis    - Recent L nail avulsion, c/o worsening pain, swelling/tenderness. S/p recent course of keflex x 3 weeks.      - Start broad spectrum abx with vanco/zosyn, given h/o diabetes.  Last vanco trough 12.      - Pt now changed to po doxycycline.  d/w Renal attending, will avoid beta-lactams given rising Cr and r/o possible drug induced AIN.  d/c augmentin    - Cont to monitor L foot swelling/pain (improved since admission)    - Podiatry follow up appreciated.  No acute intervention.  Vascular f/u for angiogram    - MRI L foot no OM, possible cellulitis.  Complete abx x 7 day course    Will follow    Yesika Rodríguez  [phone]

## 2018-07-24 NOTE — CHART NOTE - NSCHARTNOTEFT_GEN_A_CORE
Spoke with vascular resident [id number] to inform the primary team on further POC re vascular stand point. Resident will d/w Dr Loya, note to follow    [id number]

## 2018-07-24 NOTE — PROGRESS NOTE ADULT - ASSESSMENT
68 yo M former smoker, hx IDDM2, hypothyroidism, HTN, HLD, COPD, CAD s/p stent & CABG x 3, atherosclerosis LE with intermittent claudication L leg, RLE bypass, femoral endartectomy fem-pop bypass 2017, presenting with L great toe pain s/p paronychia drainage and toenail removal. Pt describes 10/10 throbbing toe pain unrelieved by diclofenac or naproxen. Pt states he soaked foot in epsom salt one day ago before pain acutely worsened. Denies fevers/chills. No foot/calf pain. Reports L leg swelling/redness unchanged from baseline. On keflex BID.      Problem/Plan - 1:  ·  Problem: Toe infection.  Plan: S/P I&D . Abxs per ID . Podiatry helping .MRI ..< from: MR Foot No Cont, Left (07.23.18 @ 08:40) >    IMPRESSION:    1.  Soft tissue swelling and first digit skin bleb, nonspecific but can   be seen with cellulitis in the appropriate clinical setting.  2.  No drainable fluid collection.  3.  No definite bone marrow signal abnormality to suggest osteomyelitis     < end of copied text >       Problem/Plan - 2:  ·  Problem: DM (diabetes mellitus).  Plan: Endo helping and will follow recommendations.      Problem/Plan - 3:  ·  Problem: PAD (peripheral artery disease).  Plan: Vascular follow up pending. Possible Angio once Cleared by Renal.       Problem/Plan - 4:  ·  Problem: CKD (chronic kidney disease), stage III with [facility] .  Plan: Sec to ATN . Has been taking NSAID . Renal following.      Problem/Plan - 5:  ·  Problem: CHF (congestive heart failure).  Plan: Not in acute CHF .      Problem/Plan - 6:  Problem: Hypothyroid. Plan: Synthroid .     Problem/Plan - 7:  ·  Problem: Foot Pain .  Plan: Sec to  Infection and PVD . Increasing Oxycontin and continuing PRN pain meds.      Problem/Plan - 8:  ·  Problem: Mild intermittent asthma without complication.  Plan: continue Inhalor.

## 2018-07-24 NOTE — CHART NOTE - NSCHARTNOTEFT_GEN_A_CORE
Notified by RN that pt c/o severe pain to left toe. pt seen at bedside. Per pt and RN the left great toe got more swollen after podiatry expressed serous fluid. Upon examination left great toe noted with swelling. Pt able to move his toe + sensation, no numbness or tingling.  -podiatry informed will see pt  -morphine for pain  -elevate the foot Notified by RN that pt c/o severe pain to left toe. pt seen at bedside. Per pt and RN the left great toe got more swollen after podiatry expressed serous fluid. Upon examination left great toe noted with swelling. Pt able to move his toe + sensation, no numbness or tingling.  -podiatry informed will see pt  -morphine for pain

## 2018-07-24 NOTE — PROGRESS NOTE ADULT - ASSESSMENT
A 67-year-old gentleman with a past medical history of hypertension, diabetes, peripheral vascular disease, coronary artery disease, and COPD who presents to the hospital for evaluation of unrelenting pain.  The patient was found to have cellulitis of his foot after an Epsom salt bath.  Patient admitted with JOSE LUIS on CKD    Renal:  JOSE LUIS on CKD:  likely from related recent NSAID use +/- inflammatory response to cellulitis.   Unfortunately he has ATN now  ID:  IV antibiotics changed to PO but still another b-lactamase class  CVS:  BPs acceptable  Podiatry:  Continue with podiatry follow up/recs  Hyponatremia:   From [facility]    Recommendation:  -BMP in AM; Off all diuretic n Cozaar   -Spoke c ID and she will change to another class that is not Beta-lactamase   - Hyponatremia from JO[facility]IS  - Pain management per primary team  -Avoid NSAIDS/nephrotoxins as able    Pt is not cleared for DC home

## 2018-07-24 NOTE — PROGRESS NOTE ADULT - SUBJECTIVE AND OBJECTIVE BOX
INTERVAL HPI/OVERNIGHT EVENTS:  Vital Signs Last 24 Hrs  T(C): 36.6 (24 Jul 2018 12:46), Max: 37.1 (23 Jul 2018 21:34)  T(F): 97.8 (24 Jul 2018 12:46), Max: 98.8 (23 Jul 2018 21:34)  HR: 72 (24 Jul 2018 12:46) (72 - 84)  BP: 147/59 (24 Jul 2018 12:46) (109/51 - 147/59)  BP(mean): --  RR: 18 (24 Jul 2018 12:46) (18 - 18)  SpO2: 100% (24 Jul 2018 12:46) (98% - 100%)  I&O's Summary    MEDICATIONS  (STANDING):  aspirin enteric coated 81 milliGRAM(s) Oral daily  atorvastatin 80 milliGRAM(s) Oral at bedtime  buDESOnide 160 MICROgram(s)/formoterol 4.5 MICROgram(s) Inhaler 2 Puff(s) Inhalation two times a day  cyanocobalamin 1000 MICROGram(s) Oral daily  dextrose 5%. 1000 milliLiter(s) (50 mL/Hr) IV Continuous <Continuous>  dextrose 50% Injectable 12.5 Gram(s) IV Push once  dextrose 50% Injectable 25 Gram(s) IV Push once  dextrose 50% Injectable 25 Gram(s) IV Push once  doxycycline hyclate Capsule 100 milliGRAM(s) Oral every 12 hours  ferrous    sulfate 325 milliGRAM(s) Oral daily  gabapentin 300 milliGRAM(s) Oral daily  heparin  Injectable 5000 Unit(s) SubCutaneous every 12 hours  insulin glargine Injectable (LANTUS) 30 Unit(s) SubCutaneous every morning  insulin glargine Injectable (LANTUS) 60 Unit(s) SubCutaneous at bedtime  insulin lispro (HumaLOG) corrective regimen sliding scale   SubCutaneous three times a day before meals  insulin lispro Injectable (HumaLOG) 30 Unit(s) SubCutaneous three times a day before meals  levothyroxine 150 MICROGram(s) Oral daily  metoprolol tartrate 25 milliGRAM(s) Oral two times a day  oxyCODONE  ER Tablet 15 milliGRAM(s) Oral every 12 hours  polyethylene glycol 3350 17 Gram(s) Oral daily  senna 2 Tablet(s) Oral at bedtime  sodium chloride 0.9%. 1000 milliLiter(s) (75 mL/Hr) IV Continuous <Continuous>    MEDICATIONS  (PRN):  dextrose 40% Gel 15 Gram(s) Oral once PRN Blood Glucose LESS THAN 70 milliGRAM(s)/deciLiter  docusate sodium 100 milliGRAM(s) Oral daily PRN Constipation  glucagon  Injectable 1 milliGRAM(s) IntraMuscular once PRN Glucose <70 milliGRAM(s)/deciLiter  morphine  - Injectable 2 milliGRAM(s) IV Push every 4 hours PRN Severe Pain (7 - 10)  oxyCODONE    5 mG/acetaminophen 325 mG 1 Tablet(s) Oral every 6 hours PRN Moderate Pain (4 - 6)    LABS:                        11.2   10.15 )-----------( 276      ( 24 Jul 2018 05:49 )             35.5     07-24    131<L>  |  91<L>  |  75<H>  ----------------------------<  147<H>  4.0   |  22  |  2.59<H>    Ca    9.2      24 Jul 2018 05:49  Phos  6.3     07-24  Mg     3.0     07-24          CAPILLARY BLOOD GLUCOSE      POCT Blood Glucose.: 280 mg/dL (24 Jul 2018 11:59)  POCT Blood Glucose.: 174 mg/dL (24 Jul 2018 08:07)  POCT Blood Glucose.: 137 mg/dL (23 Jul 2018 22:31)  POCT Blood Glucose.: 107 mg/dL (23 Jul 2018 17:13)          REVIEW OF SYSTEMS:  CONSTITUTIONAL: No fever, weight loss, or fatigue  EYES: No eye pain, visual disturbances, or discharge  ENMT:  No difficulty hearing, tinnitus, vertigo; No sinus or throat pain  NECK: No pain or stiffness  BREASTS: No pain, masses, or nipple discharge  RESPIRATORY: No cough, wheezing, chills or hemoptysis; No shortness of breath  CARDIOVASCULAR: No chest pain, palpitations, dizziness, or leg swelling  GASTROINTESTINAL: No abdominal or epigastric pain. No nausea, vomiting, or hematemesis; No diarrhea or constipation. No melena or hematochezia.  GENITOURINARY: No dysuria, frequency, hematuria, or incontinence  NEUROLOGICAL: No headaches, memory loss, loss of strength, numbness, or tremors  SKIN: No itching, burning, rashes, or lesions   LYMPH NODES: No enlarged glands  ENDOCRINE: No heat or cold intolerance; No hair loss  MUSCULOSKELETAL: No joint pain or swelling; No muscle, back, or extremity pain  PSYCHIATRIC: No depression, anxiety, mood swings, or difficulty sleeping  HEME/LYMPH: No easy bruising, or bleeding gums  ALLERY AND IMMUNOLOGIC: No hives or eczema    RADIOLOGY & ADDITIONAL TESTS:    Consultant(s) Notes Reviewed:  [x ] YES  [ ] NO    PHYSICAL EXAM:  GENERAL: NAD, well-groomed, well-developed,not in any distress ,  HEAD:  Atraumatic, Normocephalic  EYES: EOMI, PERRLA, conjunctiva and sclera clear  ENMT: No tonsillar erythema, exudates, or enlargement; Moist mucous membranes, Good dentition, No lesions  NECK: Supple, No JVD, Normal thyroid  NERVOUS SYSTEM:  Alert & Oriented X3, No focal deficit   CHEST/LUNG: Good air entry bilateral with no  rales, rhonchi, wheezing, or rubs  HEART: Regular rate and rhythm; No murmurs, rubs, or gallops  ABDOMEN: Soft, Nontender, Nondistended; Bowel sounds present  EXTREMITIES:  2+ Peripheral Pulses, No clubbing, cyanosis, or edema  SKIN: No rashes or lesions    Care Discussed with Consultants/Other Providers [ x] YES  [ ] NO INTERVAL HPI/OVERNIGHT EVENTS: I feel fine with no pain . Daughter was in room.   Vital Signs Last 24 Hrs  T(C): 36.6 (24 Jul 2018 12:46), Max: 37.1 (23 Jul 2018 21:34)  T(F): 97.8 (24 Jul 2018 12:46), Max: 98.8 (23 Jul 2018 21:34)  HR: 72 (24 Jul 2018 12:46) (72 - 84)  BP: 147/59 (24 Jul 2018 12:46) (109/51 - 147/59)  BP(mean): --  RR: 18 (24 Jul 2018 12:46) (18 - 18)  SpO2: 100% (24 Jul 2018 12:46) (98% - 100%)  I&O's Summary    MEDICATIONS  (STANDING):  aspirin enteric coated 81 milliGRAM(s) Oral daily  atorvastatin 80 milliGRAM(s) Oral at bedtime  buDESOnide 160 MICROgram(s)/formoterol 4.5 MICROgram(s) Inhaler 2 Puff(s) Inhalation two times a day  cyanocobalamin 1000 MICROGram(s) Oral daily  dextrose 5%. 1000 milliLiter(s) (50 mL/Hr) IV Continuous <Continuous>  dextrose 50% Injectable 12.5 Gram(s) IV Push once  dextrose 50% Injectable 25 Gram(s) IV Push once  dextrose 50% Injectable 25 Gram(s) IV Push once  doxycycline hyclate Capsule 100 milliGRAM(s) Oral every 12 hours  ferrous    sulfate 325 milliGRAM(s) Oral daily  gabapentin 300 milliGRAM(s) Oral daily  heparin  Injectable 5000 Unit(s) SubCutaneous every 12 hours  insulin glargine Injectable (LANTUS) 30 Unit(s) SubCutaneous every morning  insulin glargine Injectable (LANTUS) 60 Unit(s) SubCutaneous at bedtime  insulin lispro (HumaLOG) corrective regimen sliding scale   SubCutaneous three times a day before meals  insulin lispro Injectable (HumaLOG) 30 Unit(s) SubCutaneous three times a day before meals  levothyroxine 150 MICROGram(s) Oral daily  metoprolol tartrate 25 milliGRAM(s) Oral two times a day  oxyCODONE  ER Tablet 15 milliGRAM(s) Oral every 12 hours  polyethylene glycol 3350 17 Gram(s) Oral daily  senna 2 Tablet(s) Oral at bedtime  sodium chloride 0.9%. 1000 milliLiter(s) (75 mL/Hr) IV Continuous <Continuous>    MEDICATIONS  (PRN):  dextrose 40% Gel 15 Gram(s) Oral once PRN Blood Glucose LESS THAN 70 milliGRAM(s)/deciLiter  docusate sodium 100 milliGRAM(s) Oral daily PRN Constipation  glucagon  Injectable 1 milliGRAM(s) IntraMuscular once PRN Glucose <70 milliGRAM(s)/deciLiter  morphine  - Injectable 2 milliGRAM(s) IV Push every 4 hours PRN Severe Pain (7 - 10)  oxyCODONE    5 mG/acetaminophen 325 mG 1 Tablet(s) Oral every 6 hours PRN Moderate Pain (4 - 6)    LABS:                        11.2   10.15 )-----------( 276      ( 24 Jul 2018 05:49 )             35.5     07-24    131<L>  |  91<L>  |  75<H>  ----------------------------<  147<H>  4.0   |  22  |  2.59<H>    Ca    9.2      24 Jul 2018 05:49  Phos  6.3     07-24  Mg     3.0     07-24          CAPILLARY BLOOD GLUCOSE      POCT Blood Glucose.: 280 mg/dL (24 Jul 2018 11:59)  POCT Blood Glucose.: 174 mg/dL (24 Jul 2018 08:07)  POCT Blood Glucose.: 137 mg/dL (23 Jul 2018 22:31)  POCT Blood Glucose.: 107 mg/dL (23 Jul 2018 17:13)          REVIEW OF SYSTEMS:  CONSTITUTIONAL: No fever, weight loss, or fatigue  EYES: No eye pain, visual disturbances, or discharge  ENMT:  No difficulty hearing, tinnitus, vertigo; No sinus or throat pain  NECK: No pain or stiffness  BREASTS: No pain, masses, or nipple discharge  RESPIRATORY: No cough, wheezing, chills or hemoptysis; No shortness of breath  CARDIOVASCULAR: No chest pain, palpitations, dizziness, or leg swelling  GASTROINTESTINAL: No abdominal or epigastric pain. No nausea, vomiting, or hematemesis; No diarrhea or constipation. No melena or hematochezia.  GENITOURINARY: No dysuria, frequency, hematuria, or incontinence  NEUROLOGICAL: No headaches, memory loss, loss of strength, numbness, or tremors  MUSCULOSKELETAL: left foot pain.  Consultant(s) Notes Reviewed:  [x ] YES  [ ] NO    PHYSICAL EXAM:  GENERAL: NAD, well-groomed, well-developed, not in any distress ,  HEAD:  Atraumatic, Normocephalic  EYES: EOMI, PERRLA, conjunctiva and sclera clear  ENMT: No tonsillar erythema, exudates, or enlargement; Moist mucous membranes, Good dentition, No lesions  NECK: Supple, No JVD, Normal thyroid  NERVOUS SYSTEM:  Alert & Oriented X3, No focal deficit   CHEST/LUNG: Good air entry bilateral with no  rales, rhonchi, wheezing, or rubs  HEART: Regular rate and rhythm; No murmurs, rubs, or gallops  ABDOMEN: Soft, Nontender, Nondistended; Bowel sounds present  EXTREMITIES: left foot dressed     Care Discussed with Consultants/Other Providers [ x] YES  [ ] NO

## 2018-07-24 NOTE — PROGRESS NOTE ADULT - SUBJECTIVE AND OBJECTIVE BOX
[facility] [phone]        Patient seen and examined in the chair.  He notices no difference in the urine output        MEDICATIONS  (STANDING):  amoxicillin  875 milliGRAM(s)/clavulanate 1 Tablet(s) Oral two times a day  aspirin enteric coated 81 milliGRAM(s) Oral daily  atorvastatin 80 milliGRAM(s) Oral at bedtime  buDESOnide 160 MICROgram(s)/formoterol 4.5 MICROgram(s) Inhaler 2 Puff(s) Inhalation two times a day  cyanocobalamin 1000 MICROGram(s) Oral daily  dextrose 5%. 1000 milliLiter(s) (50 mL/Hr) IV Continuous <Continuous>  dextrose 50% Injectable 12.5 Gram(s) IV Push once  dextrose 50% Injectable 25 Gram(s) IV Push once  dextrose 50% Injectable 25 Gram(s) IV Push once  doxycycline hyclate Capsule 100 milliGRAM(s) Oral every 12 hours  ferrous    sulfate 325 milliGRAM(s) Oral daily  gabapentin 300 milliGRAM(s) Oral daily  heparin  Injectable 5000 Unit(s) SubCutaneous every 12 hours  insulin glargine Injectable (LANTUS) 30 Unit(s) SubCutaneous every morning  insulin glargine Injectable (LANTUS) 60 Unit(s) SubCutaneous at bedtime  insulin lispro (HumaLOG) corrective regimen sliding scale   SubCutaneous three times a day before meals  insulin lispro Injectable (HumaLOG) 28 Unit(s) SubCutaneous three times a day with meals  levothyroxine 150 MICROGram(s) Oral daily  metoprolol tartrate 25 milliGRAM(s) Oral two times a day  oxyCODONE  ER Tablet 15 milliGRAM(s) Oral every 12 hours  polyethylene glycol 3350 17 Gram(s) Oral daily  senna 2 Tablet(s) Oral at bedtime  sodium chloride 0.9%. 1000 milliLiter(s) (75 mL/Hr) IV Continuous <Continuous>      VITAL:  T(C): , Max: 37.1 (07-23-18 @ 21:34)  T(F): , Max: 98.8 (07-23-18 @ 21:34)  HR: 72 (07-24-18 @ 07:39)  BP: 134/61 (07-24-18 @ 07:39)  BP(mean): --  RR: 18 (07-24-18 @ 07:39)  SpO2: 98% (07-24-18 @ 07:39)  Wt(kg): --    I and O's:        PHYSICAL EXAM:    Constitutional: NAD  HEENT: PERRLA    Neck:  No JVD  Respiratory: CTAB/L  Cardiovascular: S1 and S2  Gastrointestinal: BS+, soft, NT/ND  Extremities: No peripheral edema  Neurological: A/O x 3, no focal deficits  Psychiatric: Normal mood, normal affect  : No Marx  Skin: No rashes  Access: Not applicable    LABS:                        11.2   10.15 )-----------( 276      ( 24 Jul 2018 05:49 )             35.5     07-24    131<L>  |  91<L>  |  75<H>  ----------------------------<  147<H>  4.0   |  22  |  2.59<H>    Ca    9.2      24 Jul 2018 05:49  Phos  6.3     07-24  Mg     3.0     07-24            Urine Studies:    Osmolality, Random Urine: 325 mosmo/kg (07-22 @ 17:46)    Urine eosinophils -Negative      RADIOLOGY & ADDITIONAL STUDIES:

## 2018-07-24 NOTE — PROGRESS NOTE ADULT - SUBJECTIVE AND OBJECTIVE BOX
Chief complaint  Patient is a 67y old  Male who presents with a chief complaint of left foot swelling right great toe swelling (21 Jul 2018 07:34)   Review of systems  Patient in bed, looks comfortable, no fever, no hypoglycemia.    Labs and Fingersticks  CAPILLARY BLOOD GLUCOSE      POCT Blood Glucose.: 280 mg/dL (24 Jul 2018 11:59)  POCT Blood Glucose.: 174 mg/dL (24 Jul 2018 08:07)  POCT Blood Glucose.: 137 mg/dL (23 Jul 2018 22:31)  POCT Blood Glucose.: 107 mg/dL (23 Jul 2018 17:13)          Calcium, Total Serum: 9.2 (07-24 @ 05:49)  Calcium, Total Serum: 9.0 (07-23 @ 05:00)          07-24    131<L>  |  91<L>  |  75<H>  ----------------------------<  147<H>  4.0   |  22  |  2.59<H>    Ca    9.2      24 Jul 2018 05:49  Phos  6.3     07-24  Mg     3.0     07-24                          11.2   10.15 )-----------( 276      ( 24 Jul 2018 05:49 )             35.5     Medications  MEDICATIONS  (STANDING):  aspirin enteric coated 81 milliGRAM(s) Oral daily  atorvastatin 80 milliGRAM(s) Oral at bedtime  buDESOnide 160 MICROgram(s)/formoterol 4.5 MICROgram(s) Inhaler 2 Puff(s) Inhalation two times a day  cyanocobalamin 1000 MICROGram(s) Oral daily  dextrose 5%. 1000 milliLiter(s) (50 mL/Hr) IV Continuous <Continuous>  dextrose 50% Injectable 12.5 Gram(s) IV Push once  dextrose 50% Injectable 25 Gram(s) IV Push once  dextrose 50% Injectable 25 Gram(s) IV Push once  doxycycline hyclate Capsule 100 milliGRAM(s) Oral every 12 hours  ferrous    sulfate 325 milliGRAM(s) Oral daily  gabapentin 300 milliGRAM(s) Oral daily  heparin  Injectable 5000 Unit(s) SubCutaneous every 12 hours  insulin glargine Injectable (LANTUS) 30 Unit(s) SubCutaneous every morning  insulin glargine Injectable (LANTUS) 60 Unit(s) SubCutaneous at bedtime  insulin lispro (HumaLOG) corrective regimen sliding scale   SubCutaneous three times a day before meals  insulin lispro Injectable (HumaLOG) 30 Unit(s) SubCutaneous three times a day before meals  levothyroxine 150 MICROGram(s) Oral daily  metoprolol tartrate 25 milliGRAM(s) Oral two times a day  oxyCODONE  ER Tablet 15 milliGRAM(s) Oral every 12 hours  polyethylene glycol 3350 17 Gram(s) Oral daily  senna 2 Tablet(s) Oral at bedtime  sodium chloride 0.9%. 1000 milliLiter(s) (75 mL/Hr) IV Continuous <Continuous>      Physical Exam  General: Patient comfortable in bed  Vital Signs Last 12 Hrs  T(F): 97.8 (07-24-18 @ 12:46), Max: 98.1 (07-24-18 @ 07:39)  HR: 72 (07-24-18 @ 12:46) (72 - 72)  BP: 147/59 (07-24-18 @ 12:46) (134/61 - 147/59)  BP(mean): --  RR: 18 (07-24-18 @ 12:46) (18 - 18)  SpO2: 100% (07-24-18 @ 12:46) (98% - 100%)  Neck: No palpable thyroid nodules.  CVS: S1S2, No murmurs  Respiratory: No wheezing, no crepitations  GI: Abdomen soft, bowel sounds positive  Musculoskeletal:  edema lower extremities.   Skin: No skin rashes, no ecchymosis    Diagnostics    Hemoglobin A1C, Whole Blood: Routine  Cancel Reason: Auto-cancelled after 3 days - specimen not received. (07-19 @ 11:09)

## 2018-07-24 NOTE — PROGRESS NOTE ADULT - SUBJECTIVE AND OBJECTIVE BOX
Infectious Diseases progress note:    Subjective: Cr elevated.  Foot pain is better.  No fevers    ROS:  CONSTITUTIONAL:  No fever, chills, rigors  CARDIOVASCULAR:  No chest pain or palpitations  RESPIRATORY:   No SOB, cough, dyspnea on exertion.  No wheezing  GASTROINTESTINAL:  No abd pain, N/V, diarrhea/constipation  EXTREMITIES:  No swelling or joint pain  GENITOURINARY:  No burning on urination, increased frequency or urgency.  No flank pain  NEUROLOGIC:  No HA, visual disturbances  SKIN: No rashes    Allergies    No Known Allergies    Intolerances        ANTIBIOTICS/RELEVANT:  antimicrobials  doxycycline hyclate Capsule 100 milliGRAM(s) Oral every 12 hours    immunologic:    OTHER:  aspirin enteric coated 81 milliGRAM(s) Oral daily  atorvastatin 80 milliGRAM(s) Oral at bedtime  buDESOnide 160 MICROgram(s)/formoterol 4.5 MICROgram(s) Inhaler 2 Puff(s) Inhalation two times a day  cyanocobalamin 1000 MICROGram(s) Oral daily  dextrose 40% Gel 15 Gram(s) Oral once PRN  dextrose 5%. 1000 milliLiter(s) IV Continuous <Continuous>  dextrose 50% Injectable 12.5 Gram(s) IV Push once  dextrose 50% Injectable 25 Gram(s) IV Push once  dextrose 50% Injectable 25 Gram(s) IV Push once  docusate sodium 100 milliGRAM(s) Oral daily PRN  ferrous    sulfate 325 milliGRAM(s) Oral daily  gabapentin 300 milliGRAM(s) Oral daily  glucagon  Injectable 1 milliGRAM(s) IntraMuscular once PRN  heparin  Injectable 5000 Unit(s) SubCutaneous every 12 hours  insulin glargine Injectable (LANTUS) 30 Unit(s) SubCutaneous every morning  insulin glargine Injectable (LANTUS) 60 Unit(s) SubCutaneous at bedtime  insulin lispro (HumaLOG) corrective regimen sliding scale   SubCutaneous three times a day before meals  insulin lispro Injectable (HumaLOG) 30 Unit(s) SubCutaneous three times a day before meals  levothyroxine 150 MICROGram(s) Oral daily  metoprolol tartrate 25 milliGRAM(s) Oral two times a day  morphine  - Injectable 2 milliGRAM(s) IV Push every 4 hours PRN  oxyCODONE    5 mG/acetaminophen 325 mG 1 Tablet(s) Oral every 6 hours PRN  oxyCODONE  ER Tablet 15 milliGRAM(s) Oral every 12 hours  polyethylene glycol 3350 17 Gram(s) Oral daily  senna 2 Tablet(s) Oral at bedtime  sodium chloride 0.9%. 1000 milliLiter(s) IV Continuous <Continuous>      Objective:  Vital Signs Last 24 Hrs  T(C): 36.6 (24 Jul 2018 12:46), Max: 37.1 (23 Jul 2018 21:34)  T(F): 97.8 (24 Jul 2018 12:46), Max: 98.8 (23 Jul 2018 21:34)  HR: 72 (24 Jul 2018 12:46) (72 - 84)  BP: 147/59 (24 Jul 2018 12:46) (109/51 - 147/59)  BP(mean): --  RR: 18 (24 Jul 2018 12:46) (18 - 18)  SpO2: 100% (24 Jul 2018 12:46) (98% - 100%)    PHYSICAL EXAM:  Constitutional:NAD  Eyes:DIMAS, EOMI  Ear/Nose/Throat: no thrush, mucositis.  Moist mucous membranes	  Neck:no JVD, no lymphadenopathy, supple  Respiratory: CTA brad  Cardiovascular: S1S2 RRR, no murmurs  Gastrointestinal:soft, nontender,  nondistended (+) BS  Extremities: L ft dsg c/d/i  Skin:  no rashes, open wounds or ulcerations        LABS:                        11.2   10.15 )-----------( 276      ( 24 Jul 2018 05:49 )             35.5     07-24    131<L>  |  91<L>  |  75<H>  ----------------------------<  147<H>  4.0   |  22  |  2.59<H>    Ca    9.2      24 Jul 2018 05:49  Phos  6.3     07-24  Mg     3.0     07-24                  Vancomycin Level, Trough: 12.7 ug/mL (07-21 @ 17:07)              MICROBIOLOGY:    Culture - Blood (07.19.18 @ 19:02)    Culture - Blood:   NO ORGANISMS ISOLATED  NO ORGANISMS ISOLATED AT 96 HOURS    Specimen Source: BLOOD VENOUS    Culture - Blood (07.19.18 @ 19:02)    Culture - Blood:   NO ORGANISMS ISOLATED  NO ORGANISMS ISOLATED AT 96 HOURS    Specimen Source: BLOOD PERIPHERAL          RADIOLOGY & ADDITIONAL STUDIES:    < from: MR Foot No Cont, Left (07.23.18 @ 08:40) >  IMPRESSION:    1.  Soft tissue swelling and first digit skin bleb, nonspecific but can   be seen with cellulitis in the appropriate clinical setting.  2.  No drainable fluid collection.  3.  No definite bone marrow signal abnormality to suggest osteomyelitis     < end of copied text >

## 2018-07-24 NOTE — PROGRESS NOTE ADULT - SUBJECTIVE AND OBJECTIVE BOX
Podiatry pager #: [phone]    Patient is a 67y old  Male who presents with a chief complaint of left foot swelling right great toe swelling (21 Jul 2018 07:34)       INTERVAL HPI/OVERNIGHT EVENTS:  Patient seen and evaluated at bedside.  Pt is resting comfortable in NAD. Denies N/V/F/C.  Pain rated at 10/10    Allergies    No Known Allergies    Intolerances        Vital Signs Last 24 Hrs  T(C): 36.9 (24 Jul 2018 19:47), Max: 37.1 (23 Jul 2018 21:34)  T(F): 98.4 (24 Jul 2018 19:47), Max: 98.8 (23 Jul 2018 21:34)  HR: 77 (24 Jul 2018 20:04) (72 - 84)  BP: 108/86 (24 Jul 2018 20:04) (108/86 - 147/59)  BP(mean): --  RR: 20 (24 Jul 2018 20:04) (18 - 20)  SpO2: 96% (24 Jul 2018 20:04) (96% - 100%)    LABS:                        11.2   10.15 )-----------( 276      ( 24 Jul 2018 05:49 )             35.5     07-24    131<L>  |  91<L>  |  75<H>  ----------------------------<  147<H>  4.0   |  22  |  2.59<H>    Ca    9.2      24 Jul 2018 05:49  Phos  6.3     07-24  Mg     3.0     07-24          CAPILLARY BLOOD GLUCOSE      POCT Blood Glucose.: 192 mg/dL (24 Jul 2018 17:12)  POCT Blood Glucose.: 280 mg/dL (24 Jul 2018 11:59)  POCT Blood Glucose.: 174 mg/dL (24 Jul 2018 08:07)  POCT Blood Glucose.: 137 mg/dL (23 Jul 2018 22:31)      Lower Extremity Physical Exam:  left hallux ulceration noted at nail bed fibrotic and edema with erythema of foot and toe, no pus tender with palpation. serous bullae on lateral aspect of hallux.

## 2018-07-25 LAB
BUN SERPL-MCNC: 75 MG/DL — HIGH (ref 7–23)
CALCIUM SERPL-MCNC: 9.3 MG/DL — SIGNIFICANT CHANGE UP (ref 8.4–10.5)
CHLORIDE SERPL-SCNC: 93 MMOL/L — LOW (ref 98–107)
CO2 SERPL-SCNC: 22 MMOL/L — SIGNIFICANT CHANGE UP (ref 22–31)
CREAT SERPL-MCNC: 1.92 MG/DL — HIGH (ref 0.5–1.3)
GLUCOSE BLDC GLUCOMTR-MCNC: 120 MG/DL — HIGH (ref 70–99)
GLUCOSE BLDC GLUCOMTR-MCNC: 143 MG/DL — HIGH (ref 70–99)
GLUCOSE BLDC GLUCOMTR-MCNC: 229 MG/DL — HIGH (ref 70–99)
GLUCOSE BLDC GLUCOMTR-MCNC: 266 MG/DL — HIGH (ref 70–99)
GLUCOSE BLDC GLUCOMTR-MCNC: 276 MG/DL — HIGH (ref 70–99)
GLUCOSE SERPL-MCNC: 120 MG/DL — HIGH (ref 70–99)
MAGNESIUM SERPL-MCNC: 2.9 MG/DL — HIGH (ref 1.6–2.6)
PHOSPHATE SERPL-MCNC: 4.7 MG/DL — HIGH (ref 2.5–4.5)
POTASSIUM SERPL-MCNC: 4.1 MMOL/L — SIGNIFICANT CHANGE UP (ref 3.5–5.3)
POTASSIUM SERPL-SCNC: 4.1 MMOL/L — SIGNIFICANT CHANGE UP (ref 3.5–5.3)
SODIUM SERPL-SCNC: 132 MMOL/L — LOW (ref 135–145)

## 2018-07-25 RX ORDER — SIMETHICONE 80 MG/1
80 TABLET, CHEWABLE ORAL ONCE
Qty: 0 | Refills: 0 | Status: COMPLETED | OUTPATIENT
Start: 2018-07-25 | End: 2018-07-25

## 2018-07-25 RX ADMIN — Medication 3: at 18:26

## 2018-07-25 RX ADMIN — INSULIN GLARGINE 30 UNIT(S): 100 INJECTION, SOLUTION SUBCUTANEOUS at 08:31

## 2018-07-25 RX ADMIN — MORPHINE SULFATE 2 MILLIGRAM(S): 50 CAPSULE, EXTENDED RELEASE ORAL at 00:05

## 2018-07-25 RX ADMIN — Medication 30 UNIT(S): at 12:36

## 2018-07-25 RX ADMIN — INSULIN GLARGINE 60 UNIT(S): 100 INJECTION, SOLUTION SUBCUTANEOUS at 21:42

## 2018-07-25 RX ADMIN — POLYETHYLENE GLYCOL 3350 17 GRAM(S): 17 POWDER, FOR SOLUTION ORAL at 12:37

## 2018-07-25 RX ADMIN — OXYCODONE HYDROCHLORIDE 15 MILLIGRAM(S): 5 TABLET ORAL at 05:20

## 2018-07-25 RX ADMIN — OXYCODONE AND ACETAMINOPHEN 1 TABLET(S): 5; 325 TABLET ORAL at 13:30

## 2018-07-25 RX ADMIN — Medication 81 MILLIGRAM(S): at 12:36

## 2018-07-25 RX ADMIN — OXYCODONE AND ACETAMINOPHEN 1 TABLET(S): 5; 325 TABLET ORAL at 12:37

## 2018-07-25 RX ADMIN — Medication 325 MILLIGRAM(S): at 12:36

## 2018-07-25 RX ADMIN — Medication 100 MILLIGRAM(S): at 18:25

## 2018-07-25 RX ADMIN — MORPHINE SULFATE 2 MILLIGRAM(S): 50 CAPSULE, EXTENDED RELEASE ORAL at 20:28

## 2018-07-25 RX ADMIN — ATORVASTATIN CALCIUM 80 MILLIGRAM(S): 80 TABLET, FILM COATED ORAL at 21:32

## 2018-07-25 RX ADMIN — BUDESONIDE AND FORMOTEROL FUMARATE DIHYDRATE 2 PUFF(S): 160; 4.5 AEROSOL RESPIRATORY (INHALATION) at 21:33

## 2018-07-25 RX ADMIN — Medication 25 MILLIGRAM(S): at 18:25

## 2018-07-25 RX ADMIN — Medication 30 UNIT(S): at 08:30

## 2018-07-25 RX ADMIN — Medication 25 MILLIGRAM(S): at 05:21

## 2018-07-25 RX ADMIN — Medication 100 MILLIGRAM(S): at 05:20

## 2018-07-25 RX ADMIN — MORPHINE SULFATE 2 MILLIGRAM(S): 50 CAPSULE, EXTENDED RELEASE ORAL at 20:13

## 2018-07-25 RX ADMIN — OXYCODONE HYDROCHLORIDE 15 MILLIGRAM(S): 5 TABLET ORAL at 18:25

## 2018-07-25 RX ADMIN — Medication 30 UNIT(S): at 18:26

## 2018-07-25 RX ADMIN — Medication 2: at 12:36

## 2018-07-25 RX ADMIN — BUDESONIDE AND FORMOTEROL FUMARATE DIHYDRATE 2 PUFF(S): 160; 4.5 AEROSOL RESPIRATORY (INHALATION) at 08:30

## 2018-07-25 RX ADMIN — GABAPENTIN 300 MILLIGRAM(S): 400 CAPSULE ORAL at 12:37

## 2018-07-25 RX ADMIN — Medication 150 MICROGRAM(S): at 05:20

## 2018-07-25 RX ADMIN — PREGABALIN 1000 MICROGRAM(S): 225 CAPSULE ORAL at 12:36

## 2018-07-25 RX ADMIN — HEPARIN SODIUM 5000 UNIT(S): 5000 INJECTION INTRAVENOUS; SUBCUTANEOUS at 05:21

## 2018-07-25 RX ADMIN — SIMETHICONE 80 MILLIGRAM(S): 80 TABLET, CHEWABLE ORAL at 21:32

## 2018-07-25 RX ADMIN — SENNA PLUS 2 TABLET(S): 8.6 TABLET ORAL at 21:32

## 2018-07-25 NOTE — PROGRESS NOTE ADULT - ASSESSMENT
Assessment  DMT2: 67y Male with DM T2 with hyperglycemia on basal bolus insulin, dose was adjusted   blood sugars improving, still not at target, no hypoglycemic episode,  eating meals, non compliant with low carb diet.  Foot Wound: On antibiotics, wound care.  CAD: On medications, stable, monitored.  Hypothyroidism:  On synthroid 150 mcg po daily, asymptomatic.  HTN: Controlled, On med.  CKD: Monitor labs/BMP,

## 2018-07-25 NOTE — PROGRESS NOTE ADULT - SUBJECTIVE AND OBJECTIVE BOX
Chief complaint  Patient is a 67y old  Male who presents with a chief complaint of left foot swelling right great toe swelling (21 Jul 2018 07:34)   Review of systems  Patient in bed, looks comfortable, no fever, no hypoglycemia.    Labs and Fingersticks  CAPILLARY BLOOD GLUCOSE      POCT Blood Glucose.: 276 mg/dL (25 Jul 2018 18:10)  POCT Blood Glucose.: 266 mg/dL (25 Jul 2018 16:51)  POCT Blood Glucose.: 229 mg/dL (25 Jul 2018 12:21)  POCT Blood Glucose.: 143 mg/dL (25 Jul 2018 08:21)  POCT Blood Glucose.: 104 mg/dL (24 Jul 2018 22:09)          Calcium, Total Serum: 9.3 (07-25 @ 06:02)  Calcium, Total Serum: 9.2 (07-24 @ 05:49)          07-25    132<L>  |  93<L>  |  75<H>  ----------------------------<  120<H>  4.1   |  22  |  1.92<H>    Ca    9.3      25 Jul 2018 06:02  Phos  4.7     07-25  Mg     2.9     07-25                          11.2   10.15 )-----------( 276      ( 24 Jul 2018 05:49 )             35.5     Medications  MEDICATIONS  (STANDING):  aspirin enteric coated 81 milliGRAM(s) Oral daily  atorvastatin 80 milliGRAM(s) Oral at bedtime  buDESOnide 160 MICROgram(s)/formoterol 4.5 MICROgram(s) Inhaler 2 Puff(s) Inhalation two times a day  cyanocobalamin 1000 MICROGram(s) Oral daily  dextrose 5%. 1000 milliLiter(s) (50 mL/Hr) IV Continuous <Continuous>  dextrose 50% Injectable 12.5 Gram(s) IV Push once  dextrose 50% Injectable 25 Gram(s) IV Push once  dextrose 50% Injectable 25 Gram(s) IV Push once  doxycycline hyclate Capsule 100 milliGRAM(s) Oral every 12 hours  ferrous    sulfate 325 milliGRAM(s) Oral daily  gabapentin 300 milliGRAM(s) Oral daily  heparin  Injectable 5000 Unit(s) SubCutaneous every 12 hours  insulin glargine Injectable (LANTUS) 30 Unit(s) SubCutaneous every morning  insulin glargine Injectable (LANTUS) 60 Unit(s) SubCutaneous at bedtime  insulin lispro (HumaLOG) corrective regimen sliding scale   SubCutaneous three times a day before meals  insulin lispro Injectable (HumaLOG) 30 Unit(s) SubCutaneous three times a day before meals  levothyroxine 150 MICROGram(s) Oral daily  metoprolol tartrate 25 milliGRAM(s) Oral two times a day  oxyCODONE  ER Tablet 15 milliGRAM(s) Oral every 12 hours  polyethylene glycol 3350 17 Gram(s) Oral daily  senna 2 Tablet(s) Oral at bedtime  sodium chloride 0.9%. 1000 milliLiter(s) (75 mL/Hr) IV Continuous <Continuous>      Physical Exam  General: Patient comfortable in bed  Vital Signs Last 12 Hrs  T(F): 97.9 (07-25-18 @ 11:55), Max: 97.9 (07-25-18 @ 11:55)  HR: 87 (07-25-18 @ 18:31) (75 - 87)  BP: 134/50 (07-25-18 @ 18:31) (113/56 - 134/50)  BP(mean): --  RR: 20 (07-25-18 @ 11:55) (20 - 20)  SpO2: 99% (07-25-18 @ 11:55) (99% - 99%)  Neck: No palpable thyroid nodules.  CVS: S1S2, No murmurs  Respiratory: No wheezing, no crepitations  GI: Abdomen soft, bowel sounds positive  Musculoskeletal:  edema lower extremities.   Skin: No skin rashes, no ecchymosis    Diagnostics    Hemoglobin A1C, Whole Blood: Routine  Cancel Reason: Auto-cancelled after 3 days - specimen not received. (07-19 @ 11:09)

## 2018-07-25 NOTE — PROGRESS NOTE ADULT - SUBJECTIVE AND OBJECTIVE BOX
Infectious Diseases progress note:    Subjective: Pt seen by podiatry, had L hallux bullae lanced with 2cc serous fluid expressed.  No pus.  Pt has been afebrile, no leukocytosis.      ROS:  CONSTITUTIONAL:  No fever, chills, rigors  CARDIOVASCULAR:  No chest pain or palpitations  RESPIRATORY:   No SOB, cough, dyspnea on exertion.  No wheezing  GASTROINTESTINAL:  No abd pain, N/V, diarrhea/constipation  EXTREMITIES:  No swelling or joint pain  GENITOURINARY:  No burning on urination, increased frequency or urgency.  No flank pain  NEUROLOGIC:  No HA, visual disturbances  SKIN: No rashes    Allergies    No Known Allergies    Intolerances        ANTIBIOTICS/RELEVANT:  antimicrobials  doxycycline hyclate Capsule 100 milliGRAM(s) Oral every 12 hours    immunologic:    OTHER:  aspirin enteric coated 81 milliGRAM(s) Oral daily  atorvastatin 80 milliGRAM(s) Oral at bedtime  buDESOnide 160 MICROgram(s)/formoterol 4.5 MICROgram(s) Inhaler 2 Puff(s) Inhalation two times a day  cyanocobalamin 1000 MICROGram(s) Oral daily  dextrose 40% Gel 15 Gram(s) Oral once PRN  dextrose 5%. 1000 milliLiter(s) IV Continuous <Continuous>  dextrose 50% Injectable 12.5 Gram(s) IV Push once  dextrose 50% Injectable 25 Gram(s) IV Push once  dextrose 50% Injectable 25 Gram(s) IV Push once  docusate sodium 100 milliGRAM(s) Oral daily PRN  ferrous    sulfate 325 milliGRAM(s) Oral daily  gabapentin 300 milliGRAM(s) Oral daily  glucagon  Injectable 1 milliGRAM(s) IntraMuscular once PRN  heparin  Injectable 5000 Unit(s) SubCutaneous every 12 hours  insulin glargine Injectable (LANTUS) 30 Unit(s) SubCutaneous every morning  insulin glargine Injectable (LANTUS) 60 Unit(s) SubCutaneous at bedtime  insulin lispro (HumaLOG) corrective regimen sliding scale   SubCutaneous three times a day before meals  insulin lispro Injectable (HumaLOG) 30 Unit(s) SubCutaneous three times a day before meals  levothyroxine 150 MICROGram(s) Oral daily  metoprolol tartrate 25 milliGRAM(s) Oral two times a day  morphine  - Injectable 2 milliGRAM(s) IV Push every 4 hours PRN  oxyCODONE    5 mG/acetaminophen 325 mG 1 Tablet(s) Oral every 6 hours PRN  oxyCODONE  ER Tablet 15 milliGRAM(s) Oral every 12 hours  polyethylene glycol 3350 17 Gram(s) Oral daily  senna 2 Tablet(s) Oral at bedtime  sodium chloride 0.9%. 1000 milliLiter(s) IV Continuous <Continuous>      Objective:  Vital Signs Last 24 Hrs  T(C): 36.6 (25 Jul 2018 11:55), Max: 36.9 (24 Jul 2018 19:47)  T(F): 97.9 (25 Jul 2018 11:55), Max: 98.4 (24 Jul 2018 19:47)  HR: 75 (25 Jul 2018 11:55) (75 - 80)  BP: 113/56 (25 Jul 2018 11:55) (108/86 - 134/61)  BP(mean): --  RR: 20 (25 Jul 2018 11:55) (20 - 20)  SpO2: 99% (25 Jul 2018 11:55) (96% - 99%)    PHYSICAL EXAM:  Constitutional:NAD  Eyes:DIMAS, EOMI  Ear/Nose/Throat: no thrush, mucositis.  Moist mucous membranes	  Neck:no JVD, no lymphadenopathy, supple  Respiratory: CTA brad  Cardiovascular: S1S2 RRR, no murmurs  Gastrointestinal:soft, nontender,  nondistended (+) BS  Extremities:no e/e/c  Skin:  L foot hallux toenail avulsion, lanced bullae, mild serous drainage.  Swelling, discoloration of L foot present as before        LABS:                        11.2   10.15 )-----------( 276      ( 24 Jul 2018 05:49 )             35.5     07-25    132<L>  |  93<L>  |  75<H>  ----------------------------<  120<H>  4.1   |  22  |  1.92<H>    Ca    9.3      25 Jul 2018 06:02  Phos  4.7     07-25  Mg     2.9     07-25                  Vancomycin Level, Trough: 12.7 ug/mL (07-21 @ 17:07)              MICROBIOLOGY:    Culture - Blood (07.19.18 @ 19:02)    Culture - Blood:   NO ORGANISMS ISOLATED    Specimen Source: BLOOD VENOUS    Culture - Blood (07.19.18 @ 19:02)    Culture - Blood:   NO ORGANISMS ISOLATED    Specimen Source: BLOOD PERIPHERAL          RADIOLOGY & ADDITIONAL STUDIES:

## 2018-07-25 NOTE — PROGRESS NOTE ADULT - ASSESSMENT
66 yo M former smoker, hx IDDM2, hypothyroidism, HTN, HLD, COPD, CAD s/p stent & CABG x 3, atherosclerosis LE with intermittent claudication L leg, RLE bypass, femoral endartectomy fem-pop bypass 2017, presenting with L great toe pain s/p paronychia drainage and toenail removal. Pt describes 10/10 throbbing toe pain unrelieved by diclofenac or naproxen. Pt states he soaked foot in epsom salt one day ago before pain acutely worsened. Denies fevers/chills. No foot/calf pain. Reports L leg swelling/redness unchanged from baseline. On keflex BID. (19 Jul 2018 16:29)    ER vitals:  T 96.2, P 72, /57, WBC 10.  ESR 79, CRP 7.0.  Pt received clinda x 1 in ER.  Pt had recently been on approximately 3 weeks of abx (keflex) as an outpatient for Left nail infection.  PT states it was not getting better, and podiatrist removed nail on Tuesday.  As per patient, he noted some pus underneath nail bed.  Pt came to ER, as he has been having intolerable pain since procedure.  He c/o rigors/chills currently.  Foot has been swollen and tender.  No recent travel, or foot trauma.      He recently saw his Vascular surgeon Dr. Loya, and pending eventual angiogram.    Problem/Plan - 1:    ·	Left foot cellulitis    - Recent L nail avulsion, c/o worsening pain, swelling/tenderness. S/p recent course of keflex x 3 weeks.      - Pt initially on broad spectrum abx with vanco/zosyn, given h/o diabetes.  Last vanco trough 12.  Now changed to po doxycycline.  d/w Renal attending, will avoid beta-lactams given rising Cr and r/o possible drug induced AIN.  d/c augmentin    - Cont to monitor L foot swelling/pain (slightly improved), still with serous drainage from L hallux bullae, s/p lanced x 2.  No purulence    - Podiatry follow up appreciated.  No acute intervention.  Vascular f/u for angiogram    - MRI L foot no OM, possible cellulitis.  Complete abx x 7 day course.  More likely foot changes related to ischemia.  No fevers/leukocytosis/purulence/increased warmth.  If patient develops worsening symptoms or fever, will re-assess need for IV abx    Will follow    d/w pt at bedside    Yesika Rodríguez  [phone]

## 2018-07-25 NOTE — PROGRESS NOTE ADULT - ASSESSMENT
A 67-year-old gentleman with a past medical history of hypertension, diabetes, peripheral vascular disease, coronary artery disease, and COPD who presents to the hospital for evaluation of unrelenting pain.  The patient was found to have cellulitis of his foot after an Epsom salt bath.  Patient admitted with JOSE LUIS on CKD    Renal:  JOSE LUIS on CKD:  likely from related recent NSAID use +/- inflammatory response to cellulitis.   Unfortunately he has ATN now  ID:  IV antibiotics changed to PO but still another b-lactamase class  CVS:  BPs acceptable  Podiatry:  Continue with podiatry follow up/recs  Hyponatremia:   From JOSE LUIS    Recommendation:  -BMP in AM; Off all diuretic n Cozaar   -Spoke c ID and she will change to another class that is not Beta-lactamase   - Hyponatremia from JOSE LUIS  - Pain management per primary team  -Avoid NSAIDS/nephrotoxins as able    Pt is not cleared for DC home A 67-year-old gentleman with a past medical history of hypertension, diabetes, peripheral vascular disease, coronary artery disease, and COPD who presents to the hospital for evaluation of unrelenting pain.  The patient was found to have cellulitis of his foot after an Epsom salt bath.  Patient admitted with JOSE LUIS on CKD    Renal:  JOSE LUIS on CKD:  likely from related recent NSAID use +/- inflammatory response to cellulitis +/- beta-lactamase. ATN - renal function improving   ID: LLE celllulitis; antibiotics recently changed, on doxy  HTN:  BP acceptable  Hyponatremia:   From JOSE LUIS  Vascular: pending timing of LLE angio    Recommendation:  - continue to hold diuretics and any ACEi/ARB   - Avoid NSAIDS/nephrotoxins as able  - dose meds for a GFR ~ 35 cc/min  - continue IVF as ordered NS @ 75 cc/min  - suspect if renal function remains stable/improves we can proceed with LLE angiogram later this week A 67-year-old gentleman with a past medical history of hypertension, diabetes, peripheral vascular disease, coronary artery disease, and COPD who presents to the hospital for evaluation of unrelenting pain.  The patient was found to have cellulitis of his foot after an Epsom salt bath.  Patient admitted with JOSE LUIS on CKD    Renal:  JOSE LUIS on CKD:  likely from related recent NSAID use +/- inflammatory response to cellulitis +/- beta-lactamase. ATN - renal function improving   ID: LLE celllulitis; antibiotics recently changed, on doxy  HTN:  BP acceptable  Hyponatremia:   From JOSE LUIS  Vascular: pending timing of LLE angio  Hyperphosphatemia: due to JOSE LUIS, improving 	    Recommendation:  - continue to hold diuretics and any ACEi/ARB   - Avoid NSAIDS/nephrotoxins as able  - dose meds for a GFR ~ 35 cc/min  - continue IVF as ordered NS @ 75 cc/min  - suspect if renal function remains stable/improves we can proceed with LLE angiogram later this week

## 2018-07-25 NOTE — PROGRESS NOTE ADULT - ASSESSMENT
68 yo M former smoker, hx IDDM2, hypothyroidism, HTN, HLD, COPD, CAD s/p stent & CABG x 3, atherosclerosis LE with intermittent claudication L leg, RLE bypass, femoral endartectomy fem-pop bypass 2017, presenting with L great toe pain s/p paronychia drainage and toenail removal. Pt describes 10/10 throbbing toe pain unrelieved by diclofenac or naproxen. Pt states he soaked foot in epsom salt one day ago before pain acutely worsened. Denies fevers/chills. No foot/calf pain. Reports L leg swelling/redness unchanged from baseline. On keflex BID.      Problem/Plan - 1:  ·  Problem: Toe infection.  Plan: S/P I&D . Abxs per ID . Podiatry helping .MRI ..< from: MR Foot No Cont, Left (07.23.18 @ 08:40) >    IMPRESSION:    1.  Soft tissue swelling and first digit skin bleb, nonspecific but can   be seen with cellulitis in the appropriate clinical setting.  2.  No drainable fluid collection.  3.  No definite bone marrow signal abnormality to suggest osteomyelitis     < end of copied text >       Problem/Plan - 2:  ·  Problem: DM (diabetes mellitus).  Plan: Endo helping and will follow recommendations.      Problem/Plan - 3:  ·  Problem: PAD (peripheral artery disease).  Plan: Vascular follow up noted.  Possible Angio once Cleared by Renal.       Problem/Plan - 4:  ·  Problem: CKD (chronic kidney disease), stage III with JOSE LUIS .  Plan: Creatinine improving. Sec to ATN . Has been taking NSAID . Renal following.      Problem/Plan - 5:  ·  Problem: CHF (congestive heart failure).  Plan: Not in acute CHF .      Problem/Plan - 6:  Problem: Hypothyroid. Plan: Synthroid .     Problem/Plan - 7:  ·  Problem: Foot Pain .  Plan: Sec to   PVD  . Oxycontin and continuing PRN pain meds.      Problem/Plan - 8:  ·  Problem: Mild intermittent asthma without complication.  Plan: continue Inhalor.

## 2018-07-25 NOTE — PROGRESS NOTE ADULT - SUBJECTIVE AND OBJECTIVE BOX
Patient c/o persistent L big toe pain and swelling currently on PO abx  No podiatric surgical intervention planned  Transfer to vascular service  F/U podiatry/ID/nephrology recs  Continue to monitor kidney function  Will discuss with nephrologist prior to proceeding with vascular interventions    Case discussed with Dr. Loya

## 2018-07-25 NOTE — PROGRESS NOTE ADULT - SUBJECTIVE AND OBJECTIVE BOX
MARK [id number]  67y  Male  Local infection of skin and subcutaneous tissue  H/o or current diagnosis of HF- ACEI/ARB contraindication unknown  H/o or current diagnosis of HF- Contraindication to ACEI/ARBs  H/o or current diagnosis of HF- ACEI/ARB contraindication unknown  H/o or current diagnosis of HF- ACEI/ARB contraindication unknown  H/o or current diagnosis of HF- Contraindication to ACEI/ARBs  H/o or current diagnosis of HF- ACEI/ARB contraindication unknown  No pertinent family history in first degree relatives  Family history of lung disease  Family history of diabetes mellitus  No pertinent family history in first degree relatives  Handoff  MEWS Score  COPD (chronic obstructive pulmonary disease)  Sleep apnea  Anemia  Atherosclerosis of arteries of extremities  Mild intermittent asthma without complication  Iron deficiency anemia, unspecified iron deficiency anemia type  Prostate cancer  Bronchospasm  Obesity (BMI 30-39.9)  PAD (peripheral artery disease)  CAD (coronary artery disease)  S/P prostatectomy  Hypothyroid  Intermittent claudication  Diabetic neuropathy  DM (diabetes mellitus)  CHF (congestive heart failure)  Hypercholesteremia  HTN (hypertension)  Diabetic ulcer of toe of left foot, limited to breakdown of skin  Toe infection  CAD (coronary artery disease)  Mild intermittent asthma without complication  Hypothyroid  CHF (congestive heart failure)  CKD (chronic kidney disease), stage III  PAD (peripheral artery disease)  DM (diabetes mellitus)  Toe infection  History of femoral angiogram  S/P bypass graft of extremity  S/P CABG x 3  Stented coronary artery  S/P prostatectomy  LEFT TOE PAIN  90+  Atherosclerosis of arteries of extremities  CAD (coronary artery disease)  CKD (chronic kidney disease), stage III  DM (diabetes mellitus)    aspirin enteric coated 81 milliGRAM(s) Oral daily  atorvastatin 80 milliGRAM(s) Oral at bedtime  buDESOnide 160 MICROgram(s)/formoterol 4.5 MICROgram(s) Inhaler 2 Puff(s) Inhalation two times a day  cyanocobalamin 1000 MICROGram(s) Oral daily  dextrose 40% Gel 15 Gram(s) Oral once PRN  dextrose 5%. 1000 milliLiter(s) IV Continuous <Continuous>  dextrose 50% Injectable 12.5 Gram(s) IV Push once  dextrose 50% Injectable 25 Gram(s) IV Push once  dextrose 50% Injectable 25 Gram(s) IV Push once  docusate sodium 100 milliGRAM(s) Oral daily PRN  doxycycline hyclate Capsule 100 milliGRAM(s) Oral every 12 hours  ferrous    sulfate 325 milliGRAM(s) Oral daily  gabapentin 300 milliGRAM(s) Oral daily  glucagon  Injectable 1 milliGRAM(s) IntraMuscular once PRN  heparin  Injectable 5000 Unit(s) SubCutaneous every 12 hours  insulin glargine Injectable (LANTUS) 30 Unit(s) SubCutaneous every morning  insulin glargine Injectable (LANTUS) 60 Unit(s) SubCutaneous at bedtime  insulin lispro (HumaLOG) corrective regimen sliding scale   SubCutaneous three times a day before meals  insulin lispro Injectable (HumaLOG) 30 Unit(s) SubCutaneous three times a day before meals  levothyroxine 150 MICROGram(s) Oral daily  metoprolol tartrate 25 milliGRAM(s) Oral two times a day  morphine  - Injectable 2 milliGRAM(s) IV Push every 4 hours PRN  oxyCODONE    5 mG/acetaminophen 325 mG 1 Tablet(s) Oral every 6 hours PRN  oxyCODONE  ER Tablet 15 milliGRAM(s) Oral every 12 hours  polyethylene glycol 3350 17 Gram(s) Oral daily  senna 2 Tablet(s) Oral at bedtime  sodium chloride 0.9%. 1000 milliLiter(s) IV Continuous <Continuous>  No Known Allergies    CBC Full  -  ( 24 Jul 2018 05:49 )  WBC Count : 10.15 K/uL  Hemoglobin : 11.2 g/dL  Hematocrit : 35.5 %  Platelet Count - Automated : 276 K/uL  Mean Cell Volume : 86.4 fL  Mean Cell Hemoglobin : 27.3 pg  Mean Cell Hemoglobin Concentration : 31.5 %  Auto Neutrophil # : x  Auto Lymphocyte # : x  Auto Monocyte # : x  Auto Eosinophil # : x  Auto Basophil # : x  Auto Neutrophil % : x  Auto Lymphocyte % : x  Auto Monocyte % : x  Auto Eosinophil % : x  Auto Basophil % : x    Patient visited at bedside in pain from left hallux. Patient had minimal serous drainage noted today with no bullae or abscess noted. patient has extreme pain left big toe. Vascular considering discharge but patient is in extreme pain and my be beneficial to have in patient vascular evaluation.       < from: MR Foot No Cont, Left (07.23.18 @ 08:40) >    EXAM:  MR FOOT LT        PROCEDURE DATE:  Jul 23 2018         INTERPRETATION:  MR FOOT LT dated 7/23/2018 8:40 AM     INDICATION: Toe infection. Pain and swelling.    COMPARISON: Foot radiographs dated 7/19/2018    TECHNIQUE: Multi-sequential, multiplanar MRI imaging of the midfoot and   forefoot was performed per standard protocol.     FINDINGS: Several sequences are mildly degraded by motion artifact.    SUBCUTANEOUS SOFT TISSUES: There is moderate dorsal soft tissue swelling   about the forefoot extending into the toes. There is a subcutaneous bleb   overlying the lateral aspect of the great toe. There is no drainable   fluid collection. There is mild skin irregularity overlying the dorsal   aspect of the first toe laterally suspicious for mild skin ulceration.  BONE MARROW: There is no confluent decreased T1 signal to suggest   osteomyelitis. No reactive bone marrow edema seen. There is no fracture.   Mild DJD at the third TMT.  SYNOVIUM/ JOINT FLUID: There is a small first metatarsophalangeal joint   effusion.  TENDONS: The tendons are intact. No full-thickness tear or retraction is   noted.  MUSCLES: There is mild fatty infiltration of the intrinsic musculature   the foot. No intramuscular hematoma is identified.  NEUROVASCULAR STRUCTURES: Preserved    IMPRESSION:    1.  Soft tissue swelling and first digit skin bleb, nonspecific but can   be seen with cellulitis in the appropriate clinical setting.  2.  No drainable fluid collection.  3.  No definite bone marrow signal abnormality to suggest osteomyelitis     < end of copied text >      No podiatric surgery needed, most likely ischemic disease is primary cause of patient's severe pain. Continue with betadine and dry sterile daily dressing. Appreciate Infectious disease and vascular input.  Podiatry to follow

## 2018-07-25 NOTE — PROGRESS NOTE ADULT - SUBJECTIVE AND OBJECTIVE BOX
NEPHROLOGY - NSN    Patient seen and examined.    MEDICATIONS  (STANDING):  aspirin enteric coated 81 milliGRAM(s) Oral daily  atorvastatin 80 milliGRAM(s) Oral at bedtime  buDESOnide 160 MICROgram(s)/formoterol 4.5 MICROgram(s) Inhaler 2 Puff(s) Inhalation two times a day  cyanocobalamin 1000 MICROGram(s) Oral daily  dextrose 5%. 1000 milliLiter(s) (50 mL/Hr) IV Continuous <Continuous>  dextrose 50% Injectable 12.5 Gram(s) IV Push once  dextrose 50% Injectable 25 Gram(s) IV Push once  dextrose 50% Injectable 25 Gram(s) IV Push once  doxycycline hyclate Capsule 100 milliGRAM(s) Oral every 12 hours  ferrous    sulfate 325 milliGRAM(s) Oral daily  gabapentin 300 milliGRAM(s) Oral daily  heparin  Injectable 5000 Unit(s) SubCutaneous every 12 hours  insulin glargine Injectable (LANTUS) 30 Unit(s) SubCutaneous every morning  insulin glargine Injectable (LANTUS) 60 Unit(s) SubCutaneous at bedtime  insulin lispro (HumaLOG) corrective regimen sliding scale   SubCutaneous three times a day before meals  insulin lispro Injectable (HumaLOG) 30 Unit(s) SubCutaneous three times a day before meals  levothyroxine 150 MICROGram(s) Oral daily  metoprolol tartrate 25 milliGRAM(s) Oral two times a day  oxyCODONE  ER Tablet 15 milliGRAM(s) Oral every 12 hours  polyethylene glycol 3350 17 Gram(s) Oral daily  senna 2 Tablet(s) Oral at bedtime  sodium chloride 0.9%. 1000 milliLiter(s) (75 mL/Hr) IV Continuous <Continuous>    VITALS:  T(C): , Max: 36.9 (07-24-18 @ 19:47)  T(F): , Max: 98.4 (07-24-18 @ 19:47)  HR: 75 (07-25-18 @ 11:55)  BP: 113/56 (07-25-18 @ 11:55)  BP(mean): --  RR: 20 (07-25-18 @ 11:55)  SpO2: 99% (07-25-18 @ 11:55)  Wt(kg): --  I and O's:    07-25 @ 07:01  -  07-25 @ 13:00  --------------------------------------------------------  IN: 0 mL / OUT: 500 mL / NET: -500 mL          REVIEW OF SYSTEMS:  Denies any nausea, vomiting, diarrhea, fever or chills. Denies chest pain, SOB, focal weakness, hematuria or dysuria. Good oral intake and denies fatigue or weakness. All other pertinent systems are reviewed and are negative.     PHYSICAL EXAM:  Constitutional: NAD  Respiratory: CTA B/L  Cardiovascular: S1 and S2, RRR  Gastrointestinal: + BS, soft, NT, ND  Extremities: No peripheral edema  Neurological: AAO x 3, CN 2-12 intact  : No Marx  Access: Not applicable    LABS:                        11.2   10.15 )-----------( 276      ( 24 Jul 2018 05:49 )             35.5     07-25    132<L>  |  93<L>  |  75<H>  ----------------------------<  120<H>  4.1   |  22  |  1.92<H>    Ca    9.3      25 Jul 2018 06:02  Phos  4.7     07-25  Mg     2.9     07-25        Urine Studies:        RADIOLOGY & ADDITIONAL STUDIES: NEPHROLOGY - NSN    Patient seen and examined.  Feels well despite some LLE pain/swelling.     MEDICATIONS  (STANDING):  aspirin enteric coated 81 milliGRAM(s) Oral daily  atorvastatin 80 milliGRAM(s) Oral at bedtime  buDESOnide 160 MICROgram(s)/formoterol 4.5 MICROgram(s) Inhaler 2 Puff(s) Inhalation two times a day  cyanocobalamin 1000 MICROGram(s) Oral daily  dextrose 5%. 1000 milliLiter(s) (50 mL/Hr) IV Continuous <Continuous>  dextrose 50% Injectable 12.5 Gram(s) IV Push once  dextrose 50% Injectable 25 Gram(s) IV Push once  dextrose 50% Injectable 25 Gram(s) IV Push once  doxycycline hyclate Capsule 100 milliGRAM(s) Oral every 12 hours  ferrous    sulfate 325 milliGRAM(s) Oral daily  gabapentin 300 milliGRAM(s) Oral daily  heparin  Injectable 5000 Unit(s) SubCutaneous every 12 hours  insulin glargine Injectable (LANTUS) 30 Unit(s) SubCutaneous every morning  insulin glargine Injectable (LANTUS) 60 Unit(s) SubCutaneous at bedtime  insulin lispro (HumaLOG) corrective regimen sliding scale   SubCutaneous three times a day before meals  insulin lispro Injectable (HumaLOG) 30 Unit(s) SubCutaneous three times a day before meals  levothyroxine 150 MICROGram(s) Oral daily  metoprolol tartrate 25 milliGRAM(s) Oral two times a day  oxyCODONE  ER Tablet 15 milliGRAM(s) Oral every 12 hours  polyethylene glycol 3350 17 Gram(s) Oral daily  senna 2 Tablet(s) Oral at bedtime  sodium chloride 0.9%. 1000 milliLiter(s) (75 mL/Hr) IV Continuous <Continuous>    VITALS:  T(C): , Max: 36.9 (07-24-18 @ 19:47)  T(F): , Max: 98.4 (07-24-18 @ 19:47)  HR: 75 (07-25-18 @ 11:55)  BP: 113/56 (07-25-18 @ 11:55)  BP(mean): --  RR: 20 (07-25-18 @ 11:55)  SpO2: 99% (07-25-18 @ 11:55)  Wt(kg): --  I and O's:    07-25 @ 07:01 - 07-25 @ 13:00  --------------------------------------------------------  IN: 0 mL / OUT: 500 mL / NET: -500 mL      PHYSICAL EXAM:  Constitutional: NAD  Respiratory: CTA B/L  Cardiovascular: S1 and S2, RRR  Gastrointestinal: + BS, soft, NT, ND  Extremities: LLE pain/tenderness/edema  Neurological: AAO x 3, CN 2-12 intact  : No Marx  Access: Not applicable    LABS:                        11.2   10.15 )-----------( 276      ( 24 Jul 2018 05:49 )             35.5     07-25    132<L>  |  93<L>  |  75<H>  ----------------------------<  120<H>  4.1   |  22  |  1.92<H>    Ca    9.3      25 Jul 2018 06:02  Phos  4.7     07-25  Mg     2.9     07-25        Urine Studies:        RADIOLOGY & ADDITIONAL STUDIES:

## 2018-07-25 NOTE — PROGRESS NOTE ADULT - SUBJECTIVE AND OBJECTIVE BOX
INTERVAL HPI/OVERNIGHT EVENTS: getting transferred to Vascular service.   Vital Signs Last 24 Hrs  T(C): 36.6 (25 Jul 2018 11:55), Max: 36.9 (24 Jul 2018 19:47)  T(F): 97.9 (25 Jul 2018 11:55), Max: 98.4 (24 Jul 2018 19:47)  HR: 75 (25 Jul 2018 11:55) (75 - 80)  BP: 113/56 (25 Jul 2018 11:55) (108/86 - 134/61)  BP(mean): --  RR: 20 (25 Jul 2018 11:55) (20 - 20)  SpO2: 99% (25 Jul 2018 11:55) (96% - 99%)  I&O's Summary    25 Jul 2018 07:01  -  25 Jul 2018 16:17  --------------------------------------------------------  IN: 0 mL / OUT: 500 mL / NET: -500 mL      MEDICATIONS  (STANDING):  aspirin enteric coated 81 milliGRAM(s) Oral daily  atorvastatin 80 milliGRAM(s) Oral at bedtime  buDESOnide 160 MICROgram(s)/formoterol 4.5 MICROgram(s) Inhaler 2 Puff(s) Inhalation two times a day  cyanocobalamin 1000 MICROGram(s) Oral daily  dextrose 5%. 1000 milliLiter(s) (50 mL/Hr) IV Continuous <Continuous>  dextrose 50% Injectable 12.5 Gram(s) IV Push once  dextrose 50% Injectable 25 Gram(s) IV Push once  dextrose 50% Injectable 25 Gram(s) IV Push once  doxycycline hyclate Capsule 100 milliGRAM(s) Oral every 12 hours  ferrous    sulfate 325 milliGRAM(s) Oral daily  gabapentin 300 milliGRAM(s) Oral daily  heparin  Injectable 5000 Unit(s) SubCutaneous every 12 hours  insulin glargine Injectable (LANTUS) 30 Unit(s) SubCutaneous every morning  insulin glargine Injectable (LANTUS) 60 Unit(s) SubCutaneous at bedtime  insulin lispro (HumaLOG) corrective regimen sliding scale   SubCutaneous three times a day before meals  insulin lispro Injectable (HumaLOG) 30 Unit(s) SubCutaneous three times a day before meals  levothyroxine 150 MICROGram(s) Oral daily  metoprolol tartrate 25 milliGRAM(s) Oral two times a day  oxyCODONE  ER Tablet 15 milliGRAM(s) Oral every 12 hours  polyethylene glycol 3350 17 Gram(s) Oral daily  senna 2 Tablet(s) Oral at bedtime  sodium chloride 0.9%. 1000 milliLiter(s) (75 mL/Hr) IV Continuous <Continuous>    MEDICATIONS  (PRN):  dextrose 40% Gel 15 Gram(s) Oral once PRN Blood Glucose LESS THAN 70 milliGRAM(s)/deciLiter  docusate sodium 100 milliGRAM(s) Oral daily PRN Constipation  glucagon  Injectable 1 milliGRAM(s) IntraMuscular once PRN Glucose <70 milliGRAM(s)/deciLiter  morphine  - Injectable 2 milliGRAM(s) IV Push every 4 hours PRN Severe Pain (7 - 10)  oxyCODONE    5 mG/acetaminophen 325 mG 1 Tablet(s) Oral every 6 hours PRN Moderate Pain (4 - 6)    LABS:                        11.2   10.15 )-----------( 276      ( 24 Jul 2018 05:49 )             35.5     07-25    132<L>  |  93<L>  |  75<H>  ----------------------------<  120<H>  4.1   |  22  |  1.92<H>    Ca    9.3      25 Jul 2018 06:02  Phos  4.7     07-25  Mg     2.9     07-25          CAPILLARY BLOOD GLUCOSE      POCT Blood Glucose.: 229 mg/dL (25 Jul 2018 12:21)  POCT Blood Glucose.: 143 mg/dL (25 Jul 2018 08:21)  POCT Blood Glucose.: 104 mg/dL (24 Jul 2018 22:09)  POCT Blood Glucose.: 192 mg/dL (24 Jul 2018 17:12)          REVIEW OF SYSTEMS:  CONSTITUTIONAL: No fever, weight loss, or fatigue  EYES: No eye pain, visual disturbances, or discharge  ENMT:  No difficulty hearing, tinnitus, vertigo; No sinus or throat pain  NECK: No pain or stiffness  BREASTS: No pain, masses, or nipple discharge  RESPIRATORY: No cough, wheezing, chills or hemoptysis; No shortness of breath  CARDIOVASCULAR: No chest pain, palpitations, dizziness, or leg swelling  GASTROINTESTINAL: No abdominal or epigastric pain. No nausea, vomiting, or hematemesis; No diarrhea or constipation. No melena or hematochezia.  GENITOURINARY: No dysuria, frequency, hematuria, or incontinence  NEUROLOGICAL: No headaches, memory loss, loss of strength, numbness, or tremors  MUSCULOSKELETAL: left foot  pain  :    Consultant(s) Notes Reviewed:  [x ] YES  [ ] NO    PHYSICAL EXAM:  GENERAL: NAD, well-groomed, well-developed,not in any distress ,  HEAD:  Atraumatic, Normocephalic  EYES: EOMI, PERRLA, conjunctiva and sclera clear  ENMT: No tonsillar erythema, exudates, or enlargement; Moist mucous membranes, Good dentition, No lesions  NECK: Supple, No JVD, Normal thyroid  NERVOUS SYSTEM:  Alert & Oriented X3, No focal deficit   CHEST/LUNG: Good air entry bilateral with no  rales, rhonchi, wheezing, or rubs  HEART: Regular rate and rhythm; No murmurs, rubs, or gallops  ABDOMEN: Soft, Nontender, Nondistended; Bowel sounds present  EXTREMITIES:  Left foot dressed   Care Discussed with Consultants/Other Providers [ x] YES  [ ] NO

## 2018-07-25 NOTE — CHART NOTE - NSCHARTNOTEFT_GEN_A_CORE
Pt requested to be transferred to Vascular service Per Dr. Loya. Bed board and admitting called. Notified Dr. Cooper. Will Give sign out to vascular team once bed available. Pt to be transferred to Vascular service Per Dr. Loya. Bed board and admitting called. Notified Dr. Cooper. Will Give sign out to vascular team once bed available.

## 2018-07-26 LAB
BUN SERPL-MCNC: 63 MG/DL — HIGH (ref 7–23)
CALCIUM SERPL-MCNC: 9.1 MG/DL — SIGNIFICANT CHANGE UP (ref 8.4–10.5)
CHLORIDE SERPL-SCNC: 99 MMOL/L — SIGNIFICANT CHANGE UP (ref 98–107)
CO2 SERPL-SCNC: 18 MMOL/L — LOW (ref 22–31)
CREAT SERPL-MCNC: 1.51 MG/DL — HIGH (ref 0.5–1.3)
GLUCOSE BLDC GLUCOMTR-MCNC: 121 MG/DL — HIGH (ref 70–99)
GLUCOSE BLDC GLUCOMTR-MCNC: 140 MG/DL — HIGH (ref 70–99)
GLUCOSE BLDC GLUCOMTR-MCNC: 173 MG/DL — HIGH (ref 70–99)
GLUCOSE BLDC GLUCOMTR-MCNC: 99 MG/DL — SIGNIFICANT CHANGE UP (ref 70–99)
GLUCOSE SERPL-MCNC: 89 MG/DL — SIGNIFICANT CHANGE UP (ref 70–99)
HCT VFR BLD CALC: 30.1 % — LOW (ref 39–50)
HGB BLD-MCNC: 9.7 G/DL — LOW (ref 13–17)
MAGNESIUM SERPL-MCNC: 2.7 MG/DL — HIGH (ref 1.6–2.6)
MCHC RBC-ENTMCNC: 26.9 PG — LOW (ref 27–34)
MCHC RBC-ENTMCNC: 32.2 % — SIGNIFICANT CHANGE UP (ref 32–36)
MCV RBC AUTO: 83.6 FL — SIGNIFICANT CHANGE UP (ref 80–100)
NRBC # FLD: 0 — SIGNIFICANT CHANGE UP
PHOSPHATE SERPL-MCNC: 3.7 MG/DL — SIGNIFICANT CHANGE UP (ref 2.5–4.5)
PLATELET # BLD AUTO: 278 K/UL — SIGNIFICANT CHANGE UP (ref 150–400)
PMV BLD: 10.7 FL — SIGNIFICANT CHANGE UP (ref 7–13)
POTASSIUM SERPL-MCNC: 4.6 MMOL/L — SIGNIFICANT CHANGE UP (ref 3.5–5.3)
POTASSIUM SERPL-SCNC: 4.6 MMOL/L — SIGNIFICANT CHANGE UP (ref 3.5–5.3)
RBC # BLD: 3.6 M/UL — LOW (ref 4.2–5.8)
RBC # FLD: 15.8 % — HIGH (ref 10.3–14.5)
SODIUM SERPL-SCNC: 134 MMOL/L — LOW (ref 135–145)
WBC # BLD: 9.13 K/UL — SIGNIFICANT CHANGE UP (ref 3.8–10.5)
WBC # FLD AUTO: 9.13 K/UL — SIGNIFICANT CHANGE UP (ref 3.8–10.5)

## 2018-07-26 RX ORDER — ACETAMINOPHEN 500 MG
650 TABLET ORAL EVERY 6 HOURS
Qty: 0 | Refills: 0 | Status: DISCONTINUED | OUTPATIENT
Start: 2018-07-26 | End: 2018-08-01

## 2018-07-26 RX ORDER — MORPHINE SULFATE 50 MG/1
2 CAPSULE, EXTENDED RELEASE ORAL EVERY 6 HOURS
Qty: 0 | Refills: 0 | Status: DISCONTINUED | OUTPATIENT
Start: 2018-07-26 | End: 2018-08-01

## 2018-07-26 RX ORDER — MORPHINE SULFATE 50 MG/1
2 CAPSULE, EXTENDED RELEASE ORAL EVERY 4 HOURS
Qty: 0 | Refills: 0 | Status: DISCONTINUED | OUTPATIENT
Start: 2018-07-26 | End: 2018-07-26

## 2018-07-26 RX ORDER — OXYCODONE HYDROCHLORIDE 5 MG/1
5 TABLET ORAL EVERY 4 HOURS
Qty: 0 | Refills: 0 | Status: DISCONTINUED | OUTPATIENT
Start: 2018-07-26 | End: 2018-08-01

## 2018-07-26 RX ORDER — OXYCODONE HYDROCHLORIDE 5 MG/1
10 TABLET ORAL EVERY 4 HOURS
Qty: 0 | Refills: 0 | Status: DISCONTINUED | OUTPATIENT
Start: 2018-07-26 | End: 2018-08-01

## 2018-07-26 RX ADMIN — GABAPENTIN 300 MILLIGRAM(S): 400 CAPSULE ORAL at 13:34

## 2018-07-26 RX ADMIN — POLYETHYLENE GLYCOL 3350 17 GRAM(S): 17 POWDER, FOR SOLUTION ORAL at 21:20

## 2018-07-26 RX ADMIN — OXYCODONE AND ACETAMINOPHEN 1 TABLET(S): 5; 325 TABLET ORAL at 12:21

## 2018-07-26 RX ADMIN — INSULIN GLARGINE 60 UNIT(S): 100 INJECTION, SOLUTION SUBCUTANEOUS at 22:40

## 2018-07-26 RX ADMIN — HEPARIN SODIUM 5000 UNIT(S): 5000 INJECTION INTRAVENOUS; SUBCUTANEOUS at 17:45

## 2018-07-26 RX ADMIN — ATORVASTATIN CALCIUM 80 MILLIGRAM(S): 80 TABLET, FILM COATED ORAL at 21:20

## 2018-07-26 RX ADMIN — Medication 30 UNIT(S): at 09:10

## 2018-07-26 RX ADMIN — MORPHINE SULFATE 2 MILLIGRAM(S): 50 CAPSULE, EXTENDED RELEASE ORAL at 07:16

## 2018-07-26 RX ADMIN — INSULIN GLARGINE 30 UNIT(S): 100 INJECTION, SOLUTION SUBCUTANEOUS at 09:10

## 2018-07-26 RX ADMIN — Medication 1: at 17:44

## 2018-07-26 RX ADMIN — Medication 30 UNIT(S): at 17:44

## 2018-07-26 RX ADMIN — OXYCODONE HYDROCHLORIDE 15 MILLIGRAM(S): 5 TABLET ORAL at 05:34

## 2018-07-26 RX ADMIN — MORPHINE SULFATE 2 MILLIGRAM(S): 50 CAPSULE, EXTENDED RELEASE ORAL at 17:45

## 2018-07-26 RX ADMIN — OXYCODONE AND ACETAMINOPHEN 1 TABLET(S): 5; 325 TABLET ORAL at 04:16

## 2018-07-26 RX ADMIN — BUDESONIDE AND FORMOTEROL FUMARATE DIHYDRATE 2 PUFF(S): 160; 4.5 AEROSOL RESPIRATORY (INHALATION) at 09:10

## 2018-07-26 RX ADMIN — Medication 30 UNIT(S): at 13:34

## 2018-07-26 RX ADMIN — PREGABALIN 1000 MICROGRAM(S): 225 CAPSULE ORAL at 12:21

## 2018-07-26 RX ADMIN — OXYCODONE HYDROCHLORIDE 10 MILLIGRAM(S): 5 TABLET ORAL at 21:20

## 2018-07-26 RX ADMIN — MORPHINE SULFATE 2 MILLIGRAM(S): 50 CAPSULE, EXTENDED RELEASE ORAL at 00:25

## 2018-07-26 RX ADMIN — OXYCODONE HYDROCHLORIDE 15 MILLIGRAM(S): 5 TABLET ORAL at 17:44

## 2018-07-26 RX ADMIN — Medication 325 MILLIGRAM(S): at 12:21

## 2018-07-26 RX ADMIN — MORPHINE SULFATE 2 MILLIGRAM(S): 50 CAPSULE, EXTENDED RELEASE ORAL at 00:40

## 2018-07-26 RX ADMIN — Medication 81 MILLIGRAM(S): at 12:21

## 2018-07-26 RX ADMIN — MORPHINE SULFATE 2 MILLIGRAM(S): 50 CAPSULE, EXTENDED RELEASE ORAL at 07:01

## 2018-07-26 RX ADMIN — OXYCODONE HYDROCHLORIDE 10 MILLIGRAM(S): 5 TABLET ORAL at 22:23

## 2018-07-26 RX ADMIN — OXYCODONE AND ACETAMINOPHEN 1 TABLET(S): 5; 325 TABLET ORAL at 12:51

## 2018-07-26 RX ADMIN — OXYCODONE HYDROCHLORIDE 15 MILLIGRAM(S): 5 TABLET ORAL at 06:34

## 2018-07-26 RX ADMIN — Medication 100 MILLIGRAM(S): at 05:34

## 2018-07-26 RX ADMIN — HEPARIN SODIUM 5000 UNIT(S): 5000 INJECTION INTRAVENOUS; SUBCUTANEOUS at 05:34

## 2018-07-26 RX ADMIN — MORPHINE SULFATE 2 MILLIGRAM(S): 50 CAPSULE, EXTENDED RELEASE ORAL at 18:15

## 2018-07-26 RX ADMIN — OXYCODONE AND ACETAMINOPHEN 1 TABLET(S): 5; 325 TABLET ORAL at 03:16

## 2018-07-26 RX ADMIN — Medication 25 MILLIGRAM(S): at 05:34

## 2018-07-26 RX ADMIN — Medication 150 MICROGRAM(S): at 05:34

## 2018-07-26 RX ADMIN — Medication 25 MILLIGRAM(S): at 17:45

## 2018-07-26 RX ADMIN — BUDESONIDE AND FORMOTEROL FUMARATE DIHYDRATE 2 PUFF(S): 160; 4.5 AEROSOL RESPIRATORY (INHALATION) at 21:20

## 2018-07-26 RX ADMIN — SENNA PLUS 2 TABLET(S): 8.6 TABLET ORAL at 21:20

## 2018-07-26 NOTE — PROGRESS NOTE ADULT - SUBJECTIVE AND OBJECTIVE BOX
NEPHROLOGY-[facility] [phone]        Patient seen and examined in bed.  He was in good spirits        MEDICATIONS  (STANDING):  aspirin enteric coated 81 milliGRAM(s) Oral daily  atorvastatin 80 milliGRAM(s) Oral at bedtime  buDESOnide 160 MICROgram(s)/formoterol 4.5 MICROgram(s) Inhaler 2 Puff(s) Inhalation two times a day  cyanocobalamin 1000 MICROGram(s) Oral daily  dextrose 5%. 1000 milliLiter(s) (50 mL/Hr) IV Continuous <Continuous>  dextrose 50% Injectable 12.5 Gram(s) IV Push once  dextrose 50% Injectable 25 Gram(s) IV Push once  dextrose 50% Injectable 25 Gram(s) IV Push once  ferrous    sulfate 325 milliGRAM(s) Oral daily  gabapentin 300 milliGRAM(s) Oral daily  heparin  Injectable 5000 Unit(s) SubCutaneous every 12 hours  insulin glargine Injectable (LANTUS) 30 Unit(s) SubCutaneous every morning  insulin glargine Injectable (LANTUS) 60 Unit(s) SubCutaneous at bedtime  insulin lispro (HumaLOG) corrective regimen sliding scale   SubCutaneous three times a day before meals  insulin lispro Injectable (HumaLOG) 30 Unit(s) SubCutaneous three times a day before meals  levothyroxine 150 MICROGram(s) Oral daily  metoprolol tartrate 25 milliGRAM(s) Oral two times a day  oxyCODONE  ER Tablet 15 milliGRAM(s) Oral every 12 hours  polyethylene glycol 3350 17 Gram(s) Oral daily  senna 2 Tablet(s) Oral at bedtime  sodium chloride 0.9%. 1000 milliLiter(s) (75 mL/Hr) IV Continuous <Continuous>      VITAL:  T(C): , Max: 37.1 (07-26-18 @ 05:32)  T(F): , Max: 98.7 (07-26-18 @ 05:32)  HR: 69 (07-26-18 @ 12:24)  BP: 133/57 (07-26-18 @ 12:24)  BP(mean): --  RR: 18 (07-26-18 @ 12:24)  SpO2: 100% (07-26-18 @ 12:24)  Wt(kg): --    I and O's:    07-25 @ 07:01  -  07-26 @ 07:00  --------------------------------------------------------  IN: 0 mL / OUT: 500 mL / NET: -500 mL          PHYSICAL EXAM:    Constitutional: NAD  HEENT: PERRLA    Neck:  No JVD  Respiratory: CTAB/L  Cardiovascular: S1 and S2  Gastrointestinal: BS+, soft, NT/ND  Extremities: No peripheral edema  Neurological: A/O x 3, no focal deficits  Psychiatric: Normal mood, normal affect  : No Marx  Skin: No rashes  Access: Not applicable    LABS:                        9.7    9.13  )-----------( 278      ( 26 Jul 2018 07:55 )             30.1     07-26    134<L>  |  99  |  63<H>  ----------------------------<  89  4.6   |  18<L>  |  1.51<H>    Ca    9.1      26 Jul 2018 07:55  Phos  3.7     07-26  Mg     2.7     07-26            Urine Studies:          RADIOLOGY & ADDITIONAL STUDIES:

## 2018-07-26 NOTE — PROGRESS NOTE ADULT - ASSESSMENT
66 yo M former smoker, hx IDDM2, hypothyroidism, HTN, HLD, COPD, CAD s/p stent & CABG x 3, atherosclerosis LE with intermittent claudication L leg, RLE bypass, femoral endartectomy fem-pop bypass 2017, presenting with L great toe pain s/p paronychia drainage and toenail removal. Pt describes 10/10 throbbing toe pain unrelieved by diclofenac or naproxen. Pt states he soaked foot in epsom salt one day ago before pain acutely worsened. Denies fevers/chills. No foot/calf pain. Reports L leg swelling/redness unchanged from baseline. On keflex BID. (19 Jul 2018 16:29)    ER vitals:  T 96.2, P 72, /57, WBC 10.  ESR 79, CRP 7.0.  Pt received clinda x 1 in ER.  Pt had recently been on approximately 3 weeks of abx (keflex) as an outpatient for Left nail infection.  PT states it was not getting better, and podiatrist removed nail on Tuesday.  As per patient, he noted some pus underneath nail bed.  Pt came to ER, as he has been having intolerable pain since procedure.  He c/o rigors/chills currently.  Foot has been swollen and tender.  No recent travel, or foot trauma.      He recently saw his Vascular surgeon Dr. Loya, and pending eventual angiogram.    Problem/Plan - 1:    ·	Left foot cellulitis    - Recent L nail avulsion, c/o worsening pain, swelling/tenderness. S/p recent course of keflex x 3 weeks.      - Pt initially on broad spectrum abx with vanco/zosyn, given h/o diabetes.  Last vanco trough 12.  Now changed to po doxycycline.  d/w Renal attending, will avoid beta-lactams given rising Cr and r/o possible drug induced AIN.  d/c augmentin    - Cont to monitor L foot swelling/pain (slightly improved), still with serous drainage from L hallux bullae, s/p lanced x 2.  No purulence    - Podiatry follow up appreciated.  No acute intervention.  Vascular f/u for angiogram    - MRI L foot no OM, possible cellulitis.  Completed abx x 7 day course.  More likely foot changes related to ischemia.  No fevers/leukocytosis/purulence/increased warmth.  If patient develops worsening symptoms or fever, will re-assess need for IV abx.  Monitor off for now        [facility]  [phone]

## 2018-07-26 NOTE — PROGRESS NOTE ADULT - SUBJECTIVE AND OBJECTIVE BOX
Patient is a 67y old  Male who presents with a chief complaint of left foot swelling right great toe swelling (21 Jul 2018 07:34)       INTERVAL HPI/OVERNIGHT EVENTS:  Patient seen and evaluated at bedside.  Pt is resting comfortable in NAD. Denies N/V/F/C.     Allergies    No Known Allergies    Intolerances        Vital Signs Last 24 Hrs  T(C): 37.1 (26 Jul 2018 05:32), Max: 37.1 (26 Jul 2018 05:32)  T(F): 98.7 (26 Jul 2018 05:32), Max: 98.7 (26 Jul 2018 05:32)  HR: 64 (26 Jul 2018 07:00) (64 - 87)  BP: 119/92 (26 Jul 2018 07:00) (113/56 - 149/69)  BP(mean): --  RR: 18 (26 Jul 2018 05:32) (18 - 20)  SpO2: 100% (26 Jul 2018 05:32) (99% - 100%)    LABS:                        9.7    9.13  )-----------( 278      ( 26 Jul 2018 07:55 )             30.1     07-26    134<L>  |  99  |  63<H>  ----------------------------<  89  4.6   |  18<L>  |  1.51<H>    Ca    9.1      26 Jul 2018 07:55  Phos  3.7     07-26  Mg     2.7     07-26          CAPILLARY BLOOD GLUCOSE      POCT Blood Glucose.: 99 mg/dL (26 Jul 2018 08:28)  POCT Blood Glucose.: 120 mg/dL (25 Jul 2018 21:42)  POCT Blood Glucose.: 276 mg/dL (25 Jul 2018 18:10)  POCT Blood Glucose.: 266 mg/dL (25 Jul 2018 16:51)  POCT Blood Glucose.: 229 mg/dL (25 Jul 2018 12:21)      Lower Extremity Physical Exam:  Patient had no drainage, no bullae or abscess noted. No acute signs of infection. Toe is cold to touch.  Patient has extreme pain left big toe.     RADIOLOGY & ADDITIONAL TESTS:

## 2018-07-26 NOTE — PROGRESS NOTE ADULT - ASSESSMENT
68 yo M former smoker, hx IDDM2, hypothyroidism, HTN, HLD, COPD, CAD s/p stent & CABG x 3 with left toe pain and swelling on PO abx    Plan  - continue abx  - plan for angio next week  - No podiatric surgical intervention planned  - FU podiatry/ID/nephrology recs  - pain control  - OOB  - DVT ppx

## 2018-07-26 NOTE — PROGRESS NOTE ADULT - ASSESSMENT
66 yo M former smoker, hx IDDM2, hypothyroidism, HTN, HLD, COPD, CAD s/p stent & CABG x 3, atherosclerosis LE with intermittent claudication L leg, RLE bypass, femoral endartectomy fem-pop bypass 2017, presenting with L great toe pain s/p paronychia drainage and toenail removal. Pt describes 10/10 throbbing toe pain unrelieved by diclofenac or naproxen. Pt states he soaked foot in epsom salt one day ago before pain acutely worsened. Denies fevers/chills. No foot/calf pain. Reports L leg swelling/redness unchanged from baseline. On keflex BID.      Problem/Plan - 1:  ·  Problem: Toe infection.  Plan: S/P I&D . Abxs per ID . Podiatry helping .MRI ..< from: MR Foot No Cont, Left (07.23.18 @ 08:40) >    IMPRESSION:    1.  Soft tissue swelling and first digit skin bleb, nonspecific but can   be seen with cellulitis in the appropriate clinical setting.  2.  No drainable fluid collection.  3.  No definite bone marrow signal abnormality to suggest osteomyelitis     < end of copied text >       Problem/Plan - 2:  ·  Problem: DM (diabetes mellitus).  Plan: Endo helping and will follow recommendations.      Problem/Plan - 3:  ·  Problem: PAD (peripheral artery disease).  Plan: Vascular follow up noted.  Cleared by renal for Angio.      Problem/Plan - 4:  ·  Problem: CKD (chronic kidney disease), stage III with JOSE LUIS .  Plan: Creatinine improving. Sec to ATN . Has been taking NSAID . Renal following.      Problem/Plan - 5:  ·  Problem: CHF (congestive heart failure).  Plan: Not in acute CHF .      Problem/Plan - 6:  Problem: Hypothyroid. Plan: Synthroid .     Problem/Plan - 7:  ·  Problem: Foot Pain .  Plan: Sec to   PVD  . Oxycontin and continuing PRN pain meds.      Problem/Plan - 8:  ·  Problem: Mild intermittent asthma without complication.  Plan: continue Inhaler.

## 2018-07-26 NOTE — PROGRESS NOTE ADULT - SUBJECTIVE AND OBJECTIVE BOX
INTERVAL HPI/OVERNIGHT EVENTS: No new concerns.   Vital Signs Last 24 Hrs  T(C): 36.8 (26 Jul 2018 12:24), Max: 37.1 (26 Jul 2018 05:32)  T(F): 98.2 (26 Jul 2018 12:24), Max: 98.7 (26 Jul 2018 05:32)  HR: 69 (26 Jul 2018 12:24) (64 - 87)  BP: 133/57 (26 Jul 2018 12:24) (119/92 - 149/69)  BP(mean): --  RR: 18 (26 Jul 2018 12:24) (18 - 18)  SpO2: 100% (26 Jul 2018 12:24) (100% - 100%)  I&O's Summary    25 Jul 2018 07:01  -  26 Jul 2018 07:00  --------------------------------------------------------  IN: 0 mL / OUT: 500 mL / NET: -500 mL      MEDICATIONS  (STANDING):  aspirin enteric coated 81 milliGRAM(s) Oral daily  atorvastatin 80 milliGRAM(s) Oral at bedtime  buDESOnide 160 MICROgram(s)/formoterol 4.5 MICROgram(s) Inhaler 2 Puff(s) Inhalation two times a day  cyanocobalamin 1000 MICROGram(s) Oral daily  dextrose 5%. 1000 milliLiter(s) (50 mL/Hr) IV Continuous <Continuous>  dextrose 50% Injectable 12.5 Gram(s) IV Push once  dextrose 50% Injectable 25 Gram(s) IV Push once  dextrose 50% Injectable 25 Gram(s) IV Push once  ferrous    sulfate 325 milliGRAM(s) Oral daily  gabapentin 300 milliGRAM(s) Oral daily  heparin  Injectable 5000 Unit(s) SubCutaneous every 12 hours  insulin glargine Injectable (LANTUS) 30 Unit(s) SubCutaneous every morning  insulin glargine Injectable (LANTUS) 60 Unit(s) SubCutaneous at bedtime  insulin lispro (HumaLOG) corrective regimen sliding scale   SubCutaneous three times a day before meals  insulin lispro Injectable (HumaLOG) 30 Unit(s) SubCutaneous three times a day before meals  levothyroxine 150 MICROGram(s) Oral daily  metoprolol tartrate 25 milliGRAM(s) Oral two times a day  oxyCODONE  ER Tablet 15 milliGRAM(s) Oral every 12 hours  polyethylene glycol 3350 17 Gram(s) Oral daily  senna 2 Tablet(s) Oral at bedtime  sodium chloride 0.9%. 1000 milliLiter(s) (75 mL/Hr) IV Continuous <Continuous>    MEDICATIONS  (PRN):  dextrose 40% Gel 15 Gram(s) Oral once PRN Blood Glucose LESS THAN 70 milliGRAM(s)/deciLiter  docusate sodium 100 milliGRAM(s) Oral daily PRN Constipation  glucagon  Injectable 1 milliGRAM(s) IntraMuscular once PRN Glucose <70 milliGRAM(s)/deciLiter  morphine  - Injectable 2 milliGRAM(s) IV Push every 4 hours PRN Severe Pain (7 - 10)  oxyCODONE    5 mG/acetaminophen 325 mG 1 Tablet(s) Oral every 6 hours PRN Moderate Pain (4 - 6)    LABS:                        9.7    9.13  )-----------( 278      ( 26 Jul 2018 07:55 )             30.1     07-26    134<L>  |  99  |  63<H>  ----------------------------<  89  4.6   |  18<L>  |  1.51<H>    Ca    9.1      26 Jul 2018 07:55  Phos  3.7     07-26  Mg     2.7     07-26          CAPILLARY BLOOD GLUCOSE      POCT Blood Glucose.: 140 mg/dL (26 Jul 2018 12:51)  POCT Blood Glucose.: 99 mg/dL (26 Jul 2018 08:28)  POCT Blood Glucose.: 120 mg/dL (25 Jul 2018 21:42)  POCT Blood Glucose.: 276 mg/dL (25 Jul 2018 18:10)  POCT Blood Glucose.: 266 mg/dL (25 Jul 2018 16:51)          REVIEW OF SYSTEMS:  CONSTITUTIONAL: No fever, weight loss, or fatigue  EYES: No eye pain, visual disturbances, or discharge  ENMT:  No difficulty hearing, tinnitus, vertigo; No sinus or throat pain  NECK: No pain or stiffness  RESPIRATORY: No cough, wheezing, chills or hemoptysis; No shortness of breath  CARDIOVASCULAR: No chest pain, palpitations, dizziness, or leg swelling  GASTROINTESTINAL: No abdominal or epigastric pain. No nausea, vomiting, or hematemesis; No diarrhea or constipation. No melena or hematochezia.  GENITOURINARY: No dysuria, frequency, hematuria, or incontinence  NEUROLOGICAL: No headaches, memory loss, loss of strength, numbness, or tremors  SKIN: No itching, burning, rashes, or lesions     Consultant(s) Notes Reviewed:  [x ] YES  [ ] NO    PHYSICAL EXAM:  GENERAL: NAD, well-groomed, well-developed, not in any distress ,  HEAD:  Atraumatic, Normocephalic  EYES: EOMI, PERRLA, conjunctiva and sclera clear  ENMT: No tonsillar erythema, exudates, or enlargement; Moist mucous membranes, Good dentition, No lesions  NECK: Supple, No JVD, Normal thyroid  NERVOUS SYSTEM:  Alert & Oriented X3, No focal deficit   CHEST/LUNG: Good air entry bilateral with no  rales, rhonchi, wheezing, or rubs  HEART: Regular rate and rhythm; No murmurs, rubs, or gallops  ABDOMEN: Soft, Nontender, Nondistended; Bowel sounds present  EXTREMITIES:  2+ Peripheral Pulses, No clubbing, cyanosis, or edema  SKIN: No rashes or lesions    Care Discussed with Consultants/Other Providers [ x] YES  [ ] NO

## 2018-07-26 NOTE — PROGRESS NOTE ADULT - ASSESSMENT
s/p Left foot nail avulsion with ischemic pain   ·	Pt seen and evaluated   ·	Wound appears stable but painful secondary to ischemic disease  ·	Local wound care with betadine and dry sterile dressing to be continued   ·	No podiatric surgery needed  ·	Vascular doing inpatient angio on Tuesday 7/31  ·	Podiatry to follow

## 2018-07-26 NOTE — PROGRESS NOTE ADULT - ASSESSMENT
Assessment  DMT2: 67y Male with DM T2 with hyperglycemia on basal bolus insulin, dose was adjusted, blood sugars improved, no hypoglycemic episode,  eating meals, non compliant with low carb diet.  Foot Wound: On antibiotics, wound care, vascular/pod on board.  CAD: On medications, stable, monitored.  Hypothyroidism:  On synthroid 150 mcg po daily, asymptomatic.  HTN: Controlled, On med.  CKD: Monitor labs/BMP,

## 2018-07-26 NOTE — PROGRESS NOTE ADULT - SUBJECTIVE AND OBJECTIVE BOX
Infectious Diseases progress note:    Subjective: No new fevers.  No leukocytosis.  No acute o/n events    ROS:  CONSTITUTIONAL:  No fever, chills, rigors  CARDIOVASCULAR:  No chest pain or palpitations  RESPIRATORY:   No SOB, cough, dyspnea on exertion.  No wheezing  GASTROINTESTINAL:  No abd pain, N/V, diarrhea/constipation  EXTREMITIES:  No swelling or joint pain  GENITOURINARY:  No burning on urination, increased frequency or urgency.  No flank pain  NEUROLOGIC:  No HA, visual disturbances  SKIN: No rashes    Allergies    No Known Allergies    Intolerances        ANTIBIOTICS/RELEVANT:  antimicrobials    immunologic:    OTHER:  aspirin enteric coated 81 milliGRAM(s) Oral daily  atorvastatin 80 milliGRAM(s) Oral at bedtime  buDESOnide 160 MICROgram(s)/formoterol 4.5 MICROgram(s) Inhaler 2 Puff(s) Inhalation two times a day  cyanocobalamin 1000 MICROGram(s) Oral daily  dextrose 40% Gel 15 Gram(s) Oral once PRN  dextrose 5%. 1000 milliLiter(s) IV Continuous <Continuous>  dextrose 50% Injectable 12.5 Gram(s) IV Push once  dextrose 50% Injectable 25 Gram(s) IV Push once  dextrose 50% Injectable 25 Gram(s) IV Push once  docusate sodium 100 milliGRAM(s) Oral daily PRN  ferrous    sulfate 325 milliGRAM(s) Oral daily  gabapentin 300 milliGRAM(s) Oral daily  glucagon  Injectable 1 milliGRAM(s) IntraMuscular once PRN  heparin  Injectable 5000 Unit(s) SubCutaneous every 12 hours  insulin glargine Injectable (LANTUS) 30 Unit(s) SubCutaneous every morning  insulin glargine Injectable (LANTUS) 60 Unit(s) SubCutaneous at bedtime  insulin lispro (HumaLOG) corrective regimen sliding scale   SubCutaneous three times a day before meals  insulin lispro Injectable (HumaLOG) 30 Unit(s) SubCutaneous three times a day before meals  levothyroxine 150 MICROGram(s) Oral daily  metoprolol tartrate 25 milliGRAM(s) Oral two times a day  morphine  - Injectable 2 milliGRAM(s) IV Push every 4 hours PRN  oxyCODONE    5 mG/acetaminophen 325 mG 1 Tablet(s) Oral every 6 hours PRN  oxyCODONE  ER Tablet 15 milliGRAM(s) Oral every 12 hours  polyethylene glycol 3350 17 Gram(s) Oral daily  senna 2 Tablet(s) Oral at bedtime  sodium chloride 0.9%. 1000 milliLiter(s) IV Continuous <Continuous>      Objective:  Vital Signs Last 24 Hrs  T(C): 36.8 (26 Jul 2018 12:24), Max: 37.1 (26 Jul 2018 05:32)  T(F): 98.2 (26 Jul 2018 12:24), Max: 98.7 (26 Jul 2018 05:32)  HR: 69 (26 Jul 2018 12:24) (64 - 87)  BP: 133/57 (26 Jul 2018 12:24) (119/92 - 149/69)  BP(mean): --  RR: 18 (26 Jul 2018 12:24) (18 - 18)  SpO2: 100% (26 Jul 2018 12:24) (100% - 100%)    PHYSICAL EXAM:  Constitutional:NAD  Eyes:DIMAS, EOMI  Ear/Nose/Throat: no thrush, mucositis.  Moist mucous membranes	  Neck:no JVD, no lymphadenopathy, supple  Respiratory: CTA brad  Cardiovascular: S1S2 RRR, no murmurs  Gastrointestinal:soft, nontender,  nondistended (+) BS  Extremities:no e/e/c  Skin:  L toenail avulsion, edema and discoloration of dorsum of foot        LABS:                        9.7    9.13  )-----------( 278      ( 26 Jul 2018 07:55 )             30.1     07-26    134<L>  |  99  |  63<H>  ----------------------------<  89  4.6   |  18<L>  |  1.51<H>    Ca    9.1      26 Jul 2018 07:55  Phos  3.7     07-26  Mg     2.7     07-26                  Vancomycin Level, Trough: 12.7 ug/mL (07-21 @ 17:07)              MICROBIOLOGY:    Culture - Blood (07.19.18 @ 19:02)    Culture - Blood:   NO ORGANISMS ISOLATED    Specimen Source: BLOOD VENOUS    Culture - Blood (07.19.18 @ 19:02)    Culture - Blood:   NO ORGANISMS ISOLATED    Specimen Source: BLOOD PERIPHERAL          RADIOLOGY & ADDITIONAL STUDIES:    < from: MR Foot No Cont, Left (07.23.18 @ 08:40) >  IMPRESSION:    1.  Soft tissue swelling and first digit skin bleb, nonspecific but can   be seen with cellulitis in the appropriate clinical setting.  2.  No drainable fluid collection.  3.  No definite bone marrow signal abnormality to suggest osteomyelitis     < end of copied text >

## 2018-07-26 NOTE — PROGRESS NOTE ADULT - ASSESSMENT
A 67-year-old gentleman with a past medical history of hypertension, diabetes, peripheral vascular disease, coronary artery disease, and COPD who presents to the hospital for evaluation of unrelenting pain.  The patient was found to have cellulitis of his foot after an Epsom salt bath.  Patient admitted with JOSE LUIS on CKD    Renal:  JOSE LUIS on CKD:  likely from related recent NSAID use +/- inflammatory response to cellulitis +/- beta-lactamase. ATN - renal function improving   ID: LLE celllulitis; antibiotics recently changed, on doxy  HTN:  BP acceptable  Hyponatremia:   From JOSE LUIS  Vascular: pending timing of LLE angio  Hyperphosphatemia: due to JOSE LUIS, improving 	    Recommendation:  - Start Lasix 40mg po qd,  To start Cozaar after the angio is done   - Angio on sched for 7/31  - Off abx;  No renal objection to angio

## 2018-07-26 NOTE — PROGRESS NOTE ADULT - SUBJECTIVE AND OBJECTIVE BOX
Chief complaint  Patient is a 67y old  Male who presents with a chief complaint of left foot swelling right great toe swelling (21 Jul 2018 07:34)   Review of systems  Patient in bed, looks comfortable, no fever, c/o foot pain,  no hypoglycemia.    Labs and Fingersticks  CAPILLARY BLOOD GLUCOSE      POCT Blood Glucose.: 140 mg/dL (26 Jul 2018 12:51)  POCT Blood Glucose.: 99 mg/dL (26 Jul 2018 08:28)  POCT Blood Glucose.: 120 mg/dL (25 Jul 2018 21:42)  POCT Blood Glucose.: 276 mg/dL (25 Jul 2018 18:10)  POCT Blood Glucose.: 266 mg/dL (25 Jul 2018 16:51)          Calcium, Total Serum: 9.1 (07-26 @ 07:55)  Calcium, Total Serum: 9.3 (07-25 @ 06:02)          07-26    134<L>  |  99  |  63<H>  ----------------------------<  89  4.6   |  18<L>  |  1.51<H>    Ca    9.1      26 Jul 2018 07:55  Phos  3.7     07-26  Mg     2.7     07-26                          9.7    9.13  )-----------( 278      ( 26 Jul 2018 07:55 )             30.1     Medications  MEDICATIONS  (STANDING):  aspirin enteric coated 81 milliGRAM(s) Oral daily  atorvastatin 80 milliGRAM(s) Oral at bedtime  buDESOnide 160 MICROgram(s)/formoterol 4.5 MICROgram(s) Inhaler 2 Puff(s) Inhalation two times a day  cyanocobalamin 1000 MICROGram(s) Oral daily  dextrose 5%. 1000 milliLiter(s) (50 mL/Hr) IV Continuous <Continuous>  dextrose 50% Injectable 12.5 Gram(s) IV Push once  dextrose 50% Injectable 25 Gram(s) IV Push once  dextrose 50% Injectable 25 Gram(s) IV Push once  ferrous    sulfate 325 milliGRAM(s) Oral daily  gabapentin 300 milliGRAM(s) Oral daily  heparin  Injectable 5000 Unit(s) SubCutaneous every 12 hours  insulin glargine Injectable (LANTUS) 30 Unit(s) SubCutaneous every morning  insulin glargine Injectable (LANTUS) 60 Unit(s) SubCutaneous at bedtime  insulin lispro (HumaLOG) corrective regimen sliding scale   SubCutaneous three times a day before meals  insulin lispro Injectable (HumaLOG) 30 Unit(s) SubCutaneous three times a day before meals  levothyroxine 150 MICROGram(s) Oral daily  metoprolol tartrate 25 milliGRAM(s) Oral two times a day  oxyCODONE  ER Tablet 15 milliGRAM(s) Oral every 12 hours  polyethylene glycol 3350 17 Gram(s) Oral daily  senna 2 Tablet(s) Oral at bedtime  sodium chloride 0.9%. 1000 milliLiter(s) (75 mL/Hr) IV Continuous <Continuous>      Physical Exam  General: Patient comfortable in bed  Vital Signs Last 12 Hrs  T(F): 98.2 (07-26-18 @ 12:24), Max: 98.7 (07-26-18 @ 05:32)  HR: 69 (07-26-18 @ 12:24) (64 - 82)  BP: 133/57 (07-26-18 @ 12:24) (119/92 - 133/63)  BP(mean): --  RR: 18 (07-26-18 @ 12:24) (18 - 18)  SpO2: 100% (07-26-18 @ 12:24) (100% - 100%)  Neck: No palpable thyroid nodules.  CVS: S1S2, No murmurs  Respiratory: No wheezing, no crepitations  GI: Abdomen soft, bowel sounds positive  Musculoskeletal:  edema lower extremities.   Skin: No skin rashes, no ecchymosis    Diagnostics    Hemoglobin A1C, Whole Blood: Routine  Cancel Reason: Auto-cancelled after 3 days - specimen not received. (07-19 @ 11:09)

## 2018-07-26 NOTE — PROGRESS NOTE ADULT - SUBJECTIVE AND OBJECTIVE BOX
Vascular Surgery Progress Note     S: Patient seen and examined during rounds this morning. No acute events overnight.      MEDICATIONS  (STANDING):  aspirin enteric coated 81 milliGRAM(s) Oral daily  atorvastatin 80 milliGRAM(s) Oral at bedtime  buDESOnide 160 MICROgram(s)/formoterol 4.5 MICROgram(s) Inhaler 2 Puff(s) Inhalation two times a day  cyanocobalamin 1000 MICROGram(s) Oral daily  dextrose 5%. 1000 milliLiter(s) (50 mL/Hr) IV Continuous <Continuous>  dextrose 50% Injectable 12.5 Gram(s) IV Push once  dextrose 50% Injectable 25 Gram(s) IV Push once  dextrose 50% Injectable 25 Gram(s) IV Push once  ferrous    sulfate 325 milliGRAM(s) Oral daily  gabapentin 300 milliGRAM(s) Oral daily  heparin  Injectable 5000 Unit(s) SubCutaneous every 12 hours  insulin glargine Injectable (LANTUS) 30 Unit(s) SubCutaneous every morning  insulin glargine Injectable (LANTUS) 60 Unit(s) SubCutaneous at bedtime  insulin lispro (HumaLOG) corrective regimen sliding scale   SubCutaneous three times a day before meals  insulin lispro Injectable (HumaLOG) 30 Unit(s) SubCutaneous three times a day before meals  levothyroxine 150 MICROGram(s) Oral daily  metoprolol tartrate 25 milliGRAM(s) Oral two times a day  oxyCODONE  ER Tablet 15 milliGRAM(s) Oral every 12 hours  polyethylene glycol 3350 17 Gram(s) Oral daily  senna 2 Tablet(s) Oral at bedtime  sodium chloride 0.9%. 1000 milliLiter(s) (75 mL/Hr) IV Continuous <Continuous>    MEDICATIONS  (PRN):  dextrose 40% Gel 15 Gram(s) Oral once PRN Blood Glucose LESS THAN 70 milliGRAM(s)/deciLiter  docusate sodium 100 milliGRAM(s) Oral daily PRN Constipation  glucagon  Injectable 1 milliGRAM(s) IntraMuscular once PRN Glucose <70 milliGRAM(s)/deciLiter  morphine  - Injectable 2 milliGRAM(s) IV Push every 4 hours PRN Severe Pain (7 - 10)  morphine  - Injectable 2 milliGRAM(s) IV Push every 4 hours PRN Severe Pain (7 - 10)  oxyCODONE    5 mG/acetaminophen 325 mG 1 Tablet(s) Oral every 6 hours PRN Moderate Pain (4 - 6)      Physical Exam:    Vital Signs Last 24 Hrs  T(C): 36.8 (26 Jul 2018 12:24), Max: 37.1 (26 Jul 2018 05:32)  T(F): 98.2 (26 Jul 2018 12:24), Max: 98.7 (26 Jul 2018 05:32)  HR: 69 (26 Jul 2018 12:24) (64 - 87)  BP: 133/57 (26 Jul 2018 12:24) (119/92 - 149/69)  BP(mean): --  RR: 18 (26 Jul 2018 12:24) (18 - 18)  SpO2: 100% (26 Jul 2018 12:24) (100% - 100%)    07-25-18 @ 07:01  -  07-26-18 @ 07:00  --------------------------------------------------------  IN: 0 mL / OUT: 500 mL / NET: -500 mL        Gen: NAD, awake and alert  Head/Neck: NC/AT.   Resp: Respirations nonlabored  Neuro: alert and oriented speaking in clear and full sentences  Extremities: left toe pain and swelling      LABS:                        9.7    9.13  )-----------( 278      ( 26 Jul 2018 07:55 )             30.1     07-26    134<L>  |  99  |  63<H>  ----------------------------<  89  4.6   |  18<L>  |  1.51<H>    Ca    9.1      26 Jul 2018 07:55  Phos  3.7     07-26  Mg     2.7     07-26

## 2018-07-27 LAB
B PERT DNA SPEC QL NAA+PROBE: SIGNIFICANT CHANGE UP
BUN SERPL-MCNC: 60 MG/DL — HIGH (ref 7–23)
C PNEUM DNA SPEC QL NAA+PROBE: NOT DETECTED — SIGNIFICANT CHANGE UP
CALCIUM SERPL-MCNC: 9.3 MG/DL — SIGNIFICANT CHANGE UP (ref 8.4–10.5)
CHLORIDE SERPL-SCNC: 96 MMOL/L — LOW (ref 98–107)
CO2 SERPL-SCNC: 23 MMOL/L — SIGNIFICANT CHANGE UP (ref 22–31)
CREAT SERPL-MCNC: 1.52 MG/DL — HIGH (ref 0.5–1.3)
FLUAV H1 2009 PAND RNA SPEC QL NAA+PROBE: NOT DETECTED — SIGNIFICANT CHANGE UP
FLUAV H1 RNA SPEC QL NAA+PROBE: NOT DETECTED — SIGNIFICANT CHANGE UP
FLUAV H3 RNA SPEC QL NAA+PROBE: NOT DETECTED — SIGNIFICANT CHANGE UP
FLUAV SUBTYP SPEC NAA+PROBE: SIGNIFICANT CHANGE UP
FLUBV RNA SPEC QL NAA+PROBE: NOT DETECTED — SIGNIFICANT CHANGE UP
GLUCOSE BLDC GLUCOMTR-MCNC: 117 MG/DL — HIGH (ref 70–99)
GLUCOSE BLDC GLUCOMTR-MCNC: 118 MG/DL — HIGH (ref 70–99)
GLUCOSE BLDC GLUCOMTR-MCNC: 171 MG/DL — HIGH (ref 70–99)
GLUCOSE BLDC GLUCOMTR-MCNC: 83 MG/DL — SIGNIFICANT CHANGE UP (ref 70–99)
GLUCOSE SERPL-MCNC: 152 MG/DL — HIGH (ref 70–99)
HADV DNA SPEC QL NAA+PROBE: NOT DETECTED — SIGNIFICANT CHANGE UP
HCOV 229E RNA SPEC QL NAA+PROBE: NOT DETECTED — SIGNIFICANT CHANGE UP
HCOV HKU1 RNA SPEC QL NAA+PROBE: NOT DETECTED — SIGNIFICANT CHANGE UP
HCOV NL63 RNA SPEC QL NAA+PROBE: NOT DETECTED — SIGNIFICANT CHANGE UP
HCOV OC43 RNA SPEC QL NAA+PROBE: NOT DETECTED — SIGNIFICANT CHANGE UP
HCT VFR BLD CALC: 30.6 % — LOW (ref 39–50)
HGB BLD-MCNC: 9.7 G/DL — LOW (ref 13–17)
HMPV RNA SPEC QL NAA+PROBE: NOT DETECTED — SIGNIFICANT CHANGE UP
HPIV1 RNA SPEC QL NAA+PROBE: NOT DETECTED — SIGNIFICANT CHANGE UP
HPIV2 RNA SPEC QL NAA+PROBE: NOT DETECTED — SIGNIFICANT CHANGE UP
HPIV3 RNA SPEC QL NAA+PROBE: NOT DETECTED — SIGNIFICANT CHANGE UP
HPIV4 RNA SPEC QL NAA+PROBE: NOT DETECTED — SIGNIFICANT CHANGE UP
M PNEUMO DNA SPEC QL NAA+PROBE: NOT DETECTED — SIGNIFICANT CHANGE UP
MAGNESIUM SERPL-MCNC: 2.7 MG/DL — HIGH (ref 1.6–2.6)
MCHC RBC-ENTMCNC: 26.6 PG — LOW (ref 27–34)
MCHC RBC-ENTMCNC: 31.7 % — LOW (ref 32–36)
MCV RBC AUTO: 84.1 FL — SIGNIFICANT CHANGE UP (ref 80–100)
NRBC # FLD: 0 — SIGNIFICANT CHANGE UP
PHOSPHATE SERPL-MCNC: 4.6 MG/DL — HIGH (ref 2.5–4.5)
PLATELET # BLD AUTO: 315 K/UL — SIGNIFICANT CHANGE UP (ref 150–400)
PMV BLD: 11 FL — SIGNIFICANT CHANGE UP (ref 7–13)
POTASSIUM SERPL-MCNC: 4.9 MMOL/L — SIGNIFICANT CHANGE UP (ref 3.5–5.3)
POTASSIUM SERPL-SCNC: 4.9 MMOL/L — SIGNIFICANT CHANGE UP (ref 3.5–5.3)
RBC # BLD: 3.64 M/UL — LOW (ref 4.2–5.8)
RBC # FLD: 16 % — HIGH (ref 10.3–14.5)
RSV RNA SPEC QL NAA+PROBE: NOT DETECTED — SIGNIFICANT CHANGE UP
RV+EV RNA SPEC QL NAA+PROBE: NOT DETECTED — SIGNIFICANT CHANGE UP
SODIUM SERPL-SCNC: 135 MMOL/L — SIGNIFICANT CHANGE UP (ref 135–145)
WBC # BLD: 9.25 K/UL — SIGNIFICANT CHANGE UP (ref 3.8–10.5)
WBC # FLD AUTO: 9.25 K/UL — SIGNIFICANT CHANGE UP (ref 3.8–10.5)

## 2018-07-27 RX ORDER — HEPARIN SODIUM 5000 [USP'U]/ML
5000 INJECTION INTRAVENOUS; SUBCUTANEOUS EVERY 8 HOURS
Qty: 0 | Refills: 0 | Status: DISCONTINUED | OUTPATIENT
Start: 2018-07-27 | End: 2018-08-01

## 2018-07-27 RX ORDER — FUROSEMIDE 40 MG
40 TABLET ORAL DAILY
Qty: 0 | Refills: 0 | Status: DISCONTINUED | OUTPATIENT
Start: 2018-07-27 | End: 2018-07-30

## 2018-07-27 RX ADMIN — MORPHINE SULFATE 2 MILLIGRAM(S): 50 CAPSULE, EXTENDED RELEASE ORAL at 23:54

## 2018-07-27 RX ADMIN — OXYCODONE HYDROCHLORIDE 10 MILLIGRAM(S): 5 TABLET ORAL at 19:48

## 2018-07-27 RX ADMIN — Medication 325 MILLIGRAM(S): at 11:42

## 2018-07-27 RX ADMIN — Medication 40 MILLIGRAM(S): at 11:43

## 2018-07-27 RX ADMIN — INSULIN GLARGINE 30 UNIT(S): 100 INJECTION, SOLUTION SUBCUTANEOUS at 08:27

## 2018-07-27 RX ADMIN — BUDESONIDE AND FORMOTEROL FUMARATE DIHYDRATE 2 PUFF(S): 160; 4.5 AEROSOL RESPIRATORY (INHALATION) at 08:28

## 2018-07-27 RX ADMIN — OXYCODONE HYDROCHLORIDE 15 MILLIGRAM(S): 5 TABLET ORAL at 06:47

## 2018-07-27 RX ADMIN — POLYETHYLENE GLYCOL 3350 17 GRAM(S): 17 POWDER, FOR SOLUTION ORAL at 21:52

## 2018-07-27 RX ADMIN — BUDESONIDE AND FORMOTEROL FUMARATE DIHYDRATE 2 PUFF(S): 160; 4.5 AEROSOL RESPIRATORY (INHALATION) at 21:51

## 2018-07-27 RX ADMIN — SENNA PLUS 2 TABLET(S): 8.6 TABLET ORAL at 21:52

## 2018-07-27 RX ADMIN — OXYCODONE HYDROCHLORIDE 15 MILLIGRAM(S): 5 TABLET ORAL at 05:50

## 2018-07-27 RX ADMIN — Medication 150 MICROGRAM(S): at 05:50

## 2018-07-27 RX ADMIN — OXYCODONE HYDROCHLORIDE 10 MILLIGRAM(S): 5 TABLET ORAL at 11:41

## 2018-07-27 RX ADMIN — Medication 30 UNIT(S): at 12:40

## 2018-07-27 RX ADMIN — OXYCODONE HYDROCHLORIDE 15 MILLIGRAM(S): 5 TABLET ORAL at 17:28

## 2018-07-27 RX ADMIN — Medication 25 MILLIGRAM(S): at 17:28

## 2018-07-27 RX ADMIN — Medication 30 UNIT(S): at 08:27

## 2018-07-27 RX ADMIN — ATORVASTATIN CALCIUM 80 MILLIGRAM(S): 80 TABLET, FILM COATED ORAL at 21:52

## 2018-07-27 RX ADMIN — HEPARIN SODIUM 5000 UNIT(S): 5000 INJECTION INTRAVENOUS; SUBCUTANEOUS at 05:51

## 2018-07-27 RX ADMIN — Medication 30 UNIT(S): at 17:27

## 2018-07-27 RX ADMIN — GABAPENTIN 300 MILLIGRAM(S): 400 CAPSULE ORAL at 11:42

## 2018-07-27 RX ADMIN — INSULIN GLARGINE 60 UNIT(S): 100 INJECTION, SOLUTION SUBCUTANEOUS at 23:12

## 2018-07-27 RX ADMIN — PREGABALIN 1000 MICROGRAM(S): 225 CAPSULE ORAL at 11:42

## 2018-07-27 RX ADMIN — Medication 25 MILLIGRAM(S): at 05:51

## 2018-07-27 RX ADMIN — HEPARIN SODIUM 5000 UNIT(S): 5000 INJECTION INTRAVENOUS; SUBCUTANEOUS at 15:23

## 2018-07-27 RX ADMIN — Medication 1: at 08:27

## 2018-07-27 RX ADMIN — OXYCODONE HYDROCHLORIDE 10 MILLIGRAM(S): 5 TABLET ORAL at 20:30

## 2018-07-27 RX ADMIN — OXYCODONE HYDROCHLORIDE 10 MILLIGRAM(S): 5 TABLET ORAL at 12:30

## 2018-07-27 RX ADMIN — HEPARIN SODIUM 5000 UNIT(S): 5000 INJECTION INTRAVENOUS; SUBCUTANEOUS at 23:12

## 2018-07-27 RX ADMIN — Medication 81 MILLIGRAM(S): at 11:42

## 2018-07-27 NOTE — PROGRESS NOTE ADULT - SUBJECTIVE AND OBJECTIVE BOX
Vascular Surgery Progress Note     S: Patient seen and examined during rounds this morning. No acute events overnight, endorsing slight throat pain starting overnight.    MEDICATIONS  (STANDING):  aspirin enteric coated 81 milliGRAM(s) Oral daily  atorvastatin 80 milliGRAM(s) Oral at bedtime  buDESOnide 160 MICROgram(s)/formoterol 4.5 MICROgram(s) Inhaler 2 Puff(s) Inhalation two times a day  cyanocobalamin 1000 MICROGram(s) Oral daily  dextrose 5%. 1000 milliLiter(s) (50 mL/Hr) IV Continuous <Continuous>  dextrose 50% Injectable 12.5 Gram(s) IV Push once  dextrose 50% Injectable 25 Gram(s) IV Push once  dextrose 50% Injectable 25 Gram(s) IV Push once  ferrous    sulfate 325 milliGRAM(s) Oral daily  gabapentin 300 milliGRAM(s) Oral daily  heparin  Injectable 5000 Unit(s) SubCutaneous every 12 hours  insulin glargine Injectable (LANTUS) 30 Unit(s) SubCutaneous every morning  insulin glargine Injectable (LANTUS) 60 Unit(s) SubCutaneous at bedtime  insulin lispro (HumaLOG) corrective regimen sliding scale   SubCutaneous three times a day before meals  insulin lispro Injectable (HumaLOG) 30 Unit(s) SubCutaneous three times a day before meals  levothyroxine 150 MICROGram(s) Oral daily  metoprolol tartrate 25 milliGRAM(s) Oral two times a day  oxyCODONE  ER Tablet 15 milliGRAM(s) Oral every 12 hours  polyethylene glycol 3350 17 Gram(s) Oral daily  senna 2 Tablet(s) Oral at bedtime  sodium chloride 0.9%. 1000 milliLiter(s) (75 mL/Hr) IV Continuous <Continuous>    MEDICATIONS  (PRN):  dextrose 40% Gel 15 Gram(s) Oral once PRN Blood Glucose LESS THAN 70 milliGRAM(s)/deciLiter  docusate sodium 100 milliGRAM(s) Oral daily PRN Constipation  glucagon  Injectable 1 milliGRAM(s) IntraMuscular once PRN Glucose <70 milliGRAM(s)/deciLiter  morphine  - Injectable 2 milliGRAM(s) IV Push every 4 hours PRN Severe Pain (7 - 10)  morphine  - Injectable 2 milliGRAM(s) IV Push every 4 hours PRN Severe Pain (7 - 10)  oxyCODONE    5 mG/acetaminophen 325 mG 1 Tablet(s) Oral every 6 hours PRN Moderate Pain (4 - 6)      Physical Exam:    Vital Signs Last 24 Hrs  T(C): 36.8 (26 Jul 2018 12:24), Max: 37.1 (26 Jul 2018 05:32)  T(F): 98.2 (26 Jul 2018 12:24), Max: 98.7 (26 Jul 2018 05:32)  HR: 69 (26 Jul 2018 12:24) (64 - 87)  BP: 133/57 (26 Jul 2018 12:24) (119/92 - 149/69)  BP(mean): --  RR: 18 (26 Jul 2018 12:24) (18 - 18)  SpO2: 100% (26 Jul 2018 12:24) (100% - 100%)    07-25-18 @ 07:01  -  07-26-18 @ 07:00  --------------------------------------------------------  IN: 0 mL / OUT: 500 mL / NET: -500 mL        Gen: NAD, sitting comfortably on the side of the bed  Head/Neck: NC/AT.   Resp: nonlabored respirations   Extremities: left toe pain and swelling, wound clean      LABS:                        9.7    9.13  )-----------( 278      ( 26 Jul 2018 07:55 )             30.1     07-26    134<L>  |  99  |  63<H>  ----------------------------<  89  4.6   |  18<L>  |  1.51<H>    Ca    9.1      26 Jul 2018 07:55  Phos  3.7     07-26  Mg     2.7     07-26

## 2018-07-27 NOTE — PROGRESS NOTE ADULT - SUBJECTIVE AND OBJECTIVE BOX
INTERVAL HPI/OVERNIGHT EVENTS: No new concerns.   Vital Signs Last 24 Hrs  T(C): 37 (27 Jul 2018 05:49), Max: 37 (27 Jul 2018 05:49)  T(F): 98.6 (27 Jul 2018 05:49), Max: 98.6 (27 Jul 2018 05:49)  HR: 75 (27 Jul 2018 05:49) (69 - 88)  BP: 144/83 (27 Jul 2018 05:49) (122/60 - 144/83)  BP(mean): --  RR: 18 (27 Jul 2018 05:49) (18 - 18)  SpO2: 99% (27 Jul 2018 05:49) (98% - 100%)  I&O's Summary    MEDICATIONS  (STANDING):  aspirin enteric coated 81 milliGRAM(s) Oral daily  atorvastatin 80 milliGRAM(s) Oral at bedtime  buDESOnide 160 MICROgram(s)/formoterol 4.5 MICROgram(s) Inhaler 2 Puff(s) Inhalation two times a day  cyanocobalamin 1000 MICROGram(s) Oral daily  dextrose 5%. 1000 milliLiter(s) (50 mL/Hr) IV Continuous <Continuous>  dextrose 50% Injectable 12.5 Gram(s) IV Push once  dextrose 50% Injectable 25 Gram(s) IV Push once  dextrose 50% Injectable 25 Gram(s) IV Push once  ferrous    sulfate 325 milliGRAM(s) Oral daily  furosemide    Tablet 40 milliGRAM(s) Oral daily  gabapentin 300 milliGRAM(s) Oral daily  heparin  Injectable 5000 Unit(s) SubCutaneous every 12 hours  insulin glargine Injectable (LANTUS) 30 Unit(s) SubCutaneous every morning  insulin glargine Injectable (LANTUS) 60 Unit(s) SubCutaneous at bedtime  insulin lispro (HumaLOG) corrective regimen sliding scale   SubCutaneous three times a day before meals  insulin lispro Injectable (HumaLOG) 30 Unit(s) SubCutaneous three times a day before meals  levothyroxine 150 MICROGram(s) Oral daily  metoprolol tartrate 25 milliGRAM(s) Oral two times a day  oxyCODONE  ER Tablet 15 milliGRAM(s) Oral every 12 hours  polyethylene glycol 3350 17 Gram(s) Oral daily  senna 2 Tablet(s) Oral at bedtime    MEDICATIONS  (PRN):  acetaminophen   Tablet. 650 milliGRAM(s) Oral every 6 hours PRN Mild Pain (1 - 3)  dextrose 40% Gel 15 Gram(s) Oral once PRN Blood Glucose LESS THAN 70 milliGRAM(s)/deciLiter  docusate sodium 100 milliGRAM(s) Oral daily PRN Constipation  glucagon  Injectable 1 milliGRAM(s) IntraMuscular once PRN Glucose <70 milliGRAM(s)/deciLiter  morphine  - Injectable 2 milliGRAM(s) IV Push every 6 hours PRN breakthrough pain  oxyCODONE    IR 5 milliGRAM(s) Oral every 4 hours PRN Moderate Pain (4 - 6)  oxyCODONE    IR 10 milliGRAM(s) Oral every 4 hours PRN Severe Pain (7 - 10)    LABS:                        9.7    9.25  )-----------( 315      ( 27 Jul 2018 05:00 )             30.6     07-27    135  |  96<L>  |  60<H>  ----------------------------<  152<H>  4.9   |  23  |  1.52<H>    Ca    9.3      27 Jul 2018 05:00  Phos  4.6     07-27  Mg     2.7     07-27          CAPILLARY BLOOD GLUCOSE      POCT Blood Glucose.: 171 mg/dL (27 Jul 2018 08:24)  POCT Blood Glucose.: 121 mg/dL (26 Jul 2018 22:33)  POCT Blood Glucose.: 173 mg/dL (26 Jul 2018 17:35)  POCT Blood Glucose.: 140 mg/dL (26 Jul 2018 12:51)          REVIEW OF SYSTEMS:  CONSTITUTIONAL: No fever, weight loss, or fatigue  EYES: No eye pain, visual disturbances, or discharge  ENMT:  No difficulty hearing, tinnitus, vertigo; No sinus or throat pain  NECK: No pain or stiffness  BREASTS: No pain, masses, or nipple discharge  RESPIRATORY: No cough, wheezing, chills or hemoptysis; No shortness of breath  CARDIOVASCULAR: No chest pain, palpitations, dizziness, or leg swelling  GASTROINTESTINAL: No abdominal or epigastric pain. No nausea, vomiting, or hematemesis; No diarrhea or constipation. No melena or hematochezia.  GENITOURINARY: No dysuria, frequency, hematuria, or incontinence  NEUROLOGICAL: No headaches, memory loss, loss of strength, numbness, or tremors  SKIN: No itching, burning, rashes, or lesions   LYMPH NODES: No enlarged glands  ENDOCRINE: No heat or cold intolerance; No hair loss  MUSCULOSKELETAL: left foot pain   PSYCHIATRIC: No depression, anxiety, mood swings, or difficulty sleeping  HEME/LYMPH: No easy bruising, or bleeding gums  ALLERY AND IMMUNOLOGIC: No hives or eczema    RADIOLOGY & ADDITIONAL TESTS:    Consultant(s) Notes Reviewed:  [x ] YES  [ ] NO    PHYSICAL EXAM:  GENERAL: NAD, well-groomed, well-developed,not in any distress ,  HEAD:  Atraumatic, Normocephalic  EYES: EOMI, PERRLA, conjunctiva and sclera clear  ENMT: No tonsillar erythema, exudates, or enlargement; Moist mucous membranes, Good dentition, No lesions  NECK: Supple, No JVD, Normal thyroid  NERVOUS SYSTEM:  Alert & Oriented X3, No focal deficit   CHEST/LUNG: Good air entry bilateral with no  rales, rhonchi, wheezing, or rubs  HEART: Regular rate and rhythm; No murmurs, rubs, or gallops  ABDOMEN: Soft, Nontender, Nondistended; Bowel sounds present  EXTREMITIES:  left foot dressed     Care Discussed with Consultants/Other Providers [ x] YES  [ ] NO

## 2018-07-27 NOTE — PROGRESS NOTE ADULT - ASSESSMENT
68 yo M former smoker, hx IDDM2, hypothyroidism, HTN, HLD, COPD, CAD s/p stent & CABG x 3, atherosclerosis LE with intermittent claudication L leg, RLE bypass, femoral endartectomy fem-pop bypass 2017, presenting with L great toe pain s/p paronychia drainage and toenail removal. Pt describes 10/10 throbbing toe pain unrelieved by diclofenac or naproxen. Pt states he soaked foot in epsom salt one day ago before pain acutely worsened. Denies fevers/chills. No foot/calf pain. Reports L leg swelling/redness unchanged from baseline. On keflex BID.      Problem/Plan - 1:  ·  Problem: Toe infection.  Plan: S/P I&D . Abxs per ID . Podiatry helping .MRI ..< from: MR Foot No Cont, Left (07.23.18 @ 08:40) >    IMPRESSION:    1.  Soft tissue swelling and first digit skin bleb, nonspecific but can   be seen with cellulitis in the appropriate clinical setting.  2.  No drainable fluid collection.  3.  No definite bone marrow signal abnormality to suggest osteomyelitis     < end of copied text >       Problem/Plan - 2:  ·  Problem: DM (diabetes mellitus).  Plan: Endo helping and will follow recommendations. Sugars better.      Problem/Plan - 3:  ·  Problem: PAD (peripheral artery disease).  Plan: Vascular follow up noted.  Cleared by renal for Angio.      Problem/Plan - 4:  ·  Problem: CKD (chronic kidney disease), stage III with JOSE LUIS .  Plan: Creatinine improving. Sec to ATN . Has been taking NSAID . Renal following.      Problem/Plan - 5:  ·  Problem: CHF (congestive heart failure).  Plan: Not in acute CHF .      Problem/Plan - 6:  Problem: Hypothyroid. Plan: Synthroid .     Problem/Plan - 7:  ·  Problem: Foot Pain .  Plan: Sec to   PVD  . On Oxycontin and continuing PRN pain meds.      Problem/Plan - 8:  ·  Problem: Mild intermittent asthma without complication.  Plan: continue Inhaler.

## 2018-07-27 NOTE — PROGRESS NOTE ADULT - ASSESSMENT
A 67-year-old gentleman with a past medical history of hypertension, diabetes, peripheral vascular disease, coronary artery disease, and COPD who presents to the hospital for evaluation of unrelenting pain.  The patient was found to have cellulitis of his foot after an Epsom salt bath.  Patient admitted with JOSE LUIS on CKD    Renal:  JOSE LUIS on CKD:  likely from related recent NSAID use +/- inflammatory response to cellulitis +/- beta-lactamase. ATN - renal function improving   ID: LLE celllulitis; Off abx  HTN:  BP acceptable  Hyponatremia:   From JOSE LUIS--Improved  Vascular:   LLE angio on Tuesday    	    Recommendation:  -  Lasix 40mg po qd,  To start Cozaar after the angio is done   - Angio on sched for 7/31  - Off abx;  No renal objection to angio

## 2018-07-27 NOTE — PROGRESS NOTE ADULT - SUBJECTIVE AND OBJECTIVE BOX
NEPHROLOGY-[facility] [phone]        Patient seen and examined in bed.  He was in good spirits        MEDICATIONS  (STANDING):  aspirin enteric coated 81 milliGRAM(s) Oral daily  atorvastatin 80 milliGRAM(s) Oral at bedtime  buDESOnide 160 MICROgram(s)/formoterol 4.5 MICROgram(s) Inhaler 2 Puff(s) Inhalation two times a day  cyanocobalamin 1000 MICROGram(s) Oral daily  dextrose 5%. 1000 milliLiter(s) (50 mL/Hr) IV Continuous <Continuous>  dextrose 50% Injectable 12.5 Gram(s) IV Push once  dextrose 50% Injectable 25 Gram(s) IV Push once  dextrose 50% Injectable 25 Gram(s) IV Push once  ferrous    sulfate 325 milliGRAM(s) Oral daily  furosemide    Tablet 40 milliGRAM(s) Oral daily  gabapentin 300 milliGRAM(s) Oral daily  heparin  Injectable 5000 Unit(s) SubCutaneous every 8 hours  insulin glargine Injectable (LANTUS) 30 Unit(s) SubCutaneous every morning  insulin glargine Injectable (LANTUS) 60 Unit(s) SubCutaneous at bedtime  insulin lispro (HumaLOG) corrective regimen sliding scale   SubCutaneous three times a day before meals  insulin lispro Injectable (HumaLOG) 30 Unit(s) SubCutaneous three times a day before meals  levothyroxine 150 MICROGram(s) Oral daily  metoprolol tartrate 25 milliGRAM(s) Oral two times a day  oxyCODONE  ER Tablet 15 milliGRAM(s) Oral every 12 hours  polyethylene glycol 3350 17 Gram(s) Oral daily  senna 2 Tablet(s) Oral at bedtime      VITAL:  T(C): , Max: 37.1 (07-27-18 @ 14:07)  T(F): , Max: 98.7 (07-27-18 @ 14:07)  HR: 88 (07-27-18 @ 17:28)  BP: 130/52 (07-27-18 @ 17:28)  BP(mean): --  RR: 18 (07-27-18 @ 14:07)  SpO2: 97% (07-27-18 @ 14:07)  Wt(kg): --    I and O's:        PHYSICAL EXAM:    Constitutional: NAD  HEENT: PERRLA    Neck:  No JVD  Respiratory: CTAB/L  Cardiovascular: S1 and S2  Gastrointestinal: BS+, soft, NT/ND  Extremities: No peripheral edema  Neurological: A/O x 3, no focal deficits  Psychiatric: Normal mood, normal affect  : No Marx  Skin: No rashes  Access: Not applicable    LABS:                        9.7    9.25  )-----------( 315      ( 27 Jul 2018 05:00 )             30.6     07-27    135  |  96<L>  |  60<H>  ----------------------------<  152<H>  4.9   |  23  |  1.52<H>    Ca    9.3      27 Jul 2018 05:00  Phos  4.6     07-27  Mg     2.7     07-27            Urine Studies:          RADIOLOGY & ADDITIONAL STUDIES:

## 2018-07-27 NOTE — PROGRESS NOTE ADULT - SUBJECTIVE AND OBJECTIVE BOX
Infectious Diseases progress note:    Subjective: No new fevers or leukocytosis.  Toe with intermittent sharp pain (8-9/10 pain scale).      ROS:  CONSTITUTIONAL:  No fever, chills, rigors  CARDIOVASCULAR:  No chest pain or palpitations  RESPIRATORY:   No SOB, cough, dyspnea on exertion.  No wheezing  GASTROINTESTINAL:  No abd pain, N/V, diarrhea/constipation  EXTREMITIES:  No swelling or joint pain  GENITOURINARY:  No burning on urination, increased frequency or urgency.  No flank pain  NEUROLOGIC:  No HA, visual disturbances  SKIN: No rashes    Allergies    No Known Allergies    Intolerances        ANTIBIOTICS/RELEVANT:  antimicrobials    immunologic:    OTHER:  acetaminophen   Tablet. 650 milliGRAM(s) Oral every 6 hours PRN  aspirin enteric coated 81 milliGRAM(s) Oral daily  atorvastatin 80 milliGRAM(s) Oral at bedtime  buDESOnide 160 MICROgram(s)/formoterol 4.5 MICROgram(s) Inhaler 2 Puff(s) Inhalation two times a day  cyanocobalamin 1000 MICROGram(s) Oral daily  dextrose 40% Gel 15 Gram(s) Oral once PRN  dextrose 5%. 1000 milliLiter(s) IV Continuous <Continuous>  dextrose 50% Injectable 12.5 Gram(s) IV Push once  dextrose 50% Injectable 25 Gram(s) IV Push once  dextrose 50% Injectable 25 Gram(s) IV Push once  docusate sodium 100 milliGRAM(s) Oral daily PRN  ferrous    sulfate 325 milliGRAM(s) Oral daily  furosemide    Tablet 40 milliGRAM(s) Oral daily  gabapentin 300 milliGRAM(s) Oral daily  glucagon  Injectable 1 milliGRAM(s) IntraMuscular once PRN  heparin  Injectable 5000 Unit(s) SubCutaneous every 8 hours  insulin glargine Injectable (LANTUS) 30 Unit(s) SubCutaneous every morning  insulin glargine Injectable (LANTUS) 60 Unit(s) SubCutaneous at bedtime  insulin lispro (HumaLOG) corrective regimen sliding scale   SubCutaneous three times a day before meals  insulin lispro Injectable (HumaLOG) 30 Unit(s) SubCutaneous three times a day before meals  levothyroxine 150 MICROGram(s) Oral daily  metoprolol tartrate 25 milliGRAM(s) Oral two times a day  morphine  - Injectable 2 milliGRAM(s) IV Push every 6 hours PRN  oxyCODONE    IR 5 milliGRAM(s) Oral every 4 hours PRN  oxyCODONE    IR 10 milliGRAM(s) Oral every 4 hours PRN  oxyCODONE  ER Tablet 15 milliGRAM(s) Oral every 12 hours  polyethylene glycol 3350 17 Gram(s) Oral daily  senna 2 Tablet(s) Oral at bedtime      Objective:  Vital Signs Last 24 Hrs  T(C): 37.1 (27 Jul 2018 14:07), Max: 37.1 (27 Jul 2018 14:07)  T(F): 98.7 (27 Jul 2018 14:07), Max: 98.7 (27 Jul 2018 14:07)  HR: 77 (27 Jul 2018 14:07) (72 - 88)  BP: 121/50 (27 Jul 2018 14:07) (121/50 - 144/83)  BP(mean): --  RR: 18 (27 Jul 2018 14:07) (18 - 18)  SpO2: 97% (27 Jul 2018 14:07) (97% - 100%)    PHYSICAL EXAM:  Constitutional:NAD  Eyes:DIMAS, EOMI  Ear/Nose/Throat: no thrush, mucositis.  Moist mucous membranes	  Neck:no JVD, no lymphadenopathy, supple  Respiratory: CTA brad  Cardiovascular: S1S2 RRR, no murmurs  Gastrointestinal:soft, nontender,  nondistended (+) BS  Extremities:no e/e/c  Skin:  Left toe with evolving ischemic changes, discoloration and bullae over toe.  Toenail avulsion with white fibrotic exudate        LABS:                        9.7    9.25  )-----------( 315      ( 27 Jul 2018 05:00 )             30.6     07-27    135  |  96<L>  |  60<H>  ----------------------------<  152<H>  4.9   |  23  |  1.52<H>    Ca    9.3      27 Jul 2018 05:00  Phos  4.6     07-27  Mg     2.7     07-27                              MICROBIOLOGY:      Rapid Respiratory Viral Panel (07.27.18 @ 10:30)    Adenovirus (RapRVP): NOT DETECTED    Influenza A (RapRVP): NOT DETECTED (any subtype)    Influenza AH1 2009 (RapRVP): NOT DETECTED    Influenza AH1 (RapRVP): NOT DETECTED    Influenza AH3 (RapRVP): NOT DETECTED    Influenza B (RapRVP): NOT DETECTED    Parainfluenza 1 (RapRVP): NOT DETECTED    Parainfluenza 2 (RapRVP): NOT DETECTED    Parainfluenza 3 (RapRVP): NOT DETECTED    Parainfluenza 4 (RapRVP): NOT DETECTED    Resp Syncytial Virus (RapRVP): NOT DETECTED    Bordetella pertussis (RapRVP): NOT DETECTED    Chlamydia pneumoniae (RapRVP): NOT DETECTED    Mycoplasma pneumoniae (RapRVP): NOT DETECTED This nucleic acid amplification assay was performed using  the [employer]. The following pathogens are tested  for: Adenovirus, Coronavirus 229E, Coronavirus HKU1,  Coronavirus NL63, Coronavirus OC43, Human Metapneumovirus  (HMPV), Rhinovirus/Enterovirus, Influenza A H1, Influenza A  H1 2009 (Pandemic H1 2009), Influenza A H3, Influenza A (Flu  A) subtype not identified, Influenza B, Parainfluenza Virus  (types 1, 2, 3, 4), Respiratory Syncytial Virus (RSV),  Bordetella pertussis, Chlamydophila pneumoniae, and  Mycoplasma pneumoniae. A negative FilmArray result does not  always exclude the possibility of viral or bacterial  infection. Laboratory results should always be interpreted  in the context of clinical findings.    Entero/Rhinovirus (RapRVP): NOT DETECTED    HKU1 Coronavirus (RapRVP): NOT DETECTED    NL63 Coronavirus (RapRVP): NOT DETECTED    229E Coronavirus (RapRVP): NOT DETECTED    OC43 Coronavirus (RapRVP): NOT DETECTED    hMPV (RapRVP): NOT DETECTED          RADIOLOGY & ADDITIONAL STUDIES:

## 2018-07-27 NOTE — PROGRESS NOTE ADULT - SUBJECTIVE AND OBJECTIVE BOX
Chief complaint  Patient is a 67y old  Male who presents with a chief complaint of left foot swelling right great toe swelling (21 Jul 2018 07:34)   Review of systems  Patient in bed, looks comfortable, no fever, no hypoglycemia.    Labs and Fingersticks  CAPILLARY BLOOD GLUCOSE      POCT Blood Glucose.: 171 mg/dL (27 Jul 2018 08:24)  POCT Blood Glucose.: 121 mg/dL (26 Jul 2018 22:33)  POCT Blood Glucose.: 173 mg/dL (26 Jul 2018 17:35)  POCT Blood Glucose.: 140 mg/dL (26 Jul 2018 12:51)          Calcium, Total Serum: 9.3 (07-27 @ 05:00)  Calcium, Total Serum: 9.1 (07-26 @ 07:55)          07-27    135  |  96<L>  |  60<H>  ----------------------------<  152<H>  4.9   |  23  |  1.52<H>    Ca    9.3      27 Jul 2018 05:00  Phos  4.6     07-27  Mg     2.7     07-27                          9.7    9.25  )-----------( 315      ( 27 Jul 2018 05:00 )             30.6     Medications  MEDICATIONS  (STANDING):  aspirin enteric coated 81 milliGRAM(s) Oral daily  atorvastatin 80 milliGRAM(s) Oral at bedtime  buDESOnide 160 MICROgram(s)/formoterol 4.5 MICROgram(s) Inhaler 2 Puff(s) Inhalation two times a day  cyanocobalamin 1000 MICROGram(s) Oral daily  dextrose 5%. 1000 milliLiter(s) (50 mL/Hr) IV Continuous <Continuous>  dextrose 50% Injectable 12.5 Gram(s) IV Push once  dextrose 50% Injectable 25 Gram(s) IV Push once  dextrose 50% Injectable 25 Gram(s) IV Push once  ferrous    sulfate 325 milliGRAM(s) Oral daily  furosemide    Tablet 40 milliGRAM(s) Oral daily  gabapentin 300 milliGRAM(s) Oral daily  heparin  Injectable 5000 Unit(s) SubCutaneous every 12 hours  insulin glargine Injectable (LANTUS) 30 Unit(s) SubCutaneous every morning  insulin glargine Injectable (LANTUS) 60 Unit(s) SubCutaneous at bedtime  insulin lispro (HumaLOG) corrective regimen sliding scale   SubCutaneous three times a day before meals  insulin lispro Injectable (HumaLOG) 30 Unit(s) SubCutaneous three times a day before meals  levothyroxine 150 MICROGram(s) Oral daily  metoprolol tartrate 25 milliGRAM(s) Oral two times a day  oxyCODONE  ER Tablet 15 milliGRAM(s) Oral every 12 hours  polyethylene glycol 3350 17 Gram(s) Oral daily  senna 2 Tablet(s) Oral at bedtime      Physical Exam  General: Patient comfortable in bed  Vital Signs Last 12 Hrs  T(F): 98.6 (07-27-18 @ 05:49), Max: 98.6 (07-27-18 @ 05:49)  HR: 75 (07-27-18 @ 05:49) (75 - 75)  BP: 144/83 (07-27-18 @ 05:49) (144/83 - 144/83)  BP(mean): --  RR: 18 (07-27-18 @ 05:49) (18 - 18)  SpO2: 99% (07-27-18 @ 05:49) (99% - 99%)  Neck: No palpable thyroid nodules.  CVS: S1S2, No murmurs  Respiratory: No wheezing, no crepitations  GI: Abdomen soft, bowel sounds positive  Musculoskeletal:  edema lower extremities.   Skin: No skin rashes, no ecchymosis    Diagnostics    Hemoglobin A1C, Whole Blood: Routine  Cancel Reason: Auto-cancelled after 3 days - specimen not received. (07-19 @ 11:09)

## 2018-07-27 NOTE — PROGRESS NOTE ADULT - ASSESSMENT
66 yo M former smoker, hx IDDM2, hypothyroidism, HTN, HLD, COPD, CAD s/p stent & CABG x 3, atherosclerosis LE with intermittent claudication L leg, RLE bypass, femoral endartectomy fem-pop bypass 2017, presenting with L great toe pain s/p paronychia drainage and toenail removal. Pt describes 10/10 throbbing toe pain unrelieved by diclofenac or naproxen. Pt states he soaked foot in epsom salt one day ago before pain acutely worsened. Denies fevers/chills. No foot/calf pain. Reports L leg swelling/redness unchanged from baseline. On keflex BID. (19 Jul 2018 16:29)    ER vitals:  T 96.2, P 72, /57, WBC 10.  ESR 79, CRP 7.0.  Pt received clinda x 1 in ER.  Pt had recently been on approximately 3 weeks of abx (keflex) as an outpatient for Left nail infection.  PT states it was not getting better, and podiatrist removed nail on Tuesday.  As per patient, he noted some pus underneath nail bed.  Pt came to ER, as he has been having intolerable pain since procedure.  He c/o rigors/chills currently.  Foot has been swollen and tender.  No recent travel, or foot trauma.      He recently saw his Vascular surgeon Dr. Loya, and pending eventual angiogram.    Problem/Plan - 1:    ·	Left foot cellulitis    - Recent L nail avulsion, c/o worsening pain, swelling/tenderness. S/p recent course of keflex x 3 weeks.      - Pt initially on broad spectrum abx with vanco/zosyn, given h/o diabetes.  Changed to po doxycycline and completed course.      - Cont to monitor L foot swelling/pain (slightly improved), still with serous drainage from L hallux bullae, s/p lanced x 2.  No purulence    - Podiatry follow up appreciated.  No acute intervention.  Vascular f/u for angiogram.  Pt with evolving ischemic changes    - MRI L foot no OM, possible cellulitis.  Completed abx x 7 day course.  More likely foot changes related to ischemia.  No fevers/leukocytosis/purulence/increased warmth.  If patient develops worsening symptoms or fever, will re-assess need for IV abx.  Monitor off for now        Yesika Rodríguez  [phone]

## 2018-07-27 NOTE — PROGRESS NOTE ADULT - ASSESSMENT
MARK [id number]  67y  Male  Local infection of skin and subcutaneous tissue  H/o or current diagnosis of HF- ACEI/ARB contraindication unknown  H/o or current diagnosis of HF- Contraindication to ACEI/ARBs  H/o or current diagnosis of HF- ACEI/ARB contraindication unknown  H/o or current diagnosis of HF- ACEI/ARB contraindication unknown  H/o or current diagnosis of HF- Contraindication to ACEI/ARBs  H/o or current diagnosis of HF- ACEI/ARB contraindication unknown  No pertinent family history in first degree relatives  Family history of lung disease  Family history of diabetes mellitus  No pertinent family history in first degree relatives  Handoff  MEWS Score  COPD (chronic obstructive pulmonary disease)  Sleep apnea  Anemia  Atherosclerosis of arteries of extremities  Mild intermittent asthma without complication  Iron deficiency anemia, unspecified iron deficiency anemia type  Prostate cancer  Bronchospasm  Obesity (BMI 30-39.9)  PAD (peripheral artery disease)  CAD (coronary artery disease)  S/P prostatectomy  Hypothyroid  Intermittent claudication  Diabetic neuropathy  DM (diabetes mellitus)  CHF (congestive heart failure)  Hypercholesteremia  HTN (hypertension)  Diabetic ulcer of toe of left foot, limited to breakdown of skin  Toe infection  CAD (coronary artery disease)  Mild intermittent asthma without complication  Hypothyroid  CHF (congestive heart failure)  CKD (chronic kidney disease), stage III  PAD (peripheral artery disease)  DM (diabetes mellitus)  Toe infection  History of femoral angiogram  S/P bypass graft of extremity  S/P CABG x 3  Stented coronary artery  S/P prostatectomy  LEFT TOE PAIN  90+  Atherosclerosis of arteries of extremities  CAD (coronary artery disease)  CKD (chronic kidney disease), stage III  DM (diabetes mellitus)    acetaminophen   Tablet. 650 milliGRAM(s) Oral every 6 hours PRN  aspirin enteric coated 81 milliGRAM(s) Oral daily  atorvastatin 80 milliGRAM(s) Oral at bedtime  buDESOnide 160 MICROgram(s)/formoterol 4.5 MICROgram(s) Inhaler 2 Puff(s) Inhalation two times a day  cyanocobalamin 1000 MICROGram(s) Oral daily  dextrose 40% Gel 15 Gram(s) Oral once PRN  dextrose 5%. 1000 milliLiter(s) IV Continuous <Continuous>  dextrose 50% Injectable 12.5 Gram(s) IV Push once  dextrose 50% Injectable 25 Gram(s) IV Push once  dextrose 50% Injectable 25 Gram(s) IV Push once  docusate sodium 100 milliGRAM(s) Oral daily PRN  ferrous    sulfate 325 milliGRAM(s) Oral daily  furosemide    Tablet 40 milliGRAM(s) Oral daily  gabapentin 300 milliGRAM(s) Oral daily  glucagon  Injectable 1 milliGRAM(s) IntraMuscular once PRN  heparin  Injectable 5000 Unit(s) SubCutaneous every 12 hours  insulin glargine Injectable (LANTUS) 30 Unit(s) SubCutaneous every morning  insulin glargine Injectable (LANTUS) 60 Unit(s) SubCutaneous at bedtime  insulin lispro (HumaLOG) corrective regimen sliding scale   SubCutaneous three times a day before meals  insulin lispro Injectable (HumaLOG) 30 Unit(s) SubCutaneous three times a day before meals  levothyroxine 150 MICROGram(s) Oral daily  metoprolol tartrate 25 milliGRAM(s) Oral two times a day  morphine  - Injectable 2 milliGRAM(s) IV Push every 6 hours PRN  oxyCODONE    IR 5 milliGRAM(s) Oral every 4 hours PRN  oxyCODONE    IR 10 milliGRAM(s) Oral every 4 hours PRN  oxyCODONE  ER Tablet 15 milliGRAM(s) Oral every 12 hours  polyethylene glycol 3350 17 Gram(s) Oral daily  senna 2 Tablet(s) Oral at bedtime  No Known Allergies    CBC Full  -  ( 27 Jul 2018 05:00 )  WBC Count : 9.25 K/uL  Hemoglobin : 9.7 g/dL  Hematocrit : 30.6 %  Platelet Count - Automated : 315 K/uL  Mean Cell Volume : 84.1 fL  Mean Cell Hemoglobin : 26.6 pg  Mean Cell Hemoglobin Concentration : 31.7 %  Auto Neutrophil # : x  Auto Lymphocyte # : x  Auto Monocyte # : x  Auto Eosinophil # : x  Auto Basophil # : x  Auto Neutrophil % : x  Auto Lymphocyte % : x  Auto Monocyte % : x  Auto Eosinophil % : x  Auto Basophil % : x    Patient visited at bedside without bandage with draining ulcerr dorsal left hallux awaiting vascular procedure next week. patient relates decreased pain since he has been keeping left foot in dependent position more often. Ischemic left hallux with no signs of ascending cellulitis. Strongly recommend patient keep left hallux wound covered at all times  Patient awaiting vascular surgery next week

## 2018-07-27 NOTE — PROGRESS NOTE ADULT - ASSESSMENT
66 yo M former smoker, hx IDDM2, hypothyroidism, HTN, HLD, COPD, CAD s/p stent & CABG x 3 with left toe pain and swelling on PO abx, planned for Angiogram next Tuesday 7/31/18.     Plan  - continue abx  - plan for angio next Tues (7/31/18)  - No podiatric surgical intervention planned  - f/u podiatry/ID/nephrology recs  - f/u RVP (swab sent)  - pain control  - OOB  - DVT ppx

## 2018-07-28 LAB
BUN SERPL-MCNC: 61 MG/DL — HIGH (ref 7–23)
CALCIUM SERPL-MCNC: 9.5 MG/DL — SIGNIFICANT CHANGE UP (ref 8.4–10.5)
CHLORIDE SERPL-SCNC: 94 MMOL/L — LOW (ref 98–107)
CO2 SERPL-SCNC: 22 MMOL/L — SIGNIFICANT CHANGE UP (ref 22–31)
CREAT SERPL-MCNC: 1.61 MG/DL — HIGH (ref 0.5–1.3)
GLUCOSE BLDC GLUCOMTR-MCNC: 112 MG/DL — HIGH (ref 70–99)
GLUCOSE BLDC GLUCOMTR-MCNC: 120 MG/DL — HIGH (ref 70–99)
GLUCOSE BLDC GLUCOMTR-MCNC: 135 MG/DL — HIGH (ref 70–99)
GLUCOSE BLDC GLUCOMTR-MCNC: 250 MG/DL — HIGH (ref 70–99)
GLUCOSE BLDC GLUCOMTR-MCNC: 55 MG/DL — LOW (ref 70–99)
GLUCOSE BLDC GLUCOMTR-MCNC: 62 MG/DL — LOW (ref 70–99)
GLUCOSE SERPL-MCNC: 140 MG/DL — HIGH (ref 70–99)
HCT VFR BLD CALC: 33.7 % — LOW (ref 39–50)
HGB BLD-MCNC: 10.6 G/DL — LOW (ref 13–17)
MAGNESIUM SERPL-MCNC: 2.8 MG/DL — HIGH (ref 1.6–2.6)
MCHC RBC-ENTMCNC: 26.9 PG — LOW (ref 27–34)
MCHC RBC-ENTMCNC: 31.5 % — LOW (ref 32–36)
MCV RBC AUTO: 85.5 FL — SIGNIFICANT CHANGE UP (ref 80–100)
NRBC # FLD: 0 — SIGNIFICANT CHANGE UP
PHOSPHATE SERPL-MCNC: 4.3 MG/DL — SIGNIFICANT CHANGE UP (ref 2.5–4.5)
PLATELET # BLD AUTO: 301 K/UL — SIGNIFICANT CHANGE UP (ref 150–400)
PMV BLD: 10.8 FL — SIGNIFICANT CHANGE UP (ref 7–13)
POTASSIUM SERPL-MCNC: 5 MMOL/L — SIGNIFICANT CHANGE UP (ref 3.5–5.3)
POTASSIUM SERPL-SCNC: 5 MMOL/L — SIGNIFICANT CHANGE UP (ref 3.5–5.3)
RBC # BLD: 3.94 M/UL — LOW (ref 4.2–5.8)
RBC # FLD: 16 % — HIGH (ref 10.3–14.5)
SODIUM SERPL-SCNC: 134 MMOL/L — LOW (ref 135–145)
WBC # BLD: 9.94 K/UL — SIGNIFICANT CHANGE UP (ref 3.8–10.5)
WBC # FLD AUTO: 9.94 K/UL — SIGNIFICANT CHANGE UP (ref 3.8–10.5)

## 2018-07-28 RX ORDER — OXYCODONE HYDROCHLORIDE 5 MG/1
20 TABLET ORAL EVERY 12 HOURS
Qty: 0 | Refills: 0 | Status: DISCONTINUED | OUTPATIENT
Start: 2018-07-28 | End: 2018-08-01

## 2018-07-28 RX ADMIN — OXYCODONE HYDROCHLORIDE 10 MILLIGRAM(S): 5 TABLET ORAL at 07:21

## 2018-07-28 RX ADMIN — OXYCODONE HYDROCHLORIDE 10 MILLIGRAM(S): 5 TABLET ORAL at 20:20

## 2018-07-28 RX ADMIN — Medication 30 UNIT(S): at 08:44

## 2018-07-28 RX ADMIN — OXYCODONE HYDROCHLORIDE 15 MILLIGRAM(S): 5 TABLET ORAL at 06:00

## 2018-07-28 RX ADMIN — HEPARIN SODIUM 5000 UNIT(S): 5000 INJECTION INTRAVENOUS; SUBCUTANEOUS at 06:00

## 2018-07-28 RX ADMIN — OXYCODONE HYDROCHLORIDE 10 MILLIGRAM(S): 5 TABLET ORAL at 19:39

## 2018-07-28 RX ADMIN — BUDESONIDE AND FORMOTEROL FUMARATE DIHYDRATE 2 PUFF(S): 160; 4.5 AEROSOL RESPIRATORY (INHALATION) at 08:44

## 2018-07-28 RX ADMIN — Medication 25 MILLIGRAM(S): at 06:00

## 2018-07-28 RX ADMIN — OXYCODONE HYDROCHLORIDE 10 MILLIGRAM(S): 5 TABLET ORAL at 02:43

## 2018-07-28 RX ADMIN — INSULIN GLARGINE 60 UNIT(S): 100 INJECTION, SOLUTION SUBCUTANEOUS at 21:59

## 2018-07-28 RX ADMIN — INSULIN GLARGINE 30 UNIT(S): 100 INJECTION, SOLUTION SUBCUTANEOUS at 08:44

## 2018-07-28 RX ADMIN — MORPHINE SULFATE 2 MILLIGRAM(S): 50 CAPSULE, EXTENDED RELEASE ORAL at 22:03

## 2018-07-28 RX ADMIN — OXYCODONE HYDROCHLORIDE 10 MILLIGRAM(S): 5 TABLET ORAL at 03:20

## 2018-07-28 RX ADMIN — OXYCODONE HYDROCHLORIDE 15 MILLIGRAM(S): 5 TABLET ORAL at 17:55

## 2018-07-28 RX ADMIN — Medication 40 MILLIGRAM(S): at 06:00

## 2018-07-28 RX ADMIN — Medication 325 MILLIGRAM(S): at 13:07

## 2018-07-28 RX ADMIN — OXYCODONE HYDROCHLORIDE 15 MILLIGRAM(S): 5 TABLET ORAL at 17:57

## 2018-07-28 RX ADMIN — OXYCODONE HYDROCHLORIDE 10 MILLIGRAM(S): 5 TABLET ORAL at 14:00

## 2018-07-28 RX ADMIN — BUDESONIDE AND FORMOTEROL FUMARATE DIHYDRATE 2 PUFF(S): 160; 4.5 AEROSOL RESPIRATORY (INHALATION) at 22:00

## 2018-07-28 RX ADMIN — PREGABALIN 1000 MICROGRAM(S): 225 CAPSULE ORAL at 13:07

## 2018-07-28 RX ADMIN — Medication 30 UNIT(S): at 13:07

## 2018-07-28 RX ADMIN — OXYCODONE HYDROCHLORIDE 10 MILLIGRAM(S): 5 TABLET ORAL at 09:00

## 2018-07-28 RX ADMIN — MORPHINE SULFATE 2 MILLIGRAM(S): 50 CAPSULE, EXTENDED RELEASE ORAL at 22:15

## 2018-07-28 RX ADMIN — Medication 150 MICROGRAM(S): at 06:00

## 2018-07-28 RX ADMIN — GABAPENTIN 300 MILLIGRAM(S): 400 CAPSULE ORAL at 13:07

## 2018-07-28 RX ADMIN — Medication 81 MILLIGRAM(S): at 13:07

## 2018-07-28 RX ADMIN — SENNA PLUS 2 TABLET(S): 8.6 TABLET ORAL at 21:59

## 2018-07-28 RX ADMIN — HEPARIN SODIUM 5000 UNIT(S): 5000 INJECTION INTRAVENOUS; SUBCUTANEOUS at 22:00

## 2018-07-28 RX ADMIN — OXYCODONE HYDROCHLORIDE 10 MILLIGRAM(S): 5 TABLET ORAL at 13:10

## 2018-07-28 RX ADMIN — HEPARIN SODIUM 5000 UNIT(S): 5000 INJECTION INTRAVENOUS; SUBCUTANEOUS at 13:08

## 2018-07-28 RX ADMIN — MORPHINE SULFATE 2 MILLIGRAM(S): 50 CAPSULE, EXTENDED RELEASE ORAL at 00:10

## 2018-07-28 RX ADMIN — POLYETHYLENE GLYCOL 3350 17 GRAM(S): 17 POWDER, FOR SOLUTION ORAL at 22:00

## 2018-07-28 RX ADMIN — ATORVASTATIN CALCIUM 80 MILLIGRAM(S): 80 TABLET, FILM COATED ORAL at 21:59

## 2018-07-28 RX ADMIN — Medication 25 MILLIGRAM(S): at 17:52

## 2018-07-28 NOTE — PROGRESS NOTE ADULT - SUBJECTIVE AND OBJECTIVE BOX
Chief complaint  Patient is a 67y old  Male who presents with a chief complaint of left foot swelling right great toe swelling (21 Jul 2018 07:34)   Review of systems  Patient in bed, looks comfortable, no fever, no hypoglycemia.    Labs and Fingersticks  CAPILLARY BLOOD GLUCOSE      POCT Blood Glucose.: 135 mg/dL (28 Jul 2018 11:58)  POCT Blood Glucose.: 120 mg/dL (28 Jul 2018 08:25)  POCT Blood Glucose.: 83 mg/dL (27 Jul 2018 22:23)  POCT Blood Glucose.: 117 mg/dL (27 Jul 2018 17:17)          Calcium, Total Serum: 9.5 (07-28 @ 06:09)  Calcium, Total Serum: 9.3 (07-27 @ 05:00)          07-28    134<L>  |  94<L>  |  61<H>  ----------------------------<  140<H>  5.0   |  22  |  1.61<H>    Ca    9.5      28 Jul 2018 06:09  Phos  4.3     07-28  Mg     2.8     07-28                          10.6   9.94  )-----------( 301      ( 28 Jul 2018 06:09 )             33.7     Medications  MEDICATIONS  (STANDING):  aspirin enteric coated 81 milliGRAM(s) Oral daily  atorvastatin 80 milliGRAM(s) Oral at bedtime  buDESOnide 160 MICROgram(s)/formoterol 4.5 MICROgram(s) Inhaler 2 Puff(s) Inhalation two times a day  cyanocobalamin 1000 MICROGram(s) Oral daily  dextrose 5%. 1000 milliLiter(s) (50 mL/Hr) IV Continuous <Continuous>  dextrose 50% Injectable 12.5 Gram(s) IV Push once  dextrose 50% Injectable 25 Gram(s) IV Push once  dextrose 50% Injectable 25 Gram(s) IV Push once  ferrous    sulfate 325 milliGRAM(s) Oral daily  furosemide    Tablet 40 milliGRAM(s) Oral daily  gabapentin 300 milliGRAM(s) Oral daily  heparin  Injectable 5000 Unit(s) SubCutaneous every 8 hours  insulin glargine Injectable (LANTUS) 30 Unit(s) SubCutaneous every morning  insulin glargine Injectable (LANTUS) 60 Unit(s) SubCutaneous at bedtime  insulin lispro (HumaLOG) corrective regimen sliding scale   SubCutaneous three times a day before meals  insulin lispro Injectable (HumaLOG) 30 Unit(s) SubCutaneous three times a day before meals  levothyroxine 150 MICROGram(s) Oral daily  metoprolol tartrate 25 milliGRAM(s) Oral two times a day  oxyCODONE  ER Tablet 15 milliGRAM(s) Oral every 12 hours  polyethylene glycol 3350 17 Gram(s) Oral daily  senna 2 Tablet(s) Oral at bedtime      Physical Exam  General: Patient comfortable in bed  Vital Signs Last 12 Hrs  T(F): 97.9 (07-28-18 @ 13:30), Max: 98.2 (07-28-18 @ 05:59)  HR: 74 (07-28-18 @ 13:30) (74 - 84)  BP: 118/51 (07-28-18 @ 13:30) (118/51 - 136/66)  BP(mean): --  RR: 18 (07-28-18 @ 13:30) (18 - 18)  SpO2: 98% (07-28-18 @ 13:30) (98% - 98%)  Neck: No palpable thyroid nodules.  CVS: S1S2, No murmurs  Respiratory: No wheezing, no crepitations  GI: Abdomen soft, bowel sounds positive  Musculoskeletal:  edema lower extremities.   Skin: No skin rashes, no ecchymosis    Diagnostics    Hemoglobin A1C, Whole Blood: Routine  Cancel Reason: Auto-cancelled after 3 days - specimen not received. (07-19 @ 11:09)

## 2018-07-28 NOTE — PROGRESS NOTE ADULT - ASSESSMENT
66 yo M former smoker, hx IDDM2, hypothyroidism, HTN, HLD, COPD, CAD s/p stent & CABG x 3 with left toe pain and swelling on PO abx, planned for Angiogram next Tuesday 7/31/18.     Plan  - continue abx  - plan for angio next Tues (7/31/18)  - No podiatric surgical intervention planned  - f/u podiatry/ID/nephrology recs  - pain control  - OOB  - DVT ppx

## 2018-07-28 NOTE — PROGRESS NOTE ADULT - ASSESSMENT
68 yo M former smoker, hx IDDM2, hypothyroidism, HTN, HLD, COPD, CAD s/p stent & CABG x 3, atherosclerosis LE with intermittent claudication L leg, RLE bypass, femoral endartectomy fem-pop bypass 2017, presenting with L great toe pain s/p paronychia drainage and toenail removal. Pt describes 10/10 throbbing toe pain unrelieved by diclofenac or naproxen. Pt states he soaked foot in epsom salt one day ago before pain acutely worsened. Denies fevers/chills. No foot/calf pain. Reports L leg swelling/redness unchanged from baseline. On keflex BID.      Problem/Plan - 1:  ·  Problem: Toe infection.  Plan: S/P I&D . Abxs per ID . Podiatry helping .MRI ..< from: MR Foot No Cont, Left (07.23.18 @ 08:40) >    IMPRESSION:    1.  Soft tissue swelling and first digit skin bleb, nonspecific but can   be seen with cellulitis in the appropriate clinical setting.  2.  No drainable fluid collection.  3.  No definite bone marrow signal abnormality to suggest osteomyelitis     < end of copied text >       Problem/Plan - 2:  ·  Problem: DM (diabetes mellitus).  Plan: Endo helping and will follow recommendations. Sugars better.      Problem/Plan - 3:  ·  Problem: PAD (peripheral artery disease).  Plan: Vascular follow up noted.  Cleared by renal for Angio. Planned for Tuesday.      Problem/Plan - 4:  ·  Problem: CKD (chronic kidney disease), stage III with JOSE LUIS .  Plan: Creatinine improving. Sec to ATN . Has been taking NSAID . Renal following.      Problem/Plan - 5:  ·  Problem: CHF (congestive heart failure).  Plan: Not in acute CHF .      Problem/Plan - 6:  Problem: Hypothyroid. Plan: Synthroid .     Problem/Plan - 7:  ·  Problem: Foot Pain .  Plan: Sec to   PVD  . Increased Oxycontin and continuing PRN pain meds.      Problem/Plan - 8:  ·  Problem: Mild intermittent asthma without complication.  Plan: continue Inhaler.

## 2018-07-28 NOTE — PROGRESS NOTE ADULT - SUBJECTIVE AND OBJECTIVE BOX
Vascular Surgery Progress Note     S: Patient was seen and examined during morning rounds. C/o pain in the L foot.  Pt denies f/c, n/v, SOB, CP/ABP, lightheadedness.      MEDICATIONS  (STANDING):  aspirin enteric coated 81 milliGRAM(s) Oral daily  atorvastatin 80 milliGRAM(s) Oral at bedtime  buDESOnide 160 MICROgram(s)/formoterol 4.5 MICROgram(s) Inhaler 2 Puff(s) Inhalation two times a day  cyanocobalamin 1000 MICROGram(s) Oral daily  dextrose 5%. 1000 milliLiter(s) (50 mL/Hr) IV Continuous <Continuous>  dextrose 50% Injectable 12.5 Gram(s) IV Push once  dextrose 50% Injectable 25 Gram(s) IV Push once  dextrose 50% Injectable 25 Gram(s) IV Push once  ferrous    sulfate 325 milliGRAM(s) Oral daily  furosemide    Tablet 40 milliGRAM(s) Oral daily  gabapentin 300 milliGRAM(s) Oral daily  heparin  Injectable 5000 Unit(s) SubCutaneous every 8 hours  insulin glargine Injectable (LANTUS) 30 Unit(s) SubCutaneous every morning  insulin glargine Injectable (LANTUS) 60 Unit(s) SubCutaneous at bedtime  insulin lispro (HumaLOG) corrective regimen sliding scale   SubCutaneous three times a day before meals  insulin lispro Injectable (HumaLOG) 30 Unit(s) SubCutaneous three times a day before meals  levothyroxine 150 MICROGram(s) Oral daily  metoprolol tartrate 25 milliGRAM(s) Oral two times a day  oxyCODONE  ER Tablet 15 milliGRAM(s) Oral every 12 hours  polyethylene glycol 3350 17 Gram(s) Oral daily  senna 2 Tablet(s) Oral at bedtime    MEDICATIONS  (PRN):  acetaminophen   Tablet. 650 milliGRAM(s) Oral every 6 hours PRN Mild Pain (1 - 3)  dextrose 40% Gel 15 Gram(s) Oral once PRN Blood Glucose LESS THAN 70 milliGRAM(s)/deciLiter  docusate sodium 100 milliGRAM(s) Oral daily PRN Constipation  glucagon  Injectable 1 milliGRAM(s) IntraMuscular once PRN Glucose <70 milliGRAM(s)/deciLiter  morphine  - Injectable 2 milliGRAM(s) IV Push every 6 hours PRN breakthrough pain  oxyCODONE    IR 5 milliGRAM(s) Oral every 4 hours PRN Moderate Pain (4 - 6)  oxyCODONE    IR 10 milliGRAM(s) Oral every 4 hours PRN Severe Pain (7 - 10)      Vital Signs Last 24 Hrs  T(C): 36.6 (28 Jul 2018 13:30), Max: 36.8 (28 Jul 2018 05:59)  T(F): 97.9 (28 Jul 2018 13:30), Max: 98.2 (28 Jul 2018 05:59)  HR: 74 (28 Jul 2018 13:30) (74 - 84)  BP: 118/51 (28 Jul 2018 13:30) (118/51 - 141/71)  BP(mean): --  RR: 18 (28 Jul 2018 13:30) (18 - 18)  SpO2: 98% (28 Jul 2018 13:30) (96% - 98%)    07-27-18 @ 07:01  -  07-28-18 @ 07:00  --------------------------------------------------------  IN: 0 mL / OUT: 450 mL / NET: -450 mL        Physical Exam  Gen: NAD, awake and alert  Head/Neck: NC/AT  Resp: Respirations nonlabored  Neuro: Speaking in clear, full sentences  Extremeties: Left toe pain and swelling of L foot, wound clean. Palpable DP/PT in L foot.    LABS:                        10.6   9.94  )-----------( 301      ( 28 Jul 2018 06:09 )             33.7     07-28    134<L>  |  94<L>  |  61<H>  ----------------------------<  140<H>  5.0   |  22  |  1.61<H>    Ca    9.5      28 Jul 2018 06:09  Phos  4.3     07-28  Mg     2.8     07-28

## 2018-07-28 NOTE — PROGRESS NOTE ADULT - SUBJECTIVE AND OBJECTIVE BOX
INTERVAL HPI/OVERNIGHT EVENTS: Still in lot of pain .   Vital Signs Last 24 Hrs  T(C): 36.6 (28 Jul 2018 13:30), Max: 36.8 (28 Jul 2018 05:59)  T(F): 97.9 (28 Jul 2018 13:30), Max: 98.2 (28 Jul 2018 05:59)  HR: 74 (28 Jul 2018 13:30) (74 - 84)  BP: 118/51 (28 Jul 2018 13:30) (118/51 - 141/71)  BP(mean): --  RR: 18 (28 Jul 2018 13:30) (18 - 18)  SpO2: 98% (28 Jul 2018 13:30) (96% - 98%)  I&O's Summary    27 Jul 2018 07:01  -  28 Jul 2018 07:00  --------------------------------------------------------  IN: 0 mL / OUT: 450 mL / NET: -450 mL      MEDICATIONS  (STANDING):  aspirin enteric coated 81 milliGRAM(s) Oral daily  atorvastatin 80 milliGRAM(s) Oral at bedtime  buDESOnide 160 MICROgram(s)/formoterol 4.5 MICROgram(s) Inhaler 2 Puff(s) Inhalation two times a day  cyanocobalamin 1000 MICROGram(s) Oral daily  dextrose 5%. 1000 milliLiter(s) (50 mL/Hr) IV Continuous <Continuous>  dextrose 50% Injectable 12.5 Gram(s) IV Push once  dextrose 50% Injectable 25 Gram(s) IV Push once  dextrose 50% Injectable 25 Gram(s) IV Push once  ferrous    sulfate 325 milliGRAM(s) Oral daily  furosemide    Tablet 40 milliGRAM(s) Oral daily  gabapentin 300 milliGRAM(s) Oral daily  heparin  Injectable 5000 Unit(s) SubCutaneous every 8 hours  insulin glargine Injectable (LANTUS) 30 Unit(s) SubCutaneous every morning  insulin glargine Injectable (LANTUS) 60 Unit(s) SubCutaneous at bedtime  insulin lispro (HumaLOG) corrective regimen sliding scale   SubCutaneous three times a day before meals  insulin lispro Injectable (HumaLOG) 30 Unit(s) SubCutaneous three times a day before meals  levothyroxine 150 MICROGram(s) Oral daily  metoprolol tartrate 25 milliGRAM(s) Oral two times a day  oxyCODONE  ER Tablet 20 milliGRAM(s) Oral every 12 hours  polyethylene glycol 3350 17 Gram(s) Oral daily  senna 2 Tablet(s) Oral at bedtime    MEDICATIONS  (PRN):  acetaminophen   Tablet. 650 milliGRAM(s) Oral every 6 hours PRN Mild Pain (1 - 3)  dextrose 40% Gel 15 Gram(s) Oral once PRN Blood Glucose LESS THAN 70 milliGRAM(s)/deciLiter  docusate sodium 100 milliGRAM(s) Oral daily PRN Constipation  glucagon  Injectable 1 milliGRAM(s) IntraMuscular once PRN Glucose <70 milliGRAM(s)/deciLiter  morphine  - Injectable 2 milliGRAM(s) IV Push every 6 hours PRN breakthrough pain  oxyCODONE    IR 5 milliGRAM(s) Oral every 4 hours PRN Moderate Pain (4 - 6)  oxyCODONE    IR 10 milliGRAM(s) Oral every 4 hours PRN Severe Pain (7 - 10)    LABS:                        10.6   9.94  )-----------( 301      ( 28 Jul 2018 06:09 )             33.7     07-28    134<L>  |  94<L>  |  61<H>  ----------------------------<  140<H>  5.0   |  22  |  1.61<H>    Ca    9.5      28 Jul 2018 06:09  Phos  4.3     07-28  Mg     2.8     07-28          CAPILLARY BLOOD GLUCOSE      POCT Blood Glucose.: 250 mg/dL (28 Jul 2018 17:56)  POCT Blood Glucose.: 55 mg/dL (28 Jul 2018 17:31)  POCT Blood Glucose.: 62 mg/dL (28 Jul 2018 17:01)  POCT Blood Glucose.: 135 mg/dL (28 Jul 2018 11:58)  POCT Blood Glucose.: 120 mg/dL (28 Jul 2018 08:25)  POCT Blood Glucose.: 83 mg/dL (27 Jul 2018 22:23)          REVIEW OF SYSTEMS:  CONSTITUTIONAL: No fever, weight loss, or fatigue  EYES: No eye pain, visual disturbances, or discharge  ENMT:  No difficulty hearing, tinnitus, vertigo; No sinus or throat pain  NECK: No pain or stiffness  BREASTS: No pain, masses, or nipple discharge  RESPIRATORY: No cough, wheezing, chills or hemoptysis; No shortness of breath  CARDIOVASCULAR: No chest pain, palpitations, dizziness, or leg swelling  GASTROINTESTINAL: No abdominal or epigastric pain. No nausea, vomiting, or hematemesis; No diarrhea or constipation. No melena or hematochezia.  GENITOURINARY: No dysuria, frequency, hematuria, or incontinence  NEUROLOGICAL: No headaches, memory loss, loss of strength, numbness, or tremors  ENDOCRINE: No heat or cold intolerance; No hair loss  MUSCULOSKELETAL: left foot  pain      Consultant(s) Notes Reviewed:  [x ] YES  [ ] NO    PHYSICAL EXAM:  GENERAL: NAD, well-groomed, well-developed, not in any distress ,  HEAD:  Atraumatic, Normocephalic  EYES: EOMI, PERRLA, conjunctiva and sclera clear  ENMT: No tonsillar erythema, exudates, or enlargement; Moist mucous membranes, Good dentition, No lesions  NECK: Supple, No JVD, Normal thyroid  NERVOUS SYSTEM:  Alert & Oriented X3, No focal deficit   CHEST/LUNG: Good air entry bilateral with no  rales, rhonchi, wheezing, or rubs  HEART: Regular rate and rhythm; No murmurs, rubs, or gallops  ABDOMEN: Soft, Nontender, Nondistended; Bowel sounds present  EXTREMITIES: left foot dressed     Care Discussed with Consultants/Other Providers [ x] YES  [ ] NO

## 2018-07-29 LAB
BUN SERPL-MCNC: 61 MG/DL — HIGH (ref 7–23)
CALCIUM SERPL-MCNC: 9.3 MG/DL — SIGNIFICANT CHANGE UP (ref 8.4–10.5)
CHLORIDE SERPL-SCNC: 94 MMOL/L — LOW (ref 98–107)
CO2 SERPL-SCNC: 24 MMOL/L — SIGNIFICANT CHANGE UP (ref 22–31)
CREAT SERPL-MCNC: 1.61 MG/DL — HIGH (ref 0.5–1.3)
GLUCOSE BLDC GLUCOMTR-MCNC: 127 MG/DL — HIGH (ref 70–99)
GLUCOSE BLDC GLUCOMTR-MCNC: 154 MG/DL — HIGH (ref 70–99)
GLUCOSE BLDC GLUCOMTR-MCNC: 258 MG/DL — HIGH (ref 70–99)
GLUCOSE BLDC GLUCOMTR-MCNC: 270 MG/DL — HIGH (ref 70–99)
GLUCOSE BLDC GLUCOMTR-MCNC: 66 MG/DL — LOW (ref 70–99)
GLUCOSE BLDC GLUCOMTR-MCNC: 75 MG/DL — SIGNIFICANT CHANGE UP (ref 70–99)
GLUCOSE SERPL-MCNC: 89 MG/DL — SIGNIFICANT CHANGE UP (ref 70–99)
HCT VFR BLD CALC: 31.3 % — LOW (ref 39–50)
HGB BLD-MCNC: 9.8 G/DL — LOW (ref 13–17)
MAGNESIUM SERPL-MCNC: 2.7 MG/DL — HIGH (ref 1.6–2.6)
MCHC RBC-ENTMCNC: 26.3 PG — LOW (ref 27–34)
MCHC RBC-ENTMCNC: 31.3 % — LOW (ref 32–36)
MCV RBC AUTO: 83.9 FL — SIGNIFICANT CHANGE UP (ref 80–100)
NRBC # FLD: 0 — SIGNIFICANT CHANGE UP
PHOSPHATE SERPL-MCNC: 4.3 MG/DL — SIGNIFICANT CHANGE UP (ref 2.5–4.5)
PLATELET # BLD AUTO: 315 K/UL — SIGNIFICANT CHANGE UP (ref 150–400)
PMV BLD: 10.8 FL — SIGNIFICANT CHANGE UP (ref 7–13)
POTASSIUM SERPL-MCNC: 4.6 MMOL/L — SIGNIFICANT CHANGE UP (ref 3.5–5.3)
POTASSIUM SERPL-SCNC: 4.6 MMOL/L — SIGNIFICANT CHANGE UP (ref 3.5–5.3)
RBC # BLD: 3.73 M/UL — LOW (ref 4.2–5.8)
RBC # FLD: 16 % — HIGH (ref 10.3–14.5)
SODIUM SERPL-SCNC: 134 MMOL/L — LOW (ref 135–145)
WBC # BLD: 9.3 K/UL — SIGNIFICANT CHANGE UP (ref 3.8–10.5)
WBC # FLD AUTO: 9.3 K/UL — SIGNIFICANT CHANGE UP (ref 3.8–10.5)

## 2018-07-29 RX ORDER — INSULIN LISPRO 100/ML
20 VIAL (ML) SUBCUTANEOUS
Qty: 0 | Refills: 0 | Status: DISCONTINUED | OUTPATIENT
Start: 2018-07-29 | End: 2018-08-01

## 2018-07-29 RX ORDER — INSULIN GLARGINE 100 [IU]/ML
60 INJECTION, SOLUTION SUBCUTANEOUS AT BEDTIME
Qty: 0 | Refills: 0 | Status: DISCONTINUED | OUTPATIENT
Start: 2018-07-29 | End: 2018-07-30

## 2018-07-29 RX ORDER — INSULIN GLARGINE 100 [IU]/ML
20 INJECTION, SOLUTION SUBCUTANEOUS EVERY MORNING
Qty: 0 | Refills: 0 | Status: DISCONTINUED | OUTPATIENT
Start: 2018-07-29 | End: 2018-07-30

## 2018-07-29 RX ADMIN — Medication 25 MILLIGRAM(S): at 17:55

## 2018-07-29 RX ADMIN — OXYCODONE HYDROCHLORIDE 10 MILLIGRAM(S): 5 TABLET ORAL at 20:47

## 2018-07-29 RX ADMIN — Medication 325 MILLIGRAM(S): at 12:43

## 2018-07-29 RX ADMIN — OXYCODONE HYDROCHLORIDE 10 MILLIGRAM(S): 5 TABLET ORAL at 14:51

## 2018-07-29 RX ADMIN — HEPARIN SODIUM 5000 UNIT(S): 5000 INJECTION INTRAVENOUS; SUBCUTANEOUS at 22:28

## 2018-07-29 RX ADMIN — OXYCODONE HYDROCHLORIDE 20 MILLIGRAM(S): 5 TABLET ORAL at 17:55

## 2018-07-29 RX ADMIN — INSULIN GLARGINE 60 UNIT(S): 100 INJECTION, SOLUTION SUBCUTANEOUS at 23:17

## 2018-07-29 RX ADMIN — HEPARIN SODIUM 5000 UNIT(S): 5000 INJECTION INTRAVENOUS; SUBCUTANEOUS at 14:51

## 2018-07-29 RX ADMIN — BUDESONIDE AND FORMOTEROL FUMARATE DIHYDRATE 2 PUFF(S): 160; 4.5 AEROSOL RESPIRATORY (INHALATION) at 22:28

## 2018-07-29 RX ADMIN — OXYCODONE HYDROCHLORIDE 10 MILLIGRAM(S): 5 TABLET ORAL at 11:15

## 2018-07-29 RX ADMIN — Medication 150 MICROGRAM(S): at 05:02

## 2018-07-29 RX ADMIN — ATORVASTATIN CALCIUM 80 MILLIGRAM(S): 80 TABLET, FILM COATED ORAL at 22:29

## 2018-07-29 RX ADMIN — Medication 81 MILLIGRAM(S): at 12:42

## 2018-07-29 RX ADMIN — PREGABALIN 1000 MICROGRAM(S): 225 CAPSULE ORAL at 12:42

## 2018-07-29 RX ADMIN — POLYETHYLENE GLYCOL 3350 17 GRAM(S): 17 POWDER, FOR SOLUTION ORAL at 22:28

## 2018-07-29 RX ADMIN — Medication 25 MILLIGRAM(S): at 05:03

## 2018-07-29 RX ADMIN — GABAPENTIN 300 MILLIGRAM(S): 400 CAPSULE ORAL at 12:42

## 2018-07-29 RX ADMIN — HEPARIN SODIUM 5000 UNIT(S): 5000 INJECTION INTRAVENOUS; SUBCUTANEOUS at 05:02

## 2018-07-29 RX ADMIN — BUDESONIDE AND FORMOTEROL FUMARATE DIHYDRATE 2 PUFF(S): 160; 4.5 AEROSOL RESPIRATORY (INHALATION) at 10:09

## 2018-07-29 RX ADMIN — INSULIN GLARGINE 30 UNIT(S): 100 INJECTION, SOLUTION SUBCUTANEOUS at 10:11

## 2018-07-29 RX ADMIN — Medication 20 UNIT(S): at 17:58

## 2018-07-29 RX ADMIN — OXYCODONE HYDROCHLORIDE 10 MILLIGRAM(S): 5 TABLET ORAL at 21:15

## 2018-07-29 RX ADMIN — Medication 3: at 17:58

## 2018-07-29 RX ADMIN — MORPHINE SULFATE 2 MILLIGRAM(S): 50 CAPSULE, EXTENDED RELEASE ORAL at 23:32

## 2018-07-29 RX ADMIN — OXYCODONE HYDROCHLORIDE 10 MILLIGRAM(S): 5 TABLET ORAL at 15:35

## 2018-07-29 RX ADMIN — Medication 30 UNIT(S): at 12:42

## 2018-07-29 RX ADMIN — OXYCODONE HYDROCHLORIDE 20 MILLIGRAM(S): 5 TABLET ORAL at 05:02

## 2018-07-29 RX ADMIN — Medication 40 MILLIGRAM(S): at 05:02

## 2018-07-29 RX ADMIN — MORPHINE SULFATE 2 MILLIGRAM(S): 50 CAPSULE, EXTENDED RELEASE ORAL at 23:17

## 2018-07-29 RX ADMIN — OXYCODONE HYDROCHLORIDE 10 MILLIGRAM(S): 5 TABLET ORAL at 10:38

## 2018-07-29 RX ADMIN — Medication 3: at 12:42

## 2018-07-29 NOTE — PROGRESS NOTE ADULT - SUBJECTIVE AND OBJECTIVE BOX
Chief complaint  Patient is a 67y old  Male who presents with a chief complaint of left foot swelling right great toe swelling (21 Jul 2018 07:34)   Review of systems  Patient in bed, looks comfortable, no fever, had hypoglycemia.    Labs and Fingersticks  CAPILLARY BLOOD GLUCOSE      POCT Blood Glucose.: 258 mg/dL (29 Jul 2018 17:34)  POCT Blood Glucose.: 270 mg/dL (29 Jul 2018 12:06)  POCT Blood Glucose.: 127 mg/dL (29 Jul 2018 09:35)  POCT Blood Glucose.: 75 mg/dL (29 Jul 2018 08:59)  POCT Blood Glucose.: 66 mg/dL (29 Jul 2018 08:28)  POCT Blood Glucose.: 112 mg/dL (28 Jul 2018 21:47)          Calcium, Total Serum: 9.3 (07-29 @ 06:03)  Calcium, Total Serum: 9.5 (07-28 @ 06:09)          07-29    134<L>  |  94<L>  |  61<H>  ----------------------------<  89  4.6   |  24  |  1.61<H>    Ca    9.3      29 Jul 2018 06:03  Phos  4.3     07-29  Mg     2.7     07-29                          9.8    9.30  )-----------( 315      ( 29 Jul 2018 06:03 )             31.3     Medications  MEDICATIONS  (STANDING):  aspirin enteric coated 81 milliGRAM(s) Oral daily  atorvastatin 80 milliGRAM(s) Oral at bedtime  buDESOnide 160 MICROgram(s)/formoterol 4.5 MICROgram(s) Inhaler 2 Puff(s) Inhalation two times a day  cyanocobalamin 1000 MICROGram(s) Oral daily  dextrose 5%. 1000 milliLiter(s) (50 mL/Hr) IV Continuous <Continuous>  dextrose 50% Injectable 12.5 Gram(s) IV Push once  dextrose 50% Injectable 25 Gram(s) IV Push once  dextrose 50% Injectable 25 Gram(s) IV Push once  ferrous    sulfate 325 milliGRAM(s) Oral daily  furosemide    Tablet 40 milliGRAM(s) Oral daily  gabapentin 300 milliGRAM(s) Oral daily  heparin  Injectable 5000 Unit(s) SubCutaneous every 8 hours  insulin glargine Injectable (LANTUS) 60 Unit(s) SubCutaneous at bedtime  insulin glargine Injectable (LANTUS) 20 Unit(s) SubCutaneous every morning  insulin lispro (HumaLOG) corrective regimen sliding scale   SubCutaneous three times a day before meals  insulin lispro Injectable (HumaLOG) 20 Unit(s) SubCutaneous three times a day before meals  levothyroxine 150 MICROGram(s) Oral daily  metoprolol tartrate 25 milliGRAM(s) Oral two times a day  oxyCODONE  ER Tablet 20 milliGRAM(s) Oral every 12 hours  polyethylene glycol 3350 17 Gram(s) Oral daily  senna 2 Tablet(s) Oral at bedtime      Physical Exam  General: Patient comfortable in bed  Vital Signs Last 12 Hrs  T(F): 97.8 (07-29-18 @ 15:04), Max: 97.8 (07-29-18 @ 15:04)  HR: 88 (07-29-18 @ 17:00) (81 - 88)  BP: 148/70 (07-29-18 @ 17:00) (145/63 - 148/70)  BP(mean): --  RR: 18 (07-29-18 @ 15:04) (18 - 18)  SpO2: 100% (07-29-18 @ 15:04) (100% - 100%)  Neck: No palpable thyroid nodules.  CVS: S1S2, No murmurs  Respiratory: No wheezing, no crepitations  GI: Abdomen soft, bowel sounds positive  Musculoskeletal:  edema lower extremities.   Skin: No skin rashes, no ecchymosis    Diagnostics    Hemoglobin A1C, Whole Blood: Routine  Cancel Reason: Auto-cancelled after 3 days - specimen not received. (07-19 @ 11:09)

## 2018-07-29 NOTE — PROVIDER CONTACT NOTE (OTHER) - SITUATION
Pt received percocet at 2245 for c/o pain to Left toe; increase in swelling noted. On reassessment, pt reports pain increase. Last morphine IVP given at 1948.
Patient found with sudden altered mental status. Patient A&Ox1 and confused.
patients blood sugar low; reassessed and provided juice, sugar decreased. provided additional juice and food; sugar increased to 250. Insulin pre meal held.Give supplemental insulin for result of 250?
pt am fs was 66
pts evening fs 51

## 2018-07-29 NOTE — PROGRESS NOTE ADULT - PROBLEM SELECTOR PLAN 1
Will decrease Lantus to 20 units in am, Lantus 60u qhs.  Will decrease Humalog to 20 units before each meal in addition to Humalog correction scale coverage.  Patient counseled for compliance with consistent low carb diet.

## 2018-07-29 NOTE — PROVIDER CONTACT NOTE (OTHER) - RECOMMENDATIONS
Give alternate pain control, assess patient for increase in swelling of toe.
Hold additional insulin for result of 250 as patient drops at times; give due 60 units pending FS check at bedtime.
Provider will come assess patient.
follow protocol till 100
hold premeal  due to fs not increasing fast enough and let pt eat his whole meal and recheck fs.

## 2018-07-29 NOTE — PROGRESS NOTE ADULT - ASSESSMENT
68 yo M former smoker, hx IDDM2, hypothyroidism, HTN, HLD, COPD, CAD s/p stent & CABG x 3, atherosclerosis LE with intermittent claudication L leg, RLE bypass, femoral endartectomy fem-pop bypass 2017, presenting with L great toe pain s/p paronychia drainage and toenail removal. Pt describes 10/10 throbbing toe pain unrelieved by diclofenac or naproxen. Pt states he soaked foot in epsom salt one day ago before pain acutely worsened. Denies fevers/chills. No foot/calf pain. Reports L leg swelling/redness unchanged from baseline. On keflex BID.      Problem/Plan - 1:  ·  Problem: Toe infection.  Plan: S/P I&D . Abxs per ID . Podiatry helping .MRI ..< from: MR Foot No Cont, Left (07.23.18 @ 08:40) >    IMPRESSION:    1.  Soft tissue swelling and first digit skin bleb, nonspecific but can   be seen with cellulitis in the appropriate clinical setting.  2.  No drainable fluid collection.  3.  No definite bone marrow signal abnormality to suggest osteomyelitis     < end of copied text >       Problem/Plan - 2:  ·  Problem: DM (diabetes mellitus).  Plan: Endo helping and will follow recommendations. Sugars better.      Problem/Plan - 3:  ·  Problem: PAD (peripheral artery disease).  Plan: Vascular follow up noted.  Cleared by renal for Angio. Planned for Tuesday.      Problem/Plan - 4:  ·  Problem: CKD (chronic kidney disease), stage III with JOSE LUIS .  Plan: Creatinine improving. Sec to ATN . Has been taking NSAID . Renal following.      Problem/Plan - 5:  ·  Problem: CHF (congestive heart failure).  Plan: Not in acute CHF .      Problem/Plan - 6:  Problem: Hypothyroid. Plan: Synthroid .     Problem/Plan - 7:  ·  Problem: Foot Pain .  Plan: Sec to   PVD  . Increased Oxycontin and continuing PRN pain meds. Now pain under control.      Problem/Plan - 8:  ·  Problem: Mild intermittent asthma without complication.  Plan: continue Inhaler.

## 2018-07-29 NOTE — PROGRESS NOTE ADULT - SUBJECTIVE AND OBJECTIVE BOX
INTERVAL HPI/OVERNIGHT EVENTS: I feel fine.   Vital Signs Last 24 Hrs  T(C): 36.6 (29 Jul 2018 05:01), Max: 36.7 (28 Jul 2018 21:57)  T(F): 97.8 (29 Jul 2018 05:01), Max: 98 (28 Jul 2018 21:57)  HR: 82 (29 Jul 2018 05:01) (74 - 82)  BP: 142/82 (29 Jul 2018 05:01) (118/51 - 142/82)  BP(mean): --  RR: 18 (29 Jul 2018 05:01) (18 - 18)  SpO2: 98% (29 Jul 2018 05:01) (98% - 99%)  I&O's Summary    28 Jul 2018 07:01  -  29 Jul 2018 07:00  --------------------------------------------------------  IN: 0 mL / OUT: 1300 mL / NET: -1300 mL      MEDICATIONS  (STANDING):  aspirin enteric coated 81 milliGRAM(s) Oral daily  atorvastatin 80 milliGRAM(s) Oral at bedtime  buDESOnide 160 MICROgram(s)/formoterol 4.5 MICROgram(s) Inhaler 2 Puff(s) Inhalation two times a day  cyanocobalamin 1000 MICROGram(s) Oral daily  dextrose 5%. 1000 milliLiter(s) (50 mL/Hr) IV Continuous <Continuous>  dextrose 50% Injectable 12.5 Gram(s) IV Push once  dextrose 50% Injectable 25 Gram(s) IV Push once  dextrose 50% Injectable 25 Gram(s) IV Push once  ferrous    sulfate 325 milliGRAM(s) Oral daily  furosemide    Tablet 40 milliGRAM(s) Oral daily  gabapentin 300 milliGRAM(s) Oral daily  heparin  Injectable 5000 Unit(s) SubCutaneous every 8 hours  insulin glargine Injectable (LANTUS) 30 Unit(s) SubCutaneous every morning  insulin glargine Injectable (LANTUS) 60 Unit(s) SubCutaneous at bedtime  insulin lispro (HumaLOG) corrective regimen sliding scale   SubCutaneous three times a day before meals  insulin lispro Injectable (HumaLOG) 30 Unit(s) SubCutaneous three times a day before meals  levothyroxine 150 MICROGram(s) Oral daily  metoprolol tartrate 25 milliGRAM(s) Oral two times a day  oxyCODONE  ER Tablet 20 milliGRAM(s) Oral every 12 hours  polyethylene glycol 3350 17 Gram(s) Oral daily  senna 2 Tablet(s) Oral at bedtime    MEDICATIONS  (PRN):  acetaminophen   Tablet. 650 milliGRAM(s) Oral every 6 hours PRN Mild Pain (1 - 3)  dextrose 40% Gel 15 Gram(s) Oral once PRN Blood Glucose LESS THAN 70 milliGRAM(s)/deciLiter  docusate sodium 100 milliGRAM(s) Oral daily PRN Constipation  glucagon  Injectable 1 milliGRAM(s) IntraMuscular once PRN Glucose <70 milliGRAM(s)/deciLiter  morphine  - Injectable 2 milliGRAM(s) IV Push every 6 hours PRN breakthrough pain  oxyCODONE    IR 5 milliGRAM(s) Oral every 4 hours PRN Moderate Pain (4 - 6)  oxyCODONE    IR 10 milliGRAM(s) Oral every 4 hours PRN Severe Pain (7 - 10)    LABS:                        9.8    9.30  )-----------( 315      ( 29 Jul 2018 06:03 )             31.3     07-29    134<L>  |  94<L>  |  61<H>  ----------------------------<  89  4.6   |  24  |  1.61<H>    Ca    9.3      29 Jul 2018 06:03  Phos  4.3     07-29  Mg     2.7     07-29          CAPILLARY BLOOD GLUCOSE      POCT Blood Glucose.: 127 mg/dL (29 Jul 2018 09:35)  POCT Blood Glucose.: 75 mg/dL (29 Jul 2018 08:59)  POCT Blood Glucose.: 66 mg/dL (29 Jul 2018 08:28)  POCT Blood Glucose.: 112 mg/dL (28 Jul 2018 21:47)  POCT Blood Glucose.: 250 mg/dL (28 Jul 2018 17:56)  POCT Blood Glucose.: 55 mg/dL (28 Jul 2018 17:31)  POCT Blood Glucose.: 62 mg/dL (28 Jul 2018 17:01)  POCT Blood Glucose.: 135 mg/dL (28 Jul 2018 11:58)          REVIEW OF SYSTEMS:  CONSTITUTIONAL: No fever, weight loss, or fatigue  EYES: No eye pain, visual disturbances, or discharge  ENMT:  No difficulty hearing, tinnitus, vertigo; No sinus or throat pain  NECK: No pain or stiffness  BREASTS: No pain, masses, or nipple discharge  RESPIRATORY: No cough, wheezing, chills or hemoptysis; No shortness of breath  CARDIOVASCULAR: No chest pain, palpitations, dizziness, or leg swelling  GASTROINTESTINAL: No abdominal or epigastric pain. No nausea, vomiting, or hematemesis; No diarrhea or constipation. No melena or hematochezia.  GENITOURINARY: No dysuria, frequency, hematuria, or incontinence  NEUROLOGICAL: No headaches, memory loss, loss of strength, numbness, or tremors  SKIN: No itching, burning, rashes, or lesions   LYMPH NODES: No enlarged glands  ENDOCRINE: No heat or cold intolerance; No hair loss  MUSCULOSKELETAL: left foot pain       Consultant(s) Notes Reviewed:  [x ] YES  [ ] NO    PHYSICAL EXAM:  GENERAL: NAD, well-groomed, well-developed,not in any distress ,  HEAD:  Atraumatic, Normocephalic  EYES: EOMI, PERRLA, conjunctiva and sclera clear  ENMT: No tonsillar erythema, exudates, or enlargement; Moist mucous membranes, Good dentition, No lesions  NECK: Supple, No JVD, Normal thyroid  NERVOUS SYSTEM:  Alert & Oriented X3, No focal deficit   CHEST/LUNG: Good air entry bilateral with no  rales, rhonchi, wheezing, or rubs  HEART: Regular rate and rhythm; No murmurs, rubs, or gallops  ABDOMEN: Soft, Nontender, Nondistended; Bowel sounds present  EXTREMITIES:  left foot dressed     Care Discussed with Consultants/Other Providers [ x] YES  [ ] NO

## 2018-07-29 NOTE — PROGRESS NOTE ADULT - SUBJECTIVE AND OBJECTIVE BOX
Vascular Surgery Progress Note     S: Patient was seen and examined during morning rounds. Pt denies f/c, n/v, SOB, CP/ABP, lightheadedness.      MEDICATIONS  (STANDING):  aspirin enteric coated 81 milliGRAM(s) Oral daily  atorvastatin 80 milliGRAM(s) Oral at bedtime  buDESOnide 160 MICROgram(s)/formoterol 4.5 MICROgram(s) Inhaler 2 Puff(s) Inhalation two times a day  cyanocobalamin 1000 MICROGram(s) Oral daily  dextrose 5%. 1000 milliLiter(s) (50 mL/Hr) IV Continuous <Continuous>  dextrose 50% Injectable 12.5 Gram(s) IV Push once  dextrose 50% Injectable 25 Gram(s) IV Push once  dextrose 50% Injectable 25 Gram(s) IV Push once  ferrous    sulfate 325 milliGRAM(s) Oral daily  furosemide    Tablet 40 milliGRAM(s) Oral daily  gabapentin 300 milliGRAM(s) Oral daily  heparin  Injectable 5000 Unit(s) SubCutaneous every 8 hours  insulin glargine Injectable (LANTUS) 30 Unit(s) SubCutaneous every morning  insulin glargine Injectable (LANTUS) 60 Unit(s) SubCutaneous at bedtime  insulin lispro (HumaLOG) corrective regimen sliding scale   SubCutaneous three times a day before meals  insulin lispro Injectable (HumaLOG) 30 Unit(s) SubCutaneous three times a day before meals  levothyroxine 150 MICROGram(s) Oral daily  metoprolol tartrate 25 milliGRAM(s) Oral two times a day  oxyCODONE  ER Tablet 20 milliGRAM(s) Oral every 12 hours  polyethylene glycol 3350 17 Gram(s) Oral daily  senna 2 Tablet(s) Oral at bedtime    MEDICATIONS  (PRN):  acetaminophen   Tablet. 650 milliGRAM(s) Oral every 6 hours PRN Mild Pain (1 - 3)  dextrose 40% Gel 15 Gram(s) Oral once PRN Blood Glucose LESS THAN 70 milliGRAM(s)/deciLiter  docusate sodium 100 milliGRAM(s) Oral daily PRN Constipation  glucagon  Injectable 1 milliGRAM(s) IntraMuscular once PRN Glucose <70 milliGRAM(s)/deciLiter  morphine  - Injectable 2 milliGRAM(s) IV Push every 6 hours PRN breakthrough pain  oxyCODONE    IR 5 milliGRAM(s) Oral every 4 hours PRN Moderate Pain (4 - 6)  oxyCODONE    IR 10 milliGRAM(s) Oral every 4 hours PRN Severe Pain (7 - 10)      Vital Signs Last 24 Hrs  T(C): 36.6 (29 Jul 2018 15:04), Max: 36.7 (28 Jul 2018 21:57)  T(F): 97.8 (29 Jul 2018 15:04), Max: 98 (28 Jul 2018 21:57)  HR: 81 (29 Jul 2018 15:04) (81 - 82)  BP: 145/63 (29 Jul 2018 15:04) (139/77 - 145/63)  BP(mean): --  RR: 18 (29 Jul 2018 15:04) (18 - 18)  SpO2: 100% (29 Jul 2018 15:04) (98% - 100%)    07-28-18 @ 07:01  -  07-29-18 @ 07:00  --------------------------------------------------------  IN: 0 mL / OUT: 1300 mL / NET: -1300 mL        Physical Exam  Gen: NAD, awake and alert  Head/Neck: NC/AT  Resp: Respirations nonlabored  Neuro: Speaking in clear, full sentences  Extremeties: Left toe pain and swelling of L foot, wound clean. Palpable DP/PT in L foot.    LABS:                        9.8    9.30  )-----------( 315      ( 29 Jul 2018 06:03 )             31.3     07-29    134<L>  |  94<L>  |  61<H>  ----------------------------<  89  4.6   |  24  |  1.61<H>    Ca    9.3      29 Jul 2018 06:03  Phos  4.3     07-29  Mg     2.7     07-29

## 2018-07-29 NOTE — PROGRESS NOTE ADULT - ASSESSMENT
66 yo M former smoker, hx IDDM2, hypothyroidism, HTN, HLD, COPD, CAD s/p stent & CABG x 3 with left toe pain and swelling on PO abx, planned for Angiogram next Tuesday 7/31/18.     Plan  - continue abx  - plan for LLE angio Tues (7/31/18)  - No podiatric surgical intervention planned  - f/u podiatry/ID/nephrology recs  - pain control  - OOB  - DVT ppx

## 2018-07-29 NOTE — PROGRESS NOTE ADULT - SUBJECTIVE AND OBJECTIVE BOX
[facility] [phone]        Patient seen and examined no new issues        MEDICATIONS  (STANDING):  aspirin enteric coated 81 milliGRAM(s) Oral daily  atorvastatin 80 milliGRAM(s) Oral at bedtime  buDESOnide 160 MICROgram(s)/formoterol 4.5 MICROgram(s) Inhaler 2 Puff(s) Inhalation two times a day  cyanocobalamin 1000 MICROGram(s) Oral daily  dextrose 5%. 1000 milliLiter(s) (50 mL/Hr) IV Continuous <Continuous>  dextrose 50% Injectable 12.5 Gram(s) IV Push once  dextrose 50% Injectable 25 Gram(s) IV Push once  dextrose 50% Injectable 25 Gram(s) IV Push once  ferrous    sulfate 325 milliGRAM(s) Oral daily  furosemide    Tablet 40 milliGRAM(s) Oral daily  gabapentin 300 milliGRAM(s) Oral daily  heparin  Injectable 5000 Unit(s) SubCutaneous every 8 hours  insulin glargine Injectable (LANTUS) 30 Unit(s) SubCutaneous every morning  insulin glargine Injectable (LANTUS) 60 Unit(s) SubCutaneous at bedtime  insulin lispro (HumaLOG) corrective regimen sliding scale   SubCutaneous three times a day before meals  insulin lispro Injectable (HumaLOG) 30 Unit(s) SubCutaneous three times a day before meals  levothyroxine 150 MICROGram(s) Oral daily  metoprolol tartrate 25 milliGRAM(s) Oral two times a day  oxyCODONE  ER Tablet 20 milliGRAM(s) Oral every 12 hours  polyethylene glycol 3350 17 Gram(s) Oral daily  senna 2 Tablet(s) Oral at bedtime      VITAL:  T(C): , Max: 36.7 (07-28-18 @ 21:57)  T(F): , Max: 98 (07-28-18 @ 21:57)  HR: 82 (07-29-18 @ 05:01)  BP: 142/82 (07-29-18 @ 05:01)  BP(mean): --  RR: 18 (07-29-18 @ 05:01)  SpO2: 98% (07-29-18 @ 05:01)  Wt(kg): --    I and O's:    07-28 @ 07:01  -  07-29 @ 07:00  --------------------------------------------------------  IN: 0 mL / OUT: 1300 mL / NET: -1300 mL          PHYSICAL EXAM:    Constitutional: NAD  HEENT: PERRLA    Neck:  No JVD  Respiratory: CTAB/L  Cardiovascular: S1 and S2  Gastrointestinal: BS+, soft, NT/ND  Extremities: L LE wrapped  Neurological: A/O x 3, no focal deficits  Psychiatric: Normal mood, normal affect  : No Marx  Skin: No rashes  Access: Not applicable    LABS:                        9.8    9.30  )-----------( 315      ( 29 Jul 2018 06:03 )             31.3     07-29    134<L>  |  94<L>  |  61<H>  ----------------------------<  89  4.6   |  24  |  1.61<H>    Ca    9.3      29 Jul 2018 06:03  Phos  4.3     07-29  Mg     2.7     07-29            Urine Studies:          RADIOLOGY & ADDITIONAL STUDIES:          Assessment and Plan:   · Assessment		  A 67-year-old gentleman with a past medical history of hypertension, diabetes, peripheral vascular disease, coronary artery disease, and COPD who presents to the hospital for evaluation of unrelenting pain.  The patient was found to have cellulitis of his foot after an Epsom salt bath.  Patient admitted with JOSE LUIS on CKD    Renal:  JOSE LUIS on CKD:  likely from related recent NSAID use +/- inflammatory response to cellulitis +/- beta-lactamase. ATN - renal function improved  ID: LLE celllulitis; Off abx  HTN:  BP acceptable  Hyponatremia:   From JOSE LUIS--Improved  Vascular:   LLE angio on Tuesday    	    Recommendation:  -  Lasix 40mg po qd,  To start Cozaar after the angio is done   - Angio on sched for 7/31  - Off abx;  No renal objection to angio

## 2018-07-29 NOTE — PROGRESS NOTE ADULT - ASSESSMENT
Assessment  DMT2: 67y Male with DM T2 with hyperglycemia on basal bolus insulin, dose was adjusted, blood sugars trending down, had hypoglycemic episode,  eating meals, non compliant with low carb diet.  Foot Wound: On antibiotics, wound care, vascular/pod on board.  CAD: On medications, stable, monitored.  Hypothyroidism:  On synthroid 150 mcg po daily, asymptomatic.  HTN: Controlled, On med.  CKD: Monitor labs/BMP,

## 2018-07-30 ENCOUNTER — TRANSCRIPTION ENCOUNTER (OUTPATIENT)
Age: 68
End: 2018-07-30

## 2018-07-30 LAB
BLD GP AB SCN SERPL QL: NEGATIVE — SIGNIFICANT CHANGE UP
BUN SERPL-MCNC: 57 MG/DL — HIGH (ref 7–23)
CALCIUM SERPL-MCNC: 9.3 MG/DL — SIGNIFICANT CHANGE UP (ref 8.4–10.5)
CHLORIDE SERPL-SCNC: 93 MMOL/L — LOW (ref 98–107)
CO2 SERPL-SCNC: 27 MMOL/L — SIGNIFICANT CHANGE UP (ref 22–31)
CREAT SERPL-MCNC: 1.61 MG/DL — HIGH (ref 0.5–1.3)
GLUCOSE BLDC GLUCOMTR-MCNC: 121 MG/DL — HIGH (ref 70–99)
GLUCOSE BLDC GLUCOMTR-MCNC: 132 MG/DL — HIGH (ref 70–99)
GLUCOSE BLDC GLUCOMTR-MCNC: 168 MG/DL — HIGH (ref 70–99)
GLUCOSE BLDC GLUCOMTR-MCNC: 241 MG/DL — HIGH (ref 70–99)
GLUCOSE SERPL-MCNC: 129 MG/DL — HIGH (ref 70–99)
HCT VFR BLD CALC: 30 % — LOW (ref 39–50)
HGB BLD-MCNC: 9.4 G/DL — LOW (ref 13–17)
MAGNESIUM SERPL-MCNC: 2.6 MG/DL — SIGNIFICANT CHANGE UP (ref 1.6–2.6)
MCHC RBC-ENTMCNC: 26.9 PG — LOW (ref 27–34)
MCHC RBC-ENTMCNC: 31.3 % — LOW (ref 32–36)
MCV RBC AUTO: 85.7 FL — SIGNIFICANT CHANGE UP (ref 80–100)
NRBC # FLD: 0 — SIGNIFICANT CHANGE UP
PHOSPHATE SERPL-MCNC: 4 MG/DL — SIGNIFICANT CHANGE UP (ref 2.5–4.5)
PLATELET # BLD AUTO: 329 K/UL — SIGNIFICANT CHANGE UP (ref 150–400)
PMV BLD: 10.3 FL — SIGNIFICANT CHANGE UP (ref 7–13)
POTASSIUM SERPL-MCNC: 4.6 MMOL/L — SIGNIFICANT CHANGE UP (ref 3.5–5.3)
POTASSIUM SERPL-SCNC: 4.6 MMOL/L — SIGNIFICANT CHANGE UP (ref 3.5–5.3)
RBC # BLD: 3.5 M/UL — LOW (ref 4.2–5.8)
RBC # FLD: 16.3 % — HIGH (ref 10.3–14.5)
RH IG SCN BLD-IMP: POSITIVE — SIGNIFICANT CHANGE UP
SODIUM SERPL-SCNC: 134 MMOL/L — LOW (ref 135–145)
WBC # BLD: 9.57 K/UL — SIGNIFICANT CHANGE UP (ref 3.8–10.5)
WBC # FLD AUTO: 9.57 K/UL — SIGNIFICANT CHANGE UP (ref 3.8–10.5)

## 2018-07-30 PROCEDURE — 71045 X-RAY EXAM CHEST 1 VIEW: CPT | Mod: 26

## 2018-07-30 RX ORDER — INSULIN GLARGINE 100 [IU]/ML
30 INJECTION, SOLUTION SUBCUTANEOUS AT BEDTIME
Qty: 0 | Refills: 0 | Status: DISCONTINUED | OUTPATIENT
Start: 2018-07-30 | End: 2018-08-01

## 2018-07-30 RX ORDER — SODIUM CHLORIDE 9 MG/ML
1000 INJECTION INTRAMUSCULAR; INTRAVENOUS; SUBCUTANEOUS
Qty: 0 | Refills: 0 | Status: DISCONTINUED | OUTPATIENT
Start: 2018-07-31 | End: 2018-07-31

## 2018-07-30 RX ORDER — CHLORHEXIDINE GLUCONATE 213 G/1000ML
1 SOLUTION TOPICAL ONCE
Qty: 0 | Refills: 0 | Status: DISCONTINUED | OUTPATIENT
Start: 2018-07-30 | End: 2018-08-01

## 2018-07-30 RX ORDER — INSULIN GLARGINE 100 [IU]/ML
10 INJECTION, SOLUTION SUBCUTANEOUS EVERY MORNING
Qty: 0 | Refills: 0 | Status: DISCONTINUED | OUTPATIENT
Start: 2018-07-31 | End: 2018-08-01

## 2018-07-30 RX ADMIN — Medication 25 MILLIGRAM(S): at 17:33

## 2018-07-30 RX ADMIN — ATORVASTATIN CALCIUM 80 MILLIGRAM(S): 80 TABLET, FILM COATED ORAL at 21:06

## 2018-07-30 RX ADMIN — OXYCODONE HYDROCHLORIDE 10 MILLIGRAM(S): 5 TABLET ORAL at 21:20

## 2018-07-30 RX ADMIN — PREGABALIN 1000 MICROGRAM(S): 225 CAPSULE ORAL at 12:14

## 2018-07-30 RX ADMIN — BUDESONIDE AND FORMOTEROL FUMARATE DIHYDRATE 2 PUFF(S): 160; 4.5 AEROSOL RESPIRATORY (INHALATION) at 20:32

## 2018-07-30 RX ADMIN — OXYCODONE HYDROCHLORIDE 10 MILLIGRAM(S): 5 TABLET ORAL at 20:31

## 2018-07-30 RX ADMIN — Medication 20 UNIT(S): at 08:49

## 2018-07-30 RX ADMIN — Medication 25 MILLIGRAM(S): at 05:42

## 2018-07-30 RX ADMIN — INSULIN GLARGINE 30 UNIT(S): 100 INJECTION, SOLUTION SUBCUTANEOUS at 21:35

## 2018-07-30 RX ADMIN — OXYCODONE HYDROCHLORIDE 20 MILLIGRAM(S): 5 TABLET ORAL at 05:42

## 2018-07-30 RX ADMIN — OXYCODONE HYDROCHLORIDE 10 MILLIGRAM(S): 5 TABLET ORAL at 02:05

## 2018-07-30 RX ADMIN — Medication 81 MILLIGRAM(S): at 12:14

## 2018-07-30 RX ADMIN — Medication 2: at 17:34

## 2018-07-30 RX ADMIN — SENNA PLUS 2 TABLET(S): 8.6 TABLET ORAL at 21:06

## 2018-07-30 RX ADMIN — OXYCODONE HYDROCHLORIDE 10 MILLIGRAM(S): 5 TABLET ORAL at 12:15

## 2018-07-30 RX ADMIN — HEPARIN SODIUM 5000 UNIT(S): 5000 INJECTION INTRAVENOUS; SUBCUTANEOUS at 21:06

## 2018-07-30 RX ADMIN — POLYETHYLENE GLYCOL 3350 17 GRAM(S): 17 POWDER, FOR SOLUTION ORAL at 21:06

## 2018-07-30 RX ADMIN — Medication 150 MICROGRAM(S): at 05:42

## 2018-07-30 RX ADMIN — OXYCODONE HYDROCHLORIDE 10 MILLIGRAM(S): 5 TABLET ORAL at 01:29

## 2018-07-30 RX ADMIN — HEPARIN SODIUM 5000 UNIT(S): 5000 INJECTION INTRAVENOUS; SUBCUTANEOUS at 15:12

## 2018-07-30 RX ADMIN — OXYCODONE HYDROCHLORIDE 20 MILLIGRAM(S): 5 TABLET ORAL at 18:35

## 2018-07-30 RX ADMIN — Medication 325 MILLIGRAM(S): at 12:14

## 2018-07-30 RX ADMIN — HEPARIN SODIUM 5000 UNIT(S): 5000 INJECTION INTRAVENOUS; SUBCUTANEOUS at 05:42

## 2018-07-30 RX ADMIN — Medication 40 MILLIGRAM(S): at 05:42

## 2018-07-30 RX ADMIN — Medication 20 UNIT(S): at 12:15

## 2018-07-30 RX ADMIN — INSULIN GLARGINE 20 UNIT(S): 100 INJECTION, SOLUTION SUBCUTANEOUS at 08:49

## 2018-07-30 RX ADMIN — OXYCODONE HYDROCHLORIDE 20 MILLIGRAM(S): 5 TABLET ORAL at 17:34

## 2018-07-30 RX ADMIN — BUDESONIDE AND FORMOTEROL FUMARATE DIHYDRATE 2 PUFF(S): 160; 4.5 AEROSOL RESPIRATORY (INHALATION) at 08:50

## 2018-07-30 RX ADMIN — GABAPENTIN 300 MILLIGRAM(S): 400 CAPSULE ORAL at 12:14

## 2018-07-30 RX ADMIN — Medication 20 UNIT(S): at 17:34

## 2018-07-30 NOTE — PROGRESS NOTE ADULT - SUBJECTIVE AND OBJECTIVE BOX
PHYSICAL EXAM:    Vital Signs Last 24 Hrs  T(C): 36.4 (30 Jul 2018 05:40), Max: 36.8 (29 Jul 2018 22:25)  T(F): 97.6 (30 Jul 2018 05:40), Max: 98.2 (29 Jul 2018 22:25)  HR: 82 (30 Jul 2018 05:40) (80 - 88)  BP: 135/76 (30 Jul 2018 05:40) (124/78 - 148/70)  BP(mean): --  RR: 18 (30 Jul 2018 05:40) (18 - 18)  SpO2: 100% (30 Jul 2018 05:40) (100% - 100%)  I&O's Summary    29 Jul 2018 07:01  -  30 Jul 2018 07:00  --------------------------------------------------------  IN: 0 mL / OUT: 1150 mL / NET: -1150 mL    Constitutional: NAD  HEENT: PERRLA    Neck:  No JVD  Respiratory: CTAB/L  Cardiovascular: S1 and S2  Gastrointestinal: BS+, soft, NT/ND  Extremities: L LE wrapped  Neurological: A/O x 3, no focal deficits    LABS:                        9.4    9.57  )-----------( 329      ( 30 Jul 2018 06:46 )             30.0     07-30    134<L>  |  93<L>  |  57<H>  ----------------------------<  129<H>  4.6   |  27  |  1.61<H>    Ca    9.3      30 Jul 2018 06:46  Phos  4.0     07-30  Mg     2.6     07-30      ASSESSMENT: A 67-year-old gentleman with a past medical history of hypertension, diabetes, peripheral vascular disease, coronary artery disease, and COPD admitted with cellulitis of his foot after an Epsom salt bath complicated with JOSE LUIS on CKD 3    1. JOSE LUIS on CKD 3:  likely from related recent NSAID use +/- inflammatory response to cellulitis +/- beta-lactamase. ATN - renal function improved  2. LLE cellulitis Off abx  3. HTN:  BP acceptable  4. Mild Hyponatremia: mild hypervolemia related  5. PVD; LLE angio on Tuesday   6. Anemia likely multifactorial; AOCD   	    RECOMMENDATIONS  - continue with Lasix 40mg po qd - hold for tomorrow,   - could re-start Cozaar if kidney function stable after the angio  - Angio on sched for 7/31 - would gently hydrate with NS 75 ml/hr - starting 2 hrs before and 6 hrs after the procedure  Rest of management per primary team PHYSICAL EXAM:    Vital Signs Last 24 Hrs  T(C): 36.4 (30 Jul 2018 05:40), Max: 36.8 (29 Jul 2018 22:25)  T(F): 97.6 (30 Jul 2018 05:40), Max: 98.2 (29 Jul 2018 22:25)  HR: 82 (30 Jul 2018 05:40) (80 - 88)  BP: 135/76 (30 Jul 2018 05:40) (124/78 - 148/70)  BP(mean): --  RR: 18 (30 Jul 2018 05:40) (18 - 18)  SpO2: 100% (30 Jul 2018 05:40) (100% - 100%)  I&O's Summary    29 Jul 2018 07:01  -  30 Jul 2018 07:00  --------------------------------------------------------  IN: 0 mL / OUT: 1150 mL / NET: -1150 mL    Constitutional: NAD  HEENT: PERRLA    Neck:  No JVD  Respiratory: CTAB/L  Cardiovascular: S1 and S2  Gastrointestinal: BS+, soft, NT/ND  Extremities: L LE wrapped  Neurological: A/O x 3, no focal deficits    LABS:                        9.4    9.57  )-----------( 329      ( 30 Jul 2018 06:46 )             30.0     07-30    134<L>  |  93<L>  |  57<H>  ----------------------------<  129<H>  4.6   |  27  |  1.61<H>    Ca    9.3      30 Jul 2018 06:46  Phos  4.0     07-30  Mg     2.6     07-30      ASSESSMENT: A 67-year-old gentleman with a past medical history of hypertension, diabetes, peripheral vascular disease, coronary artery disease, and COPD admitted with cellulitis of his foot after an Epsom salt bath complicated with JOSE LUIS on CKD 3    1. JOSE LUIS on CKD 3:  likely from related recent NSAID use +/- inflammatory response to cellulitis +/- beta-lactamase. ATN - renal function improved  2. LLE cellulitis Off abx  3. HTN:  BP acceptable  4. Mild Hyponatremia: mild hypervolemia related  5. PVD; LLE angio on Tuesday   6. Anemia likely multifactorial; AOCD   	    RECOMMENDATIONS  - continue with Lasix 40mg po qd - hold for tomorrow,   - could re-start Cozaar if kidney function stable after the angio  - Angio on sched for 7/31 - would gently hydrate with NS 75 ml/hr - starting 2 hrs before and 6 hrs after the procedure  - anemia panel as ordered  Rest of management per primary team

## 2018-07-30 NOTE — PROGRESS NOTE ADULT - ASSESSMENT
Assessment  DMT2: 67y Male with DM T2 with hyperglycemia on basal bolus insulin, had hypoglycemia yesterday, dose was adjusted, blood sugars improving, no new hypoglycemic episode,  eating meals, non compliant with low carb diet.  Foot Wound: On antibiotics, wound care, vascular/pod on board.  CAD: On medications, stable, monitored.  Hypothyroidism:  On synthroid 150 mcg po daily, asymptomatic.  HTN: Controlled, On med.  CKD: Monitor labs/BMP,

## 2018-07-30 NOTE — PROGRESS NOTE ADULT - SUBJECTIVE AND OBJECTIVE BOX
INTERVAL HPI/OVERNIGHT EVENTS: I feel okay and going for test tomorrow  Vital Signs Last 24 Hrs  T(C): 36.4 (30 Jul 2018 05:40), Max: 36.8 (29 Jul 2018 22:25)  T(F): 97.6 (30 Jul 2018 05:40), Max: 98.2 (29 Jul 2018 22:25)  HR: 82 (30 Jul 2018 05:40) (80 - 88)  BP: 135/76 (30 Jul 2018 05:40) (124/78 - 148/70)  BP(mean): --  RR: 18 (30 Jul 2018 05:40) (18 - 18)  SpO2: 100% (30 Jul 2018 05:40) (100% - 100%)  I&O's Summary    29 Jul 2018 07:01  -  30 Jul 2018 07:00  --------------------------------------------------------  IN: 0 mL / OUT: 1150 mL / NET: -1150 mL      MEDICATIONS  (STANDING):  aspirin enteric coated 81 milliGRAM(s) Oral daily  atorvastatin 80 milliGRAM(s) Oral at bedtime  buDESOnide 160 MICROgram(s)/formoterol 4.5 MICROgram(s) Inhaler 2 Puff(s) Inhalation two times a day  chlorhexidine 4% Liquid 1 Application(s) Topical once  cyanocobalamin 1000 MICROGram(s) Oral daily  dextrose 5%. 1000 milliLiter(s) (50 mL/Hr) IV Continuous <Continuous>  dextrose 50% Injectable 12.5 Gram(s) IV Push once  dextrose 50% Injectable 25 Gram(s) IV Push once  dextrose 50% Injectable 25 Gram(s) IV Push once  ferrous    sulfate 325 milliGRAM(s) Oral daily  gabapentin 300 milliGRAM(s) Oral daily  heparin  Injectable 5000 Unit(s) SubCutaneous every 8 hours  insulin glargine Injectable (LANTUS) 60 Unit(s) SubCutaneous at bedtime  insulin glargine Injectable (LANTUS) 20 Unit(s) SubCutaneous every morning  insulin lispro (HumaLOG) corrective regimen sliding scale   SubCutaneous three times a day before meals  insulin lispro Injectable (HumaLOG) 20 Unit(s) SubCutaneous three times a day before meals  levothyroxine 150 MICROGram(s) Oral daily  metoprolol tartrate 25 milliGRAM(s) Oral two times a day  oxyCODONE  ER Tablet 20 milliGRAM(s) Oral every 12 hours  polyethylene glycol 3350 17 Gram(s) Oral daily  senna 2 Tablet(s) Oral at bedtime    MEDICATIONS  (PRN):  acetaminophen   Tablet. 650 milliGRAM(s) Oral every 6 hours PRN Mild Pain (1 - 3)  dextrose 40% Gel 15 Gram(s) Oral once PRN Blood Glucose LESS THAN 70 milliGRAM(s)/deciLiter  docusate sodium 100 milliGRAM(s) Oral daily PRN Constipation  glucagon  Injectable 1 milliGRAM(s) IntraMuscular once PRN Glucose <70 milliGRAM(s)/deciLiter  morphine  - Injectable 2 milliGRAM(s) IV Push every 6 hours PRN breakthrough pain  oxyCODONE    IR 5 milliGRAM(s) Oral every 4 hours PRN Moderate Pain (4 - 6)  oxyCODONE    IR 10 milliGRAM(s) Oral every 4 hours PRN Severe Pain (7 - 10)    LABS:                        9.4    9.57  )-----------( 329      ( 30 Jul 2018 06:46 )             30.0     07-30    134<L>  |  93<L>  |  57<H>  ----------------------------<  129<H>  4.6   |  27  |  1.61<H>    Ca    9.3      30 Jul 2018 06:46  Phos  4.0     07-30  Mg     2.6     07-30          CAPILLARY BLOOD GLUCOSE      POCT Blood Glucose.: 132 mg/dL (30 Jul 2018 11:49)  POCT Blood Glucose.: 121 mg/dL (30 Jul 2018 08:33)  POCT Blood Glucose.: 154 mg/dL (29 Jul 2018 23:08)  POCT Blood Glucose.: 258 mg/dL (29 Jul 2018 17:34)          REVIEW OF SYSTEMS:  CONSTITUTIONAL: No fever, weight loss, or fatigue  EYES: No eye pain, visual disturbances, or discharge  ENMT:  No difficulty hearing, tinnitus, vertigo; No sinus or throat pain  NECK: No pain or stiffness  BREASTS: No pain, masses, or nipple discharge  RESPIRATORY: No cough, wheezing, chills or hemoptysis; No shortness of breath  CARDIOVASCULAR: No chest pain, palpitations, dizziness, or leg swelling  GASTROINTESTINAL: No abdominal or epigastric pain. No nausea, vomiting, or hematemesis; No diarrhea or constipation. No melena or hematochezia.  GENITOURINARY: No dysuria, frequency, hematuria, or incontinence  NEUROLOGICAL: No headaches, memory loss, loss of strength, numbness, or tremors  SKIN: No itching, burning, rashes, or lesions   LYMPH NODES: No enlarged glands  ENDOCRINE: No heat or cold intolerance; No hair loss  MUSCULOSKELETAL: left foot pain     Consultant(s) Notes Reviewed:  [x ] YES  [ ] NO    PHYSICAL EXAM:  GENERAL: NAD, well-groomed, well-developed ,not in any distress ,  HEAD:  Atraumatic, Normocephalic  EYES: EOMI, PERRLA, conjunctiva and sclera clear  ENMT: No tonsillar erythema, exudates, or enlargement; Moist mucous membranes, Good dentition, No lesions  NECK: Supple, No JVD, Normal thyroid  NERVOUS SYSTEM:  Alert & Oriented X3, No focal deficit   CHEST/LUNG: Good air entry bilateral with no  rales, rhonchi, wheezing, or rubs  HEART: Regular rate and rhythm; No murmurs, rubs, or gallops  ABDOMEN: Soft, Nontender, Nondistended; Bowel sounds present  EXTREMITIES:  left foot bandaged.     Care Discussed with Consultants/Other Providers [ x] YES  [ ] NO

## 2018-07-30 NOTE — PROGRESS NOTE ADULT - SUBJECTIVE AND OBJECTIVE BOX
Chief complaint  Patient is a 67y old  Male who presents with a chief complaint of left foot swelling right great toe swelling (21 Jul 2018 07:34)   Review of systems  Patient in bed, looks comfortable, no fever, no hypoglycemia.    Labs and Fingersticks  CAPILLARY BLOOD GLUCOSE      POCT Blood Glucose.: 132 mg/dL (30 Jul 2018 11:49)  POCT Blood Glucose.: 121 mg/dL (30 Jul 2018 08:33)  POCT Blood Glucose.: 154 mg/dL (29 Jul 2018 23:08)  POCT Blood Glucose.: 258 mg/dL (29 Jul 2018 17:34)          Calcium, Total Serum: 9.3 (07-30 @ 06:46)  Calcium, Total Serum: 9.3 (07-29 @ 06:03)          07-30    134<L>  |  93<L>  |  57<H>  ----------------------------<  129<H>  4.6   |  27  |  1.61<H>    Ca    9.3      30 Jul 2018 06:46  Phos  4.0     07-30  Mg     2.6     07-30                          9.4    9.57  )-----------( 329      ( 30 Jul 2018 06:46 )             30.0     Medications  MEDICATIONS  (STANDING):  aspirin enteric coated 81 milliGRAM(s) Oral daily  atorvastatin 80 milliGRAM(s) Oral at bedtime  buDESOnide 160 MICROgram(s)/formoterol 4.5 MICROgram(s) Inhaler 2 Puff(s) Inhalation two times a day  chlorhexidine 4% Liquid 1 Application(s) Topical once  cyanocobalamin 1000 MICROGram(s) Oral daily  dextrose 5%. 1000 milliLiter(s) (50 mL/Hr) IV Continuous <Continuous>  dextrose 50% Injectable 12.5 Gram(s) IV Push once  dextrose 50% Injectable 25 Gram(s) IV Push once  dextrose 50% Injectable 25 Gram(s) IV Push once  ferrous    sulfate 325 milliGRAM(s) Oral daily  gabapentin 300 milliGRAM(s) Oral daily  heparin  Injectable 5000 Unit(s) SubCutaneous every 8 hours  insulin glargine Injectable (LANTUS) 60 Unit(s) SubCutaneous at bedtime  insulin glargine Injectable (LANTUS) 20 Unit(s) SubCutaneous every morning  insulin lispro (HumaLOG) corrective regimen sliding scale   SubCutaneous three times a day before meals  insulin lispro Injectable (HumaLOG) 20 Unit(s) SubCutaneous three times a day before meals  levothyroxine 150 MICROGram(s) Oral daily  metoprolol tartrate 25 milliGRAM(s) Oral two times a day  oxyCODONE  ER Tablet 20 milliGRAM(s) Oral every 12 hours  polyethylene glycol 3350 17 Gram(s) Oral daily  senna 2 Tablet(s) Oral at bedtime      Physical Exam  General: Patient comfortable in bed  Vital Signs Last 12 Hrs  T(F): 97.6 (07-30-18 @ 05:40), Max: 97.6 (07-30-18 @ 05:40)  HR: 82 (07-30-18 @ 05:40) (82 - 82)  BP: 135/76 (07-30-18 @ 05:40) (135/76 - 135/76)  BP(mean): --  RR: 18 (07-30-18 @ 05:40) (18 - 18)  SpO2: 100% (07-30-18 @ 05:40) (100% - 100%)  Neck: No palpable thyroid nodules.  CVS: S1S2, No murmurs  Respiratory: No wheezing, no crepitations  GI: Abdomen soft, bowel sounds positive  Musculoskeletal:  edema lower extremities.   Skin: No skin rashes, no ecchymosis    Diagnostics    Hemoglobin A1C, Whole Blood: Routine  Cancel Reason: Auto-cancelled after 3 days - specimen not received. (07-19 @ 11:09)

## 2018-07-30 NOTE — PROGRESS NOTE ADULT - SUBJECTIVE AND OBJECTIVE BOX
Vascular Surgery Progress Note     S: Patient was seen and examined during morning rounds. Pt denies f/c, n/v, SOB, CP/ABP, lightheadedness.      MEDICATIONS  (STANDING):  aspirin enteric coated 81 milliGRAM(s) Oral daily  atorvastatin 80 milliGRAM(s) Oral at bedtime  buDESOnide 160 MICROgram(s)/formoterol 4.5 MICROgram(s) Inhaler 2 Puff(s) Inhalation two times a day  cyanocobalamin 1000 MICROGram(s) Oral daily  dextrose 5%. 1000 milliLiter(s) (50 mL/Hr) IV Continuous <Continuous>  dextrose 50% Injectable 12.5 Gram(s) IV Push once  dextrose 50% Injectable 25 Gram(s) IV Push once  dextrose 50% Injectable 25 Gram(s) IV Push once  ferrous    sulfate 325 milliGRAM(s) Oral daily  furosemide    Tablet 40 milliGRAM(s) Oral daily  gabapentin 300 milliGRAM(s) Oral daily  heparin  Injectable 5000 Unit(s) SubCutaneous every 8 hours  insulin glargine Injectable (LANTUS) 30 Unit(s) SubCutaneous every morning  insulin glargine Injectable (LANTUS) 60 Unit(s) SubCutaneous at bedtime  insulin lispro (HumaLOG) corrective regimen sliding scale   SubCutaneous three times a day before meals  insulin lispro Injectable (HumaLOG) 30 Unit(s) SubCutaneous three times a day before meals  levothyroxine 150 MICROGram(s) Oral daily  metoprolol tartrate 25 milliGRAM(s) Oral two times a day  oxyCODONE  ER Tablet 20 milliGRAM(s) Oral every 12 hours  polyethylene glycol 3350 17 Gram(s) Oral daily  senna 2 Tablet(s) Oral at bedtime    MEDICATIONS  (PRN):  acetaminophen   Tablet. 650 milliGRAM(s) Oral every 6 hours PRN Mild Pain (1 - 3)  dextrose 40% Gel 15 Gram(s) Oral once PRN Blood Glucose LESS THAN 70 milliGRAM(s)/deciLiter  docusate sodium 100 milliGRAM(s) Oral daily PRN Constipation  glucagon  Injectable 1 milliGRAM(s) IntraMuscular once PRN Glucose <70 milliGRAM(s)/deciLiter  morphine  - Injectable 2 milliGRAM(s) IV Push every 6 hours PRN breakthrough pain  oxyCODONE    IR 5 milliGRAM(s) Oral every 4 hours PRN Moderate Pain (4 - 6)  oxyCODONE    IR 10 milliGRAM(s) Oral every 4 hours PRN Severe Pain (7 - 10)      Vital Signs Last 24 Hrs  T(C): 36.4 (30 Jul 2018 05:40), Max: 36.8 (29 Jul 2018 22:25)  T(F): 97.6 (30 Jul 2018 05:40), Max: 98.2 (29 Jul 2018 22:25)  HR: 82 (30 Jul 2018 05:40) (80 - 88)  BP: 135/76 (30 Jul 2018 05:40) (124/78 - 148/70)  BP(mean): --  RR: 18 (30 Jul 2018 05:40) (18 - 18)  SpO2: 100% (30 Jul 2018 05:40) (100% - 100%)      Physical Exam  Gen: NAD, awake and alert  Head/Neck: NC/AT  Resp: Respirations nonlabored  Neuro: Speaking in clear, full sentences  Extremeties: Left toe pain and swelling of L foot, wound clean. Palpable DP/PT in L foot.    LABS:                        9.8    9.30  )-----------( 315      ( 29 Jul 2018 06:03 )             31.3     07-29    134<L>  |  94<L>  |  61<H>  ----------------------------<  89  4.6   |  24  |  1.61<H>    Ca    9.3      29 Jul 2018 06:03  Phos  4.3     07-29  Mg     2.7     07-29

## 2018-07-30 NOTE — PROGRESS NOTE ADULT - SUBJECTIVE AND OBJECTIVE BOX
pt seen at bedside in NAD. dressing to left foot c/d/i  left hallux ulceration noted at nail bed fibrotic and edema with erythema of foot and toe  no pus tender with palpation  applied betadine with DSD  MRI no OM   vasc planning angio  continue abx  will follow

## 2018-07-30 NOTE — PROGRESS NOTE ADULT - ASSESSMENT
66 yo M former smoker, hx IDDM2, hypothyroidism, HTN, HLD, COPD, CAD s/p stent & CABG x 3, atherosclerosis LE with intermittent claudication L leg, RLE bypass, femoral endartectomy fem-pop bypass 2017, presenting with L great toe pain s/p paronychia drainage and toenail removal. Pt describes 10/10 throbbing toe pain unrelieved by diclofenac or naproxen. Pt states he soaked foot in epsom salt one day ago before pain acutely worsened. Denies fevers/chills. No foot/calf pain. Reports L leg swelling/redness unchanged from baseline. On keflex BID.      Problem/Plan - 1:  ·  Problem: Toe infection.  Plan: S/P I&D . Podiatry helping .MRI ..< from: MR Foot No Cont, Left (07.23.18 @ 08:40) >    IMPRESSION:    1.  Soft tissue swelling and first digit skin bleb, nonspecific but can   be seen with cellulitis in the appropriate clinical setting.  2.  No drainable fluid collection.  3.  No definite bone marrow signal abnormality to suggest osteomyelitis     < end of copied text >       Problem/Plan - 2:  ·  Problem: DM (diabetes mellitus).  Plan: Endo helping and will follow recommendations. Sugars better.      Problem/Plan - 3:  ·  Problem: PAD (peripheral artery disease).  Plan: Vascular follow up noted.  Cleared by renal for Angio. Planned for tomorrow.     Problem/Plan - 4:  ·  Problem: CKD (chronic kidney disease), stage III with JOSE LUIS .  Plan: Creatinine improving. Sec to ATN . Has been taking NSAID . Renal following.      Problem/Plan - 5:  ·  Problem: CHF (congestive heart failure).  Plan: Not in acute CHF .      Problem/Plan - 6:  Problem: Hypothyroid. Plan: Synthroid .     Problem/Plan - 7:  ·  Problem: Foot Pain .  Plan: Sec to   PVD  . Increased Oxycontin and continuing PRN pain meds. Now pain under control.      Problem/Plan - 8:  ·  Problem: Mild intermittent asthma without complication.  Plan: continue Inhaler.

## 2018-07-30 NOTE — PROGRESS NOTE ADULT - SUBJECTIVE AND OBJECTIVE BOX
Pre-Op Dx: PVD  Procedure: LLE angiogram, possible angioplasty, possible stent  Labs:                       9.4    9.57  )-----------( 329      ( 30 Jul 2018 06:46 )             30.0   07-30    134<L>  |  93<L>  |  57<H>  ----------------------------<  129<H>  4.6   |  27  |  1.61<H>    Ca    9.3      30 Jul 2018 06:46  Phos  4.0     07-30  Mg     2.6     07-30    EKG/Cxr: followed by Internal Medicine, Nephro  Blood: Type and Screen  Orders: NPO at midnight  Consent: Pre-Op Dx: PVD  Procedure: LLE angiogram, possible angioplasty, possible stent  Labs:                       9.4    9.57  )-----------( 329      ( 30 Jul 2018 06:46 )             30.0   07-30    134<L>  |  93<L>  |  57<H>  ----------------------------<  129<H>  4.6   |  27  |  1.61<H>    Ca    9.3      30 Jul 2018 06:46  Phos  4.0     07-30  Mg     2.6     07-30    EKG/Cxr: followed by Internal Medicine, Nephro  Blood: Type and Screen  Orders: NPO at midnight  Consent: signed in chart Pre-Op Dx: PVD  Procedure: LLE angiogram, possible angioplasty, possible stent  Labs:                       9.4    9.57  )-----------( 329      ( 30 Jul 2018 06:46 )             30.0   07-30    134<L>  |  93<L>  |  57<H>  ----------------------------<  129<H>  4.6   |  27  |  1.61<H>    Ca    9.3      30 Jul 2018 06:46  Phos  4.0     07-30  Mg     2.6     07-30    EKG/Cxr: followed by Internal Medicine, Nephro  Blood: Type and Screen x 2, am coags  Orders: NPO at midnight  Consent: signed in chart

## 2018-07-31 ENCOUNTER — APPOINTMENT (OUTPATIENT)
Dept: VASCULAR SURGERY | Facility: HOSPITAL | Age: 68
End: 2018-07-31

## 2018-07-31 LAB
APPEARANCE UR: CLEAR — SIGNIFICANT CHANGE UP
BACTERIA # UR AUTO: SIGNIFICANT CHANGE UP
BASE EXCESS BLDA CALC-SCNC: 0.2 MMOL/L — SIGNIFICANT CHANGE UP
BILIRUB UR-MCNC: NEGATIVE — SIGNIFICANT CHANGE UP
BLOOD UR QL VISUAL: HIGH
BUN SERPL-MCNC: 59 MG/DL — HIGH (ref 7–23)
CA-I BLDA-SCNC: 1.18 MMOL/L — SIGNIFICANT CHANGE UP (ref 1.15–1.29)
CALCIUM SERPL-MCNC: 9.3 MG/DL — SIGNIFICANT CHANGE UP (ref 8.4–10.5)
CHLORIDE SERPL-SCNC: 93 MMOL/L — LOW (ref 98–107)
CHLORIDE UR-SCNC: < 20 MMOL/L — SIGNIFICANT CHANGE UP
CO2 SERPL-SCNC: 21 MMOL/L — LOW (ref 22–31)
COLOR SPEC: SIGNIFICANT CHANGE UP
CREAT ?TM UR-MCNC: 52.8 MG/DL — SIGNIFICANT CHANGE UP
CREAT SERPL-MCNC: 1.5 MG/DL — HIGH (ref 0.5–1.3)
FERRITIN SERPL-MCNC: 276.5 NG/ML — SIGNIFICANT CHANGE UP (ref 30–400)
FOLATE SERPL-MCNC: 9.1 NG/ML — SIGNIFICANT CHANGE UP (ref 4.7–20)
GLUCOSE BLDA-MCNC: 160 MG/DL — HIGH (ref 70–99)
GLUCOSE BLDC GLUCOMTR-MCNC: 152 MG/DL — HIGH (ref 70–99)
GLUCOSE BLDC GLUCOMTR-MCNC: 184 MG/DL — HIGH (ref 70–99)
GLUCOSE BLDC GLUCOMTR-MCNC: 230 MG/DL — HIGH (ref 70–99)
GLUCOSE BLDC GLUCOMTR-MCNC: 235 MG/DL — HIGH (ref 70–99)
GLUCOSE BLDC GLUCOMTR-MCNC: 279 MG/DL — HIGH (ref 70–99)
GLUCOSE SERPL-MCNC: 200 MG/DL — HIGH (ref 70–99)
GLUCOSE UR-MCNC: NEGATIVE — SIGNIFICANT CHANGE UP
HCO3 BLDA-SCNC: 25 MMOL/L — SIGNIFICANT CHANGE UP (ref 22–26)
HCT VFR BLD CALC: 29.1 % — LOW (ref 39–50)
HCT VFR BLDA CALC: 28.1 % — LOW (ref 39–51)
HGB BLD-MCNC: 9 G/DL — LOW (ref 13–17)
HGB BLDA-MCNC: 9.1 G/DL — LOW (ref 13–17)
INR BLD: 1.01 — SIGNIFICANT CHANGE UP (ref 0.88–1.17)
IRON SATN MFR SERPL: 243 UG/DL — SIGNIFICANT CHANGE UP (ref 155–535)
IRON SATN MFR SERPL: 50 UG/DL — SIGNIFICANT CHANGE UP (ref 45–165)
KETONES UR-MCNC: NEGATIVE — SIGNIFICANT CHANGE UP
LEUKOCYTE ESTERASE UR-ACNC: NEGATIVE — SIGNIFICANT CHANGE UP
MAGNESIUM SERPL-MCNC: 2.7 MG/DL — HIGH (ref 1.6–2.6)
MCHC RBC-ENTMCNC: 26.6 PG — LOW (ref 27–34)
MCHC RBC-ENTMCNC: 30.9 % — LOW (ref 32–36)
MCV RBC AUTO: 86.1 FL — SIGNIFICANT CHANGE UP (ref 80–100)
MUCOUS THREADS # UR AUTO: SIGNIFICANT CHANGE UP
NITRITE UR-MCNC: NEGATIVE — SIGNIFICANT CHANGE UP
NRBC # FLD: 0 — SIGNIFICANT CHANGE UP
OSMOLALITY UR: 414 MOSMO/KG — SIGNIFICANT CHANGE UP (ref 50–1200)
PCO2 BLDA: 42 MMHG — SIGNIFICANT CHANGE UP (ref 35–48)
PH BLDA: 7.38 PH — SIGNIFICANT CHANGE UP (ref 7.35–7.45)
PH UR: 6 — SIGNIFICANT CHANGE UP (ref 4.6–8)
PHOSPHATE SERPL-MCNC: 4.2 MG/DL — SIGNIFICANT CHANGE UP (ref 2.5–4.5)
PLATELET # BLD AUTO: 325 K/UL — SIGNIFICANT CHANGE UP (ref 150–400)
PMV BLD: 10.9 FL — SIGNIFICANT CHANGE UP (ref 7–13)
PO2 BLDA: 76 MMHG — LOW (ref 83–108)
POTASSIUM BLDA-SCNC: 4.3 MMOL/L — SIGNIFICANT CHANGE UP (ref 3.4–4.5)
POTASSIUM SERPL-MCNC: 4.9 MMOL/L — SIGNIFICANT CHANGE UP (ref 3.5–5.3)
POTASSIUM SERPL-SCNC: 4.9 MMOL/L — SIGNIFICANT CHANGE UP (ref 3.5–5.3)
POTASSIUM UR-SCNC: 28.2 MMOL/L — SIGNIFICANT CHANGE UP
PROT UR-MCNC: 20 MG/DL — SIGNIFICANT CHANGE UP
PROTHROM AB SERPL-ACNC: 11.6 SEC — SIGNIFICANT CHANGE UP (ref 9.8–13.1)
RBC # BLD: 3.38 M/UL — LOW (ref 4.2–5.8)
RBC # FLD: 16.3 % — HIGH (ref 10.3–14.5)
RBC CASTS # UR COMP ASSIST: SIGNIFICANT CHANGE UP (ref 0–?)
RH IG SCN BLD-IMP: POSITIVE — SIGNIFICANT CHANGE UP
SAO2 % BLDA: 95 % — SIGNIFICANT CHANGE UP (ref 95–99)
SODIUM BLDA-SCNC: 132 MMOL/L — LOW (ref 136–146)
SODIUM SERPL-SCNC: 131 MMOL/L — LOW (ref 135–145)
SODIUM UR-SCNC: 22 MMOL/L — SIGNIFICANT CHANGE UP
SP GR SPEC: 1.02 — SIGNIFICANT CHANGE UP (ref 1–1.04)
SQUAMOUS # UR AUTO: SIGNIFICANT CHANGE UP
TRANSFERRIN SERPL-MCNC: 163 MG/DL — LOW (ref 200–360)
UIBC SERPL-MCNC: 192.9 UG/DL — SIGNIFICANT CHANGE UP (ref 110–370)
UROBILINOGEN FLD QL: NORMAL MG/DL — SIGNIFICANT CHANGE UP
VIT B12 SERPL-MCNC: 926 PG/ML — HIGH (ref 200–900)
WBC # BLD: 8.9 K/UL — SIGNIFICANT CHANGE UP (ref 3.8–10.5)
WBC # FLD AUTO: 8.9 K/UL — SIGNIFICANT CHANGE UP (ref 3.8–10.5)
WBC UR QL: SIGNIFICANT CHANGE UP (ref 0–?)

## 2018-07-31 PROCEDURE — 36246 INS CATH ABD/L-EXT ART 2ND: CPT

## 2018-07-31 PROCEDURE — 75710 ARTERY X-RAYS ARM/LEG: CPT | Mod: 26

## 2018-07-31 RX ORDER — LABETALOL HCL 100 MG
5 TABLET ORAL EVERY 6 HOURS
Qty: 0 | Refills: 0 | Status: DISCONTINUED | OUTPATIENT
Start: 2018-07-31 | End: 2018-08-01

## 2018-07-31 RX ORDER — LABETALOL HCL 100 MG
10 TABLET ORAL ONCE
Qty: 0 | Refills: 0 | Status: COMPLETED | OUTPATIENT
Start: 2018-07-31 | End: 2018-07-31

## 2018-07-31 RX ORDER — IPRATROPIUM/ALBUTEROL SULFATE 18-103MCG
3 AEROSOL WITH ADAPTER (GRAM) INHALATION EVERY 6 HOURS
Qty: 0 | Refills: 0 | Status: DISCONTINUED | OUTPATIENT
Start: 2018-07-31 | End: 2018-07-31

## 2018-07-31 RX ORDER — METOPROLOL TARTRATE 50 MG
5 TABLET ORAL ONCE
Qty: 0 | Refills: 0 | Status: DISCONTINUED | OUTPATIENT
Start: 2018-07-31 | End: 2018-07-31

## 2018-07-31 RX ORDER — IPRATROPIUM/ALBUTEROL SULFATE 18-103MCG
3 AEROSOL WITH ADAPTER (GRAM) INHALATION ONCE
Qty: 0 | Refills: 0 | Status: COMPLETED | OUTPATIENT
Start: 2018-07-31 | End: 2018-07-31

## 2018-07-31 RX ADMIN — ATORVASTATIN CALCIUM 80 MILLIGRAM(S): 80 TABLET, FILM COATED ORAL at 22:47

## 2018-07-31 RX ADMIN — BUDESONIDE AND FORMOTEROL FUMARATE DIHYDRATE 2 PUFF(S): 160; 4.5 AEROSOL RESPIRATORY (INHALATION) at 22:55

## 2018-07-31 RX ADMIN — Medication 2: at 06:15

## 2018-07-31 RX ADMIN — HEPARIN SODIUM 5000 UNIT(S): 5000 INJECTION INTRAVENOUS; SUBCUTANEOUS at 14:17

## 2018-07-31 RX ADMIN — Medication 150 MICROGRAM(S): at 05:00

## 2018-07-31 RX ADMIN — Medication 25 MILLIGRAM(S): at 19:35

## 2018-07-31 RX ADMIN — OXYCODONE HYDROCHLORIDE 10 MILLIGRAM(S): 5 TABLET ORAL at 22:54

## 2018-07-31 RX ADMIN — OXYCODONE HYDROCHLORIDE 10 MILLIGRAM(S): 5 TABLET ORAL at 00:32

## 2018-07-31 RX ADMIN — SODIUM CHLORIDE 75 MILLILITER(S): 9 INJECTION INTRAMUSCULAR; INTRAVENOUS; SUBCUTANEOUS at 05:01

## 2018-07-31 RX ADMIN — Medication 20 UNIT(S): at 19:33

## 2018-07-31 RX ADMIN — OXYCODONE HYDROCHLORIDE 10 MILLIGRAM(S): 5 TABLET ORAL at 05:25

## 2018-07-31 RX ADMIN — OXYCODONE HYDROCHLORIDE 20 MILLIGRAM(S): 5 TABLET ORAL at 05:26

## 2018-07-31 RX ADMIN — Medication 25 MILLIGRAM(S): at 05:00

## 2018-07-31 RX ADMIN — Medication 81 MILLIGRAM(S): at 19:35

## 2018-07-31 RX ADMIN — HEPARIN SODIUM 5000 UNIT(S): 5000 INJECTION INTRAVENOUS; SUBCUTANEOUS at 05:01

## 2018-07-31 RX ADMIN — Medication 10 MILLIGRAM(S): at 11:30

## 2018-07-31 RX ADMIN — OXYCODONE HYDROCHLORIDE 20 MILLIGRAM(S): 5 TABLET ORAL at 05:01

## 2018-07-31 RX ADMIN — SODIUM CHLORIDE 75 MILLILITER(S): 9 INJECTION INTRAMUSCULAR; INTRAVENOUS; SUBCUTANEOUS at 10:50

## 2018-07-31 RX ADMIN — Medication 1: at 12:08

## 2018-07-31 RX ADMIN — SENNA PLUS 2 TABLET(S): 8.6 TABLET ORAL at 22:47

## 2018-07-31 RX ADMIN — HEPARIN SODIUM 5000 UNIT(S): 5000 INJECTION INTRAVENOUS; SUBCUTANEOUS at 22:47

## 2018-07-31 RX ADMIN — INSULIN GLARGINE 10 UNIT(S): 100 INJECTION, SOLUTION SUBCUTANEOUS at 06:15

## 2018-07-31 RX ADMIN — Medication 3 MILLILITER(S): at 10:55

## 2018-07-31 RX ADMIN — OXYCODONE HYDROCHLORIDE 10 MILLIGRAM(S): 5 TABLET ORAL at 01:20

## 2018-07-31 RX ADMIN — OXYCODONE HYDROCHLORIDE 10 MILLIGRAM(S): 5 TABLET ORAL at 23:33

## 2018-07-31 RX ADMIN — INSULIN GLARGINE 30 UNIT(S): 100 INJECTION, SOLUTION SUBCUTANEOUS at 22:48

## 2018-07-31 RX ADMIN — OXYCODONE HYDROCHLORIDE 10 MILLIGRAM(S): 5 TABLET ORAL at 04:38

## 2018-07-31 RX ADMIN — Medication 1: at 16:35

## 2018-07-31 RX ADMIN — OXYCODONE HYDROCHLORIDE 20 MILLIGRAM(S): 5 TABLET ORAL at 19:35

## 2018-07-31 NOTE — PROGRESS NOTE ADULT - ASSESSMENT
66 yo M former smoker, hx IDDM2, hypothyroidism, HTN, HLD, COPD, CAD s/p stent & CABG x 3, atherosclerosis LE with intermittent claudication L leg, RLE bypass, femoral endartectomy fem-pop bypass 2017, presenting with L great toe pain s/p paronychia drainage and toenail removal. Pt describes 10/10 throbbing toe pain unrelieved by diclofenac or naproxen. Pt states he soaked foot in epsom salt one day ago before pain acutely worsened. Denies fevers/chills. No foot/calf pain. Reports L leg swelling/redness unchanged from baseline. On keflex BID.      Problem/Plan - 1:  ·  Problem: Toe infection.  Plan: S/P I&D . Podiatry helping .MRI ..< from: MR Foot No Cont, Left (07.23.18 @ 08:40) >    IMPRESSION:    1.  Soft tissue swelling and first digit skin bleb, nonspecific but can   be seen with cellulitis in the appropriate clinical setting.  2.  No drainable fluid collection.  3.  No definite bone marrow signal abnormality to suggest osteomyelitis     < end of copied text >       Problem/Plan - 2:  ·  Problem: DM (diabetes mellitus).  Plan: Endo helping and will follow recommendations. Sugars better.      Problem/Plan - 3:  ·  Problem: PAD (peripheral artery disease).  Plan: Vascular follow up . S/P Angiogram      Problem/Plan - 4:  ·  Problem: CKD (chronic kidney disease), stage III with JOSE LUIS .  Plan: Creatinine improving. Sec to ATN . Has been taking NSAID . Renal following.      Problem/Plan - 5:  ·  Problem: CHF (congestive heart failure).  Plan: Not in acute CHF .      Problem/Plan - 6:  Problem: Hypothyroid. Plan: Synthroid .     Problem/Plan - 7:  ·  Problem: Foot Pain .  Plan: Sec to   PVD  . Increased Oxycontin and continuing PRN pain meds. Now pain under control.      Problem/Plan - 8:  ·  Problem: Mild intermittent asthma without complication.  Plan: continue Inhaler.

## 2018-07-31 NOTE — PRE-OP CHECKLIST - SELECT TESTS ORDERED
Type and Screen/BMP/CBC/INR/Type and Cross/POCT Blood Glucose/PT/PTT INR/BMP/CBC/PT/PTT/fs at  5:46 am- 230/Type and Cross/Type and Screen/POCT Blood Glucose

## 2018-07-31 NOTE — PROVIDER CONTACT NOTE (OTHER) - ACTION/TREATMENT ORDERED:
Give lantus and humalog per order.  Will continue to monitor.
Order given to give morphine IVP early
Hold additional insulin for result of 250. OK to give lantus at bedtime.
Provider will come assess patient.
order from dr. betancourt to give 24 units of lantus and told vascular were holding the premeal
will recheck

## 2018-07-31 NOTE — PROGRESS NOTE ADULT - SUBJECTIVE AND OBJECTIVE BOX
Chief complaint  Patient is a 67y old  Male who presents with a chief complaint of left foot swelling right great toe swelling (21 Jul 2018 07:34)   Review of systems  Patient in bed, looks comfortable, no fever, no hypoglycemia.    Labs and Fingersticks  CAPILLARY BLOOD GLUCOSE      POCT Blood Glucose.: 152 mg/dL (31 Jul 2018 11:59)  POCT Blood Glucose.: 230 mg/dL (31 Jul 2018 05:46)  POCT Blood Glucose.: 168 mg/dL (30 Jul 2018 21:23)  POCT Blood Glucose.: 241 mg/dL (30 Jul 2018 17:00)          Calcium, Total Serum: 9.3 (07-31 @ 04:30)  Calcium, Total Serum: 9.3 (07-30 @ 06:46)          07-31    131<L>  |  93<L>  |  59<H>  ----------------------------<  200<H>  4.9   |  21<L>  |  1.50<H>    Ca    9.3      31 Jul 2018 04:30  Phos  4.2     07-31  Mg     2.7     07-31                          9.0    8.90  )-----------( 325      ( 31 Jul 2018 06:12 )             29.1     Medications  MEDICATIONS  (STANDING):  aspirin enteric coated 81 milliGRAM(s) Oral daily  atorvastatin 80 milliGRAM(s) Oral at bedtime  buDESOnide 160 MICROgram(s)/formoterol 4.5 MICROgram(s) Inhaler 2 Puff(s) Inhalation two times a day  chlorhexidine 4% Liquid 1 Application(s) Topical once  cyanocobalamin 1000 MICROGram(s) Oral daily  dextrose 5%. 1000 milliLiter(s) (50 mL/Hr) IV Continuous <Continuous>  dextrose 50% Injectable 12.5 Gram(s) IV Push once  dextrose 50% Injectable 25 Gram(s) IV Push once  dextrose 50% Injectable 25 Gram(s) IV Push once  ferrous    sulfate 325 milliGRAM(s) Oral daily  gabapentin 300 milliGRAM(s) Oral daily  heparin  Injectable 5000 Unit(s) SubCutaneous every 8 hours  insulin glargine Injectable (LANTUS) 30 Unit(s) SubCutaneous at bedtime  insulin glargine Injectable (LANTUS) 10 Unit(s) SubCutaneous every morning  insulin lispro (HumaLOG) corrective regimen sliding scale   SubCutaneous three times a day before meals  insulin lispro Injectable (HumaLOG) 20 Unit(s) SubCutaneous three times a day before meals  levothyroxine 150 MICROGram(s) Oral daily  metoprolol tartrate 25 milliGRAM(s) Oral two times a day  oxyCODONE  ER Tablet 20 milliGRAM(s) Oral every 12 hours  polyethylene glycol 3350 17 Gram(s) Oral daily  senna 2 Tablet(s) Oral at bedtime  sodium chloride 0.9%. 1000 milliLiter(s) (75 mL/Hr) IV Continuous <Continuous>      Physical Exam  General: Patient comfortable in bed  Vital Signs Last 12 Hrs  T(F): 97.9 (07-31-18 @ 15:00), Max: 98.2 (07-31-18 @ 06:47)  HR: 79 (07-31-18 @ 15:30) (72 - 91)  BP: 139/62 (07-31-18 @ 15:30) (117/48 - 195/105)  BP(mean): --  RR: 20 (07-31-18 @ 15:30) (11 - 20)  SpO2: 100% (07-31-18 @ 15:30) (97% - 100%)  Neck: No palpable thyroid nodules.  CVS: S1S2, No murmurs  Respiratory: No wheezing, no crepitations  GI: Abdomen soft, bowel sounds positive  Musculoskeletal:  edema lower extremities.   Skin: No skin rashes, no ecchymosis    Diagnostics    Hemoglobin A1C, Whole Blood: Routine  Cancel Reason: Auto-cancelled after 3 days - specimen not received. (07-19 @ 11:09)

## 2018-07-31 NOTE — CHART NOTE - NSCHARTNOTEFT_GEN_A_CORE
C Team Surgery Post Op Note     STATUS POST:  LLE diagnostic angiogram. Hemostasis of R common femoral artery with 5 fr Mynx      SUBJECTIVE: Pt seen s/p LLE angiogram. Pt without any complaints. States that he has to void but it is difficult for him to urinate sitting down. Denies any chest pain, SOB, numbness or tingling in extremities,      Vital Signs Last 24 Hrs  T(C): 37 (31 Jul 2018 17:00), Max: 37.6 (30 Jul 2018 21:37)  T(F): 98.6 (31 Jul 2018 17:00), Max: 99.7 (30 Jul 2018 21:37)  HR: 90 (31 Jul 2018 17:00) (68 - 91)  BP: 127/67 (31 Jul 2018 17:00) (117/48 - 195/105)  BP(mean): --  RR: 18 (31 Jul 2018 17:00) (11 - 20)  SpO2: 100% (31 Jul 2018 17:00) (97% - 100%)  Marx:  TIMBOT:  I&O's Summary    30 Jul 2018 07:01  -  31 Jul 2018 07:00  --------------------------------------------------------  IN: 75 mL / OUT: 1000 mL / NET: -925 mL    31 Jul 2018 07:01  -  31 Jul 2018 17:49  --------------------------------------------------------  IN: 625 mL / OUT: 0 mL / NET: 625 mL      I&O's Detail    30 Jul 2018 07:01  -  31 Jul 2018 07:00  --------------------------------------------------------  IN:    sodium chloride 0.9%.: 75 mL  Total IN: 75 mL    OUT:    Voided: 1000 mL  Total OUT: 1000 mL    Total NET: -925 mL      31 Jul 2018 07:01  -  31 Jul 2018 17:49  --------------------------------------------------------  IN:    Oral Fluid: 100 mL    sodium chloride 0.9%.: 525 mL  Total IN: 625 mL    OUT:  Total OUT: 0 mL    Total NET: 625 mL          PHYSICAL EXAM:  Constitutional: Patient well nourish. well developed.  Eyes:  PERRL, EOMI, Conjunctiva clear.  ENMT:  WNL  Neck:  Supple.  Respiratory:  Lungs CTA, B/L, no rales , no wheezing, no rhonchi.  Cardiovascular:  S1, S2, RRR  Gastrointestinal: Abdomen soft, non distended, + BS, non tenderness  Extremities: Right groin dressing c/d/i. No tender, soft to palpation. No ecchymosis noted.  Dopplerable pulses in right PT and AT and L DP.   Neurological: AxOx3        LABS:                        9.0    8.90  )-----------( 325      ( 31 Jul 2018 06:12 )             29.1     07-31    131<L>  |  93<L>  |  59<H>  ----------------------------<  200<H>  4.9   |  21<L>  |  1.50<H>    Ca    9.3      31 Jul 2018 04:30  Phos  4.2     07-31  Mg     2.7     07-31      PT/INR - ( 31 Jul 2018 04:30 )   PT: 11.6 SEC;   INR: 1.01              A/P:    pt is a 67 y.o male s/p diagnostic LLE angiogram with mynx closure    - Diet as tolerated  - d/c fluids  -monitor I&O  -DISCHARGE: planning for tomorrow AM as pt would like to stay overnight    [id number]

## 2018-07-31 NOTE — PROGRESS NOTE ADULT - ASSESSMENT
66 yo M former smoker, hx IDDM2, hypothyroidism, HTN, HLD, COPD, CAD s/p stent & CABG x 3, atherosclerosis LE with intermittent claudication L leg, RLE bypass, femoral endartectomy fem-pop bypass 2017, presenting with L great toe pain s/p paronychia drainage and toenail removal.    Plan  - Angio today 68 yo M former smoker, hx IDDM2, hypothyroidism, HTN, HLD, COPD, CAD s/p stent & CABG x 3, atherosclerosis LE with intermittent claudication L leg, RLE bypass, femoral endartectomy fem-pop bypass 2017, presenting with L great toe pain s/p paronychia drainage and toenail removal. SP diagnostic LLE angio, R groin Mynx    Plan  - DC today and follow up in 1-2 weeks for fem pop bypass

## 2018-07-31 NOTE — PROGRESS NOTE ADULT - SUBJECTIVE AND OBJECTIVE BOX
INTERVAL HPI/OVERNIGHT EVENTS: Feel fine.   Vital Signs Last 24 Hrs  T(C): 36.3 (31 Jul 2018 11:00), Max: 37.6 (30 Jul 2018 21:37)  T(F): 97.3 (31 Jul 2018 11:00), Max: 99.7 (30 Jul 2018 21:37)  HR: 81 (31 Jul 2018 11:45) (68 - 91)  BP: 130/65 (31 Jul 2018 11:45) (119/57 - 195/105)  BP(mean): --  RR: 12 (31 Jul 2018 11:45) (11 - 18)  SpO2: 100% (31 Jul 2018 11:45) (97% - 100%)  I&O's Summary    30 Jul 2018 07:01  -  31 Jul 2018 07:00  --------------------------------------------------------  IN: 75 mL / OUT: 1000 mL / NET: -925 mL      MEDICATIONS  (STANDING):  aspirin enteric coated 81 milliGRAM(s) Oral daily  atorvastatin 80 milliGRAM(s) Oral at bedtime  buDESOnide 160 MICROgram(s)/formoterol 4.5 MICROgram(s) Inhaler 2 Puff(s) Inhalation two times a day  chlorhexidine 4% Liquid 1 Application(s) Topical once  cyanocobalamin 1000 MICROGram(s) Oral daily  dextrose 5%. 1000 milliLiter(s) (50 mL/Hr) IV Continuous <Continuous>  dextrose 50% Injectable 12.5 Gram(s) IV Push once  dextrose 50% Injectable 25 Gram(s) IV Push once  dextrose 50% Injectable 25 Gram(s) IV Push once  ferrous    sulfate 325 milliGRAM(s) Oral daily  gabapentin 300 milliGRAM(s) Oral daily  heparin  Injectable 5000 Unit(s) SubCutaneous every 8 hours  insulin glargine Injectable (LANTUS) 30 Unit(s) SubCutaneous at bedtime  insulin glargine Injectable (LANTUS) 10 Unit(s) SubCutaneous every morning  insulin lispro (HumaLOG) corrective regimen sliding scale   SubCutaneous three times a day before meals  insulin lispro Injectable (HumaLOG) 20 Unit(s) SubCutaneous three times a day before meals  levothyroxine 150 MICROGram(s) Oral daily  metoprolol tartrate 25 milliGRAM(s) Oral two times a day  oxyCODONE  ER Tablet 20 milliGRAM(s) Oral every 12 hours  polyethylene glycol 3350 17 Gram(s) Oral daily  senna 2 Tablet(s) Oral at bedtime  sodium chloride 0.9%. 1000 milliLiter(s) (75 mL/Hr) IV Continuous <Continuous>    MEDICATIONS  (PRN):  acetaminophen   Tablet. 650 milliGRAM(s) Oral every 6 hours PRN Mild Pain (1 - 3)  dextrose 40% Gel 15 Gram(s) Oral once PRN Blood Glucose LESS THAN 70 milliGRAM(s)/deciLiter  docusate sodium 100 milliGRAM(s) Oral daily PRN Constipation  glucagon  Injectable 1 milliGRAM(s) IntraMuscular once PRN Glucose <70 milliGRAM(s)/deciLiter  labetalol Injectable 5 milliGRAM(s) IV Push every 6 hours PRN BP >150  morphine  - Injectable 2 milliGRAM(s) IV Push every 6 hours PRN breakthrough pain  oxyCODONE    IR 5 milliGRAM(s) Oral every 4 hours PRN Moderate Pain (4 - 6)  oxyCODONE    IR 10 milliGRAM(s) Oral every 4 hours PRN Severe Pain (7 - 10)    LABS:                        9.0    8.90  )-----------( 325      ( 31 Jul 2018 06:12 )             29.1     07-31    131<L>  |  93<L>  |  59<H>  ----------------------------<  200<H>  4.9   |  21<L>  |  1.50<H>    Ca    9.3      31 Jul 2018 04:30  Phos  4.2     07-31  Mg     2.7     07-31      PT/INR - ( 31 Jul 2018 04:30 )   PT: 11.6 SEC;   INR: 1.01              CAPILLARY BLOOD GLUCOSE      POCT Blood Glucose.: 230 mg/dL (31 Jul 2018 05:46)  POCT Blood Glucose.: 168 mg/dL (30 Jul 2018 21:23)  POCT Blood Glucose.: 241 mg/dL (30 Jul 2018 17:00)      ABG - ( 31 Jul 2018 07:54 )  pH, Arterial: 7.38  pH, Blood: x     /  pCO2: 42    /  pO2: 76    / HCO3: 25    / Base Excess: 0.2   /  SaO2: 95.0                REVIEW OF SYSTEMS:  CONSTITUTIONAL: No fever, weight loss, or fatigue  EYES: No eye pain, visual disturbances, or discharge  ENMT:  No difficulty hearing, tinnitus, vertigo; No sinus or throat pain  NECK: No pain or stiffness  RESPIRATORY: No cough, wheezing, chills or hemoptysis; No shortness of breath  CARDIOVASCULAR: No chest pain, palpitations, dizziness, or leg swelling  GASTROINTESTINAL: No abdominal or epigastric pain. No nausea, vomiting, or hematemesis; No diarrhea or constipation. No melena or hematochezia.  GENITOURINARY: No dysuria, frequency, hematuria, or incontinence  NEUROLOGICAL: No headaches, memory loss, loss of strength, numbness, or tremors  SKIN: No itching, burning, rashes, or lesions   LYMPH NODES: No enlarged glands  ENDOCRINE: No heat or cold intolerance; No hair loss  MUSCULOSKELETAL: No joint pain or swelling; No muscle, back, or extremity pain    Consultant(s) Notes Reviewed:  [x ] YES  [ ] NO    PHYSICAL EXAM:  GENERAL: NAD, well-groomed, well-developed,not in any distress ,  HEAD:  Atraumatic, Normocephalic  EYES: EOMI, PERRLA, conjunctiva and sclera clear  ENMT: No tonsillar erythema, exudates, or enlargement; Moist mucous membranes, Good dentition, No lesions  NECK: Supple, No JVD, Normal thyroid  NERVOUS SYSTEM:  Alert & Oriented X3, No focal deficit   CHEST/LUNG: Good air entry bilateral with no  rales, rhonchi, wheezing, or rubs  HEART: Regular rate and rhythm; No murmurs, rubs, or gallops  ABDOMEN: Soft, Nontender, Nondistended; Bowel sounds present  EXTREMITIES:  foot dressed     Care Discussed with Consultants/Other Providers [ x] YES  [ ] NO

## 2018-07-31 NOTE — PRE-OP CHECKLIST - 4.
pt has MAD to b/l groin with cream in place, on b/l lower legs multiple healing scratches, healed scar to lower left leg, sacral area, multiple circular lighten pigmentation to area

## 2018-07-31 NOTE — PROVIDER CONTACT NOTE (OTHER) - BACKGROUND
Patient is admitted for left hallux ulcer.
Admit with Left great toe pain
Patient admitted for infection of skin.
Patient admitted with infected toe
pt admitted for hyperglycemia
pt admitted with L toe infection

## 2018-07-31 NOTE — PROGRESS NOTE ADULT - SUBJECTIVE AND OBJECTIVE BOX
Pre-Op Dx: PVD  Procedure: LLE angiogram, possible angioplasty, possible stent  Labs:                       9.4    9.57  )-----------( 329      ( 30 Jul 2018 06:46 )             30.0   07-30    134<L>  |  93<L>  |  57<H>  ----------------------------<  129<H>  4.6   |  27  |  1.61<H>    Ca    9.3      30 Jul 2018 06:46  Phos  4.0     07-30  Mg     2.6     07-30    EKG/Cxr: followed by Internal Medicine, Nephro  Blood: Type and Screen x 2, am coags  Orders: NPO at midnight  Consent: signed in chart

## 2018-07-31 NOTE — PROVIDER CONTACT NOTE (OTHER) - DATE AND TIME:
24-Jul-2018 23:30
23-Jul-2018 01:21
28-Jul-2018 18:00
29-Jul-2018 08:34
29-Jul-2018 17:31
31-Jul-2018 05:55

## 2018-07-31 NOTE — PROGRESS NOTE ADULT - ASSESSMENT
Assessment  DMT2: 67y Male with DM T2 with hyperglycemia on basal bolus insulin, had hypoglycemia yesterday, dose was adjusted, blood sugars improving, no new hypoglycemic episode,  eating meals, non compliant with low carb diet.  Foot Wound: On antibiotics, wound care, vascular/pod on board, s/p surgery now.  CAD: On medications, stable, monitored.  Hypothyroidism:  On synthroid 150 mcg po daily, asymptomatic.  HTN: Controlled, On med.  CKD: Monitor labs/BMP,

## 2018-07-31 NOTE — PROVIDER CONTACT NOTE (OTHER) - REASON
Patient has altered mental status
low FS 66
low fs 51
patients blood sugar
Hyperglycemia
Pt received percocet for pain; on reassessment, reports increase in pain

## 2018-07-31 NOTE — PRE-OP CHECKLIST - 5.
b/l lower abdomen, ecchymotic areas, left foot dsg b/l lower abdomen, ecchymotic areas, pt has a ulcer to left foot covered by a dsg.

## 2018-07-31 NOTE — PROVIDER CONTACT NOTE (OTHER) - ASSESSMENT
Patient going to OR this AM ~0615.  Patient ordered for lantus 10 units before breakfast (half patient's usual dose).  Does MD want to give lantus?
AA&Ox4. Reports increase in pain. Limb kept dangling as pt reports slight relief when limb dangling.
Patient VSS, neurological check normal. Patient A&Ox1 and confused.
Patient is alert and oriented. Asymptomatic. Denies s/s. No s/s of distress noted
Pt is alert and oriented x3, No distress noted, Patient makes needs known.
Pt is alert and oriented x4, Patient makes needs known. No distress noted

## 2018-07-31 NOTE — CHART NOTE - NSCHARTNOTEFT_GEN_A_CORE
Unable to see patient today  Noted serum sodium trending down  Continue per prior recommendations + increase protein intake, limit fluid intake to less than 1.2 liters a day  send urine studies as ordered  Will follow with you

## 2018-08-01 VITALS
HEART RATE: 93 BPM | OXYGEN SATURATION: 99 % | RESPIRATION RATE: 16 BRPM | TEMPERATURE: 98 F | SYSTOLIC BLOOD PRESSURE: 146 MMHG | DIASTOLIC BLOOD PRESSURE: 64 MMHG

## 2018-08-01 LAB — GLUCOSE BLDC GLUCOMTR-MCNC: 303 MG/DL — HIGH (ref 70–99)

## 2018-08-01 RX ORDER — ACETAMINOPHEN 500 MG
2 TABLET ORAL
Qty: 0 | Refills: 0 | COMMUNITY
Start: 2018-08-01

## 2018-08-01 RX ORDER — INSULIN GLARGINE 100 [IU]/ML
60 INJECTION, SOLUTION SUBCUTANEOUS AT BEDTIME
Qty: 0 | Refills: 0 | Status: DISCONTINUED | OUTPATIENT
Start: 2018-08-01 | End: 2018-08-01

## 2018-08-01 RX ORDER — INSULIN ASPART 100 [IU]/ML
20 INJECTION, SOLUTION SUBCUTANEOUS
Qty: 3 | Refills: 0 | OUTPATIENT
Start: 2018-08-01

## 2018-08-01 RX ORDER — INSULIN GLARGINE 100 [IU]/ML
60 INJECTION, SOLUTION SUBCUTANEOUS
Qty: 5 | Refills: 0 | OUTPATIENT
Start: 2018-08-01 | End: 2018-08-30

## 2018-08-01 RX ORDER — SENNA PLUS 8.6 MG/1
2 TABLET ORAL
Qty: 0 | Refills: 0 | COMMUNITY
Start: 2018-08-01

## 2018-08-01 RX ORDER — INSULIN ASPART 100 [IU]/ML
0 INJECTION, SOLUTION SUBCUTANEOUS
Qty: 0 | Refills: 0 | COMMUNITY

## 2018-08-01 RX ORDER — GABAPENTIN 400 MG/1
1 CAPSULE ORAL
Qty: 14 | Refills: 0 | OUTPATIENT
Start: 2018-08-01 | End: 2018-08-14

## 2018-08-01 RX ORDER — INSULIN GLARGINE 100 [IU]/ML
20 INJECTION, SOLUTION SUBCUTANEOUS EVERY MORNING
Qty: 0 | Refills: 0 | Status: DISCONTINUED | OUTPATIENT
Start: 2018-08-01 | End: 2018-08-01

## 2018-08-01 RX ADMIN — BUDESONIDE AND FORMOTEROL FUMARATE DIHYDRATE 2 PUFF(S): 160; 4.5 AEROSOL RESPIRATORY (INHALATION) at 09:15

## 2018-08-01 RX ADMIN — Medication 4: at 07:50

## 2018-08-01 RX ADMIN — OXYCODONE HYDROCHLORIDE 10 MILLIGRAM(S): 5 TABLET ORAL at 10:45

## 2018-08-01 RX ADMIN — INSULIN GLARGINE 10 UNIT(S): 100 INJECTION, SOLUTION SUBCUTANEOUS at 07:51

## 2018-08-01 RX ADMIN — Medication 20 UNIT(S): at 07:51

## 2018-08-01 RX ADMIN — OXYCODONE HYDROCHLORIDE 20 MILLIGRAM(S): 5 TABLET ORAL at 07:51

## 2018-08-01 RX ADMIN — OXYCODONE HYDROCHLORIDE 20 MILLIGRAM(S): 5 TABLET ORAL at 07:13

## 2018-08-01 RX ADMIN — Medication 150 MICROGRAM(S): at 07:14

## 2018-08-01 RX ADMIN — OXYCODONE HYDROCHLORIDE 10 MILLIGRAM(S): 5 TABLET ORAL at 09:39

## 2018-08-01 NOTE — PROGRESS NOTE ADULT - ASSESSMENT
Assessment  DMT2: 67y Male with DM T2 with hyperglycemia on basal bolus insulin, blood sugars high, s/p Sx, no new hypoglycemic episode,  eating meals, non compliant with low carb diet.  Foot Wound: On antibiotics, wound care, vascular/pod on board, s/p surgery now.  CAD: On medications, stable, monitored.  Hypothyroidism:  On synthroid 150 mcg po daily, asymptomatic.  HTN: Controlled, On med.  CKD: Monitor labs/BMP,

## 2018-08-01 NOTE — PROGRESS NOTE ADULT - PROBLEM SELECTOR PLAN 3
Continue antibiotics, wound care, Pod FU  Continue pain meds.
Continue antibiotics, wound care, Pod FU  Continue pain meds.
Continue antibiotics, wound care, Pd FU
Continue antibiotics, wound care, Pod FU  Continue pain meds.
Continue antibiotics, wound care, Pod FU  Continue pain meds.
Continue antibiotics, wound care, Pd FU
Continue antibiotics, wound care, Pd FU  Continue pain meds.
Continue antibiotics, wound care, Pod FU  Continue pain meds.

## 2018-08-01 NOTE — PROGRESS NOTE ADULT - PROBLEM SELECTOR PLAN 1
Will continue current insulin regimen for now. Will continue monitoring FS, log, will Follow up.  DC home on basal bolus insulin, DC insulin pump as out patient, discussed with patient and primary team.  Patient counseled for compliance with consistent low carb diet.

## 2018-08-01 NOTE — PROGRESS NOTE ADULT - PROBLEM SELECTOR PLAN 2
Continue current synthroid dose, will FU

## 2018-08-01 NOTE — PROGRESS NOTE ADULT - PROBLEM SELECTOR PROBLEM 3
Toe infection

## 2018-08-01 NOTE — PROGRESS NOTE ADULT - PROBLEM SELECTOR PROBLEM 2
Hypothyroid

## 2018-08-01 NOTE — PROGRESS NOTE ADULT - SUBJECTIVE AND OBJECTIVE BOX
Chief complaint  Patient is a 67y old  Male who presents with a chief complaint of left foot swelling right great toe swelling (21 Jul 2018 07:34)   Review of systems  Patient in bed, looks comfortable, no fever,  no hypoglycemia.    Labs and Fingersticks  CAPILLARY BLOOD GLUCOSE      POCT Blood Glucose.: 303 mg/dL (01 Aug 2018 07:39)  POCT Blood Glucose.: 279 mg/dL (31 Jul 2018 22:11)  POCT Blood Glucose.: 235 mg/dL (31 Jul 2018 19:30)  POCT Blood Glucose.: 184 mg/dL (31 Jul 2018 16:12)  POCT Blood Glucose.: 152 mg/dL (31 Jul 2018 11:59)          Calcium, Total Serum: 9.3 (07-31 @ 04:30)          07-31    131<L>  |  93<L>  |  59<H>  ----------------------------<  200<H>  4.9   |  21<L>  |  1.50<H>    Ca    9.3      31 Jul 2018 04:30  Phos  4.2     07-31  Mg     2.7     07-31                          9.0    8.90  )-----------( 325      ( 31 Jul 2018 06:12 )             29.1     Medications  MEDICATIONS  (STANDING):  aspirin enteric coated 81 milliGRAM(s) Oral daily  atorvastatin 80 milliGRAM(s) Oral at bedtime  buDESOnide 160 MICROgram(s)/formoterol 4.5 MICROgram(s) Inhaler 2 Puff(s) Inhalation two times a day  chlorhexidine 4% Liquid 1 Application(s) Topical once  cyanocobalamin 1000 MICROGram(s) Oral daily  dextrose 5%. 1000 milliLiter(s) (50 mL/Hr) IV Continuous <Continuous>  dextrose 50% Injectable 12.5 Gram(s) IV Push once  dextrose 50% Injectable 25 Gram(s) IV Push once  dextrose 50% Injectable 25 Gram(s) IV Push once  ferrous    sulfate 325 milliGRAM(s) Oral daily  gabapentin 300 milliGRAM(s) Oral daily  heparin  Injectable 5000 Unit(s) SubCutaneous every 8 hours  insulin glargine Injectable (LANTUS) 60 Unit(s) SubCutaneous at bedtime  insulin glargine Injectable (LANTUS) 20 Unit(s) SubCutaneous every morning  insulin lispro (HumaLOG) corrective regimen sliding scale   SubCutaneous three times a day before meals  insulin lispro Injectable (HumaLOG) 20 Unit(s) SubCutaneous three times a day before meals  levothyroxine 150 MICROGram(s) Oral daily  metoprolol tartrate 25 milliGRAM(s) Oral two times a day  oxyCODONE  ER Tablet 20 milliGRAM(s) Oral every 12 hours  polyethylene glycol 3350 17 Gram(s) Oral daily  senna 2 Tablet(s) Oral at bedtime      Physical Exam  General: Patient comfortable in bed  Vital Signs Last 12 Hrs  T(F): 98.1 (08-01-18 @ 07:09), Max: 98.2 (08-01-18 @ 01:35)  HR: 93 (08-01-18 @ 07:09) (90 - 93)  BP: 146/64 (08-01-18 @ 07:09) (109/49 - 146/64)  BP(mean): --  RR: 16 (08-01-18 @ 07:09) (16 - 18)  SpO2: 99% (08-01-18 @ 07:09) (96% - 99%)  Neck: No palpable thyroid nodules.  CVS: S1S2, No murmurs  Respiratory: No wheezing, no crepitations  GI: Abdomen soft, bowel sounds positive  Musculoskeletal:  edema lower extremities.   Skin: No skin rashes, no ecchymosis    Diagnostics    Hemoglobin A1C, Whole Blood: Routine  Cancel Reason: Auto-cancelled after 3 days - specimen not received. (07-19 @ 11:09)

## 2018-08-01 NOTE — PROGRESS NOTE ADULT - PROVIDER SPECIALTY LIST ADULT
Endocrinology
Infectious Disease
Internal Medicine
Nephrology
Podiatry
Vascular Surgery
Internal Medicine
Infectious Disease
Infectious Disease
Internal Medicine
Vascular Surgery
Endocrinology

## 2018-08-01 NOTE — PROGRESS NOTE ADULT - ASSESSMENT
68 yo M former smoker, hx IDDM2, hypothyroidism, HTN, HLD, COPD, CAD s/p stent & CABG x 3, atherosclerosis LE with intermittent claudication L leg, RLE bypass, femoral endartectomy fem-pop bypass 2017, presenting with L great toe pain s/p paronychia drainage and toenail removal. Pt describes 10/10 throbbing toe pain unrelieved by diclofenac or naproxen. Pt states he soaked foot in epsom salt one day ago before pain acutely worsened. Denies fevers/chills. No foot/calf pain. Reports L leg swelling/redness unchanged from baseline. On keflex BID.      Problem/Plan - 1:  ·  Problem: Toe infection.  Plan: S/P I&D . Podiatry helping .MRI ..< from: MR Foot No Cont, Left (07.23.18 @ 08:40) >    IMPRESSION:    1.  Soft tissue swelling and first digit skin bleb, nonspecific but can   be seen with cellulitis in the appropriate clinical setting.  2.  No drainable fluid collection.  3.  No definite bone marrow signal abnormality to suggest osteomyelitis     < end of copied text >       Problem/Plan - 2:  ·  Problem: DM (diabetes mellitus).  Plan: Endo helping and will follow recommendations. Sugars better.      Problem/Plan - 3:  ·  Problem: PAD (peripheral artery disease).  Plan: Vascular follow up . S/P Angiogram and outpt follow up.      Problem/Plan - 4:  ·  Problem: CKD (chronic kidney disease), stage III with JOSE LUIS .  Plan: Creatinine improving. Sec to ATN . Has been taking NSAID . Renal following.      Problem/Plan - 5:  ·  Problem: CHF (congestive heart failure).  Plan: Not in acute CHF .      Problem/Plan - 6:  Problem: Hypothyroid. Plan: Synthroid .     Problem/Plan - 7:  ·  Problem: Foot Pain .  Plan: Sec to   PVD  . Increased Oxycontin and continuing PRN pain meds. Now pain under control.      Problem/Plan - 8:  ·  Problem: Mild intermittent asthma without complication.  Plan: continue Inhaler.

## 2018-08-01 NOTE — PROGRESS NOTE ADULT - PROBLEM SELECTOR PROBLEM 1
DM (diabetes mellitus)

## 2018-08-01 NOTE — PROGRESS NOTE ADULT - PROBLEM SELECTOR PLAN 4
On medications, monitored and followed up by primary/cardiology team

## 2018-08-01 NOTE — PROGRESS NOTE ADULT - PROBLEM SELECTOR PROBLEM 4
CAD (coronary artery disease)

## 2018-08-01 NOTE — PROGRESS NOTE ADULT - SUBJECTIVE AND OBJECTIVE BOX
pt seen at bedside in NAD. dressing to left foot c/d/i  left hallux ulceration noted at nail bed fibrotic and edema with erythema of foot and toe  no pus tender with palpation  applied betadine with DSD  MRI no OM   needs bypass  continue abx  will follow

## 2018-08-01 NOTE — PROGRESS NOTE ADULT - SUBJECTIVE AND OBJECTIVE BOX
INTERVAL HPI/OVERNIGHT EVENTS: No new concerns.   Vital Signs Last 24 Hrs  T(C): 36.7 (01 Aug 2018 07:09), Max: 37 (2018 17:00)  T(F): 98.1 (01 Aug 2018 07:09), Max: 98.6 (2018 17:00)  HR: 93 (01 Aug 2018 07:09) (72 - 96)  BP: 146/64 (01 Aug 2018 07:09) (109/49 - 146/64)  BP(mean): --  RR: 16 (01 Aug 2018 07:09) (12 - 20)  SpO2: 99% (01 Aug 2018 07:09) (96% - 100%)  I&O's Summary    2018 07:01  -  01 Aug 2018 07:00  --------------------------------------------------------  IN: 625 mL / OUT: 1000 mL / NET: -375 mL      MEDICATIONS  (STANDING):  aspirin enteric coated 81 milliGRAM(s) Oral daily  atorvastatin 80 milliGRAM(s) Oral at bedtime  buDESOnide 160 MICROgram(s)/formoterol 4.5 MICROgram(s) Inhaler 2 Puff(s) Inhalation two times a day  chlorhexidine 4% Liquid 1 Application(s) Topical once  cyanocobalamin 1000 MICROGram(s) Oral daily  dextrose 5%. 1000 milliLiter(s) (50 mL/Hr) IV Continuous <Continuous>  dextrose 50% Injectable 12.5 Gram(s) IV Push once  dextrose 50% Injectable 25 Gram(s) IV Push once  dextrose 50% Injectable 25 Gram(s) IV Push once  ferrous    sulfate 325 milliGRAM(s) Oral daily  gabapentin 300 milliGRAM(s) Oral daily  heparin  Injectable 5000 Unit(s) SubCutaneous every 8 hours  insulin glargine Injectable (LANTUS) 60 Unit(s) SubCutaneous at bedtime  insulin glargine Injectable (LANTUS) 20 Unit(s) SubCutaneous every morning  insulin lispro (HumaLOG) corrective regimen sliding scale   SubCutaneous three times a day before meals  insulin lispro Injectable (HumaLOG) 20 Unit(s) SubCutaneous three times a day before meals  levothyroxine 150 MICROGram(s) Oral daily  metoprolol tartrate 25 milliGRAM(s) Oral two times a day  oxyCODONE  ER Tablet 20 milliGRAM(s) Oral every 12 hours  polyethylene glycol 3350 17 Gram(s) Oral daily  senna 2 Tablet(s) Oral at bedtime    MEDICATIONS  (PRN):  acetaminophen   Tablet. 650 milliGRAM(s) Oral every 6 hours PRN Mild Pain (1 - 3)  dextrose 40% Gel 15 Gram(s) Oral once PRN Blood Glucose LESS THAN 70 milliGRAM(s)/deciLiter  docusate sodium 100 milliGRAM(s) Oral daily PRN Constipation  glucagon  Injectable 1 milliGRAM(s) IntraMuscular once PRN Glucose <70 milliGRAM(s)/deciLiter  labetalol Injectable 5 milliGRAM(s) IV Push every 6 hours PRN BP >150  morphine  - Injectable 2 milliGRAM(s) IV Push every 6 hours PRN breakthrough pain  oxyCODONE    IR 5 milliGRAM(s) Oral every 4 hours PRN Moderate Pain (4 - 6)  oxyCODONE    IR 10 milliGRAM(s) Oral every 4 hours PRN Severe Pain (7 - 10)    LABS:                        9.0    8.90  )-----------( 325      ( 2018 06:12 )             29.1     07-31    131<L>  |  93<L>  |  59<H>  ----------------------------<  200<H>  4.9   |  21<L>  |  1.50<H>    Ca    9.3      2018 04:30  Phos  4.2     07-  Mg     2.7     07-31      PT/INR - ( 2018 04:30 )   PT: 11.6 SEC;   INR: 1.01            Urinalysis Basic - ( 2018 19:20 )    Color: PLYEL / Appearance: CLEAR / S.018 / pH: 6.0  Gluc: NEGATIVE / Ketone: NEGATIVE  / Bili: NEGATIVE / Urobili: NORMAL mg/dL   Blood: MODERATE / Protein: 20 mg/dL / Nitrite: NEGATIVE   Leuk Esterase: NEGATIVE / RBC: 25-50 / WBC 2-5   Sq Epi: OCC / Non Sq Epi: x / Bacteria: FEW      CAPILLARY BLOOD GLUCOSE      POCT Blood Glucose.: 303 mg/dL (01 Aug 2018 07:39)  POCT Blood Glucose.: 279 mg/dL (2018 22:11)  POCT Blood Glucose.: 235 mg/dL (2018 19:30)  POCT Blood Glucose.: 184 mg/dL (2018 16:12)      ABG - ( 2018 07:54 )  pH, Arterial: 7.38  pH, Blood: x     /  pCO2: 42    /  pO2: 76    / HCO3: 25    / Base Excess: 0.2   /  SaO2: 95.0              Urinalysis Basic - ( 2018 19:20 )    Color: PLYEL / Appearance: CLEAR / S.018 / pH: 6.0  Gluc: NEGATIVE / Ketone: NEGATIVE  / Bili: NEGATIVE / Urobili: NORMAL mg/dL   Blood: MODERATE / Protein: 20 mg/dL / Nitrite: NEGATIVE   Leuk Esterase: NEGATIVE / RBC: 25-50 / WBC 2-5   Sq Epi: OCC / Non Sq Epi: x / Bacteria: FEW      REVIEW OF SYSTEMS:  CONSTITUTIONAL: No fever, weight loss, or fatigue  EYES: No eye pain, visual disturbances, or discharge  ENMT:  No difficulty hearing, tinnitus, vertigo; No sinus or throat pain  NECK: No pain or stiffness  BREASTS: No pain, masses, or nipple discharge  RESPIRATORY: No cough, wheezing, chills or hemoptysis; No shortness of breath  CARDIOVASCULAR: No chest pain, palpitations, dizziness, or leg swelling  GASTROINTESTINAL: No abdominal or epigastric pain. No nausea, vomiting, or hematemesis; No diarrhea or constipation. No melena or hematochezia.  GENITOURINARY: No dysuria, frequency, hematuria, or incontinence  NEUROLOGICAL: No headaches, memory loss, loss of strength, numbness, or tremors  SKIN: No itching, burning, rashes, or lesions   LYMPH NODES: No enlarged glands  ENDOCRINE: No heat or cold intolerance; No hair loss  MUSCULOSKELETAL: foot pain     Consultant(s) Notes Reviewed:  [x ] YES  [ ] NO    PHYSICAL EXAM:  GENERAL: NAD, well-groomed, well-developed, not in any distress ,  HEAD:  Atraumatic, Normocephalic  EYES: EOMI, PERRLA, conjunctiva and sclera clear  ENMT: No tonsillar erythema, exudates, or enlargement; Moist mucous membranes, Good dentition, No lesions  NECK: Supple, No JVD, Normal thyroid  NERVOUS SYSTEM:  Alert & Oriented X3, No focal deficit   CHEST/LUNG: Good air entry bilateral with no  rales, rhonchi, wheezing, or rubs  HEART: Regular rate and rhythm; No murmurs, rubs, or gallops  ABDOMEN: Soft, Nontender, Nondistended; Bowel sounds present  EXTREMITIES:  foot dressed     Care Discussed with Consultants/Other Providers [ x] YES  [ ] NO

## 2018-08-07 ENCOUNTER — INPATIENT (INPATIENT)
Facility: HOSPITAL | Age: 68
LOS: 8 days | Discharge: NURSING, REHAB OR LONG-TERM CARE | End: 2018-08-16
Attending: SURGERY | Admitting: SURGERY
Payer: MEDICARE

## 2018-08-07 ENCOUNTER — TRANSCRIPTION ENCOUNTER (OUTPATIENT)
Age: 68
End: 2018-08-07

## 2018-08-07 ENCOUNTER — APPOINTMENT (OUTPATIENT)
Dept: VASCULAR SURGERY | Facility: HOSPITAL | Age: 68
End: 2018-08-07

## 2018-08-07 VITALS
TEMPERATURE: 98 F | OXYGEN SATURATION: 95 % | HEIGHT: 64.5 IN | RESPIRATION RATE: 18 BRPM | HEART RATE: 60 BPM | DIASTOLIC BLOOD PRESSURE: 53 MMHG | WEIGHT: 203.05 LBS | SYSTOLIC BLOOD PRESSURE: 141 MMHG

## 2018-08-07 DIAGNOSIS — Z95.828 PRESENCE OF OTHER VASCULAR IMPLANTS AND GRAFTS: Chronic | ICD-10-CM

## 2018-08-07 DIAGNOSIS — Z86.79 PERSONAL HISTORY OF OTHER DISEASES OF THE CIRCULATORY SYSTEM: Chronic | ICD-10-CM

## 2018-08-07 DIAGNOSIS — Z95.5 PRESENCE OF CORONARY ANGIOPLASTY IMPLANT AND GRAFT: Chronic | ICD-10-CM

## 2018-08-07 DIAGNOSIS — Z95.1 PRESENCE OF AORTOCORONARY BYPASS GRAFT: Chronic | ICD-10-CM

## 2018-08-07 DIAGNOSIS — I73.9 PERIPHERAL VASCULAR DISEASE, UNSPECIFIED: ICD-10-CM

## 2018-08-07 LAB
APTT BLD: 63 SEC — HIGH (ref 27.5–37.4)
BASE EXCESS BLDA CALC-SCNC: -0.1 MMOL/L — SIGNIFICANT CHANGE UP
BASE EXCESS BLDA CALC-SCNC: -0.4 MMOL/L — SIGNIFICANT CHANGE UP
BASE EXCESS BLDA CALC-SCNC: -1.1 MMOL/L — SIGNIFICANT CHANGE UP
BASE EXCESS BLDA CALC-SCNC: -1.1 MMOL/L — SIGNIFICANT CHANGE UP
BASE EXCESS BLDA CALC-SCNC: -1.8 MMOL/L — SIGNIFICANT CHANGE UP
BASE EXCESS BLDA CALC-SCNC: 0.3 MMOL/L — SIGNIFICANT CHANGE UP
BASE EXCESS BLDA CALC-SCNC: 0.8 MMOL/L — SIGNIFICANT CHANGE UP
BASOPHILS # BLD AUTO: 0.04 K/UL — SIGNIFICANT CHANGE UP (ref 0–0.2)
BASOPHILS NFR BLD AUTO: 0.3 % — SIGNIFICANT CHANGE UP (ref 0–2)
BLD GP AB SCN SERPL QL: NEGATIVE — SIGNIFICANT CHANGE UP
BUN SERPL-MCNC: 40 MG/DL — HIGH (ref 7–23)
CA-I BLDA-SCNC: 1.16 MMOL/L — SIGNIFICANT CHANGE UP (ref 1.15–1.29)
CA-I BLDA-SCNC: 1.17 MMOL/L — SIGNIFICANT CHANGE UP (ref 1.15–1.29)
CA-I BLDA-SCNC: 1.21 MMOL/L — SIGNIFICANT CHANGE UP (ref 1.15–1.29)
CA-I BLDA-SCNC: 1.23 MMOL/L — SIGNIFICANT CHANGE UP (ref 1.15–1.29)
CA-I BLDA-SCNC: 1.25 MMOL/L — SIGNIFICANT CHANGE UP (ref 1.15–1.29)
CA-I BLDA-SCNC: 1.25 MMOL/L — SIGNIFICANT CHANGE UP (ref 1.15–1.29)
CA-I BLDA-SCNC: 1.28 MMOL/L — SIGNIFICANT CHANGE UP (ref 1.15–1.29)
CALCIUM SERPL-MCNC: 9.9 MG/DL — SIGNIFICANT CHANGE UP (ref 8.4–10.5)
CHLORIDE SERPL-SCNC: 98 MMOL/L — SIGNIFICANT CHANGE UP (ref 98–107)
CO2 SERPL-SCNC: 23 MMOL/L — SIGNIFICANT CHANGE UP (ref 22–31)
CREAT SERPL-MCNC: 1.24 MG/DL — SIGNIFICANT CHANGE UP (ref 0.5–1.3)
EOSINOPHIL # BLD AUTO: 0.29 K/UL — SIGNIFICANT CHANGE UP (ref 0–0.5)
EOSINOPHIL NFR BLD AUTO: 2.3 % — SIGNIFICANT CHANGE UP (ref 0–6)
GLUCOSE BLDA-MCNC: 162 MG/DL — HIGH (ref 70–99)
GLUCOSE BLDA-MCNC: 169 MG/DL — HIGH (ref 70–99)
GLUCOSE BLDA-MCNC: 177 MG/DL — HIGH (ref 70–99)
GLUCOSE BLDA-MCNC: 178 MG/DL — HIGH (ref 70–99)
GLUCOSE BLDA-MCNC: 180 MG/DL — HIGH (ref 70–99)
GLUCOSE BLDA-MCNC: 181 MG/DL — HIGH (ref 70–99)
GLUCOSE BLDA-MCNC: 190 MG/DL — HIGH (ref 70–99)
GLUCOSE SERPL-MCNC: 213 MG/DL — HIGH (ref 70–99)
HCO3 BLDA-SCNC: 23 MMOL/L — SIGNIFICANT CHANGE UP (ref 22–26)
HCO3 BLDA-SCNC: 24 MMOL/L — SIGNIFICANT CHANGE UP (ref 22–26)
HCO3 BLDA-SCNC: 25 MMOL/L — SIGNIFICANT CHANGE UP (ref 22–26)
HCT VFR BLD CALC: 33.2 % — LOW (ref 39–50)
HCT VFR BLDA CALC: 27.5 % — LOW (ref 39–51)
HCT VFR BLDA CALC: 28.4 % — LOW (ref 39–51)
HCT VFR BLDA CALC: 29.4 % — LOW (ref 39–51)
HCT VFR BLDA CALC: 30 % — LOW (ref 39–51)
HCT VFR BLDA CALC: 30.2 % — LOW (ref 39–51)
HCT VFR BLDA CALC: 32 % — LOW (ref 39–51)
HCT VFR BLDA CALC: 32.2 % — LOW (ref 39–51)
HGB BLD-MCNC: 11 G/DL — LOW (ref 13–17)
HGB BLDA-MCNC: 10.3 G/DL — LOW (ref 13–17)
HGB BLDA-MCNC: 10.4 G/DL — LOW (ref 13–17)
HGB BLDA-MCNC: 8.8 G/DL — LOW (ref 13–17)
HGB BLDA-MCNC: 9.2 G/DL — LOW (ref 13–17)
HGB BLDA-MCNC: 9.5 G/DL — LOW (ref 13–17)
HGB BLDA-MCNC: 9.7 G/DL — LOW (ref 13–17)
HGB BLDA-MCNC: 9.7 G/DL — LOW (ref 13–17)
IMM GRANULOCYTES # BLD AUTO: 0.06 # — SIGNIFICANT CHANGE UP
IMM GRANULOCYTES NFR BLD AUTO: 0.5 % — SIGNIFICANT CHANGE UP (ref 0–1.5)
INR BLD: 1.13 — SIGNIFICANT CHANGE UP (ref 0.88–1.17)
LYMPHOCYTES # BLD AUTO: 17.5 % — SIGNIFICANT CHANGE UP (ref 13–44)
LYMPHOCYTES # BLD AUTO: 2.19 K/UL — SIGNIFICANT CHANGE UP (ref 1–3.3)
MCHC RBC-ENTMCNC: 28.4 PG — SIGNIFICANT CHANGE UP (ref 27–34)
MCHC RBC-ENTMCNC: 33.1 % — SIGNIFICANT CHANGE UP (ref 32–36)
MCV RBC AUTO: 85.8 FL — SIGNIFICANT CHANGE UP (ref 80–100)
MONOCYTES # BLD AUTO: 0.78 K/UL — SIGNIFICANT CHANGE UP (ref 0–0.9)
MONOCYTES NFR BLD AUTO: 6.2 % — SIGNIFICANT CHANGE UP (ref 2–14)
NEUTROPHILS # BLD AUTO: 9.17 K/UL — HIGH (ref 1.8–7.4)
NEUTROPHILS NFR BLD AUTO: 73.2 % — SIGNIFICANT CHANGE UP (ref 43–77)
NRBC # FLD: 0 — SIGNIFICANT CHANGE UP
PCO2 BLDA: 30 MMHG — LOW (ref 35–48)
PCO2 BLDA: 31 MMHG — LOW (ref 35–48)
PCO2 BLDA: 36 MMHG — SIGNIFICANT CHANGE UP (ref 35–48)
PCO2 BLDA: 37 MMHG — SIGNIFICANT CHANGE UP (ref 35–48)
PCO2 BLDA: 43 MMHG — SIGNIFICANT CHANGE UP (ref 35–48)
PH BLDA: 7.38 PH — SIGNIFICANT CHANGE UP (ref 7.35–7.45)
PH BLDA: 7.41 PH — SIGNIFICANT CHANGE UP (ref 7.35–7.45)
PH BLDA: 7.41 PH — SIGNIFICANT CHANGE UP (ref 7.35–7.45)
PH BLDA: 7.42 PH — SIGNIFICANT CHANGE UP (ref 7.35–7.45)
PH BLDA: 7.43 PH — SIGNIFICANT CHANGE UP (ref 7.35–7.45)
PH BLDA: 7.48 PH — HIGH (ref 7.35–7.45)
PH BLDA: 7.49 PH — HIGH (ref 7.35–7.45)
PLATELET # BLD AUTO: 240 K/UL — SIGNIFICANT CHANGE UP (ref 150–400)
PMV BLD: 10.2 FL — SIGNIFICANT CHANGE UP (ref 7–13)
PO2 BLDA: 202 MMHG — HIGH (ref 83–108)
PO2 BLDA: 204 MMHG — HIGH (ref 83–108)
PO2 BLDA: 223 MMHG — HIGH (ref 83–108)
PO2 BLDA: 243 MMHG — HIGH (ref 83–108)
PO2 BLDA: 243 MMHG — HIGH (ref 83–108)
PO2 BLDA: 256 MMHG — HIGH (ref 83–108)
PO2 BLDA: 57 MMHG — LOW (ref 83–108)
POTASSIUM BLDA-SCNC: 3.8 MMOL/L — SIGNIFICANT CHANGE UP (ref 3.4–4.5)
POTASSIUM BLDA-SCNC: 3.9 MMOL/L — SIGNIFICANT CHANGE UP (ref 3.4–4.5)
POTASSIUM BLDA-SCNC: 4 MMOL/L — SIGNIFICANT CHANGE UP (ref 3.4–4.5)
POTASSIUM BLDA-SCNC: 4.1 MMOL/L — SIGNIFICANT CHANGE UP (ref 3.4–4.5)
POTASSIUM BLDA-SCNC: 4.3 MMOL/L — SIGNIFICANT CHANGE UP (ref 3.4–4.5)
POTASSIUM BLDA-SCNC: 4.4 MMOL/L — SIGNIFICANT CHANGE UP (ref 3.4–4.5)
POTASSIUM BLDA-SCNC: 4.4 MMOL/L — SIGNIFICANT CHANGE UP (ref 3.4–4.5)
POTASSIUM SERPL-MCNC: 4.3 MMOL/L — SIGNIFICANT CHANGE UP (ref 3.5–5.3)
POTASSIUM SERPL-SCNC: 4.3 MMOL/L — SIGNIFICANT CHANGE UP (ref 3.5–5.3)
PROTHROM AB SERPL-ACNC: 12.6 SEC — SIGNIFICANT CHANGE UP (ref 9.8–13.1)
RBC # BLD: 3.87 M/UL — LOW (ref 4.2–5.8)
RBC # FLD: 15.5 % — HIGH (ref 10.3–14.5)
RH IG SCN BLD-IMP: POSITIVE — SIGNIFICANT CHANGE UP
SAO2 % BLDA: 87.2 % — LOW (ref 95–99)
SAO2 % BLDA: 99.4 % — HIGH (ref 95–99)
SAO2 % BLDA: 99.5 % — HIGH (ref 95–99)
SAO2 % BLDA: 99.5 % — HIGH (ref 95–99)
SAO2 % BLDA: 99.6 % — HIGH (ref 95–99)
SAO2 % BLDA: 99.7 % — HIGH (ref 95–99)
SAO2 % BLDA: 99.7 % — HIGH (ref 95–99)
SODIUM BLDA-SCNC: 134 MMOL/L — LOW (ref 136–146)
SODIUM BLDA-SCNC: 135 MMOL/L — LOW (ref 136–146)
SODIUM BLDA-SCNC: 136 MMOL/L — SIGNIFICANT CHANGE UP (ref 136–146)
SODIUM BLDA-SCNC: 136 MMOL/L — SIGNIFICANT CHANGE UP (ref 136–146)
SODIUM BLDA-SCNC: 137 MMOL/L — SIGNIFICANT CHANGE UP (ref 136–146)
SODIUM SERPL-SCNC: 137 MMOL/L — SIGNIFICANT CHANGE UP (ref 135–145)
WBC # BLD: 12.53 K/UL — HIGH (ref 3.8–10.5)
WBC # FLD AUTO: 12.53 K/UL — HIGH (ref 3.8–10.5)

## 2018-08-07 RX ORDER — METOPROLOL TARTRATE 50 MG
25 TABLET ORAL
Qty: 0 | Refills: 0 | Status: DISCONTINUED | OUTPATIENT
Start: 2018-08-07 | End: 2018-08-16

## 2018-08-07 RX ORDER — HYDROMORPHONE HYDROCHLORIDE 2 MG/ML
0.5 INJECTION INTRAMUSCULAR; INTRAVENOUS; SUBCUTANEOUS
Qty: 0 | Refills: 0 | Status: DISCONTINUED | OUTPATIENT
Start: 2018-08-07 | End: 2018-08-08

## 2018-08-07 RX ORDER — CEFAZOLIN SODIUM 1 G
2000 VIAL (EA) INJECTION EVERY 8 HOURS
Qty: 0 | Refills: 0 | Status: COMPLETED | OUTPATIENT
Start: 2018-08-07 | End: 2018-08-08

## 2018-08-07 RX ORDER — ASPIRIN/CALCIUM CARB/MAGNESIUM 324 MG
81 TABLET ORAL DAILY
Qty: 0 | Refills: 0 | Status: DISCONTINUED | OUTPATIENT
Start: 2018-08-07 | End: 2018-08-16

## 2018-08-07 RX ORDER — LOSARTAN POTASSIUM 100 MG/1
100 TABLET, FILM COATED ORAL DAILY
Qty: 0 | Refills: 0 | Status: DISCONTINUED | OUTPATIENT
Start: 2018-08-07 | End: 2018-08-16

## 2018-08-07 RX ORDER — SODIUM CHLORIDE 9 MG/ML
1000 INJECTION, SOLUTION INTRAVENOUS
Qty: 0 | Refills: 0 | Status: DISCONTINUED | OUTPATIENT
Start: 2018-08-07 | End: 2018-08-07

## 2018-08-07 RX ORDER — LOSARTAN POTASSIUM 100 MG/1
1 TABLET, FILM COATED ORAL
Qty: 0 | Refills: 0 | COMMUNITY

## 2018-08-07 RX ORDER — LEVOTHYROXINE SODIUM 125 MCG
1 TABLET ORAL
Qty: 0 | Refills: 0 | COMMUNITY

## 2018-08-07 RX ORDER — DEXTROSE 50 % IN WATER 50 %
15 SYRINGE (ML) INTRAVENOUS ONCE
Qty: 0 | Refills: 0 | Status: DISCONTINUED | OUTPATIENT
Start: 2018-08-07 | End: 2018-08-07

## 2018-08-07 RX ORDER — FERROUS SULFATE 325(65) MG
325 TABLET ORAL DAILY
Qty: 0 | Refills: 0 | Status: DISCONTINUED | OUTPATIENT
Start: 2018-08-07 | End: 2018-08-07

## 2018-08-07 RX ORDER — GABAPENTIN 400 MG/1
300 CAPSULE ORAL DAILY
Qty: 0 | Refills: 0 | Status: DISCONTINUED | OUTPATIENT
Start: 2018-08-07 | End: 2018-08-16

## 2018-08-07 RX ORDER — BUDESONIDE AND FORMOTEROL FUMARATE DIHYDRATE 160; 4.5 UG/1; UG/1
2 AEROSOL RESPIRATORY (INHALATION)
Qty: 0 | Refills: 0 | Status: DISCONTINUED | OUTPATIENT
Start: 2018-08-07 | End: 2018-08-16

## 2018-08-07 RX ORDER — ATORVASTATIN CALCIUM 80 MG/1
1 TABLET, FILM COATED ORAL
Qty: 0 | Refills: 0 | COMMUNITY

## 2018-08-07 RX ORDER — LABETALOL HCL 100 MG
10 TABLET ORAL ONCE
Qty: 0 | Refills: 0 | Status: COMPLETED | OUTPATIENT
Start: 2018-08-07 | End: 2018-08-07

## 2018-08-07 RX ORDER — DEXTROSE 50 % IN WATER 50 %
12.5 SYRINGE (ML) INTRAVENOUS ONCE
Qty: 0 | Refills: 0 | Status: DISCONTINUED | OUTPATIENT
Start: 2018-08-07 | End: 2018-08-07

## 2018-08-07 RX ORDER — INSULIN LISPRO 100/ML
VIAL (ML) SUBCUTANEOUS
Qty: 0 | Refills: 0 | Status: DISCONTINUED | OUTPATIENT
Start: 2018-08-07 | End: 2018-08-16

## 2018-08-07 RX ORDER — OXYCODONE AND ACETAMINOPHEN 5; 325 MG/1; MG/1
1 TABLET ORAL EVERY 4 HOURS
Qty: 0 | Refills: 0 | Status: DISCONTINUED | OUTPATIENT
Start: 2018-08-07 | End: 2018-08-08

## 2018-08-07 RX ORDER — HEPARIN SODIUM 5000 [USP'U]/ML
500 INJECTION INTRAVENOUS; SUBCUTANEOUS
Qty: 25000 | Refills: 0 | Status: DISCONTINUED | OUTPATIENT
Start: 2018-08-07 | End: 2018-08-08

## 2018-08-07 RX ORDER — DEXTROSE 50 % IN WATER 50 %
25 SYRINGE (ML) INTRAVENOUS ONCE
Qty: 0 | Refills: 0 | Status: DISCONTINUED | OUTPATIENT
Start: 2018-08-07 | End: 2018-08-07

## 2018-08-07 RX ORDER — SODIUM CHLORIDE 9 MG/ML
1000 INJECTION, SOLUTION INTRAVENOUS
Qty: 0 | Refills: 0 | Status: DISCONTINUED | OUTPATIENT
Start: 2018-08-07 | End: 2018-08-08

## 2018-08-07 RX ORDER — ATORVASTATIN CALCIUM 80 MG/1
80 TABLET, FILM COATED ORAL AT BEDTIME
Qty: 0 | Refills: 0 | Status: DISCONTINUED | OUTPATIENT
Start: 2018-08-07 | End: 2018-08-16

## 2018-08-07 RX ORDER — HYDROMORPHONE HYDROCHLORIDE 2 MG/ML
1 INJECTION INTRAMUSCULAR; INTRAVENOUS; SUBCUTANEOUS
Qty: 0 | Refills: 0 | Status: DISCONTINUED | OUTPATIENT
Start: 2018-08-07 | End: 2018-08-08

## 2018-08-07 RX ORDER — SODIUM CHLORIDE 9 MG/ML
3 INJECTION INTRAMUSCULAR; INTRAVENOUS; SUBCUTANEOUS EVERY 8 HOURS
Qty: 0 | Refills: 0 | Status: DISCONTINUED | OUTPATIENT
Start: 2018-08-07 | End: 2018-08-07

## 2018-08-07 RX ORDER — LEVOTHYROXINE SODIUM 125 MCG
150 TABLET ORAL DAILY
Qty: 0 | Refills: 0 | Status: DISCONTINUED | OUTPATIENT
Start: 2018-08-07 | End: 2018-08-16

## 2018-08-07 RX ORDER — PREGABALIN 225 MG/1
1 CAPSULE ORAL
Qty: 0 | Refills: 0 | COMMUNITY

## 2018-08-07 RX ORDER — HYDRALAZINE HCL 50 MG
10 TABLET ORAL EVERY 4 HOURS
Qty: 0 | Refills: 0 | Status: DISCONTINUED | OUTPATIENT
Start: 2018-08-07 | End: 2018-08-13

## 2018-08-07 RX ORDER — BUDESONIDE AND FORMOTEROL FUMARATE DIHYDRATE 160; 4.5 UG/1; UG/1
2 AEROSOL RESPIRATORY (INHALATION)
Qty: 0 | Refills: 0 | COMMUNITY

## 2018-08-07 RX ORDER — DICLOFENAC SODIUM 75 MG/1
75 TABLET, DELAYED RELEASE ORAL THREE TIMES A DAY
Qty: 0 | Refills: 0 | Status: DISCONTINUED | OUTPATIENT
Start: 2018-08-07 | End: 2018-08-16

## 2018-08-07 RX ORDER — FERROUS SULFATE 325(65) MG
325 TABLET ORAL DAILY
Qty: 0 | Refills: 0 | Status: DISCONTINUED | OUTPATIENT
Start: 2018-08-07 | End: 2018-08-16

## 2018-08-07 RX ORDER — FUROSEMIDE 40 MG
1 TABLET ORAL
Qty: 0 | Refills: 0 | COMMUNITY

## 2018-08-07 RX ORDER — DOCUSATE SODIUM 100 MG
0 CAPSULE ORAL
Qty: 0 | Refills: 0 | COMMUNITY

## 2018-08-07 RX ORDER — FUROSEMIDE 40 MG
40 TABLET ORAL DAILY
Qty: 0 | Refills: 0 | Status: DISCONTINUED | OUTPATIENT
Start: 2018-08-07 | End: 2018-08-07

## 2018-08-07 RX ORDER — FERROUS SULFATE 325(65) MG
1 TABLET ORAL
Qty: 0 | Refills: 0 | COMMUNITY

## 2018-08-07 RX ORDER — PREGABALIN 225 MG/1
1000 CAPSULE ORAL DAILY
Qty: 0 | Refills: 0 | Status: DISCONTINUED | OUTPATIENT
Start: 2018-08-07 | End: 2018-08-16

## 2018-08-07 RX ORDER — LABETALOL HCL 100 MG
20 TABLET ORAL ONCE
Qty: 0 | Refills: 0 | Status: COMPLETED | OUTPATIENT
Start: 2018-08-07 | End: 2018-08-07

## 2018-08-07 RX ORDER — DICLOFENAC SODIUM 75 MG/1
1 TABLET, DELAYED RELEASE ORAL
Qty: 0 | Refills: 0 | COMMUNITY

## 2018-08-07 RX ORDER — GLUCAGON INJECTION, SOLUTION 0.5 MG/.1ML
1 INJECTION, SOLUTION SUBCUTANEOUS ONCE
Qty: 0 | Refills: 0 | Status: DISCONTINUED | OUTPATIENT
Start: 2018-08-07 | End: 2018-08-07

## 2018-08-07 RX ORDER — ASPIRIN/CALCIUM CARB/MAGNESIUM 324 MG
1 TABLET ORAL
Qty: 0 | Refills: 0 | COMMUNITY

## 2018-08-07 RX ADMIN — HYDROMORPHONE HYDROCHLORIDE 0.5 MILLIGRAM(S): 2 INJECTION INTRAMUSCULAR; INTRAVENOUS; SUBCUTANEOUS at 22:48

## 2018-08-07 RX ADMIN — HYDROMORPHONE HYDROCHLORIDE 0.5 MILLIGRAM(S): 2 INJECTION INTRAMUSCULAR; INTRAVENOUS; SUBCUTANEOUS at 22:27

## 2018-08-07 RX ADMIN — Medication 20 MILLIGRAM(S): at 21:47

## 2018-08-07 RX ADMIN — HYDROMORPHONE HYDROCHLORIDE 0.5 MILLIGRAM(S): 2 INJECTION INTRAMUSCULAR; INTRAVENOUS; SUBCUTANEOUS at 22:06

## 2018-08-07 RX ADMIN — Medication 100 MILLIGRAM(S): at 22:11

## 2018-08-07 RX ADMIN — HYDROMORPHONE HYDROCHLORIDE 0.5 MILLIGRAM(S): 2 INJECTION INTRAMUSCULAR; INTRAVENOUS; SUBCUTANEOUS at 21:50

## 2018-08-07 RX ADMIN — HEPARIN SODIUM 500 UNIT(S)/HR: 5000 INJECTION INTRAVENOUS; SUBCUTANEOUS at 22:13

## 2018-08-07 RX ADMIN — Medication 10 MILLIGRAM(S): at 21:31

## 2018-08-07 RX ADMIN — BUDESONIDE AND FORMOTEROL FUMARATE DIHYDRATE 2 PUFF(S): 160; 4.5 AEROSOL RESPIRATORY (INHALATION) at 23:00

## 2018-08-07 RX ADMIN — SODIUM CHLORIDE 100 MILLILITER(S): 9 INJECTION, SOLUTION INTRAVENOUS at 22:00

## 2018-08-07 NOTE — CONSULT NOTE ADULT - ASSESSMENT
ASSESSMENT:  67y Male s/p  Left [id number]-[id number] artery bypass with PTFE graft.    PLAN:   Neurologic: PRN pain  control   Respiratory: On 3 L NC ---> wean as  tolerated   Cardiovascular: Trend   Gastrointestinal/Nutrition:   Renal/Genitourinary:   Hematologic:   Infectious Disease:   Lines/Tubes:  Endocrine:   Disposition:     --------------------------------------------------------------------------------------    Critical Care Diagnoses: ASSESSMENT:  67y Male s/p  Left Xhtnwnz-[id number]-[id number] artery bypass with PTFE graft.    PLAN:   Neurologic:   PRN pain  control   On Diclofenac , Gabapentin   Respiratory:   On 3 L NC ---> wean as  tolerated   Cardiovascular:   Trend vitals  closely   On ASA , Atorvastatin , Lopressor   Hep Gtt @ 500 [Units/ hr - No Bolus given , No titration needed   Gastrointestinal/Nutrition:   Clears   Renal/Genitourinary:   Strict I and O   replete lytes as needed   Hematologic:   On ASA , Hep gtt  Q1 vascular checks   Infectious Disease:   Ancef: Periop Prophylaxis   Lines/Tubes:  PIV , josue   Endocrine:   ISS   Trend FS closely   Disposition:   SICU  --------------------------------------------------------------------------------------    Critical Care Diagnoses: ASSESSMENT:  67y Male s/p  Left Gbdrygo-[id number] artery bypass with PTFE graft (8/7/18)    PLAN:   Neurologic:   PRN pain  control   On Diclofenac , Gabapentin   Respiratory:   On 3 L NC ---> wean as  tolerated   Cardiovascular:   Trend vitals  closely   On ASA , Atorvastatin , Lopressor   Hep Gtt @ 500 Units/ hr - No Bolus given , No titration needed   Gastrointestinal/Nutrition:   Clears   Renal/Genitourinary:   Strict I and O   replete lytes as needed   Hematologic:   On ASA , Hep gtt  Q1 vascular checks   Infectious Disease:   Ancef: Periop Prophylaxis   Lines/Tubes:  PIV , salas   Endocrine:   ISS   Trend FS closely   Disposition:   SICU  --------------------------------------------------------------------------------------    Critical Care Diagnoses:

## 2018-08-07 NOTE — ASU PREOP CHECKLIST - COMMENTS
took 5 units insulin ,synthroid, metoprolol, losartan, iron, Lasix, aspirin, gabapentin this morning @ 7 with sip of water

## 2018-08-07 NOTE — BRIEF OPERATIVE NOTE - PROCEDURE
<<-----Click on this checkbox to enter Procedure Bypass graft, femoral-peroneal, left  08/07/2018    Active  [id number]

## 2018-08-07 NOTE — CONSULT NOTE ADULT - SUBJECTIVE AND OBJECTIVE BOX
SICU Consultation Note  =====================================================  HPI:     This is a 68 yo Male former smoker, hx IDDM2, hypothyroidism, HTN, HLD, COPD, CAD s/p stent & CABG x 3 (3/28/2016, Barbara), R common femoral artery endarterectomy (11/20/2015), BLE angiogram (7/20/2015, Vincenzo), LLE angiogram (4/25/2017, Vincenzo), L femoral endarterectomy (6/6/2017, Vincenzo), presenting with L great toe pain x 1 month. The patient has been seen by podiatry multiple times over the last month for his toe pain. When his pain began, he was prescribed antibiotics, but that patient states that his pain did not improve. The patient returned to the podiatrist 2 days ago and his nail was removed because of concern for perionychial infection. The patient states that following that procedure, his pain has increased and there is now swelling in his foot. Podiatry is following the patient for hallux infection .   Pt is s/p  Left Diagnostic Angiogram for PAD - 7/31/18.   Today Pt is :    Surgery Information: Left Ovvabxl-xn-kqsotoiu artery bypass with PTFE graft.  Case Duration:      EBL:  400 ml     IV Fluids:   2200 ml Crystalloids , 500 ml Colloid     Blood Products:       3 units   Complications:    none     Pt was tolerated the procedure well . Pt was extubated and transferred to PACU for further monitoring . Positive Doppler signals confirmed with primary team at bedside . Pt is sating well on 3 L NC . Making adequate urine . 10 mg of Labetalol was given for SBP of 180. Heparin gtt at 5 unit / hr was started at 10 pm .     HISTORY  67yMale  Allergies: contrast media (iodine-based) (Hypotension)    PAST MEDICAL & SURGICAL HISTORY:  COPD (chronic obstructive pulmonary disease)  Sleep apnea: non-compliant  Anemia  Atherosclerosis of arteries of extremities: left leg  Mild intermittent asthma without complication  Iron deficiency anemia, unspecified iron deficiency anemia type  Prostate cancer  Bronchospasm  Obesity (BMI 30-39.9)  PAD (peripheral artery disease): RLE bypass 11/30/15  CAD (coronary artery disease): 3 cardiac stents placed in 2006 (mid LAD, Prox LAD, Prox to #2), 3V CABG 3/28/16  Hypothyroid  Intermittent claudication  Diabetic neuropathy  DM (diabetes mellitus): type 2 - on insulin pump  CHF (congestive heart failure)  Hypercholesteremia  HTN (hypertension)  History of femoral angiogram: Left femoral endarterectomy Fem-Pop bypass 6/2017  S/P bypass graft of extremity: RLE- 11/30/15  S/P CABG x 3: 3/28/16  Stented coronary artery: x 3 cardiac stents placed in 2006  S/P prostatectomy: 1996    FAMILY HISTORY:  No pertinent family history in first degree relatives      SOCIAL HISTORY:  ***    ADVANCE DIRECTIVES: Presumed Full Code  ***    REVIEW OF SYSTEMS:  ***  General: Non-Contributory  Skin/Breast: Non-Contributory  Ophthalmologic: Non-Contributory  ENMT: Non-Contributory  Respiratory and Thorax: Non-Contributory  Cardiovascular: Non-Contributory  Gastrointestinal: Non-Contributory  Genitourinary: Non-Contributory  Musculoskeletal: Non-Contributory  Neurological: Non-Contributory  Psychiatric: Non-Contributory  Hematology/Lymphatics: Non-Contributory  Endocrine: Non-Contributory  Allergic/Immunologic: Non-Contributory    HOME MEDICATIONS:  ***    CURRENT MEDICATIONS:   --------------------------------------------------------------------------------------  Neurologic Medications  diclofenac 75 milliGRAM(s) Oral three times a day PRN Joint pain  gabapentin 300 milliGRAM(s) Oral daily  HYDROmorphone  Injectable 0.5 milliGRAM(s) IV Push every 10 minutes PRN Moderate Pain (4 - 6)  HYDROmorphone  Injectable 1 milliGRAM(s) IV Push every 10 minutes PRN Severe Pain (7 - 10)  oxyCODONE    5 mG/acetaminophen 325 mG 1 Tablet(s) Oral every 4 hours PRN Moderate Pain    Respiratory Medications  buDESOnide 160 MICROgram(s)/formoterol 4.5 MICROgram(s) Inhaler 2 Puff(s) Inhalation two times a day    Cardiovascular Medications  furosemide    Tablet 40 milliGRAM(s) Oral daily  hydrALAZINE Injectable 10 milliGRAM(s) IV Push every 4 hours PRN SBP >160  losartan 100 milliGRAM(s) Oral daily  metolazone 5 milliGRAM(s) Oral daily  metoprolol tartrate 25 milliGRAM(s) Oral two times a day    Gastrointestinal Medications  cyanocobalamin 1000 MICROGram(s) Oral daily  ferrous    sulfate 325 milliGRAM(s) Oral daily  lactated ringers. 1000 milliLiter(s) IV Continuous <Continuous>    Genitourinary Medications    Hematologic/Oncologic Medications  aspirin enteric coated 81 milliGRAM(s) Oral daily  heparin  Infusion. 500 Unit(s)/Hr IV Continuous <Continuous>    Antimicrobial/Immunologic Medications  ceFAZolin   IVPB 2000 milliGRAM(s) IV Intermittent every 8 hours    Endocrine/Metabolic Medications  atorvastatin 80 milliGRAM(s) Oral at bedtime  insulin lispro (HumaLOG) corrective regimen sliding scale   SubCutaneous three times a day before meals  levothyroxine 150 MICROGram(s) Oral daily    Topical/Other Medications    --------------------------------------------------------------------------------------    VITAL SIGNS, INS/OUTS (last 24 hours):  --------------------------------------------------------------------------------------  T(C): 36.5 (08-07-18 @ 20:15), Max: 36.9 (08-07-18 @ 11:14)  HR: 77 (08-07-18 @ 22:00) (60 - 84)  BP: 120/52 (08-07-18 @ 22:00) (114/52 - 151/56)  BP(mean): 72 (08-07-18 @ 20:15) (72 - 72)  ABP: 154/51 (08-07-18 @ 22:00) (154/51 - 194/71)  ABP(mean): 85 (08-07-18 @ 22:00) (85 - 104)  RR: 12 (08-07-18 @ 22:00) (12 - 18)  SpO2: 99% (08-07-18 @ 22:00) (95% - 99%)  Wt(kg): --  CVP(mm Hg): --  CI: --  CAPILLARY BLOOD GLUCOSE       N/A      08-07 @ 07:01  -  08-07 @ 22:11  --------------------------------------------------------  IN:    lactated ringers.: 200 mL  Total IN: 200 mL    OUT:    Indwelling Catheter - Urethral: 325 mL  Total OUT: 325 mL    Total NET: -125 mL        --------------------------------------------------------------------------------------    EXAM:    NEUROLOGY  RASS:   GCS: 13  Exam: Normal, NAD, alert, oriented x 3, no focal deficits. PERRLA      RESPIRATORY  Exam: Lungs clear to auscultation b/l , Normal expansion/effort.    [] Tracheostomy   [] Intubated      CARDIOVASCULAR  Exam: S1, S2.  Regular rate and Rhythm     GI/NUTRITION  Exam: Abdomen soft, Non-tender, Non-distended.      Current Diet:  Clears     METABOLIC/FLUIDS/ELECTROLYTES  cyanocobalamin 1000 MICROGram(s) Oral daily  ferrous    sulfate 325 milliGRAM(s) Oral daily  lactated ringers. 1000 milliLiter(s) IV Continuous <Continuous>      HEMATOLOGIC  [x] DVT Prophylaxis: aspirin enteric coated 81 milliGRAM(s) Oral daily  heparin  Infusion. 500 Unit(s)/Hr IV Continuous <Continuous>    Transfusions:	[] PRBC	[] Platelets		[] FFP	[] Cryoprecipitate    INFECTIOUS DISEASE  Antimicrobials/Immunologic Medications:  ceFAZolin   IVPB 2000 milliGRAM(s) IV Intermittent every 8 hours    Doses total 3     Tubes/Lines/Drains   [x] Peripheral IV  [] Central Venous Line     	[] R	[] L	[] IJ	[] Fem	[] SC	Date Placed:   [X] Arterial Line		[] R	[X] L	[] Fem	[X] Rad	[] Ax	Date Placed: 8/7/18  [] PICC:         	[] Midline		[] Mediport  [X] Urinary Catheter		Date Placed: 8/7/18    VASCULAR  Exam: Extremities warm, pink, well-perfused.  Left Lower Ext ; PT , AT , Peroneal Doppler Signals     MUSCULOSKELETAL  Exam: Pt is at Bed rest - for 2 hrs at the time of exam . Left Lower Ext: Three Insicion  SItes: Left Groin , Thigh , Distal : Dressing : Clean / Dry and Intact.  Left Foot: Dressing : For a hallukx infection      SKIN:  Exam: Good skin turgor, no skin breakdown.        LABS  --------------------------------------------------------------------------------------  CBC (08-07 @ 21:23)                              11.0<L>                       12.53<H>  )--------------(  240        73.2  % Neuts, 17.5  % Lymphs, ANC: 9.17<H>                              33.2<L>    BMP (08-07 @ 21:23)             137     |  98      |  40<H> 		Ca++ --      Ca 9.9                ---------------------------------( 213<H>		Mg --                 4.3     |  23      |  1.24  			Ph --          Coags (08-07 @ 20:35)  aPTT 63.0<H> / INR 1.13 / PT 12.6      ABG (08-07 @ 18:56)     7.49<H> / 31<L> / 256<H> / 25 / 0.3 / 99.7<H>%     Lactate:     AB (08-07 @ 17:53)     7.48<H> / 30<L> / 243<H> / 24 / -1.1 / 99.7<H>%     Lactate:             --------------------------------------------------------------------------------------    OTHER LABS    IMAGING RESULTS SICU Consultation Note  =====================================================  HPI:     This is a 68 yo Male former smoker, hx IDDM2, hypothyroidism, HTN, HLD, COPD, CAD s/p stent & CABG x 3 (3/28/2016, Barbara), R common femoral artery endarterectomy (11/20/2015), BLE angiogram (7/20/2015, Vincenzo), LLE angiogram (4/25/2017, Vincenzo), L femoral endarterectomy (6/6/2017, Vincenzo), presenting with L great toe pain x 1 month. The patient has been seen by podiatry multiple times over the last month for his toe pain. When his pain began, he was prescribed antibiotics, but that patient states that his pain did not improve. The patient returned to the podiatrist and his nail  was removed because of concern for perionychial infection. The patient states that following that procedure, his pain has increased and there is now swelling in his foot. Podiatry is following the patient for hallux infection .   Pt is s/p  Left Diagnostic Angiogram for PAD - 7/31/18.  Now scheduled for the Femoral to peroneal bypass .      Today Pt is s/p :-    Surgery Information: Left Aitmoaf-mv-mmstlesi artery bypass with PTFE graft.  Case Duration:      EBL:  400 ml     IV Fluids:   2200 ml Crystalloids , 500 ml Colloid     Blood Products:       3 units   Complications:    none     Pt was tolerated the procedure well . Pt was extubated and transferred to PACU for further monitoring . Positive Doppler signals confirmed with primary team at bedside . Pt is sating well on 3 L NC . Making adequate urine . 10 mg of Labetalol was given for SBP of 180. Heparin gtt at 5 unit / hr was started at 10 pm .     HISTORY  67yMale  Allergies: contrast media (iodine-based) (Hypotension)    PAST MEDICAL & SURGICAL HISTORY:  COPD (chronic obstructive pulmonary disease)  Sleep apnea: non-compliant  Anemia  Atherosclerosis of arteries of extremities: left leg  Mild intermittent asthma without complication  Iron deficiency anemia, unspecified iron deficiency anemia type  Prostate cancer  Bronchospasm  Obesity (BMI 30-39.9)  PAD (peripheral artery disease): RLE bypass 11/30/15  CAD (coronary artery disease): 3 cardiac stents placed in 2006 (mid LAD, Prox LAD, Prox to #2), 3V CABG 3/28/16  Hypothyroid  Intermittent claudication  Diabetic neuropathy  DM (diabetes mellitus): type 2 - on insulin pump  CHF (congestive heart failure)  Hypercholesteremia  HTN (hypertension)  History of femoral angiogram: Left femoral endarterectomy Fem-Pop bypass 6/2017  S/P bypass graft of extremity: RLE- 11/30/15  S/P CABG x 3: 3/28/16  Stented coronary artery: x 3 cardiac stents placed in 2006  S/P prostatectomy: 1996    FAMILY HISTORY:  No pertinent family history in first degree relatives      SOCIAL HISTORY:  ***    ADVANCE DIRECTIVES: Presumed Full Code  ***    REVIEW OF SYSTEMS:  ***  General: Non-Contributory  Skin/Breast: Non-Contributory  Ophthalmologic: Non-Contributory  ENMT: Non-Contributory  Respiratory and Thorax: Non-Contributory  Cardiovascular: Non-Contributory  Gastrointestinal: Non-Contributory  Genitourinary: Non-Contributory  Musculoskeletal: Non-Contributory  Neurological: Non-Contributory  Psychiatric: Non-Contributory  Hematology/Lymphatics: Non-Contributory  Endocrine: Non-Contributory  Allergic/Immunologic: Non-Contributory    HOME MEDICATIONS:  ***    CURRENT MEDICATIONS:   --------------------------------------------------------------------------------------  Neurologic Medications  diclofenac 75 milliGRAM(s) Oral three times a day PRN Joint pain  gabapentin 300 milliGRAM(s) Oral daily  HYDROmorphone  Injectable 0.5 milliGRAM(s) IV Push every 10 minutes PRN Moderate Pain (4 - 6)  HYDROmorphone  Injectable 1 milliGRAM(s) IV Push every 10 minutes PRN Severe Pain (7 - 10)  oxyCODONE    5 mG/acetaminophen 325 mG 1 Tablet(s) Oral every 4 hours PRN Moderate Pain    Respiratory Medications  buDESOnide 160 MICROgram(s)/formoterol 4.5 MICROgram(s) Inhaler 2 Puff(s) Inhalation two times a day    Cardiovascular Medications  furosemide    Tablet 40 milliGRAM(s) Oral daily  hydrALAZINE Injectable 10 milliGRAM(s) IV Push every 4 hours PRN SBP >160  losartan 100 milliGRAM(s) Oral daily  metolazone 5 milliGRAM(s) Oral daily  metoprolol tartrate 25 milliGRAM(s) Oral two times a day    Gastrointestinal Medications  cyanocobalamin 1000 MICROGram(s) Oral daily  ferrous    sulfate 325 milliGRAM(s) Oral daily  lactated ringers. 1000 milliLiter(s) IV Continuous <Continuous>    Genitourinary Medications    Hematologic/Oncologic Medications  aspirin enteric coated 81 milliGRAM(s) Oral daily  heparin  Infusion. 500 Unit(s)/Hr IV Continuous <Continuous>    Antimicrobial/Immunologic Medications  ceFAZolin   IVPB 2000 milliGRAM(s) IV Intermittent every 8 hours    Endocrine/Metabolic Medications  atorvastatin 80 milliGRAM(s) Oral at bedtime  insulin lispro (HumaLOG) corrective regimen sliding scale   SubCutaneous three times a day before meals  levothyroxine 150 MICROGram(s) Oral daily    Topical/Other Medications    --------------------------------------------------------------------------------------    VITAL SIGNS, INS/OUTS (last 24 hours):  --------------------------------------------------------------------------------------  T(C): 36.5 (08-07-18 @ 20:15), Max: 36.9 (08-07-18 @ 11:14)  HR: 77 (08-07-18 @ 22:00) (60 - 84)  BP: 120/52 (08-07-18 @ 22:00) (114/52 - 151/56)  BP(mean): 72 (08-07-18 @ 20:15) (72 - 72)  ABP: 154/51 (08-07-18 @ 22:00) (154/51 - 194/71)  ABP(mean): 85 (08-07-18 @ 22:00) (85 - 104)  RR: 12 (08-07-18 @ 22:00) (12 - 18)  SpO2: 99% (08-07-18 @ 22:00) (95% - 99%)  Wt(kg): --  CVP(mm Hg): --  CI: --  CAPILLARY BLOOD GLUCOSE       N/A      08-07 @ 07:01  -  08-07 @ 22:11  --------------------------------------------------------  IN:    lactated ringers.: 200 mL  Total IN: 200 mL    OUT:    Indwelling Catheter - Urethral: 325 mL  Total OUT: 325 mL    Total NET: -125 mL        --------------------------------------------------------------------------------------    EXAM:    NEUROLOGY  RASS:   GCS: 13  Exam: Normal, NAD, alert, oriented x 3, no focal deficits. PERRLA      RESPIRATORY  Exam: Lungs clear to auscultation b/l , Normal expansion/effort.    [] Tracheostomy   [] Intubated      CARDIOVASCULAR  Exam: S1, S2.  Regular rate and Rhythm     GI/NUTRITION  Exam: Abdomen soft, Non-tender, Non-distended.      Current Diet:  Clears     METABOLIC/FLUIDS/ELECTROLYTES  cyanocobalamin 1000 MICROGram(s) Oral daily  ferrous    sulfate 325 milliGRAM(s) Oral daily  lactated ringers. 1000 milliLiter(s) IV Continuous <Continuous>      HEMATOLOGIC  [x] DVT Prophylaxis: aspirin enteric coated 81 milliGRAM(s) Oral daily  heparin  Infusion. 500 Unit(s)/Hr IV Continuous <Continuous>    Transfusions:	[] PRBC	[] Platelets		[] FFP	[] Cryoprecipitate    INFECTIOUS DISEASE  Antimicrobials/Immunologic Medications:  ceFAZolin   IVPB 2000 milliGRAM(s) IV Intermittent every 8 hours    Doses total 3     Tubes/Lines/Drains   [x] Peripheral IV  [] Central Venous Line     	[] R	[] L	[] IJ	[] Fem	[] SC	Date Placed:   [X] Arterial Line		[] R	[X] L	[] Fem	[X] Rad	[] Ax	Date Placed: 8/7/18  [] PICC:         	[] Midline		[] Mediport  [X] Urinary Catheter		Date Placed: 8/7/18    VASCULAR  Exam: Extremities warm, pink, well-perfused.  Left Lower Ext ; PT , AT , Peroneal Doppler Signals     MUSCULOSKELETAL  Exam: Pt is at Bed rest - for 2 hrs at the time of exam . Left Lower Ext: Three Insicion  SItes: Left Groin , Thigh , Distal : Dressing : Clean / Dry and Intact.  Left Foot: Dressing : For a hallukx infection      SKIN:  Exam: Good skin turgor, no skin breakdown.        LABS  --------------------------------------------------------------------------------------  CBC (08-07 @ 21:23)                              11.0<L>                       12.53<H>  )--------------(  240        73.2  % Neuts, 17.5  % Lymphs, ANC: 9.17<H>                              33.2<L>    BMP (08-07 @ 21:23)             137     |  98      |  40<H> 		Ca++ --      Ca 9.9                ---------------------------------( 213<H>		Mg --                 4.3     |  23      |  1.24  			Ph --          Coags (08-07 @ 20:35)  aPTT 63.0<H> / INR 1.13 / PT 12.6      ABG (08-07 @ 18:56)     7.49<H> / 31<L> / 256<H> / 25 / 0.3 / 99.7<H>%     Lactate:     AB (08-07 @ 17:53)     7.48<H> / 30<L> / 243<H> / 24 / -1.1 / 99.7<H>%     Lactate:             --------------------------------------------------------------------------------------    OTHER LABS    IMAGING RESULTS

## 2018-08-08 LAB
APTT BLD: 34.6 SEC — SIGNIFICANT CHANGE UP (ref 27.5–37.4)
APTT BLD: 38.2 SEC — HIGH (ref 27.5–37.4)
APTT BLD: 67.2 SEC — HIGH (ref 27.5–37.4)
GLUCOSE BLDC GLUCOMTR-MCNC: 138 MG/DL — HIGH (ref 70–99)
GLUCOSE BLDC GLUCOMTR-MCNC: 171 MG/DL — HIGH (ref 70–99)
GLUCOSE BLDC GLUCOMTR-MCNC: 234 MG/DL — HIGH (ref 70–99)
GLUCOSE BLDC GLUCOMTR-MCNC: 266 MG/DL — HIGH (ref 70–99)
GLUCOSE BLDC GLUCOMTR-MCNC: 333 MG/DL — HIGH (ref 70–99)
GLUCOSE BLDC GLUCOMTR-MCNC: 336 MG/DL — HIGH (ref 70–99)
GLUCOSE BLDC GLUCOMTR-MCNC: 353 MG/DL — HIGH (ref 70–99)
GRAM STN SPEC: SIGNIFICANT CHANGE UP
HCT VFR BLD CALC: 29.6 % — LOW (ref 39–50)
HCT VFR BLD CALC: 29.6 % — LOW (ref 39–50)
HCT VFR BLD CALC: 31.2 % — LOW (ref 39–50)
HGB BLD-MCNC: 10.4 G/DL — LOW (ref 13–17)
HGB BLD-MCNC: 9.5 G/DL — LOW (ref 13–17)
HGB BLD-MCNC: 9.7 G/DL — LOW (ref 13–17)
MCHC RBC-ENTMCNC: 27.6 PG — SIGNIFICANT CHANGE UP (ref 27–34)
MCHC RBC-ENTMCNC: 28 PG — SIGNIFICANT CHANGE UP (ref 27–34)
MCHC RBC-ENTMCNC: 28.8 PG — SIGNIFICANT CHANGE UP (ref 27–34)
MCHC RBC-ENTMCNC: 32.1 % — SIGNIFICANT CHANGE UP (ref 32–36)
MCHC RBC-ENTMCNC: 32.8 % — SIGNIFICANT CHANGE UP (ref 32–36)
MCHC RBC-ENTMCNC: 33.3 % — SIGNIFICANT CHANGE UP (ref 32–36)
MCV RBC AUTO: 85.3 FL — SIGNIFICANT CHANGE UP (ref 80–100)
MCV RBC AUTO: 86 FL — SIGNIFICANT CHANGE UP (ref 80–100)
MCV RBC AUTO: 86.4 FL — SIGNIFICANT CHANGE UP (ref 80–100)
NRBC # FLD: 0 — SIGNIFICANT CHANGE UP
PLATELET # BLD AUTO: 195 K/UL — SIGNIFICANT CHANGE UP (ref 150–400)
PLATELET # BLD AUTO: 212 K/UL — SIGNIFICANT CHANGE UP (ref 150–400)
PLATELET # BLD AUTO: 227 K/UL — SIGNIFICANT CHANGE UP (ref 150–400)
PMV BLD: 10.4 FL — SIGNIFICANT CHANGE UP (ref 7–13)
PMV BLD: 10.5 FL — SIGNIFICANT CHANGE UP (ref 7–13)
PMV BLD: 9.9 FL — SIGNIFICANT CHANGE UP (ref 7–13)
RBC # BLD: 3.44 M/UL — LOW (ref 4.2–5.8)
RBC # BLD: 3.47 M/UL — LOW (ref 4.2–5.8)
RBC # BLD: 3.61 M/UL — LOW (ref 4.2–5.8)
RBC # FLD: 15.7 % — HIGH (ref 10.3–14.5)
RBC # FLD: 15.7 % — HIGH (ref 10.3–14.5)
RBC # FLD: 15.9 % — HIGH (ref 10.3–14.5)
SPECIMEN SOURCE: SIGNIFICANT CHANGE UP
WBC # BLD: 8.1 K/UL — SIGNIFICANT CHANGE UP (ref 3.8–10.5)
WBC # BLD: 8.63 K/UL — SIGNIFICANT CHANGE UP (ref 3.8–10.5)
WBC # BLD: 9.59 K/UL — SIGNIFICANT CHANGE UP (ref 3.8–10.5)
WBC # FLD AUTO: 8.1 K/UL — SIGNIFICANT CHANGE UP (ref 3.8–10.5)
WBC # FLD AUTO: 8.63 K/UL — SIGNIFICANT CHANGE UP (ref 3.8–10.5)
WBC # FLD AUTO: 9.59 K/UL — SIGNIFICANT CHANGE UP (ref 3.8–10.5)

## 2018-08-08 PROCEDURE — 99233 SBSQ HOSP IP/OBS HIGH 50: CPT

## 2018-08-08 PROCEDURE — 35666 BPG FEM-ANT TIB PST TIB/PRNL: CPT

## 2018-08-08 RX ORDER — INSULIN GLARGINE 100 [IU]/ML
20 INJECTION, SOLUTION SUBCUTANEOUS EVERY MORNING
Qty: 0 | Refills: 0 | Status: DISCONTINUED | OUTPATIENT
Start: 2018-08-08 | End: 2018-08-14

## 2018-08-08 RX ORDER — ACETAMINOPHEN 500 MG
650 TABLET ORAL EVERY 6 HOURS
Qty: 0 | Refills: 0 | Status: COMPLETED | OUTPATIENT
Start: 2018-08-08 | End: 2018-08-10

## 2018-08-08 RX ORDER — OXYCODONE HYDROCHLORIDE 5 MG/1
10 TABLET ORAL EVERY 4 HOURS
Qty: 0 | Refills: 0 | Status: DISCONTINUED | OUTPATIENT
Start: 2018-08-08 | End: 2018-08-16

## 2018-08-08 RX ORDER — HYDROMORPHONE HYDROCHLORIDE 2 MG/ML
0.5 INJECTION INTRAMUSCULAR; INTRAVENOUS; SUBCUTANEOUS EVERY 4 HOURS
Qty: 0 | Refills: 0 | Status: DISCONTINUED | OUTPATIENT
Start: 2018-08-08 | End: 2018-08-15

## 2018-08-08 RX ORDER — ACETAMINOPHEN 500 MG
650 TABLET ORAL ONCE
Qty: 0 | Refills: 0 | Status: DISCONTINUED | OUTPATIENT
Start: 2018-08-08 | End: 2018-08-08

## 2018-08-08 RX ORDER — OXYCODONE HYDROCHLORIDE 5 MG/1
5 TABLET ORAL EVERY 4 HOURS
Qty: 0 | Refills: 0 | Status: DISCONTINUED | OUTPATIENT
Start: 2018-08-08 | End: 2018-08-16

## 2018-08-08 RX ORDER — INSULIN GLARGINE 100 [IU]/ML
60 INJECTION, SOLUTION SUBCUTANEOUS AT BEDTIME
Qty: 0 | Refills: 0 | Status: DISCONTINUED | OUTPATIENT
Start: 2018-08-08 | End: 2018-08-14

## 2018-08-08 RX ORDER — ONDANSETRON 8 MG/1
4 TABLET, FILM COATED ORAL ONCE
Qty: 0 | Refills: 0 | Status: COMPLETED | OUTPATIENT
Start: 2018-08-08 | End: 2018-08-08

## 2018-08-08 RX ORDER — HEPARIN SODIUM 5000 [USP'U]/ML
700 INJECTION INTRAVENOUS; SUBCUTANEOUS
Qty: 25000 | Refills: 0 | Status: DISCONTINUED | OUTPATIENT
Start: 2018-08-08 | End: 2018-08-09

## 2018-08-08 RX ORDER — INSULIN LISPRO 100/ML
20 VIAL (ML) SUBCUTANEOUS
Qty: 0 | Refills: 0 | Status: DISCONTINUED | OUTPATIENT
Start: 2018-08-08 | End: 2018-08-14

## 2018-08-08 RX ADMIN — Medication 325 MILLIGRAM(S): at 13:35

## 2018-08-08 RX ADMIN — OXYCODONE HYDROCHLORIDE 10 MILLIGRAM(S): 5 TABLET ORAL at 20:59

## 2018-08-08 RX ADMIN — Medication 650 MILLIGRAM(S): at 13:37

## 2018-08-08 RX ADMIN — Medication 650 MILLIGRAM(S): at 12:40

## 2018-08-08 RX ADMIN — OXYCODONE AND ACETAMINOPHEN 1 TABLET(S): 5; 325 TABLET ORAL at 01:29

## 2018-08-08 RX ADMIN — INSULIN GLARGINE 60 UNIT(S): 100 INJECTION, SOLUTION SUBCUTANEOUS at 22:13

## 2018-08-08 RX ADMIN — OXYCODONE AND ACETAMINOPHEN 1 TABLET(S): 5; 325 TABLET ORAL at 00:59

## 2018-08-08 RX ADMIN — OXYCODONE HYDROCHLORIDE 10 MILLIGRAM(S): 5 TABLET ORAL at 16:00

## 2018-08-08 RX ADMIN — Medication 2: at 12:40

## 2018-08-08 RX ADMIN — HEPARIN SODIUM 900 UNIT(S)/HR: 5000 INJECTION INTRAVENOUS; SUBCUTANEOUS at 09:06

## 2018-08-08 RX ADMIN — Medication 20 UNIT(S): at 09:02

## 2018-08-08 RX ADMIN — HEPARIN SODIUM 11 UNIT(S)/HR: 5000 INJECTION INTRAVENOUS; SUBCUTANEOUS at 18:15

## 2018-08-08 RX ADMIN — Medication 25 MILLIGRAM(S): at 18:46

## 2018-08-08 RX ADMIN — GABAPENTIN 300 MILLIGRAM(S): 400 CAPSULE ORAL at 04:37

## 2018-08-08 RX ADMIN — OXYCODONE HYDROCHLORIDE 10 MILLIGRAM(S): 5 TABLET ORAL at 20:29

## 2018-08-08 RX ADMIN — OXYCODONE HYDROCHLORIDE 10 MILLIGRAM(S): 5 TABLET ORAL at 08:57

## 2018-08-08 RX ADMIN — LOSARTAN POTASSIUM 100 MILLIGRAM(S): 100 TABLET, FILM COATED ORAL at 06:19

## 2018-08-08 RX ADMIN — Medication 100 MILLIGRAM(S): at 06:18

## 2018-08-08 RX ADMIN — Medication 650 MILLIGRAM(S): at 18:46

## 2018-08-08 RX ADMIN — INSULIN GLARGINE 20 UNIT(S): 100 INJECTION, SOLUTION SUBCUTANEOUS at 09:05

## 2018-08-08 RX ADMIN — OXYCODONE HYDROCHLORIDE 10 MILLIGRAM(S): 5 TABLET ORAL at 15:22

## 2018-08-08 RX ADMIN — HYDROMORPHONE HYDROCHLORIDE 0.5 MILLIGRAM(S): 2 INJECTION INTRAMUSCULAR; INTRAVENOUS; SUBCUTANEOUS at 22:18

## 2018-08-08 RX ADMIN — Medication 20 UNIT(S): at 16:49

## 2018-08-08 RX ADMIN — HEPARIN SODIUM 7 UNIT(S)/HR: 5000 INJECTION INTRAVENOUS; SUBCUTANEOUS at 10:15

## 2018-08-08 RX ADMIN — Medication 1 TABLET(S): at 20:34

## 2018-08-08 RX ADMIN — ONDANSETRON 4 MILLIGRAM(S): 8 TABLET, FILM COATED ORAL at 03:08

## 2018-08-08 RX ADMIN — Medication 8: at 06:40

## 2018-08-08 RX ADMIN — HYDROMORPHONE HYDROCHLORIDE 0.5 MILLIGRAM(S): 2 INJECTION INTRAMUSCULAR; INTRAVENOUS; SUBCUTANEOUS at 22:48

## 2018-08-08 RX ADMIN — Medication 150 MICROGRAM(S): at 06:19

## 2018-08-08 RX ADMIN — OXYCODONE HYDROCHLORIDE 10 MILLIGRAM(S): 5 TABLET ORAL at 09:45

## 2018-08-08 RX ADMIN — PREGABALIN 1000 MICROGRAM(S): 225 CAPSULE ORAL at 13:35

## 2018-08-08 RX ADMIN — Medication 650 MILLIGRAM(S): at 19:16

## 2018-08-08 RX ADMIN — Medication 81 MILLIGRAM(S): at 13:35

## 2018-08-08 RX ADMIN — ATORVASTATIN CALCIUM 80 MILLIGRAM(S): 80 TABLET, FILM COATED ORAL at 22:13

## 2018-08-08 RX ADMIN — Medication 25 MILLIGRAM(S): at 06:19

## 2018-08-08 RX ADMIN — BUDESONIDE AND FORMOTEROL FUMARATE DIHYDRATE 2 PUFF(S): 160; 4.5 AEROSOL RESPIRATORY (INHALATION) at 21:04

## 2018-08-08 RX ADMIN — BUDESONIDE AND FORMOTEROL FUMARATE DIHYDRATE 2 PUFF(S): 160; 4.5 AEROSOL RESPIRATORY (INHALATION) at 10:59

## 2018-08-08 RX ADMIN — Medication 20 UNIT(S): at 12:40

## 2018-08-08 NOTE — PROGRESS NOTE ADULT - SUBJECTIVE AND OBJECTIVE BOX
SICU AM Progress  Note  =====================================================      Interval Events:    Pt was transferred to SICU from PACU . Stable Vascular exams . Pt was nauseous secondary to narcotics for which 4 mg Zofran was given. Pt was started on Hep gtt at 10 pm yesterday.   HPI:     This is a 66 yo Male former smoker, hx IDDM2, hypothyroidism, HTN, HLD, COPD, CAD s/p stent & CABG x 3 (3/28/2016, Barbara), R common femoral artery endarterectomy (11/20/2015), BLE angiogram (7/20/2015, Vincenzo), LLE angiogram (4/25/2017, Vincenzo), L femoral endarterectomy (6/6/2017, Vincenzo), presenting with L great toe pain x 1 month. The patient has been seen by podiatry multiple times over the last month for his toe pain. When his pain began, he was prescribed antibiotics, but that patient states that his pain did not improve. The patient returned to the podiatrist and his nail  was removed because of concern for perionychial infection. The patient states that following that procedure, his pain has increased and there is now swelling in his foot. Podiatry is following the patient for hallux infection .   Pt is s/p  Left Diagnostic Angiogram for PAD - 7/31/18.  Now scheduled for the Femoral to peroneal bypass .      Today Pt is s/p :-    Surgery Information: Left Siokbgc-en-orhdgzvn artery bypass with PTFE graft.  Case Duration:      EBL:  400 ml     IV Fluids:   2200 ml Crystalloids , 500 ml Colloid     Blood Products:       3 units   Complications:    none     Pt was tolerated the procedure well . Pt was extubated and transferred to PACU for further monitoring . Positive Doppler signals confirmed with primary team at bedside . Pt is sating well on 3 L NC . Making adequate urine . 10 mg of Labetalol was given for SBP of 180. Heparin gtt at 5 unit / hr was started at 10 pm .     HISTORY  67yMale  Allergies: contrast media (iodine-based) (Hypotension)    PAST MEDICAL & SURGICAL HISTORY:  COPD (chronic obstructive pulmonary disease)  Sleep apnea: non-compliant  Anemia  Atherosclerosis of arteries of extremities: left leg  Mild intermittent asthma without complication  Iron deficiency anemia, unspecified iron deficiency anemia type  Prostate cancer  Bronchospasm  Obesity (BMI 30-39.9)  PAD (peripheral artery disease): RLE bypass 11/30/15  CAD (coronary artery disease): 3 cardiac stents placed in 2006 (mid LAD, Prox LAD, Prox to #2), 3V CABG 3/28/16  Hypothyroid  Intermittent claudication  Diabetic neuropathy  DM (diabetes mellitus): type 2 - on insulin pump  CHF (congestive heart failure)  Hypercholesteremia  HTN (hypertension)  History of femoral angiogram: Left femoral endarterectomy Fem-Pop bypass 6/2017  S/P bypass graft of extremity: RLE- 11/30/15  S/P CABG x 3: 3/28/16  Stented coronary artery: x 3 cardiac stents placed in 2006  S/P prostatectomy: 1996    FAMILY HISTORY:  No pertinent family history in first degree relatives      SOCIAL HISTORY:  ***    ADVANCE DIRECTIVES: Presumed Full Code  ***    REVIEW OF SYSTEMS:  ***  General: Non-Contributory  Skin/Breast: Non-Contributory  Ophthalmologic: Non-Contributory  ENMT: Non-Contributory  Respiratory and Thorax: Non-Contributory  Cardiovascular: Non-Contributory  Gastrointestinal: Non-Contributory  Genitourinary: Non-Contributory  Musculoskeletal: Non-Contributory  Neurological: Non-Contributory  Psychiatric: Non-Contributory  Hematology/Lymphatics: Non-Contributory  Endocrine: Non-Contributory  Allergic/Immunologic: Non-Contributory    HOME MEDICATIONS:  ***    CURRENT MEDICATIONS:   --------------------------------------------------------------------------------------  Neurologic Medications  diclofenac 75 milliGRAM(s) Oral three times a day PRN Joint pain  gabapentin 300 milliGRAM(s) Oral daily  HYDROmorphone  Injectable 0.5 milliGRAM(s) IV Push every 10 minutes PRN Moderate Pain (4 - 6)  HYDROmorphone  Injectable 1 milliGRAM(s) IV Push every 10 minutes PRN Severe Pain (7 - 10)  oxyCODONE    5 mG/acetaminophen 325 mG 1 Tablet(s) Oral every 4 hours PRN Moderate Pain    Respiratory Medications  buDESOnide 160 MICROgram(s)/formoterol 4.5 MICROgram(s) Inhaler 2 Puff(s) Inhalation two times a day    Cardiovascular Medications  furosemide    Tablet 40 milliGRAM(s) Oral daily  hydrALAZINE Injectable 10 milliGRAM(s) IV Push every 4 hours PRN SBP >160  losartan 100 milliGRAM(s) Oral daily  metolazone 5 milliGRAM(s) Oral daily  metoprolol tartrate 25 milliGRAM(s) Oral two times a day    Gastrointestinal Medications  cyanocobalamin 1000 MICROGram(s) Oral daily  ferrous    sulfate 325 milliGRAM(s) Oral daily  lactated ringers. 1000 milliLiter(s) IV Continuous <Continuous>    Genitourinary Medications    Hematologic/Oncologic Medications  aspirin enteric coated 81 milliGRAM(s) Oral daily  heparin  Infusion. 500 Unit(s)/Hr IV Continuous <Continuous>    Antimicrobial/Immunologic Medications  ceFAZolin   IVPB 2000 milliGRAM(s) IV Intermittent every 8 hours    Endocrine/Metabolic Medications  atorvastatin 80 milliGRAM(s) Oral at bedtime  insulin lispro (HumaLOG) corrective regimen sliding scale   SubCutaneous three times a day before meals  levothyroxine 150 MICROGram(s) Oral daily    Topical/Other Medications    --------------------------------------------------------------------------------------    VITAL SIGNS, INS/OUTS (last 24 hours):  --------------------------------------------------------------------------------------  T(C): 36.5 (08-07-18 @ 20:15), Max: 36.9 (08-07-18 @ 11:14)  HR: 77 (08-07-18 @ 22:00) (60 - 84)  BP: 120/52 (08-07-18 @ 22:00) (114/52 - 151/56)  BP(mean): 72 (08-07-18 @ 20:15) (72 - 72)  ABP: 154/51 (08-07-18 @ 22:00) (154/51 - 194/71)  ABP(mean): 85 (08-07-18 @ 22:00) (85 - 104)  RR: 12 (08-07-18 @ 22:00) (12 - 18)  SpO2: 99% (08-07-18 @ 22:00) (95% - 99%)  Wt(kg): --  CVP(mm Hg): --  CI: --  CAPILLARY BLOOD GLUCOSE       N/A      08-07 @ 07:01  -  08-07 @ 22:11  --------------------------------------------------------  IN:    lactated ringers.: 200 mL  Total IN: 200 mL    OUT:    Indwelling Catheter - Urethral: 325 mL  Total OUT: 325 mL    Total NET: -125 mL        --------------------------------------------------------------------------------------    EXAM:    NEUROLOGY  RASS:   GCS: 13  Exam: Normal, NAD, alert, oriented x 3, no focal deficits. PERRLA      RESPIRATORY  Exam: Lungs clear to auscultation b/l , Normal expansion/effort.    [] Tracheostomy   [] Intubated      CARDIOVASCULAR  Exam: S1, S2.  Regular rate and Rhythm     GI/NUTRITION  Exam: Abdomen soft, Non-tender, Non-distended.      Current Diet:  Clears     METABOLIC/FLUIDS/ELECTROLYTES  cyanocobalamin 1000 MICROGram(s) Oral daily  ferrous    sulfate 325 milliGRAM(s) Oral daily  lactated ringers. 1000 milliLiter(s) IV Continuous <Continuous>      HEMATOLOGIC  [x] DVT Prophylaxis: aspirin enteric coated 81 milliGRAM(s) Oral daily  heparin  Infusion. 500 Unit(s)/Hr IV Continuous <Continuous>    Transfusions:	[] PRBC	[] Platelets		[] FFP	[] Cryoprecipitate    INFECTIOUS DISEASE  Antimicrobials/Immunologic Medications:  ceFAZolin   IVPB 2000 milliGRAM(s) IV Intermittent every 8 hours    Doses total 3     Tubes/Lines/Drains   [x] Peripheral IV  [] Central Venous Line     	[] R	[] L	[] IJ	[] Fem	[] SC	Date Placed:   [X] Arterial Line		[] R	[X] L	[] Fem	[X] Rad	[] Ax	Date Placed: 8/7/18  [] PICC:         	[] Midline		[] Mediport  [X] Urinary Catheter		Date Placed: 8/7/18    VASCULAR  Exam: Extremities warm, pink, well-perfused.  Left Lower Ext ; PT , AT , Peroneal Doppler Signals     MUSCULOSKELETAL  Exam: Pt is at Bed rest - for 2 hrs at the time of exam . Left Lower Ext: Three Insicion  SItes: Left Groin , Thigh , Distal : Dressing : Clean / Dry and Intact.  Left Foot: Dressing : For a hallukx infection      SKIN:  Exam: Good skin turgor, no skin breakdown.        LABS  --------------------------------------------------------------------------------------  CBC (08-07 @ 21:23)                              11.0<L>                       12.53<H>  )--------------(  240        73.2  % Neuts, 17.5  % Lymphs, ANC: 9.17<H>                              33.2<L>    BMP (08-07 @ 21:23)             137     |  98      |  40<H> 		Ca++ --      Ca 9.9                ---------------------------------( 213<H>		Mg --                 4.3     |  23      |  1.24  			Ph --          Coags (08-07 @ 20:35)  aPTT 63.0<H> / INR 1.13 / PT 12.6      ABG (08-07 @ 18:56)     7.49<H> / 31<L> / 256<H> / 25 / 0.3 / 99.7<H>%     Lactate:     ABG (08-07 @ 17:53)     7.48<H> / 30<L> / 243<H> / 24 / -1.1 / 99.7<H>%     Lactate:             --------------------------------------------------------------------------------------    OTHER LABS    IMAGING RESULTS SICU AM Progress  Note  ==============================================    Interval Events:    Pt was transferred to SICU from PACU . Stable Vascular exams . Pt was nauseous secondary to narcotics for which 4 mg Zofran was given. Pt was started on Hep gtt at 10 pm yesterday.     HPI:     This is a 66 yo Male former smoker, hx IDDM2, hypothyroidism, HTN, HLD, COPD, CAD s/p stent & CABG x 3 (3/28/2016, Barbara), R common femoral artery endarterectomy (11/20/2015), BLE angiogram (7/20/2015, Vincenzo), LLE angiogram (4/25/2017, Vincenzo), L femoral endarterectomy (6/6/2017, Vincenzo), presenting with L great toe pain x 1 month. The patient has been seen by podiatry multiple times over the last month for his toe pain. When his pain began, he was prescribed antibiotics, but that patient states that his pain did not improve. The patient returned to the podiatrist and his nail  was removed because of concern for perionychial infection. The patient states that following that procedure, his pain has increased and there is now swelling in his foot. Podiatry is following the patient for hallux infection .   Pt is s/p  Left Diagnostic Angiogram for PAD - 7/31/18.  Now scheduled for the Femoral to peroneal bypass .      Surgery Information: Left Enqqjkm-jg-kpvypxfm artery bypass with PTFE graft.  Case Duration:      EBL:  400 ml     IV Fluids:   2200 ml Crystalloids , 500 ml Colloid     Blood Products:       3 units   Complications:    none     Pt was tolerated the procedure well . Pt was extubated and transferred to PACU for further monitoring . Positive Doppler signals confirmed with primary team at bedside . Pt is sating well on 3 L NC . Making adequate urine . 10 mg of Labetalol was given for SBP of 180. Heparin gtt at 5 unit / hr was started at 10 pm .     HISTORY  67yMale  Allergies: contrast media (iodine-based) (Hypotension)    PAST MEDICAL & SURGICAL HISTORY:  COPD (chronic obstructive pulmonary disease)  Sleep apnea: non-compliant  Anemia  Atherosclerosis of arteries of extremities: left leg  Mild intermittent asthma without complication  Iron deficiency anemia, unspecified iron deficiency anemia type  Prostate cancer  Bronchospasm  Obesity (BMI 30-39.9)  PAD (peripheral artery disease): RLE bypass 11/30/15  CAD (coronary artery disease): 3 cardiac stents placed in 2006 (mid LAD, Prox LAD, Prox to #2), 3V CABG 3/28/16  Hypothyroid  Intermittent claudication  Diabetic neuropathy  DM (diabetes mellitus): type 2 - on insulin pump  CHF (congestive heart failure)  Hypercholesteremia  HTN (hypertension)  History of femoral angiogram: Left femoral endarterectomy Fem-Pop bypass 6/2017  S/P bypass graft of extremity: RLE- 11/30/15  S/P CABG x 3: 3/28/16  Stented coronary artery: x 3 cardiac stents placed in 2006  S/P prostatectomy: 1996    FAMILY HISTORY:  No pertinent family history in first degree relatives      SOCIAL HISTORY:  ***    ADVANCE DIRECTIVES: Presumed Full Code  ***    REVIEW OF SYSTEMS:  ***  General: Non-Contributory  Skin/Breast: Non-Contributory  Ophthalmologic: Non-Contributory  ENMT: Non-Contributory  Respiratory and Thorax: Non-Contributory  Cardiovascular: Non-Contributory  Gastrointestinal: Non-Contributory  Genitourinary: Non-Contributory  Musculoskeletal: Non-Contributory  Neurological: Non-Contributory  Psychiatric: Non-Contributory  Hematology/Lymphatics: Non-Contributory  Endocrine: Non-Contributory  Allergic/Immunologic: Non-Contributory    HOME MEDICATIONS:  ***    CURRENT MEDICATIONS:   --------------------------------------------------------------------------------------  Neurologic Medications  diclofenac 75 milliGRAM(s) Oral three times a day PRN Joint pain  gabapentin 300 milliGRAM(s) Oral daily  HYDROmorphone  Injectable 0.5 milliGRAM(s) IV Push every 10 minutes PRN Moderate Pain (4 - 6)  HYDROmorphone  Injectable 1 milliGRAM(s) IV Push every 10 minutes PRN Severe Pain (7 - 10)  oxyCODONE    5 mG/acetaminophen 325 mG 1 Tablet(s) Oral every 4 hours PRN Moderate Pain    Respiratory Medications  buDESOnide 160 MICROgram(s)/formoterol 4.5 MICROgram(s) Inhaler 2 Puff(s) Inhalation two times a day    Cardiovascular Medications  furosemide    Tablet 40 milliGRAM(s) Oral daily  hydrALAZINE Injectable 10 milliGRAM(s) IV Push every 4 hours PRN SBP >160  losartan 100 milliGRAM(s) Oral daily  metolazone 5 milliGRAM(s) Oral daily  metoprolol tartrate 25 milliGRAM(s) Oral two times a day    Gastrointestinal Medications  cyanocobalamin 1000 MICROGram(s) Oral daily  ferrous    sulfate 325 milliGRAM(s) Oral daily  lactated ringers. 1000 milliLiter(s) IV Continuous <Continuous>    Genitourinary Medications    Hematologic/Oncologic Medications  aspirin enteric coated 81 milliGRAM(s) Oral daily  heparin  Infusion. 500 Unit(s)/Hr IV Continuous <Continuous>    Antimicrobial/Immunologic Medications  ceFAZolin   IVPB 2000 milliGRAM(s) IV Intermittent every 8 hours    Endocrine/Metabolic Medications  atorvastatin 80 milliGRAM(s) Oral at bedtime  insulin lispro (HumaLOG) corrective regimen sliding scale   SubCutaneous three times a day before meals  levothyroxine 150 MICROGram(s) Oral daily    Topical/Other Medications    --------------------------------------------------------------------------------------    VITAL SIGNS, INS/OUTS (last 24 hours):  --------------------------------------------------------------------------------------  T(C): 36.5 (08-07-18 @ 20:15), Max: 36.9 (08-07-18 @ 11:14)  HR: 77 (08-07-18 @ 22:00) (60 - 84)  BP: 120/52 (08-07-18 @ 22:00) (114/52 - 151/56)  BP(mean): 72 (08-07-18 @ 20:15) (72 - 72)  ABP: 154/51 (08-07-18 @ 22:00) (154/51 - 194/71)  ABP(mean): 85 (08-07-18 @ 22:00) (85 - 104)  RR: 12 (08-07-18 @ 22:00) (12 - 18)  SpO2: 99% (08-07-18 @ 22:00) (95% - 99%)  Wt(kg): --  CVP(mm Hg): --  CI: --  CAPILLARY BLOOD GLUCOSE       N/A      08-07 @ 07:01  -  08-07 @ 22:11  --------------------------------------------------------  IN:    lactated ringers.: 200 mL  Total IN: 200 mL    OUT:    Indwelling Catheter - Urethral: 325 mL  Total OUT: 325 mL    Total NET: -125 mL    --------------------------------------------------------------------------------------    EXAM:    NEUROLOGY  RASS:   GCS: 13  Exam: Normal, NAD, alert, oriented x 3, no focal deficits. PERRLA      RESPIRATORY  Exam: Lungs clear to auscultation b/l , Normal expansion/effort.    [] Tracheostomy   [] Intubated      CARDIOVASCULAR  Exam: S1, S2.  Regular rate and Rhythm     GI/NUTRITION  Exam: Abdomen soft, Non-tender, Non-distended.      Current Diet:  Clears     METABOLIC/FLUIDS/ELECTROLYTES  cyanocobalamin 1000 MICROGram(s) Oral daily  ferrous    sulfate 325 milliGRAM(s) Oral daily  lactated ringers. 1000 milliLiter(s) IV Continuous <Continuous>      HEMATOLOGIC  [x] DVT Prophylaxis: aspirin enteric coated 81 milliGRAM(s) Oral daily  heparin  Infusion. 500 Unit(s)/Hr IV Continuous <Continuous>    Transfusions:	[] PRBC	[] Platelets		[] FFP	[] Cryoprecipitate    INFECTIOUS DISEASE  Antimicrobials/Immunologic Medications:  ceFAZolin   IVPB 2000 milliGRAM(s) IV Intermittent every 8 hours    Doses total 3     Tubes/Lines/Drains   [x] Peripheral IV  [] Central Venous Line     	[] R	[] L	[] IJ	[] Fem	[] SC	Date Placed:   [X] Arterial Line		[] R	[X] L	[] Fem	[X] Rad	[] Ax	Date Placed: 8/7/18  [] PICC:         	[] Midline		[] Mediport  [X] Urinary Catheter		Date Placed: 8/7/18    VASCULAR  Exam: Extremities warm, pink, well-perfused.  Left Lower Ext ; PT , AT , Peroneal Doppler Signals     MUSCULOSKELETAL  Exam: Pt is at Bed rest - for 2 hrs at the time of exam . Left Lower Ext: Three Insicion  SItes: Left Groin , Thigh , Distal : Dressing : Clean / Dry and Intact.  Left Foot: Dressing : For a hallukx infection      SKIN:  Exam: Good skin turgor, no skin breakdown.        LABS  -----------------------------------------------------  CBC (08-07 @ 21:23)                              11.0<L>                       12.53<H>  )--------------(  240        73.2  % Neuts, 17.5  % Lymphs, ANC: 9.17<H>                              33.2<L>    BMP (08-07 @ 21:23)             137     |  98      |  40<H> 		Ca++ --      Ca 9.9                ---------------------------------( 213<H>		Mg --                 4.3     |  23      |  1.24  			Ph --          Coags (08-07 @ 20:35)  aPTT 63.0<H> / INR 1.13 / PT 12.6      ABG (08-07 @ 18:56)     7.49<H> / 31<L> / 256<H> / 25 / 0.3 / 99.7<H>%     Lactate:     ABG (08-07 @ 17:53)     7.48<H> / 30<L> / 243<H> / 24 / -1.1 / 99.7<H>%     Lactate: SICU AM Progress  Note  ==============================================    Interval Events:    Pt was transferred to SICU from PACU . Stable Vascular exams . Pt was nauseous secondary to narcotics for which 4 mg Zofran was given. Pt was started on Hep gtt at 10 pm yesterday.     HPI:     This is a 66 yo Male former smoker, hx IDDM2, hypothyroidism, HTN, HLD, COPD, CAD s/p stent & CABG x 3 (3/28/2016, Barbara), R common femoral artery endarterectomy (11/20/2015), BLE angiogram (7/20/2015, Vincenzo), LLE angiogram (4/25/2017, Vincenzo), L femoral endarterectomy (6/6/2017, Vincenzo), presenting with L great toe pain x 1 month. The patient has been seen by podiatry multiple times over the last month for his toe pain. When his pain began, he was prescribed antibiotics, but that patient states that his pain did not improve. The patient returned to the podiatrist and his nail  was removed because of concern for perionychial infection. The patient states that following that procedure, his pain has increased and there is now swelling in his foot. Podiatry is following the patient for hallux infection .   Pt is s/p  Left Diagnostic Angiogram for PAD - 7/31/18.  Now scheduled for the Femoral to peroneal bypass .      Surgery Information: Left Ktjxqjz-ft-elbzagko artery bypass with PTFE graft.  Case Duration:      EBL:  400 ml     IV Fluids:   2200 ml Crystalloids , 500 ml Colloid     Blood Products:       3 units   Complications:    none     Pt was tolerated the procedure well . Pt was extubated and transferred to PACU for further monitoring . Positive Doppler signals confirmed with primary team at bedside . Pt is sating well on 3 L NC . Making adequate urine . 10 mg of Labetalol was given for SBP of 180. Heparin gtt at 5 unit / hr was started at 10 pm .     HISTORY  67yMale  Allergies: contrast media (iodine-based) (Hypotension)    PAST MEDICAL & SURGICAL HISTORY:  COPD (chronic obstructive pulmonary disease)  Sleep apnea: non-compliant  Anemia  Atherosclerosis of arteries of extremities: left leg  Mild intermittent asthma without complication  Iron deficiency anemia, unspecified iron deficiency anemia type  Prostate cancer  Bronchospasm  Obesity (BMI 30-39.9)  PAD (peripheral artery disease): RLE bypass 11/30/15  CAD (coronary artery disease): 3 cardiac stents placed in 2006 (mid LAD, Prox LAD, Prox to #2), 3V CABG 3/28/16  Hypothyroid  Intermittent claudication  Diabetic neuropathy  DM (diabetes mellitus): type 2 - on insulin pump  CHF (congestive heart failure)  Hypercholesteremia  HTN (hypertension)  History of femoral angiogram: Left femoral endarterectomy Fem-Pop bypass 6/2017  S/P bypass graft of extremity: RLE- 11/30/15  S/P CABG x 3: 3/28/16  Stented coronary artery: x 3 cardiac stents placed in 2006  S/P prostatectomy: 1996    FAMILY HISTORY:  No pertinent family history in first degree relatives      SOCIAL HISTORY:  ***    ADVANCE DIRECTIVES: Presumed Full Code  ***    REVIEW OF SYSTEMS:  ***  General: Non-Contributory  Skin/Breast: Non-Contributory  Ophthalmologic: Non-Contributory  ENMT: Non-Contributory  Respiratory and Thorax: Non-Contributory  Cardiovascular: Non-Contributory  Gastrointestinal: Non-Contributory  Genitourinary: Non-Contributory  Musculoskeletal: Non-Contributory  Neurological: Non-Contributory  Psychiatric: Non-Contributory  Hematology/Lymphatics: Non-Contributory  Endocrine: Non-Contributory  Allergic/Immunologic: Non-Contributory    HOME MEDICATIONS:  ***    CURRENT MEDICATIONS:   --------------------------------------------------------------------------------------  Neurologic Medications  diclofenac 75 milliGRAM(s) Oral three times a day PRN Joint pain  gabapentin 300 milliGRAM(s) Oral daily  HYDROmorphone  Injectable 0.5 milliGRAM(s) IV Push every 10 minutes PRN Moderate Pain (4 - 6)  HYDROmorphone  Injectable 1 milliGRAM(s) IV Push every 10 minutes PRN Severe Pain (7 - 10)  oxyCODONE    5 mG/acetaminophen 325 mG 1 Tablet(s) Oral every 4 hours PRN Moderate Pain    Respiratory Medications  buDESOnide 160 MICROgram(s)/formoterol 4.5 MICROgram(s) Inhaler 2 Puff(s) Inhalation two times a day    Cardiovascular Medications  furosemide    Tablet 40 milliGRAM(s) Oral daily  hydrALAZINE Injectable 10 milliGRAM(s) IV Push every 4 hours PRN SBP >160  losartan 100 milliGRAM(s) Oral daily  metolazone 5 milliGRAM(s) Oral daily  metoprolol tartrate 25 milliGRAM(s) Oral two times a day    Gastrointestinal Medications  cyanocobalamin 1000 MICROGram(s) Oral daily  ferrous    sulfate 325 milliGRAM(s) Oral daily  lactated ringers. 1000 milliLiter(s) IV Continuous <Continuous>    Genitourinary Medications    Hematologic/Oncologic Medications  aspirin enteric coated 81 milliGRAM(s) Oral daily  heparin  Infusion. 500 Unit(s)/Hr IV Continuous <Continuous>    Antimicrobial/Immunologic Medications  ceFAZolin   IVPB 2000 milliGRAM(s) IV Intermittent every 8 hours    Endocrine/Metabolic Medications  atorvastatin 80 milliGRAM(s) Oral at bedtime  insulin lispro (HumaLOG) corrective regimen sliding scale   SubCutaneous three times a day before meals  levothyroxine 150 MICROGram(s) Oral daily    Topical/Other Medications    --------------------------------------------------------------------------------------    VITAL SIGNS, INS/OUTS (last 24 hours):  --------------------------------------------------------------------------------------  T(C): 36.5 (08-07-18 @ 20:15), Max: 36.9 (08-07-18 @ 11:14)  HR: 77 (08-07-18 @ 22:00) (60 - 84)  BP: 120/52 (08-07-18 @ 22:00) (114/52 - 151/56)  BP(mean): 72 (08-07-18 @ 20:15) (72 - 72)  ABP: 154/51 (08-07-18 @ 22:00) (154/51 - 194/71)  ABP(mean): 85 (08-07-18 @ 22:00) (85 - 104)  RR: 12 (08-07-18 @ 22:00) (12 - 18)  SpO2: 99% (08-07-18 @ 22:00) (95% - 99%)  Wt(kg): --  CVP(mm Hg): --  CI: --  CAPILLARY BLOOD GLUCOSE       N/A      08-07 @ 07:01  -  08-07 @ 22:11  --------------------------------------------------------  IN:    lactated ringers.: 200 mL  Total IN: 200 mL    OUT:    Indwelling Catheter - Urethral: 325 mL  Total OUT: 325 mL    Total NET: -125 mL    --------------------------------------------------------------------------------------    EXAM:    NEUROLOGY  GCS: 13  Exam: Normal, NAD, alert, oriented x 3, no focal deficits. PERRLA      RESPIRATORY Exam: Lungs clear to auscultation b/l , Normal expansion/effort.    CARDIOVASCULAR Exam: S1, S2.  Regular rate and Rhythm. no DP +PT dopplerable    GI/NUTRITION Exam: Abdomen soft, Non-tender, Non-distended.      Current Diet:  Clears     METABOLIC/FLUIDS/ELECTROLYTES  cyanocobalamin 1000 MICROGram(s) Oral daily  ferrous    sulfate 325 milliGRAM(s) Oral daily  lactated ringers. 1000 milliLiter(s) IV Continuous <Continuous>      HEMATOLOGIC  [x] DVT Prophylaxis: aspirin enteric coated 81 milliGRAM(s) Oral daily  heparin  Infusion. 500 Unit(s)/Hr IV Continuous <Continuous>    Transfusions:	[] PRBC	[] Platelets		[] FFP	[] Cryoprecipitate    INFECTIOUS DISEASE  Antimicrobials/Immunologic Medications:  ceFAZolin   IVPB 2000 milliGRAM(s) IV Intermittent every 8 hours    Doses total 3     Tubes/Lines/Drains   [x] Peripheral IV  [] Central Venous Line     	[] R	[] L	[] IJ	[] Fem	[] SC	Date Placed:   [X] Arterial Line		[] R	[X] L	[] Fem	[X] Rad	[] Ax	Date Placed: 8/7/18  [] PICC:         	[] Midline		[] Mediport  [X] Urinary Catheter		Date Placed: 8/7/18    VASCULAR  Exam: Extremities warm, pink, well-perfused.  Left Lower Ext ; PT , AT , Peroneal Doppler Signals     MUSCULOSKELETAL  Exam: Pt is at Bed rest - for 2 hrs at the time of exam . Left Lower Ext: Three Insicion  SItes: Left Groin , Thigh , Distal : Dressing : Clean / Dry and Intact.  Left Foot: Dressing : For a hallukx infection      SKIN:  Exam: Good skin turgor, no skin breakdown.        LABS  -----------------------------------------------------  CBC (08-07 @ 21:23)                              11.0<L>                       12.53<H>  )--------------(  240        73.2  % Neuts, 17.5  % Lymphs, ANC: 9.17<H>                              33.2<L>    BMP (08-07 @ 21:23)             137     |  98      |  40<H> 		Ca++ --      Ca 9.9                ---------------------------------( 213<H>		Mg --                 4.3     |  23      |  1.24  			Ph --          Coags (08-07 @ 20:35)  aPTT 63.0<H> / INR 1.13 / PT 12.6      ABG (08-07 @ 18:56)     7.49<H> / 31<L> / 256<H> / 25 / 0.3 / 99.7<H>%     Lactate:     ABG (08-07 @ 17:53)     7.48<H> / 30<L> / 243<H> / 24 / -1.1 / 99.7<H>%     Lactate: SICU AM Progress  Note  ==============================================    Interval Events:    Pt was transferred to SICU from PACU . Stable Vascular exams . Pt was nauseous secondary to narcotics for which 4 mg Zofran was given. Pt was started on Hep gtt at 10 pm yesterday.  Pt seen at bedside; complains of L toe pain.    HPI:     This is a 68 yo Male former smoker, hx IDDM2, hypothyroidism, HTN, HLD, COPD, CAD s/p stent & CABG x 3 (3/28/2016, Barbara), R common femoral artery endarterectomy (11/20/2015), BLE angiogram (7/20/2015, Vincenzo), LLE angiogram (4/25/2017, Vincenzo), L femoral endarterectomy (6/6/2017, Vincenzo), presenting with L great toe pain x 1 month. The patient has been seen by podiatry multiple times over the last month for his toe pain. When his pain began, he was prescribed antibiotics, but that patient states that his pain did not improve. The patient returned to the podiatrist and his nail  was removed because of concern for perionychial infection. The patient states that following that procedure, his pain has increased and there is now swelling in his foot. Podiatry is following the patient for hallux infection .   Pt is s/p  Left Diagnostic Angiogram for PAD - 7/31/18.  Now scheduled for the Femoral to peroneal bypass .      Surgery Information: Left Piirldo-mv-zxqmegms artery bypass with PTFE graft.  Case Duration:      EBL:  400 ml     IV Fluids:   2200 ml Crystalloids , 500 ml Colloid     Blood Products:       3 units   Complications:    none     Pt was tolerated the procedure well . Pt was extubated and transferred to PACU for further monitoring . Positive Doppler signals confirmed with primary team at bedside . Pt is sating well on 3 L NC . Making adequate urine . 10 mg of Labetalol was given for SBP of 180. Heparin gtt at 5 unit / hr was started at 10 pm .     HISTORY  67yMale  Allergies: contrast media (iodine-based) (Hypotension)    PAST MEDICAL & SURGICAL HISTORY:  COPD (chronic obstructive pulmonary disease)  Sleep apnea: non-compliant  Anemia  Atherosclerosis of arteries of extremities: left leg  Mild intermittent asthma without complication  Iron deficiency anemia, unspecified iron deficiency anemia type  Prostate cancer  Bronchospasm  Obesity (BMI 30-39.9)  PAD (peripheral artery disease): RLE bypass 11/30/15  CAD (coronary artery disease): 3 cardiac stents placed in 2006 (mid LAD, Prox LAD, Prox to #2), 3V CABG 3/28/16  Hypothyroid  Intermittent claudication  Diabetic neuropathy  DM (diabetes mellitus): type 2 - on insulin pump  CHF (congestive heart failure)  Hypercholesteremia  HTN (hypertension)  History of femoral angiogram: Left femoral endarterectomy Fem-Pop bypass 6/2017  S/P bypass graft of extremity: RLE- 11/30/15  S/P CABG x 3: 3/28/16  Stented coronary artery: x 3 cardiac stents placed in 2006  S/P prostatectomy: 1996    FAMILY HISTORY:  No pertinent family history in first degree relatives      SOCIAL HISTORY:  ***    ADVANCE DIRECTIVES: Presumed Full Code  ***    REVIEW OF SYSTEMS:  ***  General: Non-Contributory  Skin/Breast: Non-Contributory  Ophthalmologic: Non-Contributory  ENMT: Non-Contributory  Respiratory and Thorax: Non-Contributory  Cardiovascular: Non-Contributory  Gastrointestinal: Non-Contributory  Genitourinary: Non-Contributory  Musculoskeletal: Non-Contributory  Neurological: Non-Contributory  Psychiatric: Non-Contributory  Hematology/Lymphatics: Non-Contributory  Endocrine: Non-Contributory  Allergic/Immunologic: Non-Contributory    HOME MEDICATIONS:  ***    CURRENT MEDICATIONS:   --------------------------------------------------------------------------------------  Neurologic Medications  diclofenac 75 milliGRAM(s) Oral three times a day PRN Joint pain  gabapentin 300 milliGRAM(s) Oral daily  HYDROmorphone  Injectable 0.5 milliGRAM(s) IV Push every 10 minutes PRN Moderate Pain (4 - 6)  HYDROmorphone  Injectable 1 milliGRAM(s) IV Push every 10 minutes PRN Severe Pain (7 - 10)  oxyCODONE    5 mG/acetaminophen 325 mG 1 Tablet(s) Oral every 4 hours PRN Moderate Pain    Respiratory Medications  buDESOnide 160 MICROgram(s)/formoterol 4.5 MICROgram(s) Inhaler 2 Puff(s) Inhalation two times a day    Cardiovascular Medications  furosemide    Tablet 40 milliGRAM(s) Oral daily  hydrALAZINE Injectable 10 milliGRAM(s) IV Push every 4 hours PRN SBP >160  losartan 100 milliGRAM(s) Oral daily  metolazone 5 milliGRAM(s) Oral daily  metoprolol tartrate 25 milliGRAM(s) Oral two times a day    Gastrointestinal Medications  cyanocobalamin 1000 MICROGram(s) Oral daily  ferrous    sulfate 325 milliGRAM(s) Oral daily  lactated ringers. 1000 milliLiter(s) IV Continuous <Continuous>    Genitourinary Medications    Hematologic/Oncologic Medications  aspirin enteric coated 81 milliGRAM(s) Oral daily  heparin  Infusion. 500 Unit(s)/Hr IV Continuous <Continuous>    Antimicrobial/Immunologic Medications  ceFAZolin   IVPB 2000 milliGRAM(s) IV Intermittent every 8 hours    Endocrine/Metabolic Medications  atorvastatin 80 milliGRAM(s) Oral at bedtime  insulin lispro (HumaLOG) corrective regimen sliding scale   SubCutaneous three times a day before meals  levothyroxine 150 MICROGram(s) Oral daily    Topical/Other Medications    --------------------------------------------------------------------------------------    VITAL SIGNS, INS/OUTS (last 24 hours):  --------------------------------------------------------------------------------------  T(C): 36.5 (08-07-18 @ 20:15), Max: 36.9 (08-07-18 @ 11:14)  HR: 77 (08-07-18 @ 22:00) (60 - 84)  BP: 120/52 (08-07-18 @ 22:00) (114/52 - 151/56)  BP(mean): 72 (08-07-18 @ 20:15) (72 - 72)  ABP: 154/51 (08-07-18 @ 22:00) (154/51 - 194/71)  ABP(mean): 85 (08-07-18 @ 22:00) (85 - 104)  RR: 12 (08-07-18 @ 22:00) (12 - 18)  SpO2: 99% (08-07-18 @ 22:00) (95% - 99%)  Wt(kg): --  CVP(mm Hg): --  CI: --  CAPILLARY BLOOD GLUCOSE       N/A      08-07 @ 07:01  -  08-07 @ 22:11  --------------------------------------------------------  IN:    lactated ringers.: 200 mL  Total IN: 200 mL    OUT:    Indwelling Catheter - Urethral: 325 mL  Total OUT: 325 mL    Total NET: -125 mL    --------------------------------------------------------------------------------------    EXAM:    NEUROLOGY  GCS: 13  Exam: Normal, NAD, alert, oriented x 3, no focal deficits. PERRLA      RESPIRATORY Exam: Lungs clear to auscultation b/l , Normal expansion/effort.    CARDIOVASCULAR Exam: S1, S2.  Regular rate and Rhythm. no DP +PT dopplerable    GI/NUTRITION Exam: Abdomen soft, Non-tender, Non-distended.      Current Diet:  Clears     METABOLIC/FLUIDS/ELECTROLYTES  cyanocobalamin 1000 MICROGram(s) Oral daily  ferrous    sulfate 325 milliGRAM(s) Oral daily  lactated ringers. 1000 milliLiter(s) IV Continuous <Continuous>      HEMATOLOGIC  [x] DVT Prophylaxis: aspirin enteric coated 81 milliGRAM(s) Oral daily  heparin  Infusion. 500 Unit(s)/Hr IV Continuous <Continuous>    Transfusions:	[] PRBC	[] Platelets		[] FFP	[] Cryoprecipitate    INFECTIOUS DISEASE  Antimicrobials/Immunologic Medications:  ceFAZolin   IVPB 2000 milliGRAM(s) IV Intermittent every 8 hours    Doses total 3     Tubes/Lines/Drains   [x] Peripheral IV  [] Central Venous Line     	[] R	[] L	[] IJ	[] Fem	[] SC	Date Placed:   [X] Arterial Line		[] R	[X] L	[] Fem	[X] Rad	[] Ax	Date Placed: 8/7/18  [] PICC:         	[] Midline		[] Mediport  [X] Urinary Catheter		Date Placed: 8/7/18    VASCULAR  Exam: Extremities warm, pink, well-perfused.  Left Lower Ext ; PT , AT , Peroneal Doppler Signals     MUSCULOSKELETAL  Exam: Pt is at Bed rest - for 2 hrs at the time of exam . Left Lower Ext: Three Insicion  SItes: Left Groin , Thigh , Distal : Dressing : Clean / Dry and Intact.  Left Foot: Dressing : For a hallukx infection      SKIN:  Exam: Good skin turgor, no skin breakdown.        LABS  -----------------------------------------------------  CBC (08-07 @ 21:23)                              11.0<L>                       12.53<H>  )--------------(  240        73.2  % Neuts, 17.5  % Lymphs, ANC: 9.17<H>                              33.2<L>    BMP (08-07 @ 21:23)             137     |  98      |  40<H> 		Ca++ --      Ca 9.9                ---------------------------------( 213<H>		Mg --                 4.3     |  23      |  1.24  			Ph --          Coags (08-07 @ 20:35)  aPTT 63.0<H> / INR 1.13 / PT 12.6      ABG (08-07 @ 18:56)     7.49<H> / 31<L> / 256<H> / 25 / 0.3 / 99.7<H>%     Lactate:     ABG (08-07 @ 17:53)     7.48<H> / 30<L> / 243<H> / 24 / -1.1 / 99.7<H>%     Lactate:

## 2018-08-08 NOTE — CONSULT NOTE ADULT - ASSESSMENT
· Assessment		  66 yo male patient s/p fem-peroneal bypass (8/7) with left hallux wound after nail avulsion   ·	Pt seen and evaluated   ·	Wound is fibro-granular with sloughing soft tissue   ·	periwound erythema, edema and warmth noted (post-bypass changes vs. cellulitis and paronychia)  ·	WBC 8.63, VSS, LF Xray (7/19) showed no OM, no gas   ·	Wound was flushed, cleaned with saline, dressed with betadine, DSD  ·	Wound is cultured   ·	Recommend PO Augmentin till culture result  ·	Podiatry to follow   ·	d/w attending · Assessment		  66 yo male patient s/p fem-peroneal bypass (8/7) with left hallux wound after nail avulsion   ·	Pt seen and evaluated   ·	Wound is fibro-granular with sloughing soft tissue   ·	Periwound erythema, edema and warmth noted (post-bypass changes vs. cellulitis and paronychia)  ·	WBC 8.63, VSS, LF Xray (7/19) showed no OM, no gas   ·	Wound was flushed, cleaned with saline, dressed with betadine, DSD  ·	Wound is cultured   ·	Recommend PO Augmentin till culture result  ·	Reordered Xray LF   ·	Podiatry to follow   ·	d/w attending

## 2018-08-08 NOTE — CONSULT NOTE ADULT - SUBJECTIVE AND OBJECTIVE BOX
Podiatry pager #: [phone]    Patient is a 67y old  Male who presents with a chief complaint of left foot swelling right great toe swelling (08 Aug 2018 03:34)      HPI:  Pt had bypass yesterday 8/7, POD 1 s/p L femoral-peroneal bypass with PTFE.  Reports incisional pain.   LLE with moderate swelling with dopplerable AT/PT and peroneal signals.  LLE dressings c/d/i.     Podiatry was consulted today about the left hallux wound s/p nail avulsion.     PAST MEDICAL & SURGICAL HISTORY:  COPD (chronic obstructive pulmonary disease)  Sleep apnea: non-compliant  Anemia  Atherosclerosis of arteries of extremities: left leg  Mild intermittent asthma without complication  Iron deficiency anemia, unspecified iron deficiency anemia type  Prostate cancer  Bronchospasm  Obesity (BMI 30-39.9)  PAD (peripheral artery disease): RLE bypass 11/30/15  CAD (coronary artery disease): 3 cardiac stents placed in 2006 (mid LAD, Prox LAD, Prox to #2), 3V CABG 3/28/16  Hypothyroid  Intermittent claudication  Diabetic neuropathy  DM (diabetes mellitus): type 2 - on insulin pump  CHF (congestive heart failure)  Hypercholesteremia  HTN (hypertension)  History of femoral angiogram: Left femoral endarterectomy Fem-Pop bypass 6/2017  S/P bypass graft of extremity: RLE- 11/30/15  S/P CABG x 3: 3/28/16  Stented coronary artery: x 3 cardiac stents placed in 2006  S/P prostatectomy: 1996      MEDICATIONS  (STANDING):  acetaminophen   Tablet. 650 milliGRAM(s) Oral every 6 hours  aspirin enteric coated 81 milliGRAM(s) Oral daily  atorvastatin 80 milliGRAM(s) Oral at bedtime  buDESOnide 160 MICROgram(s)/formoterol 4.5 MICROgram(s) Inhaler 2 Puff(s) Inhalation two times a day  cyanocobalamin 1000 MICROGram(s) Oral daily  ferrous    sulfate 325 milliGRAM(s) Oral daily  gabapentin 300 milliGRAM(s) Oral daily  heparin  Infusion 700 Unit(s)/Hr (7 mL/Hr) IV Continuous <Continuous>  insulin glargine Injectable (LANTUS) 60 Unit(s) SubCutaneous at bedtime  insulin glargine Injectable (LANTUS) 20 Unit(s) SubCutaneous every morning  insulin lispro (HumaLOG) corrective regimen sliding scale   SubCutaneous three times a day before meals  insulin lispro Injectable (HumaLOG) 20 Unit(s) SubCutaneous three times a day before meals  levothyroxine 150 MICROGram(s) Oral daily  losartan 100 milliGRAM(s) Oral daily  metolazone 5 milliGRAM(s) Oral daily  metoprolol tartrate 25 milliGRAM(s) Oral two times a day    MEDICATIONS  (PRN):  diclofenac 75 milliGRAM(s) Oral three times a day PRN Joint pain  hydrALAZINE Injectable 10 milliGRAM(s) IV Push every 4 hours PRN SBP >160  HYDROmorphone  Injectable 0.5 milliGRAM(s) IV Push every 4 hours PRN Pain unrelieved by PO medications  oxyCODONE    IR 5 milliGRAM(s) Oral every 4 hours PRN Mild to Moderate Pain  oxyCODONE    IR 10 milliGRAM(s) Oral every 4 hours PRN Severe Pain (7 - 10)      Allergies    contrast media (iodine-based) (Hypotension)    Intolerances        VITALS:    Vital Signs Last 24 Hrs  T(C): 37.8 (08 Aug 2018 08:00), Max: 37.8 (08 Aug 2018 08:00)  T(F): 100 (08 Aug 2018 08:00), Max: 100 (08 Aug 2018 08:00)  HR: 74 (08 Aug 2018 15:00) (68 - 104)  BP: 139/52 (08 Aug 2018 12:00) (106/43 - 152/56)  BP(mean): 73 (08 Aug 2018 12:00) (61 - 78)  RR: 16 (08 Aug 2018 15:00) (10 - 19)  SpO2: 98% (08 Aug 2018 15:00) (90% - 100%)    LABS:                          9.7    8.63  )-----------( 212      ( 08 Aug 2018 06:02 )             29.6       08-07    137  |  98  |  40<H>  ----------------------------<  213<H>  4.3   |  23  |  1.24    Ca    9.9      07 Aug 2018 21:23        CAPILLARY BLOOD GLUCOSE      POCT Blood Glucose.: 171 mg/dL (08 Aug 2018 12:16)  POCT Blood Glucose.: 234 mg/dL (08 Aug 2018 10:16)  POCT Blood Glucose.: 266 mg/dL (08 Aug 2018 09:00)  POCT Blood Glucose.: 336 mg/dL (08 Aug 2018 07:15)  POCT Blood Glucose.: 333 mg/dL (08 Aug 2018 06:26)  POCT Blood Glucose.: 353 mg/dL (08 Aug 2018 06:25)      PT/INR - ( 07 Aug 2018 20:35 )   PT: 12.6 SEC;   INR: 1.13          PTT - ( 08 Aug 2018 06:02 )  PTT:34.6 SEC    LOWER EXTREMITY PHYSICAL EXAM:  S/p left hallux nail avulsion, fibro-granular wound bed with sloughing, no pus, moderate serous drainage. Periwound erythema, swelling and warmth, may be associated with post bypass changes.     < from: Xray Foot AP + Lateral, Left (07.19.18 @ 06:22) >    EXAM:  RAD FOOT 2 VIEWS LEFT        PROCEDURE DATE:  Jul 19 2018         INTERPRETATION:  CLINICAL INDICATION: Left toe infection.    EXAM: 3 views of the left foot obtained on 7/19/2018.    COMPARISON: None.     IMPRESSION:  No tracking soft tissue gas collections, radiopaque foreign bodies, or   gross radiographic evidence for osteomyelitis.    No fractures or dislocations.    Tarsometatarsal alignment maintained without evidence for a Lisfranc   injury.    Preserved visualized joint spaces. No joint margin erosions or   intra-articular or periarticular calcifications.    Small calcaneal enthesophytes.     No lytic or blastic lesions.    Vascular calcium lesions.    < end of copied text >

## 2018-08-08 NOTE — PATIENT PROFILE ADULT. - AS SC BRADEN ACTIVITY
no loss of consciousness, no gait abnormality, no headache, no sensory deficits, and no weakness. (1) bedfast

## 2018-08-08 NOTE — PROGRESS NOTE ADULT - SUBJECTIVE AND OBJECTIVE BOX
Post-Op Note  68 yo M s/p Left Kcpbext-as-yaisgcst artery bypass with PTFE graft.[place] since OR. Examined in SICU. Pain well controlled. Denies f/c, n/v, CP/SOB    Vital Signs Last 24 Hrs  T(C): 37.4 (08 Aug 2018 02:08), Max: 37.4 (08 Aug 2018 02:08)  T(F): 99.4 (08 Aug 2018 02:08), Max: 99.4 (08 Aug 2018 02:08)  HR: 86 (08 Aug 2018 02:08) (60 - 90)  BP: 134/40 (08 Aug 2018 02:08) (106/43 - 151/56)  BP(mean): 63 (08 Aug 2018 02:08) (63 - 72)  RR: 14 (08 Aug 2018 02:08) (12 - 19)  SpO2: 98% (08 Aug 2018 02:08) (95% - 100%)    I&O's Summary    07 Aug 2018 07:01  -  08 Aug 2018 03:04  --------------------------------------------------------  IN: 970 mL / OUT: 1125 mL / NET: -155 mL    Pe: Gen: NAD  CV: RRR  Pulm: non-labored  Ext: WWP. .  Left Lower Ext ; PT , AT , Peroneal Doppler Signals   Groin , Thigh , Distal : Dressing : Clean / Dry and Intact.  Left Foot: Dressing : For a hallux infection                            11.0   12.53 )-----------( 240      ( 07 Aug 2018 21:23 )             33.2   08-07    137  |  98  |  40<H>  ----------------------------<  213<H>  4.3   |  23  |  1.24    Ca    9.9      07 Aug 2018 21:23        A/P: 67y Male s/p  Left Robuytf-ny-tmckquiz artery bypass with PTFE graft (8/7/18)  -Hep Gtt @ 500 Units/ hr - No Bolus given , No titration needed   -Q1 vascular checks   -SBP goal: 110-150. If hypotensive prefer IVF or transfusions than pressors  -Podiatry Consult in am  -Endocrine Consult in am  -Care per SICU

## 2018-08-08 NOTE — PROGRESS NOTE ADULT - ASSESSMENT
ASSESSMENT:  67y Male s/p  Left Fthdeof-pr-egnztexi artery bypass with PTFE graft (8/7/18)    PLAN:   Neurologic:   PRN pain  control   On Diclofenac , Gabapentin   Respiratory:   On 3 L NC ---> wean as  tolerated   Cardiovascular:   Trend vitals  closely   On ASA , Atorvastatin , Lopressor   Hep Gtt @ 500 Units/ hr - No Bolus given , No titration needed   Gastrointestinal/Nutrition:   Clears   Renal/Genitourinary:   Strict I and O   replete lytes as needed   Hematologic:   On ASA , Hep gtt  Q1 vascular checks   Infectious Disease:   Ancef: Periop Prophylaxis   Lines/Tubes:  PIV , salas   Endocrine:   ISS   Trend FS closely   Disposition:   SICU  --------------------------------------------------------------------------------------    Critical Care Diagnoses: ASSESSMENT:  67y Male s/p  Left Gqlhsre-ob-wvhtzmrj artery bypass with PTFE graft (8/7/18)    PLAN:   Neurologic: AAOx3  PRN pain  control w percocet and diclofenac 75 PO prn   c/w  Diclofenac , Gabapentin     Respiratory: SpO2 94% on room air  no acute issues     Cardiovascular:  PMH CAD s/p stent and CABG, HTN. HLD w PAD s/p R endarterectomy, BLE angio.  /50 HR71. L DP PT Dopplerable.   c/w [id number] PO qd, metorpolol 25 PO bid, metolazone 5mg PO qd  -c/w  hydralazine 10mg IV push q 4hrs prn  Trend vitals  closely   On ASA , Atorvastatin , Lopressor   Hep Gtt @ 500 Units/ hr - No Bolus given , No titration needed   Q1 vascular checks     Gastrointestinal/Nutrition:   Clears     Renal/Genitourinary: Salas in place,   Strict I and O   replete lytes as needed     Hematologic: H/H 9.7/29.6  On ASA ,   DVT ppx: Hep gtt    Infectious Disease: WBC downtrending 12.53-->8.63  c/w Ancef: Periop Prophylaxis     Lines/Tubes:  2 PIV , salas, L radial A-line    Endocrine:   ISS   Trend FS closely   -ordered for 20u Lantus subQ every morning, 20 u humolog tid before meals, 60 u lantus bedtime,     Disposition:   SICU  --------------------------------------------------------------------------------------    Critical Care Diagnoses: ASSESSMENT:  67y Male s/p  Left Qzdgeqm-ag-qxrznzul artery bypass with PTFE graft (8/7/18). In SICU frequent vascular checks, just had bypass. -Dorsalis Pedis -Ant Tibial, +PT Dopplerable.    PLAN:   Neurologic: AAOx3  PRN pain  control w percocet and diclofenac 75 PO prn   c/w  Diclofenac , Gabapentin     Respiratory: SpO2 94% on room air  no acute issues     Cardiovascular:  PMH CAD s/p stent and CABG, HTN. HLD w PAD s/p R endarterectomy, BLE angio.  /50 HR71. -DP +PT Dopplerable.   c/w [id number] PO qd, metorpolol 25 PO bid, metolazone 5mg PO qd  -c/w  hydralazine 10mg IV push q 4hrs prn  Trend vitals  closely   On ASA , Atorvastatin , Lopressor   Hep Gtt @ 500 Units/ hr - No Bolus given , No titration needed   Q1 vascular checks     Gastrointestinal/Nutrition:   Clears     Renal/Genitourinary: Salas in place,   Strict I and O   replete lytes as needed     Hematologic: H/H 9.7/29.6  On ASA ,   DVT ppx: Hep gtt    Infectious Disease: WBC downtrending 12.53-->8.63  c/w Ancef: Periop Prophylaxis     Lines/Tubes:  2 PIV , salas, L radial A-line    Endocrine:   ISS   Trend FS closely   -ordered for 20u Lantus subQ every morning, 20 u humolog tid before meals, 60 u lantus bedtime,     Misc:  Left Lower Ext: Three Insicion  SItes: Left Groin , Thigh , Distal : Dressing : Clean / Dry and Intact.  Left Foot: Dressing : For a hallukx infection      Disposition:   SICU  --------------------------------------------------------------------------------------    Critical Care Diagnoses: ASSESSMENT:  67y Male s/p  Left Fnkuffm-lc-bavvedyi artery bypass with PTFE graft (8/7/18). In SICU frequent vascular checks, just had bypass. -Dorsalis Pedis -Ant Tibial, +PT Dopplerable.    PLAN:   Neurologic: AAOx3  PRN pain  control w percocet and diclofenac 75 PO prn   c/w  Diclofenac , Gabapentin     Respiratory: SpO2 94% on room air  no acute issues     Cardiovascular:  PMH CAD s/p stent and CABG, HTN. HLD w PAD s/p R endarterectomy, BLE angio.  /50 HR71. -DP +PT Dopplerable.   c/w [id number] PO qd, metorpolol 25 PO bid, metolazone 5mg PO qd  -c/w  hydralazine 10mg IV push q 4hrs prn  Trend vitals  closely   On ASA , Atorvastatin , Lopressor   Hep Gtt @ 500 Units/ hr - No Bolus given , No titration needed   Q1 vascular checks     Gastrointestinal/Nutrition:   Clears     Renal/Genitourinary: Salas in place,   Strict I and O   replete lytes as needed     Hematologic: H/H 9.7/29.6  On ASA ,   DVT ppx: increase Hep gtt, to get PTT 45    Infectious Disease: WBC downtrending 12.53-->8.63  c/w Ancef: Periop Prophylaxis     Lines/Tubes:  2 PIV , salas, L radial A-line    Endocrine:   ISS   Trend FS closely   -ordered for 20u Lantus subQ every morning, 20 u humolog tid before meals, 60 u lantus bedtime,     Misc:  Left Lower Ext: Three Insicion  SItes: Left Groin , Thigh , Distal : Dressing : Clean / Dry and Intact.  Left Foot: Dressing : For a hallukx infection      Disposition:   SICU  --------------------------------------------------------------------------------------    Critical Care Diagnoses:

## 2018-08-08 NOTE — PROGRESS NOTE ADULT - ATTENDING COMMENTS
67y Male s/p  Left Ztyvqdh-ne-tygrbnia artery bypass with PTFE graft (8/7/18). In SICU frequent vascular checks, just had bypass. -Dorsalis Pedis -Ant Tibial, +PT Dopplerable.    PLAN:   Neurologic: AAOx3  PRN pain  control w percocet and diclofenac 75 PO prn   c/w  Diclofenac , Gabapentin     Respiratory: SpO2 94% on room air  no acute issues     Cardiovascular:  PMH CAD s/p stent and CABG, HTN. HLD w PAD s/p R endarterectomy, BLE angio.  /50 HR71. -DP +PT Dopplerable.   c/w [id number] PO qd, metorpolol 25 PO bid, metolazone 5mg PO qd  -c/w  hydralazine 10mg IV push q 4hrs prn  Trend vitals  closely   On ASA , Atorvastatin , Lopressor   Hep Gtt @ 500 Units/ hr - No Bolus given , No titration needed   Q1 vascular checks     Gastrointestinal/Nutrition:   Clears     Renal/Genitourinary: Salas in place,   Strict I and O   replete lytes as needed     Hematologic: H/H 9.7/29.6  On ASA ,   DVT ppx: Hep gtt    Infectious Disease: WBC downtrending 12.53-->8.63  c/w Ancef: Periop Prophylaxis     Lines/Tubes:  2 PIV , salas, L radial A-line    Endocrine:   ISS   Trend FS closely   -ordered for 20u Lantus subQ every morning, 20 u humolog tid before meals, 60 u lantus bedtime,     Misc:  Left Lower Ext: Three Insicion  SItes: Left Groin , Thigh , Distal : Dressing : Clean / Dry and Intact.  Left Foot: Dressing : For a hallukx infection      Disposition:   SICU  Critical Care Diagnoses: vascular checks, CAD, respiratory abnormality.    The patient is a critical care patient with life threatening hemodynamic and metabolic instability in SICU.  I have personally interviewed and examined the patient, reviewed data and laboratory tests/x-rays and all pertinent electronic images.  The SICU team discusses on an ongoing basis with the primary team and all consultants, House staff and PA's to have a permanent risk benefit analyses on all decisions.  These diagnoses are unrelated to the surgical procedure noted above.  I met with family if needed to get further history, discuss the case and make care decisions for this patient who might not be able to participate.  Time involved in performance of separately billable procedures was not counted toward my critical care time. There is no overlap.  I spent 55-75 minutes  in total providing critical care for the diagnoses, assessment and plan above.

## 2018-08-08 NOTE — PROGRESS NOTE ADULT - SUBJECTIVE AND OBJECTIVE BOX
Patient POD 1 s/p L femoral-peroneal bypass with PTFE.  Reports incisional pain.   LLE with moderate swelling with dopplerable DP/PT and peroneal pulses.  LLE dressings c/d/i.     Plan:  c/w pain management  consult with podiatry Dr. Eduar escudero per SICU    Case discussed with Dr. Loya Patient POD 1 s/p L femoral-peroneal bypass with PTFE.  Reports incisional pain.   LLE with moderate swelling with dopplerable AT/PT and peroneal signals.  LLE dressings c/d/i.     Plan:  c/w pain management  consult with podiatry Dr. Eduar escudero per SICU    Case discussed with Dr. Loya

## 2018-08-09 LAB
ALBUMIN SERPL ELPH-MCNC: 3.4 G/DL — SIGNIFICANT CHANGE UP (ref 3.3–5)
ALP SERPL-CCNC: 55 U/L — SIGNIFICANT CHANGE UP (ref 40–120)
ALT FLD-CCNC: 15 U/L — SIGNIFICANT CHANGE UP (ref 4–41)
APTT BLD: 56 SEC — HIGH (ref 27.5–37.4)
APTT BLD: 65.8 SEC — HIGH (ref 27.5–37.4)
APTT BLD: 74.1 SEC — HIGH (ref 27.5–37.4)
AST SERPL-CCNC: 18 U/L — SIGNIFICANT CHANGE UP (ref 4–40)
BILIRUB SERPL-MCNC: 0.6 MG/DL — SIGNIFICANT CHANGE UP (ref 0.2–1.2)
BUN SERPL-MCNC: 30 MG/DL — HIGH (ref 7–23)
CALCIUM SERPL-MCNC: 9.1 MG/DL — SIGNIFICANT CHANGE UP (ref 8.4–10.5)
CHLORIDE SERPL-SCNC: 97 MMOL/L — LOW (ref 98–107)
CO2 SERPL-SCNC: 25 MMOL/L — SIGNIFICANT CHANGE UP (ref 22–31)
CREAT SERPL-MCNC: 1.47 MG/DL — HIGH (ref 0.5–1.3)
GLUCOSE BLDC GLUCOMTR-MCNC: 121 MG/DL — HIGH (ref 70–99)
GLUCOSE BLDC GLUCOMTR-MCNC: 122 MG/DL — HIGH (ref 70–99)
GLUCOSE BLDC GLUCOMTR-MCNC: 129 MG/DL — HIGH (ref 70–99)
GLUCOSE BLDC GLUCOMTR-MCNC: 151 MG/DL — HIGH (ref 70–99)
GLUCOSE SERPL-MCNC: 97 MG/DL — SIGNIFICANT CHANGE UP (ref 70–99)
HCT VFR BLD CALC: 28.9 % — LOW (ref 39–50)
HCT VFR BLD CALC: 31.7 % — LOW (ref 39–50)
HCT VFR BLD CALC: 32.4 % — LOW (ref 39–50)
HCT VFR BLD CALC: 34.3 % — LOW (ref 39–50)
HGB BLD-MCNC: 10.1 G/DL — LOW (ref 13–17)
HGB BLD-MCNC: 10.2 G/DL — LOW (ref 13–17)
HGB BLD-MCNC: 10.8 G/DL — LOW (ref 13–17)
HGB BLD-MCNC: 9.1 G/DL — LOW (ref 13–17)
INR BLD: 1.18 — HIGH (ref 0.88–1.17)
MAGNESIUM SERPL-MCNC: 1.9 MG/DL — SIGNIFICANT CHANGE UP (ref 1.6–2.6)
MCHC RBC-ENTMCNC: 27.5 PG — SIGNIFICANT CHANGE UP (ref 27–34)
MCHC RBC-ENTMCNC: 27.6 PG — SIGNIFICANT CHANGE UP (ref 27–34)
MCHC RBC-ENTMCNC: 27.8 PG — SIGNIFICANT CHANGE UP (ref 27–34)
MCHC RBC-ENTMCNC: 27.9 PG — SIGNIFICANT CHANGE UP (ref 27–34)
MCHC RBC-ENTMCNC: 31.5 % — LOW (ref 32–36)
MCHC RBC-ENTMCNC: 31.9 % — LOW (ref 32–36)
MCV RBC AUTO: 87.3 FL — SIGNIFICANT CHANGE UP (ref 80–100)
MCV RBC AUTO: 87.5 FL — SIGNIFICANT CHANGE UP (ref 80–100)
MCV RBC AUTO: 87.6 FL — SIGNIFICANT CHANGE UP (ref 80–100)
MCV RBC AUTO: 88.3 FL — SIGNIFICANT CHANGE UP (ref 80–100)
NRBC # FLD: 0 — SIGNIFICANT CHANGE UP
PHOSPHATE SERPL-MCNC: 3.8 MG/DL — SIGNIFICANT CHANGE UP (ref 2.5–4.5)
PLATELET # BLD AUTO: 188 K/UL — SIGNIFICANT CHANGE UP (ref 150–400)
PLATELET # BLD AUTO: 192 K/UL — SIGNIFICANT CHANGE UP (ref 150–400)
PLATELET # BLD AUTO: 203 K/UL — SIGNIFICANT CHANGE UP (ref 150–400)
PLATELET # BLD AUTO: 213 K/UL — SIGNIFICANT CHANGE UP (ref 150–400)
PMV BLD: 10.4 FL — SIGNIFICANT CHANGE UP (ref 7–13)
PMV BLD: 10.5 FL — SIGNIFICANT CHANGE UP (ref 7–13)
PMV BLD: 10.7 FL — SIGNIFICANT CHANGE UP (ref 7–13)
PMV BLD: 10.7 FL — SIGNIFICANT CHANGE UP (ref 7–13)
POTASSIUM SERPL-MCNC: 3.9 MMOL/L — SIGNIFICANT CHANGE UP (ref 3.5–5.3)
POTASSIUM SERPL-SCNC: 3.9 MMOL/L — SIGNIFICANT CHANGE UP (ref 3.5–5.3)
PROT SERPL-MCNC: 6.3 G/DL — SIGNIFICANT CHANGE UP (ref 6–8.3)
PROTHROM AB SERPL-ACNC: 13.6 SEC — HIGH (ref 9.8–13.1)
RBC # BLD: 3.31 M/UL — LOW (ref 4.2–5.8)
RBC # BLD: 3.62 M/UL — LOW (ref 4.2–5.8)
RBC # BLD: 3.67 M/UL — LOW (ref 4.2–5.8)
RBC # BLD: 3.92 M/UL — LOW (ref 4.2–5.8)
RBC # FLD: 15.9 % — HIGH (ref 10.3–14.5)
RBC # FLD: 16.1 % — HIGH (ref 10.3–14.5)
SODIUM SERPL-SCNC: 136 MMOL/L — SIGNIFICANT CHANGE UP (ref 135–145)
WBC # BLD: 10.66 K/UL — HIGH (ref 3.8–10.5)
WBC # BLD: 9.37 K/UL — SIGNIFICANT CHANGE UP (ref 3.8–10.5)
WBC # BLD: 9.62 K/UL — SIGNIFICANT CHANGE UP (ref 3.8–10.5)
WBC # BLD: 9.7 K/UL — SIGNIFICANT CHANGE UP (ref 3.8–10.5)
WBC # FLD AUTO: 10.66 K/UL — HIGH (ref 3.8–10.5)
WBC # FLD AUTO: 9.37 K/UL — SIGNIFICANT CHANGE UP (ref 3.8–10.5)
WBC # FLD AUTO: 9.62 K/UL — SIGNIFICANT CHANGE UP (ref 3.8–10.5)
WBC # FLD AUTO: 9.7 K/UL — SIGNIFICANT CHANGE UP (ref 3.8–10.5)

## 2018-08-09 PROCEDURE — 71045 X-RAY EXAM CHEST 1 VIEW: CPT | Mod: 26

## 2018-08-09 RX ORDER — HEPARIN SODIUM 5000 [USP'U]/ML
1100 INJECTION INTRAVENOUS; SUBCUTANEOUS
Qty: 25000 | Refills: 0 | Status: DISCONTINUED | OUTPATIENT
Start: 2018-08-09 | End: 2018-08-14

## 2018-08-09 RX ORDER — SODIUM HYPOCHLORITE 0.125 %
1 SOLUTION, NON-ORAL MISCELLANEOUS DAILY
Qty: 0 | Refills: 0 | Status: DISCONTINUED | OUTPATIENT
Start: 2018-08-09 | End: 2018-08-16

## 2018-08-09 RX ORDER — WARFARIN SODIUM 2.5 MG/1
3 TABLET ORAL ONCE
Qty: 0 | Refills: 0 | Status: DISCONTINUED | OUTPATIENT
Start: 2018-08-09 | End: 2018-08-09

## 2018-08-09 RX ORDER — WARFARIN SODIUM 2.5 MG/1
3 TABLET ORAL ONCE
Qty: 0 | Refills: 0 | Status: COMPLETED | OUTPATIENT
Start: 2018-08-09 | End: 2018-08-09

## 2018-08-09 RX ORDER — HEPARIN SODIUM 5000 [USP'U]/ML
11 INJECTION INTRAVENOUS; SUBCUTANEOUS
Qty: 25000 | Refills: 0 | Status: DISCONTINUED | OUTPATIENT
Start: 2018-08-09 | End: 2018-08-09

## 2018-08-09 RX ADMIN — INSULIN GLARGINE 20 UNIT(S): 100 INJECTION, SOLUTION SUBCUTANEOUS at 07:52

## 2018-08-09 RX ADMIN — HYDROMORPHONE HYDROCHLORIDE 0.5 MILLIGRAM(S): 2 INJECTION INTRAMUSCULAR; INTRAVENOUS; SUBCUTANEOUS at 03:04

## 2018-08-09 RX ADMIN — OXYCODONE HYDROCHLORIDE 10 MILLIGRAM(S): 5 TABLET ORAL at 19:28

## 2018-08-09 RX ADMIN — BUDESONIDE AND FORMOTEROL FUMARATE DIHYDRATE 2 PUFF(S): 160; 4.5 AEROSOL RESPIRATORY (INHALATION) at 11:21

## 2018-08-09 RX ADMIN — OXYCODONE HYDROCHLORIDE 10 MILLIGRAM(S): 5 TABLET ORAL at 06:13

## 2018-08-09 RX ADMIN — OXYCODONE HYDROCHLORIDE 5 MILLIGRAM(S): 5 TABLET ORAL at 15:08

## 2018-08-09 RX ADMIN — Medication 20 UNIT(S): at 17:35

## 2018-08-09 RX ADMIN — HEPARIN SODIUM 11.5 UNIT(S)/HR: 5000 INJECTION INTRAVENOUS; SUBCUTANEOUS at 07:53

## 2018-08-09 RX ADMIN — LOSARTAN POTASSIUM 100 MILLIGRAM(S): 100 TABLET, FILM COATED ORAL at 05:05

## 2018-08-09 RX ADMIN — Medication 650 MILLIGRAM(S): at 23:21

## 2018-08-09 RX ADMIN — Medication 650 MILLIGRAM(S): at 18:15

## 2018-08-09 RX ADMIN — Medication 1 TABLET(S): at 05:06

## 2018-08-09 RX ADMIN — BUDESONIDE AND FORMOTEROL FUMARATE DIHYDRATE 2 PUFF(S): 160; 4.5 AEROSOL RESPIRATORY (INHALATION) at 23:02

## 2018-08-09 RX ADMIN — HEPARIN SODIUM 11.5 UNIT(S)/HR: 5000 INJECTION INTRAVENOUS; SUBCUTANEOUS at 14:07

## 2018-08-09 RX ADMIN — HYDROMORPHONE HYDROCHLORIDE 0.5 MILLIGRAM(S): 2 INJECTION INTRAMUSCULAR; INTRAVENOUS; SUBCUTANEOUS at 16:24

## 2018-08-09 RX ADMIN — Medication 300 MILLIGRAM(S): at 23:03

## 2018-08-09 RX ADMIN — HYDROMORPHONE HYDROCHLORIDE 0.5 MILLIGRAM(S): 2 INJECTION INTRAMUSCULAR; INTRAVENOUS; SUBCUTANEOUS at 03:19

## 2018-08-09 RX ADMIN — GABAPENTIN 300 MILLIGRAM(S): 400 CAPSULE ORAL at 11:21

## 2018-08-09 RX ADMIN — Medication 650 MILLIGRAM(S): at 12:22

## 2018-08-09 RX ADMIN — Medication 20 UNIT(S): at 08:15

## 2018-08-09 RX ADMIN — Medication 25 MILLIGRAM(S): at 05:06

## 2018-08-09 RX ADMIN — Medication 2: at 17:34

## 2018-08-09 RX ADMIN — Medication 81 MILLIGRAM(S): at 11:21

## 2018-08-09 RX ADMIN — Medication 25 MILLIGRAM(S): at 17:35

## 2018-08-09 RX ADMIN — Medication 650 MILLIGRAM(S): at 17:34

## 2018-08-09 RX ADMIN — PREGABALIN 1000 MICROGRAM(S): 225 CAPSULE ORAL at 11:21

## 2018-08-09 RX ADMIN — HYDROMORPHONE HYDROCHLORIDE 0.5 MILLIGRAM(S): 2 INJECTION INTRAMUSCULAR; INTRAVENOUS; SUBCUTANEOUS at 17:00

## 2018-08-09 RX ADMIN — HEPARIN SODIUM 11 UNIT(S)/HR: 5000 INJECTION INTRAVENOUS; SUBCUTANEOUS at 04:38

## 2018-08-09 RX ADMIN — Medication 20 UNIT(S): at 12:27

## 2018-08-09 RX ADMIN — HYDROMORPHONE HYDROCHLORIDE 0.5 MILLIGRAM(S): 2 INJECTION INTRAMUSCULAR; INTRAVENOUS; SUBCUTANEOUS at 22:20

## 2018-08-09 RX ADMIN — Medication 650 MILLIGRAM(S): at 05:05

## 2018-08-09 RX ADMIN — WARFARIN SODIUM 3 MILLIGRAM(S): 2.5 TABLET ORAL at 17:34

## 2018-08-09 RX ADMIN — ATORVASTATIN CALCIUM 80 MILLIGRAM(S): 80 TABLET, FILM COATED ORAL at 21:57

## 2018-08-09 RX ADMIN — Medication 325 MILLIGRAM(S): at 11:21

## 2018-08-09 RX ADMIN — OXYCODONE HYDROCHLORIDE 5 MILLIGRAM(S): 5 TABLET ORAL at 14:10

## 2018-08-09 RX ADMIN — Medication 150 MICROGRAM(S): at 05:06

## 2018-08-09 RX ADMIN — OXYCODONE HYDROCHLORIDE 10 MILLIGRAM(S): 5 TABLET ORAL at 07:03

## 2018-08-09 RX ADMIN — OXYCODONE HYDROCHLORIDE 10 MILLIGRAM(S): 5 TABLET ORAL at 18:35

## 2018-08-09 RX ADMIN — HYDROMORPHONE HYDROCHLORIDE 0.5 MILLIGRAM(S): 2 INJECTION INTRAMUSCULAR; INTRAVENOUS; SUBCUTANEOUS at 21:55

## 2018-08-09 RX ADMIN — Medication 650 MILLIGRAM(S): at 11:37

## 2018-08-09 RX ADMIN — Medication 650 MILLIGRAM(S): at 06:16

## 2018-08-09 RX ADMIN — Medication 650 MILLIGRAM(S): at 00:32

## 2018-08-09 RX ADMIN — Medication 650 MILLIGRAM(S): at 01:02

## 2018-08-09 RX ADMIN — INSULIN GLARGINE 60 UNIT(S): 100 INJECTION, SOLUTION SUBCUTANEOUS at 21:57

## 2018-08-09 NOTE — PROGRESS NOTE ADULT - ASSESSMENT
Patient POD 1 s/p L femoral-peroneal bypass with PTFE.  Reports incisional pain.   LLE with moderate swelling with dopplerable AT/PT and peroneal signals.  LLE dressings c/d/i.     Plan:  c/w pain management  Transition to coumadin   monitor INR/PTT  endocrine consult   Coumadin 3 mg

## 2018-08-09 NOTE — PROGRESS NOTE ADULT - SUBJECTIVE AND OBJECTIVE BOX
pt seen at bedside in NAD/ s/p LLE bypass  dressing to left foot c/d/i  left hallux ulceration noted less wet than yesterday, some necrotic tissue no pus no probing deep minimal erythema and edema  applied DSD  pt has burning when using betadine would avoid it  continue abx  will follow up tomorrow  explained to pt that if toe doesn't improve he may require amputation of the hallux.   will re-eval tomorrow

## 2018-08-09 NOTE — PROGRESS NOTE ADULT - SUBJECTIVE AND OBJECTIVE BOX
vascular surgery note    Subjective:   pt was seen and examined this morning. pt still complaining of pain, no nausea or vomiting     Objective   Vital Signs Last 24 Hrs  T(C): 36.8 (09 Aug 2018 14:07), Max: 38.4 (08 Aug 2018 20:00)  T(F): 98.2 (09 Aug 2018 14:07), Max: 101.1 (08 Aug 2018 20:00)  HR: 74 (09 Aug 2018 14:07) (69 - 92)  BP: 124/53 (09 Aug 2018 14:07) (115/42 - 137/41)  BP(mean): 74 (09 Aug 2018 03:00) (64 - 74)  RR: 20 (09 Aug 2018 14:07) (13 - 20)  SpO2: 96% (09 Aug 2018 14:07) (94% - 99%)    I&O's Detail    08 Aug 2018 07:01  -  09 Aug 2018 07:00  --------------------------------------------------------  IN:    heparin  Infusion.: 10 mL    heparin Infusion: 144 mL    heparin Infusion: 55 mL  Total IN: 209 mL    OUT:    Indwelling Catheter - Urethral: 595 mL    Voided: 700 mL  Total OUT: 1295 mL    Total NET: -1086 mL      09 Aug 2018 07:01  -  09 Aug 2018 15:41  --------------------------------------------------------  IN:    heparin Infusion: 91.5 mL  Total IN: 91.5 mL    OUT:    Voided: 400 mL  Total OUT: 400 mL    Total NET: -308.5 mL          MEDICATIONS  (STANDING):  acetaminophen   Tablet. 650 milliGRAM(s) Oral every 6 hours  amoxicillin  875 milliGRAM(s)/clavulanate 1 Tablet(s) Oral two times a day  aspirin enteric coated 81 milliGRAM(s) Oral daily  atorvastatin 80 milliGRAM(s) Oral at bedtime  buDESOnide 160 MICROgram(s)/formoterol 4.5 MICROgram(s) Inhaler 2 Puff(s) Inhalation two times a day  cyanocobalamin 1000 MICROGram(s) Oral daily  ferrous    sulfate 325 milliGRAM(s) Oral daily  gabapentin 300 milliGRAM(s) Oral daily  heparin  Infusion 1100 Unit(s)/Hr (11.5 mL/Hr) IV Continuous <Continuous>  insulin glargine Injectable (LANTUS) 60 Unit(s) SubCutaneous at bedtime  insulin glargine Injectable (LANTUS) 20 Unit(s) SubCutaneous every morning  insulin lispro (HumaLOG) corrective regimen sliding scale   SubCutaneous three times a day before meals  insulin lispro Injectable (HumaLOG) 20 Unit(s) SubCutaneous three times a day before meals  levothyroxine 150 MICROGram(s) Oral daily  losartan 100 milliGRAM(s) Oral daily  metolazone 5 milliGRAM(s) Oral daily  metoprolol tartrate 25 milliGRAM(s) Oral two times a day  warfarin 3 milliGRAM(s) Oral once    MEDICATIONS  (PRN):  diclofenac 75 milliGRAM(s) Oral three times a day PRN Joint pain  hydrALAZINE Injectable 10 milliGRAM(s) IV Push every 4 hours PRN SBP >160  HYDROmorphone  Injectable 0.5 milliGRAM(s) IV Push every 4 hours PRN Pain unrelieved by PO medications  oxyCODONE    IR 5 milliGRAM(s) Oral every 4 hours PRN Mild to Moderate Pain  oxyCODONE    IR 10 milliGRAM(s) Oral every 4 hours PRN Severe Pain (7 - 10)      LABS:                        10.8   10.66 )-----------( 213      ( 09 Aug 2018 12:17 )             34.3     08-09    136  |  97<L>  |  30<H>  ----------------------------<  97  3.9   |  25  |  1.47<H>    Ca    9.1      09 Aug 2018 02:20  Phos  3.8     08-09  Mg     1.9     08-09    TPro  6.3  /  Alb  3.4  /  TBili  0.6  /  DBili  x   /  AST  18  /  ALT  15  /  AlkPhos  55  08-09    PT/INR - ( 09 Aug 2018 12:17 )   PT: 13.6 SEC;   INR: 1.18          PTT - ( 09 Aug 2018 12:17 )  PTT:74.1 SEC    LIVER FUNCTIONS - ( 09 Aug 2018 02:20 )  Alb: 3.4 g/dL / Pro: 6.3 g/dL / ALK PHOS: 55 u/L / ALT: 15 u/L / AST: 18 u/L / GGT: x

## 2018-08-10 ENCOUNTER — TRANSCRIPTION ENCOUNTER (OUTPATIENT)
Age: 68
End: 2018-08-10

## 2018-08-10 DIAGNOSIS — I73.9 PERIPHERAL VASCULAR DISEASE, UNSPECIFIED: ICD-10-CM

## 2018-08-10 DIAGNOSIS — E03.9 HYPOTHYROIDISM, UNSPECIFIED: ICD-10-CM

## 2018-08-10 DIAGNOSIS — E11.9 TYPE 2 DIABETES MELLITUS WITHOUT COMPLICATIONS: ICD-10-CM

## 2018-08-10 DIAGNOSIS — I25.10 ATHEROSCLEROTIC HEART DISEASE OF NATIVE CORONARY ARTERY WITHOUT ANGINA PECTORIS: ICD-10-CM

## 2018-08-10 LAB
-  AMIKACIN: SIGNIFICANT CHANGE UP
-  AMPICILLIN/SULBACTAM: SIGNIFICANT CHANGE UP
-  AMPICILLIN: SIGNIFICANT CHANGE UP
-  AZTREONAM: SIGNIFICANT CHANGE UP
-  CEFAZOLIN: SIGNIFICANT CHANGE UP
-  CEFEPIME: SIGNIFICANT CHANGE UP
-  CEFOXITIN: SIGNIFICANT CHANGE UP
-  CEFTAZIDIME: SIGNIFICANT CHANGE UP
-  CEFTRIAXONE: SIGNIFICANT CHANGE UP
-  CIPROFLOXACIN: SIGNIFICANT CHANGE UP
-  ERTAPENEM: SIGNIFICANT CHANGE UP
-  GENTAMICIN: SIGNIFICANT CHANGE UP
-  IMIPENEM: SIGNIFICANT CHANGE UP
-  LEVOFLOXACIN: SIGNIFICANT CHANGE UP
-  MEROPENEM: SIGNIFICANT CHANGE UP
-  PIPERACILLIN/TAZOBACTAM: SIGNIFICANT CHANGE UP
-  TIGECYCLINE: SIGNIFICANT CHANGE UP
-  TOBRAMYCIN: SIGNIFICANT CHANGE UP
-  TRIMETHOPRIM/SULFAMETHOXAZOLE: SIGNIFICANT CHANGE UP
APTT BLD: 75.2 SEC — HIGH (ref 27.5–37.4)
BUN SERPL-MCNC: 30 MG/DL — HIGH (ref 7–23)
CALCIUM SERPL-MCNC: 8.7 MG/DL — SIGNIFICANT CHANGE UP (ref 8.4–10.5)
CHLORIDE SERPL-SCNC: 97 MMOL/L — LOW (ref 98–107)
CO2 SERPL-SCNC: 20 MMOL/L — LOW (ref 22–31)
CREAT SERPL-MCNC: 1.42 MG/DL — HIGH (ref 0.5–1.3)
CULTURE RESULTS: SIGNIFICANT CHANGE UP
GLUCOSE BLDC GLUCOMTR-MCNC: 113 MG/DL — HIGH (ref 70–99)
GLUCOSE BLDC GLUCOMTR-MCNC: 129 MG/DL — HIGH (ref 70–99)
GLUCOSE BLDC GLUCOMTR-MCNC: 163 MG/DL — HIGH (ref 70–99)
GLUCOSE BLDC GLUCOMTR-MCNC: 90 MG/DL — SIGNIFICANT CHANGE UP (ref 70–99)
GLUCOSE SERPL-MCNC: 92 MG/DL — SIGNIFICANT CHANGE UP (ref 70–99)
GRAM STN SPEC: SIGNIFICANT CHANGE UP
HCT VFR BLD CALC: 31.3 % — LOW (ref 39–50)
HGB BLD-MCNC: 9.8 G/DL — LOW (ref 13–17)
INR BLD: 1.16 — SIGNIFICANT CHANGE UP (ref 0.88–1.17)
MCHC RBC-ENTMCNC: 27.5 PG — SIGNIFICANT CHANGE UP (ref 27–34)
MCHC RBC-ENTMCNC: 31.3 % — LOW (ref 32–36)
MCV RBC AUTO: 87.9 FL — SIGNIFICANT CHANGE UP (ref 80–100)
METHOD TYPE: SIGNIFICANT CHANGE UP
NRBC # FLD: 0 — SIGNIFICANT CHANGE UP
ORGANISM # SPEC MICROSCOPIC CNT: SIGNIFICANT CHANGE UP
PLATELET # BLD AUTO: 186 K/UL — SIGNIFICANT CHANGE UP (ref 150–400)
PMV BLD: 11 FL — SIGNIFICANT CHANGE UP (ref 7–13)
POTASSIUM SERPL-MCNC: 4.5 MMOL/L — SIGNIFICANT CHANGE UP (ref 3.5–5.3)
POTASSIUM SERPL-SCNC: 4.5 MMOL/L — SIGNIFICANT CHANGE UP (ref 3.5–5.3)
PROTHROM AB SERPL-ACNC: 13.4 SEC — HIGH (ref 9.8–13.1)
RBC # BLD: 3.56 M/UL — LOW (ref 4.2–5.8)
RBC # FLD: 15.9 % — HIGH (ref 10.3–14.5)
SODIUM SERPL-SCNC: 132 MMOL/L — LOW (ref 135–145)
WBC # BLD: 9.48 K/UL — SIGNIFICANT CHANGE UP (ref 3.8–10.5)
WBC # FLD AUTO: 9.48 K/UL — SIGNIFICANT CHANGE UP (ref 3.8–10.5)

## 2018-08-10 RX ORDER — WARFARIN SODIUM 2.5 MG/1
5 TABLET ORAL ONCE
Qty: 0 | Refills: 0 | Status: COMPLETED | OUTPATIENT
Start: 2018-08-10 | End: 2018-08-10

## 2018-08-10 RX ADMIN — HYDROMORPHONE HYDROCHLORIDE 0.5 MILLIGRAM(S): 2 INJECTION INTRAMUSCULAR; INTRAVENOUS; SUBCUTANEOUS at 07:30

## 2018-08-10 RX ADMIN — INSULIN GLARGINE 60 UNIT(S): 100 INJECTION, SOLUTION SUBCUTANEOUS at 21:31

## 2018-08-10 RX ADMIN — OXYCODONE HYDROCHLORIDE 10 MILLIGRAM(S): 5 TABLET ORAL at 11:33

## 2018-08-10 RX ADMIN — Medication 650 MILLIGRAM(S): at 05:35

## 2018-08-10 RX ADMIN — WARFARIN SODIUM 5 MILLIGRAM(S): 2.5 TABLET ORAL at 17:29

## 2018-08-10 RX ADMIN — Medication 25 MILLIGRAM(S): at 05:35

## 2018-08-10 RX ADMIN — Medication 81 MILLIGRAM(S): at 11:33

## 2018-08-10 RX ADMIN — LOSARTAN POTASSIUM 100 MILLIGRAM(S): 100 TABLET, FILM COATED ORAL at 05:35

## 2018-08-10 RX ADMIN — INSULIN GLARGINE 20 UNIT(S): 100 INJECTION, SOLUTION SUBCUTANEOUS at 08:37

## 2018-08-10 RX ADMIN — Medication 300 MILLIGRAM(S): at 14:03

## 2018-08-10 RX ADMIN — Medication 650 MILLIGRAM(S): at 00:15

## 2018-08-10 RX ADMIN — GABAPENTIN 300 MILLIGRAM(S): 400 CAPSULE ORAL at 11:33

## 2018-08-10 RX ADMIN — HYDROMORPHONE HYDROCHLORIDE 0.5 MILLIGRAM(S): 2 INJECTION INTRAMUSCULAR; INTRAVENOUS; SUBCUTANEOUS at 22:35

## 2018-08-10 RX ADMIN — Medication 20 UNIT(S): at 12:28

## 2018-08-10 RX ADMIN — OXYCODONE HYDROCHLORIDE 10 MILLIGRAM(S): 5 TABLET ORAL at 12:00

## 2018-08-10 RX ADMIN — Medication 650 MILLIGRAM(S): at 06:49

## 2018-08-10 RX ADMIN — OXYCODONE HYDROCHLORIDE 10 MILLIGRAM(S): 5 TABLET ORAL at 05:17

## 2018-08-10 RX ADMIN — HYDROMORPHONE HYDROCHLORIDE 0.5 MILLIGRAM(S): 2 INJECTION INTRAMUSCULAR; INTRAVENOUS; SUBCUTANEOUS at 21:25

## 2018-08-10 RX ADMIN — Medication 325 MILLIGRAM(S): at 11:33

## 2018-08-10 RX ADMIN — Medication 25 MILLIGRAM(S): at 17:29

## 2018-08-10 RX ADMIN — BUDESONIDE AND FORMOTEROL FUMARATE DIHYDRATE 2 PUFF(S): 160; 4.5 AEROSOL RESPIRATORY (INHALATION) at 11:35

## 2018-08-10 RX ADMIN — OXYCODONE HYDROCHLORIDE 10 MILLIGRAM(S): 5 TABLET ORAL at 20:00

## 2018-08-10 RX ADMIN — Medication 20 UNIT(S): at 17:23

## 2018-08-10 RX ADMIN — HYDROMORPHONE HYDROCHLORIDE 0.5 MILLIGRAM(S): 2 INJECTION INTRAMUSCULAR; INTRAVENOUS; SUBCUTANEOUS at 06:54

## 2018-08-10 RX ADMIN — PREGABALIN 1000 MICROGRAM(S): 225 CAPSULE ORAL at 11:33

## 2018-08-10 RX ADMIN — OXYCODONE HYDROCHLORIDE 10 MILLIGRAM(S): 5 TABLET ORAL at 19:21

## 2018-08-10 RX ADMIN — OXYCODONE HYDROCHLORIDE 10 MILLIGRAM(S): 5 TABLET ORAL at 04:01

## 2018-08-10 RX ADMIN — BUDESONIDE AND FORMOTEROL FUMARATE DIHYDRATE 2 PUFF(S): 160; 4.5 AEROSOL RESPIRATORY (INHALATION) at 21:31

## 2018-08-10 RX ADMIN — Medication 150 MICROGRAM(S): at 05:35

## 2018-08-10 RX ADMIN — Medication 300 MILLIGRAM(S): at 05:35

## 2018-08-10 RX ADMIN — Medication 300 MILLIGRAM(S): at 21:26

## 2018-08-10 RX ADMIN — ATORVASTATIN CALCIUM 80 MILLIGRAM(S): 80 TABLET, FILM COATED ORAL at 21:27

## 2018-08-10 RX ADMIN — HEPARIN SODIUM 11.5 UNIT(S)/HR: 5000 INJECTION INTRAVENOUS; SUBCUTANEOUS at 19:21

## 2018-08-10 RX ADMIN — Medication 20 UNIT(S): at 08:37

## 2018-08-10 NOTE — DISCHARGE NOTE ADULT - PLAN OF CARE
healing of the left hallux ulcer WOUND CARE: daily dressing change with dakin solution, apply 4x4 gauze, and cling   ANTIBIOTICS: follow instructions per infectious disease   WEIGHT BEARING: weight bear as tolerated to the left   FOLLOW UP: with Dr. Witt in clinic within one week upon discharge (phone: [phone]) s/p Left Qpipcpd-ix-szjrjrzp artery bypass with PTFE graft. WOUND CARE:  Please keep incisions clean and dry. Please do not Scrub or rub incisions. Do not use lotion or powder on incisions.   BATHING: You may shower and/or sponge bathe. You may use warm soapy water in the shower and rinse, pat dry.  ACTIVITY: No heavy lifting or straining. Otherwise, you may return to your usual level of physical activity. If you are taking narcotic pain medication DO NOT drive a car, operate machinery or make important decisions.  DIET: Return to your usual diet.  NOTIFY YOUR SURGEON IF: You have any bleeding that does not stop, any pus draining from your wound(s), increased pain at surgical site, any fever (over 100.4 F) persistent nausea/vomiting, or if your pain is not controlled on your discharge pain medications.  Please follow up with your primary care physician in 1-2 weeks regarding your hospitalization.  Please follow up with your surgeon, Dr. Loya in 1 week. Please call office to make an appointment status post Left Bhdatdj-ln-zjxqqqxe artery bypass with PTFE graft.

## 2018-08-10 NOTE — PROGRESS NOTE ADULT - SUBJECTIVE AND OBJECTIVE BOX
Podiatry pager #: [phone]/ [phone]    Patient is a 67y old  Male who presents with a chief complaint of left foot swelling right great toe swelling (08 Aug 2018 03:34)       INTERVAL HPI/OVERNIGHT EVENTS:  Patient seen and evaluated at bedside.  Pt is resting comfortable in NAD. Denies N/V/F/C.  Pt relates pain is improving since revascularization.     Allergies    contrast media (iodine-based) (Hypotension)    Intolerances        Vital Signs Last 24 Hrs  T(C): 36.6 (10 Aug 2018 10:13), Max: 37.3 (09 Aug 2018 21:35)  T(F): 97.9 (10 Aug 2018 10:13), Max: 99.1 (09 Aug 2018 21:35)  HR: 70 (10 Aug 2018 10:13) (70 - 93)  BP: 100/49 (10 Aug 2018 10:13) (100/49 - 141/54)  BP(mean): --  RR: 18 (10 Aug 2018 10:13) (18 - 20)  SpO2: 100% (10 Aug 2018 10:13) (96% - 100%)    LABS:                        9.8    9.48  )-----------( 186      ( 10 Aug 2018 05:40 )             31.3     08-10    132<L>  |  97<L>  |  30<H>  ----------------------------<  92  4.5   |  20<L>  |  1.42<H>    Ca    8.7      10 Aug 2018 05:31  Phos  3.8     08-09  Mg     1.9     08-09    TPro  6.3  /  Alb  3.4  /  TBili  0.6  /  DBili  x   /  AST  18  /  ALT  15  /  AlkPhos  55  08-09    PT/INR - ( 10 Aug 2018 05:31 )   PT: 13.4 SEC;   INR: 1.16          PTT - ( 10 Aug 2018 05:31 )  PTT:75.2 SEC    CAPILLARY BLOOD GLUCOSE      POCT Blood Glucose.: 129 mg/dL (10 Aug 2018 12:03)  POCT Blood Glucose.: 90 mg/dL (10 Aug 2018 07:40)  POCT Blood Glucose.: 121 mg/dL (09 Aug 2018 21:30)  POCT Blood Glucose.: 151 mg/dL (09 Aug 2018 17:07)      Lower Extremity Physical Exam:  S/p left hallux nail avulsion, fibro-granular wound bed with sloughing, no pus, moderate serous drainage. Periwound erythema, swelling and warmth, may be associated with post bypass changes.     RADIOLOGY & ADDITIONAL TESTS:

## 2018-08-10 NOTE — PROGRESS NOTE ADULT - ASSESSMENT
66 yo male patient s/p fem-peroneal bypass (8/7) with left hallux wound after nail avulsion   ·	Pt seen and evaluated   ·	Wound is fibro-granular with sloughing soft tissue   ·	Periwound erythema, edema and warmth noted (post-bypass changes vs. cellulitis and paronychia)  ·	WBC 9.48, VSS, LF Xray (7/19) showed no OM, no gas   ·	Wound was flushed, cleaned with saline, dressed with betadine, DSD  ·	Recommend ID consult for abx rec for discharge.   ·	Pod stable for discharge and follow up as outpatient.   ·	seen with attending.

## 2018-08-10 NOTE — DISCHARGE NOTE ADULT - HOSPITAL COURSE
Pt is a 68 yo Male former smoker, hx IDDM2, hypothyroidism, HTN, HLD, COPD, CAD s/p stent & CABG x 3 (3/28/2016, Barbara), R common femoral artery endarterectomy (11/20/2015), BLE angiogram (7/20/2015, Vincenzo), LLE angiogram (4/25/2017, Vincenzo), L femoral endarterectomy (6/6/2017, Vincenzo), presenting with L great toe pain x 1 month. The patient has been seen by podiatry multiple times over the last month for his toe pain. When his pain began, he was prescribed antibiotics, but that patient states that his pain did not improve. The patient returned to the podiatrist and his nail  was removed because of concern for perionychial infection. The patient states that following that procedure, his pain has increased and there is now swelling in his foot. Podiatry is following the patient for hallux infection . Pt is s/p  Left Diagnostic Angiogram for PAD on 7/31/18. On 8/7 pt underwent left Ldntxvg-kg-souoeimb artery bypass with PTFE graft. Pt was tolerated the procedure well . Pt was extubated and transferred to PACU for further monitoring . Positive Doppler signals confirmed with primary team at bedside . Pt is sating well on 3 L NC . Making adequate urine . 10 mg of Labetalol was given for SBP of 180. Heparin gtt at 5 unit / hr was started at 10 pm . Pt is a 68 yo Male former smoker, hx IDDM2, hypothyroidism, HTN, HLD, COPD, CAD s/p stent & CABG x 3 (3/28/2016, Barbara), R common femoral artery endarterectomy (11/20/2015), BLE angiogram (7/20/2015, Vincenzo), LLE angiogram (4/25/2017, Vincenzo), L femoral endarterectomy (6/6/2017, Vincenzo), presenting with L great toe pain x 1 month. The patient has been seen by podiatry multiple times over the last month for his toe pain. When his pain began, he was prescribed antibiotics, but that patient states that his pain did not improve. The patient returned to the podiatrist and his nail  was removed because of concern for perionychial infection. The patient states that following that procedure, his pain has increased and there is now swelling in his foot. Podiatry is following the patient for hallux infection . Pt is s/p  Left Diagnostic Angiogram for PAD on 7/31/18. On 8/7 pt underwent left Fioklou-eo-ppikabqw artery bypass with PTFE graft. Pt was tolerated the procedure well . Pt was extubated and transferred to PACU for further monitoring . Positive Doppler signals confirmed with primary team at bedside . Pt is sating well on 3 L NC . Making adequate urine . 10 mg of Labetalol was given for SBP of 180. Heparin gtt at 5 unit / hr was started and patient was transitioned to Coumadin.      Patient evaluated by Endocrine for glucose control and ID for infection treatment.  Patient remained on IV antibiotics.- Cont vanco/cefepime/flagyl/fluconazole to cover cellulitis, possible abscess/OM.  Superficial wound cultures grew enterobacter/candida.  Obtain MRI of Left foot to rule out deep infection. Last MRI performed on 7/23/18 no OM at that time. MRI revealed Generalized cellulitic type changes. No interval development of discrete drainable abscess collections or osteomyelitis. Left Hallux wound treated with Dakins and DSD by Podiatry.  Patient was advised by Podiatry if wound does not get better, he may require an amputation of the left hallux.    As per ID, ok to continue Levaquin for 7 days.  Glucose well controlled with current insulin regimen. Patient to remain on Coumadin. INR today 3.29.  Patient to receive 5 mg of Coumadin tonight as per Dr Loya and will require daily INR levels while in rehab facility.    Patient seen by PT on multiple occasions and they recommend Rehab facility.  Patient is stable for discharge to [facility] today and will need to follow up with Podiatry and Vascular Service.  Patient should also follow up with his Primary Care Doctor once he is discharged from the rehab facility.

## 2018-08-10 NOTE — DISCHARGE NOTE ADULT - CARE PROVIDER_API CALL
Enrico Loya), Surgery; Vascular Surgery  [id number] [place] [address]  Phone: [phone]  Fax: [phone]

## 2018-08-10 NOTE — DISCHARGE NOTE ADULT - NS AS ACTIVITY OBS
No Heavy lifting/straining Stairs allowed/No Heavy lifting/straining/Do not make important decisions/Do not drive or operate machinery/sponge bath/Walking-Outdoors allowed/Showering allowed/Walking-Indoors allowed

## 2018-08-10 NOTE — DISCHARGE NOTE ADULT - CARE PLAN
Principal Discharge DX:	Swelling  Secondary Diagnosis:	Wound  Goal:	healing of the left hallux ulcer  Assessment and plan of treatment:	WOUND CARE: daily dressing change with dakin solution, apply 4x4 gauze, and cling   ANTIBIOTICS: follow instructions per infectious disease   WEIGHT BEARING: weight bear as tolerated to the left   FOLLOW UP: with Dr. Witt in clinic within one week upon discharge (phone: [phone]) Principal Discharge DX:	PAD (peripheral artery disease)  Goal:	s/p Left Ulnbhlb-wg-yvdcmueo artery bypass with PTFE graft.  Assessment and plan of treatment:	WOUND CARE:  Please keep incisions clean and dry. Please do not Scrub or rub incisions. Do not use lotion or powder on incisions.   BATHING: You may shower and/or sponge bathe. You may use warm soapy water in the shower and rinse, pat dry.  ACTIVITY: No heavy lifting or straining. Otherwise, you may return to your usual level of physical activity. If you are taking narcotic pain medication DO NOT drive a car, operate machinery or make important decisions.  DIET: Return to your usual diet.  NOTIFY YOUR SURGEON IF: You have any bleeding that does not stop, any pus draining from your wound(s), increased pain at surgical site, any fever (over 100.4 F) persistent nausea/vomiting, or if your pain is not controlled on your discharge pain medications.  Please follow up with your primary care physician in 1-2 weeks regarding your hospitalization.  Please follow up with your surgeon, Dr. Loya in 1 week. Please call office to make an appointment  Secondary Diagnosis:	Wound  Goal:	healing of the left hallux ulcer  Assessment and plan of treatment:	WOUND CARE: daily dressing change with dakin solution, apply 4x4 gauze, and cling   ANTIBIOTICS: follow instructions per infectious disease   WEIGHT BEARING: weight bear as tolerated to the left   FOLLOW UP: with Dr. Witt in clinic within one week upon discharge (phone: [phone]) Principal Discharge DX:	PAD (peripheral artery disease)  Goal:	status post Left Dwpqysh-yj-qolhmija artery bypass with PTFE graft.  Assessment and plan of treatment:	WOUND CARE:  Please keep incisions clean and dry. Please do not Scrub or rub incisions. Do not use lotion or powder on incisions.   BATHING: You may shower and/or sponge bathe. You may use warm soapy water in the shower and rinse, pat dry.  ACTIVITY: No heavy lifting or straining. Otherwise, you may return to your usual level of physical activity. If you are taking narcotic pain medication DO NOT drive a car, operate machinery or make important decisions.  DIET: Return to your usual diet.  NOTIFY YOUR SURGEON IF: You have any bleeding that does not stop, any pus draining from your wound(s), increased pain at surgical site, any fever (over 100.4 F) persistent nausea/vomiting, or if your pain is not controlled on your discharge pain medications.  Please follow up with your primary care physician in 1-2 weeks regarding your hospitalization.  Please follow up with your surgeon, Dr. Loya in 1 week. Please call office to make an appointment  Secondary Diagnosis:	Wound  Goal:	healing of the left hallux ulcer  Assessment and plan of treatment:	WOUND CARE: daily dressing change with dakin solution, apply 4x4 gauze, and cling   ANTIBIOTICS: follow instructions per infectious disease   WEIGHT BEARING: weight bear as tolerated to the left   FOLLOW UP: with Dr. Witt in clinic within one week upon discharge (phone: [phone])

## 2018-08-10 NOTE — PROGRESS NOTE ADULT - SUBJECTIVE AND OBJECTIVE BOX
Clinically stable  Left foot warm  Incisions intact. Some weeping from calf incision  Continue coumadinziation  F/U podiatry for plan re toe  DC planning for rehab or outpatient PT

## 2018-08-10 NOTE — CONSULT NOTE ADULT - ASSESSMENT
Assessment  DMT2: 67y Male with DM T2 with hyperglycemia on basal bolus insulin, blood sugars in acceptable range, improving, fluctuating, no hypoglycemic episode,  eating meals,  compliant with low carb diet.  CAD: On medications, stable, monitored.  Hypothyroidism:  On synthroid 150 mcg po daily, asymptomatic.  HTN: Controlled, On med.  PVD: S/P procedure, has foot wound.

## 2018-08-10 NOTE — DISCHARGE NOTE ADULT - PATIENT PORTAL LINK FT
You can access the [facility] Patient Portal, offered by [facility], by registering with the following website: [email or web address]

## 2018-08-10 NOTE — DISCHARGE NOTE ADULT - MEDICATION SUMMARY - MEDICATIONS TO TAKE
I will START or STAY ON the medications listed below when I get home from the hospital:    Aspir 81 oral delayed release tablet  -- 1 tab(s) by mouth once a day in Am  -- Indication: For CAD (coronary artery disease)    diclofenac sodium 75 mg oral delayed release tablet  -- 1 tab(s) by mouth 3 times a day, As Needed  -- Indication: For PAin    acetaminophen 325 mg oral tablet  -- 2 tab(s) by mouth every 6 hours, As needed, Mild Pain (1 - 3)  -- Indication: For PAin    naproxen 250 mg oral tablet  -- 1 tab(s) by mouth once a day, As Needed  for pain   -- Indication: For PAin    oxyCODONE 5 mg oral tablet  -- 1 tab(s) by mouth every 4 hours, As needed, Mild to Moderate Pain  -- Indication: For PAin    oxyCODONE 10 mg oral tablet  -- 1 tab(s) by mouth every 4 hours, As needed, Severe Pain (7 - 10)  -- Indication: For PAin    losartan 100 mg oral tablet  -- 1 tab(s) by mouth once a day in Am  -- Indication: For Hypertension    Coumadin 5 mg oral tablet  -- 1 tab(s) by mouth once a day  -- Indication: For PVD    gabapentin 300 mg oral capsule  -- 1 cap(s) by mouth once a day, As Needed  -- Indication: For PAin    Basaglar KwikPen 100 units/mL subcutaneous solution  -- 60 unit(s) subcutaneous once a day (at bedtime)  and 20 Units once a day before breakfast  -- Do not drink alcoholic beverages when taking this medication.  It is very important that you take or use this exactly as directed.  Do not skip doses or discontinue unless directed by your doctor.  Keep in refrigerator.  Do not freeze.    -- Indication: For DM (diabetes mellitus)    NovoLOG FlexPen 100 units/mL injectable solution  -- 20 unit(s) injectable 3 times a day (before meals) 10-15 minutes before meals  -- Check with your doctor before becoming pregnant.  Do not drink alcoholic beverages when taking this medication.  Keep in refrigerator.  Do not freeze.  Obtain medical advice before taking any non-prescription drugs as some may affect the action of this medication.    -- Indication: For DM (diabetes mellitus)    atorvastatin 80 mg oral tablet  -- 1 tab(s) by mouth once a day at night  -- Indication: For Hyperlipidemia    sodium hypochlorite 0.125% topical solution  -- 1 application on skin once a day  -- Indication: For foot wound    metoprolol tartrate 25 mg oral tablet  -- 1 tab(s) by mouth 2 times a day  -- Indication: For Hypertension    Symbicort 160 mcg-4.5 mcg/inh inhalation aerosol  -- 2 puff(s) inhaled 2 times a day  -- Indication: For Wheeze/shortness of breath    furosemide 40 mg oral tablet  -- 1 tab(s) by mouth once a day in Am  -- Indication: For Hypertension    ferrous sulfate 325 mg (65 mg elemental iron) oral tablet  -- 1 tab(s) by mouth once a day  -- Indication: For Supplement    senna oral tablet  -- 2 tab(s) by mouth once a day (at bedtime)  -- Indication: For Stool softener    Colace 100 mg oral capsule  -- orally once a day, As Needed  -- Indication: For Stool softener    polyethylene glycol 3350 oral powder for reconstitution  -- 17 gram(s) by mouth once a day  -- Indication: For Constipation    levoFLOXacin 500 mg oral tablet  -- 1 tab(s) by mouth every 24 hours  -- Indication: For leg wound    levothyroxine 150 mcg (0.15 mg) oral tablet  -- 1 tab(s) by mouth once a day Am  -- Indication: For Hypothyroid    Vitamin B-12 1000 mcg oral tablet  -- 1 tab(s) by mouth once a day in Am  -- Indication: For Supplement I will START or STAY ON the medications listed below when I get home from the hospital:    Aspir 81 oral delayed release tablet  -- 1 tab(s) by mouth once a day in Am  -- Indication: For CAD (coronary artery disease)    diclofenac sodium 75 mg oral delayed release tablet  -- 1 tab(s) by mouth 3 times a day, As Needed  -- Indication: For PAin    naproxen 250 mg oral tablet  -- 1 tab(s) by mouth once a day, As Needed  for pain   -- Indication: For PAin    oxyCODONE 5 mg oral tablet  -- 1 tab(s) by mouth every 4 hours, As needed, Mild to Moderate Pain  -- Indication: For PAin    oxyCODONE 10 mg oral tablet  -- 1 tab(s) by mouth every 4 hours, As needed, Severe Pain (7 - 10)  -- Indication: For PAin    acetaminophen 325 mg oral tablet  -- 2 tab(s) by mouth every 6 hours, As needed, Mild Pain (1 - 3)  -- Indication: For PAin    losartan 100 mg oral tablet  -- 1 tab(s) by mouth once a day in Am  -- Indication: For Hypertension    Coumadin 5 mg oral tablet  -- 1 tab(s) by mouth once a day  -- Indication: For PVD    gabapentin 300 mg oral capsule  -- 1 cap(s) by mouth once a day, As Needed  -- Indication: For PAin    atorvastatin 80 mg oral tablet  -- 1 tab(s) by mouth once a day at night  -- Indication: For Hyperlipidemia    sodium hypochlorite 0.125% topical solution  -- 1 application on skin once a day  -- Indication: For foot wound    metoprolol tartrate 25 mg oral tablet  -- 1 tab(s) by mouth 2 times a day  -- Indication: For Hypertension    Symbicort 160 mcg-4.5 mcg/inh inhalation aerosol  -- 2 puff(s) inhaled 2 times a day  -- Indication: For Wheeze/shortness of breath    furosemide 40 mg oral tablet  -- 1 tab(s) by mouth once a day in Am  -- Indication: For Hypertension    ferrous sulfate 325 mg (65 mg elemental iron) oral tablet  -- 1 tab(s) by mouth once a day  -- Indication: For Supplement    polyethylene glycol 3350 oral powder for reconstitution  -- 17 gram(s) by mouth once a day  -- Indication: For Constipation    Colace 100 mg oral capsule  -- orally once a day, As Needed  -- Indication: For Stool softener    senna oral tablet  -- 2 tab(s) by mouth once a day (at bedtime)  -- Indication: For Stool softener    levoFLOXacin 500 mg oral tablet  -- 1 tab(s) by mouth every 24 hours  -- Indication: For leg wound    levothyroxine 150 mcg (0.15 mg) oral tablet  -- 1 tab(s) by mouth once a day Am  -- Indication: For Hypothyroid    Vitamin B-12 1000 mcg oral tablet  -- 1 tab(s) by mouth once a day in Am  -- Indication: For Supplement

## 2018-08-10 NOTE — PROGRESS NOTE ADULT - ASSESSMENT
Patient  s/p L femoral-peroneal bypass with PTFE.    Plan:  c/w pain management  Transition to coumadin   monitor INR/PTT  endocrine consult   Coumadin 3 mg Patient  s/p L femoral-peroneal bypass with PTFE.  Plan:  -c/w pain management  -Cont to transition to coumadin  from heparin drip  -monitor INR/PTT  -endocrine consult   -PT

## 2018-08-10 NOTE — DISCHARGE NOTE ADULT - ADDITIONAL INSTRUCTIONS
Patient will require daily INR checks and dose Coumadin based on level  Patient will require Levaquin for 7 days

## 2018-08-10 NOTE — PROGRESS NOTE ADULT - SUBJECTIVE AND OBJECTIVE BOX
vascular surgery note    Subjective:   pt was seen and examined this morning. pt still complaining of pain, no nausea or vomiting     Objective   Vital Signs Last 24 Hrs  T(C): 36.9 (10 Aug 2018 14:01), Max: 37.3 (09 Aug 2018 21:35)  T(F): 98.5 (10 Aug 2018 14:01), Max: 99.1 (09 Aug 2018 21:35)  HR: 72 (10 Aug 2018 14:01) (70 - 93)  BP: 91/51 (10 Aug 2018 14:01) (91/51 - 141/54)  BP(mean): --  RR: 19 (10 Aug 2018 14:01) (18 - 20)  SpO2: 100% (10 Aug 2018 14:01) (96% - 100%)        I&O's Summary    09 Aug 2018 07:01  -  10 Aug 2018 07:00  --------------------------------------------------------  IN: 241 mL / OUT: 1875 mL / NET: -1634 mL    10 Aug 2018 07:01  -  10 Aug 2018 15:16  --------------------------------------------------------  IN: 0 mL / OUT: 400 mL / NET: -400 mL            MEDICATIONS  (STANDING):  acetaminophen   Tablet. 650 milliGRAM(s) Oral every 6 hours  amoxicillin  875 milliGRAM(s)/clavulanate 1 Tablet(s) Oral two times a day  aspirin enteric coated 81 milliGRAM(s) Oral daily  atorvastatin 80 milliGRAM(s) Oral at bedtime  buDESOnide 160 MICROgram(s)/formoterol 4.5 MICROgram(s) Inhaler 2 Puff(s) Inhalation two times a day  cyanocobalamin 1000 MICROGram(s) Oral daily  ferrous    sulfate 325 milliGRAM(s) Oral daily  gabapentin 300 milliGRAM(s) Oral daily  heparin  Infusion 1100 Unit(s)/Hr (11.5 mL/Hr) IV Continuous <Continuous>  insulin glargine Injectable (LANTUS) 60 Unit(s) SubCutaneous at bedtime  insulin glargine Injectable (LANTUS) 20 Unit(s) SubCutaneous every morning  insulin lispro (HumaLOG) corrective regimen sliding scale   SubCutaneous three times a day before meals  insulin lispro Injectable (HumaLOG) 20 Unit(s) SubCutaneous three times a day before meals  levothyroxine 150 MICROGram(s) Oral daily  losartan 100 milliGRAM(s) Oral daily  metolazone 5 milliGRAM(s) Oral daily  metoprolol tartrate 25 milliGRAM(s) Oral two times a day  warfarin 3 milliGRAM(s) Oral once    MEDICATIONS  (PRN):  diclofenac 75 milliGRAM(s) Oral three times a day PRN Joint pain  hydrALAZINE Injectable 10 milliGRAM(s) IV Push every 4 hours PRN SBP >160  HYDROmorphone  Injectable 0.5 milliGRAM(s) IV Push every 4 hours PRN Pain unrelieved by PO medications  oxyCODONE    IR 5 milliGRAM(s) Oral every 4 hours PRN Mild to Moderate Pain  oxyCODONE    IR 10 milliGRAM(s) Oral every 4 hours PRN Severe Pain (7 - 10)    Pe: Gen: NAD  CV: RRR  Pulm: non-labored  Ext: WWP. .  Left Lower Ext ; PT , AT , Peroneal Doppler Signals. LLE dressings c/d/i.   Groin , Thigh , Distal : Dressing : Clean / Dry and Intact.  Left Foot: Dressing : For a hallux infection            LABS:                                       9.8    9.48  )-----------( 186      ( 10 Aug 2018 05:40 )             31.3 vascular surgery note    Subjective:   pt was seen and examined this morning. Pain well controlled.  No nausea or vomiting, CP/SOB    Objective   Vital Signs Last 24 Hrs  T(C): 36.9 (10 Aug 2018 14:01), Max: 37.3 (09 Aug 2018 21:35)  T(F): 98.5 (10 Aug 2018 14:01), Max: 99.1 (09 Aug 2018 21:35)  HR: 72 (10 Aug 2018 14:01) (70 - 93)  BP: 91/51 (10 Aug 2018 14:01) (91/51 - 141/54)  BP(mean): --  RR: 19 (10 Aug 2018 14:01) (18 - 20)  SpO2: 100% (10 Aug 2018 14:01) (96% - 100%)        I&O's Summary    09 Aug 2018 07:01  -  10 Aug 2018 07:00  --------------------------------------------------------  IN: 241 mL / OUT: 1875 mL / NET: -1634 mL    10 Aug 2018 07:01  -  10 Aug 2018 15:16  --------------------------------------------------------  IN: 0 mL / OUT: 400 mL / NET: -400 mL            MEDICATIONS  (STANDING):  acetaminophen   Tablet. 650 milliGRAM(s) Oral every 6 hours  amoxicillin  875 milliGRAM(s)/clavulanate 1 Tablet(s) Oral two times a day  aspirin enteric coated 81 milliGRAM(s) Oral daily  atorvastatin 80 milliGRAM(s) Oral at bedtime  buDESOnide 160 MICROgram(s)/formoterol 4.5 MICROgram(s) Inhaler 2 Puff(s) Inhalation two times a day  cyanocobalamin 1000 MICROGram(s) Oral daily  ferrous    sulfate 325 milliGRAM(s) Oral daily  gabapentin 300 milliGRAM(s) Oral daily  heparin  Infusion 1100 Unit(s)/Hr (11.5 mL/Hr) IV Continuous <Continuous>  insulin glargine Injectable (LANTUS) 60 Unit(s) SubCutaneous at bedtime  insulin glargine Injectable (LANTUS) 20 Unit(s) SubCutaneous every morning  insulin lispro (HumaLOG) corrective regimen sliding scale   SubCutaneous three times a day before meals  insulin lispro Injectable (HumaLOG) 20 Unit(s) SubCutaneous three times a day before meals  levothyroxine 150 MICROGram(s) Oral daily  losartan 100 milliGRAM(s) Oral daily  metolazone 5 milliGRAM(s) Oral daily  metoprolol tartrate 25 milliGRAM(s) Oral two times a day  warfarin 3 milliGRAM(s) Oral once    MEDICATIONS  (PRN):  diclofenac 75 milliGRAM(s) Oral three times a day PRN Joint pain  hydrALAZINE Injectable 10 milliGRAM(s) IV Push every 4 hours PRN SBP >160  HYDROmorphone  Injectable 0.5 milliGRAM(s) IV Push every 4 hours PRN Pain unrelieved by PO medications  oxyCODONE    IR 5 milliGRAM(s) Oral every 4 hours PRN Mild to Moderate Pain  oxyCODONE    IR 10 milliGRAM(s) Oral every 4 hours PRN Severe Pain (7 - 10)    Pe: Gen: NAD  CV: RRR  Pulm: non-labored  Ext: WWP. .  Left Lower Ext ; PT , AT , Peroneal Doppler Signals. LLE dressings c/d/i.   Groin , Thigh , Distal : Dressing : Clean / Dry and Intact.  Left Foot: Dressing : For a hallux infection            LABS:                                       9.8    9.48  )-----------( 186      ( 10 Aug 2018 05:40 )             31.3

## 2018-08-10 NOTE — CONSULT NOTE ADULT - SUBJECTIVE AND OBJECTIVE BOX
HPI:  Patient has history of diabetes, on large dose basal bolus insulin at home, no recent hypoglycemic episodes, no polyuria polydipsia. Patient follows up with me for diabetes management.    PAST MEDICAL & SURGICAL HISTORY:  COPD (chronic obstructive pulmonary disease)  Sleep apnea: non-compliant  Anemia  Atherosclerosis of arteries of extremities: left leg  Mild intermittent asthma without complication  Iron deficiency anemia, unspecified iron deficiency anemia type  Prostate cancer  Bronchospasm  Obesity (BMI 30-39.9)  PAD (peripheral artery disease): RLE bypass 11/30/15  CAD (coronary artery disease): 3 cardiac stents placed in 2006 (mid LAD, Prox LAD, Prox to #2), 3V CABG 3/28/16  Hypothyroid  Intermittent claudication  Diabetic neuropathy  DM (diabetes mellitus): type 2 - on insulin pump  CHF (congestive heart failure)  Hypercholesteremia  HTN (hypertension)  History of femoral angiogram: Left femoral endarterectomy Fem-Pop bypass 6/2017  S/P bypass graft of extremity: RLE- 11/30/15  S/P CABG x 3: 3/28/16  Stented coronary artery: x 3 cardiac stents placed in 2006  S/P prostatectomy: 1996      FAMILY HISTORY:  No pertinent family history in first degree relatives      Social History:    Outpatient Medications:    MEDICATIONS  (STANDING):  aspirin enteric coated 81 milliGRAM(s) Oral daily  atorvastatin 80 milliGRAM(s) Oral at bedtime  buDESOnide 160 MICROgram(s)/formoterol 4.5 MICROgram(s) Inhaler 2 Puff(s) Inhalation two times a day  clindamycin   Capsule 300 milliGRAM(s) Oral three times a day  cyanocobalamin 1000 MICROGram(s) Oral daily  Dakins Solution - 1/4 Strength 1 Application(s) Topical daily  ferrous    sulfate 325 milliGRAM(s) Oral daily  gabapentin 300 milliGRAM(s) Oral daily  heparin  Infusion 1100 Unit(s)/Hr (11.5 mL/Hr) IV Continuous <Continuous>  insulin glargine Injectable (LANTUS) 60 Unit(s) SubCutaneous at bedtime  insulin glargine Injectable (LANTUS) 20 Unit(s) SubCutaneous every morning  insulin lispro (HumaLOG) corrective regimen sliding scale   SubCutaneous three times a day before meals  insulin lispro Injectable (HumaLOG) 20 Unit(s) SubCutaneous three times a day before meals  levothyroxine 150 MICROGram(s) Oral daily  losartan 100 milliGRAM(s) Oral daily  metolazone 5 milliGRAM(s) Oral daily  metoprolol tartrate 25 milliGRAM(s) Oral two times a day  warfarin 5 milliGRAM(s) Oral once    MEDICATIONS  (PRN):  diclofenac 75 milliGRAM(s) Oral three times a day PRN Joint pain  hydrALAZINE Injectable 10 milliGRAM(s) IV Push every 4 hours PRN SBP >160  HYDROmorphone  Injectable 0.5 milliGRAM(s) IV Push every 4 hours PRN Pain unrelieved by PO medications  oxyCODONE    IR 5 milliGRAM(s) Oral every 4 hours PRN Mild to Moderate Pain  oxyCODONE    IR 10 milliGRAM(s) Oral every 4 hours PRN Severe Pain (7 - 10)      Allergies    contrast media (iodine-based) (Hypotension)    Intolerances      Review of Systems:  Constitutional: No fever, no chills  Eyes: No blurry vision  Neuro: No tremors  HEENT: No pain, no neck swelling  Cardiovascular: No chest pain, no palpitations  Respiratory: Has SOB, no cough  GI: No nausea, vomiting, abdominal pain  : No dysuria  Skin: no rash  MSK: Has leg swelling.  Psych: no depression  Endocrine: no polyuria, polydipsia    ALL OTHER SYSTEMS REVIEWED AND NEGATIVE    UNABLE TO OBTAIN    PHYSICAL EXAM:  VITALS: T(C): 36.9 (08-10-18 @ 14:01)  T(F): 98.5 (08-10-18 @ 14:01), Max: 99.1 (08-09-18 @ 21:35)  HR: 72 (08-10-18 @ 14:01) (70 - 93)  BP: 91/51 (08-10-18 @ 14:01) (91/51 - 141/54)  RR:  (18 - 20)  SpO2:  (96% - 100%)  Wt(kg): --  GENERAL: NAD, well-groomed, well-developed  EYES: No proptosis, no lid lag  HEENT:  Atraumatic, Normocephalic  THYROID: Normal size, no palpable nodules  RESPIRATORY: Clear to auscultation bilaterally; No rales, rhonchi, wheezing  CARDIOVASCULAR: Si S2, No murmurs;  GI: Soft, non distended, normal bowel sounds  SKIN: Dry, intact, No rashes or lesions  MUSCULOSKELETAL: Has BL lower extremity edema.  NEURO:  no tremor, sensation decreased in feet BL,    POCT Blood Glucose.: 129 mg/dL (08-10-18 @ 12:03)  POCT Blood Glucose.: 90 mg/dL (08-10-18 @ 07:40)  POCT Blood Glucose.: 121 mg/dL (08-09-18 @ 21:30)  POCT Blood Glucose.: 151 mg/dL (08-09-18 @ 17:07)  POCT Blood Glucose.: 129 mg/dL (08-09-18 @ 12:07)  POCT Blood Glucose.: 122 mg/dL (08-09-18 @ 07:37)  POCT Blood Glucose.: 138 mg/dL (08-08-18 @ 16:13)  POCT Blood Glucose.: 171 mg/dL (08-08-18 @ 12:16)  POCT Blood Glucose.: 234 mg/dL (08-08-18 @ 10:16)  POCT Blood Glucose.: 266 mg/dL (08-08-18 @ 09:00)  POCT Blood Glucose.: 336 mg/dL (08-08-18 @ 07:15)  POCT Blood Glucose.: 333 mg/dL (08-08-18 @ 06:26)  POCT Blood Glucose.: 353 mg/dL (08-08-18 @ 06:25)                            9.8    9.48  )-----------( 186      ( 10 Aug 2018 05:40 )             31.3       08-10    132<L>  |  97<L>  |  30<H>  ----------------------------<  92  4.5   |  20<L>  |  1.42<H>    EGFR if : 58  EGFR if non : 50    Ca    8.7      08-10  Mg     1.9     08-09  Phos  3.8     08-09    TPro  6.3  /  Alb  3.4  /  TBili  0.6  /  DBili  x   /  AST  18  /  ALT  15  /  AlkPhos  55  08-09      Thyroid Function Tests:  07-23 @ 05:00 TSH 0.85 FreeT4 -- T3 -- Anti TPO -- Anti Thyroglobulin Ab -- TSI --      Hemoglobin A1C, Whole Blood: 9.1 % <H> [4.0 - 5.6] (07-20-18 @ 05:16)          Radiology:

## 2018-08-11 LAB
APTT BLD: 69.5 SEC — HIGH (ref 27.5–37.4)
BUN SERPL-MCNC: 32 MG/DL — HIGH (ref 7–23)
CALCIUM SERPL-MCNC: 8.9 MG/DL — SIGNIFICANT CHANGE UP (ref 8.4–10.5)
CHLORIDE SERPL-SCNC: 97 MMOL/L — LOW (ref 98–107)
CO2 SERPL-SCNC: 20 MMOL/L — LOW (ref 22–31)
CREAT SERPL-MCNC: 1.42 MG/DL — HIGH (ref 0.5–1.3)
GLUCOSE BLDC GLUCOMTR-MCNC: 160 MG/DL — HIGH (ref 70–99)
GLUCOSE BLDC GLUCOMTR-MCNC: 209 MG/DL — HIGH (ref 70–99)
GLUCOSE BLDC GLUCOMTR-MCNC: 230 MG/DL — HIGH (ref 70–99)
GLUCOSE BLDC GLUCOMTR-MCNC: 89 MG/DL — SIGNIFICANT CHANGE UP (ref 70–99)
GLUCOSE SERPL-MCNC: 91 MG/DL — SIGNIFICANT CHANGE UP (ref 70–99)
HCT VFR BLD CALC: 30.2 % — LOW (ref 39–50)
HGB BLD-MCNC: 9.3 G/DL — LOW (ref 13–17)
INR BLD: 1.21 — HIGH (ref 0.88–1.17)
MAGNESIUM SERPL-MCNC: 2.3 MG/DL — SIGNIFICANT CHANGE UP (ref 1.6–2.6)
MCHC RBC-ENTMCNC: 27 PG — SIGNIFICANT CHANGE UP (ref 27–34)
MCHC RBC-ENTMCNC: 30.8 % — LOW (ref 32–36)
MCV RBC AUTO: 87.8 FL — SIGNIFICANT CHANGE UP (ref 80–100)
NRBC # FLD: 0 — SIGNIFICANT CHANGE UP
PHOSPHATE SERPL-MCNC: 3 MG/DL — SIGNIFICANT CHANGE UP (ref 2.5–4.5)
PLATELET # BLD AUTO: 224 K/UL — SIGNIFICANT CHANGE UP (ref 150–400)
PMV BLD: 11.1 FL — SIGNIFICANT CHANGE UP (ref 7–13)
POTASSIUM SERPL-MCNC: 4.3 MMOL/L — SIGNIFICANT CHANGE UP (ref 3.5–5.3)
POTASSIUM SERPL-SCNC: 4.3 MMOL/L — SIGNIFICANT CHANGE UP (ref 3.5–5.3)
PROTHROM AB SERPL-ACNC: 13.5 SEC — HIGH (ref 9.8–13.1)
RBC # BLD: 3.44 M/UL — LOW (ref 4.2–5.8)
RBC # FLD: 15.9 % — HIGH (ref 10.3–14.5)
SODIUM SERPL-SCNC: 133 MMOL/L — LOW (ref 135–145)
WBC # BLD: 8.21 K/UL — SIGNIFICANT CHANGE UP (ref 3.8–10.5)
WBC # FLD AUTO: 8.21 K/UL — SIGNIFICANT CHANGE UP (ref 3.8–10.5)

## 2018-08-11 RX ORDER — VANCOMYCIN HCL 1 G
1000 VIAL (EA) INTRAVENOUS ONCE
Qty: 0 | Refills: 0 | Status: COMPLETED | OUTPATIENT
Start: 2018-08-11 | End: 2018-08-11

## 2018-08-11 RX ORDER — DOCUSATE SODIUM 100 MG
100 CAPSULE ORAL DAILY
Qty: 0 | Refills: 0 | Status: DISCONTINUED | OUTPATIENT
Start: 2018-08-11 | End: 2018-08-16

## 2018-08-11 RX ORDER — WARFARIN SODIUM 2.5 MG/1
7.5 TABLET ORAL ONCE
Qty: 0 | Refills: 0 | Status: COMPLETED | OUTPATIENT
Start: 2018-08-11 | End: 2018-08-11

## 2018-08-11 RX ORDER — SENNA PLUS 8.6 MG/1
1 TABLET ORAL AT BEDTIME
Qty: 0 | Refills: 0 | Status: DISCONTINUED | OUTPATIENT
Start: 2018-08-11 | End: 2018-08-16

## 2018-08-11 RX ORDER — AZTREONAM 2 G
1000 VIAL (EA) INJECTION EVERY 12 HOURS
Qty: 0 | Refills: 0 | Status: DISCONTINUED | OUTPATIENT
Start: 2018-08-12 | End: 2018-08-12

## 2018-08-11 RX ORDER — AZTREONAM 2 G
1000 VIAL (EA) INJECTION ONCE
Qty: 0 | Refills: 0 | Status: COMPLETED | OUTPATIENT
Start: 2018-08-11 | End: 2018-08-11

## 2018-08-11 RX ORDER — VANCOMYCIN HCL 1 G
VIAL (EA) INTRAVENOUS
Qty: 0 | Refills: 0 | Status: DISCONTINUED | OUTPATIENT
Start: 2018-08-11 | End: 2018-08-15

## 2018-08-11 RX ORDER — ACETAMINOPHEN 500 MG
1000 TABLET ORAL ONCE
Qty: 0 | Refills: 0 | Status: DISCONTINUED | OUTPATIENT
Start: 2018-08-11 | End: 2018-08-16

## 2018-08-11 RX ORDER — VANCOMYCIN HCL 1 G
1000 VIAL (EA) INTRAVENOUS EVERY 24 HOURS
Qty: 0 | Refills: 0 | Status: DISCONTINUED | OUTPATIENT
Start: 2018-08-12 | End: 2018-08-15

## 2018-08-11 RX ORDER — AZTREONAM 2 G
VIAL (EA) INJECTION
Qty: 0 | Refills: 0 | Status: DISCONTINUED | OUTPATIENT
Start: 2018-08-11 | End: 2018-08-12

## 2018-08-11 RX ADMIN — Medication 100 MILLIGRAM(S): at 22:20

## 2018-08-11 RX ADMIN — OXYCODONE HYDROCHLORIDE 10 MILLIGRAM(S): 5 TABLET ORAL at 05:47

## 2018-08-11 RX ADMIN — Medication 50 MILLIGRAM(S): at 13:53

## 2018-08-11 RX ADMIN — GABAPENTIN 300 MILLIGRAM(S): 400 CAPSULE ORAL at 11:00

## 2018-08-11 RX ADMIN — Medication 150 MICROGRAM(S): at 05:32

## 2018-08-11 RX ADMIN — Medication 2: at 12:17

## 2018-08-11 RX ADMIN — Medication 20 UNIT(S): at 08:20

## 2018-08-11 RX ADMIN — Medication 1 APPLICATION(S): at 05:33

## 2018-08-11 RX ADMIN — INSULIN GLARGINE 60 UNIT(S): 100 INJECTION, SOLUTION SUBCUTANEOUS at 22:19

## 2018-08-11 RX ADMIN — OXYCODONE HYDROCHLORIDE 10 MILLIGRAM(S): 5 TABLET ORAL at 19:43

## 2018-08-11 RX ADMIN — Medication 20 UNIT(S): at 12:17

## 2018-08-11 RX ADMIN — Medication 81 MILLIGRAM(S): at 11:00

## 2018-08-11 RX ADMIN — HYDROMORPHONE HYDROCHLORIDE 0.5 MILLIGRAM(S): 2 INJECTION INTRAMUSCULAR; INTRAVENOUS; SUBCUTANEOUS at 23:45

## 2018-08-11 RX ADMIN — Medication 25 MILLIGRAM(S): at 18:18

## 2018-08-11 RX ADMIN — PREGABALIN 1000 MICROGRAM(S): 225 CAPSULE ORAL at 11:00

## 2018-08-11 RX ADMIN — WARFARIN SODIUM 7.5 MILLIGRAM(S): 2.5 TABLET ORAL at 17:17

## 2018-08-11 RX ADMIN — Medication 250 MILLIGRAM(S): at 14:48

## 2018-08-11 RX ADMIN — Medication 4: at 17:21

## 2018-08-11 RX ADMIN — OXYCODONE HYDROCHLORIDE 10 MILLIGRAM(S): 5 TABLET ORAL at 00:04

## 2018-08-11 RX ADMIN — OXYCODONE HYDROCHLORIDE 10 MILLIGRAM(S): 5 TABLET ORAL at 10:55

## 2018-08-11 RX ADMIN — HYDROMORPHONE HYDROCHLORIDE 0.5 MILLIGRAM(S): 2 INJECTION INTRAMUSCULAR; INTRAVENOUS; SUBCUTANEOUS at 23:19

## 2018-08-11 RX ADMIN — BUDESONIDE AND FORMOTEROL FUMARATE DIHYDRATE 2 PUFF(S): 160; 4.5 AEROSOL RESPIRATORY (INHALATION) at 10:54

## 2018-08-11 RX ADMIN — Medication 20 UNIT(S): at 17:21

## 2018-08-11 RX ADMIN — SENNA PLUS 1 TABLET(S): 8.6 TABLET ORAL at 22:20

## 2018-08-11 RX ADMIN — OXYCODONE HYDROCHLORIDE 10 MILLIGRAM(S): 5 TABLET ORAL at 11:25

## 2018-08-11 RX ADMIN — OXYCODONE HYDROCHLORIDE 10 MILLIGRAM(S): 5 TABLET ORAL at 06:52

## 2018-08-11 RX ADMIN — Medication 325 MILLIGRAM(S): at 11:00

## 2018-08-11 RX ADMIN — BUDESONIDE AND FORMOTEROL FUMARATE DIHYDRATE 2 PUFF(S): 160; 4.5 AEROSOL RESPIRATORY (INHALATION) at 22:20

## 2018-08-11 RX ADMIN — Medication 25 MILLIGRAM(S): at 05:32

## 2018-08-11 RX ADMIN — Medication 300 MILLIGRAM(S): at 05:32

## 2018-08-11 RX ADMIN — LOSARTAN POTASSIUM 100 MILLIGRAM(S): 100 TABLET, FILM COATED ORAL at 05:32

## 2018-08-11 RX ADMIN — OXYCODONE HYDROCHLORIDE 10 MILLIGRAM(S): 5 TABLET ORAL at 00:51

## 2018-08-11 RX ADMIN — ATORVASTATIN CALCIUM 80 MILLIGRAM(S): 80 TABLET, FILM COATED ORAL at 22:21

## 2018-08-11 RX ADMIN — INSULIN GLARGINE 20 UNIT(S): 100 INJECTION, SOLUTION SUBCUTANEOUS at 08:21

## 2018-08-11 RX ADMIN — OXYCODONE HYDROCHLORIDE 10 MILLIGRAM(S): 5 TABLET ORAL at 20:13

## 2018-08-11 NOTE — PROGRESS NOTE ADULT - SUBJECTIVE AND OBJECTIVE BOX
vascular surgery note    Subjective:   pt was seen and examined this morning. Pain well controlled, c/o pain with dressing changes.  No nausea or vomiting, CP/SOB    Objective   Vital Signs Last 24 Hrs  T(C): 36.9 (10 Aug 2018 14:01), Max: 37.3 (09 Aug 2018 21:35)  T(F): 98.5 (10 Aug 2018 14:01), Max: 99.1 (09 Aug 2018 21:35)  HR: 72 (10 Aug 2018 14:01) (70 - 93)  BP: 91/51 (10 Aug 2018 14:01) (91/51 - 141/54)  BP(mean): --  RR: 19 (10 Aug 2018 14:01) (18 - 20)  SpO2: 100% (10 Aug 2018 14:01) (96% - 100%)      I&O's Summary    10 Aug 2018 07:01  -  11 Aug 2018 07:00  --------------------------------------------------------  IN: 103.5 mL / OUT: 1950 mL / NET: -1846.5 mL    11 Aug 2018 07:01  -  11 Aug 2018 09:30  --------------------------------------------------------  IN: 263 mL / OUT: 0 mL / NET: 263 mL                MEDICATIONS  (STANDING):  acetaminophen   Tablet. 650 milliGRAM(s) Oral every 6 hours  amoxicillin  875 milliGRAM(s)/clavulanate 1 Tablet(s) Oral two times a day  aspirin enteric coated 81 milliGRAM(s) Oral daily  atorvastatin 80 milliGRAM(s) Oral at bedtime  buDESOnide 160 MICROgram(s)/formoterol 4.5 MICROgram(s) Inhaler 2 Puff(s) Inhalation two times a day  cyanocobalamin 1000 MICROGram(s) Oral daily  ferrous    sulfate 325 milliGRAM(s) Oral daily  gabapentin 300 milliGRAM(s) Oral daily  heparin  Infusion 1100 Unit(s)/Hr (11.5 mL/Hr) IV Continuous <Continuous>  insulin glargine Injectable (LANTUS) 60 Unit(s) SubCutaneous at bedtime  insulin glargine Injectable (LANTUS) 20 Unit(s) SubCutaneous every morning  insulin lispro (HumaLOG) corrective regimen sliding scale   SubCutaneous three times a day before meals  insulin lispro Injectable (HumaLOG) 20 Unit(s) SubCutaneous three times a day before meals  levothyroxine 150 MICROGram(s) Oral daily  losartan 100 milliGRAM(s) Oral daily  metolazone 5 milliGRAM(s) Oral daily  metoprolol tartrate 25 milliGRAM(s) Oral two times a day  warfarin 3 milliGRAM(s) Oral once    MEDICATIONS  (PRN):  diclofenac 75 milliGRAM(s) Oral three times a day PRN Joint pain  hydrALAZINE Injectable 10 milliGRAM(s) IV Push every 4 hours PRN SBP >160  HYDROmorphone  Injectable 0.5 milliGRAM(s) IV Push every 4 hours PRN Pain unrelieved by PO medications  oxyCODONE    IR 5 milliGRAM(s) Oral every 4 hours PRN Mild to Moderate Pain  oxyCODONE    IR 10 milliGRAM(s) Oral every 4 hours PRN Severe Pain (7 - 10)    Pe: Gen: NAD  CV: RRR  Pulm: non-labored  Ext: WWP. .  Left Lower Ext ; PT , AT , Peroneal Doppler Signals. LLE dressings c/d/i.   Groin , Thigh , Distal : Dressing : Clean / Dry and Intact.  Left Foot: Dressing : For a hallux infection            LABS:                                       9.8    9.48  )-----------( 186      ( 10 Aug 2018 05:40 )             31.3

## 2018-08-11 NOTE — PROGRESS NOTE ADULT - ASSESSMENT
Patient  s/p L femoral-peroneal bypass with PTFE.  Plan:  -c/w pain management  -Cont to transition to coumadin from heparin drip. Dose Coumadin today  -monitor INR/PTT  -endocrine consult: appreciate recs  -ID consulted: message left for consult with Dr. Yesika Rodríguez [phone]

## 2018-08-11 NOTE — PROGRESS NOTE ADULT - SUBJECTIVE AND OBJECTIVE BOX
Chief complaint  Patient is a 67y old  Male who presents with a chief complaint of left foot swelling right great toe swelling (10 Aug 2018 13:42)   Review of systems  Patient in bed, looks comfortable, no fever, no hypoglycemia.    Labs and Fingersticks  CAPILLARY BLOOD GLUCOSE      POCT Blood Glucose.: 160 mg/dL (11 Aug 2018 12:00)  POCT Blood Glucose.: 89 mg/dL (11 Aug 2018 07:38)  POCT Blood Glucose.: 163 mg/dL (10 Aug 2018 21:29)  POCT Blood Glucose.: 113 mg/dL (10 Aug 2018 17:02)          Calcium, Total Serum: 8.9 (08-11 @ 06:15)  Calcium, Total Serum: 8.7 (08-10 @ 05:31)          08-11    133<L>  |  97<L>  |  32<H>  ----------------------------<  91  4.3   |  20<L>  |  1.42<H>    Ca    8.9      11 Aug 2018 06:15  Phos  3.0     08-11  Mg     2.3     08-11                          9.3    8.21  )-----------( 224      ( 11 Aug 2018 06:15 )             30.2     Medications  MEDICATIONS  (STANDING):  acetaminophen  IVPB. 1000 milliGRAM(s) IV Intermittent once  aspirin enteric coated 81 milliGRAM(s) Oral daily  atorvastatin 80 milliGRAM(s) Oral at bedtime  aztreonam  IVPB      buDESOnide 160 MICROgram(s)/formoterol 4.5 MICROgram(s) Inhaler 2 Puff(s) Inhalation two times a day  cyanocobalamin 1000 MICROGram(s) Oral daily  Dakins Solution - 1/4 Strength 1 Application(s) Topical daily  ferrous    sulfate 325 milliGRAM(s) Oral daily  gabapentin 300 milliGRAM(s) Oral daily  heparin  Infusion 1100 Unit(s)/Hr (11.5 mL/Hr) IV Continuous <Continuous>  insulin glargine Injectable (LANTUS) 60 Unit(s) SubCutaneous at bedtime  insulin glargine Injectable (LANTUS) 20 Unit(s) SubCutaneous every morning  insulin lispro (HumaLOG) corrective regimen sliding scale   SubCutaneous three times a day before meals  insulin lispro Injectable (HumaLOG) 20 Unit(s) SubCutaneous three times a day before meals  levothyroxine 150 MICROGram(s) Oral daily  losartan 100 milliGRAM(s) Oral daily  metolazone 5 milliGRAM(s) Oral daily  metoprolol tartrate 25 milliGRAM(s) Oral two times a day  vancomycin  IVPB 1000 milliGRAM(s) IV Intermittent once  vancomycin  IVPB      warfarin 7.5 milliGRAM(s) Oral once      Physical Exam  General: Patient comfortable in bed  Vital Signs Last 12 Hrs  T(F): 97.7 (08-11-18 @ 13:31), Max: 98.4 (08-11-18 @ 02:00)  HR: 81 (08-11-18 @ 13:31) (71 - 86)  BP: 105/78 (08-11-18 @ 13:31) (105/78 - 131/60)  BP(mean): --  RR: 18 (08-11-18 @ 13:31) (16 - 18)  SpO2: 98% (08-11-18 @ 13:31) (98% - 100%)  Neck: No palpable thyroid nodules.  CVS: S1S2, No murmurs  Respiratory: No wheezing, no crepitations  GI: Abdomen soft, bowel sounds positive  Musculoskeletal:  edema lower extremities.   Skin: No skin rashes, no ecchymosis    Diagnostics

## 2018-08-11 NOTE — PROGRESS NOTE ADULT - SUBJECTIVE AND OBJECTIVE BOX
pt seen at bedside in NAD/ s/p LLE bypass  dressing to left foot c/d/i  left hallux ulceration noted less wet than yesterday, some necrotic tissue no pus no probing deep minimal erythema and edema  applied DSD  pt has burning when using betadine would avoid it  continue abx  recommend ID eval for oral abx since pt is on coumadin and will require close monitoring of INR while on abx culture results enterobacter cloacae  explained to pt that if toe doesn't improve he may require amputation of the hallux.   cleansed wound with Dakins and applied DSD

## 2018-08-11 NOTE — PROGRESS NOTE ADULT - SUBJECTIVE AND OBJECTIVE BOX
Spoke with ID Dr. Yesika Rodríguez   ID will evaluate patient tomorrow and give final recs  ID recs now stopping clindamycin and starting Azactam 1g q12h and Vancomycin 1 g q24h

## 2018-08-11 NOTE — PROGRESS NOTE ADULT - ASSESSMENT
Assessment  DMT2: 67y Male with DM T2 with hyperglycemia on basal bolus insulin, blood sugars in acceptable range, eating meals,  compliant with low carb diet.  CAD: On medications, stable, monitored.  Hypothyroidism:  On synthroid 150 mcg po daily, asymptomatic.  HTN: Controlled, On med.  PVD: S/P procedure, has foot wound.

## 2018-08-12 LAB
APTT BLD: 77.1 SEC — HIGH (ref 27.5–37.4)
BUN SERPL-MCNC: 37 MG/DL — HIGH (ref 7–23)
CALCIUM SERPL-MCNC: 8.6 MG/DL — SIGNIFICANT CHANGE UP (ref 8.4–10.5)
CHLORIDE SERPL-SCNC: 97 MMOL/L — LOW (ref 98–107)
CO2 SERPL-SCNC: 21 MMOL/L — LOW (ref 22–31)
CREAT SERPL-MCNC: 1.52 MG/DL — HIGH (ref 0.5–1.3)
GLUCOSE BLDC GLUCOMTR-MCNC: 115 MG/DL — HIGH (ref 70–99)
GLUCOSE BLDC GLUCOMTR-MCNC: 172 MG/DL — HIGH (ref 70–99)
GLUCOSE BLDC GLUCOMTR-MCNC: 183 MG/DL — HIGH (ref 70–99)
GLUCOSE BLDC GLUCOMTR-MCNC: 200 MG/DL — HIGH (ref 70–99)
GLUCOSE SERPL-MCNC: 173 MG/DL — HIGH (ref 70–99)
HCT VFR BLD CALC: 27.8 % — LOW (ref 39–50)
HGB BLD-MCNC: 8.7 G/DL — LOW (ref 13–17)
INR BLD: 1.29 — HIGH (ref 0.88–1.17)
MAGNESIUM SERPL-MCNC: 2.4 MG/DL — SIGNIFICANT CHANGE UP (ref 1.6–2.6)
MCHC RBC-ENTMCNC: 27.2 PG — SIGNIFICANT CHANGE UP (ref 27–34)
MCHC RBC-ENTMCNC: 31.3 % — LOW (ref 32–36)
MCV RBC AUTO: 86.9 FL — SIGNIFICANT CHANGE UP (ref 80–100)
NRBC # FLD: 0 — SIGNIFICANT CHANGE UP
PHOSPHATE SERPL-MCNC: 2.8 MG/DL — SIGNIFICANT CHANGE UP (ref 2.5–4.5)
PLATELET # BLD AUTO: 213 K/UL — SIGNIFICANT CHANGE UP (ref 150–400)
PMV BLD: 10.3 FL — SIGNIFICANT CHANGE UP (ref 7–13)
POTASSIUM SERPL-MCNC: 4.8 MMOL/L — SIGNIFICANT CHANGE UP (ref 3.5–5.3)
POTASSIUM SERPL-SCNC: 4.8 MMOL/L — SIGNIFICANT CHANGE UP (ref 3.5–5.3)
PROTHROM AB SERPL-ACNC: 14.9 SEC — HIGH (ref 9.8–13.1)
RBC # BLD: 3.2 M/UL — LOW (ref 4.2–5.8)
RBC # FLD: 15.7 % — HIGH (ref 10.3–14.5)
SODIUM SERPL-SCNC: 133 MMOL/L — LOW (ref 135–145)
WBC # BLD: 6.39 K/UL — SIGNIFICANT CHANGE UP (ref 3.8–10.5)
WBC # FLD AUTO: 6.39 K/UL — SIGNIFICANT CHANGE UP (ref 3.8–10.5)

## 2018-08-12 RX ORDER — WARFARIN SODIUM 2.5 MG/1
7.5 TABLET ORAL ONCE
Qty: 0 | Refills: 0 | Status: COMPLETED | OUTPATIENT
Start: 2018-08-12 | End: 2018-08-12

## 2018-08-12 RX ORDER — CEFEPIME 1 G/1
1000 INJECTION, POWDER, FOR SOLUTION INTRAMUSCULAR; INTRAVENOUS ONCE
Qty: 0 | Refills: 0 | Status: COMPLETED | OUTPATIENT
Start: 2018-08-12 | End: 2018-08-12

## 2018-08-12 RX ORDER — CEFEPIME 1 G/1
1000 INJECTION, POWDER, FOR SOLUTION INTRAMUSCULAR; INTRAVENOUS EVERY 12 HOURS
Qty: 0 | Refills: 0 | Status: DISCONTINUED | OUTPATIENT
Start: 2018-08-13 | End: 2018-08-15

## 2018-08-12 RX ORDER — CEFEPIME 1 G/1
INJECTION, POWDER, FOR SOLUTION INTRAMUSCULAR; INTRAVENOUS
Qty: 0 | Refills: 0 | Status: DISCONTINUED | OUTPATIENT
Start: 2018-08-12 | End: 2018-08-15

## 2018-08-12 RX ORDER — FLUCONAZOLE 150 MG/1
TABLET ORAL
Qty: 0 | Refills: 0 | Status: DISCONTINUED | OUTPATIENT
Start: 2018-08-13 | End: 2018-08-15

## 2018-08-12 RX ADMIN — HEPARIN SODIUM 11.5 UNIT(S)/HR: 5000 INJECTION INTRAVENOUS; SUBCUTANEOUS at 07:29

## 2018-08-12 RX ADMIN — Medication 25 MILLIGRAM(S): at 05:49

## 2018-08-12 RX ADMIN — Medication 81 MILLIGRAM(S): at 11:22

## 2018-08-12 RX ADMIN — ATORVASTATIN CALCIUM 80 MILLIGRAM(S): 80 TABLET, FILM COATED ORAL at 22:17

## 2018-08-12 RX ADMIN — HYDROMORPHONE HYDROCHLORIDE 0.5 MILLIGRAM(S): 2 INJECTION INTRAMUSCULAR; INTRAVENOUS; SUBCUTANEOUS at 05:49

## 2018-08-12 RX ADMIN — Medication 250 MILLIGRAM(S): at 12:27

## 2018-08-12 RX ADMIN — CEFEPIME 100 MILLIGRAM(S): 1 INJECTION, POWDER, FOR SOLUTION INTRAMUSCULAR; INTRAVENOUS at 23:17

## 2018-08-12 RX ADMIN — OXYCODONE HYDROCHLORIDE 10 MILLIGRAM(S): 5 TABLET ORAL at 01:46

## 2018-08-12 RX ADMIN — Medication 20 UNIT(S): at 17:34

## 2018-08-12 RX ADMIN — WARFARIN SODIUM 7.5 MILLIGRAM(S): 2.5 TABLET ORAL at 17:05

## 2018-08-12 RX ADMIN — Medication 2: at 17:34

## 2018-08-12 RX ADMIN — GABAPENTIN 300 MILLIGRAM(S): 400 CAPSULE ORAL at 11:22

## 2018-08-12 RX ADMIN — OXYCODONE HYDROCHLORIDE 10 MILLIGRAM(S): 5 TABLET ORAL at 02:49

## 2018-08-12 RX ADMIN — LOSARTAN POTASSIUM 100 MILLIGRAM(S): 100 TABLET, FILM COATED ORAL at 05:49

## 2018-08-12 RX ADMIN — Medication 150 MICROGRAM(S): at 05:49

## 2018-08-12 RX ADMIN — BUDESONIDE AND FORMOTEROL FUMARATE DIHYDRATE 2 PUFF(S): 160; 4.5 AEROSOL RESPIRATORY (INHALATION) at 22:11

## 2018-08-12 RX ADMIN — HYDROMORPHONE HYDROCHLORIDE 0.5 MILLIGRAM(S): 2 INJECTION INTRAMUSCULAR; INTRAVENOUS; SUBCUTANEOUS at 06:05

## 2018-08-12 RX ADMIN — HYDROMORPHONE HYDROCHLORIDE 0.5 MILLIGRAM(S): 2 INJECTION INTRAMUSCULAR; INTRAVENOUS; SUBCUTANEOUS at 12:18

## 2018-08-12 RX ADMIN — OXYCODONE HYDROCHLORIDE 10 MILLIGRAM(S): 5 TABLET ORAL at 08:39

## 2018-08-12 RX ADMIN — Medication 50 MILLIGRAM(S): at 05:52

## 2018-08-12 RX ADMIN — Medication 50 MILLIGRAM(S): at 17:05

## 2018-08-12 RX ADMIN — Medication 20 UNIT(S): at 07:31

## 2018-08-12 RX ADMIN — OXYCODONE HYDROCHLORIDE 10 MILLIGRAM(S): 5 TABLET ORAL at 16:01

## 2018-08-12 RX ADMIN — Medication 20 UNIT(S): at 12:27

## 2018-08-12 RX ADMIN — Medication 2: at 07:30

## 2018-08-12 RX ADMIN — Medication 100 MILLIGRAM(S): at 11:22

## 2018-08-12 RX ADMIN — OXYCODONE HYDROCHLORIDE 10 MILLIGRAM(S): 5 TABLET ORAL at 23:11

## 2018-08-12 RX ADMIN — OXYCODONE HYDROCHLORIDE 10 MILLIGRAM(S): 5 TABLET ORAL at 22:13

## 2018-08-12 RX ADMIN — Medication 325 MILLIGRAM(S): at 11:22

## 2018-08-12 RX ADMIN — OXYCODONE HYDROCHLORIDE 10 MILLIGRAM(S): 5 TABLET ORAL at 15:06

## 2018-08-12 RX ADMIN — BUDESONIDE AND FORMOTEROL FUMARATE DIHYDRATE 2 PUFF(S): 160; 4.5 AEROSOL RESPIRATORY (INHALATION) at 09:51

## 2018-08-12 RX ADMIN — Medication 25 MILLIGRAM(S): at 17:54

## 2018-08-12 RX ADMIN — SENNA PLUS 1 TABLET(S): 8.6 TABLET ORAL at 22:13

## 2018-08-12 RX ADMIN — INSULIN GLARGINE 60 UNIT(S): 100 INJECTION, SOLUTION SUBCUTANEOUS at 23:17

## 2018-08-12 RX ADMIN — Medication 1 APPLICATION(S): at 05:54

## 2018-08-12 RX ADMIN — INSULIN GLARGINE 20 UNIT(S): 100 INJECTION, SOLUTION SUBCUTANEOUS at 07:31

## 2018-08-12 RX ADMIN — HYDROMORPHONE HYDROCHLORIDE 0.5 MILLIGRAM(S): 2 INJECTION INTRAMUSCULAR; INTRAVENOUS; SUBCUTANEOUS at 11:21

## 2018-08-12 RX ADMIN — PREGABALIN 1000 MICROGRAM(S): 225 CAPSULE ORAL at 11:22

## 2018-08-12 RX ADMIN — OXYCODONE HYDROCHLORIDE 10 MILLIGRAM(S): 5 TABLET ORAL at 07:55

## 2018-08-12 NOTE — PHYSICAL THERAPY INITIAL EVALUATION ADULT - PATIENT PROFILE REVIEW, REHAB EVAL
ACTIVITY: OOB with Assistance; spoke with RN Dione Rueda prior to PT evaluation--> Pt OK for PT consult/yes

## 2018-08-12 NOTE — PROGRESS NOTE ADULT - ASSESSMENT
Patient  s/p L femoral-peroneal bypass with PTFE.  Plan:  -PT to evaluate today  -Cont LLE ACE bandage and elevation in bed  -c/w pain management  -Cont to transition to coumadin from heparin drip. Dose Coumadin today based on INR  -monitor INR/PTT  -endocrine consult: appreciate recs  -ID consulted: Gerri feliz 8/12 accordingly. Message left for consult with Dr. Yesika Rodríguez [phone]. Pt to be seen 8/12

## 2018-08-12 NOTE — PHYSICAL THERAPY INITIAL EVALUATION ADULT - ADDITIONAL COMMENTS
Pt reports that he lives in a private house with daughter with ~5STE; (+)bilateral handrails; and a flight of stairs to negotiate to bedroom; (+)1 handrail. Prior to hospital admission pt was ambulating independently using single axis cane (for past 3 months). Pt denies any recent falls and reports that he requires occasional assist with LE dressing from his daughter.     Pt left comfortable in bed, NAD, all lines intact, all precautions maintained, with call bell in reach, daughter @bedside, and RN aware of PT evaluation.

## 2018-08-12 NOTE — CONSULT NOTE ADULT - SUBJECTIVE AND OBJECTIVE BOX
Patient is a 67y old  Male who presents with a chief complaint of left foot swelling right great toe swelling (08 Aug 2018 03:34)      HPI:  Pt had bypass yesterday 8/7, POD 1 s/p L femoral-peroneal bypass with PTFE.  Reports incisional pain.   LLE with moderate swelling with dopplerable AT/PT and peroneal signals.  LLE dressings c/d/i.     Patient is a 67y old  Male who presents with a chief complaint of left foot swelling right great toe swelling (10 Aug 2018 13:42)      INTERVAL HPI/OVERNIGHT EVENTS:  T(C): 36.2 (08-12-18 @ 17:42), Max: 37.2 (08-12-18 @ 01:32)  HR: 89 (08-12-18 @ 17:42) (70 - 89)  BP: 140/52 (08-12-18 @ 17:42) (100/50 - 154/61)  RR: 18 (08-12-18 @ 17:42) (16 - 19)  SpO2: 99% (08-12-18 @ 17:42) (97% - 99%)  Wt(kg): --  I&O's Summary    11 Aug 2018 07:01  -  12 Aug 2018 07:00  --------------------------------------------------------  IN: 1334.5 mL / OUT: 2500 mL / NET: -1165.5 mL    12 Aug 2018 07:01  -  12 Aug 2018 21:03  --------------------------------------------------------  IN: 1158 mL / OUT: 1100 mL / NET: 58 mL        PAST MEDICAL & SURGICAL HISTORY:  COPD (chronic obstructive pulmonary disease)  Sleep apnea: non-compliant  Anemia  Atherosclerosis of arteries of extremities: left leg  Mild intermittent asthma without complication  Iron deficiency anemia, unspecified iron deficiency anemia type  Prostate cancer  Bronchospasm  Obesity (BMI 30-39.9)  PAD (peripheral artery disease): RLE bypass 11/30/15  CAD (coronary artery disease): 3 cardiac stents placed in 2006 (mid LAD, Prox LAD, Prox to #2), 3V CABG 3/28/16  Hypothyroid  Intermittent claudication  Diabetic neuropathy  DM (diabetes mellitus): type 2 - on insulin pump  CHF (congestive heart failure)  Hypercholesteremia  HTN (hypertension)  History of femoral angiogram: Left femoral endarterectomy Fem-Pop bypass 6/2017  S/P bypass graft of extremity: RLE- 11/30/15  S/P CABG x 3: 3/28/16  Stented coronary artery: x 3 cardiac stents placed in 2006  S/P prostatectomy: 1996      SOCIAL HISTORY  Alcohol:  Tobacco:  Illicit substance use:      FAMILY HISTORY:      LABS:                        8.7    6.39  )-----------( 213      ( 12 Aug 2018 06:10 )             27.8     08-12    133<L>  |  97<L>  |  37<H>  ----------------------------<  173<H>  4.8   |  21<L>  |  1.52<H>    Ca    8.6      12 Aug 2018 06:10  Phos  2.8     08-12  Mg     2.4     08-12      PT/INR - ( 12 Aug 2018 06:10 )   PT: 14.9 SEC;   INR: 1.29          PTT - ( 12 Aug 2018 06:10 )  PTT:77.1 SEC    CAPILLARY BLOOD GLUCOSE      POCT Blood Glucose.: 172 mg/dL (12 Aug 2018 17:16)  POCT Blood Glucose.: 115 mg/dL (12 Aug 2018 12:18)  POCT Blood Glucose.: 183 mg/dL (12 Aug 2018 07:29)  POCT Blood Glucose.: 230 mg/dL (11 Aug 2018 22:17)            MEDICATIONS  (STANDING):  acetaminophen  IVPB. 1000 milliGRAM(s) IV Intermittent once  aspirin enteric coated 81 milliGRAM(s) Oral daily  atorvastatin 80 milliGRAM(s) Oral at bedtime  aztreonam  IVPB      aztreonam  IVPB 1000 milliGRAM(s) IV Intermittent every 12 hours  buDESOnide 160 MICROgram(s)/formoterol 4.5 MICROgram(s) Inhaler 2 Puff(s) Inhalation two times a day  cyanocobalamin 1000 MICROGram(s) Oral daily  Dakins Solution - 1/4 Strength 1 Application(s) Topical daily  docusate sodium 100 milliGRAM(s) Oral daily  ferrous    sulfate 325 milliGRAM(s) Oral daily  gabapentin 300 milliGRAM(s) Oral daily  heparin  Infusion 1100 Unit(s)/Hr (11.5 mL/Hr) IV Continuous <Continuous>  insulin glargine Injectable (LANTUS) 60 Unit(s) SubCutaneous at bedtime  insulin glargine Injectable (LANTUS) 20 Unit(s) SubCutaneous every morning  insulin lispro (HumaLOG) corrective regimen sliding scale   SubCutaneous three times a day before meals  insulin lispro Injectable (HumaLOG) 20 Unit(s) SubCutaneous three times a day before meals  levothyroxine 150 MICROGram(s) Oral daily  losartan 100 milliGRAM(s) Oral daily  metolazone 5 milliGRAM(s) Oral daily  metoprolol tartrate 25 milliGRAM(s) Oral two times a day  senna 1 Tablet(s) Oral at bedtime  vancomycin  IVPB      vancomycin  IVPB 1000 milliGRAM(s) IV Intermittent every 24 hours    MEDICATIONS  (PRN):  diclofenac 75 milliGRAM(s) Oral three times a day PRN Joint pain  hydrALAZINE Injectable 10 milliGRAM(s) IV Push every 4 hours PRN SBP >160  HYDROmorphone  Injectable 0.5 milliGRAM(s) IV Push every 4 hours PRN Pain unrelieved by PO medications  oxyCODONE    IR 5 milliGRAM(s) Oral every 4 hours PRN Mild to Moderate Pain  oxyCODONE    IR 10 milliGRAM(s) Oral every 4 hours PRN Severe Pain (7 - 10)      REVIEW OF SYSTEMS:  CONSTITUTIONAL: No fever, weight loss, or fatigue  EYES: No eye pain, visual disturbances, or discharge  ENMT:  No difficulty hearing, tinnitus, vertigo; No sinus or throat pain  NECK: No pain or stiffness  RESPIRATORY: No cough, wheezing, chills or hemoptysis; No shortness of breath  CARDIOVASCULAR: No chest pain, palpitations, dizziness, or leg swelling  GASTROINTESTINAL: No abdominal or epigastric pain. No nausea, vomiting, or hematemesis; No diarrhea or constipation. No melena or hematochezia.  GENITOURINARY: No dysuria, frequency, hematuria, or incontinence  NEUROLOGICAL: No headaches, memory loss, loss of strength, numbness, or tremors  SKIN: No itching, burning, rashes, or lesions   LYMPH NODES: No enlarged glands  ENDOCRINE: No heat or cold intolerance; No hair loss  MUSCULOSKELETAL: No joint pain or swelling; No muscle, back, or extremity pain  PSYCHIATRIC: No depression, anxiety, mood swings, or difficulty sleeping  HEME/LYMPH: No easy bruising, or bleeding gums  ALLERY AND IMMUNOLOGIC: No hives or eczema    RADIOLOGY & ADDITIONAL TESTS:    Imaging Personally Reviewed:  [ ] YES  [ ] NO    Consultant(s) Notes Reviewed:  [ ] YES  [ ] NO    PHYSICAL EXAM:  GENERAL: NAD, well-groomed, well-developed  HEAD:  Atraumatic, Normocephalic  EYES: EOMI, PERRLA, conjunctiva and sclera clear  ENMT: No tonsillar erythema, exudates, or enlargement; Moist mucous membranes, Good dentition, No lesions  NECK: Supple, No JVD, Normal thyroid  NERVOUS SYSTEM:  Alert & Oriented X3, Good concentration; Motor Strength 5/5 B/L upper and lower extremities; DTRs 2+ intact and symmetric  CHEST/LUNG: Clear to percussion bilaterally; No rales, rhonchi, wheezing, or rubs  HEART: Regular rate and rhythm; No murmurs, rubs, or gallops  ABDOMEN: Soft, Nontender, Nondistended; Bowel sounds present  EXTREMITIES:  2+ Peripheral Pulses, No clubbing, cyanosis, or edema  LYMPH: No lymphadenopathy noted  SKIN: No rashes or lesions    Care Discussed with Consultants/Other Providers [ ] YES  [ ] NO Patient is a 67y old  Male with multiple co-morbidities including DM, PVD, recent Left femoral-peroneal bypass with PTFE  as of 8/7, who presents to the ER for evaluation  of worsening  left great toe pain and swelling. He has no fever or chills, seen by Podiatry and send swab culture which growing Enterobacter and Candida. He has started on Aztreonam, Flagyl and Vancomycin, and the ID consult requested to assist with further evaluation and antibiotic management,      REVIEW OF SYSTEMS: Total of twelve systems have been reviewed with patient and found to be negative unless mentioned in HPI        PAST MEDICAL & SURGICAL HISTORY:  COPD (chronic obstructive pulmonary disease)  Sleep apnea: non-compliant  Anemia, Atherosclerosis of arteries of extremities: left leg  Mild intermittent asthma without complication  Iron deficiency anemia, unspecified iron deficiency anemia type  Prostate cancer  Bronchospas, Obesity (BMI 30-39.9)  PAD (peripheral artery disease): RLE bypass 11/30/15  CAD (coronary artery disease): 3 cardiac stents placed in 2006 (mid LAD, Prox LAD, Prox to #2), 3V CABG 3/28/16  Hypothyroid  Intermittent claudication  DM (diabetes mellitus): type 2 - on insulin pump, =Diabetic neuropathy  CHF (congestive heart failure)  Hypercholesteremia  HTN (hypertension)  History of femoral angiogram: Left femoral endarterectomy Fem-Pop bypass 6/2017  S/P bypass graft of extremity: RLE- 11/30/15  S/P CABG x 3: 3/28/16  Stented coronary artery: x 3 cardiac stents placed in 2006  S/P prostatectomy: 1996      SOCIAL HISTORY  Alcohol: Does not drink  Tobacco: Does not smoke  Illicit substance use: None        FAMILY HISTORY: Non contributory to the present illness      ALLERGIES: NKDA, contrast      T(C): 36.2 (08-12-18 @ 17:42), Max: 37.2 (08-12-18 @ 01:32)  HR: 89 (08-12-18 @ 17:42) (70 - 89)  BP: 140/52 (08-12-18 @ 17:42) (100/50 - 154/61)  RR: 18 (08-12-18 @ 17:42) (16 - 19)  SpO2: 99% (08-12-18 @ 17:42) (97% - 99%)  Wt(kg): --  I&O's Summary        PHYSICAL EXAM:  GENERAL: Not in distress  CVS: s1 and s2 present  RESP: air entry B/L  GI: Abdomen nondistended and nontender  EXT: Left great toe ulcer swollen upto ankle, very tender and erythematous with erosion/avulsion of nail  CNS: Awake and Alert        LABS:                        8.7    6.39  )-----------( 213      ( 12 Aug 2018 06:10 )             27.8         08-12    133<L>  |  97<L>  |  37<H>  ----------------------------<  173<H>  4.8   |  21<L>  |  1.52<H>    Ca    8.6      12 Aug 2018 06:10  Phos  2.8     08-12  Mg     2.4     08-12      PT/INR - ( 12 Aug 2018 06:10 )   PT: 14.9 SEC;   INR: 1.29          PTT - ( 12 Aug 2018 06:10 )  PTT:77.1 SEC    CAPILLARY BLOOD GLUCOSE      POCT Blood Glucose.: 172 mg/dL (12 Aug 2018 17:16)  POCT Blood Glucose.: 115 mg/dL (12 Aug 2018 12:18)  POCT Blood Glucose.: 183 mg/dL (12 Aug 2018 07:29)  POCT Blood Glucose.: 230 mg/dL (11 Aug 2018 22:17)        MEDICATIONS  (STANDING):  acetaminophen  IVPB. 1000 milliGRAM(s) IV Intermittent once  aspirin enteric coated 81 milliGRAM(s) Oral daily  atorvastatin 80 milliGRAM(s) Oral at bedtime  aztreonam  IVPB      aztreonam  IVPB 1000 milliGRAM(s) IV Intermittent every 12 hours  buDESOnide 160 MICROgram(s)/formoterol 4.5 MICROgram(s) Inhaler 2 Puff(s) Inhalation two times a day  cyanocobalamin 1000 MICROGram(s) Oral daily  Dakins Solution - 1/4 Strength 1 Application(s) Topical daily  docusate sodium 100 milliGRAM(s) Oral daily  ferrous    sulfate 325 milliGRAM(s) Oral daily  gabapentin 300 milliGRAM(s) Oral daily  heparin  Infusion 1100 Unit(s)/Hr (11.5 mL/Hr) IV Continuous <Continuous>  insulin glargine Injectable (LANTUS) 60 Unit(s) SubCutaneous at bedtime  insulin glargine Injectable (LANTUS) 20 Unit(s) SubCutaneous every morning  insulin lispro (HumaLOG) corrective regimen sliding scale   SubCutaneous three times a day before meals  insulin lispro Injectable (HumaLOG) 20 Unit(s) SubCutaneous three times a day before meals  levothyroxine 150 MICROGram(s) Oral daily  losartan 100 milliGRAM(s) Oral daily  metolazone 5 milliGRAM(s) Oral daily  metoprolol tartrate 25 milliGRAM(s) Oral two times a day  senna 1 Tablet(s) Oral at bedtime  vancomycin  IVPB      vancomycin  IVPB 1000 milliGRAM(s) IV Intermittent every 24 hours    MEDICATIONS  (PRN):  diclofenac 75 milliGRAM(s) Oral three times a day PRN Joint pain  hydrALAZINE Injectable 10 milliGRAM(s) IV Push every 4 hours PRN SBP >160  HYDROmorphone  Injectable 0.5 milliGRAM(s) IV Push every 4 hours PRN Pain unrelieved by PO medications  oxyCODONE    IR 5 milliGRAM(s) Oral every 4 hours PRN Mild to Moderate Pain  oxyCODONE    IR 10 milliGRAM(s) Oral every 4 hours PRN Severe Pain (7 - 10)      RADIOLOGY & ADDITIONAL TESTS:    < from: Xray Chest 1 View- PORTABLE-Routine (08.09.18 @ 02:14) >  Sternotomy wires in place.Heart size is stable. The lungs are clear with   elevation of the right hemidiaphragm. No effusions or pneumothorax.  No   significant congestion to indicate CHF.    < end of copied text >      MICROBIOLOGY DATA:    Culture - Abscess with Gram Stain (08.08.18 @ 16:51)    -  Gentamicin: S <=1 PURNIMA    -  Imipenem: S <=1 PURNIMA    -  Levofloxacin: S <=1 PURNIMA    -  Meropenem: S <=1 PURNIMA    -  Piperacillin/Tazobactam: S <=8 PURNIMA    -  Tigecycline: S <=1 PURNIMA    -  Tobramycin: S <=2 PURNIMA    -  Trimethoprim/Sulfamethoxazole: S 1/19 PURNIMA    -  Amikacin: S <=8 PURNIMA    -  Ampicillin: R 16 PURNIMA    -  Ampicillin/Sulbactam: R 16/8 PURNIMA    -  Aztreonam: S <=4 PURNIMA    -  Cefazolin: R >16 PURNIMA    -  Cefepime: S <=2 PURNIMA    -  Cefoxitin: R >16 PURNIMA    -  Ceftazidime: S <=1 PURNIMA    -  Ceftriaxone: S <=1 PURNIMA    -  Ciprofloxacin: S <=0.5 PURNIMA    -  Ertapenem: S <=0.5 PURNIMA    Specimen Source: OTHER    Gram Stain Result:   NOS No Organisms Seen  WBC^White Blood Cells  QNTY CELLS IN GRAM STAIN: NO CELLS SEEN    Culture Results:   MANY                *******************************                * This is an appended result. *                *******************************  A prior result that was reported as final has been changed.    Organism Identification: Enterobacter cloacae  Candida tropicalis    Organism: Enterobacter cloacae    Organism: Candida tropicalis    Method Type: NEGATIVE PURNIMA 43

## 2018-08-12 NOTE — PHYSICAL THERAPY INITIAL EVALUATION ADULT - ACTIVE RANGE OF MOTION EXAMINATION, REHAB EVAL
brad. upper extremity Active ROM was WNL (within normal limits)/RLE Active ROM was WNL (within normal limits)/Left LE Active ROM was WFL (within functional limits)

## 2018-08-12 NOTE — CONSULT NOTE ADULT - ASSESSMENT
Patient is a 67y old  Male with multiple co-morbidities including DM, PVD, recent Left femoral-peroneal bypass with PTFE  as of 8/7, who presents to the ER for evaluation  of worsening  left great toe pain and swelling. He has no fever or chills, seen by Podiatry and send swab culture which growing Enterobacter and Candida. He has started on Aztreonam, Flagyl and Vancomycin, and the ID consult requested to assist with further evaluation and antibiotic management,      # Left great toe DFU with cellulitis ans suspected Abscess - Swab Cx grew Enterobacter and Candida  # s/p left  femoral-peroneal bypass with PTFE  on 8/7    would recommend:  1. Change aztreonam to Cefepime to cover Enterobacter  in wound, Add Fluconazole and c/w flagyl  2. Obtain MRI of Left foot to rule out deep infection  3. Pain management  4. Less likely need Vancomycin since culture grew NO MRSA  5. Keep LLE elevated  6. wound care as per Podiatry    d/w Vascular team, patient and Nursing staff    will follow the patient with you and make further recommendation based on the clinical course and Lab results  Thank you for the opportunity to participate in Mr. CASTAÑEDA's care

## 2018-08-12 NOTE — PROGRESS NOTE ADULT - SUBJECTIVE AND OBJECTIVE BOX
vascular surgery note    Subjective:   pt was seen and examined this morning. Pain well controlled, c/o pain with dressing changes.  No nausea or vomiting, CP/SOB    Objective   Vital Signs Last 24 Hrs  T(C): 37.2 (12 Aug 2018 05:22), Max: 37.2 (12 Aug 2018 01:32)  T(F): 98.9 (12 Aug 2018 05:22), Max: 98.9 (12 Aug 2018 01:32)  HR: 88 (12 Aug 2018 05:22) (81 - 88)  BP: 147/62 (12 Aug 2018 05:22) (105/78 - 154/61)  BP(mean): --  RR: 18 (12 Aug 2018 05:22) (18 - 18)  SpO2: 98% (12 Aug 2018 05:22) (98% - 100%)    I&O's Summary    11 Aug 2018 07:01  -  12 Aug 2018 07:00  --------------------------------------------------------  IN: 1334.5 mL / OUT: 2500 mL / NET: -1165.5 mL    12 Aug 2018 07:01  -  12 Aug 2018 10:53  --------------------------------------------------------  IN: 286 mL / OUT: 400 mL / NET: -114 mL                    MEDICATIONS  (STANDING):  acetaminophen   Tablet. 650 milliGRAM(s) Oral every 6 hours  amoxicillin  875 milliGRAM(s)/clavulanate 1 Tablet(s) Oral two times a day  aspirin enteric coated 81 milliGRAM(s) Oral daily  atorvastatin 80 milliGRAM(s) Oral at bedtime  buDESOnide 160 MICROgram(s)/formoterol 4.5 MICROgram(s) Inhaler 2 Puff(s) Inhalation two times a day  cyanocobalamin 1000 MICROGram(s) Oral daily  ferrous    sulfate 325 milliGRAM(s) Oral daily  gabapentin 300 milliGRAM(s) Oral daily  heparin  Infusion 1100 Unit(s)/Hr (11.5 mL/Hr) IV Continuous <Continuous>  insulin glargine Injectable (LANTUS) 60 Unit(s) SubCutaneous at bedtime  insulin glargine Injectable (LANTUS) 20 Unit(s) SubCutaneous every morning  insulin lispro (HumaLOG) corrective regimen sliding scale   SubCutaneous three times a day before meals  insulin lispro Injectable (HumaLOG) 20 Unit(s) SubCutaneous three times a day before meals  levothyroxine 150 MICROGram(s) Oral daily  losartan 100 milliGRAM(s) Oral daily  metolazone 5 milliGRAM(s) Oral daily  metoprolol tartrate 25 milliGRAM(s) Oral two times a day  warfarin 3 milliGRAM(s) Oral once    MEDICATIONS  (PRN):  diclofenac 75 milliGRAM(s) Oral three times a day PRN Joint pain  hydrALAZINE Injectable 10 milliGRAM(s) IV Push every 4 hours PRN SBP >160  HYDROmorphone  Injectable 0.5 milliGRAM(s) IV Push every 4 hours PRN Pain unrelieved by PO medications  oxyCODONE    IR 5 milliGRAM(s) Oral every 4 hours PRN Mild to Moderate Pain  oxyCODONE    IR 10 milliGRAM(s) Oral every 4 hours PRN Severe Pain (7 - 10)    Pe: Gen: NAD  CV: RRR  Pulm: non-labored  Ext: WWP. .  Left Lower Ext ; PT , AT , Peroneal Doppler Signals. LLE dressings c/d/i. LLE in ACE wrap  Groin , Thigh , Distal : Dressing : Clean / Dry and Intact.  Left Foot: Dressing : For a hallux infection            LABS:                                           8.7    6.39  )-----------( 213      ( 12 Aug 2018 06:10 )             27.8   08-12    133<L>  |  97<L>  |  37<H>  ----------------------------<  173<H>  4.8   |  21<L>  |  1.52<H>    Ca    8.6      12 Aug 2018 06:10  Phos  2.8     08-12  Mg     2.4     08-12    PT/INR - ( 12 Aug 2018 06:10 )   PT: 14.9 SEC;   INR: 1.29          PTT - ( 12 Aug 2018 06:10 )  PTT:77.1 SEC

## 2018-08-12 NOTE — PHYSICAL THERAPY INITIAL EVALUATION ADULT - PASSIVE RANGE OF MOTION EXAMINATION, REHAB EVAL
Right LE Passive ROM was WNL (within normal limits)/bilateral upper extremity Passive ROM was WNL (within normal limits)/Left LE Passive ROM was WFL (within functional limits)

## 2018-08-13 LAB
APTT BLD: 83.3 SEC — HIGH (ref 27.5–37.4)
BUN SERPL-MCNC: 40 MG/DL — HIGH (ref 7–23)
CALCIUM SERPL-MCNC: 8.6 MG/DL — SIGNIFICANT CHANGE UP (ref 8.4–10.5)
CHLORIDE SERPL-SCNC: 98 MMOL/L — SIGNIFICANT CHANGE UP (ref 98–107)
CO2 SERPL-SCNC: 19 MMOL/L — LOW (ref 22–31)
CREAT SERPL-MCNC: 1.43 MG/DL — HIGH (ref 0.5–1.3)
GLUCOSE BLDC GLUCOMTR-MCNC: 126 MG/DL — HIGH (ref 70–99)
GLUCOSE BLDC GLUCOMTR-MCNC: 171 MG/DL — HIGH (ref 70–99)
GLUCOSE BLDC GLUCOMTR-MCNC: 192 MG/DL — HIGH (ref 70–99)
GLUCOSE BLDC GLUCOMTR-MCNC: 194 MG/DL — HIGH (ref 70–99)
GLUCOSE BLDC GLUCOMTR-MCNC: 194 MG/DL — HIGH (ref 70–99)
GLUCOSE BLDC GLUCOMTR-MCNC: 216 MG/DL — HIGH (ref 70–99)
GLUCOSE SERPL-MCNC: 214 MG/DL — HIGH (ref 70–99)
HCT VFR BLD CALC: 28.6 % — LOW (ref 39–50)
HGB BLD-MCNC: 8.8 G/DL — LOW (ref 13–17)
INR BLD: 1.97 — HIGH (ref 0.88–1.17)
MAGNESIUM SERPL-MCNC: 2.5 MG/DL — SIGNIFICANT CHANGE UP (ref 1.6–2.6)
MCHC RBC-ENTMCNC: 26.9 PG — LOW (ref 27–34)
MCHC RBC-ENTMCNC: 30.8 % — LOW (ref 32–36)
MCV RBC AUTO: 87.5 FL — SIGNIFICANT CHANGE UP (ref 80–100)
NRBC # FLD: 0 — SIGNIFICANT CHANGE UP
PHOSPHATE SERPL-MCNC: 2.8 MG/DL — SIGNIFICANT CHANGE UP (ref 2.5–4.5)
PLATELET # BLD AUTO: 233 K/UL — SIGNIFICANT CHANGE UP (ref 150–400)
PMV BLD: 11 FL — SIGNIFICANT CHANGE UP (ref 7–13)
POTASSIUM SERPL-MCNC: 4.8 MMOL/L — SIGNIFICANT CHANGE UP (ref 3.5–5.3)
POTASSIUM SERPL-SCNC: 4.8 MMOL/L — SIGNIFICANT CHANGE UP (ref 3.5–5.3)
PROTHROM AB SERPL-ACNC: 22.2 SEC — HIGH (ref 9.8–13.1)
RBC # BLD: 3.27 M/UL — LOW (ref 4.2–5.8)
RBC # FLD: 15.9 % — HIGH (ref 10.3–14.5)
SODIUM SERPL-SCNC: 131 MMOL/L — LOW (ref 135–145)
WBC # BLD: 6.11 K/UL — SIGNIFICANT CHANGE UP (ref 3.8–10.5)
WBC # FLD AUTO: 6.11 K/UL — SIGNIFICANT CHANGE UP (ref 3.8–10.5)

## 2018-08-13 RX ORDER — WARFARIN SODIUM 2.5 MG/1
5 TABLET ORAL ONCE
Qty: 0 | Refills: 0 | Status: COMPLETED | OUTPATIENT
Start: 2018-08-13 | End: 2018-08-13

## 2018-08-13 RX ORDER — FLUCONAZOLE 150 MG/1
200 TABLET ORAL ONCE
Qty: 0 | Refills: 0 | Status: COMPLETED | OUTPATIENT
Start: 2018-08-13 | End: 2018-08-13

## 2018-08-13 RX ORDER — METRONIDAZOLE 500 MG
500 TABLET ORAL ONCE
Qty: 0 | Refills: 0 | Status: COMPLETED | OUTPATIENT
Start: 2018-08-13 | End: 2018-08-13

## 2018-08-13 RX ORDER — FLUCONAZOLE 150 MG/1
200 TABLET ORAL EVERY 24 HOURS
Qty: 0 | Refills: 0 | Status: DISCONTINUED | OUTPATIENT
Start: 2018-08-14 | End: 2018-08-15

## 2018-08-13 RX ORDER — METRONIDAZOLE 500 MG
TABLET ORAL
Qty: 0 | Refills: 0 | Status: DISCONTINUED | OUTPATIENT
Start: 2018-08-13 | End: 2018-08-15

## 2018-08-13 RX ORDER — METRONIDAZOLE 500 MG
500 TABLET ORAL EVERY 8 HOURS
Qty: 0 | Refills: 0 | Status: DISCONTINUED | OUTPATIENT
Start: 2018-08-13 | End: 2018-08-15

## 2018-08-13 RX ADMIN — Medication 100 MILLIGRAM(S): at 19:03

## 2018-08-13 RX ADMIN — LOSARTAN POTASSIUM 100 MILLIGRAM(S): 100 TABLET, FILM COATED ORAL at 05:00

## 2018-08-13 RX ADMIN — ATORVASTATIN CALCIUM 80 MILLIGRAM(S): 80 TABLET, FILM COATED ORAL at 23:02

## 2018-08-13 RX ADMIN — OXYCODONE HYDROCHLORIDE 10 MILLIGRAM(S): 5 TABLET ORAL at 05:00

## 2018-08-13 RX ADMIN — HEPARIN SODIUM 11.5 UNIT(S)/HR: 5000 INJECTION INTRAVENOUS; SUBCUTANEOUS at 07:08

## 2018-08-13 RX ADMIN — Medication 100 MILLIGRAM(S): at 23:44

## 2018-08-13 RX ADMIN — FLUCONAZOLE 100 MILLIGRAM(S): 150 TABLET ORAL at 01:33

## 2018-08-13 RX ADMIN — OXYCODONE HYDROCHLORIDE 10 MILLIGRAM(S): 5 TABLET ORAL at 06:00

## 2018-08-13 RX ADMIN — WARFARIN SODIUM 5 MILLIGRAM(S): 2.5 TABLET ORAL at 19:56

## 2018-08-13 RX ADMIN — BUDESONIDE AND FORMOTEROL FUMARATE DIHYDRATE 2 PUFF(S): 160; 4.5 AEROSOL RESPIRATORY (INHALATION) at 22:02

## 2018-08-13 RX ADMIN — Medication 20 UNIT(S): at 07:48

## 2018-08-13 RX ADMIN — Medication 20 UNIT(S): at 12:15

## 2018-08-13 RX ADMIN — Medication 1 APPLICATION(S): at 06:12

## 2018-08-13 RX ADMIN — Medication 81 MILLIGRAM(S): at 12:16

## 2018-08-13 RX ADMIN — INSULIN GLARGINE 60 UNIT(S): 100 INJECTION, SOLUTION SUBCUTANEOUS at 23:45

## 2018-08-13 RX ADMIN — Medication 100 MILLIGRAM(S): at 12:16

## 2018-08-13 RX ADMIN — OXYCODONE HYDROCHLORIDE 10 MILLIGRAM(S): 5 TABLET ORAL at 13:16

## 2018-08-13 RX ADMIN — OXYCODONE HYDROCHLORIDE 10 MILLIGRAM(S): 5 TABLET ORAL at 19:34

## 2018-08-13 RX ADMIN — PREGABALIN 1000 MICROGRAM(S): 225 CAPSULE ORAL at 12:16

## 2018-08-13 RX ADMIN — Medication 2: at 19:04

## 2018-08-13 RX ADMIN — INSULIN GLARGINE 20 UNIT(S): 100 INJECTION, SOLUTION SUBCUTANEOUS at 07:47

## 2018-08-13 RX ADMIN — OXYCODONE HYDROCHLORIDE 10 MILLIGRAM(S): 5 TABLET ORAL at 13:54

## 2018-08-13 RX ADMIN — SENNA PLUS 1 TABLET(S): 8.6 TABLET ORAL at 23:02

## 2018-08-13 RX ADMIN — Medication 25 MILLIGRAM(S): at 05:00

## 2018-08-13 RX ADMIN — HYDROMORPHONE HYDROCHLORIDE 0.5 MILLIGRAM(S): 2 INJECTION INTRAMUSCULAR; INTRAVENOUS; SUBCUTANEOUS at 07:08

## 2018-08-13 RX ADMIN — Medication 25 MILLIGRAM(S): at 19:56

## 2018-08-13 RX ADMIN — CEFEPIME 100 MILLIGRAM(S): 1 INJECTION, POWDER, FOR SOLUTION INTRAMUSCULAR; INTRAVENOUS at 23:00

## 2018-08-13 RX ADMIN — BUDESONIDE AND FORMOTEROL FUMARATE DIHYDRATE 2 PUFF(S): 160; 4.5 AEROSOL RESPIRATORY (INHALATION) at 13:17

## 2018-08-13 RX ADMIN — Medication 150 MICROGRAM(S): at 05:00

## 2018-08-13 RX ADMIN — HYDROMORPHONE HYDROCHLORIDE 0.5 MILLIGRAM(S): 2 INJECTION INTRAMUSCULAR; INTRAVENOUS; SUBCUTANEOUS at 00:46

## 2018-08-13 RX ADMIN — HYDROMORPHONE HYDROCHLORIDE 0.5 MILLIGRAM(S): 2 INJECTION INTRAMUSCULAR; INTRAVENOUS; SUBCUTANEOUS at 00:27

## 2018-08-13 RX ADMIN — CEFEPIME 100 MILLIGRAM(S): 1 INJECTION, POWDER, FOR SOLUTION INTRAMUSCULAR; INTRAVENOUS at 12:16

## 2018-08-13 RX ADMIN — Medication 325 MILLIGRAM(S): at 12:16

## 2018-08-13 RX ADMIN — Medication 2: at 07:47

## 2018-08-13 RX ADMIN — Medication 20 UNIT(S): at 19:05

## 2018-08-13 RX ADMIN — HYDROMORPHONE HYDROCHLORIDE 0.5 MILLIGRAM(S): 2 INJECTION INTRAMUSCULAR; INTRAVENOUS; SUBCUTANEOUS at 07:46

## 2018-08-13 RX ADMIN — GABAPENTIN 300 MILLIGRAM(S): 400 CAPSULE ORAL at 12:25

## 2018-08-13 RX ADMIN — Medication 250 MILLIGRAM(S): at 14:43

## 2018-08-13 RX ADMIN — OXYCODONE HYDROCHLORIDE 10 MILLIGRAM(S): 5 TABLET ORAL at 19:02

## 2018-08-13 NOTE — PROGRESS NOTE ADULT - SUBJECTIVE AND OBJECTIVE BOX
Chief complaint  Patient is a 67y old  Male who presents with a chief complaint of left foot swelling right great toe swelling (10 Aug 2018 13:42)   Review of systems  Patient in bed, looks comfortable, no fever,  no hypoglycemia.    Labs and Fingersticks  CAPILLARY BLOOD GLUCOSE      POCT Blood Glucose.: 126 mg/dL (13 Aug 2018 12:00)  POCT Blood Glucose.: 192 mg/dL (13 Aug 2018 07:38)  POCT Blood Glucose.: 200 mg/dL (12 Aug 2018 23:05)  POCT Blood Glucose.: 172 mg/dL (12 Aug 2018 17:16)          Calcium, Total Serum: 8.6 (08-13 @ 05:30)  Calcium, Total Serum: 8.6 (08-12 @ 06:10)          08-13    131<L>  |  98  |  40<H>  ----------------------------<  214<H>  4.8   |  19<L>  |  1.43<H>    Ca    8.6      13 Aug 2018 05:30  Phos  2.8     08-13  Mg     2.5     08-13                          8.8    6.11  )-----------( 233      ( 13 Aug 2018 05:30 )             28.6     Medications  MEDICATIONS  (STANDING):  acetaminophen  IVPB. 1000 milliGRAM(s) IV Intermittent once  aspirin enteric coated 81 milliGRAM(s) Oral daily  atorvastatin 80 milliGRAM(s) Oral at bedtime  buDESOnide 160 MICROgram(s)/formoterol 4.5 MICROgram(s) Inhaler 2 Puff(s) Inhalation two times a day  cefepime   IVPB      cefepime   IVPB 1000 milliGRAM(s) IV Intermittent every 12 hours  cyanocobalamin 1000 MICROGram(s) Oral daily  Dakins Solution - 1/4 Strength 1 Application(s) Topical daily  docusate sodium 100 milliGRAM(s) Oral daily  ferrous    sulfate 325 milliGRAM(s) Oral daily  fluconAZOLE IVPB      gabapentin 300 milliGRAM(s) Oral daily  heparin  Infusion 1100 Unit(s)/Hr (11.5 mL/Hr) IV Continuous <Continuous>  insulin glargine Injectable (LANTUS) 60 Unit(s) SubCutaneous at bedtime  insulin glargine Injectable (LANTUS) 20 Unit(s) SubCutaneous every morning  insulin lispro (HumaLOG) corrective regimen sliding scale   SubCutaneous three times a day before meals  insulin lispro Injectable (HumaLOG) 20 Unit(s) SubCutaneous three times a day before meals  levothyroxine 150 MICROGram(s) Oral daily  losartan 100 milliGRAM(s) Oral daily  metolazone 5 milliGRAM(s) Oral daily  metoprolol tartrate 25 milliGRAM(s) Oral two times a day  metroNIDAZOLE  IVPB      metroNIDAZOLE  IVPB 500 milliGRAM(s) IV Intermittent once  metroNIDAZOLE  IVPB 500 milliGRAM(s) IV Intermittent every 8 hours  senna 1 Tablet(s) Oral at bedtime  vancomycin  IVPB      vancomycin  IVPB 1000 milliGRAM(s) IV Intermittent every 24 hours  warfarin 5 milliGRAM(s) Oral once      Physical Exam  General: Patient comfortable in bed  Vital Signs Last 12 Hrs  T(F): 97.8 (08-13-18 @ 15:12), Max: 98.8 (08-13-18 @ 05:00)  HR: 67 (08-13-18 @ 15:12) (64 - 86)  BP: 128/45 (08-13-18 @ 15:12) (112/48 - 150/53)  BP(mean): --  RR: 17 (08-13-18 @ 15:12) (17 - 18)  SpO2: 98% (08-13-18 @ 15:12) (98% - 100%)  Neck: No palpable thyroid nodules.  CVS: S1S2, No murmurs  Respiratory: No wheezing, no crepitations  GI: Abdomen soft, bowel sounds positive  Musculoskeletal:  edema lower extremities.   Skin: No skin rashes, no ecchymosis    Diagnostics

## 2018-08-13 NOTE — PROGRESS NOTE ADULT - SUBJECTIVE AND OBJECTIVE BOX
Clinically stable  Plans of weekend noted  Await MRI  if Osteo will ask podiatry to amputate toe  Continue coumadin

## 2018-08-13 NOTE — PROGRESS NOTE ADULT - ASSESSMENT
Patient is a 67y old  Male with multiple co-morbidities including DM, PVD, recent Left femoral-peroneal bypass with PTFE  as of 8/7, who presents to the ER for evaluation  of worsening  left great toe pain and swelling. He has no fever or chills, seen by Podiatry and send swab culture which growing Enterobacter and Candida. He has started on Aztreonam, Flagyl and Vancomycin, and the ID consult requested to assist with further evaluation and antibiotic management,      # Left great toe DFU with cellulitis and suspected Abscess - Swab Cx grew Enterobacter and Candida  # s/p left  femoral-peroneal bypass with PTFE  on 8/7    would recommend:    - Cont vanco/cefepime/flagyl/fluconazole to cover cellulitis, possible abscess/OM.  Superficial wound cultures grew enterobacter/candida.     - Obtain MRI of Left foot to rule out deep infection. Last MRI performed on 7/23/18 no OM at that time    - f/u vanco trough      will follow,    Yesika Rodríguez  [phone]

## 2018-08-13 NOTE — PROGRESS NOTE ADULT - SUBJECTIVE AND OBJECTIVE BOX
Infectious Diseases progress note:    Subjective: Weekend coverage appreciated, interim notes reviewed.  Pt c/o pain in lower leg.  No fever/chills.  Awaiting MRI    ROS:  CONSTITUTIONAL:  No fever, chills, rigors  CARDIOVASCULAR:  No chest pain or palpitations  RESPIRATORY:   No SOB, cough, dyspnea on exertion.  No wheezing  GASTROINTESTINAL:  No abd pain, N/V, diarrhea/constipation  EXTREMITIES:  LLE pain  GENITOURINARY:  No burning on urination, increased frequency or urgency.  No flank pain  NEUROLOGIC:  No HA, visual disturbances  SKIN: No rashes    Allergies    contrast media (iodine-based) (Hypotension)    Intolerances        ANTIBIOTICS/RELEVANT:  antimicrobials  cefepime   IVPB      cefepime   IVPB 1000 milliGRAM(s) IV Intermittent every 12 hours  fluconAZOLE IVPB      metroNIDAZOLE  IVPB      metroNIDAZOLE  IVPB 500 milliGRAM(s) IV Intermittent once  metroNIDAZOLE  IVPB 500 milliGRAM(s) IV Intermittent every 8 hours  vancomycin  IVPB      vancomycin  IVPB 1000 milliGRAM(s) IV Intermittent every 24 hours    immunologic:    OTHER:  acetaminophen  IVPB. 1000 milliGRAM(s) IV Intermittent once  aspirin enteric coated 81 milliGRAM(s) Oral daily  atorvastatin 80 milliGRAM(s) Oral at bedtime  buDESOnide 160 MICROgram(s)/formoterol 4.5 MICROgram(s) Inhaler 2 Puff(s) Inhalation two times a day  cyanocobalamin 1000 MICROGram(s) Oral daily  Dakins Solution - 1/4 Strength 1 Application(s) Topical daily  diclofenac 75 milliGRAM(s) Oral three times a day PRN  docusate sodium 100 milliGRAM(s) Oral daily  ferrous    sulfate 325 milliGRAM(s) Oral daily  gabapentin 300 milliGRAM(s) Oral daily  heparin  Infusion 1100 Unit(s)/Hr IV Continuous <Continuous>  HYDROmorphone  Injectable 0.5 milliGRAM(s) IV Push every 4 hours PRN  insulin glargine Injectable (LANTUS) 60 Unit(s) SubCutaneous at bedtime  insulin glargine Injectable (LANTUS) 20 Unit(s) SubCutaneous every morning  insulin lispro (HumaLOG) corrective regimen sliding scale   SubCutaneous three times a day before meals  insulin lispro Injectable (HumaLOG) 20 Unit(s) SubCutaneous three times a day before meals  levothyroxine 150 MICROGram(s) Oral daily  losartan 100 milliGRAM(s) Oral daily  metolazone 5 milliGRAM(s) Oral daily  metoprolol tartrate 25 milliGRAM(s) Oral two times a day  oxyCODONE    IR 5 milliGRAM(s) Oral every 4 hours PRN  oxyCODONE    IR 10 milliGRAM(s) Oral every 4 hours PRN  senna 1 Tablet(s) Oral at bedtime  warfarin 5 milliGRAM(s) Oral once      Objective:  Vital Signs Last 24 Hrs  T(C): 36.3 (13 Aug 2018 17:33), Max: 37.1 (13 Aug 2018 05:00)  T(F): 97.4 (13 Aug 2018 17:33), Max: 98.8 (13 Aug 2018 05:00)  HR: 75 (13 Aug 2018 17:33) (64 - 89)  BP: 129/56 (13 Aug 2018 17:33) (111/48 - 150/53)  BP(mean): --  RR: 19 (13 Aug 2018 17:33) (17 - 19)  SpO2: 99% (13 Aug 2018 17:33) (98% - 100%)    PHYSICAL EXAM:  Constitutional:NAD  Eyes:DIMAS, EOMI  Ear/Nose/Throat: no thrush, mucositis.  Moist mucous membranes	  Neck:no JVD, no lymphadenopathy, supple  Respiratory: CTA brad  Cardiovascular: S1S2 RRR, no murmurs  Gastrointestinal:soft, nontender,  nondistended (+) BS  Extremities: LLE with swelling/erythema of lower leg, surgical site intact, L hallux with eschar/necrosis, mild purulence  Skin:  L hallux necrotic appearing ulcer with eschar        LABS:                        8.8    6.11  )-----------( 233      ( 13 Aug 2018 05:30 )             28.6     08-13    131<L>  |  98  |  40<H>  ----------------------------<  214<H>  4.8   |  19<L>  |  1.43<H>    Ca    8.6      13 Aug 2018 05:30  Phos  2.8     08-13  Mg     2.5     08-13      PT/INR - ( 13 Aug 2018 05:30 )   PT: 22.2 SEC;   INR: 1.97          PTT - ( 13 Aug 2018 05:30 )  PTT:83.3 SEC          MICROBIOLOGY:    Culture - Abscess with Gram Stain (08.08.18 @ 16:51)    -  Gentamicin: S <=1 PURNIMA    -  Imipenem: S <=1 PURNIMA    -  Levofloxacin: S <=1 PURNIMA    -  Meropenem: S <=1 PURNIMA    -  Piperacillin/Tazobactam: S <=8 PURNIMA    -  Tigecycline: S <=1 PURNIMA    -  Tobramycin: S <=2 PURNIMA    -  Trimethoprim/Sulfamethoxazole: S 1/19 PURNIMA    -  Amikacin: S <=8 PURNIMA    -  Ampicillin: R 16 PURNIMA    -  Ampicillin/Sulbactam: R 16/8 PURNIMA    -  Aztreonam: S <=4 PURNIMA    -  Cefazolin: R >16 PURNIMA    -  Cefepime: S <=2 PURNIMA    -  Cefoxitin: R >16 PURNIMA    -  Ceftazidime: S <=1 PURNIMA    -  Ceftriaxone: S <=1 PURNIMA    -  Ciprofloxacin: S <=0.5 PURNIMA    -  Ertapenem: S <=0.5 PURNIMA    Specimen Source: OTHER    Gram Stain Result:   NOS No Organisms Seen  WBC^White Blood Cells  QNTY CELLS IN GRAM STAIN: NO CELLS SEEN    Culture Results:   MANY                *******************************                * This is an appended result. *                *******************************  A prior result that was reported as final has been changed.    Organism Identification: Enterobacter cloacae  Candida tropicalis    Organism: Enterobacter cloacae    Organism: Candida tropicalis    Method Type: NEGATIVE PURNIMA 43          RADIOLOGY & ADDITIONAL STUDIES:    < from: Xray Chest 1 View- PORTABLE-Routine (08.09.18 @ 02:14) >  IMPRESSION:  Clear lungs with cardiomegaly    < end of copied text >      < from: MR Foot No Cont, Left (07.23.18 @ 08:40) >  IMPRESSION:    1.  Soft tissue swelling and first digit skin bleb, nonspecific but can   be seen with cellulitis in the appropriate clinical setting.  2.  No drainable fluid collection.  3.  No definite bone marrow signal abnormality to suggest osteomyelitis     < end of copied text >

## 2018-08-13 NOTE — PROGRESS NOTE ADULT - SUBJECTIVE AND OBJECTIVE BOX
vascular surgery note    Subjective:   pt was seen and examined this morning. Pain well controlled, c/o pain with dressing changes.  No nausea or vomiting, CP/SOB. worked with PT yesterday.     Objective   Vital Signs Last 24 Hrs  T(C): 36.9 (12 Aug 2018 21:32), Max: 37.2 (12 Aug 2018 01:32)  T(F): 98.4 (12 Aug 2018 21:32), Max: 98.9 (12 Aug 2018 01:32)  HR: 78 (12 Aug 2018 21:32) (70 - 89)  BP: 141/82 (12 Aug 2018 21:32) (100/50 - 154/61)  BP(mean): --  RR: 18 (12 Aug 2018 21:32) (16 - 19)  SpO2: 100% (12 Aug 2018 21:32) (97% - 100%)    I&O's Summary    11 Aug 2018 07:01  -  12 Aug 2018 07:00  --------------------------------------------------------  IN: 1334.5 mL / OUT: 2500 mL / NET: -1165.5 mL    12 Aug 2018 07:01  -  13 Aug 2018 00:27  --------------------------------------------------------  IN: 1158 mL / OUT: 2650 mL / NET: -1492 mL                        MEDICATIONS  (STANDING):  acetaminophen   Tablet. 650 milliGRAM(s) Oral every 6 hours  amoxicillin  875 milliGRAM(s)/clavulanate 1 Tablet(s) Oral two times a day  aspirin enteric coated 81 milliGRAM(s) Oral daily  atorvastatin 80 milliGRAM(s) Oral at bedtime  buDESOnide 160 MICROgram(s)/formoterol 4.5 MICROgram(s) Inhaler 2 Puff(s) Inhalation two times a day  cyanocobalamin 1000 MICROGram(s) Oral daily  ferrous    sulfate 325 milliGRAM(s) Oral daily  gabapentin 300 milliGRAM(s) Oral daily  heparin  Infusion 1100 Unit(s)/Hr (11.5 mL/Hr) IV Continuous <Continuous>  insulin glargine Injectable (LANTUS) 60 Unit(s) SubCutaneous at bedtime  insulin glargine Injectable (LANTUS) 20 Unit(s) SubCutaneous every morning  insulin lispro (HumaLOG) corrective regimen sliding scale   SubCutaneous three times a day before meals  insulin lispro Injectable (HumaLOG) 20 Unit(s) SubCutaneous three times a day before meals  levothyroxine 150 MICROGram(s) Oral daily  losartan 100 milliGRAM(s) Oral daily  metolazone 5 milliGRAM(s) Oral daily  metoprolol tartrate 25 milliGRAM(s) Oral two times a day  warfarin 3 milliGRAM(s) Oral once    MEDICATIONS  (PRN):  diclofenac 75 milliGRAM(s) Oral three times a day PRN Joint pain  hydrALAZINE Injectable 10 milliGRAM(s) IV Push every 4 hours PRN SBP >160  HYDROmorphone  Injectable 0.5 milliGRAM(s) IV Push every 4 hours PRN Pain unrelieved by PO medications  oxyCODONE    IR 5 milliGRAM(s) Oral every 4 hours PRN Mild to Moderate Pain  oxyCODONE    IR 10 milliGRAM(s) Oral every 4 hours PRN Severe Pain (7 - 10)    Pe: Gen: NAD  CV: RRR  Pulm: non-labored  Ext: WWP. .  Left Lower Ext ; PT , AT , Peroneal Doppler Signals. LLE dressings c/d/i. LLE in ACE wrap  Groin , Thigh , Distal : Dressing : Clean / Dry and Intact.  Left Foot: Dressing : For a hallux infection            LABS:                                                      8.7    6.39  )-----------( 213      ( 12 Aug 2018 06:10 )             27.8   08-12    133<L>  |  97<L>  |  37<H>  ----------------------------<  173<H>  4.8   |  21<L>  |  1.52<H>    Ca    8.6      12 Aug 2018 06:10  Phos  2.8     08-12  Mg     2.4     08-12

## 2018-08-13 NOTE — PROGRESS NOTE ADULT - ASSESSMENT
Patient  s/p L femoral-peroneal bypass with PTFE.  Plan:  -Cont ambulating with PT   -Cont LLE ACE bandage and elevation in bed  -c/w pain management  -Cont to transition to coumadin from heparin drip. Dose Coumadin today based on INR  -monitor INR/PTT  -endocrine consult: appreciate recs  -ID consulted: Gerri feliz 8/12 accordingly. Message left for consult with Dr. Elena for 8/12. Will f/u with iD today

## 2018-08-14 LAB
APTT BLD: 100.8 SEC — HIGH (ref 27.5–37.4)
BUN SERPL-MCNC: 39 MG/DL — HIGH (ref 7–23)
CALCIUM SERPL-MCNC: 8.9 MG/DL — SIGNIFICANT CHANGE UP (ref 8.4–10.5)
CHLORIDE SERPL-SCNC: 103 MMOL/L — SIGNIFICANT CHANGE UP (ref 98–107)
CO2 SERPL-SCNC: 20 MMOL/L — LOW (ref 22–31)
CREAT SERPL-MCNC: 1.34 MG/DL — HIGH (ref 0.5–1.3)
GLUCOSE BLDC GLUCOMTR-MCNC: 123 MG/DL — HIGH (ref 70–99)
GLUCOSE BLDC GLUCOMTR-MCNC: 134 MG/DL — HIGH (ref 70–99)
GLUCOSE BLDC GLUCOMTR-MCNC: 193 MG/DL — HIGH (ref 70–99)
GLUCOSE BLDC GLUCOMTR-MCNC: 221 MG/DL — HIGH (ref 70–99)
GLUCOSE BLDC GLUCOMTR-MCNC: 58 MG/DL — LOW (ref 70–99)
GLUCOSE BLDC GLUCOMTR-MCNC: 60 MG/DL — LOW (ref 70–99)
GLUCOSE BLDC GLUCOMTR-MCNC: 61 MG/DL — LOW (ref 70–99)
GLUCOSE BLDC GLUCOMTR-MCNC: 63 MG/DL — LOW (ref 70–99)
GLUCOSE BLDC GLUCOMTR-MCNC: 83 MG/DL — SIGNIFICANT CHANGE UP (ref 70–99)
GLUCOSE SERPL-MCNC: 89 MG/DL — SIGNIFICANT CHANGE UP (ref 70–99)
HCT VFR BLD CALC: 29.7 % — LOW (ref 39–50)
HGB BLD-MCNC: 9.3 G/DL — LOW (ref 13–17)
INR BLD: 2.25 — HIGH (ref 0.88–1.17)
MAGNESIUM SERPL-MCNC: 2.6 MG/DL — SIGNIFICANT CHANGE UP (ref 1.6–2.6)
MCHC RBC-ENTMCNC: 28.2 PG — SIGNIFICANT CHANGE UP (ref 27–34)
MCHC RBC-ENTMCNC: 31.3 % — LOW (ref 32–36)
MCV RBC AUTO: 90 FL — SIGNIFICANT CHANGE UP (ref 80–100)
NRBC # FLD: 0 — SIGNIFICANT CHANGE UP
PHOSPHATE SERPL-MCNC: 3.2 MG/DL — SIGNIFICANT CHANGE UP (ref 2.5–4.5)
PLATELET # BLD AUTO: 263 K/UL — SIGNIFICANT CHANGE UP (ref 150–400)
PMV BLD: 11.2 FL — SIGNIFICANT CHANGE UP (ref 7–13)
POTASSIUM SERPL-MCNC: 4.8 MMOL/L — SIGNIFICANT CHANGE UP (ref 3.5–5.3)
POTASSIUM SERPL-SCNC: 4.8 MMOL/L — SIGNIFICANT CHANGE UP (ref 3.5–5.3)
PROTHROM AB SERPL-ACNC: 26.3 SEC — HIGH (ref 9.8–13.1)
RBC # BLD: 3.3 M/UL — LOW (ref 4.2–5.8)
RBC # FLD: 15.9 % — HIGH (ref 10.3–14.5)
SODIUM SERPL-SCNC: 134 MMOL/L — LOW (ref 135–145)
VANCOMYCIN TROUGH SERPL-MCNC: 10.2 UG/ML — SIGNIFICANT CHANGE UP (ref 10–20)
WBC # BLD: 6.17 K/UL — SIGNIFICANT CHANGE UP (ref 3.8–10.5)
WBC # FLD AUTO: 6.17 K/UL — SIGNIFICANT CHANGE UP (ref 3.8–10.5)

## 2018-08-14 PROCEDURE — 73720 MRI LWR EXTREMITY W/O&W/DYE: CPT | Mod: 26,LT

## 2018-08-14 RX ORDER — DEXTROSE 50 % IN WATER 50 %
15 SYRINGE (ML) INTRAVENOUS ONCE
Qty: 0 | Refills: 0 | Status: DISCONTINUED | OUTPATIENT
Start: 2018-08-14 | End: 2018-08-16

## 2018-08-14 RX ORDER — INSULIN GLARGINE 100 [IU]/ML
55 INJECTION, SOLUTION SUBCUTANEOUS AT BEDTIME
Qty: 0 | Refills: 0 | Status: DISCONTINUED | OUTPATIENT
Start: 2018-08-14 | End: 2018-08-16

## 2018-08-14 RX ORDER — DEXTROSE 50 % IN WATER 50 %
25 SYRINGE (ML) INTRAVENOUS ONCE
Qty: 0 | Refills: 0 | Status: DISCONTINUED | OUTPATIENT
Start: 2018-08-14 | End: 2018-08-16

## 2018-08-14 RX ORDER — DEXTROSE 50 % IN WATER 50 %
15 SYRINGE (ML) INTRAVENOUS ONCE
Qty: 0 | Refills: 0 | Status: COMPLETED | OUTPATIENT
Start: 2018-08-14 | End: 2018-08-14

## 2018-08-14 RX ORDER — WARFARIN SODIUM 2.5 MG/1
5 TABLET ORAL ONCE
Qty: 0 | Refills: 0 | Status: COMPLETED | OUTPATIENT
Start: 2018-08-14 | End: 2018-08-14

## 2018-08-14 RX ORDER — SODIUM CHLORIDE 9 MG/ML
1000 INJECTION, SOLUTION INTRAVENOUS
Qty: 0 | Refills: 0 | Status: DISCONTINUED | OUTPATIENT
Start: 2018-08-14 | End: 2018-08-16

## 2018-08-14 RX ORDER — GLUCAGON INJECTION, SOLUTION 0.5 MG/.1ML
1 INJECTION, SOLUTION SUBCUTANEOUS ONCE
Qty: 0 | Refills: 0 | Status: DISCONTINUED | OUTPATIENT
Start: 2018-08-14 | End: 2018-08-16

## 2018-08-14 RX ORDER — DEXTROSE 50 % IN WATER 50 %
12.5 SYRINGE (ML) INTRAVENOUS ONCE
Qty: 0 | Refills: 0 | Status: DISCONTINUED | OUTPATIENT
Start: 2018-08-14 | End: 2018-08-16

## 2018-08-14 RX ORDER — INSULIN LISPRO 100/ML
16 VIAL (ML) SUBCUTANEOUS
Qty: 0 | Refills: 0 | Status: DISCONTINUED | OUTPATIENT
Start: 2018-08-14 | End: 2018-08-16

## 2018-08-14 RX ADMIN — INSULIN GLARGINE 55 UNIT(S): 100 INJECTION, SOLUTION SUBCUTANEOUS at 22:21

## 2018-08-14 RX ADMIN — Medication 2: at 08:25

## 2018-08-14 RX ADMIN — BUDESONIDE AND FORMOTEROL FUMARATE DIHYDRATE 2 PUFF(S): 160; 4.5 AEROSOL RESPIRATORY (INHALATION) at 10:53

## 2018-08-14 RX ADMIN — OXYCODONE HYDROCHLORIDE 10 MILLIGRAM(S): 5 TABLET ORAL at 01:00

## 2018-08-14 RX ADMIN — Medication 100 MILLIGRAM(S): at 22:25

## 2018-08-14 RX ADMIN — Medication 16 UNIT(S): at 18:36

## 2018-08-14 RX ADMIN — OXYCODONE HYDROCHLORIDE 10 MILLIGRAM(S): 5 TABLET ORAL at 00:04

## 2018-08-14 RX ADMIN — Medication 100 MILLIGRAM(S): at 07:00

## 2018-08-14 RX ADMIN — Medication 100 MILLIGRAM(S): at 14:19

## 2018-08-14 RX ADMIN — BUDESONIDE AND FORMOTEROL FUMARATE DIHYDRATE 2 PUFF(S): 160; 4.5 AEROSOL RESPIRATORY (INHALATION) at 22:19

## 2018-08-14 RX ADMIN — CEFEPIME 100 MILLIGRAM(S): 1 INJECTION, POWDER, FOR SOLUTION INTRAMUSCULAR; INTRAVENOUS at 22:32

## 2018-08-14 RX ADMIN — ATORVASTATIN CALCIUM 80 MILLIGRAM(S): 80 TABLET, FILM COATED ORAL at 22:21

## 2018-08-14 RX ADMIN — Medication 25 MILLIGRAM(S): at 06:52

## 2018-08-14 RX ADMIN — Medication 150 MICROGRAM(S): at 06:52

## 2018-08-14 RX ADMIN — Medication 1 APPLICATION(S): at 06:00

## 2018-08-14 RX ADMIN — GABAPENTIN 300 MILLIGRAM(S): 400 CAPSULE ORAL at 11:06

## 2018-08-14 RX ADMIN — CEFEPIME 100 MILLIGRAM(S): 1 INJECTION, POWDER, FOR SOLUTION INTRAMUSCULAR; INTRAVENOUS at 11:05

## 2018-08-14 RX ADMIN — Medication 250 MILLIGRAM(S): at 18:32

## 2018-08-14 RX ADMIN — Medication 25 MILLIGRAM(S): at 18:34

## 2018-08-14 RX ADMIN — OXYCODONE HYDROCHLORIDE 10 MILLIGRAM(S): 5 TABLET ORAL at 23:06

## 2018-08-14 RX ADMIN — OXYCODONE HYDROCHLORIDE 10 MILLIGRAM(S): 5 TABLET ORAL at 22:31

## 2018-08-14 RX ADMIN — Medication 81 MILLIGRAM(S): at 11:06

## 2018-08-14 RX ADMIN — LOSARTAN POTASSIUM 100 MILLIGRAM(S): 100 TABLET, FILM COATED ORAL at 06:52

## 2018-08-14 RX ADMIN — Medication 325 MILLIGRAM(S): at 11:06

## 2018-08-14 RX ADMIN — PREGABALIN 1000 MICROGRAM(S): 225 CAPSULE ORAL at 11:06

## 2018-08-14 RX ADMIN — SENNA PLUS 1 TABLET(S): 8.6 TABLET ORAL at 22:24

## 2018-08-14 RX ADMIN — Medication 100 MILLIGRAM(S): at 11:06

## 2018-08-14 RX ADMIN — WARFARIN SODIUM 5 MILLIGRAM(S): 2.5 TABLET ORAL at 18:35

## 2018-08-14 RX ADMIN — INSULIN GLARGINE 20 UNIT(S): 100 INJECTION, SOLUTION SUBCUTANEOUS at 08:25

## 2018-08-14 RX ADMIN — FLUCONAZOLE 100 MILLIGRAM(S): 150 TABLET ORAL at 01:20

## 2018-08-14 RX ADMIN — OXYCODONE HYDROCHLORIDE 10 MILLIGRAM(S): 5 TABLET ORAL at 11:39

## 2018-08-14 RX ADMIN — OXYCODONE HYDROCHLORIDE 10 MILLIGRAM(S): 5 TABLET ORAL at 12:30

## 2018-08-14 RX ADMIN — Medication 20 UNIT(S): at 08:26

## 2018-08-14 RX ADMIN — OXYCODONE HYDROCHLORIDE 10 MILLIGRAM(S): 5 TABLET ORAL at 07:10

## 2018-08-14 NOTE — PROGRESS NOTE ADULT - SUBJECTIVE AND OBJECTIVE BOX
Infectious Diseases progress note:    Subjective: Pt awaiting MRI today.  No acute o/n events.  Afebrile.  Still with pain in L hallux    ROS:  CONSTITUTIONAL:  No fever, chills, rigors  CARDIOVASCULAR:  No chest pain or palpitations  RESPIRATORY:   No SOB, cough, dyspnea on exertion.  No wheezing  GASTROINTESTINAL:  No abd pain, N/V, diarrhea/constipation  EXTREMITIES:  No swelling or joint pain  GENITOURINARY:  No burning on urination, increased frequency or urgency.  No flank pain  NEUROLOGIC:  No HA, visual disturbances  SKIN: No rashes    Allergies    contrast media (iodine-based) (Hypotension)    Intolerances        ANTIBIOTICS/RELEVANT:  antimicrobials  cefepime   IVPB      cefepime   IVPB 1000 milliGRAM(s) IV Intermittent every 12 hours  fluconAZOLE IVPB      fluconAZOLE IVPB 200 milliGRAM(s) IV Intermittent every 24 hours  metroNIDAZOLE  IVPB      metroNIDAZOLE  IVPB 500 milliGRAM(s) IV Intermittent every 8 hours  vancomycin  IVPB      vancomycin  IVPB 1000 milliGRAM(s) IV Intermittent every 24 hours    immunologic:    OTHER:  acetaminophen  IVPB. 1000 milliGRAM(s) IV Intermittent once  aspirin enteric coated 81 milliGRAM(s) Oral daily  atorvastatin 80 milliGRAM(s) Oral at bedtime  buDESOnide 160 MICROgram(s)/formoterol 4.5 MICROgram(s) Inhaler 2 Puff(s) Inhalation two times a day  cyanocobalamin 1000 MICROGram(s) Oral daily  Dakins Solution - 1/4 Strength 1 Application(s) Topical daily  diclofenac 75 milliGRAM(s) Oral three times a day PRN  docusate sodium 100 milliGRAM(s) Oral daily  ferrous    sulfate 325 milliGRAM(s) Oral daily  gabapentin 300 milliGRAM(s) Oral daily  HYDROmorphone  Injectable 0.5 milliGRAM(s) IV Push every 4 hours PRN  insulin glargine Injectable (LANTUS) 60 Unit(s) SubCutaneous at bedtime  insulin glargine Injectable (LANTUS) 20 Unit(s) SubCutaneous every morning  insulin lispro (HumaLOG) corrective regimen sliding scale   SubCutaneous three times a day before meals  insulin lispro Injectable (HumaLOG) 20 Unit(s) SubCutaneous three times a day before meals  levothyroxine 150 MICROGram(s) Oral daily  losartan 100 milliGRAM(s) Oral daily  metolazone 5 milliGRAM(s) Oral daily  metoprolol tartrate 25 milliGRAM(s) Oral two times a day  oxyCODONE    IR 5 milliGRAM(s) Oral every 4 hours PRN  oxyCODONE    IR 10 milliGRAM(s) Oral every 4 hours PRN  senna 1 Tablet(s) Oral at bedtime  warfarin 5 milliGRAM(s) Oral once      Objective:  Vital Signs Last 24 Hrs  T(C): 36.4 (14 Aug 2018 14:13), Max: 36.8 (14 Aug 2018 06:00)  T(F): 97.6 (14 Aug 2018 14:13), Max: 98.3 (14 Aug 2018 06:00)  HR: 67 (14 Aug 2018 14:13) (55 - 88)  BP: 107/52 (14 Aug 2018 14:13) (100/57 - 130/71)  BP(mean): --  RR: 17 (14 Aug 2018 14:13) (17 - 19)  SpO2: 100% (14 Aug 2018 14:13) (96% - 100%)    PHYSICAL EXAM:  Constitutional:NAD  Eyes:DIMAS, EOMI  Ear/Nose/Throat: no thrush, mucositis.  Moist mucous membranes	  Neck:no JVD, no lymphadenopathy, supple  Respiratory: CTA brad  Cardiovascular: S1S2 RRR, no murmurs  Gastrointestinal:soft, nontender,  nondistended (+) BS  Extremities:no e/e/c  Skin:  LLE mild swelling, surgical sites c/d/i no drainage, L hallux with gangrenous changes        LABS:                        9.3    6.17  )-----------( 263      ( 14 Aug 2018 06:00 )             29.7     08-14    134<L>  |  103  |  39<H>  ----------------------------<  89  4.8   |  20<L>  |  1.34<H>    Ca    8.9      14 Aug 2018 06:00  Phos  3.2     08-14  Mg     2.6     08-14      PT/INR - ( 14 Aug 2018 06:00 )   PT: 26.3 SEC;   INR: 2.25          PTT - ( 14 Aug 2018 06:00 )  PTT:100.8 SEC                        MICROBIOLOGY:      Culture - Abscess with Gram Stain (08.08.18 @ 16:51)    -  Gentamicin: S <=1 PURNIMA    -  Imipenem: S <=1 PURNIMA    -  Levofloxacin: S <=1 PURNIMA    -  Meropenem: S <=1 PURNIMA    -  Piperacillin/Tazobactam: S <=8 PURNIMA    -  Tigecycline: S <=1 PURNIMA    -  Tobramycin: S <=2 PURNIMA    -  Trimethoprim/Sulfamethoxazole: S 1/19 PURNIMA    -  Amikacin: S <=8 PURNIMA    -  Ampicillin: R 16 PURNIMA    -  Ampicillin/Sulbactam: R 16/8 PURNIMA    -  Aztreonam: S <=4 PURNIMA    -  Cefazolin: R >16 PURNIMA    -  Cefepime: S <=2 PURNIMA    -  Cefoxitin: R >16 PURNIMA    -  Ceftazidime: S <=1 PURNIMA    -  Ceftriaxone: S <=1 PURNIMA    -  Ciprofloxacin: S <=0.5 PURNIMA    -  Ertapenem: S <=0.5 PURNIMA    Specimen Source: OTHER    Gram Stain Result:   NOS No Organisms Seen  WBC^White Blood Cells  QNTY CELLS IN GRAM STAIN: NO CELLS SEEN    Culture Results:   MANY                *******************************                * This is an appended result. *                *******************************  A prior result that was reported as final has been changed.    Organism Identification: Enterobacter cloacae  Candida tropicalis    Organism: Candida tropicalis    Organism: Enterobacter cloacae    Method Type: NEGATIVE PURNIMA 43        RADIOLOGY & ADDITIONAL STUDIES:    < from: Xray Chest 1 View- PORTABLE-Routine (08.09.18 @ 02:14) >    IMPRESSION:  Clear lungs with cardiomegaly    < end of copied text >

## 2018-08-14 NOTE — PROVIDER CONTACT NOTE (OTHER) - RECOMMENDATIONS
Gave patient multiple cups of juice and rechecked FS within 15 mins. Recheck FS was 60, then 61 and then 63. Was going to give patient oral dextrose gel, but patient wanted FS check again. FS was 83.

## 2018-08-14 NOTE — PROGRESS NOTE ADULT - SUBJECTIVE AND OBJECTIVE BOX
pt seen at bedside in NAD/ s/p LLE bypass  dressing to left foot c/d/i  left hallux ulceration noted less wet than yesterday, some necrotic tissue no pus no probing deep minimal erythema and edema  applied DSD  pt has burning when using betadine would avoid it  continue abx  follow up MRI  explained to pt that if toe doesn't improve he may require amputation of the hallux.   cleansed wound with Dakins and applied DSD

## 2018-08-14 NOTE — PROGRESS NOTE ADULT - ASSESSMENT
Patient  s/p L femoral-peroneal bypass with PTFE.  Plan:  -Cont ambulating with PT   -Cont LLE ACE bandage and elevation in bed  -c/w pain management  -Cont to transition to coumadin from heparin drip. Dose Coumadin today based on INR  -monitor INR/PTT  -follow ID [email or web address] recs.

## 2018-08-14 NOTE — PROGRESS NOTE ADULT - SUBJECTIVE AND OBJECTIVE BOX
Chief complaint  Patient is a 67y old  Male who presents with a chief complaint of left foot swelling right great toe swelling (10 Aug 2018 13:42)   Review of systems  Patient in bed, looks comfortable, no fever, had hypoglycemia.    Labs and Fingersticks  CAPILLARY BLOOD GLUCOSE      POCT Blood Glucose.: 123 mg/dL (14 Aug 2018 15:19)  POCT Blood Glucose.: 83 mg/dL (14 Aug 2018 13:09)  POCT Blood Glucose.: 63 mg/dL (14 Aug 2018 12:45)  POCT Blood Glucose.: 61 mg/dL (14 Aug 2018 12:43)  POCT Blood Glucose.: 60 mg/dL (14 Aug 2018 12:25)  POCT Blood Glucose.: 58 mg/dL (14 Aug 2018 12:09)  POCT Blood Glucose.: 193 mg/dL (14 Aug 2018 07:42)  POCT Blood Glucose.: 194 mg/dL (13 Aug 2018 23:14)  POCT Blood Glucose.: 216 mg/dL (13 Aug 2018 21:32)  POCT Blood Glucose.: 194 mg/dL (13 Aug 2018 19:00)  POCT Blood Glucose.: 171 mg/dL (13 Aug 2018 17:10)          Calcium, Total Serum: 8.9 (08-14 @ 06:00)  Calcium, Total Serum: 8.6 (08-13 @ 05:30)          08-14    134<L>  |  103  |  39<H>  ----------------------------<  89  4.8   |  20<L>  |  1.34<H>    Ca    8.9      14 Aug 2018 06:00  Phos  3.2     08-14  Mg     2.6     08-14                          9.3    6.17  )-----------( 263      ( 14 Aug 2018 06:00 )             29.7     Medications  MEDICATIONS  (STANDING):  acetaminophen  IVPB. 1000 milliGRAM(s) IV Intermittent once  aspirin enteric coated 81 milliGRAM(s) Oral daily  atorvastatin 80 milliGRAM(s) Oral at bedtime  buDESOnide 160 MICROgram(s)/formoterol 4.5 MICROgram(s) Inhaler 2 Puff(s) Inhalation two times a day  cefepime   IVPB      cefepime   IVPB 1000 milliGRAM(s) IV Intermittent every 12 hours  cyanocobalamin 1000 MICROGram(s) Oral daily  Dakins Solution - 1/4 Strength 1 Application(s) Topical daily  dextrose 5%. 1000 milliLiter(s) (50 mL/Hr) IV Continuous <Continuous>  dextrose 50% Injectable 12.5 Gram(s) IV Push once  dextrose 50% Injectable 25 Gram(s) IV Push once  dextrose 50% Injectable 25 Gram(s) IV Push once  docusate sodium 100 milliGRAM(s) Oral daily  ferrous    sulfate 325 milliGRAM(s) Oral daily  fluconAZOLE IVPB      fluconAZOLE IVPB 200 milliGRAM(s) IV Intermittent every 24 hours  gabapentin 300 milliGRAM(s) Oral daily  insulin glargine Injectable (LANTUS) 55 Unit(s) SubCutaneous at bedtime  insulin lispro (HumaLOG) corrective regimen sliding scale   SubCutaneous three times a day before meals  insulin lispro Injectable (HumaLOG) 16 Unit(s) SubCutaneous three times a day before meals  levothyroxine 150 MICROGram(s) Oral daily  losartan 100 milliGRAM(s) Oral daily  metolazone 5 milliGRAM(s) Oral daily  metoprolol tartrate 25 milliGRAM(s) Oral two times a day  metroNIDAZOLE  IVPB      metroNIDAZOLE  IVPB 500 milliGRAM(s) IV Intermittent every 8 hours  senna 1 Tablet(s) Oral at bedtime  vancomycin  IVPB      vancomycin  IVPB 1000 milliGRAM(s) IV Intermittent every 24 hours  warfarin 5 milliGRAM(s) Oral once      Physical Exam  General: Patient comfortable in bed  Vital Signs Last 12 Hrs  T(F): 97.6 (08-14-18 @ 14:13), Max: 98.3 (08-14-18 @ 06:00)  HR: 67 (08-14-18 @ 14:13) (63 - 71)  BP: 107/52 (08-14-18 @ 14:13) (100/57 - 125/51)  BP(mean): --  RR: 17 (08-14-18 @ 14:13) (17 - 18)  SpO2: 100% (08-14-18 @ 14:13) (96% - 100%)  Neck: No palpable thyroid nodules.  CVS: S1S2, No murmurs  Respiratory: No wheezing, no crepitations  GI: Abdomen soft, bowel sounds positive  Musculoskeletal:  edema lower extremities.   Skin: No skin rashes, no ecchymosis    Diagnostics

## 2018-08-14 NOTE — PROGRESS NOTE ADULT - SUBJECTIVE AND OBJECTIVE BOX
vascular surgery note    Subjective:   pt was seen and examined this morning. Pain well controlled, c/o pain with dressing changes.  No nausea or vomiting, CP/SOB. worked with PT yesterday.     Objective       Vital Signs Last 24 Hrs  T(C): 36.4 (14 Aug 2018 01:57), Max: 37.1 (13 Aug 2018 05:00)  T(F): 97.5 (14 Aug 2018 01:57), Max: 98.8 (13 Aug 2018 05:00)  HR: 55 (14 Aug 2018 01:57) (55 - 88)  BP: 130/71 (14 Aug 2018 01:57) (112/48 - 150/53)  BP(mean): --  RR: 18 (14 Aug 2018 01:57) (17 - 19)  SpO2: 100% (14 Aug 2018 01:57) (98% - 100%)    I&O's Detail    12 Aug 2018 07:01  -  13 Aug 2018 07:00  --------------------------------------------------------  IN:    heparin Infusion: 264.5 mL    IV PiggyBack: 450 mL    Oral Fluid: 720 mL  Total IN: 1434.5 mL    OUT:    Voided: 3850 mL  Total OUT: 3850 mL    Total NET: -2415.5 mL      13 Aug 2018 07:01  -  14 Aug 2018 04:28  --------------------------------------------------------  IN:    IV PiggyBack: 450 mL  Total IN: 450 mL    OUT:    Voided: 1925 mL  Total OUT: 1925 mL    Total NET: -1475 mL          MEDICATIONS  (STANDING):  acetaminophen  IVPB. 1000 milliGRAM(s) IV Intermittent once  aspirin enteric coated 81 milliGRAM(s) Oral daily  atorvastatin 80 milliGRAM(s) Oral at bedtime  buDESOnide 160 MICROgram(s)/formoterol 4.5 MICROgram(s) Inhaler 2 Puff(s) Inhalation two times a day  cefepime   IVPB      cefepime   IVPB 1000 milliGRAM(s) IV Intermittent every 12 hours  cyanocobalamin 1000 MICROGram(s) Oral daily  Dakins Solution - 1/4 Strength 1 Application(s) Topical daily  docusate sodium 100 milliGRAM(s) Oral daily  ferrous    sulfate 325 milliGRAM(s) Oral daily  fluconAZOLE IVPB      fluconAZOLE IVPB 200 milliGRAM(s) IV Intermittent every 24 hours  gabapentin 300 milliGRAM(s) Oral daily  heparin  Infusion 1100 Unit(s)/Hr (11.5 mL/Hr) IV Continuous <Continuous>  insulin glargine Injectable (LANTUS) 60 Unit(s) SubCutaneous at bedtime  insulin glargine Injectable (LANTUS) 20 Unit(s) SubCutaneous every morning  insulin lispro (HumaLOG) corrective regimen sliding scale   SubCutaneous three times a day before meals  insulin lispro Injectable (HumaLOG) 20 Unit(s) SubCutaneous three times a day before meals  levothyroxine 150 MICROGram(s) Oral daily  losartan 100 milliGRAM(s) Oral daily  metolazone 5 milliGRAM(s) Oral daily  metoprolol tartrate 25 milliGRAM(s) Oral two times a day  metroNIDAZOLE  IVPB      metroNIDAZOLE  IVPB 500 milliGRAM(s) IV Intermittent every 8 hours  senna 1 Tablet(s) Oral at bedtime  vancomycin  IVPB      vancomycin  IVPB 1000 milliGRAM(s) IV Intermittent every 24 hours    MEDICATIONS  (PRN):  diclofenac 75 milliGRAM(s) Oral three times a day PRN Joint pain  HYDROmorphone  Injectable 0.5 milliGRAM(s) IV Push every 4 hours PRN Pain unrelieved by PO medications  oxyCODONE    IR 5 milliGRAM(s) Oral every 4 hours PRN Mild to Moderate Pain  oxyCODONE    IR 10 milliGRAM(s) Oral every 4 hours PRN Severe Pain (7 - 10)      LABS:                        8.8    6.11  )-----------( 233      ( 13 Aug 2018 05:30 )             28.6     08-13    131<L>  |  98  |  40<H>  ----------------------------<  214<H>  4.8   |  19<L>  |  1.43<H>    Ca    8.6      13 Aug 2018 05:30  Phos  2.8     08-13  Mg     2.5     08-13      PT/INR - ( 13 Aug 2018 05:30 )   PT: 22.2 SEC;   INR: 1.97          PTT - ( 13 Aug 2018 05:30 )  PTT:83.3 SEC

## 2018-08-14 NOTE — PROGRESS NOTE ADULT - ASSESSMENT
Patient is a 67y old  Male with multiple co-morbidities including DM, PVD, recent Left femoral-peroneal bypass with PTFE  as of 8/7, who presents to the ER for evaluation  of worsening  left great toe pain and swelling. He has no fever or chills, seen by Podiatry and send swab culture which growing Enterobacter and Candida. He has started on Aztreonam, Flagyl and Vancomycin, and the ID consult requested to assist with further evaluation and antibiotic management,      # Left great toe DFU with cellulitis and suspected Abscess - Swab Cx grew Enterobacter and Candida  # s/p left  femoral-peroneal bypass with PTFE  on 8/7    would recommend:    - Cont vanco/cefepime/flagyl/fluconazole to cover cellulitis, possible abscess/OM.  Superficial wound cultures grew enterobacter/candida.   (Pt with prior h/o possible AIN towards beta-lactam agent)    - Obtain MRI of Left foot to rule out deep infection. Last MRI performed on 7/23/18 no OM at that time.  Pt may need amputation of L hallux    - f/u vanco trough      will follow,    Yesika Rodríguez  [phone]

## 2018-08-14 NOTE — PROGRESS NOTE ADULT - ASSESSMENT
Assessment  DMT2: 67y Male with DM T2 with hyperglycemia on basal bolus insulin, blood sugars trending down, had hypoglycemia, eating meals,  compliant with low carb diet.  CAD: On medications, stable, monitored.  Hypothyroidism:  On synthroid 150 mcg po daily, asymptomatic.  HTN: Controlled, On med.  PVD: S/P procedure, has foot wound.

## 2018-08-15 LAB
APTT BLD: 41.4 SEC — HIGH (ref 27.5–37.4)
BUN SERPL-MCNC: 35 MG/DL — HIGH (ref 7–23)
BUN SERPL-MCNC: 36 MG/DL — HIGH (ref 7–23)
BUN SERPL-MCNC: 39 MG/DL — HIGH (ref 7–23)
CALCIUM SERPL-MCNC: 8.6 MG/DL — SIGNIFICANT CHANGE UP (ref 8.4–10.5)
CALCIUM SERPL-MCNC: 9 MG/DL — SIGNIFICANT CHANGE UP (ref 8.4–10.5)
CALCIUM SERPL-MCNC: 9 MG/DL — SIGNIFICANT CHANGE UP (ref 8.4–10.5)
CHLORIDE SERPL-SCNC: 100 MMOL/L — SIGNIFICANT CHANGE UP (ref 98–107)
CHLORIDE SERPL-SCNC: 99 MMOL/L — SIGNIFICANT CHANGE UP (ref 98–107)
CHLORIDE SERPL-SCNC: 99 MMOL/L — SIGNIFICANT CHANGE UP (ref 98–107)
CO2 SERPL-SCNC: 17 MMOL/L — LOW (ref 22–31)
CO2 SERPL-SCNC: 18 MMOL/L — LOW (ref 22–31)
CO2 SERPL-SCNC: 20 MMOL/L — LOW (ref 22–31)
CREAT SERPL-MCNC: 1.22 MG/DL — SIGNIFICANT CHANGE UP (ref 0.5–1.3)
CREAT SERPL-MCNC: 1.32 MG/DL — HIGH (ref 0.5–1.3)
CREAT SERPL-MCNC: 1.32 MG/DL — HIGH (ref 0.5–1.3)
GLUCOSE BLDC GLUCOMTR-MCNC: 109 MG/DL — HIGH (ref 70–99)
GLUCOSE BLDC GLUCOMTR-MCNC: 115 MG/DL — HIGH (ref 70–99)
GLUCOSE BLDC GLUCOMTR-MCNC: 122 MG/DL — HIGH (ref 70–99)
GLUCOSE BLDC GLUCOMTR-MCNC: 204 MG/DL — HIGH (ref 70–99)
GLUCOSE BLDC GLUCOMTR-MCNC: 76 MG/DL — SIGNIFICANT CHANGE UP (ref 70–99)
GLUCOSE SERPL-MCNC: 105 MG/DL — HIGH (ref 70–99)
GLUCOSE SERPL-MCNC: 132 MG/DL — HIGH (ref 70–99)
GLUCOSE SERPL-MCNC: 179 MG/DL — HIGH (ref 70–99)
INR BLD: 2.61 — HIGH (ref 0.88–1.17)
MAGNESIUM SERPL-MCNC: 2.7 MG/DL — HIGH (ref 1.6–2.6)
PHOSPHATE SERPL-MCNC: 3.4 MG/DL — SIGNIFICANT CHANGE UP (ref 2.5–4.5)
POTASSIUM SERPL-MCNC: 4.9 MMOL/L — SIGNIFICANT CHANGE UP (ref 3.5–5.3)
POTASSIUM SERPL-MCNC: 5.6 MMOL/L — HIGH (ref 3.5–5.3)
POTASSIUM SERPL-MCNC: 5.7 MMOL/L — HIGH (ref 3.5–5.3)
POTASSIUM SERPL-SCNC: 4.9 MMOL/L — SIGNIFICANT CHANGE UP (ref 3.5–5.3)
POTASSIUM SERPL-SCNC: 5.6 MMOL/L — HIGH (ref 3.5–5.3)
POTASSIUM SERPL-SCNC: 5.7 MMOL/L — HIGH (ref 3.5–5.3)
PROTHROM AB SERPL-ACNC: 29.5 SEC — HIGH (ref 9.8–13.1)
SODIUM SERPL-SCNC: 134 MMOL/L — LOW (ref 135–145)

## 2018-08-15 RX ORDER — WARFARIN SODIUM 2.5 MG/1
5 TABLET ORAL ONCE
Qty: 0 | Refills: 0 | Status: COMPLETED | OUTPATIENT
Start: 2018-08-15 | End: 2018-08-15

## 2018-08-15 RX ORDER — POLYETHYLENE GLYCOL 3350 17 G/17G
17 POWDER, FOR SOLUTION ORAL DAILY
Qty: 0 | Refills: 0 | Status: DISCONTINUED | OUTPATIENT
Start: 2018-08-15 | End: 2018-08-16

## 2018-08-15 RX ADMIN — Medication 16 UNIT(S): at 19:32

## 2018-08-15 RX ADMIN — FLUCONAZOLE 100 MILLIGRAM(S): 150 TABLET ORAL at 00:06

## 2018-08-15 RX ADMIN — Medication 16 UNIT(S): at 12:25

## 2018-08-15 RX ADMIN — HYDROMORPHONE HYDROCHLORIDE 0.5 MILLIGRAM(S): 2 INJECTION INTRAMUSCULAR; INTRAVENOUS; SUBCUTANEOUS at 12:32

## 2018-08-15 RX ADMIN — PREGABALIN 1000 MICROGRAM(S): 225 CAPSULE ORAL at 11:29

## 2018-08-15 RX ADMIN — BUDESONIDE AND FORMOTEROL FUMARATE DIHYDRATE 2 PUFF(S): 160; 4.5 AEROSOL RESPIRATORY (INHALATION) at 09:32

## 2018-08-15 RX ADMIN — Medication 100 MILLIGRAM(S): at 05:33

## 2018-08-15 RX ADMIN — BUDESONIDE AND FORMOTEROL FUMARATE DIHYDRATE 2 PUFF(S): 160; 4.5 AEROSOL RESPIRATORY (INHALATION) at 22:15

## 2018-08-15 RX ADMIN — OXYCODONE HYDROCHLORIDE 10 MILLIGRAM(S): 5 TABLET ORAL at 18:24

## 2018-08-15 RX ADMIN — Medication 25 MILLIGRAM(S): at 05:34

## 2018-08-15 RX ADMIN — Medication 81 MILLIGRAM(S): at 11:29

## 2018-08-15 RX ADMIN — POLYETHYLENE GLYCOL 3350 17 GRAM(S): 17 POWDER, FOR SOLUTION ORAL at 11:28

## 2018-08-15 RX ADMIN — Medication 100 MILLIGRAM(S): at 11:29

## 2018-08-15 RX ADMIN — INSULIN GLARGINE 55 UNIT(S): 100 INJECTION, SOLUTION SUBCUTANEOUS at 22:19

## 2018-08-15 RX ADMIN — OXYCODONE HYDROCHLORIDE 10 MILLIGRAM(S): 5 TABLET ORAL at 09:31

## 2018-08-15 RX ADMIN — ATORVASTATIN CALCIUM 80 MILLIGRAM(S): 80 TABLET, FILM COATED ORAL at 22:17

## 2018-08-15 RX ADMIN — LOSARTAN POTASSIUM 100 MILLIGRAM(S): 100 TABLET, FILM COATED ORAL at 05:34

## 2018-08-15 RX ADMIN — Medication 325 MILLIGRAM(S): at 11:29

## 2018-08-15 RX ADMIN — HYDROMORPHONE HYDROCHLORIDE 0.5 MILLIGRAM(S): 2 INJECTION INTRAMUSCULAR; INTRAVENOUS; SUBCUTANEOUS at 12:45

## 2018-08-15 RX ADMIN — GABAPENTIN 300 MILLIGRAM(S): 400 CAPSULE ORAL at 11:29

## 2018-08-15 RX ADMIN — Medication 100 MILLIGRAM(S): at 14:08

## 2018-08-15 RX ADMIN — Medication 4: at 12:25

## 2018-08-15 RX ADMIN — Medication 16 UNIT(S): at 08:06

## 2018-08-15 RX ADMIN — WARFARIN SODIUM 5 MILLIGRAM(S): 2.5 TABLET ORAL at 18:25

## 2018-08-15 RX ADMIN — SENNA PLUS 1 TABLET(S): 8.6 TABLET ORAL at 22:17

## 2018-08-15 RX ADMIN — CEFEPIME 100 MILLIGRAM(S): 1 INJECTION, POWDER, FOR SOLUTION INTRAMUSCULAR; INTRAVENOUS at 11:28

## 2018-08-15 RX ADMIN — Medication 1 APPLICATION(S): at 05:34

## 2018-08-15 RX ADMIN — Medication 150 MICROGRAM(S): at 05:34

## 2018-08-15 RX ADMIN — OXYCODONE HYDROCHLORIDE 10 MILLIGRAM(S): 5 TABLET ORAL at 10:30

## 2018-08-15 NOTE — PROGRESS NOTE ADULT - SUBJECTIVE AND OBJECTIVE BOX
vascular surgery note    Subjective:   pt was seen and examined this morning. Pain well controlled, c/o pain with dressing changes.  No nausea or vomiting, CP/SOB.     Objective   Vital Signs Last 24 Hrs  T(C): 36.7 (15 Aug 2018 11:08), Max: 36.9 (14 Aug 2018 21:26)  T(F): 98 (15 Aug 2018 11:08), Max: 98.4 (14 Aug 2018 21:26)  HR: 65 (15 Aug 2018 11:08) (65 - 78)  BP: 123/49 (15 Aug 2018 11:08) (107/52 - 144/54)  BP(mean): --  RR: 20 (15 Aug 2018 11:08) (17 - 20)  SpO2: 98% (15 Aug 2018 11:08) (97% - 100%)      I&O's Summary    14 Aug 2018 07:01  -  15 Aug 2018 07:00  --------------------------------------------------------  IN: 1470 mL / OUT: 2050 mL / NET: -580 mL    Pe: Gen: NAD  CV: RRR  Pulm: non-labored  Ext: WWP. .  Left Lower Ext ; PT , AT , Peroneal Doppler Signals. LLE in ACE wrap  Groin , Thigh , Distal : staple and incision line : Clean / Dry and Intact.  Left Foot: Dressing : For a hallux infection                  MEDICATIONS  (STANDING):  acetaminophen  IVPB. 1000 milliGRAM(s) IV Intermittent once  aspirin enteric coated 81 milliGRAM(s) Oral daily  atorvastatin 80 milliGRAM(s) Oral at bedtime  buDESOnide 160 MICROgram(s)/formoterol 4.5 MICROgram(s) Inhaler 2 Puff(s) Inhalation two times a day  cefepime   IVPB      cefepime   IVPB 1000 milliGRAM(s) IV Intermittent every 12 hours  cyanocobalamin 1000 MICROGram(s) Oral daily  Dakins Solution - 1/4 Strength 1 Application(s) Topical daily  docusate sodium 100 milliGRAM(s) Oral daily  ferrous    sulfate 325 milliGRAM(s) Oral daily  fluconAZOLE IVPB      fluconAZOLE IVPB 200 milliGRAM(s) IV Intermittent every 24 hours  gabapentin 300 milliGRAM(s) Oral daily  heparin  Infusion 1100 Unit(s)/Hr (11.5 mL/Hr) IV Continuous <Continuous>  insulin glargine Injectable (LANTUS) 60 Unit(s) SubCutaneous at bedtime  insulin glargine Injectable (LANTUS) 20 Unit(s) SubCutaneous every morning  insulin lispro (HumaLOG) corrective regimen sliding scale   SubCutaneous three times a day before meals  insulin lispro Injectable (HumaLOG) 20 Unit(s) SubCutaneous three times a day before meals  levothyroxine 150 MICROGram(s) Oral daily  losartan 100 milliGRAM(s) Oral daily  metolazone 5 milliGRAM(s) Oral daily  metoprolol tartrate 25 milliGRAM(s) Oral two times a day  metroNIDAZOLE  IVPB      metroNIDAZOLE  IVPB 500 milliGRAM(s) IV Intermittent every 8 hours  senna 1 Tablet(s) Oral at bedtime  vancomycin  IVPB      vancomycin  IVPB 1000 milliGRAM(s) IV Intermittent every 24 hours    MEDICATIONS  (PRN):  diclofenac 75 milliGRAM(s) Oral three times a day PRN Joint pain  HYDROmorphone  Injectable 0.5 milliGRAM(s) IV Push every 4 hours PRN Pain unrelieved by PO medications  oxyCODONE    IR 5 milliGRAM(s) Oral every 4 hours PRN Mild to Moderate Pain  oxyCODONE    IR 10 milliGRAM(s) Oral every 4 hours PRN Severe Pain (7 - 10)      LABS:                                    9.3    6.17  )-----------( 263      ( 14 Aug 2018 06:00 )             29.7   08-15    134<L>  |  99  |  36<H>  ----------------------------<  179<H>  5.7<H>   |  18<L>  |  1.32<H>    Ca    9.0      15 Aug 2018 11:15  Phos  3.4     08-15  Mg     2.7     08-15    PT/INR - ( 15 Aug 2018 06:00 )   PT: 29.5 SEC;   INR: 2.61          PTT - ( 15 Aug 2018 06:00 )  PTT:41.4 SEC        IMPRESSION:  Generalized cellulitic type changes. No interval development of discrete   drainable abscess collections or osteomyelitis.

## 2018-08-15 NOTE — PROGRESS NOTE ADULT - ASSESSMENT
Assessment  DMT2: 67y Male with DM T2 with hyperglycemia on basal bolus insulin, dose adjusted,  blood sugars improving no new hypoglycemia, eating meals,  compliant with low carb diet.  CAD: On medications, stable, monitored.  Hypothyroidism:  On synthroid 150 mcg po daily, asymptomatic.  HTN: Controlled, On med.  PVD: S/P procedure, has foot wound.

## 2018-08-15 NOTE — PROGRESS NOTE ADULT - ASSESSMENT
Patient is a 67y old  Male with multiple co-morbidities including DM, PVD, recent Left femoral-peroneal bypass with PTFE  as of 8/7, who presents to the ER for evaluation  of worsening  left great toe pain and swelling. He has no fever or chills, seen by Podiatry and send swab culture which growing Enterobacter and Candida. He has started on Aztreonam, Flagyl and Vancomycin, and the ID consult requested to assist with further evaluation and antibiotic management,      # Left great toe DFU with cellulitis and suspected Abscess - Swab Cx grew Enterobacter and Candida  # s/p left  femoral-peroneal bypass with PTFE  on 8/7    would recommend:    - Cont vanco/cefepime/flagyl/fluconazole to cover cellulitis, possible abscess/OM.  Superficial wound cultures grew enterobacter/candida.   (Pt with prior h/o possible AIN towards beta-lactam agent)    - Obtain MRI of Left foot to rule out deep infection. Last MRI performed on 7/23/18 no OM at that time.  Repeat MRI 8/14 without OM or abscess.  f/u podiatry regarding surgical plan    - If no further surgical intervention, can change to levaquin 500mg PO Qdaily x 7 days.        will follow,    Yesika Rodríguez  [phone]

## 2018-08-15 NOTE — DIETITIAN INITIAL EVALUATION ADULT. - ENERGY NEEDS
Weight: 208# (94.4kg) (8/15)   Height: 64.5inches  BMI: 35kg/m^2  IBW: 133# (60.5kg) +/-10%  Edema: 2+ left leg/foot. Skin: left leg/thigh surgical incision, left 1st toe diabetic ulcer

## 2018-08-15 NOTE — PROGRESS NOTE ADULT - ASSESSMENT
Patient  s/p L femoral-peroneal bypass with PTFE.  Plan:  -Cont ambulating with PT   -Cont LLE ACE bandage and elevation in bed  -c/w pain management  -Cont to dose Coumadin today based on INR  -monitor INR  -follow ID ABX recs.   -MRI: neg for osteomyelitis

## 2018-08-15 NOTE — DIETITIAN INITIAL EVALUATION ADULT. - NS AS NUTRI INTERV ED CONTENT
Vitamin K and medications, Extensive diet education and written instructions provided on Consistent Carbohydrate diet, Carb Counting, Label Reading and Portion Control.

## 2018-08-15 NOTE — PROGRESS NOTE ADULT - SUBJECTIVE AND OBJECTIVE BOX
Infectious Diseases progress note:    Subjective: c/o intermittent sharp pain in left toe. No fever/chills/rigors.  MRI no OM/abscess    ROS:  CONSTITUTIONAL:  No fever, chills, rigors  CARDIOVASCULAR:  No chest pain or palpitations  RESPIRATORY:   No SOB, cough, dyspnea on exertion.  No wheezing  GASTROINTESTINAL:  No abd pain, N/V, diarrhea/constipation  EXTREMITIES:  No swelling or joint pain  GENITOURINARY:  No burning on urination, increased frequency or urgency.  No flank pain  NEUROLOGIC:  No HA, visual disturbances  SKIN: No rashes    Allergies    contrast media (iodine-based) (Hypotension)    Intolerances        ANTIBIOTICS/RELEVANT:  antimicrobials  cefepime   IVPB      cefepime   IVPB 1000 milliGRAM(s) IV Intermittent every 12 hours  fluconAZOLE IVPB      fluconAZOLE IVPB 200 milliGRAM(s) IV Intermittent every 24 hours  metroNIDAZOLE  IVPB      metroNIDAZOLE  IVPB 500 milliGRAM(s) IV Intermittent every 8 hours  vancomycin  IVPB      vancomycin  IVPB 1000 milliGRAM(s) IV Intermittent every 24 hours    immunologic:    OTHER:  acetaminophen  IVPB. 1000 milliGRAM(s) IV Intermittent once  aspirin enteric coated 81 milliGRAM(s) Oral daily  atorvastatin 80 milliGRAM(s) Oral at bedtime  buDESOnide 160 MICROgram(s)/formoterol 4.5 MICROgram(s) Inhaler 2 Puff(s) Inhalation two times a day  cyanocobalamin 1000 MICROGram(s) Oral daily  Dakins Solution - 1/4 Strength 1 Application(s) Topical daily  dextrose 40% Gel 15 Gram(s) Oral once PRN  dextrose 5%. 1000 milliLiter(s) IV Continuous <Continuous>  dextrose 50% Injectable 12.5 Gram(s) IV Push once  dextrose 50% Injectable 25 Gram(s) IV Push once  dextrose 50% Injectable 25 Gram(s) IV Push once  diclofenac 75 milliGRAM(s) Oral three times a day PRN  docusate sodium 100 milliGRAM(s) Oral daily  ferrous    sulfate 325 milliGRAM(s) Oral daily  gabapentin 300 milliGRAM(s) Oral daily  glucagon  Injectable 1 milliGRAM(s) IntraMuscular once PRN  HYDROmorphone  Injectable 0.5 milliGRAM(s) IV Push every 4 hours PRN  insulin glargine Injectable (LANTUS) 55 Unit(s) SubCutaneous at bedtime  insulin lispro (HumaLOG) corrective regimen sliding scale   SubCutaneous three times a day before meals  insulin lispro Injectable (HumaLOG) 16 Unit(s) SubCutaneous three times a day before meals  levothyroxine 150 MICROGram(s) Oral daily  losartan 100 milliGRAM(s) Oral daily  metolazone 5 milliGRAM(s) Oral daily  metoprolol tartrate 25 milliGRAM(s) Oral two times a day  oxyCODONE    IR 5 milliGRAM(s) Oral every 4 hours PRN  oxyCODONE    IR 10 milliGRAM(s) Oral every 4 hours PRN  polyethylene glycol 3350 17 Gram(s) Oral daily  senna 1 Tablet(s) Oral at bedtime  warfarin 5 milliGRAM(s) Oral once      Objective:  Vital Signs Last 24 Hrs  T(C): 37.1 (15 Aug 2018 14:00), Max: 37.1 (15 Aug 2018 14:00)  T(F): 98.7 (15 Aug 2018 14:00), Max: 98.7 (15 Aug 2018 14:00)  HR: 70 (15 Aug 2018 14:00) (65 - 78)  BP: 128/47 (15 Aug 2018 14:00) (112/53 - 144/54)  BP(mean): --  RR: 16 (15 Aug 2018 14:00) (16 - 20)  SpO2: 99% (15 Aug 2018 14:00) (97% - 99%)    PHYSICAL EXAM:  Constitutional:NAD  Eyes:DIMAS, EOMI  Ear/Nose/Throat: no thrush, mucositis.  Moist mucous membranes	  Neck:no JVD, no lymphadenopathy, supple  Respiratory: CTA brad  Cardiovascular: S1S2 RRR, no murmurs  Gastrointestinal:soft, nontender,  nondistended (+) BS  Extremities:  LLE some swelling, surgical sites intact.  L toe with gangrenous changes  Skin:  no rashes, open wounds or ulcerations        LABS:                        9.3    6.17  )-----------( 263      ( 14 Aug 2018 06:00 )             29.7     08-15    134<L>  |  99  |  36<H>  ----------------------------<  179<H>  5.7<H>   |  18<L>  |  1.32<H>    Ca    9.0      15 Aug 2018 11:15  Phos  3.4     08-15  Mg     2.7     08-15      PT/INR - ( 15 Aug 2018 06:00 )   PT: 29.5 SEC;   INR: 2.61          PTT - ( 15 Aug 2018 06:00 )  PTT:41.4 SEC            Vancomycin Level, Trough: 10.2 ug/mL (08-14 @ 15:40)              MICROBIOLOGY:    Culture - Abscess with Gram Stain (08.08.18 @ 16:51)    -  Gentamicin: S <=1 PURNIMA    -  Imipenem: S <=1 PURNIMA    -  Levofloxacin: S <=1 PURNIMA    -  Meropenem: S <=1 PURNIMA    -  Piperacillin/Tazobactam: S <=8 PURNIMA    -  Tigecycline: S <=1 PURNIMA    -  Tobramycin: S <=2 PURNIMA    -  Trimethoprim/Sulfamethoxazole: S 1/19 PURNIMA    -  Amikacin: S <=8 PURNIMA    -  Ampicillin: R 16 PURNIMA    -  Ampicillin/Sulbactam: R 16/8 PURNIMA    -  Aztreonam: S <=4 PURNIMA    -  Cefazolin: R >16 PURNIMA    -  Cefepime: S <=2 PURNIMA    -  Cefoxitin: R >16 PURNIMA    -  Ceftazidime: S <=1 PURNIMA    -  Ceftriaxone: S <=1 PURNIMA    -  Ciprofloxacin: S <=0.5 PURNIMA    -  Ertapenem: S <=0.5 PURNIMA    Specimen Source: OTHER    Gram Stain Result:   NOS No Organisms Seen  WBC^White Blood Cells  QNTY CELLS IN GRAM STAIN: NO CELLS SEEN    Culture Results:   MANY                *******************************                * This is an appended result. *                *******************************  A prior result that was reported as final has been changed.    Organism Identification: Enterobacter cloacae  Candida tropicalis    Organism: Enterobacter cloacae    Organism: Candida tropicalis    Method Type: NEGATIVE PURNIMA 43          RADIOLOGY & ADDITIONAL STUDIES:    < from: MR Foot w/wo IV Cont, Left (08.14.18 @ 18:15) >  IMPRESSION:  Generalized cellulitic type changes. No interval development of discrete   drainable abscess collections or osteomyelitis.    < end of copied text >

## 2018-08-15 NOTE — DIETITIAN INITIAL EVALUATION ADULT. - OTHER INFO
Initial Dietitian Evaluation 2/2 to extended length of stay. Per chart review patient with medical history of former smoker, IDDM2, hypothyroidism, HTN, HLD, COPD, CAD s/p stent & CABG x 3,  presenting with L great toe pain x 1 month. S/p Bypass graft, femoral-peroneal, left (8/07). Patient reports good appetite/PO intake PTA. Reports trying to control carb intake PTA. Cut down portion of fresh fruit he eats daily. Patient states he checks Finger sticks everyday. HbA1c 9.1%. No GI distress (nausea/vomiting/diarrhea/constipation.) Extensive diet education and written instructions provided on Consistent Carbohydrate diet, Carb Counting, Label Reading and Portion Control. Wife and patient verbalize understanding of diet modifications.

## 2018-08-15 NOTE — PROGRESS NOTE ADULT - SUBJECTIVE AND OBJECTIVE BOX
Chief complaint  Patient is a 67y old  Male who presents with a chief complaint of left foot swelling right great toe swelling (10 Aug 2018 13:42)   Review of systems  Patient in bed, looks comfortable, no fever, no hypoglycemia.    Labs and Fingersticks  CAPILLARY BLOOD GLUCOSE      POCT Blood Glucose.: 204 mg/dL (15 Aug 2018 12:00)  POCT Blood Glucose.: 122 mg/dL (15 Aug 2018 07:42)  POCT Blood Glucose.: 221 mg/dL (14 Aug 2018 21:30)  POCT Blood Glucose.: 134 mg/dL (14 Aug 2018 18:31)          Calcium, Total Serum: 9.0 (08-15 @ 15:25)  Calcium, Total Serum: 9.0 (08-15 @ 11:15)  Calcium, Total Serum: 8.6 (08-15 @ 06:00)  Calcium, Total Serum: 8.9 (08-14 @ 06:00)          08-15    134<L>  |  99  |  39<H>  ----------------------------<  105<H>  4.9   |  20<L>  |  1.32<H>    Ca    9.0      15 Aug 2018 15:25  Phos  3.4     08-15  Mg     2.7     08-15                          9.3    6.17  )-----------( 263      ( 14 Aug 2018 06:00 )             29.7     Medications  MEDICATIONS  (STANDING):  acetaminophen  IVPB. 1000 milliGRAM(s) IV Intermittent once  aspirin enteric coated 81 milliGRAM(s) Oral daily  atorvastatin 80 milliGRAM(s) Oral at bedtime  buDESOnide 160 MICROgram(s)/formoterol 4.5 MICROgram(s) Inhaler 2 Puff(s) Inhalation two times a day  cyanocobalamin 1000 MICROGram(s) Oral daily  Dakins Solution - 1/4 Strength 1 Application(s) Topical daily  dextrose 5%. 1000 milliLiter(s) (50 mL/Hr) IV Continuous <Continuous>  dextrose 50% Injectable 12.5 Gram(s) IV Push once  dextrose 50% Injectable 25 Gram(s) IV Push once  dextrose 50% Injectable 25 Gram(s) IV Push once  docusate sodium 100 milliGRAM(s) Oral daily  ferrous    sulfate 325 milliGRAM(s) Oral daily  gabapentin 300 milliGRAM(s) Oral daily  insulin glargine Injectable (LANTUS) 55 Unit(s) SubCutaneous at bedtime  insulin lispro (HumaLOG) corrective regimen sliding scale   SubCutaneous three times a day before meals  insulin lispro Injectable (HumaLOG) 16 Unit(s) SubCutaneous three times a day before meals  levoFLOXacin  Tablet 500 milliGRAM(s) Oral every 24 hours  levothyroxine 150 MICROGram(s) Oral daily  losartan 100 milliGRAM(s) Oral daily  metolazone 5 milliGRAM(s) Oral daily  metoprolol tartrate 25 milliGRAM(s) Oral two times a day  polyethylene glycol 3350 17 Gram(s) Oral daily  senna 1 Tablet(s) Oral at bedtime  warfarin 5 milliGRAM(s) Oral once      Physical Exam  General: Patient comfortable in bed  Vital Signs Last 12 Hrs  T(F): 98.7 (08-15-18 @ 14:00), Max: 98.7 (08-15-18 @ 14:00)  HR: 70 (08-15-18 @ 14:00) (65 - 70)  BP: 128/47 (08-15-18 @ 14:00) (123/49 - 131/54)  BP(mean): --  RR: 16 (08-15-18 @ 14:00) (16 - 20)  SpO2: 99% (08-15-18 @ 14:00) (98% - 99%)  Neck: No palpable thyroid nodules.  CVS: S1S2, No murmurs  Respiratory: No wheezing, no crepitations  GI: Abdomen soft, bowel sounds positive  Musculoskeletal:  edema lower extremities.   Skin: No skin rashes, no ecchymosis    Diagnostics

## 2018-08-15 NOTE — DIETITIAN INITIAL EVALUATION ADULT. - NS AS NUTRI INTERV MEALS SNACK
1. Continue Consistent Carbohydrate (evening snack) diet. 2. Suggest outpatient follow up with an endocrinologist to ensure long-term DM diet comprehension and compliance. 3. Please Encourage po intake, assist with meals and menu selections, provide alternatives PRN. 4. Monitor weights, labs, BM's, skin integrity, p.o. intake.

## 2018-08-16 VITALS — TEMPERATURE: 98 F

## 2018-08-16 LAB
APTT BLD: 44.6 SEC — HIGH (ref 27.5–37.4)
BUN SERPL-MCNC: 34 MG/DL — HIGH (ref 7–23)
CALCIUM SERPL-MCNC: 8.9 MG/DL — SIGNIFICANT CHANGE UP (ref 8.4–10.5)
CHLORIDE SERPL-SCNC: 102 MMOL/L — SIGNIFICANT CHANGE UP (ref 98–107)
CO2 SERPL-SCNC: 21 MMOL/L — LOW (ref 22–31)
CREAT SERPL-MCNC: 1.27 MG/DL — SIGNIFICANT CHANGE UP (ref 0.5–1.3)
GLUCOSE BLDC GLUCOMTR-MCNC: 122 MG/DL — HIGH (ref 70–99)
GLUCOSE BLDC GLUCOMTR-MCNC: 135 MG/DL — HIGH (ref 70–99)
GLUCOSE BLDC GLUCOMTR-MCNC: 69 MG/DL — LOW (ref 70–99)
GLUCOSE BLDC GLUCOMTR-MCNC: 73 MG/DL — SIGNIFICANT CHANGE UP (ref 70–99)
GLUCOSE SERPL-MCNC: 80 MG/DL — SIGNIFICANT CHANGE UP (ref 70–99)
HCT VFR BLD CALC: 27.6 % — LOW (ref 39–50)
HGB BLD-MCNC: 8.6 G/DL — LOW (ref 13–17)
INR BLD: 3.29 — HIGH (ref 0.88–1.17)
MAGNESIUM SERPL-MCNC: 2.5 MG/DL — SIGNIFICANT CHANGE UP (ref 1.6–2.6)
MCHC RBC-ENTMCNC: 27.4 PG — SIGNIFICANT CHANGE UP (ref 27–34)
MCHC RBC-ENTMCNC: 31.2 % — LOW (ref 32–36)
MCV RBC AUTO: 87.9 FL — SIGNIFICANT CHANGE UP (ref 80–100)
NRBC # FLD: 0 — SIGNIFICANT CHANGE UP
PHOSPHATE SERPL-MCNC: 3 MG/DL — SIGNIFICANT CHANGE UP (ref 2.5–4.5)
PLATELET # BLD AUTO: 309 K/UL — SIGNIFICANT CHANGE UP (ref 150–400)
PMV BLD: 10.8 FL — SIGNIFICANT CHANGE UP (ref 7–13)
POTASSIUM SERPL-MCNC: 4.7 MMOL/L — SIGNIFICANT CHANGE UP (ref 3.5–5.3)
POTASSIUM SERPL-SCNC: 4.7 MMOL/L — SIGNIFICANT CHANGE UP (ref 3.5–5.3)
PROTHROM AB SERPL-ACNC: 38.8 SEC — HIGH (ref 9.8–13.1)
RBC # BLD: 3.14 M/UL — LOW (ref 4.2–5.8)
RBC # FLD: 16.1 % — HIGH (ref 10.3–14.5)
SODIUM SERPL-SCNC: 136 MMOL/L — SIGNIFICANT CHANGE UP (ref 135–145)
WBC # BLD: 6.85 K/UL — SIGNIFICANT CHANGE UP (ref 3.8–10.5)
WBC # FLD AUTO: 6.85 K/UL — SIGNIFICANT CHANGE UP (ref 3.8–10.5)

## 2018-08-16 RX ORDER — OXYCODONE HYDROCHLORIDE 5 MG/1
1 TABLET ORAL
Qty: 0 | Refills: 0 | COMMUNITY
Start: 2018-08-16

## 2018-08-16 RX ORDER — OXYCODONE HYDROCHLORIDE 5 MG/1
10 TABLET ORAL EVERY 4 HOURS
Qty: 0 | Refills: 0 | Status: DISCONTINUED | OUTPATIENT
Start: 2018-08-16 | End: 2018-08-16

## 2018-08-16 RX ORDER — SODIUM HYPOCHLORITE 0.125 %
1 SOLUTION, NON-ORAL MISCELLANEOUS
Qty: 0 | Refills: 0 | COMMUNITY
Start: 2018-08-16

## 2018-08-16 RX ORDER — POLYETHYLENE GLYCOL 3350 17 G/17G
17 POWDER, FOR SOLUTION ORAL
Qty: 0 | Refills: 0 | COMMUNITY
Start: 2018-08-16

## 2018-08-16 RX ORDER — OXYCODONE HYDROCHLORIDE 5 MG/1
5 TABLET ORAL EVERY 4 HOURS
Qty: 0 | Refills: 0 | Status: DISCONTINUED | OUTPATIENT
Start: 2018-08-16 | End: 2018-08-16

## 2018-08-16 RX ORDER — INSULIN ASPART 100 [IU]/ML
20 INJECTION, SOLUTION SUBCUTANEOUS
Qty: 3 | Refills: 0 | OUTPATIENT
Start: 2018-08-16

## 2018-08-16 RX ORDER — INSULIN GLARGINE 100 [IU]/ML
60 INJECTION, SOLUTION SUBCUTANEOUS
Qty: 5 | Refills: 0 | OUTPATIENT
Start: 2018-08-16 | End: 2018-09-14

## 2018-08-16 RX ADMIN — OXYCODONE HYDROCHLORIDE 10 MILLIGRAM(S): 5 TABLET ORAL at 14:53

## 2018-08-16 RX ADMIN — POLYETHYLENE GLYCOL 3350 17 GRAM(S): 17 POWDER, FOR SOLUTION ORAL at 08:39

## 2018-08-16 RX ADMIN — Medication 1 APPLICATION(S): at 05:11

## 2018-08-16 RX ADMIN — OXYCODONE HYDROCHLORIDE 10 MILLIGRAM(S): 5 TABLET ORAL at 07:47

## 2018-08-16 RX ADMIN — DICLOFENAC SODIUM 75 MILLIGRAM(S): 75 TABLET, DELAYED RELEASE ORAL at 05:51

## 2018-08-16 RX ADMIN — Medication 16 UNIT(S): at 08:32

## 2018-08-16 RX ADMIN — OXYCODONE HYDROCHLORIDE 10 MILLIGRAM(S): 5 TABLET ORAL at 03:05

## 2018-08-16 RX ADMIN — Medication 16 UNIT(S): at 12:40

## 2018-08-16 RX ADMIN — Medication 25 MILLIGRAM(S): at 05:10

## 2018-08-16 RX ADMIN — Medication 325 MILLIGRAM(S): at 12:39

## 2018-08-16 RX ADMIN — Medication 100 MILLIGRAM(S): at 08:38

## 2018-08-16 RX ADMIN — PREGABALIN 1000 MICROGRAM(S): 225 CAPSULE ORAL at 12:40

## 2018-08-16 RX ADMIN — GABAPENTIN 300 MILLIGRAM(S): 400 CAPSULE ORAL at 12:40

## 2018-08-16 RX ADMIN — OXYCODONE HYDROCHLORIDE 10 MILLIGRAM(S): 5 TABLET ORAL at 02:08

## 2018-08-16 RX ADMIN — OXYCODONE HYDROCHLORIDE 10 MILLIGRAM(S): 5 TABLET ORAL at 08:41

## 2018-08-16 RX ADMIN — Medication 25 MILLIGRAM(S): at 17:25

## 2018-08-16 RX ADMIN — LOSARTAN POTASSIUM 100 MILLIGRAM(S): 100 TABLET, FILM COATED ORAL at 05:11

## 2018-08-16 RX ADMIN — OXYCODONE HYDROCHLORIDE 10 MILLIGRAM(S): 5 TABLET ORAL at 14:04

## 2018-08-16 RX ADMIN — BUDESONIDE AND FORMOTEROL FUMARATE DIHYDRATE 2 PUFF(S): 160; 4.5 AEROSOL RESPIRATORY (INHALATION) at 08:33

## 2018-08-16 RX ADMIN — Medication 150 MICROGRAM(S): at 05:10

## 2018-08-16 RX ADMIN — Medication 81 MILLIGRAM(S): at 12:40

## 2018-08-16 RX ADMIN — DICLOFENAC SODIUM 75 MILLIGRAM(S): 75 TABLET, DELAYED RELEASE ORAL at 06:46

## 2018-08-16 NOTE — PROVIDER CONTACT NOTE (OTHER) - ASSESSMENT
Patient A&Ox4. Patient is asymptomatic.
AOX4, VSS, afebrile. Denies dizziness, HA , no sweating noted .
Patient axox4, Left leg, thigh and foot dressing dry and intact.

## 2018-08-16 NOTE — PROVIDER CONTACT NOTE (OTHER) - ACTION/TREATMENT ORDERED:
Patient refusing any further treatment. Patient will eat lunch. As per PA 83 Fingerstick is okay for no further intervention.
Heparin drip increased as ordered. Endorse to the incoming RN .
PA aware. May hold Humalog standing.

## 2018-08-16 NOTE — PROGRESS NOTE ADULT - SUBJECTIVE AND OBJECTIVE BOX
Chief complaint  Patient is a 67y old  Male who presents with a chief complaint of left foot swelling right great toe swelling (10 Aug 2018 13:42)   Review of systems  Patient in bed, looks comfortable, no fever,  no hypoglycemia.    Labs and Fingersticks  CAPILLARY BLOOD GLUCOSE      POCT Blood Glucose.: 73 mg/dL (16 Aug 2018 07:35)  POCT Blood Glucose.: 109 mg/dL (15 Aug 2018 21:32)  POCT Blood Glucose.: 115 mg/dL (15 Aug 2018 19:27)  POCT Blood Glucose.: 76 mg/dL (15 Aug 2018 16:43)  POCT Blood Glucose.: 204 mg/dL (15 Aug 2018 12:00)          Calcium, Total Serum: 8.9 (08-16 @ 06:00)  Calcium, Total Serum: 9.0 (08-15 @ 15:25)  Calcium, Total Serum: 9.0 (08-15 @ 11:15)  Calcium, Total Serum: 8.6 (08-15 @ 06:00)          08-16    136  |  102  |  34<H>  ----------------------------<  80  4.7   |  21<L>  |  1.27    Ca    8.9      16 Aug 2018 06:00  Phos  3.0     08-16  Mg     2.5     08-16                          8.6    6.85  )-----------( 309      ( 16 Aug 2018 06:19 )             27.6     Medications  MEDICATIONS  (STANDING):  acetaminophen  IVPB. 1000 milliGRAM(s) IV Intermittent once  aspirin enteric coated 81 milliGRAM(s) Oral daily  atorvastatin 80 milliGRAM(s) Oral at bedtime  buDESOnide 160 MICROgram(s)/formoterol 4.5 MICROgram(s) Inhaler 2 Puff(s) Inhalation two times a day  cyanocobalamin 1000 MICROGram(s) Oral daily  Dakins Solution - 1/4 Strength 1 Application(s) Topical daily  dextrose 5%. 1000 milliLiter(s) (50 mL/Hr) IV Continuous <Continuous>  dextrose 50% Injectable 12.5 Gram(s) IV Push once  dextrose 50% Injectable 25 Gram(s) IV Push once  dextrose 50% Injectable 25 Gram(s) IV Push once  docusate sodium 100 milliGRAM(s) Oral daily  ferrous    sulfate 325 milliGRAM(s) Oral daily  gabapentin 300 milliGRAM(s) Oral daily  insulin glargine Injectable (LANTUS) 55 Unit(s) SubCutaneous at bedtime  insulin lispro (HumaLOG) corrective regimen sliding scale   SubCutaneous three times a day before meals  insulin lispro Injectable (HumaLOG) 16 Unit(s) SubCutaneous three times a day before meals  levoFLOXacin  Tablet 500 milliGRAM(s) Oral every 24 hours  levothyroxine 150 MICROGram(s) Oral daily  losartan 100 milliGRAM(s) Oral daily  metolazone 5 milliGRAM(s) Oral daily  metoprolol tartrate 25 milliGRAM(s) Oral two times a day  polyethylene glycol 3350 17 Gram(s) Oral daily  senna 1 Tablet(s) Oral at bedtime      Physical Exam  General: Patient comfortable in bed  Vital Signs Last 12 Hrs  T(F): 97.8 (08-16-18 @ 10:13), Max: 98.5 (08-16-18 @ 05:00)  HR: 60 (08-16-18 @ 10:13) (60 - 86)  BP: 121/55 (08-16-18 @ 10:13) (121/55 - 122/54)  BP(mean): --  RR: 18 (08-16-18 @ 10:13) (18 - 18)  SpO2: 100% (08-16-18 @ 10:13) (97% - 100%)  Neck: No palpable thyroid nodules.  CVS: S1S2, No murmurs  Respiratory: No wheezing, no crepitations  GI: Abdomen soft, bowel sounds positive  Musculoskeletal:  edema lower extremities.   Skin: No skin rashes, no ecchymosis    Diagnostics

## 2018-08-16 NOTE — PROGRESS NOTE ADULT - PROBLEM SELECTOR PLAN 4
Continue current Synthroid dose, FU

## 2018-08-16 NOTE — PROGRESS NOTE ADULT - ASSESSMENT
Patient  s/p L femoral-peroneal bypass with PTFE.  Plan:  -Cont ambulating with PT   -c/w pain management  -Cont to dose Coumadin today based on INR  -monitor INR  -ID: stated on PO levaquin  -MRI: neg for osteomyelitis. No further PODs sx  -D/c to rehab

## 2018-08-16 NOTE — PROGRESS NOTE ADULT - PROBLEM SELECTOR PROBLEM 2
PAD (peripheral artery disease)

## 2018-08-16 NOTE — PROGRESS NOTE ADULT - SUBJECTIVE AND OBJECTIVE BOX
vascular surgery note    Subjective:   pt was seen and examined this morning. Pain well controlled, c/o pain with dressing changes.  No nausea or vomiting, CP/SOB.     Objective   Vital Signs Last 24 Hrs  T(C): 36.8 (16 Aug 2018 01:35), Max: 37.1 (15 Aug 2018 14:00)  T(F): 98.3 (16 Aug 2018 01:35), Max: 98.7 (15 Aug 2018 14:00)  HR: 86 (16 Aug 2018 01:35) (65 - 93)  BP: 121/61 (16 Aug 2018 01:35) (108/39 - 131/54)  BP(mean): --  RR: 18 (16 Aug 2018 01:35) (16 - 20)  SpO2: 99% (16 Aug 2018 01:35) (98% - 100%)    I&O's Summary  I&O's Summary    14 Aug 2018 07:01  -  15 Aug 2018 07:00  --------------------------------------------------------  IN: 1470 mL / OUT: 2050 mL / NET: -580 mL    15 Aug 2018 07:01  -  16 Aug 2018 03:51  --------------------------------------------------------  IN: 0 mL / OUT: 650 mL / NET: -650 mL        Pe: Gen: NAD  CV: RRR  Pulm: non-labored  Ext: WWP. .  Left Lower Ext ; PT , AT , Peroneal Doppler Signals. LLE in ACE wrap  Groin , Thigh , Distal : staple and incision line : Clean / Dry and Intact.  Left Foot: Dressing : For a hallux infection                  MEDICATIONS  (STANDING):  acetaminophen  IVPB. 1000 milliGRAM(s) IV Intermittent once  aspirin enteric coated 81 milliGRAM(s) Oral daily  atorvastatin 80 milliGRAM(s) Oral at bedtime  buDESOnide 160 MICROgram(s)/formoterol 4.5 MICROgram(s) Inhaler 2 Puff(s) Inhalation two times a day  cefepime   IVPB      cefepime   IVPB 1000 milliGRAM(s) IV Intermittent every 12 hours  cyanocobalamin 1000 MICROGram(s) Oral daily  Dakins Solution - 1/4 Strength 1 Application(s) Topical daily  docusate sodium 100 milliGRAM(s) Oral daily  ferrous    sulfate 325 milliGRAM(s) Oral daily  fluconAZOLE IVPB      fluconAZOLE IVPB 200 milliGRAM(s) IV Intermittent every 24 hours  gabapentin 300 milliGRAM(s) Oral daily  heparin  Infusion 1100 Unit(s)/Hr (11.5 mL/Hr) IV Continuous <Continuous>  insulin glargine Injectable (LANTUS) 60 Unit(s) SubCutaneous at bedtime  insulin glargine Injectable (LANTUS) 20 Unit(s) SubCutaneous every morning  insulin lispro (HumaLOG) corrective regimen sliding scale   SubCutaneous three times a day before meals  insulin lispro Injectable (HumaLOG) 20 Unit(s) SubCutaneous three times a day before meals  levothyroxine 150 MICROGram(s) Oral daily  losartan 100 milliGRAM(s) Oral daily  metolazone 5 milliGRAM(s) Oral daily  metoprolol tartrate 25 milliGRAM(s) Oral two times a day  metroNIDAZOLE  IVPB      metroNIDAZOLE  IVPB 500 milliGRAM(s) IV Intermittent every 8 hours  senna 1 Tablet(s) Oral at bedtime  vancomycin  IVPB      vancomycin  IVPB 1000 milliGRAM(s) IV Intermittent every 24 hours    MEDICATIONS  (PRN):  diclofenac 75 milliGRAM(s) Oral three times a day PRN Joint pain  HYDROmorphone  Injectable 0.5 milliGRAM(s) IV Push every 4 hours PRN Pain unrelieved by PO medications  oxyCODONE    IR 5 milliGRAM(s) Oral every 4 hours PRN Mild to Moderate Pain  oxyCODONE    IR 10 milliGRAM(s) Oral every 4 hours PRN Severe Pain (7 - 10)      LABS:                                    9.3    6.17  )-----------( 263      ( 14 Aug 2018 06:00 )             29.7   08-15    134<L>  |  99  |  36<H>  ----------------------------<  179<H>  5.7<H>   |  18<L>  |  1.32<H>    Ca    9.0      15 Aug 2018 11:15  Phos  3.4     08-15  Mg     2.7     08-15    PT/INR - ( 15 Aug 2018 06:00 )   PT: 29.5 SEC;   INR: 2.61          PTT - ( 15 Aug 2018 06:00 )  PTT:41.4 SEC        IMPRESSION:  Generalized cellulitic type changes. No interval development of discrete   drainable abscess collections or osteomyelitis.

## 2018-08-16 NOTE — PROGRESS NOTE ADULT - PROBLEM SELECTOR PLAN 1
Will continue current insulin regimen for now. Will continue monitoring FS, log, will Follow up.  Patient counseled for compliance with consistent low carb diet.
Will continue current insulin regimen for now. Will continue monitoring FS, log, will Follow up.  Patient counseled for compliance with consistent low carb diet.
Will continue current insulin regimen for now. Will continue monitoring FS, log, will Follow up.  DC to rehab on current insulin regimen, FU  Patient counseled for compliance with consistent low carb diet.
Will continue current insulin regimen for now. Will continue monitoring FS, log, will Follow up.  Patient counseled for compliance with consistent low carb diet.
Will decrease lantus to 55u qhs, Humalog 15u before each meal, continue current insulin coverage for now. Will continue monitoring FS, log, will Follow up.  Patient counseled for compliance with consistent low carb diet.

## 2018-08-16 NOTE — PROGRESS NOTE ADULT - PROVIDER SPECIALTY LIST ADULT
Endocrinology
Endocrinology
Infectious Disease
Infectious Disease
Podiatry
SICU
Vascular Surgery
Infectious Disease
Podiatry
Endocrinology

## 2018-08-16 NOTE — PROVIDER CONTACT NOTE (OTHER) - SITUATION
Patient FS 58
FS=69 before dinner. FS checked after dinner =122.
MD Amezquita # [id number] informed that patient aptt is 56

## 2018-08-16 NOTE — PROGRESS NOTE ADULT - ASSESSMENT
applied DSD  pt has burning when using betadine would avoid it  continue abx  MRI negative for OM  cleansed wound with Dakins and applied DSD

## 2018-08-16 NOTE — PROGRESS NOTE ADULT - SUBJECTIVE AND OBJECTIVE BOX
Patient is a 67y old  Male who presents with a chief complaint of left foot swelling right great toe swelling (10 Aug 2018 13:42)       INTERVAL HPI/OVERNIGHT EVENTS:  Patient seen and evaluated at bedside.  Pt is resting comfortable in NAD. Denies N/V/F/C.  Pain rated at 10/10    Allergies    contrast media (iodine-based) (Hypotension)    Intolerances        Vital Signs Last 24 Hrs  T(C): 36.6 (16 Aug 2018 10:13), Max: 37.1 (15 Aug 2018 14:00)  T(F): 97.8 (16 Aug 2018 10:13), Max: 98.7 (15 Aug 2018 14:00)  HR: 60 (16 Aug 2018 10:13) (60 - 93)  BP: 121/55 (16 Aug 2018 10:13) (108/39 - 128/47)  BP(mean): --  RR: 18 (16 Aug 2018 10:13) (16 - 20)  SpO2: 100% (16 Aug 2018 10:13) (97% - 100%)    LABS:                        8.6    6.85  )-----------( 309      ( 16 Aug 2018 06:19 )             27.6     08-16    136  |  102  |  34<H>  ----------------------------<  80  4.7   |  21<L>  |  1.27    Ca    8.9      16 Aug 2018 06:00  Phos  3.0     08-16  Mg     2.5     08-16      PT/INR - ( 16 Aug 2018 06:09 )   PT: 38.8 SEC;   INR: 3.29          PTT - ( 16 Aug 2018 06:09 )  PTT:44.6 SEC    CAPILLARY BLOOD GLUCOSE      POCT Blood Glucose.: 73 mg/dL (16 Aug 2018 07:35)  POCT Blood Glucose.: 109 mg/dL (15 Aug 2018 21:32)  POCT Blood Glucose.: 115 mg/dL (15 Aug 2018 19:27)  POCT Blood Glucose.: 76 mg/dL (15 Aug 2018 16:43)  POCT Blood Glucose.: 204 mg/dL (15 Aug 2018 12:00)      Lower Extremity Physical Exam:  left hallux ulceration noted less wet than yesterday, some necrotic tissue no pus no probing deep minimal erythema and edema    RADIOLOGY & ADDITIONAL TESTS:

## 2018-08-16 NOTE — PROGRESS NOTE ADULT - PROBLEM SELECTOR PLAN 2
Continue Tx, wound care, Sx team FU

## 2018-08-16 NOTE — PROGRESS NOTE ADULT - PROBLEM SELECTOR PLAN 3
On medications, monitored and followed up by primary/cardiology team

## 2018-08-17 NOTE — H&P PST ADULT - PRO PAIN LIFE ADAPT
"Called mom to review below labwork. She says Beulah eats "everything". Discussed increasing iron in his diet and repeating in 3 months. Address confirmed and Iron rich food list and CBC order put in the mail to mom. All questions answered.   " none

## 2018-08-29 ENCOUNTER — OUTPATIENT (OUTPATIENT)
Dept: OUTPATIENT SERVICES | Facility: HOSPITAL | Age: 68
LOS: 1 days | End: 2018-08-29
Payer: COMMERCIAL

## 2018-08-29 ENCOUNTER — APPOINTMENT (OUTPATIENT)
Dept: PODIATRY | Facility: HOSPITAL | Age: 68
End: 2018-08-29

## 2018-08-29 VITALS
SYSTOLIC BLOOD PRESSURE: 138 MMHG | WEIGHT: 195 LBS | DIASTOLIC BLOOD PRESSURE: 62 MMHG | HEIGHT: 66 IN | BODY MASS INDEX: 31.34 KG/M2 | HEART RATE: 66 BPM

## 2018-08-29 DIAGNOSIS — M79.609 PAIN IN UNSPECIFIED LIMB: ICD-10-CM

## 2018-08-29 DIAGNOSIS — Z95.828 PRESENCE OF OTHER VASCULAR IMPLANTS AND GRAFTS: Chronic | ICD-10-CM

## 2018-08-29 DIAGNOSIS — Z86.79 PERSONAL HISTORY OF OTHER DISEASES OF THE CIRCULATORY SYSTEM: Chronic | ICD-10-CM

## 2018-08-29 DIAGNOSIS — Z95.1 PRESENCE OF AORTOCORONARY BYPASS GRAFT: Chronic | ICD-10-CM

## 2018-08-29 DIAGNOSIS — L97.529 NON-PRESSURE CHRONIC ULCER OF OTHER PART OF LEFT FOOT WITH UNSPECIFIED SEVERITY: ICD-10-CM

## 2018-08-29 DIAGNOSIS — Z95.5 PRESENCE OF CORONARY ANGIOPLASTY IMPLANT AND GRAFT: Chronic | ICD-10-CM

## 2018-08-29 PROCEDURE — G0463: CPT

## 2018-08-29 RX ORDER — DICLOFENAC SODIUM 75 MG/1
75 TABLET, DELAYED RELEASE ORAL
Refills: 0 | Status: DISCONTINUED | COMMUNITY
End: 2018-08-29

## 2018-08-30 ENCOUNTER — APPOINTMENT (OUTPATIENT)
Dept: VASCULAR SURGERY | Facility: CLINIC | Age: 68
End: 2018-08-30
Payer: MEDICARE

## 2018-08-30 VITALS
DIASTOLIC BLOOD PRESSURE: 61 MMHG | SYSTOLIC BLOOD PRESSURE: 106 MMHG | HEART RATE: 58 BPM | WEIGHT: 195 LBS | HEIGHT: 66 IN | TEMPERATURE: 97.8 F | BODY MASS INDEX: 31.34 KG/M2

## 2018-08-30 VITALS — SYSTOLIC BLOOD PRESSURE: 114 MMHG | HEART RATE: 60 BPM | DIASTOLIC BLOOD PRESSURE: 59 MMHG

## 2018-08-30 PROCEDURE — 93926 LOWER EXTREMITY STUDY: CPT

## 2018-08-30 PROCEDURE — 99024 POSTOP FOLLOW-UP VISIT: CPT

## 2018-08-30 RX ORDER — AMOXICILLIN AND CLAVULANATE POTASSIUM 500; 125 MG/1; MG/1
500-125 TABLET, FILM COATED ORAL
Qty: 20 | Refills: 0 | Status: COMPLETED | COMMUNITY
Start: 2018-08-30 | End: 2018-09-09

## 2018-08-30 RX ORDER — OXYCODONE 5 MG/1
5 TABLET ORAL
Qty: 28 | Refills: 0 | Status: ACTIVE | COMMUNITY
Start: 2018-08-30 | End: 1900-01-01

## 2018-09-03 PROBLEM — R60.0 BILATERAL EDEMA OF LOWER EXTREMITY: Status: ACTIVE | Noted: 2017-05-06

## 2018-09-05 ENCOUNTER — OUTPATIENT (OUTPATIENT)
Dept: OUTPATIENT SERVICES | Facility: HOSPITAL | Age: 68
LOS: 1 days | End: 2018-09-05
Payer: COMMERCIAL

## 2018-09-05 ENCOUNTER — APPOINTMENT (OUTPATIENT)
Dept: PODIATRY | Facility: HOSPITAL | Age: 68
End: 2018-09-05

## 2018-09-05 VITALS
RESPIRATION RATE: 16 BRPM | HEART RATE: 72 BPM | BODY MASS INDEX: 31.34 KG/M2 | SYSTOLIC BLOOD PRESSURE: 153 MMHG | WEIGHT: 195 LBS | HEIGHT: 66 IN | DIASTOLIC BLOOD PRESSURE: 77 MMHG

## 2018-09-05 DIAGNOSIS — I73.9 PERIPHERAL VASCULAR DISEASE, UNSPECIFIED: ICD-10-CM

## 2018-09-05 DIAGNOSIS — Z95.1 PRESENCE OF AORTOCORONARY BYPASS GRAFT: Chronic | ICD-10-CM

## 2018-09-05 DIAGNOSIS — M79.609 PAIN IN UNSPECIFIED LIMB: ICD-10-CM

## 2018-09-05 DIAGNOSIS — I70.219 ATHEROSCLEROSIS OF NATIVE ARTERIES OF EXTREMITIES WITH INTERMITTENT CLAUDICATION, UNSPECIFIED EXTREMITY: ICD-10-CM

## 2018-09-05 DIAGNOSIS — Z95.828 PRESENCE OF OTHER VASCULAR IMPLANTS AND GRAFTS: Chronic | ICD-10-CM

## 2018-09-05 DIAGNOSIS — Z86.79 PERSONAL HISTORY OF OTHER DISEASES OF THE CIRCULATORY SYSTEM: Chronic | ICD-10-CM

## 2018-09-05 DIAGNOSIS — Z95.5 PRESENCE OF CORONARY ANGIOPLASTY IMPLANT AND GRAFT: Chronic | ICD-10-CM

## 2018-09-05 DIAGNOSIS — L97.529 NON-PRESSURE CHRONIC ULCER OF OTHER PART OF LEFT FOOT WITH UNSPECIFIED SEVERITY: ICD-10-CM

## 2018-09-05 PROCEDURE — G0463: CPT

## 2018-09-09 ENCOUNTER — INPATIENT (INPATIENT)
Facility: HOSPITAL | Age: 68
LOS: 3 days | Discharge: HOME | DRG: 617 | End: 2018-09-13
Attending: INTERNAL MEDICINE | Admitting: INTERNAL MEDICINE
Payer: COMMERCIAL

## 2018-09-09 VITALS
TEMPERATURE: 97 F | OXYGEN SATURATION: 99 % | WEIGHT: 188.94 LBS | DIASTOLIC BLOOD PRESSURE: 70 MMHG | HEART RATE: 78 BPM | RESPIRATION RATE: 16 BRPM | SYSTOLIC BLOOD PRESSURE: 127 MMHG

## 2018-09-09 DIAGNOSIS — Z86.79 PERSONAL HISTORY OF OTHER DISEASES OF THE CIRCULATORY SYSTEM: Chronic | ICD-10-CM

## 2018-09-09 DIAGNOSIS — Z95.828 PRESENCE OF OTHER VASCULAR IMPLANTS AND GRAFTS: Chronic | ICD-10-CM

## 2018-09-09 DIAGNOSIS — E11.621 TYPE 2 DIABETES MELLITUS WITH FOOT ULCER: ICD-10-CM

## 2018-09-09 DIAGNOSIS — Z95.1 PRESENCE OF AORTOCORONARY BYPASS GRAFT: Chronic | ICD-10-CM

## 2018-09-09 DIAGNOSIS — Z95.5 PRESENCE OF CORONARY ANGIOPLASTY IMPLANT AND GRAFT: Chronic | ICD-10-CM

## 2018-09-09 LAB
ALBUMIN SERPL ELPH-MCNC: 4.4 G/DL — SIGNIFICANT CHANGE UP (ref 3.3–5)
ALP SERPL-CCNC: 85 U/L — SIGNIFICANT CHANGE UP (ref 40–120)
ALT FLD-CCNC: 14 U/L — SIGNIFICANT CHANGE UP (ref 10–45)
ANION GAP SERPL CALC-SCNC: 14 MMOL/L — SIGNIFICANT CHANGE UP (ref 5–17)
APTT BLD: 42.4 SEC — HIGH (ref 27.5–37.4)
AST SERPL-CCNC: 20 U/L — SIGNIFICANT CHANGE UP (ref 10–40)
BASOPHILS # BLD AUTO: 0 K/UL — SIGNIFICANT CHANGE UP (ref 0–0.2)
BASOPHILS NFR BLD AUTO: 0.9 % — SIGNIFICANT CHANGE UP (ref 0–2)
BILIRUB SERPL-MCNC: 0.2 MG/DL — SIGNIFICANT CHANGE UP (ref 0.2–1.2)
BLD GP AB SCN SERPL QL: NEGATIVE — SIGNIFICANT CHANGE UP
BUN SERPL-MCNC: 36 MG/DL — HIGH (ref 7–23)
CALCIUM SERPL-MCNC: 9.5 MG/DL — SIGNIFICANT CHANGE UP (ref 8.4–10.5)
CHLORIDE SERPL-SCNC: 92 MMOL/L — LOW (ref 96–108)
CO2 SERPL-SCNC: 24 MMOL/L — SIGNIFICANT CHANGE UP (ref 22–31)
CREAT SERPL-MCNC: 1.32 MG/DL — HIGH (ref 0.5–1.3)
CRP SERPL-MCNC: 0.9 MG/DL — HIGH (ref 0–0.4)
EOSINOPHIL # BLD AUTO: 0.3 K/UL — SIGNIFICANT CHANGE UP (ref 0–0.5)
EOSINOPHIL NFR BLD AUTO: 6.2 % — HIGH (ref 0–6)
ERYTHROCYTE [SEDIMENTATION RATE] IN BLOOD: 102 MM/HR — HIGH (ref 0–20)
GAS PNL BLDV: SIGNIFICANT CHANGE UP
GLUCOSE BLDC GLUCOMTR-MCNC: 227 MG/DL — HIGH (ref 70–99)
GLUCOSE BLDC GLUCOMTR-MCNC: 272 MG/DL — HIGH (ref 70–99)
GLUCOSE SERPL-MCNC: 126 MG/DL — HIGH (ref 70–99)
HCT VFR BLD CALC: 30.6 % — LOW (ref 39–50)
HGB BLD-MCNC: 10 G/DL — LOW (ref 13–17)
INR BLD: 3.05 RATIO — HIGH (ref 0.88–1.16)
LYMPHOCYTES # BLD AUTO: 1.3 K/UL — SIGNIFICANT CHANGE UP (ref 1–3.3)
LYMPHOCYTES # BLD AUTO: 27.3 % — SIGNIFICANT CHANGE UP (ref 13–44)
MCHC RBC-ENTMCNC: 28 PG — SIGNIFICANT CHANGE UP (ref 27–34)
MCHC RBC-ENTMCNC: 32.7 GM/DL — SIGNIFICANT CHANGE UP (ref 32–36)
MCV RBC AUTO: 85.6 FL — SIGNIFICANT CHANGE UP (ref 80–100)
MONOCYTES # BLD AUTO: 0.6 K/UL — SIGNIFICANT CHANGE UP (ref 0–0.9)
MONOCYTES NFR BLD AUTO: 11.6 % — SIGNIFICANT CHANGE UP (ref 2–14)
NEUTROPHILS # BLD AUTO: 2.6 K/UL — SIGNIFICANT CHANGE UP (ref 1.8–7.4)
NEUTROPHILS NFR BLD AUTO: 53.9 % — SIGNIFICANT CHANGE UP (ref 43–77)
PLATELET # BLD AUTO: 326 K/UL — SIGNIFICANT CHANGE UP (ref 150–400)
POTASSIUM SERPL-MCNC: 4.1 MMOL/L — SIGNIFICANT CHANGE UP (ref 3.5–5.3)
POTASSIUM SERPL-SCNC: 4.1 MMOL/L — SIGNIFICANT CHANGE UP (ref 3.5–5.3)
PROT SERPL-MCNC: 8 G/DL — SIGNIFICANT CHANGE UP (ref 6–8.3)
PROTHROM AB SERPL-ACNC: 33.7 SEC — HIGH (ref 9.8–12.7)
RBC # BLD: 3.57 M/UL — LOW (ref 4.2–5.8)
RBC # FLD: 15 % — HIGH (ref 10.3–14.5)
RH IG SCN BLD-IMP: POSITIVE — SIGNIFICANT CHANGE UP
SODIUM SERPL-SCNC: 130 MMOL/L — LOW (ref 135–145)
WBC # BLD: 4.8 K/UL — SIGNIFICANT CHANGE UP (ref 3.8–10.5)
WBC # FLD AUTO: 4.8 K/UL — SIGNIFICANT CHANGE UP (ref 3.8–10.5)

## 2018-09-09 PROCEDURE — 73590 X-RAY EXAM OF LOWER LEG: CPT | Mod: 26,LT

## 2018-09-09 PROCEDURE — 73620 X-RAY EXAM OF FOOT: CPT | Mod: 26,LT

## 2018-09-09 PROCEDURE — 71045 X-RAY EXAM CHEST 1 VIEW: CPT | Mod: 26

## 2018-09-09 PROCEDURE — 99285 EMERGENCY DEPT VISIT HI MDM: CPT

## 2018-09-09 RX ORDER — INFLUENZA VIRUS VACCINE 15; 15; 15; 15 UG/.5ML; UG/.5ML; UG/.5ML; UG/.5ML
0.5 SUSPENSION INTRAMUSCULAR ONCE
Qty: 0 | Refills: 0 | Status: DISCONTINUED | OUTPATIENT
Start: 2018-09-09 | End: 2018-09-13

## 2018-09-09 RX ORDER — ASPIRIN/CALCIUM CARB/MAGNESIUM 324 MG
81 TABLET ORAL DAILY
Qty: 0 | Refills: 0 | Status: DISCONTINUED | OUTPATIENT
Start: 2018-09-09 | End: 2018-09-13

## 2018-09-09 RX ORDER — GLUCAGON INJECTION, SOLUTION 0.5 MG/.1ML
1 INJECTION, SOLUTION SUBCUTANEOUS ONCE
Qty: 0 | Refills: 0 | Status: DISCONTINUED | OUTPATIENT
Start: 2018-09-09 | End: 2018-09-13

## 2018-09-09 RX ORDER — FUROSEMIDE 40 MG
40 TABLET ORAL DAILY
Qty: 0 | Refills: 0 | Status: DISCONTINUED | OUTPATIENT
Start: 2018-09-09 | End: 2018-09-13

## 2018-09-09 RX ORDER — PIPERACILLIN AND TAZOBACTAM 4; .5 G/20ML; G/20ML
3.38 INJECTION, POWDER, LYOPHILIZED, FOR SOLUTION INTRAVENOUS ONCE
Qty: 0 | Refills: 0 | Status: COMPLETED | OUTPATIENT
Start: 2018-09-09 | End: 2018-09-09

## 2018-09-09 RX ORDER — BUDESONIDE AND FORMOTEROL FUMARATE DIHYDRATE 160; 4.5 UG/1; UG/1
2 AEROSOL RESPIRATORY (INHALATION)
Qty: 0 | Refills: 0 | Status: DISCONTINUED | OUTPATIENT
Start: 2018-09-09 | End: 2018-09-13

## 2018-09-09 RX ORDER — DEXTROSE 50 % IN WATER 50 %
12.5 SYRINGE (ML) INTRAVENOUS ONCE
Qty: 0 | Refills: 0 | Status: DISCONTINUED | OUTPATIENT
Start: 2018-09-09 | End: 2018-09-13

## 2018-09-09 RX ORDER — GABAPENTIN 400 MG/1
300 CAPSULE ORAL DAILY
Qty: 0 | Refills: 0 | Status: DISCONTINUED | OUTPATIENT
Start: 2018-09-09 | End: 2018-09-13

## 2018-09-09 RX ORDER — METOPROLOL TARTRATE 50 MG
25 TABLET ORAL
Qty: 0 | Refills: 0 | Status: DISCONTINUED | OUTPATIENT
Start: 2018-09-09 | End: 2018-09-13

## 2018-09-09 RX ORDER — LOSARTAN POTASSIUM 100 MG/1
100 TABLET, FILM COATED ORAL DAILY
Qty: 0 | Refills: 0 | Status: DISCONTINUED | OUTPATIENT
Start: 2018-09-09 | End: 2018-09-13

## 2018-09-09 RX ORDER — DEXTROSE 50 % IN WATER 50 %
15 SYRINGE (ML) INTRAVENOUS ONCE
Qty: 0 | Refills: 0 | Status: DISCONTINUED | OUTPATIENT
Start: 2018-09-09 | End: 2018-09-13

## 2018-09-09 RX ORDER — SODIUM HYPOCHLORITE 0.125 %
1 SOLUTION, NON-ORAL MISCELLANEOUS DAILY
Qty: 0 | Refills: 0 | Status: DISCONTINUED | OUTPATIENT
Start: 2018-09-09 | End: 2018-09-13

## 2018-09-09 RX ORDER — INSULIN LISPRO 100/ML
4 VIAL (ML) SUBCUTANEOUS
Qty: 0 | Refills: 0 | Status: DISCONTINUED | OUTPATIENT
Start: 2018-09-09 | End: 2018-09-10

## 2018-09-09 RX ORDER — VANCOMYCIN HCL 1 G
1000 VIAL (EA) INTRAVENOUS ONCE
Qty: 0 | Refills: 0 | Status: COMPLETED | OUTPATIENT
Start: 2018-09-09 | End: 2018-09-09

## 2018-09-09 RX ORDER — DEXTROSE 50 % IN WATER 50 %
25 SYRINGE (ML) INTRAVENOUS ONCE
Qty: 0 | Refills: 0 | Status: DISCONTINUED | OUTPATIENT
Start: 2018-09-09 | End: 2018-09-13

## 2018-09-09 RX ORDER — INSULIN LISPRO 100/ML
VIAL (ML) SUBCUTANEOUS
Qty: 0 | Refills: 0 | Status: DISCONTINUED | OUTPATIENT
Start: 2018-09-09 | End: 2018-09-13

## 2018-09-09 RX ORDER — PIPERACILLIN AND TAZOBACTAM 4; .5 G/20ML; G/20ML
3.38 INJECTION, POWDER, LYOPHILIZED, FOR SOLUTION INTRAVENOUS EVERY 8 HOURS
Qty: 0 | Refills: 0 | Status: DISCONTINUED | OUTPATIENT
Start: 2018-09-09 | End: 2018-09-10

## 2018-09-09 RX ORDER — OXYCODONE HYDROCHLORIDE 5 MG/1
5 TABLET ORAL EVERY 4 HOURS
Qty: 0 | Refills: 0 | Status: DISCONTINUED | OUTPATIENT
Start: 2018-09-09 | End: 2018-09-11

## 2018-09-09 RX ORDER — ACETAMINOPHEN 500 MG
650 TABLET ORAL EVERY 8 HOURS
Qty: 0 | Refills: 0 | Status: DISCONTINUED | OUTPATIENT
Start: 2018-09-09 | End: 2018-09-11

## 2018-09-09 RX ORDER — SENNA PLUS 8.6 MG/1
2 TABLET ORAL AT BEDTIME
Qty: 0 | Refills: 0 | Status: DISCONTINUED | OUTPATIENT
Start: 2018-09-09 | End: 2018-09-13

## 2018-09-09 RX ORDER — SODIUM CHLORIDE 9 MG/ML
1000 INJECTION, SOLUTION INTRAVENOUS
Qty: 0 | Refills: 0 | Status: DISCONTINUED | OUTPATIENT
Start: 2018-09-09 | End: 2018-09-13

## 2018-09-09 RX ORDER — ATORVASTATIN CALCIUM 80 MG/1
80 TABLET, FILM COATED ORAL AT BEDTIME
Qty: 0 | Refills: 0 | Status: DISCONTINUED | OUTPATIENT
Start: 2018-09-09 | End: 2018-09-13

## 2018-09-09 RX ORDER — LEVOTHYROXINE SODIUM 125 MCG
150 TABLET ORAL DAILY
Qty: 0 | Refills: 0 | Status: DISCONTINUED | OUTPATIENT
Start: 2018-09-09 | End: 2018-09-13

## 2018-09-09 RX ORDER — INSULIN GLARGINE 100 [IU]/ML
20 INJECTION, SOLUTION SUBCUTANEOUS AT BEDTIME
Qty: 0 | Refills: 0 | Status: DISCONTINUED | OUTPATIENT
Start: 2018-09-09 | End: 2018-09-11

## 2018-09-09 RX ORDER — MORPHINE SULFATE 50 MG/1
4 CAPSULE, EXTENDED RELEASE ORAL ONCE
Qty: 0 | Refills: 0 | Status: DISCONTINUED | OUTPATIENT
Start: 2018-09-09 | End: 2018-09-09

## 2018-09-09 RX ORDER — OXYCODONE HYDROCHLORIDE 5 MG/1
2.5 TABLET ORAL ONCE
Qty: 0 | Refills: 0 | Status: DISCONTINUED | OUTPATIENT
Start: 2018-09-09 | End: 2018-09-09

## 2018-09-09 RX ADMIN — MORPHINE SULFATE 4 MILLIGRAM(S): 50 CAPSULE, EXTENDED RELEASE ORAL at 12:03

## 2018-09-09 RX ADMIN — Medication 2: at 18:08

## 2018-09-09 RX ADMIN — Medication 650 MILLIGRAM(S): at 22:51

## 2018-09-09 RX ADMIN — Medication 650 MILLIGRAM(S): at 23:35

## 2018-09-09 RX ADMIN — OXYCODONE HYDROCHLORIDE 2.5 MILLIGRAM(S): 5 TABLET ORAL at 22:51

## 2018-09-09 RX ADMIN — Medication 4 UNIT(S): at 18:08

## 2018-09-09 RX ADMIN — OXYCODONE HYDROCHLORIDE 5 MILLIGRAM(S): 5 TABLET ORAL at 21:00

## 2018-09-09 RX ADMIN — Medication 25 MILLIGRAM(S): at 21:13

## 2018-09-09 RX ADMIN — BUDESONIDE AND FORMOTEROL FUMARATE DIHYDRATE 2 PUFF(S): 160; 4.5 AEROSOL RESPIRATORY (INHALATION) at 21:13

## 2018-09-09 RX ADMIN — OXYCODONE HYDROCHLORIDE 5 MILLIGRAM(S): 5 TABLET ORAL at 16:47

## 2018-09-09 RX ADMIN — Medication 1 APPLICATION(S): at 12:14

## 2018-09-09 RX ADMIN — SENNA PLUS 2 TABLET(S): 8.6 TABLET ORAL at 21:11

## 2018-09-09 RX ADMIN — PIPERACILLIN AND TAZOBACTAM 25 GRAM(S): 4; .5 INJECTION, POWDER, LYOPHILIZED, FOR SOLUTION INTRAVENOUS at 18:35

## 2018-09-09 RX ADMIN — Medication 250 MILLIGRAM(S): at 12:14

## 2018-09-09 RX ADMIN — INSULIN GLARGINE 20 UNIT(S): 100 INJECTION, SOLUTION SUBCUTANEOUS at 21:36

## 2018-09-09 RX ADMIN — PIPERACILLIN AND TAZOBACTAM 200 GRAM(S): 4; .5 INJECTION, POWDER, LYOPHILIZED, FOR SOLUTION INTRAVENOUS at 10:37

## 2018-09-09 RX ADMIN — OXYCODONE HYDROCHLORIDE 5 MILLIGRAM(S): 5 TABLET ORAL at 20:54

## 2018-09-09 RX ADMIN — MORPHINE SULFATE 4 MILLIGRAM(S): 50 CAPSULE, EXTENDED RELEASE ORAL at 10:37

## 2018-09-09 RX ADMIN — GABAPENTIN 300 MILLIGRAM(S): 400 CAPSULE ORAL at 21:11

## 2018-09-09 RX ADMIN — OXYCODONE HYDROCHLORIDE 2.5 MILLIGRAM(S): 5 TABLET ORAL at 23:35

## 2018-09-09 RX ADMIN — ATORVASTATIN CALCIUM 80 MILLIGRAM(S): 80 TABLET, FILM COATED ORAL at 21:11

## 2018-09-09 NOTE — CONSULT NOTE ADULT - SUBJECTIVE AND OBJECTIVE BOX
HPI:   Patient is a 67y male with with hx of RLE PVD who underwent left  lower extremity bypass at [facility] last month, presents with non healing left big toe infection. The patient reports that in June 2018 he went to his podiatrist and had the nail of his left foot big toe was removed. Since then he has a non healing wound on his left big toe. He saw his vascular surgeon last week regarding non healing wound on the toe and he was adviced that the toe will need to be amputated. He also takes coumadin    REVIEW OF SYSTEMS:  All other review of systems negative (Comprehensive ROS)    PAST MEDICAL & SURGICAL HISTORY:  COPD (chronic obstructive pulmonary disease)  Sleep apnea: non-compliant  Anemia  Atherosclerosis of arteries of extremities: left leg  Mild intermittent asthma without complication  Iron deficiency anemia, unspecified iron deficiency anemia type  Prostate cancer  Bronchospasm  Obesity (BMI 30-39.9)  PAD (peripheral artery disease): RLE bypass 11/30/15  CAD (coronary artery disease): 3 cardiac stents placed in 2006 (mid LAD, Prox LAD, Prox to #2), 3V CABG 3/28/16  Hypothyroid  Intermittent claudication  Diabetic neuropathy  DM (diabetes mellitus): type 2 - on insulin pump  CHF (congestive heart failure)  Hypercholesteremia  HTN (hypertension)  History of femoral angiogram: Left femoral endarterectomy Fem-Pop bypass 6/2017  S/P bypass graft of extremity: RLE- 11/30/15  S/P CABG x 3: 3/28/16  Stented coronary artery: x 3 cardiac stents placed in 2006  S/P prostatectomy: 1996      Allergies    Benadryl (Rash)  contrast media (iodine-based) (Hypotension)    Intolerances            FAMILY HISTORY:  No pertinent family history in first degree relatives      SOCIAL HISTORY:  Smoking:  No   ETOH:   No     T(F): 97.7 (09-09-18 @ 14:51), Max: 98.3 (09-09-18 @ 12:14)  HR: 71 (09-09-18 @ 14:51)  BP: 140/65 (09-09-18 @ 14:51)  RR: 16 (09-09-18 @ 14:51)  SpO2: 100% (09-09-18 @ 14:51)  Wt(kg): --    PHYSICAL EXAM:  General: alert, no acute distress  Eyes:  anicteric, no conjunctival injection, no discharge  Oropharynx: no lesions or injection 	  Neck: supple, without adenopathy  Lungs: clear to auscultation  Heart: regular rate and rhythm; no murmur, rubs or gallops  Abdomen: soft, nondistended, nontender, without mass or organomegaly  Skin: no lesions  Extremities:   Neurologic: alert, oriented, moves all extremities    LAB RESULTS:                        10.0   4.8   )-----------( 326      ( 09 Sep 2018 09:31 )             30.6     09-09    130<L>  |  92<L>  |  36<H>  ----------------------------<  126<H>  4.1   |  24  |  1.32<H>    Ca    9.5      09 Sep 2018 09:31    TPro  8.0  /  Alb  4.4  /  TBili  0.2  /  DBili  x   /  AST  20  /  ALT  14  /  AlkPhos  85  09-09    LIVER FUNCTIONS - ( 09 Sep 2018 09:31 )  Alb: 4.4 g/dL / Pro: 8.0 g/dL / ALK PHOS: 85 U/L / ALT: 14 U/L / AST: 20 U/L / GGT: x               MICROBIOLOGY:  RECENT CULTURES:        RADIOLOGY REVIEWED:      Antimicrobials Day #    piperacillin/tazobactam IVPB. 3.375 Gram(s) IV Intermittent every 8 hours    Other Medications:  aspirin enteric coated 81 milliGRAM(s) Oral daily  atorvastatin 80 milliGRAM(s) Oral at bedtime  buDESOnide 160 MICROgram(s)/formoterol 4.5 MICROgram(s) Inhaler 2 Puff(s) Inhalation two times a day  Dakins Solution - 1/4 Strength 1 Application(s) Topical daily  dextrose 40% Gel 15 Gram(s) Oral once PRN  dextrose 5%. 1000 milliLiter(s) IV Continuous <Continuous>  dextrose 50% Injectable 12.5 Gram(s) IV Push once  dextrose 50% Injectable 25 Gram(s) IV Push once  dextrose 50% Injectable 25 Gram(s) IV Push once  furosemide    Tablet 40 milliGRAM(s) Oral daily  gabapentin 300 milliGRAM(s) Oral daily  glucagon  Injectable 1 milliGRAM(s) IntraMuscular once PRN  insulin glargine Injectable (LANTUS) 20 Unit(s) SubCutaneous at bedtime  insulin lispro (HumaLOG) corrective regimen sliding scale   SubCutaneous three times a day before meals  insulin lispro Injectable (HumaLOG) 4 Unit(s) SubCutaneous three times a day before meals  levothyroxine 150 MICROGram(s) Oral daily  losartan 100 milliGRAM(s) Oral daily  metoprolol tartrate 25 milliGRAM(s) Oral two times a day  oxyCODONE    IR 5 milliGRAM(s) Oral every 4 hours PRN  senna 2 Tablet(s) Oral at bedtime

## 2018-09-09 NOTE — ED PROVIDER NOTE - OBJECTIVE STATEMENT
67 M h/o COPD, CHF, PAD, IDDM, recent left Tdxrzqr-qf-wsvkqtmv artery bypass with PTFE graft on 8/7 for chronic non healing L great toe infection, sent by podiatry for surgery. States that since surgery, he still has persistent pain to the toe. He is on amox. Denies any drainage or fevers. No change in appearance or amount of swelling. Able to ambulate. Saw podiatry last week and was told to come to ED on sunday for admission.

## 2018-09-09 NOTE — CONSULT NOTE ADULT - SUBJECTIVE AND OBJECTIVE BOX
Patient is a 67y old  Male who presents with a chief complaint of     HPI: 67 M h/o COPD, CHF, PAD, IDDM, recent left Vbkebyk-kp-lhwqppoo artery bypass with PTFE graft on 8/7 for chronic non healing L great toe infection, sent by podiatry for surgery. States that since surgery, he still has persistent pain to the toe. He is on amox. Denies any drainage or fevers. No change in appearance or amount of swelling. Able to ambulate. Saw podiatry last week and was told to come to ED on sunday for admission.      PAST MEDICAL & SURGICAL HISTORY:  COPD (chronic obstructive pulmonary disease)  Sleep apnea: non-compliant  Anemia  Atherosclerosis of arteries of extremities: left leg  Mild intermittent asthma without complication  Iron deficiency anemia, unspecified iron deficiency anemia type  Prostate cancer  Bronchospasm  Obesity (BMI 30-39.9)  PAD (peripheral artery disease): RLE bypass 11/30/15  CAD (coronary artery disease): 3 cardiac stents placed in 2006 (mid LAD, Prox LAD, Prox to #2), 3V CABG 3/28/16  Hypothyroid  Intermittent claudication  Diabetic neuropathy  DM (diabetes mellitus): type 2 - on insulin pump  CHF (congestive heart failure)  Hypercholesteremia  HTN (hypertension)  History of femoral angiogram: Left femoral endarterectomy Fem-Pop bypass 6/2017  S/P bypass graft of extremity: RLE- 11/30/15  S/P CABG x 3: 3/28/16  Stented coronary artery: x 3 cardiac stents placed in 2006  S/P prostatectomy: 1996    MEDICATIONS  (STANDING):  piperacillin/tazobactam IVPB. 3.375 Gram(s) IV Intermittent once  vancomycin  IVPB 1000 milliGRAM(s) IV Intermittent once    MEDICATIONS  (PRN):    Allergies    Benadryl (Rash)  contrast media (iodine-based) (Hypotension)    Intolerances    VITALS:    Vital Signs Last 24 Hrs  T(C): 36.3 (09 Sep 2018 08:36), Max: 36.3 (09 Sep 2018 08:36)  T(F): 97.3 (09 Sep 2018 08:36), Max: 97.3 (09 Sep 2018 08:36)  HR: 78 (09 Sep 2018 08:36) (78 - 78)  BP: 127/70 (09 Sep 2018 08:36) (127/70 - 127/70)  BP(mean): --  RR: 16 (09 Sep 2018 08:36) (16 - 16)  SpO2: 99% (09 Sep 2018 08:36) (99% - 99%)    LABS:                          10.0   4.8   )-----------( 326      ( 09 Sep 2018 09:31 )             30.6     CAPILLARY BLOOD GLUCOSE    PT/INR - ( 09 Sep 2018 09:31 )   PT: 33.7 sec;   INR: 3.05 ratio      PTT - ( 09 Sep 2018 09:31 )  PTT:42.4 sec    LOWER EXTREMITY PHYSICAL EXAM:    left hallux ulceration --> liquefactive ulceration wound bed, with minimal granular tissue. Jaycee-wound erythema, with dactylitis of the digit. No purulent drainage or malodor.     RADIOLOGY & ADDITIONAL STUDIES: Patient is a 67y old  Male who presents with a chief complaint of     HPI: 67 M h/o COPD, CHF, PAD, IDDM, recent left Amevkhj-ja-kelfdapf artery bypass with PTFE graft on 8/7 for chronic non healing L great toe infection, sent by podiatry for surgery. States that since surgery, he still has persistent pain to the toe. He is on amox. Denies any drainage or fevers. No change in appearance or amount of swelling. Able to ambulate. Saw podiatry last week and was told to come to ED on sunday for admission.      PAST MEDICAL & SURGICAL HISTORY:  COPD (chronic obstructive pulmonary disease)  Sleep apnea: non-compliant  Anemia  Atherosclerosis of arteries of extremities: left leg  Mild intermittent asthma without complication  Iron deficiency anemia, unspecified iron deficiency anemia type  Prostate cancer  Bronchospasm  Obesity (BMI 30-39.9)  PAD (peripheral artery disease): RLE bypass 11/30/15  CAD (coronary artery disease): 3 cardiac stents placed in 2006 (mid LAD, Prox LAD, Prox to #2), 3V CABG 3/28/16  Hypothyroid  Intermittent claudication  Diabetic neuropathy  DM (diabetes mellitus): type 2 - on insulin pump  CHF (congestive heart failure)  Hypercholesteremia  HTN (hypertension)  History of femoral angiogram: Left femoral endarterectomy Fem-Pop bypass 6/2017  S/P bypass graft of extremity: RLE- 11/30/15  S/P CABG x 3: 3/28/16  Stented coronary artery: x 3 cardiac stents placed in 2006  S/P prostatectomy: 1996    MEDICATIONS  (STANDING):  piperacillin/tazobactam IVPB. 3.375 Gram(s) IV Intermittent once  vancomycin  IVPB 1000 milliGRAM(s) IV Intermittent once    MEDICATIONS  (PRN):    Allergies    Benadryl (Rash)  contrast media (iodine-based) (Hypotension)    Intolerances    VITALS:    Vital Signs Last 24 Hrs  T(C): 36.3 (09 Sep 2018 08:36), Max: 36.3 (09 Sep 2018 08:36)  T(F): 97.3 (09 Sep 2018 08:36), Max: 97.3 (09 Sep 2018 08:36)  HR: 78 (09 Sep 2018 08:36) (78 - 78)  BP: 127/70 (09 Sep 2018 08:36) (127/70 - 127/70)  BP(mean): --  RR: 16 (09 Sep 2018 08:36) (16 - 16)  SpO2: 99% (09 Sep 2018 08:36) (99% - 99%)    LABS:                          10.0   4.8   )-----------( 326      ( 09 Sep 2018 09:31 )             30.6     CAPILLARY BLOOD GLUCOSE    PT/INR - ( 09 Sep 2018 09:31 )   PT: 33.7 sec;   INR: 3.05 ratio      PTT - ( 09 Sep 2018 09:31 )  PTT:42.4 sec    LOWER EXTREMITY PHYSICAL EXAM:    left hallux ulceration --> eschar noted to the dorsal aspect of the hallux. Jaycee-wound erythema, with dactylitis of the digit. No purulent drainage or malodor.     RADIOLOGY & ADDITIONAL STUDIES:

## 2018-09-09 NOTE — ED ADULT NURSE NOTE - OBJECTIVE STATEMENT
67 year old male presents to the ED complaining of left great toe pain 5 weeks post Cxtlmiv-dm-csivywkv artery bypass with PTFE graft on 8/7 for chronic non healing L great toe infection, sent by podiatry for surgery. Pt is A&O x 4, VSS, afebrile, ambulating independently. Pt denies fever, chills, NVD, SOB, or chest pain. IV placed, labs drawn, bed in low position, safety measures in place. red socks on patient. Daughter at bedside. Pt has extensive cardiac Hx, please see Pt history. Pt able to abmulate, well appearing.

## 2018-09-09 NOTE — H&P ADULT - NSHPPHYSICALEXAM_GEN_ALL_CORE
PHYSICAL EXAMINATION:  Vital Signs Last 24 Hrs  T(C): 36.3 (09 Sep 2018 08:36), Max: 36.3 (09 Sep 2018 08:36)  T(F): 97.3 (09 Sep 2018 08:36), Max: 97.3 (09 Sep 2018 08:36)  HR: 78 (09 Sep 2018 08:36) (78 - 78)  BP: 127/70 (09 Sep 2018 08:36) (127/70 - 127/70)  BP(mean): --  RR: 16 (09 Sep 2018 08:36) (16 - 16)  SpO2: 99% (09 Sep 2018 08:36) (99% - 99%)  CAPILLARY BLOOD GLUCOSE            GENERAL: NAD, well-groomed,  HEAD:  atraumatic, normocephalic  EYES: sclera anicteric  ENMT: mucous membranes moist  NECK: supple, No JVD  CHEST/LUNG: clear to auscultation bilaterally;    no      rales   ,   no rhonchi,   HEART: normal S1, S2  ABDOMEN: BS+, soft, ND, NT   EXTREMITIES:   non healing ulcer, toe  NEURO: awake, ,     moves all extremities  SKIN: no     rash

## 2018-09-09 NOTE — ED ADULT NURSE NOTE - NSIMPLEMENTINTERV_GEN_ALL_ED
Implemented All Fall with Harm Risk Interventions:  [place] to call system. Call bell, personal items and telephone within reach. Instruct patient to call for assistance. Room bathroom lighting operational. Non-slip footwear when patient is off stretcher. Physically safe environment: no spills, clutter or unnecessary equipment. Stretcher in lowest position, wheels locked, appropriate side rails in place. Provide visual cue, wrist band, yellow gown, etc. Monitor gait and stability. Monitor for mental status changes and reorient to person, place, and time. Review medications for side effects contributing to fall risk. Reinforce activity limits and safety measures with patient and family. Provide visual clues: red socks.

## 2018-09-09 NOTE — ED PROVIDER NOTE - MEDICAL DECISION MAKING DETAILS
67 M sent for admission for amputation. Well appearing with stable vital signs. Labs, XR, abx, podiatry and vascular consults and admission

## 2018-09-09 NOTE — CONSULT NOTE ADULT - SUBJECTIVE AND OBJECTIVE BOX
CC: Patient is a 67y old  Male who presents with a chief complaint of foot  infection (09 Sep 2018 11:23)      HPI:  67 M h/o COPD, CHF, PAD, IDDM, recent left Cbnpenw-lg-lotznoht artery bypass with PTFE graft with Dr. Loya on 8/7 for chronic non healing L great toe infection, sent by podiatry for surgery. States that since surgery, he still has persistent pain to the toe. He is on amoxicillin,  Denies any drainage or fevers. No change in appearance or amount of swelling. Able to ambulate. Saw podiatry last week and was told to come to ED on Sunday for admission. (09 Sep 2018 11:23)    PMHx: COPD (chronic obstructive pulmonary disease)  Sleep apnea  Anemia  Atherosclerosis of arteries of extremities  Mild intermittent asthma without complication  Iron deficiency anemia, unspecified iron deficiency anemia type  Prostate cancer  Bronchospasm  Obesity (BMI 30-39.9)  PAD (peripheral artery disease)  CAD (coronary artery disease)  S/P prostatectomy  Hypothyroid  Intermittent claudication  Diabetic neuropathy  DM (diabetes mellitus)  CHF (congestive heart failure)  Hypercholesteremia  HTN (hypertension)    PSHx: History of femoral angiogram  S/P bypass graft of extremity  S/P CABG x 3  Stented coronary artery  S/P prostatectomy    Medications (inpatient): aspirin enteric coated 81 milliGRAM(s) Oral daily  atorvastatin 80 milliGRAM(s) Oral at bedtime  buDESOnide 160 MICROgram(s)/formoterol 4.5 MICROgram(s) Inhaler 2 Puff(s) Inhalation two times a day  Dakins Solution - 1/4 Strength 1 Application(s) Topical daily  dextrose 5%. 1000 milliLiter(s) IV Continuous <Continuous>  dextrose 50% Injectable 12.5 Gram(s) IV Push once  dextrose 50% Injectable 25 Gram(s) IV Push once  dextrose 50% Injectable 25 Gram(s) IV Push once  furosemide    Tablet 40 milliGRAM(s) Oral daily  gabapentin 300 milliGRAM(s) Oral daily  insulin glargine Injectable (LANTUS) 20 Unit(s) SubCutaneous at bedtime  insulin lispro (HumaLOG) corrective regimen sliding scale   SubCutaneous three times a day before meals  insulin lispro Injectable (HumaLOG) 4 Unit(s) SubCutaneous three times a day before meals  levothyroxine 150 MICROGram(s) Oral daily  losartan 100 milliGRAM(s) Oral daily  metoprolol tartrate 25 milliGRAM(s) Oral two times a day  piperacillin/tazobactam IVPB. 3.375 Gram(s) IV Intermittent every 8 hours  senna 2 Tablet(s) Oral at bedtime  vancomycin  IVPB 1000 milliGRAM(s) IV Intermittent once    Medications (PRN):dextrose 40% Gel 15 Gram(s) Oral once PRN  glucagon  Injectable 1 milliGRAM(s) IntraMuscular once PRN  oxyCODONE    IR 5 milliGRAM(s) Oral every 4 hours PRN    Allergies: Benadryl (Rash)  contrast media (iodine-based) (Hypotension)  (Intolerances: )  Social Hx:   Family Hx: No pertinent family history in first degree relatives      Physical Exam  T(C): 36.3  HR: 78 (78 - 78)  BP: 127/70 (127/70 - 127/70)  RR: 16 (16 - 16)  SpO2: 99% (99% - 99%)  Tmax: T(C): , Max: 36.3 (09-09-18 @ 08:36)    General: well developed, well nourished, NAD  Neuro: alert and oriented, no focal deficits, moves all extremities spontaneously  HEENT: NCAT, EOMI, anicteric, mucosa moist  Respiratory: airway patent, respirations unlabored  CVS: regular rate and rhythm  Abdomen: soft, nontender, nondistended  Extremities: no edema, sensation and movement grossly intact  Skin: warm, dry, appropriate color    Labs:                        10.0   4.8   )-----------( 326      ( 09 Sep 2018 09:31 )             30.6     PT/INR - ( 09 Sep 2018 09:31 )   PT: 33.7 sec;   INR: 3.05 ratio         PTT - ( 09 Sep 2018 09:31 )  PTT:42.4 sec  09-09    130<L>  |  92<L>  |  36<H>  ----------------------------<  126<H>  4.1   |  24  |  1.32<H>    Ca    9.5      09 Sep 2018 09:31    TPro  8.0  /  Alb  4.4  /  TBili  0.2  /  DBili  x   /  AST  20  /  ALT  14  /  AlkPhos  85  09-09            Imaging and other studies:  < from: Xray Chest 1 View- PORTABLE-Urgent (09.09.18 @ 10:12) >    Impression:  Clear lungs. Stable elevation of the right hemidiaphragm.    < end of copied text >    < from: Xray Tibia + Fibula 2 Views, Left (09.09.18 @ 10:11) >  Impression: No fracture or evidence of osteomyelitis of the left tibia   and fibula.    < end of copied text >    < from: Xray Foot AP + Lateral, Left (09.09.18 @ 10:11) >  Impression: No evidence of osteomyelitis of the left foot with attention   to the left first toe.    < end of copied text > VASCULAR SURGERY CONSULT NOTE [id number]    CC: Patient is a 67y old  Male who presents with a chief complaint of foot  infection (09 Sep 2018 11:23)      HPI:  67 M h/o COPD, CHF, PAD, IDDM, CAD s/p CABGx3, s/p R common fem endarterectomy 2015, L femoral endarterectomy 06/2017, and s/p recent left Cmbublx-md-laayxwys artery bypass with PTFE graft with Dr. Loya on 8/7 for chronic non healing L great toe infection. During admission for that procedure, he had MRI 8/14 that showed no osteo and was discharged on PO Levaquin on 8/16. States that since surgery, he still has persistent pain to the toe. He is on amoxicillin,  Denies any drainage or fevers. Denies pain in left groin or left leg at incision sites. No change in appearance or amount of swelling. Able to ambulate. Saw podiatry last week and was told to come to ED on Sunday for admission. Last saw Dr. Loya in the office 8/30, had bypass duplex that showed flow velocities < 45 cm/s in mid segment of the graft suggestive of impending graft failure, patent runoff.    PMHx: COPD (chronic obstructive pulmonary disease)  Sleep apnea  Anemia  Atherosclerosis of arteries of extremities  Mild intermittent asthma without complication  Iron deficiency anemia, unspecified iron deficiency anemia type  Prostate cancer  Bronchospasm  Obesity (BMI 30-39.9)  PAD (peripheral artery disease)  CAD (coronary artery disease)  S/P prostatectomy  Hypothyroid  Intermittent claudication  Diabetic neuropathy  DM (diabetes mellitus)  CHF (congestive heart failure)  Hypercholesteremia  HTN (hypertension)    PSHx: History of femoral angiogram  S/P bypass graft of extremity  S/P CABG x 3  Stented coronary artery  S/P prostatectomy    Medications (inpatient): aspirin enteric coated 81 milliGRAM(s) Oral daily  atorvastatin 80 milliGRAM(s) Oral at bedtime  buDESOnide 160 MICROgram(s)/formoterol 4.5 MICROgram(s) Inhaler 2 Puff(s) Inhalation two times a day  Dakins Solution - 1/4 Strength 1 Application(s) Topical daily  dextrose 5%. 1000 milliLiter(s) IV Continuous <Continuous>  dextrose 50% Injectable 12.5 Gram(s) IV Push once  dextrose 50% Injectable 25 Gram(s) IV Push once  dextrose 50% Injectable 25 Gram(s) IV Push once  furosemide    Tablet 40 milliGRAM(s) Oral daily  gabapentin 300 milliGRAM(s) Oral daily  insulin glargine Injectable (LANTUS) 20 Unit(s) SubCutaneous at bedtime  insulin lispro (HumaLOG) corrective regimen sliding scale   SubCutaneous three times a day before meals  insulin lispro Injectable (HumaLOG) 4 Unit(s) SubCutaneous three times a day before meals  levothyroxine 150 MICROGram(s) Oral daily  losartan 100 milliGRAM(s) Oral daily  metoprolol tartrate 25 milliGRAM(s) Oral two times a day  piperacillin/tazobactam IVPB. 3.375 Gram(s) IV Intermittent every 8 hours  senna 2 Tablet(s) Oral at bedtime  vancomycin  IVPB 1000 milliGRAM(s) IV Intermittent once    Medications (PRN):dextrose 40% Gel 15 Gram(s) Oral once PRN  glucagon  Injectable 1 milliGRAM(s) IntraMuscular once PRN  oxyCODONE    IR 5 milliGRAM(s) Oral every 4 hours PRN    Allergies: Benadryl (Rash)  contrast media (iodine-based) (Hypotension)  (Intolerances: )  Social Hx:   Family Hx: No pertinent family history in first degree relatives      Physical Exam  T(C): 36.3  HR: 78 (78 - 78)  BP: 127/70 (127/70 - 127/70)  RR: 16 (16 - 16)  SpO2: 99% (99% - 99%)  Tmax: T(C): , Max: 36.3 (09-09-18 @ 08:36)    General: well developed, well nourished, NAD  Neuro: alert and oriented, no focal deficits, moves all extremities spontaneously  HEENT: NCAT, EOMI, anicteric, mucosa moist  Respiratory: airway patent, respirations unlabored  Abdomen: soft, nontender, nondistended  Extremities: L groin with well-healed surgical scar, left leg incision intact with minimal areas of eschar, no sign of infection around incision. Sensation and movement grossly intact distally. Left peroneal and weak PT signal, palp femoral on left. LLE edema. L first toe with eschar on dorsum of IP joint, shahzad-wound erythema, tender to palpation, no purulent drainage or malodor  R groin with well healed scar, palpable femoral, popliteal and PT signal.  Skin: warm, dry, appropriate color    Labs:                        10.0   4.8   )-----------( 326      ( 09 Sep 2018 09:31 )             30.6     PT/INR - ( 09 Sep 2018 09:31 )   PT: 33.7 sec;   INR: 3.05 ratio         PTT - ( 09 Sep 2018 09:31 )  PTT:42.4 sec  09-09    130<L>  |  92<L>  |  36<H>  ----------------------------<  126<H>  4.1   |  24  |  1.32<H>    Ca    9.5      09 Sep 2018 09:31    TPro  8.0  /  Alb  4.4  /  TBili  0.2  /  DBili  x   /  AST  20  /  ALT  14  /  AlkPhos  85  09-09            Imaging and other studies:  < from: Xray Chest 1 View- PORTABLE-Urgent (09.09.18 @ 10:12) >    Impression:  Clear lungs. Stable elevation of the right hemidiaphragm.    < end of copied text >    < from: Xray Tibia + Fibula 2 Views, Left (09.09.18 @ 10:11) >  Impression: No fracture or evidence of osteomyelitis of the left tibia   and fibula.    < end of copied text >    < from: Xray Foot AP + Lateral, Left (09.09.18 @ 10:11) >  Impression: No evidence of osteomyelitis of the left foot with attention   to the left first toe.    < end of copied text > VASCULAR SURGERY CONSULT NOTE [id number]    CC: Patient is a 67y old  Male who presents with a chief complaint of foot  infection (09 Sep 2018 11:23)      HPI:  67 M h/o COPD, CHF, PAD, IDDM, CAD s/p CABGx3, s/p R common fem endarterectomy 2015, L femoral endarterectomy 06/2017, and s/p recent left Mjzgkrl-ls-jrjdgzln artery bypass with PTFE graft with Dr. Loya on 8/7 for chronic non healing L great toe infection. During admission for that procedure, he had MRI 8/14 that showed no osteo and was discharged on PO Levaquin on 8/16. States that since surgery, he still has persistent pain to the toe. He is on amoxicillin, on Coumadin for bypass. Denies any drainage or fevers. Denies pain in left groin or left leg at incision sites. No change in appearance or amount of swelling. Able to ambulate. Saw podiatry last week and was told to come to ED on Sunday for admission. Last saw Dr. Loya in the office 8/30, had bypass duplex that showed flow velocities < 45 cm/s in mid segment of the graft suggestive of impending graft failure, patent runoff.    PMHx: COPD (chronic obstructive pulmonary disease)  Sleep apnea  Anemia  Atherosclerosis of arteries of extremities  Mild intermittent asthma without complication  Iron deficiency anemia, unspecified iron deficiency anemia type  Prostate cancer  Bronchospasm  Obesity (BMI 30-39.9)  PAD (peripheral artery disease)  CAD (coronary artery disease)  S/P prostatectomy  Hypothyroid  Intermittent claudication  Diabetic neuropathy  DM (diabetes mellitus)  CHF (congestive heart failure)  Hypercholesteremia  HTN (hypertension)    PSHx: History of femoral angiogram  S/P bypass graft of extremity  S/P CABG x 3  Stented coronary artery  S/P prostatectomy    Medications (inpatient): aspirin enteric coated 81 milliGRAM(s) Oral daily  atorvastatin 80 milliGRAM(s) Oral at bedtime  buDESOnide 160 MICROgram(s)/formoterol 4.5 MICROgram(s) Inhaler 2 Puff(s) Inhalation two times a day  Dakins Solution - 1/4 Strength 1 Application(s) Topical daily  dextrose 5%. 1000 milliLiter(s) IV Continuous <Continuous>  dextrose 50% Injectable 12.5 Gram(s) IV Push once  dextrose 50% Injectable 25 Gram(s) IV Push once  dextrose 50% Injectable 25 Gram(s) IV Push once  furosemide    Tablet 40 milliGRAM(s) Oral daily  gabapentin 300 milliGRAM(s) Oral daily  insulin glargine Injectable (LANTUS) 20 Unit(s) SubCutaneous at bedtime  insulin lispro (HumaLOG) corrective regimen sliding scale   SubCutaneous three times a day before meals  insulin lispro Injectable (HumaLOG) 4 Unit(s) SubCutaneous three times a day before meals  levothyroxine 150 MICROGram(s) Oral daily  losartan 100 milliGRAM(s) Oral daily  metoprolol tartrate 25 milliGRAM(s) Oral two times a day  piperacillin/tazobactam IVPB. 3.375 Gram(s) IV Intermittent every 8 hours  senna 2 Tablet(s) Oral at bedtime  vancomycin  IVPB 1000 milliGRAM(s) IV Intermittent once    Medications (PRN):dextrose 40% Gel 15 Gram(s) Oral once PRN  glucagon  Injectable 1 milliGRAM(s) IntraMuscular once PRN  oxyCODONE    IR 5 milliGRAM(s) Oral every 4 hours PRN    Allergies: Benadryl (Rash)  contrast media (iodine-based) (Hypotension)  (Intolerances: )  Social Hx:   Family Hx: No pertinent family history in first degree relatives      Physical Exam  T(C): 36.3  HR: 78 (78 - 78)  BP: 127/70 (127/70 - 127/70)  RR: 16 (16 - 16)  SpO2: 99% (99% - 99%)  Tmax: T(C): , Max: 36.3 (09-09-18 @ 08:36)    General: well developed, well nourished, NAD  Neuro: alert and oriented, no focal deficits, moves all extremities spontaneously  HEENT: NCAT, EOMI, anicteric, mucosa moist  Respiratory: airway patent, respirations unlabored  Abdomen: soft, nontender, nondistended  Extremities: L groin with well-healed surgical scar, left leg incision intact with minimal areas of eschar, no sign of infection around incision. Sensation and movement grossly intact distally. Left peroneal and weak PT signal, palp femoral on left. LLE edema. L first toe with eschar on dorsum of IP joint, shahzad-wound erythema, tender to palpation, no purulent drainage or malodor  R groin with well healed scar, palpable femoral, popliteal and PT signal.  Skin: warm, dry, appropriate color    Labs:                        10.0   4.8   )-----------( 326      ( 09 Sep 2018 09:31 )             30.6     PT/INR - ( 09 Sep 2018 09:31 )   PT: 33.7 sec;   INR: 3.05 ratio         PTT - ( 09 Sep 2018 09:31 )  PTT:42.4 sec  09-09    130<L>  |  92<L>  |  36<H>  ----------------------------<  126<H>  4.1   |  24  |  1.32<H>    Ca    9.5      09 Sep 2018 09:31    TPro  8.0  /  Alb  4.4  /  TBili  0.2  /  DBili  x   /  AST  20  /  ALT  14  /  AlkPhos  85  09-09            Imaging and other studies:  < from: Xray Chest 1 View- PORTABLE-Urgent (09.09.18 @ 10:12) >    Impression:  Clear lungs. Stable elevation of the right hemidiaphragm.    < end of copied text >    < from: Xray Tibia + Fibula 2 Views, Left (09.09.18 @ 10:11) >  Impression: No fracture or evidence of osteomyelitis of the left tibia   and fibula.    < end of copied text >    < from: Xray Foot AP + Lateral, Left (09.09.18 @ 10:11) >  Impression: No evidence of osteomyelitis of the left foot with attention   to the left first toe.    < end of copied text >

## 2018-09-09 NOTE — CONSULT NOTE ADULT - ASSESSMENT
68 y/o M s/p recent L fem-peroneal bypass with Dr. Loya on 8/7 presenting with persistent left first toe pain and ulcer. Vascular exam stable  - c/w statin, asa  - OR with podiatry planned tentatively on 9/13 for hallux amp  - dressing changes per podiatry  - medical clearance  - care per primary  - will follow  - will d/w fellow    Noam Goyal MD PGY2  Vascular p9000 66 y/o M s/p recent L fem-peroneal bypass with Dr. Loya on 8/7 presenting with persistent left first toe pain and ulcer. Vascular exam stable  - c/w statin, asa  - rec bridge w/ heparin  - OR with podiatry planned tentatively on 9/13 for hallux amp  - dressing changes per podiatry  - medical clearance  - care per primary  - will follow  - will d/w fellow, Dr. Kane Goyal MD PGY2  Vascular p9061 68 y/o M s/p recent L fem-peroneal bypass with Dr. Loya on 8/7 presenting with persistent left first toe pain and ulcer. Vascular exam stable,. Podiatry planning to take to OR on 9/13 for hallux amp.  - c/w statin, asa, coumadin for tonight, will d/w attending tomorrow re: bridging to heparin in anticipation of Or w/ podiatry  - dressing changes per podiatry  - medical clearance  - care per primary  - will follow  - discussed with Dr. Humphries, vascular fellow    Noam Goyal MD PGY2  Vascular [id number] 68 y/o M s/p recent L fem-peroneal bypass with Dr. Loya on 8/7 presenting with persistent left first toe pain and ulcer. Vascular exam stable,. Podiatry planning to take to OR on 9/13 for hallux amp.  - c/w statin, asa  - c/w coumadin for tonight, will d/w attending tomorrow re: bridging to heparin in anticipation of OR w/ podiatry  - dressing changes per podiatry  - medical clearance  - care per primary  - will follow  - discussed with Dr. Humphries, vascular fellow    Noam Goyal MD PGY2  Vascular [id number] 66 y/o M s/p recent L fem-peroneal bypass with Dr. Loya on 8/7 presenting with persistent left first toe pain and ulcer. Vascular exam stable,. Podiatry planning to take to OR on 9/13 for hallux amp.  - c/w statin, asa  - will d/w attending tomorrow re: bridging to heparin in anticipation of OR w/ podiatry  - dressing changes per podiatry  - medical clearance  - care per primary  - will follow  - discussed with Dr. Humphries, vascular fellow    Noam Goyal MD PGY2  Vascular [id number]

## 2018-09-09 NOTE — CONSULT NOTE ADULT - ASSESSMENT
67 year old male LEFT hallux ulceration  - Pt was examined and evaluated  - Sent in for Pod surg --> Tentatively planned for Thursday, will book tomorrow ocne booking opens  - Please optimize medically for OR (Sedation with local anesthesia). Pt has history of CAD, cardic c/s rec for risk stratification and possible cardic optimization  - Please hold coumadin starting today for OR Thursday if okay with primary team   - Dakins ordered to floor  - Wound dressed with DSD  - c/w IV abx   - d/w attending

## 2018-09-09 NOTE — H&P ADULT - NSHPLABSRESULTS_GEN_ALL_CORE
LABS:                        10.0   4.8   )-----------( 326      ( 09 Sep 2018 09:31 )             30.6     09-09    130<L>  |  92<L>  |  36<H>  ----------------------------<  126<H>  4.1   |  24  |  1.32<H>    Ca    9.5      09 Sep 2018 09:31    TPro  8.0  /  Alb  4.4  /  TBili  0.2  /  DBili  x   /  AST  20  /  ALT  14  /  AlkPhos  85  09-09    PT/INR - ( 09 Sep 2018 09:31 )   PT: 33.7 sec;   INR: 3.05 ratio         PTT - ( 09 Sep 2018 09:31 )  PTT:42.4 sec            09-09 @ 09:31  4.1  23    Hemoglobin A1C, Whole Blood: 9.1 % (07-20 @ 05:16)    Thyroid Stimulating Hormone, Serum: 0.85 uIU/mL (07-23 @ 05:00)

## 2018-09-09 NOTE — ED PROVIDER NOTE - SKIN, MLM
LLE : surgical site clean/ dry/ intact. L great toe with swelling and warmth and small area of fluctuance on dorsal aspect of toe. LLE warm  without crepitus.

## 2018-09-09 NOTE — H&P ADULT - ASSESSMENT
Patient  s/p L femoral-peroneal bypass  8/2018, , cad,/ stent,  cabg  2016,    copd,  pad,/ stent  leg, 2016,   ca prostate, sriram, , htn,  dm 2, on insulin, obesity,    admitted  with non healing toe  ulcer,  left    big toe  afib, on coumadin/  echo  2017,  ef  of  48  seen  by podiatry  in  er  zosyn  vascular to  f/p  labs/ inr in am   daughter at  bedside<       from: Cardiac Cath Lab - Adult (06.06.16 @ 12:56) >  RIGHT LOWER EXTREMITY VESSELS: two vessel runoff to ankle. Peroneal and PT.  peroneal runoff tofoot. Occluded AT. Right common femoral: The vessel was  partially occluded. There was a 80 % stenosis at the site of a prior  endarterectomy (performed on 06/06/2016). Right superficial femoral: There  was a 90 % stenosis. Right deep femoral: Normal.  COMPLICATIONS: , Left the 6fr sheat in.  DISP    < end of copied text >                 - Patient  s/p L femoral-peroneal bypass  8/2018, , cad,/ stent,  cabg  2016,    copd,  pad,/ stent  leg, 2016,   ca prostate, sriram, , htn,  dm 2, on insulin, obesity,    admitted  with non healing toe  ulcer,  left    big toe  afib, on coumadin/  echo  2017,  ef  of  48  seen  by podiatry  in  er, for Or on thursday  hold  coumadin  zosyn  labs/ inr in am   daughter at  bedside       from: Cardiac Cath Lab - Adult (06.06.16 @ 12:56) >  RIGHT LOWER EXTREMITY VESSELS: two vessel runoff to ankle. Peroneal and PT.  peroneal runoff tofoot. Occluded AT. Right common femoral: The vessel was  partially occluded. There was a 80 % stenosis at the site of a prior  endarterectomy (performed on 06/06/2016). Right superficial femoral: There  was a 90 % stenosis. Right deep femoral: Normal.  COMPLICATIONS: , Left the 6fr sheat in.  DISP    < end of copied text >                 - Patient  s/p L femoral-peroneal bypass  8/2018, , cad,/ stent,  cabg  2016,    copd,  pad,/ stent  leg, 2016,   ca prostate, sriram, , htn,  dm 2, on insulin, obesity,    admitted  with non healing toe  ulcer,  left    big toe  afib, on coumadin/  echo  2017,  ef  of  48  seen  by podiatry  in  er, for Or on thursday  hold  coumadin  zosyn  labs/ inr in am   daughter at  bedside  card   to  optimize  pt/ clearance  ,  called       from: Cardiac Cath Lab - Adult (06.06.16 @ 12:56) >  RIGHT LOWER EXTREMITY VESSELS: two vessel runoff to ankle. Peroneal and PT.  peroneal runoff tofoot. Occluded AT. Right common femoral: The vessel was  partially occluded. There was a 80 % stenosis at the site of a prior  endarterectomy (performed on 06/06/2016). Right superficial femoral: There  was a 90 % stenosis. Right deep femoral: Normal.  COMPLICATIONS: , Left the 6fr sheat in.  DISP    < end of copied text >                 -

## 2018-09-09 NOTE — CONSULT NOTE ADULT - ATTENDING COMMENTS
Patient is a high risk cardiovascular patient on the basis of his known ischemic heart disease, PAD, history of CHF, and insulin dependent diabetes.  Patient is capable of > 4 METS at baseline.  He underwent higher risk vascular surgery one month ago without cardiac event.    He is currently without recent ACS, decompensated heart failure, dangerous arrhythmia, or critical valvular stenosis.  EKG from 9/11/2018 reviewed and is unchanged from outpatient records.  No further cardiovascular work-up is necessary prior to his planned procedure.    Continue beta-blocker perioperatively.

## 2018-09-09 NOTE — H&P ADULT - HISTORY OF PRESENT ILLNESS
67y Male complaining of toe infection	   67 M h/o COPD,     CHF, PAD,   IDDM, recent left Vyvjunn-hj-wxmftfxc artery bypass with PTFE graft on 8/7 for chronic non healing L great toe infection, sent by podiatry for surgery. States that since surgery, he still has persistent pain to the toe. He is on amoxicillin, . Denies any drainage or fevers. No change in appearance or amount of swelling. Able to ambulate. Saw podiatry last week and was told to come to ED on sunday for admission.

## 2018-09-09 NOTE — ED PROVIDER NOTE - PROGRESS NOTE DETAILS
Podiatry and vascular are aware of patient and will see in ED. Spoke with patients pmd Dr Jimenez who does not admit. Will admit to unattached once labs result

## 2018-09-09 NOTE — CONSULT NOTE ADULT - ASSESSMENT
67 year old make w DM and PVD, recent left lower leg bypass, left foot big toe nail was removed in June 2018, present with a non healing left big toe ulcer he was admitted for amputation of toe     plan   continue with zosyn while in hospital, if clean margins are obtained after amputation he may not need prolonged abx use   wound care as per podiatry   supportive care as per medicine

## 2018-09-09 NOTE — CONSULT NOTE ADULT - SUBJECTIVE AND OBJECTIVE BOX
Date of Admission:    CHIEF COMPLAINT:    HISTORY OF PRESENT ILLNESS:      Allergies    Benadryl (Rash)  contrast media (iodine-based) (Hypotension)    Intolerances    	    MEDICATIONS:  aspirin enteric coated 81 milliGRAM(s) Oral daily  furosemide    Tablet 40 milliGRAM(s) Oral daily  losartan 100 milliGRAM(s) Oral daily  metoprolol tartrate 25 milliGRAM(s) Oral two times a day    piperacillin/tazobactam IVPB. 3.375 Gram(s) IV Intermittent every 8 hours    buDESOnide 160 MICROgram(s)/formoterol 4.5 MICROgram(s) Inhaler 2 Puff(s) Inhalation two times a day    gabapentin 300 milliGRAM(s) Oral daily  oxyCODONE    IR 5 milliGRAM(s) Oral every 4 hours PRN    senna 2 Tablet(s) Oral at bedtime    atorvastatin 80 milliGRAM(s) Oral at bedtime  dextrose 40% Gel 15 Gram(s) Oral once PRN  dextrose 50% Injectable 12.5 Gram(s) IV Push once  dextrose 50% Injectable 25 Gram(s) IV Push once  dextrose 50% Injectable 25 Gram(s) IV Push once  glucagon  Injectable 1 milliGRAM(s) IntraMuscular once PRN  insulin glargine Injectable (LANTUS) 20 Unit(s) SubCutaneous at bedtime  insulin lispro (HumaLOG) corrective regimen sliding scale   SubCutaneous three times a day before meals  insulin lispro Injectable (HumaLOG) 4 Unit(s) SubCutaneous three times a day before meals  levothyroxine 150 MICROGram(s) Oral daily    Dakins Solution - 1/4 Strength 1 Application(s) Topical daily  dextrose 5%. 1000 milliLiter(s) IV Continuous <Continuous>      PAST MEDICAL & SURGICAL HISTORY:  COPD (chronic obstructive pulmonary disease)  Sleep apnea: non-compliant  Anemia  Atherosclerosis of arteries of extremities: left leg  Mild intermittent asthma without complication  Iron deficiency anemia, unspecified iron deficiency anemia type  Prostate cancer  Bronchospasm  Obesity (BMI 30-39.9)  PAD (peripheral artery disease): RLE bypass 11/30/15  CAD (coronary artery disease): 3 cardiac stents placed in 2006 (mid LAD, Prox LAD, Prox to #2), 3V CABG 3/28/16  Hypothyroid  Intermittent claudication  Diabetic neuropathy  DM (diabetes mellitus): type 2 - on insulin pump  CHF (congestive heart failure)  Hypercholesteremia  HTN (hypertension)  History of femoral angiogram: Left femoral endarterectomy Fem-Pop bypass 6/2017  S/P bypass graft of extremity: RLE- 11/30/15  S/P CABG x 3: 3/28/16  Stented coronary artery: x 3 cardiac stents placed in 2006  S/P prostatectomy: 1996      FAMILY HISTORY:  No pertinent family history in first degree relatives      SOCIAL HISTORY:    [ ] Non-smoker  [ ] Smoker  [ ] Alcohol      REVIEW OF SYSTEMS:  See HPI. Otherwise, 10 point ROS done and otherwise negative.    PHYSICAL EXAM:  T(C): 36.8 (09-09-18 @ 12:14), Max: 36.8 (09-09-18 @ 12:14)  HR: 66 (09-09-18 @ 12:14) (66 - 78)  BP: 111/57 (09-09-18 @ 12:14) (111/57 - 127/70)  RR: 16 (09-09-18 @ 12:14) (16 - 16)  SpO2: 100% (09-09-18 @ 12:14) (99% - 100%)  Wt(kg): --  I&O's Summary      Appearance: Normal	  HEENT:   Normal oral mucosa, PERRL, EOMI	  Lymphatic: No lymphadenopathy  Cardiovascular: Normal S1 S2, No JVD, No murmurs, No edema  Respiratory: Lungs clear to auscultation	  Psychiatry: A & O x 3, Mood & affect appropriate  Gastrointestinal:  Soft, Non-tender, + BS	  Skin: No rashes, No ecchymoses, No cyanosis	  Neurologic: Non-focal  Extremities: Normal range of motion, No clubbing, cyanosis or edema  Vascular: Peripheral pulses palpable 2+ bilaterally        LABS:	 	    CBC Full  -  ( 09 Sep 2018 09:31 )  WBC Count : 4.8 K/uL  Hemoglobin : 10.0 g/dL  Hematocrit : 30.6 %  Platelet Count - Automated : 326 K/uL  Mean Cell Volume : 85.6 fl  Mean Cell Hemoglobin : 28.0 pg  Mean Cell Hemoglobin Concentration : 32.7 gm/dL  Auto Neutrophil # : 2.6 K/uL  Auto Lymphocyte # : 1.3 K/uL  Auto Monocyte # : 0.6 K/uL  Auto Eosinophil # : 0.3 K/uL  Auto Basophil # : 0.0 K/uL  Auto Neutrophil % : 53.9 %  Auto Lymphocyte % : 27.3 %  Auto Monocyte % : 11.6 %  Auto Eosinophil % : 6.2 %  Auto Basophil % : 0.9 %    09-09    130<L>  |  92<L>  |  36<H>  ----------------------------<  126<H>  4.1   |  24  |  1.32<H>    Ca    9.5      09 Sep 2018 09:31    TPro  8.0  /  Alb  4.4  /  TBili  0.2  /  DBili  x   /  AST  20  /  ALT  14  /  AlkPhos  85  09-09      proBNP:   Lipid Profile:   HgA1c:   TSH:       CARDIAC MARKERS:            TELEMETRY: 	    ECG:  	  RADIOLOGY:  OTHER: 	    PREVIOUS DIAGNOSTIC TESTING:    [ ] Echocardiogram:  [ ]  Catheterization:  [ ] Stress Test:  	  	  ASSESSMENT/PLAN: Date of Admission: 9/9 18    CHIEF COMPLAINT: 67y Male complaining of toe infection    HISTORY OF PRESENT ILLNESS: HPI:  	   67 M h/o COPD, CHF, PAD, IDDM, recent left Xdoouyq-ot-upyzyqbj artery bypass with PTFE graft on 8/7 for chronic non healing L great toe infection, sent by podiatry for surgery. States that since surgery, he still has persistent pain to the toe. He is on amoxicillin, . Denies any drainage or fevers. No change in appearance or amount of swelling. Able to ambulate. Saw podiatry last week and was told to come to ED on sunday for admission. (09 Sep 2018 11:23)      Allergies  Benadryl (Rash)  contrast media (iodine-based) (Hypotension)    MEDICATIONS:  aspirin enteric coated 81 milliGRAM(s) Oral daily  furosemide    Tablet 40 milliGRAM(s) Oral daily  losartan 100 milliGRAM(s) Oral daily  metoprolol tartrate 25 milliGRAM(s) Oral two times a day    piperacillin/tazobactam IVPB. 3.375 Gram(s) IV Intermittent every 8 hours    buDESOnide 160 MICROgram(s)/formoterol 4.5 MICROgram(s) Inhaler 2 Puff(s) Inhalation two times a day    gabapentin 300 milliGRAM(s) Oral daily  oxyCODONE    IR 5 milliGRAM(s) Oral every 4 hours PRN    senna 2 Tablet(s) Oral at bedtime    atorvastatin 80 milliGRAM(s) Oral at bedtime  dextrose 40% Gel 15 Gram(s) Oral once PRN  dextrose 50% Injectable 12.5 Gram(s) IV Push once  dextrose 50% Injectable 25 Gram(s) IV Push once  dextrose 50% Injectable 25 Gram(s) IV Push once  glucagon  Injectable 1 milliGRAM(s) IntraMuscular once PRN  insulin glargine Injectable (LANTUS) 20 Unit(s) SubCutaneous at bedtime  insulin lispro (HumaLOG) corrective regimen sliding scale   SubCutaneous three times a day before meals  insulin lispro Injectable (HumaLOG) 4 Unit(s) SubCutaneous three times a day before meals  levothyroxine 150 MICROGram(s) Oral daily    Dakins Solution - 1/4 Strength 1 Application(s) Topical daily  dextrose 5%. 1000 milliLiter(s) IV Continuous <Continuous>      PAST MEDICAL & SURGICAL HISTORY:  COPD (chronic obstructive pulmonary disease)  Sleep apnea: non-compliant  Anemia  Atherosclerosis of arteries of extremities: left leg  Mild intermittent asthma without complication  Iron deficiency anemia, unspecified iron deficiency anemia type  Prostate cancer  Bronchospasm  Obesity (BMI 30-39.9)  PAD (peripheral artery disease): RLE bypass 11/30/15  CAD (coronary artery disease): 3 cardiac stents placed in 2006 (mid LAD, Prox LAD, Prox to #2), 3V CABG 3/28/16  Hypothyroid  Intermittent claudication  Diabetic neuropathy  DM (diabetes mellitus): type 2 - on insulin pump  CHF (congestive heart failure)  Hypercholesteremia  HTN (hypertension)  History of femoral angiogram: Left femoral endarterectomy Fem-Pop bypass 6/2017  S/P bypass graft of extremity: RLE- 11/30/15  S/P CABG x 3: 3/28/16  Stented coronary artery: x 3 cardiac stents placed in 2006  S/P prostatectomy: 1996      FAMILY HISTORY:  No pertinent family history in first degree relatives      SOCIAL HISTORY:    [ ] Non-smoker  [ ] Smoker  [ ] Alcohol      REVIEW OF SYSTEMS:  See HPI. Otherwise, 10 point ROS done and otherwise negative.    PHYSICAL EXAM:  T(C): 36.8 (09-09-18 @ 12:14), Max: 36.8 (09-09-18 @ 12:14)  HR: 66 (09-09-18 @ 12:14) (66 - 78)  BP: 111/57 (09-09-18 @ 12:14) (111/57 - 127/70)  RR: 16 (09-09-18 @ 12:14) (16 - 16)  SpO2: 100% (09-09-18 @ 12:14) (99% - 100%)  Wt(kg): --  I&O's Summary      Appearance: Normal	  HEENT:   Normal oral mucosa, PERRL, EOMI	  Lymphatic: No lymphadenopathy  Cardiovascular: Normal S1 S2, No JVD, No murmurs, No edema  Respiratory: Lungs clear to auscultation	  Psychiatry: A & O x 3, Mood & affect appropriate  Gastrointestinal:  Soft, Non-tender, + BS	  Skin: No rashes, No ecchymoses, No cyanosis	  Neurologic: Non-focal  Extremities: Normal range of motion, No clubbing, cyanosis or edema  Vascular: Peripheral pulses palpable 2+ bilaterally        LABS:	 	    CBC Full  -  ( 09 Sep 2018 09:31 )  WBC Count : 4.8 K/uL  Hemoglobin : 10.0 g/dL  Hematocrit : 30.6 %  Platelet Count - Automated : 326 K/uL  Mean Cell Volume : 85.6 fl  Mean Cell Hemoglobin : 28.0 pg  Mean Cell Hemoglobin Concentration : 32.7 gm/dL  Auto Neutrophil # : 2.6 K/uL  Auto Lymphocyte # : 1.3 K/uL  Auto Monocyte # : 0.6 K/uL  Auto Eosinophil # : 0.3 K/uL  Auto Basophil # : 0.0 K/uL  Auto Neutrophil % : 53.9 %  Auto Lymphocyte % : 27.3 %  Auto Monocyte % : 11.6 %  Auto Eosinophil % : 6.2 %  Auto Basophil % : 0.9 %    09-09    130<L>  |  92<L>  |  36<H>  ----------------------------<  126<H>  4.1   |  24  |  1.32<H>    Ca    9.5      09 Sep 2018 09:31    TPro  8.0  /  Alb  4.4  /  TBili  0.2  /  DBili  x   /  AST  20  /  ALT  14  /  AlkPhos  85  09-09      CARDIAC MARKERS:      TELEMETRY: 	    ECG:  	  RADIOLOGY:  OTHER: 	    PREVIOUS DIAGNOSTIC TESTING:    [ ] Echocardiogram: < from: TTE with Doppler (w/Cont) (05.08.17 @ 13:04) >  Conclusions:  1. Aortic valve not well visualized; appears calcified.  2. Normal left ventricular internal dimensions and wall  thicknesses.  3. Technically difficult study, endocardial visualization  enhanced with intravenous injection of echo contrast  (Definity). Paradoxical septal wall motion consistent with  prior open heart surgery. The apex is hypokinetic. Grossly  borderline   LV systolic function, EF by visual estimate  about  50%  4. Normal tricuspid valve. Minimal tricuspid regurgitation.  5. Estimated pulmonary artery systolic pressure equals 19  mm Hg, assuming right atrial pressure equals 8  mm Hg,  consistent with normal pulmonary pressures.  *** Compared with echocardiogram of 3/26/2016, no  significant changes noted.    < end of copied text >    [ ]  Catheterization:  	  	  ASSESSMENT/PLAN: Date of Admission: 9/9 18    CHIEF COMPLAINT: 67y Male complaining of toe infection    HISTORY OF PRESENT ILLNESS: HPI:  	   67 M, former smoker, hx IDDM2, hypothyroidism, HTN, HLD, IDDM, COPD, CAD s/p stent & CABG x 3 2016, CHF, PAD, recent left Mkwjltg-sk-bnfvstyi artery bypass with PTFE graft on 8/7 for chronic non healing L great toe infection, sent by podiatry for surgery. States that since surgery, he still has persistent pain to the toe. He is on amoxicillin, . Denies any drainage or fevers. No change in appearance or amount of swelling. Able to ambulate. Saw podiatry last week and was told to come to ED on sunday for admission. (09 Sep 2018 11:23)      Allergies  Benadryl (Rash)  contrast media (iodine-based) (Hypotension)    MEDICATIONS:  aspirin enteric coated 81 milliGRAM(s) Oral daily  furosemide    Tablet 40 milliGRAM(s) Oral daily  losartan 100 milliGRAM(s) Oral daily  metoprolol tartrate 25 milliGRAM(s) Oral two times a day    piperacillin/tazobactam IVPB. 3.375 Gram(s) IV Intermittent every 8 hours    buDESOnide 160 MICROgram(s)/formoterol 4.5 MICROgram(s) Inhaler 2 Puff(s) Inhalation two times a day    gabapentin 300 milliGRAM(s) Oral daily  oxyCODONE    IR 5 milliGRAM(s) Oral every 4 hours PRN    senna 2 Tablet(s) Oral at bedtime    atorvastatin 80 milliGRAM(s) Oral at bedtime  dextrose 40% Gel 15 Gram(s) Oral once PRN  dextrose 50% Injectable 12.5 Gram(s) IV Push once  dextrose 50% Injectable 25 Gram(s) IV Push once  dextrose 50% Injectable 25 Gram(s) IV Push once  glucagon  Injectable 1 milliGRAM(s) IntraMuscular once PRN  insulin glargine Injectable (LANTUS) 20 Unit(s) SubCutaneous at bedtime  insulin lispro (HumaLOG) corrective regimen sliding scale   SubCutaneous three times a day before meals  insulin lispro Injectable (HumaLOG) 4 Unit(s) SubCutaneous three times a day before meals  levothyroxine 150 MICROGram(s) Oral daily    Dakins Solution - 1/4 Strength 1 Application(s) Topical daily  dextrose 5%. 1000 milliLiter(s) IV Continuous <Continuous>      PAST MEDICAL & SURGICAL HISTORY:  COPD (chronic obstructive pulmonary disease)  Sleep apnea: non-compliant  Anemia  Atherosclerosis of arteries of extremities: left leg  Mild intermittent asthma without complication  Iron deficiency anemia, unspecified iron deficiency anemia type  Prostate cancer  Bronchospasm  Obesity (BMI 30-39.9)  PAD (peripheral artery disease): RLE bypass 11/30/15  CAD (coronary artery disease): 3 cardiac stents placed in 2006 (mid LAD, Prox LAD, Prox to #2), 3V CABG 3/28/16  Hypothyroid  Intermittent claudication  Diabetic neuropathy  DM (diabetes mellitus): type 2 - on insulin pump  CHF (congestive heart failure)  Hypercholesteremia  HTN (hypertension)  History of femoral angiogram: Left femoral endarterectomy Fem-Pop bypass 6/2017  S/P bypass graft of extremity: RLE- 11/30/15  S/P CABG x 3: 3/28/16  Stented coronary artery: x 3 cardiac stents placed in 2006  S/P prostatectomy: 1996      FAMILY HISTORY:  No pertinent family history in first degree relatives      SOCIAL HISTORY:    [ ] Non-smoker  [ ] Smoker  [ ] Alcohol      REVIEW OF SYSTEMS:  See HPI. Otherwise, 10 point ROS done and otherwise negative.    PHYSICAL EXAM:  T(C): 36.8 (09-09-18 @ 12:14), Max: 36.8 (09-09-18 @ 12:14)  HR: 66 (09-09-18 @ 12:14) (66 - 78)  BP: 111/57 (09-09-18 @ 12:14) (111/57 - 127/70)  RR: 16 (09-09-18 @ 12:14) (16 - 16)  SpO2: 100% (09-09-18 @ 12:14) (99% - 100%)  Wt(kg): --  I&O's Summary      Appearance: Normal	  HEENT:   Normal oral mucosa, PERRL, EOMI	  Lymphatic: No lymphadenopathy  Cardiovascular: Normal S1 S2, No JVD, No murmurs, No edema  Respiratory: Lungs clear to auscultation	  Psychiatry: A & O x 3, Mood & affect appropriate  Gastrointestinal:  Soft, Non-tender, + BS	  Skin: No rashes, No ecchymoses, No cyanosis	  Neurologic: Non-focal  Extremities: Normal range of motion, No clubbing, cyanosis or edema  Vascular: Peripheral pulses palpable 2+ bilaterally        LABS:	 	    CBC Full  -  ( 09 Sep 2018 09:31 )  WBC Count : 4.8 K/uL  Hemoglobin : 10.0 g/dL  Hematocrit : 30.6 %  Platelet Count - Automated : 326 K/uL  Mean Cell Volume : 85.6 fl  Mean Cell Hemoglobin : 28.0 pg  Mean Cell Hemoglobin Concentration : 32.7 gm/dL  Auto Neutrophil # : 2.6 K/uL  Auto Lymphocyte # : 1.3 K/uL  Auto Monocyte # : 0.6 K/uL  Auto Eosinophil # : 0.3 K/uL  Auto Basophil # : 0.0 K/uL  Auto Neutrophil % : 53.9 %  Auto Lymphocyte % : 27.3 %  Auto Monocyte % : 11.6 %  Auto Eosinophil % : 6.2 %  Auto Basophil % : 0.9 %    09-09    130<L>  |  92<L>  |  36<H>  ----------------------------<  126<H>  4.1   |  24  |  1.32<H>    Ca    9.5      09 Sep 2018 09:31    TPro  8.0  /  Alb  4.4  /  TBili  0.2  /  DBili  x   /  AST  20  /  ALT  14  /  AlkPhos  85  09-09      CARDIAC MARKERS:      TELEMETRY: 	    ECG:  	  RADIOLOGY:  OTHER: 	    PREVIOUS DIAGNOSTIC TESTING:    [ ] Echocardiogram: < from: TTE with Doppler (w/Cont) (05.08.17 @ 13:04) >  Conclusions:  1. Aortic valve not well visualized; appears calcified.  2. Normal left ventricular internal dimensions and wall  thicknesses.  3. Technically difficult study, endocardial visualization  enhanced with intravenous injection of echo contrast  (Definity). Paradoxical septal wall motion consistent with  prior open heart surgery. The apex is hypokinetic. Grossly  borderline   LV systolic function, EF by visual estimate  about  50%  4. Normal tricuspid valve. Minimal tricuspid regurgitation.  5. Estimated pulmonary artery systolic pressure equals 19  mm Hg, assuming right atrial pressure equals 8  mm Hg,  consistent with normal pulmonary pressures.  *** Compared with echocardiogram of 3/26/2016, no  significant changes noted.    < end of copied text >    [ ]  Catheterization:  	  	  ASSESSMENT/PLAN: Date of Admission: 9/9 18    CHIEF COMPLAINT: 67y Male complaining of toe infection    HISTORY OF PRESENT ILLNESS:   67 M, former smoker, hx IDDM2, hypothyroidism, HTN, HLD, IDDM, COPD, CAD s/p stent & CABG x 3 2016, CHF, PAD, recent left Mltvaoz-ge-dirloecs artery bypass with PTFE graft on 8/7 for chronic non healing L great toe infection, sent by podiatry for surgery. States that since surgery, he still has persistent pain to the toe. He is on amoxicillin, . Denies any drainage or fevers. No change in appearance or amount of swelling. Able to ambulate. Saw podiatry last week and was told to come to ED on sunday for admission. (09 Sep 2018 11:23)      Allergies  Benadryl (Rash)  contrast media (iodine-based) (Hypotension)    MEDICATIONS:  aspirin enteric coated 81 milliGRAM(s) Oral daily  furosemide    Tablet 40 milliGRAM(s) Oral daily  losartan 100 milliGRAM(s) Oral daily  metoprolol tartrate 25 milliGRAM(s) Oral two times a day    piperacillin/tazobactam IVPB. 3.375 Gram(s) IV Intermittent every 8 hours    buDESOnide 160 MICROgram(s)/formoterol 4.5 MICROgram(s) Inhaler 2 Puff(s) Inhalation two times a day    gabapentin 300 milliGRAM(s) Oral daily  oxyCODONE    IR 5 milliGRAM(s) Oral every 4 hours PRN    senna 2 Tablet(s) Oral at bedtime    atorvastatin 80 milliGRAM(s) Oral at bedtime  dextrose 40% Gel 15 Gram(s) Oral once PRN  dextrose 50% Injectable 12.5 Gram(s) IV Push once  dextrose 50% Injectable 25 Gram(s) IV Push once  dextrose 50% Injectable 25 Gram(s) IV Push once  glucagon  Injectable 1 milliGRAM(s) IntraMuscular once PRN  insulin glargine Injectable (LANTUS) 20 Unit(s) SubCutaneous at bedtime  insulin lispro (HumaLOG) corrective regimen sliding scale   SubCutaneous three times a day before meals  insulin lispro Injectable (HumaLOG) 4 Unit(s) SubCutaneous three times a day before meals  levothyroxine 150 MICROGram(s) Oral daily    Dakins Solution - 1/4 Strength 1 Application(s) Topical daily  dextrose 5%. 1000 milliLiter(s) IV Continuous <Continuous>      PAST MEDICAL & SURGICAL HISTORY:  COPD (chronic obstructive pulmonary disease)  Sleep apnea: non-compliant  Anemia  Atherosclerosis of arteries of extremities: left leg  Mild intermittent asthma without complication  Iron deficiency anemia, unspecified iron deficiency anemia type  Prostate cancer  Bronchospasm  Obesity (BMI 30-39.9)  PAD (peripheral artery disease): RLE bypass 11/30/15  CAD (coronary artery disease): 3 cardiac stents placed in 2006 (mid LAD, Prox LAD, Prox to #2), 3V CABG 3/28/16  Hypothyroid  Intermittent claudication  Diabetic neuropathy  DM (diabetes mellitus): type 2 - on insulin pump  CHF (congestive heart failure)  Hypercholesteremia  HTN (hypertension)  History of femoral angiogram: Left femoral endarterectomy Fem-Pop bypass 6/2017  S/P bypass graft of extremity: RLE- 11/30/15  S/P CABG x 3: 3/28/16  Stented coronary artery: x 3 cardiac stents placed in 2006  S/P prostatectomy: 1996      FAMILY HISTORY:  No pertinent family history in first degree relatives    Surgical HX:  Left femoral endarterectomy Fem-Pop bypass 6/2017  S/P bypass graft of extremity  RLE- 11/30/15  S/P CABG x 3  3/28/16  S/P prostatectomy  1996  Stented coronary artery  x 3 cardiac stents placed in 2006.    SOCIAL HISTORY:    [ ] Smoker: Former smoker  [ ] Alcohol      REVIEW OF SYSTEMS:  See HPI. Otherwise, 10 point ROS done and otherwise negative.  left leg and great toe pain  No chest pain, SOB or palpitations    PHYSICAL EXAM:  T(C): 36.8 (09-09-18 @ 12:14), Max: 36.8 (09-09-18 @ 12:14)  HR: 66 (09-09-18 @ 12:14) (66 - 78)  BP: 111/57 (09-09-18 @ 12:14) (111/57 - 127/70)  RR: 16 (09-09-18 @ 12:14) (16 - 16)  SpO2: 100% (09-09-18 @ 12:14) (99% - 100%)  Wt(kg): --  I&O's Summary      Appearance: Normal	  HEENT:   Normal oral mucosa, PERRL, EOMI	  Cardiovascular: Normal S1 S2, No JVD, No murmurs, No edema  Respiratory: Lungs clear to auscultation	  Psychiatry: A & O x 3, Mood & affect appropriate  Gastrointestinal:  obese, Soft, Non-tender, + BS	  Skin: No rashes, No ecchymoses, No cyanosis	  Neurologic: Non-focal  Extremities: Normal range of motion, left leg edematous left toe necrotic  Vascular: Peripheral pulses palpable 2+ bilaterally    LABS:	 	    CBC Full  -  ( 09 Sep 2018 09:31 )  WBC Count : 4.8 K/uL  Hemoglobin : 10.0 g/dL  Hematocrit : 30.6 %  Platelet Count - Automated : 326 K/uL  Mean Cell Volume : 85.6 fl  Mean Cell Hemoglobin : 28.0 pg  Mean Cell Hemoglobin Concentration : 32.7 gm/dL  Auto Neutrophil # : 2.6 K/uL  Auto Lymphocyte # : 1.3 K/uL  Auto Monocyte # : 0.6 K/uL  Auto Eosinophil # : 0.3 K/uL  Auto Basophil # : 0.0 K/uL  Auto Neutrophil % : 53.9 %  Auto Lymphocyte % : 27.3 %  Auto Monocyte % : 11.6 %  Auto Eosinophil % : 6.2 %  Auto Basophil % : 0.9 %    09-09    130<L>  |  92<L>  |  36<H>  ----------------------------<  126<H>  4.1   |  24  |  1.32<H>    Ca    9.5      09 Sep 2018 09:31    TPro  8.0  /  Alb  4.4  /  TBili  0.2  /  DBili  x   /  AST  20  /  ALT  14  /  AlkPhos  85  09-09      ECG:  No EKG changes  RADIOLOGY:  OTHER: 	    PREVIOUS DIAGNOSTIC TESTING:    [ ] Echocardiogram: < from: TTE with Doppler (w/Cont) (05.08.17 @ 13:04) >  Conclusions:  1. Aortic valve not well visualized; appears calcified.  2. Normal left ventricular internal dimensions and wall  thicknesses.  3. Technically difficult study, endocardial visualization  enhanced with intravenous injection of echo contrast  (Definity). Paradoxical septal wall motion consistent with  prior open heart surgery. The apex is hypokinetic. Grossly  borderline   LV systolic function, EF by visual estimate  about  50%  4. Normal tricuspid valve. Minimal tricuspid regurgitation.  5. Estimated pulmonary artery systolic pressure equals 19  mm Hg, assuming right atrial pressure equals 8  mm Hg,  consistent with normal pulmonary pressures.  *** Compared with echocardiogram of 3/26/2016, no  significant changes noted.    < end of copied text >    	  	  ASSESSMENT/PLAN: 	67 Male PMHX:, former smoker, IDDM2, hypothyroidism, HTN, HLD, IDDM, COPD, CAD s/p stent & CABG x 3 2016, CHF, PAD, recent left Sjxwkoo-bm-aegzhplu artery bypass with PTFE graft on 8/7 for chronic non healing L great toe infection. Admitted with chronic pain.  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      Asymptomatic from cardiac standpoint  EKG without ischemic changes  OK to proceed with surgery without further cardiac risk stratification  Continue All home meds Date of Admission: 9/9 18    CHIEF COMPLAINT: 67y Male complaining of toe infection    HISTORY OF PRESENT ILLNESS:   67 M, former smoker, hx IDDM2, hypothyroidism, HTN, HLD, IDDM, COPD, CAD s/p stent & CABG x 3 2016, CHF, PAD, recent left Cmtckdo-vo-rkhgyzhb artery bypass with PTFE graft on 8/7 for chronic non healing L great toe infection, sent by podiatry for surgery. States that since surgery, he still has persistent pain to the toe. He is on amoxicillin, . Denies any drainage or fevers. No change in appearance or amount of swelling. Able to ambulate. Saw podiatry last week and was told to come to ED on sunday for admission. (09 Sep 2018 11:23)      Allergies:  Benadryl (Rash)  contrast media (iodine-based) (Hypotension)    MEDICATIONS:  aspirin enteric coated 81 milliGRAM(s) Oral daily  furosemide    Tablet 40 milliGRAM(s) Oral daily  losartan 100 milliGRAM(s) Oral daily  metoprolol tartrate 25 milliGRAM(s) Oral two times a day    piperacillin/tazobactam IVPB. 3.375 Gram(s) IV Intermittent every 8 hours    buDESOnide 160 MICROgram(s)/formoterol 4.5 MICROgram(s) Inhaler 2 Puff(s) Inhalation two times a day    gabapentin 300 milliGRAM(s) Oral daily  oxyCODONE    IR 5 milliGRAM(s) Oral every 4 hours PRN    senna 2 Tablet(s) Oral at bedtime    atorvastatin 80 milliGRAM(s) Oral at bedtime  dextrose 40% Gel 15 Gram(s) Oral once PRN  dextrose 50% Injectable 12.5 Gram(s) IV Push once  dextrose 50% Injectable 25 Gram(s) IV Push once  dextrose 50% Injectable 25 Gram(s) IV Push once  glucagon  Injectable 1 milliGRAM(s) IntraMuscular once PRN  insulin glargine Injectable (LANTUS) 20 Unit(s) SubCutaneous at bedtime  insulin lispro (HumaLOG) corrective regimen sliding scale   SubCutaneous three times a day before meals  insulin lispro Injectable (HumaLOG) 4 Unit(s) SubCutaneous three times a day before meals  levothyroxine 150 MICROGram(s) Oral daily    Dakins Solution - 1/4 Strength 1 Application(s) Topical daily  dextrose 5%. 1000 milliLiter(s) IV Continuous <Continuous>      PAST MEDICAL & SURGICAL HISTORY:  COPD (chronic obstructive pulmonary disease)  Sleep apnea: non-compliant  Anemia  Atherosclerosis of arteries of extremities: left leg  Mild intermittent asthma without complication  Iron deficiency anemia, unspecified iron deficiency anemia type  Prostate cancer  Bronchospasm  Obesity (BMI 30-39.9)  PAD (peripheral artery disease): RLE bypass 11/30/15  CAD (coronary artery disease): 3 cardiac stents placed in 2006 (mid LAD, Prox LAD, Prox to #2), 3V CABG 3/28/16  Hypothyroid  Intermittent claudication  Diabetic neuropathy  DM (diabetes mellitus): type 2 - on insulin pump  CHF (congestive heart failure)  Hypercholesteremia  HTN (hypertension)  History of femoral angiogram: Left femoral endarterectomy Fem-Pop bypass 6/2017  S/P bypass graft of extremity: RLE- 11/30/15  S/P CABG x 3: 3/28/16  Stented coronary artery: x 3 cardiac stents placed in 2006  S/P prostatectomy: 1996      FAMILY HISTORY:  No pertinent family history in first degree relatives    Surgical HX:  Left femoral endarterectomy Fem-Pop bypass 6/2017  S/P bypass graft of extremity  RLE- 11/30/15  S/P CABG x 3  3/28/16  S/P prostatectomy  1996  Stented coronary artery  x 3 cardiac stents placed in 2006.    SOCIAL HISTORY:    [x ] Smoker: Former smoker  [x ] No Alcohol      REVIEW OF SYSTEMS:  See HPI. Otherwise, 10 point ROS done and otherwise negative.  left leg and great toe pain  No chest pain, SOB or palpitations    PHYSICAL EXAM:  T(C): 36.8 (09-09-18 @ 12:14), Max: 36.8 (09-09-18 @ 12:14)  HR: 66 (09-09-18 @ 12:14) (66 - 78)  BP: 111/57 (09-09-18 @ 12:14) (111/57 - 127/70)  RR: 16 (09-09-18 @ 12:14) (16 - 16)  SpO2: 100% (09-09-18 @ 12:14) (99% - 100%)  Wt(kg): --  I&O's Summary      Appearance: Normal	  HEENT:   Normal oral mucosa, PERRL, EOMI	  Cardiovascular: Normal S1 S2, No JVD, No murmurs, No edema  Respiratory: Lungs clear to auscultation	  Psychiatry: A & O x 3, Mood & affect appropriate  Gastrointestinal:  obese, Soft, Non-tender, + BS	  Skin: No rashes, No ecchymoses, No cyanosis	  Neurologic: Non-focal  Extremities: Normal range of motion, left leg edematous left toe necrotic  Vascular: Peripheral pulses palpable 2+ bilaterally    LABS:	 	    CBC Full  -  ( 09 Sep 2018 09:31 )  WBC Count : 4.8 K/uL  Hemoglobin : 10.0 g/dL  Hematocrit : 30.6 %  Platelet Count - Automated : 326 K/uL  Mean Cell Volume : 85.6 fl  Mean Cell Hemoglobin : 28.0 pg  Mean Cell Hemoglobin Concentration : 32.7 gm/dL  Auto Neutrophil # : 2.6 K/uL  Auto Lymphocyte # : 1.3 K/uL  Auto Monocyte # : 0.6 K/uL  Auto Eosinophil # : 0.3 K/uL  Auto Basophil # : 0.0 K/uL  Auto Neutrophil % : 53.9 %  Auto Lymphocyte % : 27.3 %  Auto Monocyte % : 11.6 %  Auto Eosinophil % : 6.2 %  Auto Basophil % : 0.9 %    09-09    130<L>  |  92<L>  |  36<H>  ----------------------------<  126<H>  4.1   |  24  |  1.32<H>    Ca    9.5      09 Sep 2018 09:31    TPro  8.0  /  Alb  4.4  /  TBili  0.2  /  DBili  x   /  AST  20  /  ALT  14  /  AlkPhos  85  09-09      ECG:  No EKG changes  	    PREVIOUS DIAGNOSTIC TESTING:    [ ] Echocardiogram: < from: TTE with Doppler (w/Cont) (05.08.17 @ 13:04) >  Conclusions:  1. Aortic valve not well visualized; appears calcified.  2. Normal left ventricular internal dimensions and wall  thicknesses.  3. Technically difficult study, endocardial visualization  enhanced with intravenous injection of echo contrast  (Definity). Paradoxical septal wall motion consistent with  prior open heart surgery. The apex is hypokinetic. Grossly  borderline   LV systolic function, EF by visual estimate  about  50%  4. Normal tricuspid valve. Minimal tricuspid regurgitation.  5. Estimated pulmonary artery systolic pressure equals 19  mm Hg, assuming right atrial pressure equals 8  mm Hg,  consistent with normal pulmonary pressures.  *** Compared with echocardiogram of 3/26/2016, no  significant changes noted.    < end of copied text >    	  	  ASSESSMENT/PLAN: 	67 Male PMHX:, former smoker, IDDM2, hypothyroidism, HTN, HLD, IDDM, COPD, CAD s/p stent & CABG x 3 2016, CHF, PAD, recent left Iqsuqun-mm-uxasblww artery bypass with PTFE graft on 8/7 for chronic non healing L great toe infection. Admitted with chronic pain.  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    Revised Cardiac Risk Index 11%    Asymptomatic from cardiac standpoint  EKG without ischemic changes  OK to proceed with surgery without further cardiac risk stratification      Dakota Lopes, -ACNP  [id number]

## 2018-09-10 LAB
ANION GAP SERPL CALC-SCNC: 15 MMOL/L — SIGNIFICANT CHANGE UP (ref 5–17)
APTT BLD: 35.3 SEC — SIGNIFICANT CHANGE UP (ref 27.5–37.4)
BUN SERPL-MCNC: 35 MG/DL — HIGH (ref 7–23)
CALCIUM SERPL-MCNC: 9.7 MG/DL — SIGNIFICANT CHANGE UP (ref 8.4–10.5)
CHLORIDE SERPL-SCNC: 94 MMOL/L — LOW (ref 96–108)
CO2 SERPL-SCNC: 22 MMOL/L — SIGNIFICANT CHANGE UP (ref 22–31)
CREAT SERPL-MCNC: 1.24 MG/DL — SIGNIFICANT CHANGE UP (ref 0.5–1.3)
GLUCOSE BLDC GLUCOMTR-MCNC: 258 MG/DL — HIGH (ref 70–99)
GLUCOSE BLDC GLUCOMTR-MCNC: 282 MG/DL — HIGH (ref 70–99)
GLUCOSE BLDC GLUCOMTR-MCNC: 306 MG/DL — HIGH (ref 70–99)
GLUCOSE BLDC GLUCOMTR-MCNC: 350 MG/DL — HIGH (ref 70–99)
GLUCOSE SERPL-MCNC: 236 MG/DL — HIGH (ref 70–99)
HCT VFR BLD CALC: 29 % — LOW (ref 39–50)
HGB BLD-MCNC: 10.1 G/DL — LOW (ref 13–17)
INR BLD: 2.9 RATIO — HIGH (ref 0.88–1.16)
MCHC RBC-ENTMCNC: 29.9 PG — SIGNIFICANT CHANGE UP (ref 27–34)
MCHC RBC-ENTMCNC: 34.8 GM/DL — SIGNIFICANT CHANGE UP (ref 32–36)
MCV RBC AUTO: 85.8 FL — SIGNIFICANT CHANGE UP (ref 80–100)
PLATELET # BLD AUTO: 135 K/UL — LOW (ref 150–400)
POTASSIUM SERPL-MCNC: 4.9 MMOL/L — SIGNIFICANT CHANGE UP (ref 3.5–5.3)
POTASSIUM SERPL-SCNC: 4.9 MMOL/L — SIGNIFICANT CHANGE UP (ref 3.5–5.3)
PROTHROM AB SERPL-ACNC: 33.5 SEC — HIGH (ref 10–13.1)
RBC # BLD: 3.38 M/UL — LOW (ref 4.2–5.8)
RBC # FLD: 16.6 % — HIGH (ref 10.3–14.5)
SODIUM SERPL-SCNC: 131 MMOL/L — LOW (ref 135–145)
WBC # BLD: 2.97 K/UL — LOW (ref 3.8–10.5)
WBC # FLD AUTO: 2.97 K/UL — LOW (ref 3.8–10.5)

## 2018-09-10 RX ORDER — DEXTROSE 50 % IN WATER 50 %
12.5 SYRINGE (ML) INTRAVENOUS ONCE
Qty: 0 | Refills: 0 | Status: DISCONTINUED | OUTPATIENT
Start: 2018-09-10 | End: 2018-09-13

## 2018-09-10 RX ORDER — INSULIN LISPRO 100/ML
8 VIAL (ML) SUBCUTANEOUS
Qty: 0 | Refills: 0 | Status: DISCONTINUED | OUTPATIENT
Start: 2018-09-10 | End: 2018-09-11

## 2018-09-10 RX ORDER — DEXTROSE 50 % IN WATER 50 %
25 SYRINGE (ML) INTRAVENOUS ONCE
Qty: 0 | Refills: 0 | Status: DISCONTINUED | OUTPATIENT
Start: 2018-09-10 | End: 2018-09-13

## 2018-09-10 RX ORDER — GLUCAGON INJECTION, SOLUTION 0.5 MG/.1ML
1 INJECTION, SOLUTION SUBCUTANEOUS ONCE
Qty: 0 | Refills: 0 | Status: DISCONTINUED | OUTPATIENT
Start: 2018-09-10 | End: 2018-09-13

## 2018-09-10 RX ORDER — SODIUM CHLORIDE 9 MG/ML
1000 INJECTION, SOLUTION INTRAVENOUS
Qty: 0 | Refills: 0 | Status: DISCONTINUED | OUTPATIENT
Start: 2018-09-10 | End: 2018-09-13

## 2018-09-10 RX ORDER — PIPERACILLIN AND TAZOBACTAM 4; .5 G/20ML; G/20ML
3.38 INJECTION, POWDER, LYOPHILIZED, FOR SOLUTION INTRAVENOUS EVERY 8 HOURS
Qty: 0 | Refills: 0 | Status: DISCONTINUED | OUTPATIENT
Start: 2018-09-10 | End: 2018-09-13

## 2018-09-10 RX ORDER — INSULIN LISPRO 100/ML
VIAL (ML) SUBCUTANEOUS AT BEDTIME
Qty: 0 | Refills: 0 | Status: DISCONTINUED | OUTPATIENT
Start: 2018-09-10 | End: 2018-09-13

## 2018-09-10 RX ORDER — DEXTROSE 50 % IN WATER 50 %
15 SYRINGE (ML) INTRAVENOUS ONCE
Qty: 0 | Refills: 0 | Status: DISCONTINUED | OUTPATIENT
Start: 2018-09-10 | End: 2018-09-13

## 2018-09-10 RX ADMIN — BUDESONIDE AND FORMOTEROL FUMARATE DIHYDRATE 2 PUFF(S): 160; 4.5 AEROSOL RESPIRATORY (INHALATION) at 05:16

## 2018-09-10 RX ADMIN — Medication 650 MILLIGRAM(S): at 17:42

## 2018-09-10 RX ADMIN — Medication 3: at 08:43

## 2018-09-10 RX ADMIN — Medication 150 MICROGRAM(S): at 05:20

## 2018-09-10 RX ADMIN — OXYCODONE HYDROCHLORIDE 5 MILLIGRAM(S): 5 TABLET ORAL at 12:00

## 2018-09-10 RX ADMIN — LOSARTAN POTASSIUM 100 MILLIGRAM(S): 100 TABLET, FILM COATED ORAL at 05:17

## 2018-09-10 RX ADMIN — OXYCODONE HYDROCHLORIDE 5 MILLIGRAM(S): 5 TABLET ORAL at 07:31

## 2018-09-10 RX ADMIN — Medication 1 APPLICATION(S): at 11:32

## 2018-09-10 RX ADMIN — Medication 40 MILLIGRAM(S): at 05:17

## 2018-09-10 RX ADMIN — PIPERACILLIN AND TAZOBACTAM 25 GRAM(S): 4; .5 INJECTION, POWDER, LYOPHILIZED, FOR SOLUTION INTRAVENOUS at 11:31

## 2018-09-10 RX ADMIN — SENNA PLUS 2 TABLET(S): 8.6 TABLET ORAL at 21:17

## 2018-09-10 RX ADMIN — Medication 4 UNIT(S): at 12:38

## 2018-09-10 RX ADMIN — OXYCODONE HYDROCHLORIDE 5 MILLIGRAM(S): 5 TABLET ORAL at 18:52

## 2018-09-10 RX ADMIN — Medication 650 MILLIGRAM(S): at 18:12

## 2018-09-10 RX ADMIN — ATORVASTATIN CALCIUM 80 MILLIGRAM(S): 80 TABLET, FILM COATED ORAL at 21:17

## 2018-09-10 RX ADMIN — GABAPENTIN 300 MILLIGRAM(S): 400 CAPSULE ORAL at 11:30

## 2018-09-10 RX ADMIN — Medication 4 UNIT(S): at 17:39

## 2018-09-10 RX ADMIN — OXYCODONE HYDROCHLORIDE 5 MILLIGRAM(S): 5 TABLET ORAL at 01:29

## 2018-09-10 RX ADMIN — Medication 25 MILLIGRAM(S): at 17:39

## 2018-09-10 RX ADMIN — OXYCODONE HYDROCHLORIDE 5 MILLIGRAM(S): 5 TABLET ORAL at 11:30

## 2018-09-10 RX ADMIN — Medication 25 MILLIGRAM(S): at 05:18

## 2018-09-10 RX ADMIN — Medication 4: at 17:39

## 2018-09-10 RX ADMIN — INSULIN GLARGINE 20 UNIT(S): 100 INJECTION, SOLUTION SUBCUTANEOUS at 21:56

## 2018-09-10 RX ADMIN — OXYCODONE HYDROCHLORIDE 5 MILLIGRAM(S): 5 TABLET ORAL at 08:04

## 2018-09-10 RX ADMIN — PIPERACILLIN AND TAZOBACTAM 25 GRAM(S): 4; .5 INJECTION, POWDER, LYOPHILIZED, FOR SOLUTION INTRAVENOUS at 02:41

## 2018-09-10 RX ADMIN — Medication 81 MILLIGRAM(S): at 11:30

## 2018-09-10 RX ADMIN — Medication 4 UNIT(S): at 08:43

## 2018-09-10 RX ADMIN — Medication 650 MILLIGRAM(S): at 08:04

## 2018-09-10 RX ADMIN — Medication 650 MILLIGRAM(S): at 07:34

## 2018-09-10 RX ADMIN — OXYCODONE HYDROCHLORIDE 5 MILLIGRAM(S): 5 TABLET ORAL at 19:21

## 2018-09-10 RX ADMIN — Medication 3: at 12:37

## 2018-09-10 RX ADMIN — BUDESONIDE AND FORMOTEROL FUMARATE DIHYDRATE 2 PUFF(S): 160; 4.5 AEROSOL RESPIRATORY (INHALATION) at 17:40

## 2018-09-10 RX ADMIN — PIPERACILLIN AND TAZOBACTAM 25 GRAM(S): 4; .5 INJECTION, POWDER, LYOPHILIZED, FOR SOLUTION INTRAVENOUS at 21:57

## 2018-09-10 RX ADMIN — Medication 2: at 21:57

## 2018-09-10 RX ADMIN — PIPERACILLIN AND TAZOBACTAM 25 GRAM(S): 4; .5 INJECTION, POWDER, LYOPHILIZED, FOR SOLUTION INTRAVENOUS at 17:39

## 2018-09-10 RX ADMIN — OXYCODONE HYDROCHLORIDE 5 MILLIGRAM(S): 5 TABLET ORAL at 00:59

## 2018-09-10 NOTE — PROGRESS NOTE ADULT - ASSESSMENT
67 year old male LEFT hallux ulceration  - Pt was examined and evaluated  - Sent in for Pod surg --> Booked for Thursday 9/13 @ 3:30pm   - Appreciate Cardic and Medical optimization.  Cards cleared for surgery   - Please hold coumadin starting today for OR Thursday if okay with primary team   - c/w IV abx   - d/w attending

## 2018-09-10 NOTE — PROGRESS NOTE ADULT - ASSESSMENT
Patient  s/p L femoral-peroneal bypass  8/2018, , cad,/ stent,  cabg  2016,    copd,  pad,/ stent  leg, 2016,   ca prostate, sriram, , htn,  dm 2, on insulin, obesity,    admitted  with non healing toe  ulcer,  left    big toe  afib, on coumadin/  echo  2017,  ef  of  48  seen  by podiatry  in  [place], for Or on thursday  hold  coumadin  zosyn  labs/ inr in am  if inr  is  under  2, then  start    iv heparin per  vascular  card    clearance  ,         from: Cardiac Cath Lab - Adult (06.06.16 @ 12:56) >  RIGHT LOWER EXTREMITY VESSELS: two vessel runoff to ankle. Peroneal and PT.  peroneal runoff tofoot. Occluded AT. Right common femoral: The vessel was  partially occluded. There was a 80 % stenosis at the site of a prior  endarterectomy (performed on 06/06/2016). Right superficial femoral: There  was a 90 % stenosis. Right deep femoral: Normal.  COMPLICATIONS: , Left the 6fr sheat in.  DISP    < end of copied text >                 -

## 2018-09-10 NOTE — PROGRESS NOTE ADULT - ASSESSMENT
68 yo male with PAD, CAD,DM,HTN, and recent left leg bypass with PTFE graft.Left fem-peroneal bypass.  Left big toe pain and poorly healing nail bed.  The toe looks more ischemic than infected.  Await toe amputation per podiatry.  Suggest:  1.okay with zosyn but if any significant delay in surgery I would consider d/c of antibiotics  2.Follow cbc, diff, ? component of drug induced leukopenia  3.surgery per podiatry, hope for clean margins to allow limited post op antibiotics

## 2018-09-11 ENCOUNTER — TRANSCRIPTION ENCOUNTER (OUTPATIENT)
Age: 68
End: 2018-09-11

## 2018-09-11 DIAGNOSIS — E11.621 TYPE 2 DIABETES MELLITUS WITH FOOT ULCER: ICD-10-CM

## 2018-09-11 DIAGNOSIS — E11.9 TYPE 2 DIABETES MELLITUS WITHOUT COMPLICATIONS: ICD-10-CM

## 2018-09-11 DIAGNOSIS — I25.10 ATHEROSCLEROTIC HEART DISEASE OF NATIVE CORONARY ARTERY WITHOUT ANGINA PECTORIS: ICD-10-CM

## 2018-09-11 DIAGNOSIS — I10 ESSENTIAL (PRIMARY) HYPERTENSION: ICD-10-CM

## 2018-09-11 DIAGNOSIS — E03.9 HYPOTHYROIDISM, UNSPECIFIED: ICD-10-CM

## 2018-09-11 LAB
ANION GAP SERPL CALC-SCNC: 15 MMOL/L — SIGNIFICANT CHANGE UP (ref 5–17)
BUN SERPL-MCNC: 33 MG/DL — HIGH (ref 7–23)
CALCIUM SERPL-MCNC: 9.8 MG/DL — SIGNIFICANT CHANGE UP (ref 8.4–10.5)
CHLORIDE SERPL-SCNC: 92 MMOL/L — LOW (ref 96–108)
CO2 SERPL-SCNC: 21 MMOL/L — LOW (ref 22–31)
CREAT SERPL-MCNC: 1.43 MG/DL — HIGH (ref 0.5–1.3)
GLUCOSE BLDC GLUCOMTR-MCNC: 252 MG/DL — HIGH (ref 70–99)
GLUCOSE BLDC GLUCOMTR-MCNC: 310 MG/DL — HIGH (ref 70–99)
GLUCOSE BLDC GLUCOMTR-MCNC: 331 MG/DL — HIGH (ref 70–99)
GLUCOSE BLDC GLUCOMTR-MCNC: 343 MG/DL — HIGH (ref 70–99)
GLUCOSE SERPL-MCNC: 208 MG/DL — HIGH (ref 70–99)
HCT VFR BLD CALC: 29.3 % — LOW (ref 39–50)
HGB BLD-MCNC: 9.2 G/DL — LOW (ref 13–17)
INR BLD: 2.35 RATIO — HIGH (ref 0.88–1.16)
MCHC RBC-ENTMCNC: 26.4 PG — LOW (ref 27–34)
MCHC RBC-ENTMCNC: 31.4 GM/DL — LOW (ref 32–36)
MCV RBC AUTO: 84.2 FL — SIGNIFICANT CHANGE UP (ref 80–100)
PLATELET # BLD AUTO: 315 K/UL — SIGNIFICANT CHANGE UP (ref 150–400)
POTASSIUM SERPL-MCNC: 4.3 MMOL/L — SIGNIFICANT CHANGE UP (ref 3.5–5.3)
POTASSIUM SERPL-SCNC: 4.3 MMOL/L — SIGNIFICANT CHANGE UP (ref 3.5–5.3)
PROTHROM AB SERPL-ACNC: 27 SEC — HIGH (ref 10–13.1)
RBC # BLD: 3.48 M/UL — LOW (ref 4.2–5.8)
RBC # FLD: 16.2 % — HIGH (ref 10.3–14.5)
SODIUM SERPL-SCNC: 128 MMOL/L — LOW (ref 135–145)
WBC # BLD: 5.5 K/UL — SIGNIFICANT CHANGE UP (ref 3.8–10.5)
WBC # FLD AUTO: 5.5 K/UL — SIGNIFICANT CHANGE UP (ref 3.8–10.5)

## 2018-09-11 PROCEDURE — 99223 1ST HOSP IP/OBS HIGH 75: CPT

## 2018-09-11 PROCEDURE — 93010 ELECTROCARDIOGRAM REPORT: CPT

## 2018-09-11 RX ORDER — OXYCODONE AND ACETAMINOPHEN 5; 325 MG/1; MG/1
2 TABLET ORAL EVERY 4 HOURS
Qty: 0 | Refills: 0 | Status: DISCONTINUED | OUTPATIENT
Start: 2018-09-11 | End: 2018-09-13

## 2018-09-11 RX ORDER — OXYCODONE HYDROCHLORIDE 5 MG/1
2.5 TABLET ORAL ONCE
Qty: 0 | Refills: 0 | Status: DISCONTINUED | OUTPATIENT
Start: 2018-09-11 | End: 2018-09-11

## 2018-09-11 RX ORDER — INSULIN GLARGINE 100 [IU]/ML
20 INJECTION, SOLUTION SUBCUTANEOUS AT BEDTIME
Qty: 0 | Refills: 0 | Status: DISCONTINUED | OUTPATIENT
Start: 2018-09-11 | End: 2018-09-11

## 2018-09-11 RX ORDER — INSULIN GLARGINE 100 [IU]/ML
50 INJECTION, SOLUTION SUBCUTANEOUS AT BEDTIME
Qty: 0 | Refills: 0 | Status: DISCONTINUED | OUTPATIENT
Start: 2018-09-11 | End: 2018-09-12

## 2018-09-11 RX ORDER — POLYETHYLENE GLYCOL 3350 17 G/17G
17 POWDER, FOR SOLUTION ORAL DAILY
Qty: 0 | Refills: 0 | Status: DISCONTINUED | OUTPATIENT
Start: 2018-09-11 | End: 2018-09-13

## 2018-09-11 RX ORDER — INSULIN LISPRO 100/ML
15 VIAL (ML) SUBCUTANEOUS
Qty: 0 | Refills: 0 | Status: DISCONTINUED | OUTPATIENT
Start: 2018-09-11 | End: 2018-09-12

## 2018-09-11 RX ORDER — INSULIN GLARGINE 100 [IU]/ML
45 INJECTION, SOLUTION SUBCUTANEOUS AT BEDTIME
Qty: 0 | Refills: 0 | Status: DISCONTINUED | OUTPATIENT
Start: 2018-09-11 | End: 2018-09-11

## 2018-09-11 RX ADMIN — OXYCODONE AND ACETAMINOPHEN 2 TABLET(S): 5; 325 TABLET ORAL at 18:30

## 2018-09-11 RX ADMIN — Medication 650 MILLIGRAM(S): at 09:15

## 2018-09-11 RX ADMIN — Medication 2: at 21:26

## 2018-09-11 RX ADMIN — Medication 650 MILLIGRAM(S): at 08:25

## 2018-09-11 RX ADMIN — Medication 150 MICROGRAM(S): at 05:12

## 2018-09-11 RX ADMIN — Medication 4: at 17:11

## 2018-09-11 RX ADMIN — OXYCODONE HYDROCHLORIDE 5 MILLIGRAM(S): 5 TABLET ORAL at 07:08

## 2018-09-11 RX ADMIN — SENNA PLUS 2 TABLET(S): 8.6 TABLET ORAL at 21:00

## 2018-09-11 RX ADMIN — OXYCODONE HYDROCHLORIDE 2.5 MILLIGRAM(S): 5 TABLET ORAL at 06:00

## 2018-09-11 RX ADMIN — BUDESONIDE AND FORMOTEROL FUMARATE DIHYDRATE 2 PUFF(S): 160; 4.5 AEROSOL RESPIRATORY (INHALATION) at 17:12

## 2018-09-11 RX ADMIN — OXYCODONE AND ACETAMINOPHEN 2 TABLET(S): 5; 325 TABLET ORAL at 21:45

## 2018-09-11 RX ADMIN — Medication 81 MILLIGRAM(S): at 11:42

## 2018-09-11 RX ADMIN — PIPERACILLIN AND TAZOBACTAM 25 GRAM(S): 4; .5 INJECTION, POWDER, LYOPHILIZED, FOR SOLUTION INTRAVENOUS at 05:15

## 2018-09-11 RX ADMIN — Medication 15 UNIT(S): at 17:11

## 2018-09-11 RX ADMIN — OXYCODONE HYDROCHLORIDE 2.5 MILLIGRAM(S): 5 TABLET ORAL at 05:12

## 2018-09-11 RX ADMIN — Medication 3: at 09:00

## 2018-09-11 RX ADMIN — Medication 25 MILLIGRAM(S): at 17:41

## 2018-09-11 RX ADMIN — PIPERACILLIN AND TAZOBACTAM 25 GRAM(S): 4; .5 INJECTION, POWDER, LYOPHILIZED, FOR SOLUTION INTRAVENOUS at 21:00

## 2018-09-11 RX ADMIN — OXYCODONE HYDROCHLORIDE 5 MILLIGRAM(S): 5 TABLET ORAL at 03:00

## 2018-09-11 RX ADMIN — Medication 8 UNIT(S): at 12:42

## 2018-09-11 RX ADMIN — Medication 8 UNIT(S): at 08:59

## 2018-09-11 RX ADMIN — PIPERACILLIN AND TAZOBACTAM 25 GRAM(S): 4; .5 INJECTION, POWDER, LYOPHILIZED, FOR SOLUTION INTRAVENOUS at 13:23

## 2018-09-11 RX ADMIN — OXYCODONE HYDROCHLORIDE 5 MILLIGRAM(S): 5 TABLET ORAL at 11:00

## 2018-09-11 RX ADMIN — OXYCODONE HYDROCHLORIDE 5 MILLIGRAM(S): 5 TABLET ORAL at 06:15

## 2018-09-11 RX ADMIN — GABAPENTIN 300 MILLIGRAM(S): 400 CAPSULE ORAL at 11:42

## 2018-09-11 RX ADMIN — OXYCODONE AND ACETAMINOPHEN 2 TABLET(S): 5; 325 TABLET ORAL at 17:39

## 2018-09-11 RX ADMIN — LOSARTAN POTASSIUM 100 MILLIGRAM(S): 100 TABLET, FILM COATED ORAL at 05:12

## 2018-09-11 RX ADMIN — ATORVASTATIN CALCIUM 80 MILLIGRAM(S): 80 TABLET, FILM COATED ORAL at 21:00

## 2018-09-11 RX ADMIN — Medication 4: at 12:42

## 2018-09-11 RX ADMIN — OXYCODONE AND ACETAMINOPHEN 2 TABLET(S): 5; 325 TABLET ORAL at 21:00

## 2018-09-11 RX ADMIN — Medication 40 MILLIGRAM(S): at 05:12

## 2018-09-11 RX ADMIN — OXYCODONE HYDROCHLORIDE 5 MILLIGRAM(S): 5 TABLET ORAL at 02:03

## 2018-09-11 RX ADMIN — OXYCODONE HYDROCHLORIDE 5 MILLIGRAM(S): 5 TABLET ORAL at 10:05

## 2018-09-11 RX ADMIN — Medication 25 MILLIGRAM(S): at 05:13

## 2018-09-11 RX ADMIN — BUDESONIDE AND FORMOTEROL FUMARATE DIHYDRATE 2 PUFF(S): 160; 4.5 AEROSOL RESPIRATORY (INHALATION) at 06:12

## 2018-09-11 RX ADMIN — INSULIN GLARGINE 50 UNIT(S): 100 INJECTION, SOLUTION SUBCUTANEOUS at 21:26

## 2018-09-11 NOTE — CONSULT NOTE ADULT - SUBJECTIVE AND OBJECTIVE BOX
HPI:  67y Male complaining of toe infection	   67 M h/o COPD,     CHF, PAD,   IDDM, recent left Jwautwd-zy-hokzhujo artery bypass with PTFE graft on 8/7 for chronic non healing L great toe infection, sent by podiatry for surgery. States that since surgery, he still has persistent pain to the toe. He is on amoxicillin, . Denies any drainage or fevers. No change in appearance or amount of swelling. Able to ambulate. Saw podiatry last week and was told to come to ED on sunday for admission. (09 Sep 2018 11:23)  Patient has history of diabetes, on basal bolus insulin at home, no recent hypoglycemic episodes, no polyuria polydipsia. Patient follows up with PCP for diabetes management.    PAST MEDICAL & SURGICAL HISTORY:  COPD (chronic obstructive pulmonary disease)  Sleep apnea: non-compliant  Anemia  Atherosclerosis of arteries of extremities: left leg  Mild intermittent asthma without complication  Iron deficiency anemia, unspecified iron deficiency anemia type  Prostate cancer  Bronchospasm  Obesity (BMI 30-39.9)  PAD (peripheral artery disease): RLE bypass 11/30/15  CAD (coronary artery disease): 3 cardiac stents placed in 2006 (mid LAD, Prox LAD, Prox to #2), 3V CABG 3/28/16  Hypothyroid  Intermittent claudication  Diabetic neuropathy  DM (diabetes mellitus): type 2 - on insulin pump  CHF (congestive heart failure)  Hypercholesteremia  HTN (hypertension)  History of femoral angiogram: Left femoral endarterectomy Fem-Pop bypass 6/2017  S/P bypass graft of extremity: RLE- 11/30/15  S/P CABG x 3: 3/28/16  Stented coronary artery: x 3 cardiac stents placed in 2006  S/P prostatectomy: 1996      FAMILY HISTORY:  No pertinent family history in first degree relatives      Social History:    Outpatient Medications:    MEDICATIONS  (STANDING):  aspirin enteric coated 81 milliGRAM(s) Oral daily  atorvastatin 80 milliGRAM(s) Oral at bedtime  buDESOnide 160 MICROgram(s)/formoterol 4.5 MICROgram(s) Inhaler 2 Puff(s) Inhalation two times a day  Dakins Solution - 1/4 Strength 1 Application(s) Topical daily  dextrose 5%. 1000 milliLiter(s) (50 mL/Hr) IV Continuous <Continuous>  dextrose 5%. 1000 milliLiter(s) (50 mL/Hr) IV Continuous <Continuous>  dextrose 50% Injectable 12.5 Gram(s) IV Push once  dextrose 50% Injectable 25 Gram(s) IV Push once  dextrose 50% Injectable 25 Gram(s) IV Push once  dextrose 50% Injectable 12.5 Gram(s) IV Push once  dextrose 50% Injectable 25 Gram(s) IV Push once  dextrose 50% Injectable 25 Gram(s) IV Push once  furosemide    Tablet 40 milliGRAM(s) Oral daily  gabapentin 300 milliGRAM(s) Oral daily  influenza   Vaccine 0.5 milliLiter(s) IntraMuscular once  insulin glargine Injectable (LANTUS) 50 Unit(s) SubCutaneous at bedtime  insulin lispro (HumaLOG) corrective regimen sliding scale   SubCutaneous at bedtime  insulin lispro (HumaLOG) corrective regimen sliding scale   SubCutaneous three times a day before meals  insulin lispro Injectable (HumaLOG) 15 Unit(s) SubCutaneous three times a day before meals  levothyroxine 150 MICROGram(s) Oral daily  losartan 100 milliGRAM(s) Oral daily  metoprolol tartrate 25 milliGRAM(s) Oral two times a day  piperacillin/tazobactam IVPB. 3.375 Gram(s) IV Intermittent every 8 hours  polyethylene glycol 3350 17 Gram(s) Oral daily  senna 2 Tablet(s) Oral at bedtime    MEDICATIONS  (PRN):  dextrose 40% Gel 15 Gram(s) Oral once PRN Blood Glucose LESS THAN 70 milliGRAM(s)/deciliter  dextrose 40% Gel 15 Gram(s) Oral once PRN Blood Glucose LESS THAN 70 milliGRAM(s)/deciliter  glucagon  Injectable 1 milliGRAM(s) IntraMuscular once PRN Glucose LESS THAN 70 milligrams/deciliter  glucagon  Injectable 1 milliGRAM(s) IntraMuscular once PRN Glucose LESS THAN 70 milligrams/deciliter  oxyCODONE    5 mG/acetaminophen 325 mG 2 Tablet(s) Oral every 4 hours PRN Mild Pain (1 - 3)      Allergies    Benadryl (Rash)  contrast media (iodine-based) (Hypotension)    Intolerances      Review of Systems:  Constitutional: No fever, no chills  Eyes: No blurry vision  Neuro: No tremors  HEENT: No pain, no neck swelling  Cardiovascular: No chest pain, no palpitations  Respiratory: Has SOB, no cough  GI: No nausea, vomiting, abdominal pain  : No dysuria  Skin: no rash  MSK: Has leg swelling.  Psych: no depression  Endocrine: no polyuria, polydipsia    ALL OTHER SYSTEMS REVIEWED AND NEGATIVE    UNABLE TO OBTAIN    PHYSICAL EXAM:  VITALS: T(C): 36.8 (09-11-18 @ 17:43)  T(F): 98.3 (09-11-18 @ 17:43), Max: 98.4 (09-11-18 @ 15:38)  HR: 77 (09-11-18 @ 17:43) (67 - 88)  BP: 150/75 (09-11-18 @ 17:43) (119/57 - 169/68)  RR:  (18 - 18)  SpO2:  (87% - 100%)  Wt(kg): --  GENERAL: NAD, well-groomed, well-developed  EYES: No proptosis, no lid lag  HEENT:  Atraumatic, Normocephalic  THYROID: Normal size, no palpable nodules  RESPIRATORY: Clear to auscultation bilaterally; No rales, rhonchi, wheezing  CARDIOVASCULAR: Si S2, No murmurs;  GI: Soft, non distended, normal bowel sounds  SKIN: Dry, intact, No rashes or lesions  MUSCULOSKELETAL: Has BL lower extremity edema.  NEURO:  no tremor, sensation decreased in feet BL,    POCT Blood Glucose.: 310 mg/dL (09-11-18 @ 17:00)  POCT Blood Glucose.: 331 mg/dL (09-11-18 @ 12:30)  POCT Blood Glucose.: 252 mg/dL (09-11-18 @ 08:36)  POCT Blood Glucose.: 350 mg/dL (09-10-18 @ 21:19)  POCT Blood Glucose.: 306 mg/dL (09-10-18 @ 17:03)  POCT Blood Glucose.: 282 mg/dL (09-10-18 @ 12:32)  POCT Blood Glucose.: 258 mg/dL (09-10-18 @ 08:24)  POCT Blood Glucose.: 272 mg/dL (09-09-18 @ 21:15)  POCT Blood Glucose.: 227 mg/dL (09-09-18 @ 17:49)                            9.2    5.50  )-----------( 315      ( 11 Sep 2018 07:55 )             29.3       09-11    128<L>  |  92<L>  |  33<H>  ----------------------------<  208<H>  4.3   |  21<L>  |  1.43<H>    EGFR if : 58<L>  EGFR if non : 50<L>    Ca    9.8      09-11    TPro  8.0  /  Alb  4.4  /  TBili  0.2  /  DBili  x   /  AST  20  /  ALT  14  /  AlkPhos  85  09-09      Thyroid Function Tests:      Hemoglobin A1C, Whole Blood: 9.1 % <H> [4.0 - 5.6] (07-20-18 @ 05:16)          Radiology:

## 2018-09-11 NOTE — PROGRESS NOTE ADULT - ASSESSMENT
Patient is a high risk cardiovascular patient on the basis of his known ischemic heart disease, PAD, history of CHF, and insulin dependent diabetes. RCRI score 3, consistent with 11% risk of cardiac event.  Patient is capable of > 4 METS at baseline.  He underwent higher risk vascular surgery one month ago without cardiac event.    He is currently without recent ACS, decompensated heart failure, dangerous arrhythmia, or critical valvular stenosis.  EKG from today reviewed and is unchanged from outpatient records.  No further cardiovascular work-up is necessary prior to his planned procedure.    Continue ASA and high intensity statin.  Continue beta-blocker perioperatively.    Would hold ARB in setting of rising creatinine.

## 2018-09-11 NOTE — CONSULT NOTE ADULT - PROBLEM SELECTOR RECOMMENDATION 9
Will start on Lantus 50 units at bed time.  Will start Humalog 15 units before each meal in addition to Humalog correction scale coverage.  Patient counseled for compliance with consistent low carb diet.

## 2018-09-11 NOTE — PROGRESS NOTE ADULT - ASSESSMENT
68 yo male with PAD, CAD,DM,HTN, and recent left leg bypass with PTFE graft.Left fem-peroneal bypass.  Left big toe pain and poorly healing nail bed.  The toe looks more ischemic than infected.I am concerned by cool temp of foot.  Await toe amputation per podiatry.  Suggest:  1.okay with zosyn but if any significant delay in surgery I would consider d/c of antibiotics  2.Follow cbc, diff, ? component of drug induced leukopenia, will repeat CBC and Diff in AM  3.surgery per podiatry, hope for clean margins to allow limited post op antibiotics

## 2018-09-11 NOTE — PROVIDER CONTACT NOTE (MEDICATION) - ACTION/TREATMENT ORDERED:
Notified OLIVIA Nugent, New order of 2.5mg Oxycodone put in to help with the pain until next scheduled PRN order of 5mg Oxycodone.

## 2018-09-11 NOTE — CONSULT NOTE ADULT - ASSESSMENT
Assessment  DMT2: 67y Male with DM T2 with hyperglycemia on basal bolus insulin at home, blood sugars running high, no hypoglycemic episode,  eating meals,  non compliant with low carb diet.  CAD: On medications, stable, monitored.  Hypothyroidism:  On synthroid 150 mcg po daily, asymptomatic.  HTN: Controlled, On med.  Foot wound: Planing amputation, podiatry FU

## 2018-09-11 NOTE — PROGRESS NOTE ADULT - ASSESSMENT
Patient  s/p L femoral-peroneal bypass  8/2018, , cad,/ stent,  cabg  2016,    copd,  pad,/ stent  leg, 2016,   ca prostate, sriram, , htn,  dm 2, on insulin, obesity,    admitted  with non healing toe  ulcer,  left    big toe  afib, on coumadin/  echo  2017,  ef  of  48  seen  by podiatry  in  [place], for Or on thursday  hold  coumadin, ,   zosyn   inr in am  if inr  is  under  2, then  start lovenox   per  vascular  card    clearance   cleared  for  or, when  inr  is ok,         from: Cardiac Cath Lab - Adult (06.06.16 @ 12:56) >  RIGHT LOWER EXTREMITY VESSELS: two vessel runoff to ankle. Peroneal and PT.  peroneal runoff tofoot. Occluded AT. Right common femoral: The vessel was  partially occluded. There was a 80 % stenosis at the site of a prior  endarterectomy (performed on 06/06/2016). Right superficial femoral: There  was a 90 % stenosis. Right deep femoral: Normal.  COMPLICATIONS: , Left the 6fr sheat in.  DISP    < end of copied text >                 -

## 2018-09-11 NOTE — PROGRESS NOTE ADULT - ASSESSMENT
67 year old male LEFT hallux ulceration  - Pt was examined and evaluated  - Sent in for Pod surg --> Booked for Thursday 9/13 @ 3:30pm   - Appreciate Cardic and Medical optimization.  Cards cleared for surgery   - Consented today   - c/w IV abx   - d/w attending 67 year old male LEFT hallux ulceration  - Pt was examined and evaluated  - Sent in for Pod surg --> Booked for Wednesday 9/12 @ 7:30 am   - Appreciate Cardic and Medical optimization.  Cards cleared for surgery   - Consented today   - Pre-op orders placed. NPO at midnight.   - c/w IV abx   - d/w attending

## 2018-09-12 ENCOUNTER — RESULT REVIEW (OUTPATIENT)
Age: 68
End: 2018-09-12

## 2018-09-12 LAB
ANION GAP SERPL CALC-SCNC: 15 MMOL/L — SIGNIFICANT CHANGE UP (ref 5–17)
B-OH-BUTYR SERPL-SCNC: 0.1 MMOL/L — SIGNIFICANT CHANGE UP
BASOPHILS # BLD AUTO: 0.03 K/UL — SIGNIFICANT CHANGE UP (ref 0–0.2)
BASOPHILS NFR BLD AUTO: 0.6 % — SIGNIFICANT CHANGE UP (ref 0–2)
BLD GP AB SCN SERPL QL: NEGATIVE — SIGNIFICANT CHANGE UP
BUN SERPL-MCNC: 34 MG/DL — HIGH (ref 7–23)
CALCIUM SERPL-MCNC: 9.7 MG/DL — SIGNIFICANT CHANGE UP (ref 8.4–10.5)
CHLORIDE SERPL-SCNC: 94 MMOL/L — LOW (ref 96–108)
CO2 SERPL-SCNC: 25 MMOL/L — SIGNIFICANT CHANGE UP (ref 22–31)
CREAT SERPL-MCNC: 1.34 MG/DL — HIGH (ref 0.5–1.3)
EOSINOPHIL # BLD AUTO: 0.31 K/UL — SIGNIFICANT CHANGE UP (ref 0–0.5)
EOSINOPHIL NFR BLD AUTO: 6 % — SIGNIFICANT CHANGE UP (ref 0–6)
GLUCOSE BLDC GLUCOMTR-MCNC: 242 MG/DL — HIGH (ref 70–99)
GLUCOSE BLDC GLUCOMTR-MCNC: 302 MG/DL — HIGH (ref 70–99)
GLUCOSE BLDC GLUCOMTR-MCNC: 357 MG/DL — HIGH (ref 70–99)
GLUCOSE BLDC GLUCOMTR-MCNC: 427 MG/DL — HIGH (ref 70–99)
GLUCOSE SERPL-MCNC: 234 MG/DL — HIGH (ref 70–99)
HCT VFR BLD CALC: 29.3 % — LOW (ref 39–50)
HGB BLD-MCNC: 9.6 G/DL — LOW (ref 13–17)
IMM GRANULOCYTES NFR BLD AUTO: 0 % — SIGNIFICANT CHANGE UP (ref 0–1.5)
LYMPHOCYTES # BLD AUTO: 1.9 K/UL — SIGNIFICANT CHANGE UP (ref 1–3.3)
LYMPHOCYTES # BLD AUTO: 36.5 % — SIGNIFICANT CHANGE UP (ref 13–44)
MCHC RBC-ENTMCNC: 27.7 PG — SIGNIFICANT CHANGE UP (ref 27–34)
MCHC RBC-ENTMCNC: 32.8 GM/DL — SIGNIFICANT CHANGE UP (ref 32–36)
MCV RBC AUTO: 84.7 FL — SIGNIFICANT CHANGE UP (ref 80–100)
MONOCYTES # BLD AUTO: 0.42 K/UL — SIGNIFICANT CHANGE UP (ref 0–0.9)
MONOCYTES NFR BLD AUTO: 8.1 % — SIGNIFICANT CHANGE UP (ref 2–14)
NEUTROPHILS # BLD AUTO: 2.55 K/UL — SIGNIFICANT CHANGE UP (ref 1.8–7.4)
NEUTROPHILS NFR BLD AUTO: 48.8 % — SIGNIFICANT CHANGE UP (ref 43–77)
PLATELET # BLD AUTO: 315 K/UL — SIGNIFICANT CHANGE UP (ref 150–400)
POTASSIUM SERPL-MCNC: 4.6 MMOL/L — SIGNIFICANT CHANGE UP (ref 3.5–5.3)
POTASSIUM SERPL-SCNC: 4.6 MMOL/L — SIGNIFICANT CHANGE UP (ref 3.5–5.3)
RBC # BLD: 3.46 M/UL — LOW (ref 4.2–5.8)
RBC # FLD: 16.4 % — HIGH (ref 10.3–14.5)
RH IG SCN BLD-IMP: POSITIVE — SIGNIFICANT CHANGE UP
SODIUM SERPL-SCNC: 134 MMOL/L — LOW (ref 135–145)
WBC # BLD: 5.21 K/UL — SIGNIFICANT CHANGE UP (ref 3.8–10.5)
WBC # FLD AUTO: 5.21 K/UL — SIGNIFICANT CHANGE UP (ref 3.8–10.5)

## 2018-09-12 PROCEDURE — 73630 X-RAY EXAM OF FOOT: CPT | Mod: 26,LT

## 2018-09-12 PROCEDURE — 88305 TISSUE EXAM BY PATHOLOGIST: CPT | Mod: 26

## 2018-09-12 PROCEDURE — 88311 DECALCIFY TISSUE: CPT | Mod: 26

## 2018-09-12 RX ORDER — SODIUM CHLORIDE 9 MG/ML
1000 INJECTION INTRAMUSCULAR; INTRAVENOUS; SUBCUTANEOUS
Qty: 0 | Refills: 0 | Status: DISCONTINUED | OUTPATIENT
Start: 2018-09-12 | End: 2018-09-13

## 2018-09-12 RX ORDER — INSULIN LISPRO 100/ML
19 VIAL (ML) SUBCUTANEOUS
Qty: 0 | Refills: 0 | Status: DISCONTINUED | OUTPATIENT
Start: 2018-09-12 | End: 2018-09-13

## 2018-09-12 RX ORDER — HYDROMORPHONE HYDROCHLORIDE 2 MG/ML
0.25 INJECTION INTRAMUSCULAR; INTRAVENOUS; SUBCUTANEOUS
Qty: 0 | Refills: 0 | Status: DISCONTINUED | OUTPATIENT
Start: 2018-09-12 | End: 2018-09-12

## 2018-09-12 RX ORDER — MORPHINE SULFATE 50 MG/1
2 CAPSULE, EXTENDED RELEASE ORAL EVERY 4 HOURS
Qty: 0 | Refills: 0 | Status: DISCONTINUED | OUTPATIENT
Start: 2018-09-12 | End: 2018-09-13

## 2018-09-12 RX ORDER — ONDANSETRON 8 MG/1
4 TABLET, FILM COATED ORAL ONCE
Qty: 0 | Refills: 0 | Status: DISCONTINUED | OUTPATIENT
Start: 2018-09-12 | End: 2018-09-12

## 2018-09-12 RX ORDER — ACETAMINOPHEN 500 MG
650 TABLET ORAL EVERY 6 HOURS
Qty: 0 | Refills: 0 | Status: DISCONTINUED | OUTPATIENT
Start: 2018-09-12 | End: 2018-09-13

## 2018-09-12 RX ORDER — INSULIN GLARGINE 100 [IU]/ML
58 INJECTION, SOLUTION SUBCUTANEOUS AT BEDTIME
Qty: 0 | Refills: 0 | Status: DISCONTINUED | OUTPATIENT
Start: 2018-09-12 | End: 2018-09-13

## 2018-09-12 RX ADMIN — Medication 4: at 12:25

## 2018-09-12 RX ADMIN — Medication 81 MILLIGRAM(S): at 13:14

## 2018-09-12 RX ADMIN — PIPERACILLIN AND TAZOBACTAM 25 GRAM(S): 4; .5 INJECTION, POWDER, LYOPHILIZED, FOR SOLUTION INTRAVENOUS at 21:54

## 2018-09-12 RX ADMIN — OXYCODONE AND ACETAMINOPHEN 2 TABLET(S): 5; 325 TABLET ORAL at 03:22

## 2018-09-12 RX ADMIN — Medication 150 MICROGRAM(S): at 05:07

## 2018-09-12 RX ADMIN — Medication 19 UNIT(S): at 12:25

## 2018-09-12 RX ADMIN — Medication 6: at 17:23

## 2018-09-12 RX ADMIN — Medication 40 MILLIGRAM(S): at 05:07

## 2018-09-12 RX ADMIN — Medication 3: at 21:54

## 2018-09-12 RX ADMIN — Medication 25 MILLIGRAM(S): at 05:07

## 2018-09-12 RX ADMIN — BUDESONIDE AND FORMOTEROL FUMARATE DIHYDRATE 2 PUFF(S): 160; 4.5 AEROSOL RESPIRATORY (INHALATION) at 17:24

## 2018-09-12 RX ADMIN — Medication 25 MILLIGRAM(S): at 17:24

## 2018-09-12 RX ADMIN — Medication 19 UNIT(S): at 17:23

## 2018-09-12 RX ADMIN — OXYCODONE AND ACETAMINOPHEN 2 TABLET(S): 5; 325 TABLET ORAL at 02:37

## 2018-09-12 RX ADMIN — SENNA PLUS 2 TABLET(S): 8.6 TABLET ORAL at 21:54

## 2018-09-12 RX ADMIN — INSULIN GLARGINE 58 UNIT(S): 100 INJECTION, SOLUTION SUBCUTANEOUS at 21:55

## 2018-09-12 RX ADMIN — PIPERACILLIN AND TAZOBACTAM 25 GRAM(S): 4; .5 INJECTION, POWDER, LYOPHILIZED, FOR SOLUTION INTRAVENOUS at 13:27

## 2018-09-12 RX ADMIN — ATORVASTATIN CALCIUM 80 MILLIGRAM(S): 80 TABLET, FILM COATED ORAL at 21:54

## 2018-09-12 RX ADMIN — BUDESONIDE AND FORMOTEROL FUMARATE DIHYDRATE 2 PUFF(S): 160; 4.5 AEROSOL RESPIRATORY (INHALATION) at 05:08

## 2018-09-12 RX ADMIN — GABAPENTIN 300 MILLIGRAM(S): 400 CAPSULE ORAL at 12:26

## 2018-09-12 RX ADMIN — PIPERACILLIN AND TAZOBACTAM 25 GRAM(S): 4; .5 INJECTION, POWDER, LYOPHILIZED, FOR SOLUTION INTRAVENOUS at 05:07

## 2018-09-12 RX ADMIN — LOSARTAN POTASSIUM 100 MILLIGRAM(S): 100 TABLET, FILM COATED ORAL at 05:07

## 2018-09-12 RX ADMIN — SODIUM CHLORIDE 30 MILLILITER(S): 9 INJECTION INTRAMUSCULAR; INTRAVENOUS; SUBCUTANEOUS at 09:59

## 2018-09-12 NOTE — PROGRESS NOTE ADULT - ASSESSMENT
Plan:   To OR today at ___7:30_ with Dr. Cruz_____ for __left partial 1st ray resection_____.   CXR on sunrise.  EKG on sunrise.  Medical/Cardiac clearance since __9/10__ and documented in chart.  Consent signed and in chart.  Procedure was explained to patient in detail. All alternatives, risks and complications were discussed. All questions answered.

## 2018-09-12 NOTE — PROGRESS NOTE ADULT - ASSESSMENT
Patient  s/p L femoral-peroneal bypass  8/2018, , cad,/ stent,  cabg  2016,    copd,  pad,/ stent  leg, 2016,   ca prostate, sriram, , htn,  dm 2, on insulin, obesity,    admitted  with non healing toe  ulcer,  left    big toe  afib, on coumadin/  echo  2017,  ef  of  48  seen  by podiatry  in  [place], for Or today  hold  coumadin, ,   zosyn   inr in am         from: Cardiac Cath Lab - Adult (06.06.16 @ 12:56) >  RIGHT LOWER EXTREMITY VESSELS: two vessel runoff to ankle. Peroneal and PT.  peroneal runoff tofoot. Occluded AT. Right common femoral: The vessel was  partially occluded. There was a 80 % stenosis at the site of a prior  endarterectomy (performed on 06/06/2016). Right superficial femoral: There  was a 90 % stenosis. Right deep femoral: Normal.  COMPLICATIONS: , Left the 6fr sheat in.  DISP    < end of copied text >                 -

## 2018-09-12 NOTE — PROGRESS NOTE ADULT - ASSESSMENT
68 yo male with PAD, CAD,DM,HTN, and recent left leg bypass with PTFE graft.Left fem-peroneal bypass.  Left big toe pain and poorly healing nail bed.  The toe looks more ischemic than infected.I am concerned by cool temp of foot.  S/P ray amputation 9/12  Per OR report no concerns of residual osteo but concerns of limited blood flow.  Leukopenia has improved spontaneously  Suggest:  1.will adhere to 2 weeks of oral antibiotics post op  2.conversion to oral agent in 24-48 hours.

## 2018-09-12 NOTE — CHART NOTE - NSCHARTNOTEFT_GEN_A_CORE
Vascular Surgery Post-Op Note    Pre-Op Dx: Type 2 diabetes mellitus with foot ulcer, Gangrene, Diabetic toe ulcer    Procedure: Amputation of left hallux    Surgeon: Dr. Gregg    Subjective: Patient is 4hrs s/p amputation of left hallux. Patient denies CP, SOB, and lightheadedness. Patient has urinated twice and is hungry, waiting to order food. He has no complaints and feels well.     Vital Signs Last 24 Hrs  T(C): 36.4 (12 Sep 2018 13:00), Max: 36.9 (11 Sep 2018 15:38)  T(F): 97.6 (12 Sep 2018 13:00), Max: 98.4 (11 Sep 2018 15:38)  HR: 63 (12 Sep 2018 13:00) (61 - 85)  BP: 113/60 (12 Sep 2018 13:00) (112/56 - 150/75)  BP(mean): 85 (12 Sep 2018 10:00) (79 - 90)  RR: 18 (12 Sep 2018 13:00) (16 - 18)  SpO2: 99% (12 Sep 2018 13:00) (95% - 100%)    Physical Exam:  General: NAD, resting comfortably in bed  Pulmonary: Nonlabored breathing, no respiratory distress  Extremities:   LLE:  Foot dressing c/d/i  SILT 3rd and 4th toe  Fires TA/GA/Sol      LABS:                        9.6    5.21  )-----------( 315      ( 12 Sep 2018 07:35 )             29.3     09-12    134<L>  |  94<L>  |  34<H>  ----------------------------<  234<H>  4.6   |  25  |  1.34<H>    Ca    9.7      12 Sep 2018 06:27      PT/INR - ( 11 Sep 2018 07:57 )   PT: 27.0 sec;   INR: 2.35 ratio           CAPILLARY BLOOD GLUCOSE      POCT Blood Glucose.: 302 mg/dL (12 Sep 2018 11:51)  POCT Blood Glucose.: 242 mg/dL (12 Sep 2018 06:24)  POCT Blood Glucose.: 343 mg/dL (11 Sep 2018 21:03)  POCT Blood Glucose.: 310 mg/dL (11 Sep 2018 17:00)          Radiology and Additional Studies:    Assessment: 67y Male s/p Amputation of left hallux with podiatry    Plan:  Pain/nausea control PRN  Home meds  Incentive spirometer/OOB as tolerated  Weight bearing per podiatry   dvt ppx per podiatry  Vitals per protocol  Reg diet  AM labs  Excellent care per primary team

## 2018-09-12 NOTE — PROGRESS NOTE ADULT - PROBLEM SELECTOR PLAN 1
Will continue current insulin regimen for now. Will continue monitoring FS, log, will Follow up.  Tight sugar control advised.  Will increase Humalog to 10 units before each meal in addition to Humalog correction scale coverage.  Patient counseled for compliance with consistent low carb diet.

## 2018-09-12 NOTE — PROGRESS NOTE ADULT - ASSESSMENT
Assessment  DMT2: 67y Male with DM T2 with hyperglycemia on basal bolus insulin, dose was adjusted yesterday, blood sugars still  running high, no hypoglycemic episode,  eating meals,  non compliant with low carb diet.  CAD: On medications, stable, monitored.  Hypothyroidism:  On synthroid 150 mcg po daily, asymptomatic.  HTN: Controlled, On med.  Foot wound: S/P Toe amputation, podiatry FU

## 2018-09-12 NOTE — BRIEF OPERATIVE NOTE - PROCEDURE
<<-----Click on this checkbox to enter Procedure Amputation  09/12/2018  of the left toe  Active  [facility]4

## 2018-09-13 ENCOUNTER — APPOINTMENT (OUTPATIENT)
Dept: VASCULAR SURGERY | Facility: CLINIC | Age: 68
End: 2018-09-13

## 2018-09-13 ENCOUNTER — TRANSCRIPTION ENCOUNTER (OUTPATIENT)
Age: 68
End: 2018-09-13

## 2018-09-13 VITALS
OXYGEN SATURATION: 98 % | HEART RATE: 72 BPM | DIASTOLIC BLOOD PRESSURE: 60 MMHG | RESPIRATION RATE: 18 BRPM | SYSTOLIC BLOOD PRESSURE: 119 MMHG | TEMPERATURE: 98 F

## 2018-09-13 LAB
ANION GAP SERPL CALC-SCNC: 14 MMOL/L — SIGNIFICANT CHANGE UP (ref 5–17)
BUN SERPL-MCNC: 35 MG/DL — HIGH (ref 7–23)
CALCIUM SERPL-MCNC: 9.9 MG/DL — SIGNIFICANT CHANGE UP (ref 8.4–10.5)
CHLORIDE SERPL-SCNC: 94 MMOL/L — LOW (ref 96–108)
CO2 SERPL-SCNC: 24 MMOL/L — SIGNIFICANT CHANGE UP (ref 22–31)
CREAT SERPL-MCNC: 1.38 MG/DL — HIGH (ref 0.5–1.3)
GLUCOSE BLDC GLUCOMTR-MCNC: 259 MG/DL — HIGH (ref 70–99)
GLUCOSE BLDC GLUCOMTR-MCNC: 297 MG/DL — HIGH (ref 70–99)
GLUCOSE SERPL-MCNC: 255 MG/DL — HIGH (ref 70–99)
HCT VFR BLD CALC: 29.4 % — LOW (ref 39–50)
HGB BLD-MCNC: 9.7 G/DL — LOW (ref 13–17)
INR BLD: 1.42 RATIO — HIGH (ref 0.88–1.16)
INR BLD: 1.49 RATIO — HIGH (ref 0.88–1.16)
MCHC RBC-ENTMCNC: 27.9 PG — SIGNIFICANT CHANGE UP (ref 27–34)
MCHC RBC-ENTMCNC: 33 GM/DL — SIGNIFICANT CHANGE UP (ref 32–36)
MCV RBC AUTO: 84.5 FL — SIGNIFICANT CHANGE UP (ref 80–100)
PLATELET # BLD AUTO: 319 K/UL — SIGNIFICANT CHANGE UP (ref 150–400)
POTASSIUM SERPL-MCNC: 4.5 MMOL/L — SIGNIFICANT CHANGE UP (ref 3.5–5.3)
POTASSIUM SERPL-SCNC: 4.5 MMOL/L — SIGNIFICANT CHANGE UP (ref 3.5–5.3)
PROTHROM AB SERPL-ACNC: 16.2 SEC — HIGH (ref 10–13.1)
PROTHROM AB SERPL-ACNC: 16.4 SEC — HIGH (ref 9.8–12.7)
RBC # BLD: 3.48 M/UL — LOW (ref 4.2–5.8)
RBC # FLD: 16.4 % — HIGH (ref 10.3–14.5)
SODIUM SERPL-SCNC: 132 MMOL/L — LOW (ref 135–145)
WBC # BLD: 6.63 K/UL — SIGNIFICANT CHANGE UP (ref 3.8–10.5)
WBC # FLD AUTO: 6.63 K/UL — SIGNIFICANT CHANGE UP (ref 3.8–10.5)

## 2018-09-13 PROCEDURE — 85730 THROMBOPLASTIN TIME PARTIAL: CPT

## 2018-09-13 PROCEDURE — 84132 ASSAY OF SERUM POTASSIUM: CPT

## 2018-09-13 PROCEDURE — 86901 BLOOD TYPING SEROLOGIC RH(D): CPT

## 2018-09-13 PROCEDURE — 85652 RBC SED RATE AUTOMATED: CPT

## 2018-09-13 PROCEDURE — 36415 COLL VENOUS BLD VENIPUNCTURE: CPT

## 2018-09-13 PROCEDURE — 85027 COMPLETE CBC AUTOMATED: CPT

## 2018-09-13 PROCEDURE — 88311 DECALCIFY TISSUE: CPT

## 2018-09-13 PROCEDURE — 99285 EMERGENCY DEPT VISIT HI MDM: CPT

## 2018-09-13 PROCEDURE — 71045 X-RAY EXAM CHEST 1 VIEW: CPT

## 2018-09-13 PROCEDURE — 82010 KETONE BODYS QUAN: CPT

## 2018-09-13 PROCEDURE — 88305 TISSUE EXAM BY PATHOLOGIST: CPT

## 2018-09-13 PROCEDURE — 86140 C-REACTIVE PROTEIN: CPT

## 2018-09-13 PROCEDURE — 73590 X-RAY EXAM OF LOWER LEG: CPT

## 2018-09-13 PROCEDURE — 84295 ASSAY OF SERUM SODIUM: CPT

## 2018-09-13 PROCEDURE — 82803 BLOOD GASES ANY COMBINATION: CPT

## 2018-09-13 PROCEDURE — 82435 ASSAY OF BLOOD CHLORIDE: CPT

## 2018-09-13 PROCEDURE — 93005 ELECTROCARDIOGRAM TRACING: CPT

## 2018-09-13 PROCEDURE — 82962 GLUCOSE BLOOD TEST: CPT

## 2018-09-13 PROCEDURE — 94640 AIRWAY INHALATION TREATMENT: CPT

## 2018-09-13 PROCEDURE — 83605 ASSAY OF LACTIC ACID: CPT

## 2018-09-13 PROCEDURE — 80048 BASIC METABOLIC PNL TOTAL CA: CPT

## 2018-09-13 PROCEDURE — 87040 BLOOD CULTURE FOR BACTERIA: CPT

## 2018-09-13 PROCEDURE — 86900 BLOOD TYPING SEROLOGIC ABO: CPT

## 2018-09-13 PROCEDURE — 85014 HEMATOCRIT: CPT

## 2018-09-13 PROCEDURE — 82330 ASSAY OF CALCIUM: CPT

## 2018-09-13 PROCEDURE — 85610 PROTHROMBIN TIME: CPT

## 2018-09-13 PROCEDURE — 73630 X-RAY EXAM OF FOOT: CPT

## 2018-09-13 PROCEDURE — 86850 RBC ANTIBODY SCREEN: CPT

## 2018-09-13 PROCEDURE — 80053 COMPREHEN METABOLIC PANEL: CPT

## 2018-09-13 PROCEDURE — 73620 X-RAY EXAM OF FOOT: CPT

## 2018-09-13 PROCEDURE — 82947 ASSAY GLUCOSE BLOOD QUANT: CPT

## 2018-09-13 PROCEDURE — 97161 PT EVAL LOW COMPLEX 20 MIN: CPT

## 2018-09-13 RX ORDER — INSULIN GLARGINE 100 [IU]/ML
66 INJECTION, SOLUTION SUBCUTANEOUS AT BEDTIME
Qty: 0 | Refills: 0 | Status: DISCONTINUED | OUTPATIENT
Start: 2018-09-13 | End: 2018-09-13

## 2018-09-13 RX ORDER — MINOCYCLINE HYDROCHLORIDE 45 MG/1
1 TABLET, EXTENDED RELEASE ORAL
Qty: 28 | Refills: 0 | OUTPATIENT
Start: 2018-09-13 | End: 2018-09-26

## 2018-09-13 RX ORDER — METOPROLOL TARTRATE 50 MG
1 TABLET ORAL
Qty: 0 | Refills: 0 | COMMUNITY

## 2018-09-13 RX ORDER — WARFARIN SODIUM 2.5 MG/1
7.5 TABLET ORAL ONCE
Qty: 0 | Refills: 0 | Status: DISCONTINUED | OUTPATIENT
Start: 2018-09-13 | End: 2018-09-13

## 2018-09-13 RX ORDER — METOPROLOL TARTRATE 50 MG
1 TABLET ORAL
Qty: 0 | Refills: 0 | COMMUNITY
Start: 2018-09-13

## 2018-09-13 RX ORDER — INSULIN LISPRO 100/ML
23 VIAL (ML) SUBCUTANEOUS
Qty: 0 | Refills: 0 | Status: DISCONTINUED | OUTPATIENT
Start: 2018-09-13 | End: 2018-09-13

## 2018-09-13 RX ORDER — WARFARIN SODIUM 2.5 MG/1
1 TABLET ORAL
Qty: 1 | Refills: 0 | OUTPATIENT
Start: 2018-09-13 | End: 2018-09-13

## 2018-09-13 RX ORDER — WARFARIN SODIUM 2.5 MG/1
1 TABLET ORAL
Qty: 0 | Refills: 0 | COMMUNITY

## 2018-09-13 RX ORDER — MINOCYCLINE HYDROCHLORIDE 45 MG/1
1 TABLET, EXTENDED RELEASE ORAL
Qty: 14 | Refills: 0 | OUTPATIENT
Start: 2018-09-13 | End: 2018-09-19

## 2018-09-13 RX ORDER — MINOCYCLINE HYDROCHLORIDE 45 MG/1
100 TABLET, EXTENDED RELEASE ORAL
Qty: 0 | Refills: 0 | Status: DISCONTINUED | OUTPATIENT
Start: 2018-09-13 | End: 2018-09-13

## 2018-09-13 RX ADMIN — GABAPENTIN 300 MILLIGRAM(S): 400 CAPSULE ORAL at 11:38

## 2018-09-13 RX ADMIN — PIPERACILLIN AND TAZOBACTAM 25 GRAM(S): 4; .5 INJECTION, POWDER, LYOPHILIZED, FOR SOLUTION INTRAVENOUS at 05:33

## 2018-09-13 RX ADMIN — Medication 23 UNIT(S): at 12:23

## 2018-09-13 RX ADMIN — LOSARTAN POTASSIUM 100 MILLIGRAM(S): 100 TABLET, FILM COATED ORAL at 05:44

## 2018-09-13 RX ADMIN — Medication 3: at 08:32

## 2018-09-13 RX ADMIN — SODIUM CHLORIDE 30 MILLILITER(S): 9 INJECTION INTRAMUSCULAR; INTRAVENOUS; SUBCUTANEOUS at 12:24

## 2018-09-13 RX ADMIN — Medication 25 MILLIGRAM(S): at 05:34

## 2018-09-13 RX ADMIN — Medication 19 UNIT(S): at 08:33

## 2018-09-13 RX ADMIN — OXYCODONE AND ACETAMINOPHEN 2 TABLET(S): 5; 325 TABLET ORAL at 03:04

## 2018-09-13 RX ADMIN — OXYCODONE AND ACETAMINOPHEN 2 TABLET(S): 5; 325 TABLET ORAL at 03:30

## 2018-09-13 RX ADMIN — OXYCODONE AND ACETAMINOPHEN 2 TABLET(S): 5; 325 TABLET ORAL at 16:17

## 2018-09-13 RX ADMIN — Medication 3: at 12:24

## 2018-09-13 RX ADMIN — Medication 81 MILLIGRAM(S): at 11:38

## 2018-09-13 RX ADMIN — PIPERACILLIN AND TAZOBACTAM 25 GRAM(S): 4; .5 INJECTION, POWDER, LYOPHILIZED, FOR SOLUTION INTRAVENOUS at 13:26

## 2018-09-13 RX ADMIN — POLYETHYLENE GLYCOL 3350 17 GRAM(S): 17 POWDER, FOR SOLUTION ORAL at 11:40

## 2018-09-13 RX ADMIN — Medication 40 MILLIGRAM(S): at 05:34

## 2018-09-13 RX ADMIN — Medication 150 MICROGRAM(S): at 05:34

## 2018-09-13 RX ADMIN — BUDESONIDE AND FORMOTEROL FUMARATE DIHYDRATE 2 PUFF(S): 160; 4.5 AEROSOL RESPIRATORY (INHALATION) at 05:33

## 2018-09-13 NOTE — DISCHARGE NOTE ADULT - HOSPITAL COURSE
Patient  s/p L femoral-peroneal bypass  8/2018, , cad,/ stent,  cabg  2016, copd,  pad,/ stent  leg, 2016,   ca prostate, sriram, , htn,  dm 2, on insulin, obesity, admitted  with non healing toe  ulcer,  left    big toeafib, on coumadin/  echo  2017,  ef  of  48. s/p  s/p LEFT foot P1RR- Wound dressed with DSD --> will not need home VNS. Keep dressing CDI till follow-up [facility] Dr. Ortiz in 2-3 days.  discharge on 2 more weeks of Minocycline. Afib- coumadin restarted.

## 2018-09-13 NOTE — PROGRESS NOTE ADULT - SUBJECTIVE AND OBJECTIVE BOX
Podiatry Pager #: [phone]    Patient is a 67y old  Male who presents with a chief complaint of Critical limb ischemia and infection (11 Sep 2018 19:39)      INTERVAL HPI/OVERNIGHT EVENTS:   Pt is scheduled for __left partial 1st ray resection left foot __ with  ___Marysol__ at ____7:30___. Patient is aware of procedure and is NPO since midnight.    MEDICATIONS  (STANDING):  aspirin enteric coated 81 milliGRAM(s) Oral daily  atorvastatin 80 milliGRAM(s) Oral at bedtime  buDESOnide 160 MICROgram(s)/formoterol 4.5 MICROgram(s) Inhaler 2 Puff(s) Inhalation two times a day  Dakins Solution - 1/4 Strength 1 Application(s) Topical daily  dextrose 5%. 1000 milliLiter(s) (50 mL/Hr) IV Continuous <Continuous>  dextrose 5%. 1000 milliLiter(s) (50 mL/Hr) IV Continuous <Continuous>  dextrose 50% Injectable 12.5 Gram(s) IV Push once  dextrose 50% Injectable 25 Gram(s) IV Push once  dextrose 50% Injectable 25 Gram(s) IV Push once  dextrose 50% Injectable 12.5 Gram(s) IV Push once  dextrose 50% Injectable 25 Gram(s) IV Push once  dextrose 50% Injectable 25 Gram(s) IV Push once  furosemide    Tablet 40 milliGRAM(s) Oral daily  gabapentin 300 milliGRAM(s) Oral daily  influenza   Vaccine 0.5 milliLiter(s) IntraMuscular once  insulin glargine Injectable (LANTUS) 50 Unit(s) SubCutaneous at bedtime  insulin lispro (HumaLOG) corrective regimen sliding scale   SubCutaneous at bedtime  insulin lispro (HumaLOG) corrective regimen sliding scale   SubCutaneous three times a day before meals  insulin lispro Injectable (HumaLOG) 15 Unit(s) SubCutaneous three times a day before meals  levothyroxine 150 MICROGram(s) Oral daily  losartan 100 milliGRAM(s) Oral daily  metoprolol tartrate 25 milliGRAM(s) Oral two times a day  piperacillin/tazobactam IVPB. 3.375 Gram(s) IV Intermittent every 8 hours  polyethylene glycol 3350 17 Gram(s) Oral daily  senna 2 Tablet(s) Oral at bedtime    MEDICATIONS  (PRN):  dextrose 40% Gel 15 Gram(s) Oral once PRN Blood Glucose LESS THAN 70 milliGRAM(s)/deciliter  dextrose 40% Gel 15 Gram(s) Oral once PRN Blood Glucose LESS THAN 70 milliGRAM(s)/deciliter  glucagon  Injectable 1 milliGRAM(s) IntraMuscular once PRN Glucose LESS THAN 70 milligrams/deciliter  glucagon  Injectable 1 milliGRAM(s) IntraMuscular once PRN Glucose LESS THAN 70 milligrams/deciliter  oxyCODONE    5 mG/acetaminophen 325 mG 2 Tablet(s) Oral every 4 hours PRN Mild Pain (1 - 3)      Allergies    Benadryl (Rash)  contrast media (iodine-based) (Hypotension)    Intolerances        Vital Signs Last 24 Hrs  T(C): 36.4 (12 Sep 2018 06:32), Max: 36.9 (11 Sep 2018 15:38)  T(F): 97.6 (12 Sep 2018 06:32), Max: 98.4 (11 Sep 2018 15:38)  HR: 65 (12 Sep 2018 06:32) (65 - 85)  BP: 122/60 (12 Sep 2018 06:32) (119/57 - 150/75)  BP(mean): --  RR: 18 (12 Sep 2018 06:32) (18 - 18)  SpO2: 97% (12 Sep 2018 06:32) (87% - 100%)    LABS:                        9.2    5.50  )-----------( 315      ( 11 Sep 2018 07:55 )             29.3     09-12    x   |  x   |  34<H>  ----------------------------<  234<H>  x    |  25  |  1.34<H>    Ca    9.7      12 Sep 2018 06:27      PT/INR - ( 11 Sep 2018 07:57 )   PT: 27.0 sec;   INR: 2.35 ratio         PTT - ( 10 Sep 2018 09:24 )  PTT:35.3 sec    CAPILLARY BLOOD GLUCOSE      POCT Blood Glucose.: 242 mg/dL (12 Sep 2018 06:24)  POCT Blood Glucose.: 343 mg/dL (11 Sep 2018 21:03)  POCT Blood Glucose.: 310 mg/dL (11 Sep 2018 17:00)  POCT Blood Glucose.: 331 mg/dL (11 Sep 2018 12:30)  POCT Blood Glucose.: 252 mg/dL (11 Sep 2018 08:36)      RADIOLOGY & ADDITIONAL TESTS:
CC: f/u for  ischemic left great toe tip  Patient reports  feels fine  REVIEW OF SYSTEMS:  All other review of systems negative (Comprehensive ROS)    Antimicrobials Day #  :post op day 1/14  minocycline 100 milliGRAM(s) Oral two times a day    Other Medications Reviewed    T(F): 97.8 (09-13-18 @ 07:58), Max: 98.2 (09-13-18 @ 05:30)  HR: 75 (09-13-18 @ 07:58)  BP: 127/68 (09-13-18 @ 07:58)  RR: 18 (09-13-18 @ 07:58)  SpO2: 98% (09-13-18 @ 07:58)  Wt(kg): --    PHYSICAL EXAM:  General: alert, no acute distress  Eyes:  anicteric, no conjunctival injection, no discharge  Oropharynx: no lesions or injection 	  Neck: supple, without adenopathy  Lungs: clear to auscultation  Heart: regular rate and rhythm; no murmur, rubs or gallops  Abdomen: soft, nondistended, nontender, without mass or organomegaly  Skin: no lesions  Extremities: no clubbing, cyanosis, or edema. left foot wrapped, leg wound scabbed and intact  Neurologic: alert, oriented, moves all extremities    LAB RESULTS:                        9.7    6.63  )-----------( 319      ( 13 Sep 2018 07:51 )             29.4     09-13    132<L>  |  94<L>  |  35<H>  ----------------------------<  255<H>  4.5   |  24  |  1.38<H>    Ca    9.9      13 Sep 2018 07:08          MICROBIOLOGY:  RECENT CULTURES:  09-09 @ 15:22 .Blood Blood-Peripheral     No growth to date.      09-09 @ 13:06 .Blood Blood-Peripheral     No growth to date.          RADIOLOGY REVIEWED:  < from: Xray Foot AP + Lateral + Oblique, Left (09.12.18 @ 09:26) >  COMPARISON: Left foot radiograph 9/9/2018.    IMPRESSION:  Interval amputation of the first ray at the distal metatarsal. Expected   postoperative soft tissues changes.  Plantar and superior-posterior calcaneal enthesophyte.  Preserved joint spaces.  Vascular calcifications.      Assessment:  Patient with pvd, s/p recent left leg bypass now s/p left first toe amp for ischemia toe. residual osteo grossly not apparent per brief op report.   Plan: changed to po minocycline for 2 more weeks  local care to foot per podiatry
CC: f/u for left big toe infection    Patient reports: ongoing pain in left big toe.    REVIEW OF SYSTEMS:  All other review of systems negative (Comprehensive ROS)    Antimicrobials Day #  :day 2  piperacillin/tazobactam IVPB. 3.375 Gram(s) IV Intermittent every 8 hours    Other Medications Reviewed    T(F): 98.4 (09-10-18 @ 08:38), Max: 98.7 (09-10-18 @ 05:27)  HR: 57 (09-10-18 @ 08:38)  BP: 123/69 (09-10-18 @ 08:38)  RR: 18 (09-10-18 @ 08:38)  SpO2: 98% (09-10-18 @ 08:38)  Wt(kg): --    PHYSICAL EXAM:  General: alert, no acute distress  Eyes:  anicteric, no conjunctival injection, no discharge  Oropharynx: no lesions or injection 	  Neck: supple, without adenopathy  Lungs: clear to auscultation  Heart: regular rate and rhythm; no murmur, rubs or gallops  Abdomen: soft, nondistended, nontender, without mass or organomegaly  Skin: no lesions  Extremities: no clubbing, cyanosis, or edema.Left thigh and calf incision healing well  Neurologic: alert, oriented, moves all extremities  Left big toe is cool, dry ulcer at nail region, plantar ulcer, no purulence   LAB RESULTS:                        10.1   2.97  )-----------( 135      ( 10 Sep 2018 08:06 )             29.0     09-10    131<L>  |  94<L>  |  35<H>  ----------------------------<  236<H>  4.9   |  22  |  1.24    Ca    9.7      10 Sep 2018 07:03    TPro  8.0  /  Alb  4.4  /  TBili  0.2  /  DBili  x   /  AST  20  /  ALT  14  /  AlkPhos  85  09-09    LIVER FUNCTIONS - ( 09 Sep 2018 09:31 )  Alb: 4.4 g/dL / Pro: 8.0 g/dL / ALK PHOS: 85 U/L / ALT: 14 U/L / AST: 20 U/L / GGT: x             MICROBIOLOGY:  RECENT CULTURES:      RADIOLOGY REVIEWED:  < from: Xray Chest 1 View- PORTABLE-Urgent (09.09.18 @ 10:12) >  Impression:  Clear lungs. Stable elevation of the right hemidiaphragm.    < end of copied text >  < from: Xray Tibia + Fibula 2 Views, Left (09.09.18 @ 10:11) >    INTERPRETATION:  Indication: Left great toe infection.    Technique: AP and lateral views of left tibia and fibula were obtained.    Findings: No fracture is seen. No cortical erosion is seen to suggest   osteomyelitis. There are multiple overlying surgical clips.    Impression: No fracture or evidence of osteomyelitis of the left tibia   and fibula.    < end of copied text >  < from: Xray Foot AP + Lateral, Left (09.09.18 @ 10:11) >  Impression: No evidence of osteomyelitis of the left foot with attention   to the left first toe.
CC: f/u for left big toe infection/gangrene    Patient reports: no post op pain at this point.He is s/p left 1st ray amputation back to healthy bone,     REVIEW OF SYSTEMS:  All other review of systems negative (Comprehensive ROS)    Antimicrobials Day #  :day 4  piperacillin/tazobactam IVPB. 3.375 Gram(s) IV Intermittent every 8 hours    Other Medications Reviewed    T(F): 97.3 (09-12-18 @ 14:00), Max: 98.3 (09-11-18 @ 17:43)  HR: 77 (09-12-18 @ 14:00)  BP: 109/68 (09-12-18 @ 14:00)  RR: 18 (09-12-18 @ 14:00)  SpO2: 94% (09-12-18 @ 14:00)  Wt(kg): --    PHYSICAL EXAM:  General: alert, no acute distress  Eyes:  anicteric, no conjunctival injection, no discharge  Oropharynx: no lesions or injection 	  Neck: supple, without adenopathy  Lungs: clear to auscultation  Heart: regular rate and rhythm; no murmur, rubs or gallops  Abdomen: soft, nondistended, nontender, without mass or organomegaly  Skin: no lesions  Extremities: no clubbing, cyanosis, or edema.Left foot dresssed  Neurologic: alert, oriented, moves all extremities    LAB RESULTS:                        9.6    5.21  )-----------( 315      ( 12 Sep 2018 07:35 )             29.3     09-12    134<L>  |  94<L>  |  34<H>  ----------------------------<  234<H>  4.6   |  25  |  1.34<H>    Ca    9.7      12 Sep 2018 06:27          MICROBIOLOGY:  RECENT CULTURES:  09-09 @ 15:22 .Blood Blood-Peripheral     No growth to date.      09-09 @ 13:06 .Blood Blood-Peripheral     No growth to date.          RADIOLOGY REVIEWED:  < from: Xray Foot AP + Lateral + Oblique, Left (09.12.18 @ 09:26) >  INTERPRETATION:  CLINICAL INFORMATION: Postop, status post left partial   first ray resection.    EXAM: 3 views of the left foot.    COMPARISON: Left foot radiograph 9/9/2018.    IMPRESSION:  Interval amputation of the first ray at the distal metatarsal. Expected   postoperative soft tissues changes.  Plantar and superior-posterior calcaneal enthesophyte.  Preserved joint spaces.  Vascular calcifications.    < end of copied text >
CC: f/u for left foot infection/ischemia    Patient reports: ongoing left big toe pain, asking for narcotics    REVIEW OF SYSTEMS:  All other review of systems negative (Comprehensive ROS)    Antimicrobials Day #  :day 3  piperacillin/tazobactam IVPB. 3.375 Gram(s) IV Intermittent every 8 hours    Other Medications Reviewed    T(F): 98.2 (09-11-18 @ 08:19), Max: 98.2 (09-11-18 @ 08:19)  HR: 67 (09-11-18 @ 08:19)  BP: 130/52 (09-11-18 @ 08:19)  RR: 18 (09-11-18 @ 08:19)  SpO2: 87% (09-11-18 @ 08:19)  Wt(kg): --    PHYSICAL EXAM:  General: alert, no acute distress  Eyes:  anicteric, no conjunctival injection, no discharge  Oropharynx: no lesions or injection 	  Neck: supple, without adenopathy  Lungs: clear to auscultation  Heart: regular rate and rhythm; no murmur, rubs or gallops  Abdomen: soft, nondistended, nontender, without mass or organomegaly  Skin: left leg incision scabbed dry  Extremities: no clubbing, cyanosis, or edema  Neurologic: alert, oriented, moves all extremities  Left big toe with dry eschar over nail region.Big toe is cool as are other toes  LAB RESULTS:                        10.1   2.97  )-----------( 135      ( 10 Sep 2018 08:06 )             29.0     09-11    128<L>  |  92<L>  |  33<H>  ----------------------------<  208<H>  4.3   |  21<L>  |  1.43<H>    Ca    9.8      11 Sep 2018 06:24    TPro  8.0  /  Alb  4.4  /  TBili  0.2  /  DBili  x   /  AST  20  /  ALT  14  /  AlkPhos  85  09-09    LIVER FUNCTIONS - ( 09 Sep 2018 09:31 )  Alb: 4.4 g/dL / Pro: 8.0 g/dL / ALK PHOS: 85 U/L / ALT: 14 U/L / AST: 20 U/L / GGT: x             MICROBIOLOGY:  RECENT CULTURES:  09-09 @ 15:22 .Blood Blood-Peripheral     No growth to date.      09-09 @ 13:06 .Blood Blood-Peripheral     No growth to date.          RADIOLOGY REVIEWED:  < from: Xray Chest 1 View- PORTABLE-Urgent (09.09.18 @ 10:12) >  Impression:  Clear lungs. Stable elevation of the right hemidiaphragm.    < end of copied text >
Chief complaint  Patient is a 67y old  Male who presents with a chief complaint of foot  infection (12 Sep 2018 16:52)   Review of systems  Patient in bed, looks comfortable, no fever,  s/p amputation, no hypoglycemia.    Labs and Fingersticks  CAPILLARY BLOOD GLUCOSE      POCT Blood Glucose.: 302 mg/dL (12 Sep 2018 11:51)  POCT Blood Glucose.: 242 mg/dL (12 Sep 2018 06:24)  POCT Blood Glucose.: 343 mg/dL (11 Sep 2018 21:03)      Anion Gap, Serum: 15 (09-12 @ 06:27)  Anion Gap, Serum: 15 (09-11 @ 06:24)      Calcium, Total Serum: 9.7 (09-12 @ 06:27)  Calcium, Total Serum: 9.8 (09-11 @ 06:24)          09-12    134<L>  |  94<L>  |  34<H>  ----------------------------<  234<H>  4.6   |  25  |  1.34<H>    Ca    9.7      12 Sep 2018 06:27                          9.6    5.21  )-----------( 315      ( 12 Sep 2018 07:35 )             29.3     Medications  MEDICATIONS  (STANDING):  aspirin enteric coated 81 milliGRAM(s) Oral daily  atorvastatin 80 milliGRAM(s) Oral at bedtime  buDESOnide 160 MICROgram(s)/formoterol 4.5 MICROgram(s) Inhaler 2 Puff(s) Inhalation two times a day  Dakins Solution - 1/4 Strength 1 Application(s) Topical daily  dextrose 5%. 1000 milliLiter(s) (50 mL/Hr) IV Continuous <Continuous>  dextrose 5%. 1000 milliLiter(s) (50 mL/Hr) IV Continuous <Continuous>  dextrose 50% Injectable 12.5 Gram(s) IV Push once  dextrose 50% Injectable 25 Gram(s) IV Push once  dextrose 50% Injectable 25 Gram(s) IV Push once  dextrose 50% Injectable 12.5 Gram(s) IV Push once  dextrose 50% Injectable 25 Gram(s) IV Push once  dextrose 50% Injectable 25 Gram(s) IV Push once  furosemide    Tablet 40 milliGRAM(s) Oral daily  gabapentin 300 milliGRAM(s) Oral daily  influenza   Vaccine 0.5 milliLiter(s) IntraMuscular once  insulin glargine Injectable (LANTUS) 58 Unit(s) SubCutaneous at bedtime  insulin lispro (HumaLOG) corrective regimen sliding scale   SubCutaneous at bedtime  insulin lispro (HumaLOG) corrective regimen sliding scale   SubCutaneous three times a day before meals  insulin lispro Injectable (HumaLOG) 19 Unit(s) SubCutaneous three times a day before meals  levothyroxine 150 MICROGram(s) Oral daily  losartan 100 milliGRAM(s) Oral daily  metoprolol tartrate 25 milliGRAM(s) Oral two times a day  piperacillin/tazobactam IVPB. 3.375 Gram(s) IV Intermittent every 8 hours  polyethylene glycol 3350 17 Gram(s) Oral daily  senna 2 Tablet(s) Oral at bedtime  sodium chloride 0.9%. 1000 milliLiter(s) (30 mL/Hr) IV Continuous <Continuous>      Physical Exam  General: Patient comfortable in bed  Vital Signs Last 12 Hrs  T(F): 97.9 (09-12-18 @ 16:07), Max: 98.1 (09-12-18 @ 08:58)  HR: 83 (09-12-18 @ 16:07) (61 - 83)  BP: 112/55 (09-12-18 @ 16:07) (109/68 - 139/62)  BP(mean): 85 (09-12-18 @ 10:00) (79 - 90)  RR: 18 (09-12-18 @ 16:07) (16 - 18)  SpO2: 97% (09-12-18 @ 16:07) (94% - 100%)  Neck: No palpable thyroid nodules.  CVS: S1S2, No murmurs  Respiratory: No wheezing, no crepitations  GI: Abdomen soft, bowel sounds positive  Musculoskeletal:  edema lower extremities.   Skin: No skin rashes, no ecchymosis    Diagnostics
Chief complaint  Patient is a 67y old  Male who presents with a chief complaint of foot  infection (13 Sep 2018 13:37)   Review of systems  Patient in bed, looks comfortable, seen earlier today, no fever, no hypoglycemia.    Labs and Fingersticks  CAPILLARY BLOOD GLUCOSE      POCT Blood Glucose.: 297 mg/dL (13 Sep 2018 12:17)  POCT Blood Glucose.: 259 mg/dL (13 Sep 2018 08:13)  POCT Blood Glucose.: 357 mg/dL (12 Sep 2018 21:31)      Anion Gap, Serum: 14 (09-13 @ 07:08)  Anion Gap, Serum: 15 (09-12 @ 06:27)      Calcium, Total Serum: 9.9 (09-13 @ 07:08)  Calcium, Total Serum: 9.7 (09-12 @ 06:27)          09-13    132<L>  |  94<L>  |  35<H>  ----------------------------<  255<H>  4.5   |  24  |  1.38<H>    Ca    9.9      13 Sep 2018 07:08                          9.7    6.63  )-----------( 319      ( 13 Sep 2018 07:51 )             29.4     Medications  MEDICATIONS  (STANDING):  aspirin enteric coated 81 milliGRAM(s) Oral daily  atorvastatin 80 milliGRAM(s) Oral at bedtime  buDESOnide 160 MICROgram(s)/formoterol 4.5 MICROgram(s) Inhaler 2 Puff(s) Inhalation two times a day  Dakins Solution - 1/4 Strength 1 Application(s) Topical daily  dextrose 5%. 1000 milliLiter(s) (50 mL/Hr) IV Continuous <Continuous>  dextrose 5%. 1000 milliLiter(s) (50 mL/Hr) IV Continuous <Continuous>  dextrose 50% Injectable 12.5 Gram(s) IV Push once  dextrose 50% Injectable 25 Gram(s) IV Push once  dextrose 50% Injectable 25 Gram(s) IV Push once  dextrose 50% Injectable 12.5 Gram(s) IV Push once  dextrose 50% Injectable 25 Gram(s) IV Push once  dextrose 50% Injectable 25 Gram(s) IV Push once  furosemide    Tablet 40 milliGRAM(s) Oral daily  gabapentin 300 milliGRAM(s) Oral daily  influenza   Vaccine 0.5 milliLiter(s) IntraMuscular once  insulin glargine Injectable (LANTUS) 66 Unit(s) SubCutaneous at bedtime  insulin lispro (HumaLOG) corrective regimen sliding scale   SubCutaneous at bedtime  insulin lispro (HumaLOG) corrective regimen sliding scale   SubCutaneous three times a day before meals  insulin lispro Injectable (HumaLOG) 23 Unit(s) SubCutaneous three times a day before meals  levothyroxine 150 MICROGram(s) Oral daily  losartan 100 milliGRAM(s) Oral daily  metoprolol tartrate 25 milliGRAM(s) Oral two times a day  minocycline 100 milliGRAM(s) Oral two times a day  polyethylene glycol 3350 17 Gram(s) Oral daily  senna 2 Tablet(s) Oral at bedtime  sodium chloride 0.9%. 1000 milliLiter(s) (30 mL/Hr) IV Continuous <Continuous>  warfarin 7.5 milliGRAM(s) Oral once      Physical Exam  General: Patient comfortable in bed  Vital Signs Last 12 Hrs  T(F): 98.4 (09-13-18 @ 15:29), Max: 98.4 (09-13-18 @ 15:29)  HR: 72 (09-13-18 @ 15:29) (72 - 75)  BP: 119/60 (09-13-18 @ 15:29) (119/60 - 127/68)  BP(mean): --  RR: 18 (09-13-18 @ 15:29) (18 - 18)  SpO2: 98% (09-13-18 @ 15:29) (98% - 98%)  Neck: No palpable thyroid nodules.  CVS: S1S2, No murmurs  Respiratory: No wheezing, no crepitations  GI: Abdomen soft, bowel sounds positive  Musculoskeletal:  edema lower extremities.   Skin: No skin rashes, no ecchymosis    Diagnostics
HPI:  No events overnight.  Patient with EKG performed today, unchanged from outpatient ECG 7/16/2018.    Review Of Systems:           Respiratory: No shortness of breath, cough, or wheezing  Cardiovascular: No chest pain or palpitations  10 point review of systems is otherwise negative except as mentioned above        Medications:  aspirin enteric coated 81 milliGRAM(s) Oral daily  atorvastatin 80 milliGRAM(s) Oral at bedtime  buDESOnide 160 MICROgram(s)/formoterol 4.5 MICROgram(s) Inhaler 2 Puff(s) Inhalation two times a day  Dakins Solution - 1/4 Strength 1 Application(s) Topical daily  dextrose 40% Gel 15 Gram(s) Oral once PRN  dextrose 40% Gel 15 Gram(s) Oral once PRN  dextrose 5%. 1000 milliLiter(s) IV Continuous <Continuous>  dextrose 5%. 1000 milliLiter(s) IV Continuous <Continuous>  dextrose 50% Injectable 12.5 Gram(s) IV Push once  dextrose 50% Injectable 25 Gram(s) IV Push once  dextrose 50% Injectable 25 Gram(s) IV Push once  dextrose 50% Injectable 12.5 Gram(s) IV Push once  dextrose 50% Injectable 25 Gram(s) IV Push once  dextrose 50% Injectable 25 Gram(s) IV Push once  furosemide    Tablet 40 milliGRAM(s) Oral daily  gabapentin 300 milliGRAM(s) Oral daily  glucagon  Injectable 1 milliGRAM(s) IntraMuscular once PRN  glucagon  Injectable 1 milliGRAM(s) IntraMuscular once PRN  influenza   Vaccine 0.5 milliLiter(s) IntraMuscular once  insulin glargine Injectable (LANTUS) 50 Unit(s) SubCutaneous at bedtime  insulin lispro (HumaLOG) corrective regimen sliding scale   SubCutaneous at bedtime  insulin lispro (HumaLOG) corrective regimen sliding scale   SubCutaneous three times a day before meals  insulin lispro Injectable (HumaLOG) 15 Unit(s) SubCutaneous three times a day before meals  levothyroxine 150 MICROGram(s) Oral daily  losartan 100 milliGRAM(s) Oral daily  metoprolol tartrate 25 milliGRAM(s) Oral two times a day  oxyCODONE    5 mG/acetaminophen 325 mG 2 Tablet(s) Oral every 4 hours PRN  piperacillin/tazobactam IVPB. 3.375 Gram(s) IV Intermittent every 8 hours  polyethylene glycol 3350 17 Gram(s) Oral daily  senna 2 Tablet(s) Oral at bedtime    PAST MEDICAL & SURGICAL HISTORY:  COPD (chronic obstructive pulmonary disease)  Sleep apnea: non-compliant  Anemia  Atherosclerosis of arteries of extremities: left leg  Mild intermittent asthma without complication  Iron deficiency anemia, unspecified iron deficiency anemia type  Prostate cancer  Bronchospasm  Obesity (BMI 30-39.9)  PAD (peripheral artery disease): RLE bypass 11/30/15  CAD (coronary artery disease): 3 cardiac stents placed in 2006 (mid LAD, Prox LAD, Prox to #2), 3V CABG 3/28/16  Hypothyroid  Intermittent claudication  Diabetic neuropathy  DM (diabetes mellitus): type 2 - on insulin pump  CHF (congestive heart failure)  Hypercholesteremia  HTN (hypertension)  History of femoral angiogram: Left femoral endarterectomy Fem-Pop bypass 6/2017  S/P bypass graft of extremity: RLE- 11/30/15  S/P CABG x 3: 3/28/16  Stented coronary artery: x 3 cardiac stents placed in 2006  S/P prostatectomy: 1996    Vitals:  T(C): 36.8 (09-11-18 @ 17:43), Max: 36.9 (09-11-18 @ 15:38)  HR: 77 (09-11-18 @ 17:43) (67 - 88)  BP: 150/75 (09-11-18 @ 17:43) (119/57 - 169/68)  BP(mean): --  RR: 18 (09-11-18 @ 17:43) (18 - 18)  SpO2: 100% (09-11-18 @ 17:43) (87% - 100%)  Wt(kg): --  Daily     Daily   I&O's Summary    10 Sep 2018 07:01  -  11 Sep 2018 07:00  --------------------------------------------------------  IN: 400 mL / OUT: 800 mL / NET: -400 mL    11 Sep 2018 07:01  -  11 Sep 2018 19:40  --------------------------------------------------------  IN: 1060 mL / OUT: 550 mL / NET: 510 mL        Physical Exam:  Appearance: No acute distress; well appearing  Eyes: PERRL, EOMI, pink conjunctiva  HENT: Normal oral mucosa  Cardiovascular: RRR, S1, S2, no murmurs; LLE CLI noted  Respiratory: Clear to auscultation bilaterally  Gastrointestinal: soft, non-tender, non-distended with normal bowel sounds  Musculoskeletal: No clubbing; no joint deformity   Neurologic: Non-focal  Lymphatic: No lymphadenopathy  Psychiatry: AAOx3, mood & affect appropriate  Skin: No rashes, ecchymoses, or cyanosis                          9.2    5.50  )-----------( 315      ( 11 Sep 2018 07:55 )             29.3     09-11    128<L>  |  92<L>  |  33<H>  ----------------------------<  208<H>  4.3   |  21<L>  |  1.43<H>    Ca    9.8      11 Sep 2018 06:24      PT/INR - ( 11 Sep 2018 07:57 )   PT: 27.0 sec;   INR: 2.35 ratio         PTT - ( 10 Sep 2018 09:24 )  PTT:35.3 sec      ECG: sinus 86 bpm with first degree AV delay, normal axis, and poor R-wave progression    Echo: < from: TTE with Doppler (w/Cont) (05.08.17 @ 13:04) >  ------------------------------------------------------------------------  Conclusions:  1. Aortic valve not well visualized; appears calcified.  2. Normal left ventricular internal dimensions and wall  thicknesses.  3. Technically difficult study, endocardial visualization  enhanced with intravenous injection of echo contrast  (Definity). Paradoxical septal wall motion consistent with  prior open heart surgery. The apex is hypokinetic. Grossly  borderline   LV systolic function, EF by visual estimate  about  50%  4. Normal tricuspid valve. Minimal tricuspid regurgitation.  5. Estimated pulmonary artery systolic pressure equals 19  mm Hg, assuming right atrial pressure equals 8  mm Hg,  consistent with normal pulmonary pressures.  *** Compared with echocardiogram of 3/26/2016, no  significant changes noted.  ------------------------------------------------------------------------  Confirmed on  5/8/2017 - 16:43:46 by Leonor Steward M.D.  ------------------------------------------------------------------------    < end of copied text >    Stress Testing:  < from: Nuclear Stress Test-Pharmacologic (02.24.16 @ 16:07) >  GATED ANALYSIS:  Post-stress resting myocardial perfusion gated SPECT  imaging was performed (LVEF = 45 %;LVEDV = 120 ml.),  revealing mild segmental LV dysfunction.  ------------------------------------------------------------------------    IMPRESSIONS:Abnormal Study  * There are medium sized, moderate defects in the anterior  and apical walls that are fixed, suggestive of infarction  with moderate shahzad-infarct ischemia.  There is a medium  sized, moderate defect in inferior wall that is fixed  consistent with infarction with mild shahzad-infarction  ischemia.  * Post-stress resting myocardial perfusion gated SPECT  imaging was performed (LVEF = 45 %;LVEDV = 120 ml.),  revealing mild segmental LV dysfunction.      Confirmed on  2/24/2016 - 16:52:30 at [facility] by Guillermo Dudley MD  ------------------------------------------------------------------------    < end of copied text >      Cath: < from: Cardiac Cath Lab - Adult (03.25.16 @ 10:55) >  CORONARY VESSELS: The coronary circulation is left dominant.  LM:   --  Distal left main: There was a 50 % stenosis.  LAD:   --  Ostial LAD: There was a 80 % stenosis.  CX:   --  Ostial circumflex: There was a 60 % stenosis.  RCA:   --  RCA: Angiography showed minor luminal irregularities with no  flow limiting lesions.  COMPLICATIONS: There were no complications.  DIAGNOSTIC RECOMMENDATIONS: Consultation with Barbara will be obtained for  coronary artery bypass grafting.  Prepared and signed by  Chico Ferro M.D.  Signed 03/25/2016 13:05:14    < end of copied text >
Patient is a 67y old  Male who presents with a chief complaint of foot  infection (10 Sep 2018 08:59)     INTERVAL HPI/OVERNIGHT EVENTS:  Patient seen and evaluated at bedside.  Pt is resting comfortable in NAD. Denies N/V/F/C.  Pain rated at 0/10    Allergies    Benadryl (Rash)  contrast media (iodine-based) (Hypotension)    Intolerances    Vital Signs Last 24 Hrs  T(C): 36.9 (10 Sep 2018 08:38), Max: 37.1 (10 Sep 2018 05:27)  T(F): 98.4 (10 Sep 2018 08:38), Max: 98.7 (10 Sep 2018 05:27)  HR: 57 (10 Sep 2018 08:38) (57 - 76)  BP: 123/69 (10 Sep 2018 08:38) (123/69 - 150/76)  BP(mean): --  RR: 18 (10 Sep 2018 08:38) (16 - 18)  SpO2: 98% (10 Sep 2018 08:38) (94% - 100%)    LABS:                        10.1   2.97  )-----------( 135      ( 10 Sep 2018 08:06 )             29.0     09-10    131<L>  |  94<L>  |  35<H>  ----------------------------<  236<H>  4.9   |  22  |  1.24    Ca    9.7      10 Sep 2018 07:03    TPro  8.0  /  Alb  4.4  /  TBili  0.2  /  DBili  x   /  AST  20  /  ALT  14  /  AlkPhos  85  09-09    PT/INR - ( 10 Sep 2018 09:24 )   PT: 33.5 sec;   INR: 2.90 ratio      PTT - ( 10 Sep 2018 09:24 )  PTT:35.3 sec    CAPILLARY BLOOD GLUCOSE    POCT Blood Glucose.: 282 mg/dL (10 Sep 2018 12:32)  POCT Blood Glucose.: 258 mg/dL (10 Sep 2018 08:24)  POCT Blood Glucose.: 272 mg/dL (09 Sep 2018 21:15)  POCT Blood Glucose.: 227 mg/dL (09 Sep 2018 17:49)    Lower Extremity Physical Exam:  left hallux ulceration --> eschar noted to the dorsal aspect of the hallux. Jaycee-wound erythema, with dactylitis of the digit. No purulent drainage or malodor.     RADIOLOGY & ADDITIONAL TESTS:
Patient is a 67y old  Male who presents with a chief complaint of foot  infection (11 Sep 2018 09:12)     INTERVAL HPI/OVERNIGHT EVENTS:  Patient seen and evaluated at bedside.  Pt is resting comfortable in NAD. Denies N/V/F/C.  Pain rated at 0/10    Allergies    Benadryl (Rash)  contrast media (iodine-based) (Hypotension)    Intolerances    Vital Signs Last 24 Hrs  T(C): 36.8 (11 Sep 2018 08:19), Max: 36.8 (11 Sep 2018 08:19)  T(F): 98.2 (11 Sep 2018 08:19), Max: 98.2 (11 Sep 2018 08:19)  HR: 67 (11 Sep 2018 08:19) (64 - 88)  BP: 130/52 (11 Sep 2018 08:19) (120/66 - 169/68)  BP(mean): --  RR: 18 (11 Sep 2018 08:19) (18 - 18)  SpO2: 87% (11 Sep 2018 08:19) (87% - 96%)    LABS:                        9.2    5.50  )-----------( 315      ( 11 Sep 2018 07:55 )             29.3     09-11    128<L>  |  92<L>  |  33<H>  ----------------------------<  208<H>  4.3   |  21<L>  |  1.43<H>    Ca    9.8      11 Sep 2018 06:24      PT/INR - ( 11 Sep 2018 07:57 )   PT: 27.0 sec;   INR: 2.35 ratio      PTT - ( 10 Sep 2018 09:24 )  PTT:35.3 sec    CAPILLARY BLOOD GLUCOSE    POCT Blood Glucose.: 252 mg/dL (11 Sep 2018 08:36)  POCT Blood Glucose.: 350 mg/dL (10 Sep 2018 21:19)  POCT Blood Glucose.: 306 mg/dL (10 Sep 2018 17:03)  POCT Blood Glucose.: 282 mg/dL (10 Sep 2018 12:32)    Lower Extremity Physical Exam:  left hallux ulceration --> eschar noted to the dorsal aspect of the hallux. Jaycee-wound erythema, with dactylitis of the digit. No purulent drainage or malodor    RADIOLOGY & ADDITIONAL TESTS:
Patient is a 67y old  Male who presents with a chief complaint of foot  infection (13 Sep 2018 07:45)       INTERVAL HPI/OVERNIGHT EVENTS:  Patient seen and evaluated at bedside.  Pt is resting comfortable in NAD. Denies N/V/F/C.  Pain rated at X/10    Allergies    Benadryl (Rash)  contrast media (iodine-based) (Hypotension)    Intolerances        Vital Signs Last 24 Hrs  T(C): 36.6 (13 Sep 2018 07:58), Max: 36.8 (13 Sep 2018 05:30)  T(F): 97.8 (13 Sep 2018 07:58), Max: 98.2 (13 Sep 2018 05:30)  HR: 75 (13 Sep 2018 07:58) (63 - 88)  BP: 127/68 (13 Sep 2018 07:58) (109/68 - 138/72)  BP(mean): --  RR: 18 (13 Sep 2018 07:58) (18 - 18)  SpO2: 98% (13 Sep 2018 07:58) (94% - 99%)    LABS:                        9.7    6.63  )-----------( 319      ( 13 Sep 2018 07:51 )             29.4     09-13    132<L>  |  94<L>  |  35<H>  ----------------------------<  255<H>  4.5   |  24  |  1.38<H>    Ca    9.9      13 Sep 2018 07:08      PT/INR - ( 13 Sep 2018 08:39 )   PT: 16.4 sec;   INR: 1.49 ratio      CAPILLARY BLOOD GLUCOSE    POCT Blood Glucose.: 297 mg/dL (13 Sep 2018 12:17)  POCT Blood Glucose.: 259 mg/dL (13 Sep 2018 08:13)  POCT Blood Glucose.: 357 mg/dL (12 Sep 2018 21:31)  POCT Blood Glucose.: 427 mg/dL (12 Sep 2018 17:10)      Lower Extremity Physical Exam:  s/p P1RR --> surgical site stable, well-coapted. no signs of hematoma, active bleeding, or oozing. All sutures intact, no signs of dehiscence. Flap cool to touch.      RADIOLOGY & ADDITIONAL TESTS:
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 pt tolerated procedure and anesthesia well pt transferred to PACU with VSS and NVS unchanged to left foot
mild  pain, fot    REVIEW OF SYSTEMS:  GEN: no fever,    no chills  RESP: no SOB,   no cough  CVS: no chest pain,   no palpitations  GI: no abdominal pain,   no nausea,   no vomiting,   no constipation,   no diarrhea  : no dysuria,   no frequency  NEURO: no headache,   no dizziness  PSYCH: no depression,   not anxious  Derm : no rash    MEDICATIONS  (STANDING):  aspirin enteric coated 81 milliGRAM(s) Oral daily  atorvastatin 80 milliGRAM(s) Oral at bedtime  buDESOnide 160 MICROgram(s)/formoterol 4.5 MICROgram(s) Inhaler 2 Puff(s) Inhalation two times a day  Dakins Solution - 1/4 Strength 1 Application(s) Topical daily  dextrose 5%. 1000 milliLiter(s) (50 mL/Hr) IV Continuous <Continuous>  dextrose 50% Injectable 12.5 Gram(s) IV Push once  dextrose 50% Injectable 25 Gram(s) IV Push once  dextrose 50% Injectable 25 Gram(s) IV Push once  furosemide    Tablet 40 milliGRAM(s) Oral daily  gabapentin 300 milliGRAM(s) Oral daily  influenza   Vaccine 0.5 milliLiter(s) IntraMuscular once  insulin glargine Injectable (LANTUS) 20 Unit(s) SubCutaneous at bedtime  insulin lispro (HumaLOG) corrective regimen sliding scale   SubCutaneous three times a day before meals  insulin lispro Injectable (HumaLOG) 4 Unit(s) SubCutaneous three times a day before meals  levothyroxine 150 MICROGram(s) Oral daily  losartan 100 milliGRAM(s) Oral daily  metoprolol tartrate 25 milliGRAM(s) Oral two times a day  piperacillin/tazobactam IVPB. 3.375 Gram(s) IV Intermittent every 8 hours  senna 2 Tablet(s) Oral at bedtime    MEDICATIONS  (PRN):  acetaminophen   Tablet .. 650 milliGRAM(s) Oral every 8 hours PRN Mild Pain (1 - 3), Moderate Pain (4 - 6), Severe Pain (7 - 10)  dextrose 40% Gel 15 Gram(s) Oral once PRN Blood Glucose LESS THAN 70 milliGRAM(s)/deciliter  glucagon  Injectable 1 milliGRAM(s) IntraMuscular once PRN Glucose LESS THAN 70 milligrams/deciliter  oxyCODONE    IR 5 milliGRAM(s) Oral every 4 hours PRN Moderate Pain (4 - 6)      Vital Signs Last 24 Hrs  T(C): 37.1 (10 Sep 2018 05:27), Max: 37.1 (10 Sep 2018 05:27)  T(F): 98.7 (10 Sep 2018 05:27), Max: 98.7 (10 Sep 2018 05:27)  HR: 63 (10 Sep 2018 05:27) (63 - 76)  BP: 131/73 (10 Sep 2018 05:27) (111/57 - 150/76)  BP(mean): --  RR: 18 (10 Sep 2018 05:27) (16 - 18)  SpO2: 97% (10 Sep 2018 05:27) (94% - 100%)  CAPILLARY BLOOD GLUCOSE      POCT Blood Glucose.: 258 mg/dL (10 Sep 2018 08:24)  POCT Blood Glucose.: 272 mg/dL (09 Sep 2018 21:15)  POCT Blood Glucose.: 227 mg/dL (09 Sep 2018 17:49)    I&O's Summary      PHYSICAL EXAM:  HEAD:  Atraumatic, Normocephalic  NECK: Supple, No   JVD  CHEST/LUNG:   no     rales,     no,    rhonchi  HEART: Regular rate and rhythm;         murmur  ABDOMEN: Soft, Nontender, ;   EXTREMITIES:       ulceration toe  NEUROLOGY:  alert    LABS:                        10.1   2.97  )-----------( 135      ( 10 Sep 2018 08:06 )             29.0     09-10    131<L>  |  94<L>  |  35<H>  ----------------------------<  236<H>  4.9   |  22  |  1.24    Ca    9.7      10 Sep 2018 07:03    TPro  8.0  /  Alb  4.4  /  TBili  0.2  /  DBili  x   /  AST  20  /  ALT  14  /  AlkPhos  85  09-09    PT/INR - ( 09 Sep 2018 09:31 )   PT: 33.7 sec;   INR: 3.05 ratio         PTT - ( 09 Sep 2018 09:31 )  PTT:42.4 sec            09-09 @ 09:31  4.1  23    Hemoglobin A1C, Whole Blood: 9.1 % (07-20 @ 05:16)    Thyroid Stimulating Hormone, Serum: 0.85 uIU/mL (07-23 @ 05:00)          Consultant(s) Notes Reviewed:      Care Discussed with Consultants/Other Providers:
no  compalints    REVIEW OF SYSTEMS:  GEN: no fever,    no chills  RESP: no SOB,   no cough  CVS: no chest pain,   no palpitations  GI: no abdominal pain,   no nausea,   no vomiting,   no constipation,   no diarrhea  : no dysuria,   no frequency  NEURO: no headache,   no dizziness  PSYCH: no depression,   not anxious  Derm : no rash    MEDICATIONS  (STANDING):  aspirin enteric coated 81 milliGRAM(s) Oral daily  atorvastatin 80 milliGRAM(s) Oral at bedtime  buDESOnide 160 MICROgram(s)/formoterol 4.5 MICROgram(s) Inhaler 2 Puff(s) Inhalation two times a day  Dakins Solution - 1/4 Strength 1 Application(s) Topical daily  dextrose 5%. 1000 milliLiter(s) (50 mL/Hr) IV Continuous <Continuous>  dextrose 5%. 1000 milliLiter(s) (50 mL/Hr) IV Continuous <Continuous>  dextrose 50% Injectable 12.5 Gram(s) IV Push once  dextrose 50% Injectable 25 Gram(s) IV Push once  dextrose 50% Injectable 25 Gram(s) IV Push once  dextrose 50% Injectable 12.5 Gram(s) IV Push once  dextrose 50% Injectable 25 Gram(s) IV Push once  dextrose 50% Injectable 25 Gram(s) IV Push once  furosemide    Tablet 40 milliGRAM(s) Oral daily  gabapentin 300 milliGRAM(s) Oral daily  influenza   Vaccine 0.5 milliLiter(s) IntraMuscular once  insulin glargine Injectable (LANTUS) 50 Unit(s) SubCutaneous at bedtime  insulin lispro (HumaLOG) corrective regimen sliding scale   SubCutaneous at bedtime  insulin lispro (HumaLOG) corrective regimen sliding scale   SubCutaneous three times a day before meals  insulin lispro Injectable (HumaLOG) 15 Unit(s) SubCutaneous three times a day before meals  levothyroxine 150 MICROGram(s) Oral daily  losartan 100 milliGRAM(s) Oral daily  metoprolol tartrate 25 milliGRAM(s) Oral two times a day  piperacillin/tazobactam IVPB. 3.375 Gram(s) IV Intermittent every 8 hours  polyethylene glycol 3350 17 Gram(s) Oral daily  senna 2 Tablet(s) Oral at bedtime    MEDICATIONS  (PRN):  dextrose 40% Gel 15 Gram(s) Oral once PRN Blood Glucose LESS THAN 70 milliGRAM(s)/deciliter  dextrose 40% Gel 15 Gram(s) Oral once PRN Blood Glucose LESS THAN 70 milliGRAM(s)/deciliter  glucagon  Injectable 1 milliGRAM(s) IntraMuscular once PRN Glucose LESS THAN 70 milligrams/deciliter  glucagon  Injectable 1 milliGRAM(s) IntraMuscular once PRN Glucose LESS THAN 70 milligrams/deciliter  oxyCODONE    5 mG/acetaminophen 325 mG 2 Tablet(s) Oral every 4 hours PRN Mild Pain (1 - 3)      Vital Signs Last 24 Hrs  T(C): 36.4 (12 Sep 2018 06:32), Max: 36.9 (11 Sep 2018 15:38)  T(F): 97.6 (12 Sep 2018 06:32), Max: 98.4 (11 Sep 2018 15:38)  HR: 65 (12 Sep 2018 06:32) (65 - 85)  BP: 122/60 (12 Sep 2018 06:32) (119/57 - 150/75)  BP(mean): --  RR: 18 (12 Sep 2018 06:32) (18 - 18)  SpO2: 97% (12 Sep 2018 06:32) (87% - 100%)  CAPILLARY BLOOD GLUCOSE      POCT Blood Glucose.: 242 mg/dL (12 Sep 2018 06:24)  POCT Blood Glucose.: 343 mg/dL (11 Sep 2018 21:03)  POCT Blood Glucose.: 310 mg/dL (11 Sep 2018 17:00)  POCT Blood Glucose.: 331 mg/dL (11 Sep 2018 12:30)  POCT Blood Glucose.: 252 mg/dL (11 Sep 2018 08:36)    I&O's Summary    11 Sep 2018 07:01  -  12 Sep 2018 07:00  --------------------------------------------------------  IN: 1660 mL / OUT: 550 mL / NET: 1110 mL        PHYSICAL EXAM:  HEAD:  Atraumatic, Normocephalic  NECK: Supple, No   JVD  CHEST/LUNG:   no     rales,     no,    rhonchi  HEART: Regular rate and rhythm;         murmur  ABDOMEN: Soft, Nontender, ;   EXTREMITIES:        edema  NEUROLOGY:  alert    LABS:                        9.2    5.50  )-----------( 315      ( 11 Sep 2018 07:55 )             29.3     09-12    x   |  x   |  34<H>  ----------------------------<  234<H>  x    |  25  |  1.34<H>    Ca    9.7      12 Sep 2018 06:27      PT/INR - ( 11 Sep 2018 07:57 )   PT: 27.0 sec;   INR: 2.35 ratio         PTT - ( 10 Sep 2018 09:24 )  PTT:35.3 sec              Hemoglobin A1C, Whole Blood: 9.1 % (07-20 @ 05:16)    Thyroid Stimulating Hormone, Serum: 0.85 uIU/mL (07-23 @ 05:00)          Consultant(s) Notes Reviewed:      Care Discussed with Consultants/Other Providers:
no compalints    REVIEW OF SYSTEMS:  GEN: no fever,    no chills  RESP: no SOB,   no cough  CVS: no chest pain,   no palpitations  GI: no abdominal pain,   no nausea,   no vomiting,   no constipation,   no diarrhea  : no dysuria,   no frequency  NEURO: no headache,   no dizziness  PSYCH: no depression,   not anxious  Derm : no rash    MEDICATIONS  (STANDING):  aspirin enteric coated 81 milliGRAM(s) Oral daily  atorvastatin 80 milliGRAM(s) Oral at bedtime  buDESOnide 160 MICROgram(s)/formoterol 4.5 MICROgram(s) Inhaler 2 Puff(s) Inhalation two times a day  Dakins Solution - 1/4 Strength 1 Application(s) Topical daily  dextrose 5%. 1000 milliLiter(s) (50 mL/Hr) IV Continuous <Continuous>  dextrose 5%. 1000 milliLiter(s) (50 mL/Hr) IV Continuous <Continuous>  dextrose 50% Injectable 12.5 Gram(s) IV Push once  dextrose 50% Injectable 25 Gram(s) IV Push once  dextrose 50% Injectable 25 Gram(s) IV Push once  dextrose 50% Injectable 12.5 Gram(s) IV Push once  dextrose 50% Injectable 25 Gram(s) IV Push once  dextrose 50% Injectable 25 Gram(s) IV Push once  furosemide    Tablet 40 milliGRAM(s) Oral daily  gabapentin 300 milliGRAM(s) Oral daily  influenza   Vaccine 0.5 milliLiter(s) IntraMuscular once  insulin glargine Injectable (LANTUS) 20 Unit(s) SubCutaneous at bedtime  insulin lispro (HumaLOG) corrective regimen sliding scale   SubCutaneous at bedtime  insulin lispro (HumaLOG) corrective regimen sliding scale   SubCutaneous three times a day before meals  insulin lispro Injectable (HumaLOG) 8 Unit(s) SubCutaneous three times a day before meals  levothyroxine 150 MICROGram(s) Oral daily  losartan 100 milliGRAM(s) Oral daily  metoprolol tartrate 25 milliGRAM(s) Oral two times a day  piperacillin/tazobactam IVPB. 3.375 Gram(s) IV Intermittent every 8 hours  senna 2 Tablet(s) Oral at bedtime    MEDICATIONS  (PRN):  acetaminophen   Tablet .. 650 milliGRAM(s) Oral every 8 hours PRN Mild Pain (1 - 3), Moderate Pain (4 - 6), Severe Pain (7 - 10)  dextrose 40% Gel 15 Gram(s) Oral once PRN Blood Glucose LESS THAN 70 milliGRAM(s)/deciliter  dextrose 40% Gel 15 Gram(s) Oral once PRN Blood Glucose LESS THAN 70 milliGRAM(s)/deciliter  glucagon  Injectable 1 milliGRAM(s) IntraMuscular once PRN Glucose LESS THAN 70 milligrams/deciliter  glucagon  Injectable 1 milliGRAM(s) IntraMuscular once PRN Glucose LESS THAN 70 milligrams/deciliter  oxyCODONE    IR 5 milliGRAM(s) Oral every 4 hours PRN Moderate Pain (4 - 6)      Vital Signs Last 24 Hrs  T(C): 36.8 (11 Sep 2018 08:19), Max: 36.8 (11 Sep 2018 08:19)  T(F): 98.2 (11 Sep 2018 08:19), Max: 98.2 (11 Sep 2018 08:19)  HR: 67 (11 Sep 2018 08:19) (64 - 88)  BP: 130/52 (11 Sep 2018 08:19) (120/66 - 169/68)  BP(mean): --  RR: 18 (11 Sep 2018 08:19) (18 - 18)  SpO2: 87% (11 Sep 2018 08:19) (87% - 96%)  CAPILLARY BLOOD GLUCOSE      POCT Blood Glucose.: 252 mg/dL (11 Sep 2018 08:36)  POCT Blood Glucose.: 350 mg/dL (10 Sep 2018 21:19)  POCT Blood Glucose.: 306 mg/dL (10 Sep 2018 17:03)  POCT Blood Glucose.: 282 mg/dL (10 Sep 2018 12:32)    I&O's Summary    10 Sep 2018 07:01  -  11 Sep 2018 07:00  --------------------------------------------------------  IN: 400 mL / OUT: 800 mL / NET: -400 mL        PHYSICAL EXAM:  HEAD:  Atraumatic, Normocephalic  NECK: Supple, No   JVD  CHEST/LUNG:   no     rales,     no,    rhonchi  HEART: Regular rate and rhythm;         murmur  ABDOMEN: Soft, Nontender, ;   EXTREMITIES:      toe ulcer   NEUROLOGY:  alert    LABS:                        10.1   2.97  )-----------( 135      ( 10 Sep 2018 08:06 )             29.0     09-11    128<L>  |  92<L>  |  33<H>  ----------------------------<  208<H>  4.3   |  21<L>  |  1.43<H>    Ca    9.8      11 Sep 2018 06:24    TPro  8.0  /  Alb  4.4  /  TBili  0.2  /  DBili  x   /  AST  20  /  ALT  14  /  AlkPhos  85  09-09    PT/INR - ( 11 Sep 2018 07:57 )   PT: 27.0 sec;   INR: 2.35 ratio         PTT - ( 10 Sep 2018 09:24 )  PTT:35.3 sec            09-09 @ 09:31  4.1  23    Hemoglobin A1C, Whole Blood: 9.1 % (07-20 @ 05:16)    Thyroid Stimulating Hormone, Serum: 0.85 uIU/mL (07-23 @ 05:00)          Consultant(s) Notes Reviewed:      Care Discussed with Consultants/Other Providers:
poet op, doing  well  REVIEW OF SYSTEMS:  GEN: no fever,    no chills  RESP: no SOB,   no cough  CVS: no chest pain,   no palpitations  GI: no abdominal pain,   no nausea,   no vomiting,   no constipation,   no diarrhea  : no dysuria,   no frequency  NEURO: no headache,   no dizziness  PSYCH: no depression,   not anxious  Derm : no rash    MEDICATIONS  (STANDING):  aspirin enteric coated 81 milliGRAM(s) Oral daily  atorvastatin 80 milliGRAM(s) Oral at bedtime  buDESOnide 160 MICROgram(s)/formoterol 4.5 MICROgram(s) Inhaler 2 Puff(s) Inhalation two times a day  Dakins Solution - 1/4 Strength 1 Application(s) Topical daily  dextrose 5%. 1000 milliLiter(s) (50 mL/Hr) IV Continuous <Continuous>  dextrose 5%. 1000 milliLiter(s) (50 mL/Hr) IV Continuous <Continuous>  dextrose 50% Injectable 12.5 Gram(s) IV Push once  dextrose 50% Injectable 25 Gram(s) IV Push once  dextrose 50% Injectable 25 Gram(s) IV Push once  dextrose 50% Injectable 12.5 Gram(s) IV Push once  dextrose 50% Injectable 25 Gram(s) IV Push once  dextrose 50% Injectable 25 Gram(s) IV Push once  furosemide    Tablet 40 milliGRAM(s) Oral daily  gabapentin 300 milliGRAM(s) Oral daily  influenza   Vaccine 0.5 milliLiter(s) IntraMuscular once  insulin glargine Injectable (LANTUS) 58 Unit(s) SubCutaneous at bedtime  insulin lispro (HumaLOG) corrective regimen sliding scale   SubCutaneous at bedtime  insulin lispro (HumaLOG) corrective regimen sliding scale   SubCutaneous three times a day before meals  insulin lispro Injectable (HumaLOG) 19 Unit(s) SubCutaneous three times a day before meals  levothyroxine 150 MICROGram(s) Oral daily  losartan 100 milliGRAM(s) Oral daily  metoprolol tartrate 25 milliGRAM(s) Oral two times a day  piperacillin/tazobactam IVPB. 3.375 Gram(s) IV Intermittent every 8 hours  polyethylene glycol 3350 17 Gram(s) Oral daily  senna 2 Tablet(s) Oral at bedtime  sodium chloride 0.9%. 1000 milliLiter(s) (30 mL/Hr) IV Continuous <Continuous>    MEDICATIONS  (PRN):  acetaminophen   Tablet .. 650 milliGRAM(s) Oral every 6 hours PRN Mild Pain (1 - 3)  dextrose 40% Gel 15 Gram(s) Oral once PRN Blood Glucose LESS THAN 70 milliGRAM(s)/deciliter  dextrose 40% Gel 15 Gram(s) Oral once PRN Blood Glucose LESS THAN 70 milliGRAM(s)/deciliter  glucagon  Injectable 1 milliGRAM(s) IntraMuscular once PRN Glucose LESS THAN 70 milligrams/deciliter  glucagon  Injectable 1 milliGRAM(s) IntraMuscular once PRN Glucose LESS THAN 70 milligrams/deciliter  morphine  - Injectable 2 milliGRAM(s) IV Push every 4 hours PRN Severe Pain (7 - 10)  oxyCODONE    5 mG/acetaminophen 325 mG 2 Tablet(s) Oral every 4 hours PRN Mild Pain (1 - 3)      Vital Signs Last 24 Hrs  T(C): 36.8 (13 Sep 2018 05:30), Max: 36.8 (13 Sep 2018 05:30)  T(F): 98.2 (13 Sep 2018 05:30), Max: 98.2 (13 Sep 2018 05:30)  HR: 76 (13 Sep 2018 05:30) (61 - 88)  BP: 134/64 (13 Sep 2018 05:30) (109/68 - 139/62)  BP(mean): 85 (12 Sep 2018 10:00) (79 - 90)  RR: 18 (13 Sep 2018 05:30) (16 - 18)  SpO2: 98% (13 Sep 2018 05:30) (94% - 100%)  CAPILLARY BLOOD GLUCOSE      POCT Blood Glucose.: 357 mg/dL (12 Sep 2018 21:31)  POCT Blood Glucose.: 427 mg/dL (12 Sep 2018 17:10)  POCT Blood Glucose.: 302 mg/dL (12 Sep 2018 11:51)    I&O's Summary    12 Sep 2018 07:01  -  13 Sep 2018 07:00  --------------------------------------------------------  IN: 540 mL / OUT: 2450 mL / NET: -1910 mL        PHYSICAL EXAM:  HEAD:  Atraumatic, Normocephalic  NECK: Supple, No   JVD  CHEST/LUNG:   no     rales,     no,    rhonchi  HEART: Regular rate and rhythm;         murmur  ABDOMEN: Soft, Nontender, ;   EXTREMITIES:    dressing, foot    NEUROLOGY:  alert    LABS:                        9.6    5.21  )-----------( 315      ( 12 Sep 2018 07:35 )             29.3     09-13    132<L>  |  94<L>  |  35<H>  ----------------------------<  255<H>  4.5   |  24  |  1.38<H>    Ca    9.9      13 Sep 2018 07:08      PT/INR - ( 11 Sep 2018 07:57 )   PT: 27.0 sec;   INR: 2.35 ratio                       Hemoglobin A1C, Whole Blood: 9.1 % (07-20 @ 05:16)    Thyroid Stimulating Hormone, Serum: 0.85 uIU/mL (07-23 @ 05:00)          Consultant(s) Notes Reviewed:      Care Discussed with Consultants/Other Providers:

## 2018-09-13 NOTE — PROGRESS NOTE ADULT - ASSESSMENT
Assessment  DMT2: 67y Male with DM T2 with hyperglycemia on basal bolus insulin, dose was adjusted yesterday, blood sugars still  running high, insulin dose adjusted,  no hypoglycemic episode,  eating meals,  non compliant with low carb diet.  CAD: On medications, stable, monitored.  Hypothyroidism:  On synthroid 150 mcg po daily, asymptomatic.  HTN: Controlled, On med.  Foot wound: S/P Toe amputation, podiatry FU

## 2018-09-13 NOTE — PHYSICAL THERAPY INITIAL EVALUATION ADULT - PLANNED THERAPY INTERVENTIONS, PT EVAL
strengthening/GOAL: Pt will negotiate 9 steps in a reciprocal pattern with unilateral handrail independently in 2 weeks/gait training

## 2018-09-13 NOTE — PROGRESS NOTE ADULT - ASSESSMENT
67 year old male s/p LEFT foot P1RR  - Pt was examined and evaluated  - Sx site consistent with post-op course --> flap is slightly cool to touch  - Wound dressed with DSD --> will not need home VNS. Keep dressing CDI till follow-up  - Pod surg stable for discharge, will eval for possible HBOT as outpt.  - Pod surg discharge recs in discharge documents  - Seen with attending 67 year old male s/p LEFT foot P1RR  - Pt was examined and evaluated  - Sx site consistent with post-op course --> flap is slightly cool to touch  - Wound dressed with DSD --> will not need home VNS. Keep dressing CDI till follow-up  - Pod surg stable for discharge, will eval for possible HBOT as outpt --> discharge on 7 days of Minocycline   - Pod surg discharge recs in discharge documents  - Seen with attending

## 2018-09-13 NOTE — PHYSICAL THERAPY INITIAL EVALUATION ADULT - ADDITIONAL COMMENTS
Pt states he lives in a private home with family with approximately 4 steps to enter (+handrail), pt states he stays on first floor of home. Pt states prior to admission he was independent with ambulation using a straight cane at times for balance. Pt states he owns a RW. Pt states he was driving prior to surgery. Pt states daughters would assist him at times with foot care/ADLs.

## 2018-09-13 NOTE — DISCHARGE NOTE ADULT - SECONDARY DIAGNOSIS.
CHF (congestive heart failure) DM (diabetes mellitus) HTN (hypertension) COPD (chronic obstructive pulmonary disease)

## 2018-09-13 NOTE — PROGRESS NOTE ADULT - ASSESSMENT
Patient  s/p L femoral-peroneal bypass  8/2018, , cad,/ stent,  cabg  2016,    copd,  pad,/ stent  leg, 2016,   ca prostate, sriram, , htn,  dm 2, on insulin, obesity,    admitted  with non healing toe  ulcer,  left    big toe  afib, on coumadin/  echo  2017,  ef  of  48  s/p  toe  amutation  re start  coumadin, ,   zosyn/  dm, on insulin pe r endo/ hypergycemia   inr in am

## 2018-09-13 NOTE — DISCHARGE NOTE ADULT - PATIENT PORTAL LINK FT
You can access the [facility] Patient Portal, offered by [facility], by registering with the following website: [email or web address]

## 2018-09-13 NOTE — DISCHARGE NOTE ADULT - MEDICATION SUMMARY - MEDICATIONS TO TAKE
I will START or STAY ON the medications listed below when I get home from the hospital:    Aspir 81 oral delayed release tablet  -- 1 tab(s) by mouth once a day in Am  -- Indication: For CAD (coronary artery disease)    diclofenac sodium 75 mg oral delayed release tablet  -- 1 tab(s) by mouth 3 times a day, As Needed  -- Indication: For PAD (peripheral artery disease)    acetaminophen 325 mg oral tablet  -- 2 tab(s) by mouth every 6 hours, As needed, Mild Pain (1 - 3)  -- Indication: For PAin    naproxen 250 mg oral tablet  -- 1 tab(s) by mouth once a day, As Needed  for pain   -- Indication: For PAin     oxyCODONE 5 mg oral tablet  -- 1 tab(s) by mouth every 4 hours, As needed, Mild to Moderate Pain  -- Indication: For PAin     oxyCODONE 10 mg oral tablet  -- 1 tab(s) by mouth every 4 hours, As needed, Severe Pain (7 - 10)  -- Indication: For PAin     losartan 100 mg oral tablet  -- 1 tab(s) by mouth once a day in Am  -- Indication: For HTN (hypertension)    Coumadin 5 mg oral tablet  -- 1 tab(s) by mouth once a day resume 9/14/18   -- Indication: For afib     Coumadin 7.5 mg oral tablet  -- 1 tab(s) by mouth once ( takie tonight)   -- Do not take this drug if you are pregnant.  It is very important that you take or use this exactly as directed.  Do not skip doses or discontinue unless directed by your doctor.  Obtain medical advice before taking any non-prescription drugs as some may affect the action of this medication.    -- Indication: For afib     gabapentin 300 mg oral capsule  -- 1 cap(s) by mouth once a day, As Needed  -- Indication: For neuropathy     atorvastatin 80 mg oral tablet  -- 1 tab(s) by mouth once a day at night  -- Indication: For Hyperlipidemia     metoprolol tartrate 25 mg oral tablet  -- 1 tab(s) by mouth 2 times a day  -- Indication: For HTN (hypertension)    Symbicort 160 mcg-4.5 mcg/inh inhalation aerosol  -- 2 puff(s) inhaled 2 times a day  -- Indication: For Copd    furosemide 40 mg oral tablet  -- 1 tab(s) by mouth once a day in Am  -- Indication: For Chf    ferrous sulfate 325 mg (65 mg elemental iron) oral tablet  -- 1 tab(s) by mouth once a day  -- Indication: For anemia     senna oral tablet  -- 2 tab(s) by mouth once a day (at bedtime)  -- Indication: For Constipation     polyethylene glycol 3350 oral powder for reconstitution  -- 17 gram(s) by mouth once a day  -- Indication: For Constipation     Colace 100 mg oral capsule  -- orally once a day, As Needed  -- Indication: For Constipation     minocycline 100 mg oral capsule  -- 1 cap(s) by mouth 2 times a day  -- Indication: For Diabetic toe ulcer    levothyroxine 150 mcg (0.15 mg) oral tablet  -- 1 tab(s) by mouth once a day Am  -- Indication: For Hypothyroid    Vitamin B-12 1000 mcg oral tablet  -- 1 tab(s) by mouth once a day in Am  -- Indication: For supplement

## 2018-09-13 NOTE — DISCHARGE NOTE ADULT - MEDICATION SUMMARY - MEDICATIONS TO STOP TAKING
I will STOP taking the medications listed below when I get home from the hospital:    levoFLOXacin 500 mg oral tablet  -- 1 tab(s) by mouth every 24 hours

## 2018-09-13 NOTE — PHYSICAL THERAPY INITIAL EVALUATION ADULT - PERTINENT HX OF CURRENT PROBLEM, REHAB EVAL
66 y/o male h/o COPD, CHF, PAD, IDDM, recent left [id number] artery bypass with PTFE graft on 8/7 for chronic non healing L great toe infection, sent by podiatry for surgery. States that since surgery, he still has persistent pain to the toe. He is on amoxicillin. Pt s/p L hallux amputation for gangrene (9/12/18).

## 2018-09-13 NOTE — PHYSICAL THERAPY INITIAL EVALUATION ADULT - GENERAL OBSERVATIONS, REHAB EVAL
Pt brando 45 min eval well. Pt initially rec'd standing in room independently, premedicated and NAD. PT reviewed weight bearing restrictions, WBAT through heel in [place] shoe.

## 2018-09-13 NOTE — DISCHARGE NOTE ADULT - CARE PROVIDER_API CALL
Roxann Tomlin (NITO), Surgery  Dept Director  [address] [place] [address]  Phone: [phone]  Fax: [phone]

## 2018-09-13 NOTE — DISCHARGE NOTE ADULT - ADDITIONAL INSTRUCTIONS
WOUND CARE: Keep dressing clean, dry and intact till follow-up with Pod surg  WEIGHT BEARING: Heel weight bearing as tolerated in surgical shoe only   ANTIBIOTICS: Please complete course of antibiotics till completion as prescribed   FOLLOW-UP: Follow-up with Dr. Gregg within 2-4 days of discharge @ [phone]

## 2018-09-13 NOTE — DISCHARGE NOTE ADULT - PLAN OF CARE
Medical management of surgical site WOUND CARE: Keep dressing clean, dry and intact till follow-up with Pod surg  WEIGHT BEARING: Heel weight bearing as tolerated in surgical shoe only   ANTIBIOTICS: Please complete course of antibiotics till completion as prescribed   FOLLOW-UP: Follow-up with Dr. Gregg within 2-4 days of discharge @ [phone] WOUND CARE: Keep dressing clean, dry and intact till follow-up with Pod surg  WEIGHT BEARING: Heel weight bearing as tolerated in surgical shoe only   ANTIBIOTICS: Please complete course of antibiotics till completion as prescribed   FOLLOW-UP: Follow-up with Dr. Gregg within 2-4 days of discharge @ [phone] Weigh yourself daily.  If you gain 3lbs in 3 days, or 5lbs in a week call your Health Care Provider.  Do not eat or drink foods containing more than 2000mg of salt (sodium) in your diet every day.  Call your Health Care Provider if you have any swelling or increased swelling in your feet, ankles, and/or stomach.  Take all of your medication as directed.  If you become dizzy call your Health Care Provider. HgA1C this admission.  Make sure you get your HgA1c checked every three months.  If you take oral diabetes medications, check your blood glucose two times a day.  If you take insulin, check your blood glucose before meals and at bedtime.  It's important not to skip any meals.  Keep a log of your blood glucose results and always take it with you to your doctor appointments.  Keep a list of your current medications including injectables and over the counter medications and bring this medication list with you to all your doctor appointments.  If you have not seen your ophthalmologist this year call for appointment.  Check your feet daily for redness, sores, or openings. Do not self treat. If no improvement in two days call your primary care physician for an appointment.  Low blood sugar (hypoglycemia) is a blood sugar below 70mg/dl. Check your blood sugar if you feel signs/symptoms of hypoglycemia. If your blood sugar is below 70 take 15 grams of carbohydrates (ex 4 oz of apple juice, 3-4 glucose tablets, or 4-6 oz of regular soda) wait 15 minutes and repeat blood sugar to make sure it comes up above 70.  If your blood sugar is above 70 and you are due for a meal, have a meal.  If you are not due for a meal have a snack.  This snack helps keeps your blood sugar at a safe range. Low salt diet  Activity as tolerated.  Take all medication as prescribed.  Follow up with your medical doctor for routine blood pressure monitoring at your next visit.  Notify your doctor if you have any of the following symptoms:   Dizziness, Lightheadedness, Blurry vision, Headache, Chest pain, Shortness of breath Call your Health Care provider upon arrival home to make a follow up appointment within one week.  Take all inhalers as prescribed by your Health Care Provider.  Take steroids as prescribed by your Health Care Provider.  If your cough increases infrequency and severity and/or you have shortness of breath or increased shortness of breath call your Health Care Provider.  If you develop fever, chills, night sweats, malaise, and/or change in mental status call your Health care Provider.  Nutrition is very important.  Eat small frequent meals.  Increase your activity as tolerated.  Do not stay in bed all day

## 2018-09-13 NOTE — PHYSICAL THERAPY INITIAL EVALUATION ADULT - GAIT TRAINING, PT EVAL
GOAL: Pt will ambulate 300 feet with least restrictive device as appropriate independently in 2 weeks

## 2018-09-13 NOTE — PROGRESS NOTE ADULT - REASON FOR ADMISSION
foot  infection
Critical limb ischemia and infection
foot  infection

## 2018-09-13 NOTE — DISCHARGE NOTE ADULT - CARE PLAN
Goal:	Medical management of surgical site  Assessment and plan of treatment:	WOUND CARE: Keep dressing clean, dry and intact till follow-up with Pod surg  WEIGHT BEARING: Heel weight bearing as tolerated in surgical shoe only   ANTIBIOTICS: Please complete course of antibiotics till completion as prescribed   FOLLOW-UP: Follow-up with Dr. Gregg within 2-4 days of discharge @ [phone] Goal:	Medical management of surgical site  Assessment and plan of treatment:	WOUND CARE: Keep dressing clean, dry and intact till follow-up with Pod surg  WEIGHT BEARING: Heel weight bearing as tolerated in surgical shoe only   ANTIBIOTICS: Please complete course of antibiotics till completion as prescribed   FOLLOW-UP: Follow-up with Dr. Gregg within 2-4 days of discharge @ [phone] Principal Discharge DX:	Ulcer of foot  Goal:	Medical management of surgical site  Assessment and plan of treatment:	WOUND CARE: Keep dressing clean, dry and intact till follow-up with Pod surg  WEIGHT BEARING: Heel weight bearing as tolerated in surgical shoe only   ANTIBIOTICS: Please complete course of antibiotics till completion as prescribed   FOLLOW-UP: Follow-up with Dr. Gregg within 2-4 days of discharge @ [phone]  Secondary Diagnosis:	CHF (congestive heart failure)  Assessment and plan of treatment:	Weigh yourself daily.  If you gain 3lbs in 3 days, or 5lbs in a week call your Health Care Provider.  Do not eat or drink foods containing more than 2000mg of salt (sodium) in your diet every day.  Call your Health Care Provider if you have any swelling or increased swelling in your feet, ankles, and/or stomach.  Take all of your medication as directed.  If you become dizzy call your Health Care Provider.  Secondary Diagnosis:	DM (diabetes mellitus)  Assessment and plan of treatment:	HgA1C this admission.  Make sure you get your HgA1c checked every three months.  If you take oral diabetes medications, check your blood glucose two times a day.  If you take insulin, check your blood glucose before meals and at bedtime.  It's important not to skip any meals.  Keep a log of your blood glucose results and always take it with you to your doctor appointments.  Keep a list of your current medications including injectables and over the counter medications and bring this medication list with you to all your doctor appointments.  If you have not seen your ophthalmologist this year call for appointment.  Check your feet daily for redness, sores, or openings. Do not self treat. If no improvement in two days call your primary care physician for an appointment.  Low blood sugar (hypoglycemia) is a blood sugar below 70mg/dl. Check your blood sugar if you feel signs/symptoms of hypoglycemia. If your blood sugar is below 70 take 15 grams of carbohydrates (ex 4 oz of apple juice, 3-4 glucose tablets, or 4-6 oz of regular soda) wait 15 minutes and repeat blood sugar to make sure it comes up above 70.  If your blood sugar is above 70 and you are due for a meal, have a meal.  If you are not due for a meal have a snack.  This snack helps keeps your blood sugar at a safe range.  Secondary Diagnosis:	HTN (hypertension)  Assessment and plan of treatment:	Low salt diet  Activity as tolerated.  Take all medication as prescribed.  Follow up with your medical doctor for routine blood pressure monitoring at your next visit.  Notify your doctor if you have any of the following symptoms:   Dizziness, Lightheadedness, Blurry vision, Headache, Chest pain, Shortness of breath  Secondary Diagnosis:	COPD (chronic obstructive pulmonary disease)  Assessment and plan of treatment:	Call your Health Care provider upon arrival home to make a follow up appointment within one week.  Take all inhalers as prescribed by your Health Care Provider.  Take steroids as prescribed by your Health Care Provider.  If your cough increases infrequency and severity and/or you have shortness of breath or increased shortness of breath call your Health Care Provider.  If you develop fever, chills, night sweats, malaise, and/or change in mental status call your Health care Provider.  Nutrition is very important.  Eat small frequent meals.  Increase your activity as tolerated.  Do not stay in bed all day

## 2018-09-14 ENCOUNTER — INPATIENT (INPATIENT)
Facility: HOSPITAL | Age: 68
LOS: 4 days | Discharge: HOME | DRG: 315 | End: 2018-09-19
Attending: STUDENT IN AN ORGANIZED HEALTH CARE EDUCATION/TRAINING PROGRAM | Admitting: STUDENT IN AN ORGANIZED HEALTH CARE EDUCATION/TRAINING PROGRAM
Payer: COMMERCIAL

## 2018-09-14 VITALS
OXYGEN SATURATION: 99 % | RESPIRATION RATE: 18 BRPM | HEIGHT: 65 IN | DIASTOLIC BLOOD PRESSURE: 75 MMHG | SYSTOLIC BLOOD PRESSURE: 133 MMHG | HEART RATE: 81 BPM | WEIGHT: 188.94 LBS | TEMPERATURE: 98 F

## 2018-09-14 DIAGNOSIS — Z95.5 PRESENCE OF CORONARY ANGIOPLASTY IMPLANT AND GRAFT: Chronic | ICD-10-CM

## 2018-09-14 DIAGNOSIS — M79.672 PAIN IN LEFT FOOT: ICD-10-CM

## 2018-09-14 DIAGNOSIS — Z95.828 PRESENCE OF OTHER VASCULAR IMPLANTS AND GRAFTS: Chronic | ICD-10-CM

## 2018-09-14 DIAGNOSIS — Z95.1 PRESENCE OF AORTOCORONARY BYPASS GRAFT: Chronic | ICD-10-CM

## 2018-09-14 DIAGNOSIS — Z86.79 PERSONAL HISTORY OF OTHER DISEASES OF THE CIRCULATORY SYSTEM: Chronic | ICD-10-CM

## 2018-09-14 LAB
ALBUMIN SERPL ELPH-MCNC: 4.3 G/DL — SIGNIFICANT CHANGE UP (ref 3.3–5)
ALP SERPL-CCNC: 70 U/L — SIGNIFICANT CHANGE UP (ref 40–120)
ALT FLD-CCNC: 15 U/L — SIGNIFICANT CHANGE UP (ref 10–45)
ANION GAP SERPL CALC-SCNC: 14 MMOL/L — SIGNIFICANT CHANGE UP (ref 5–17)
APTT BLD: 105.2 SEC — HIGH (ref 27.5–37.4)
APTT BLD: 29.5 SEC — SIGNIFICANT CHANGE UP (ref 27.5–37.4)
AST SERPL-CCNC: 21 U/L — SIGNIFICANT CHANGE UP (ref 10–40)
BASE EXCESS BLDV CALC-SCNC: 1.4 MMOL/L — SIGNIFICANT CHANGE UP (ref -2–2)
BASOPHILS # BLD AUTO: 0 K/UL — SIGNIFICANT CHANGE UP (ref 0–0.2)
BASOPHILS NFR BLD AUTO: 0.3 % — SIGNIFICANT CHANGE UP (ref 0–2)
BILIRUB SERPL-MCNC: 0.4 MG/DL — SIGNIFICANT CHANGE UP (ref 0.2–1.2)
BLD GP AB SCN SERPL QL: NEGATIVE — SIGNIFICANT CHANGE UP
BUN SERPL-MCNC: 43 MG/DL — HIGH (ref 7–23)
CA-I SERPL-SCNC: 1.19 MMOL/L — SIGNIFICANT CHANGE UP (ref 1.12–1.3)
CALCIUM SERPL-MCNC: 10.1 MG/DL — SIGNIFICANT CHANGE UP (ref 8.4–10.5)
CHLORIDE BLDV-SCNC: 99 MMOL/L — SIGNIFICANT CHANGE UP (ref 96–108)
CHLORIDE SERPL-SCNC: 95 MMOL/L — LOW (ref 96–108)
CO2 BLDV-SCNC: 28 MMOL/L — SIGNIFICANT CHANGE UP (ref 22–30)
CO2 SERPL-SCNC: 24 MMOL/L — SIGNIFICANT CHANGE UP (ref 22–31)
CREAT SERPL-MCNC: 1.52 MG/DL — HIGH (ref 0.5–1.3)
CRP SERPL-MCNC: 0.32 MG/DL — SIGNIFICANT CHANGE UP (ref 0–0.4)
CULTURE RESULTS: SIGNIFICANT CHANGE UP
CULTURE RESULTS: SIGNIFICANT CHANGE UP
EOSINOPHIL # BLD AUTO: 0.2 K/UL — SIGNIFICANT CHANGE UP (ref 0–0.5)
EOSINOPHIL NFR BLD AUTO: 2.3 % — SIGNIFICANT CHANGE UP (ref 0–6)
ERYTHROCYTE [SEDIMENTATION RATE] IN BLOOD: 102 MM/HR — HIGH (ref 0–20)
GAS PNL BLDV: 133 MMOL/L — LOW (ref 136–145)
GAS PNL BLDV: SIGNIFICANT CHANGE UP
GLUCOSE BLDV-MCNC: 154 MG/DL — HIGH (ref 70–99)
GLUCOSE SERPL-MCNC: 152 MG/DL — HIGH (ref 70–99)
HCO3 BLDV-SCNC: 27 MMOL/L — SIGNIFICANT CHANGE UP (ref 21–29)
HCT VFR BLD CALC: 32.3 % — LOW (ref 39–50)
HCT VFR BLDA CALC: 32 % — LOW (ref 39–50)
HGB BLD CALC-MCNC: 10.3 G/DL — LOW (ref 13–17)
HGB BLD-MCNC: 10.5 G/DL — LOW (ref 13–17)
INR BLD: 1.28 RATIO — HIGH (ref 0.88–1.16)
INR BLD: 1.35 RATIO — HIGH (ref 0.88–1.16)
LACTATE BLDV-MCNC: 2.7 MMOL/L — HIGH (ref 0.7–2)
LYMPHOCYTES # BLD AUTO: 2 K/UL — SIGNIFICANT CHANGE UP (ref 1–3.3)
LYMPHOCYTES # BLD AUTO: 21.8 % — SIGNIFICANT CHANGE UP (ref 13–44)
MCHC RBC-ENTMCNC: 27.6 PG — SIGNIFICANT CHANGE UP (ref 27–34)
MCHC RBC-ENTMCNC: 32.4 GM/DL — SIGNIFICANT CHANGE UP (ref 32–36)
MCV RBC AUTO: 85.3 FL — SIGNIFICANT CHANGE UP (ref 80–100)
MONOCYTES # BLD AUTO: 0.8 K/UL — SIGNIFICANT CHANGE UP (ref 0–0.9)
MONOCYTES NFR BLD AUTO: 8.7 % — SIGNIFICANT CHANGE UP (ref 2–14)
NEUTROPHILS # BLD AUTO: 6.1 K/UL — SIGNIFICANT CHANGE UP (ref 1.8–7.4)
NEUTROPHILS NFR BLD AUTO: 66.8 % — SIGNIFICANT CHANGE UP (ref 43–77)
OTHER CELLS CSF MANUAL: 7 ML/DL — LOW (ref 18–22)
PCO2 BLDV: 49 MMHG — SIGNIFICANT CHANGE UP (ref 35–50)
PH BLDV: 7.36 — SIGNIFICANT CHANGE UP (ref 7.35–7.45)
PLATELET # BLD AUTO: 334 K/UL — SIGNIFICANT CHANGE UP (ref 150–400)
PO2 BLDV: 31 MMHG — SIGNIFICANT CHANGE UP (ref 25–45)
POTASSIUM BLDV-SCNC: 4.2 MMOL/L — SIGNIFICANT CHANGE UP (ref 3.5–5.3)
POTASSIUM SERPL-MCNC: 3.8 MMOL/L — SIGNIFICANT CHANGE UP (ref 3.5–5.3)
POTASSIUM SERPL-SCNC: 3.8 MMOL/L — SIGNIFICANT CHANGE UP (ref 3.5–5.3)
PROT SERPL-MCNC: 7.8 G/DL — SIGNIFICANT CHANGE UP (ref 6–8.3)
PROTHROM AB SERPL-ACNC: 14 SEC — HIGH (ref 9.8–12.7)
PROTHROM AB SERPL-ACNC: 14.8 SEC — HIGH (ref 9.8–12.7)
RBC # BLD: 3.79 M/UL — LOW (ref 4.2–5.8)
RBC # FLD: 15.1 % — HIGH (ref 10.3–14.5)
RH IG SCN BLD-IMP: POSITIVE — SIGNIFICANT CHANGE UP
SAO2 % BLDV: 49 % — LOW (ref 67–88)
SODIUM SERPL-SCNC: 133 MMOL/L — LOW (ref 135–145)
SPECIMEN SOURCE: SIGNIFICANT CHANGE UP
SPECIMEN SOURCE: SIGNIFICANT CHANGE UP
WBC # BLD: 9.2 K/UL — SIGNIFICANT CHANGE UP (ref 3.8–10.5)
WBC # FLD AUTO: 9.2 K/UL — SIGNIFICANT CHANGE UP (ref 3.8–10.5)

## 2018-09-14 PROCEDURE — 99285 EMERGENCY DEPT VISIT HI MDM: CPT | Mod: 25

## 2018-09-14 PROCEDURE — 73630 X-RAY EXAM OF FOOT: CPT | Mod: 26,LT

## 2018-09-14 PROCEDURE — 71046 X-RAY EXAM CHEST 2 VIEWS: CPT | Mod: 26

## 2018-09-14 PROCEDURE — 93971 EXTREMITY STUDY: CPT | Mod: 26

## 2018-09-14 RX ORDER — PREGABALIN 225 MG/1
1000 CAPSULE ORAL DAILY
Qty: 0 | Refills: 0 | Status: DISCONTINUED | OUTPATIENT
Start: 2018-09-14 | End: 2018-09-17

## 2018-09-14 RX ORDER — INFLUENZA VIRUS VACCINE 15; 15; 15; 15 UG/.5ML; UG/.5ML; UG/.5ML; UG/.5ML
0.5 SUSPENSION INTRAMUSCULAR ONCE
Qty: 0 | Refills: 0 | Status: COMPLETED | OUTPATIENT
Start: 2018-09-14 | End: 2018-09-19

## 2018-09-14 RX ORDER — INSULIN LISPRO 100/ML
VIAL (ML) SUBCUTANEOUS AT BEDTIME
Qty: 0 | Refills: 0 | Status: DISCONTINUED | OUTPATIENT
Start: 2018-09-14 | End: 2018-09-16

## 2018-09-14 RX ORDER — HEPARIN SODIUM 5000 [USP'U]/ML
1600 INJECTION INTRAVENOUS; SUBCUTANEOUS
Qty: 25000 | Refills: 0 | Status: DISCONTINUED | OUTPATIENT
Start: 2018-09-14 | End: 2018-09-16

## 2018-09-14 RX ORDER — OXYCODONE HYDROCHLORIDE 5 MG/1
5 TABLET ORAL ONCE
Qty: 0 | Refills: 0 | Status: DISCONTINUED | OUTPATIENT
Start: 2018-09-14 | End: 2018-09-14

## 2018-09-14 RX ORDER — METOPROLOL TARTRATE 50 MG
25 TABLET ORAL
Qty: 0 | Refills: 0 | Status: DISCONTINUED | OUTPATIENT
Start: 2018-09-14 | End: 2018-09-17

## 2018-09-14 RX ORDER — ASPIRIN/CALCIUM CARB/MAGNESIUM 324 MG
81 TABLET ORAL DAILY
Qty: 0 | Refills: 0 | Status: DISCONTINUED | OUTPATIENT
Start: 2018-09-14 | End: 2018-09-17

## 2018-09-14 RX ORDER — HEPARIN SODIUM 5000 [USP'U]/ML
INJECTION INTRAVENOUS; SUBCUTANEOUS
Qty: 25000 | Refills: 0 | Status: DISCONTINUED | OUTPATIENT
Start: 2018-09-14 | End: 2018-09-14

## 2018-09-14 RX ORDER — DICLOFENAC SODIUM 75 MG/1
75 TABLET, DELAYED RELEASE ORAL ONCE
Qty: 0 | Refills: 0 | Status: COMPLETED | OUTPATIENT
Start: 2018-09-14 | End: 2018-09-14

## 2018-09-14 RX ORDER — HEPARIN SODIUM 5000 [USP'U]/ML
7000 INJECTION INTRAVENOUS; SUBCUTANEOUS ONCE
Qty: 0 | Refills: 0 | Status: COMPLETED | OUTPATIENT
Start: 2018-09-14 | End: 2018-09-14

## 2018-09-14 RX ORDER — GABAPENTIN 400 MG/1
300 CAPSULE ORAL ONCE
Qty: 0 | Refills: 0 | Status: COMPLETED | OUTPATIENT
Start: 2018-09-14 | End: 2018-09-14

## 2018-09-14 RX ORDER — LOSARTAN POTASSIUM 100 MG/1
100 TABLET, FILM COATED ORAL DAILY
Qty: 0 | Refills: 0 | Status: DISCONTINUED | OUTPATIENT
Start: 2018-09-14 | End: 2018-09-17

## 2018-09-14 RX ORDER — MINOCYCLINE HYDROCHLORIDE 45 MG/1
100 TABLET, EXTENDED RELEASE ORAL ONCE
Qty: 0 | Refills: 0 | Status: COMPLETED | OUTPATIENT
Start: 2018-09-14 | End: 2018-09-14

## 2018-09-14 RX ORDER — SODIUM CHLORIDE 9 MG/ML
1000 INJECTION INTRAMUSCULAR; INTRAVENOUS; SUBCUTANEOUS ONCE
Qty: 0 | Refills: 0 | Status: COMPLETED | OUTPATIENT
Start: 2018-09-14 | End: 2018-09-14

## 2018-09-14 RX ORDER — INSULIN LISPRO 100/ML
VIAL (ML) SUBCUTANEOUS
Qty: 0 | Refills: 0 | Status: DISCONTINUED | OUTPATIENT
Start: 2018-09-14 | End: 2018-09-16

## 2018-09-14 RX ORDER — FUROSEMIDE 40 MG
40 TABLET ORAL DAILY
Qty: 0 | Refills: 0 | Status: DISCONTINUED | OUTPATIENT
Start: 2018-09-14 | End: 2018-09-17

## 2018-09-14 RX ORDER — ATORVASTATIN CALCIUM 80 MG/1
80 TABLET, FILM COATED ORAL AT BEDTIME
Qty: 0 | Refills: 0 | Status: DISCONTINUED | OUTPATIENT
Start: 2018-09-14 | End: 2018-09-17

## 2018-09-14 RX ORDER — ACETAMINOPHEN 500 MG
975 TABLET ORAL ONCE
Qty: 0 | Refills: 0 | Status: COMPLETED | OUTPATIENT
Start: 2018-09-14 | End: 2018-09-14

## 2018-09-14 RX ORDER — MORPHINE SULFATE 50 MG/1
4 CAPSULE, EXTENDED RELEASE ORAL EVERY 4 HOURS
Qty: 0 | Refills: 0 | Status: DISCONTINUED | OUTPATIENT
Start: 2018-09-14 | End: 2018-09-17

## 2018-09-14 RX ORDER — SENNA PLUS 8.6 MG/1
2 TABLET ORAL AT BEDTIME
Qty: 0 | Refills: 0 | Status: DISCONTINUED | OUTPATIENT
Start: 2018-09-14 | End: 2018-09-17

## 2018-09-14 RX ORDER — BUDESONIDE AND FORMOTEROL FUMARATE DIHYDRATE 160; 4.5 UG/1; UG/1
2 AEROSOL RESPIRATORY (INHALATION)
Qty: 0 | Refills: 0 | Status: DISCONTINUED | OUTPATIENT
Start: 2018-09-14 | End: 2018-09-17

## 2018-09-14 RX ORDER — HEPARIN SODIUM 5000 [USP'U]/ML
3500 INJECTION INTRAVENOUS; SUBCUTANEOUS EVERY 6 HOURS
Qty: 0 | Refills: 0 | Status: DISCONTINUED | OUTPATIENT
Start: 2018-09-14 | End: 2018-09-14

## 2018-09-14 RX ORDER — HEPARIN SODIUM 5000 [USP'U]/ML
7000 INJECTION INTRAVENOUS; SUBCUTANEOUS EVERY 6 HOURS
Qty: 0 | Refills: 0 | Status: DISCONTINUED | OUTPATIENT
Start: 2018-09-14 | End: 2018-09-14

## 2018-09-14 RX ORDER — LEVOTHYROXINE SODIUM 125 MCG
150 TABLET ORAL DAILY
Qty: 0 | Refills: 0 | Status: DISCONTINUED | OUTPATIENT
Start: 2018-09-14 | End: 2018-09-17

## 2018-09-14 RX ORDER — OXYCODONE HYDROCHLORIDE 5 MG/1
10 TABLET ORAL ONCE
Qty: 0 | Refills: 0 | Status: DISCONTINUED | OUTPATIENT
Start: 2018-09-14 | End: 2018-09-14

## 2018-09-14 RX ORDER — ACETAMINOPHEN 500 MG
650 TABLET ORAL EVERY 6 HOURS
Qty: 0 | Refills: 0 | Status: DISCONTINUED | OUTPATIENT
Start: 2018-09-14 | End: 2018-09-17

## 2018-09-14 RX ADMIN — OXYCODONE HYDROCHLORIDE 5 MILLIGRAM(S): 5 TABLET ORAL at 11:44

## 2018-09-14 RX ADMIN — OXYCODONE HYDROCHLORIDE 5 MILLIGRAM(S): 5 TABLET ORAL at 19:39

## 2018-09-14 RX ADMIN — OXYCODONE HYDROCHLORIDE 10 MILLIGRAM(S): 5 TABLET ORAL at 17:07

## 2018-09-14 RX ADMIN — DICLOFENAC SODIUM 75 MILLIGRAM(S): 75 TABLET, DELAYED RELEASE ORAL at 23:31

## 2018-09-14 RX ADMIN — MORPHINE SULFATE 4 MILLIGRAM(S): 50 CAPSULE, EXTENDED RELEASE ORAL at 22:10

## 2018-09-14 RX ADMIN — OXYCODONE HYDROCHLORIDE 5 MILLIGRAM(S): 5 TABLET ORAL at 20:09

## 2018-09-14 RX ADMIN — Medication 150 MICROGRAM(S): at 17:08

## 2018-09-14 RX ADMIN — PREGABALIN 1000 MICROGRAM(S): 225 CAPSULE ORAL at 17:08

## 2018-09-14 RX ADMIN — Medication 650 MILLIGRAM(S): at 23:31

## 2018-09-14 RX ADMIN — Medication 975 MILLIGRAM(S): at 09:50

## 2018-09-14 RX ADMIN — GABAPENTIN 300 MILLIGRAM(S): 400 CAPSULE ORAL at 23:31

## 2018-09-14 RX ADMIN — HEPARIN SODIUM 14 UNIT(S)/HR: 5000 INJECTION INTRAVENOUS; SUBCUTANEOUS at 22:24

## 2018-09-14 RX ADMIN — OXYCODONE HYDROCHLORIDE 5 MILLIGRAM(S): 5 TABLET ORAL at 11:14

## 2018-09-14 RX ADMIN — Medication 650 MILLIGRAM(S): at 17:08

## 2018-09-14 RX ADMIN — OXYCODONE HYDROCHLORIDE 10 MILLIGRAM(S): 5 TABLET ORAL at 17:38

## 2018-09-14 RX ADMIN — SODIUM CHLORIDE 1000 MILLILITER(S): 9 INJECTION INTRAMUSCULAR; INTRAVENOUS; SUBCUTANEOUS at 09:52

## 2018-09-14 RX ADMIN — MORPHINE SULFATE 4 MILLIGRAM(S): 50 CAPSULE, EXTENDED RELEASE ORAL at 22:25

## 2018-09-14 RX ADMIN — HEPARIN SODIUM 16 UNIT(S)/HR: 5000 INJECTION INTRAVENOUS; SUBCUTANEOUS at 15:39

## 2018-09-14 RX ADMIN — Medication 975 MILLIGRAM(S): at 08:20

## 2018-09-14 RX ADMIN — Medication 650 MILLIGRAM(S): at 17:38

## 2018-09-14 NOTE — CONSULT NOTE ADULT - SUBJECTIVE AND OBJECTIVE BOX
VASCULAR SURGERY CONSULT NOTE    67M with CAD s/p three stents in 2006, CABG x3 in 3/2016, left femoral endarterectomy and fem pop bypass (6/2017), left femoral to peroneal bypass with PTFE (8/7/18) for claudication, now POD 2 s/p left hallux amputation with podiatry. Patient says that he was discharged last evening and that he was able to walk minimally around his house with a cane, but that severe pain at the amputation site kept him awake the entire night. He could no longer tolerate the pain this am, and so decided to present to the ED. No fevers or chills. No rest pain or claudication in the calf that he has noted.  In the ED, vital signs and labs normal. Left leg erythematous foot warm, motor and sensory intact, with nylons in fresh amputation site. PT signal, no DP, AT or graft signals present. Palpable femoral pulse. Right foot warm with DP, AT, and PT signals. Venous duplex showing clotted proximal bypass.      PAST MEDICAL & SURGICAL HISTORY:  COPD (chronic obstructive pulmonary disease)  Sleep apnea: non-compliant  Anemia  Atherosclerosis of arteries of extremities: left leg  Mild intermittent asthma without complication  Iron deficiency anemia, unspecified iron deficiency anemia type  Prostate cancer  Bronchospasm  Obesity (BMI 30-39.9)  PAD (peripheral artery disease): RLE bypass 11/30/15  CAD (coronary artery disease): 3 cardiac stents placed in 2006 (mid LAD, Prox LAD, Prox to #2), 3V CABG 3/28/16  Hypothyroid  Intermittent claudication  Diabetic neuropathy  DM (diabetes mellitus): type 2 - on insulin pump  CHF (congestive heart failure)  Hypercholesteremia  HTN (hypertension)  History of femoral angiogram: Left femoral endarterectomy Fem-Pop bypass 6/2017  S/P bypass graft of extremity: RLE- 11/30/15  S/P CABG x 3: 3/28/16  Stented coronary artery: x 3 cardiac stents placed in 2006  S/P prostatectomy: 1996      FAMILY HISTORY:  No pertinent family history in first degree relatives      SOCIAL HISTORY:    MEDICATIONS  (STANDING):    MEDICATIONS  (PRN):    Allergies    Benadryl (Rash)  contrast media (iodine-based) (Hypotension)    Intolerances    PHYSICAL EXAM     Vital Signs Last 24 Hrs  T(C): 36.7 (14 Sep 2018 07:50), Max: 36.9 (13 Sep 2018 15:29)  T(F): 98 (14 Sep 2018 07:50), Max: 98.4 (13 Sep 2018 15:29)  HR: 87 (14 Sep 2018 07:50) (72 - 87)  BP: 170/70 (14 Sep 2018 07:50) (119/60 - 170/70)  BP(mean): --  RR: 18 (14 Sep 2018 07:50) (18 - 18)  SpO2: 100% (14 Sep 2018 07:50) (98% - 100%)  Daily Height in cm: 165.1 (14 Sep 2018 07:27)    Daily     General: WN/WD NAD  Neurology: A&Ox3, nonfocal, TENA x 4  Head:  Normocephalic, atraumatic  ENT:  Mucosa moist, no ulcerations  Neck:  Supple, no sinuses or palpable masses  Lymphatic:  No palpable cervical, supraclavicular, axillary or inguinal adenopathy  Respiratory: CTA B/L  CV: RRR, S1S2, no murmur  Abdominal: Soft, NT, ND no palpable mass  MSK: Left leg erythematous foot warm, motor and sensory intact, with nylons in fresh amputation site. PT signal, no DP, AT or graft signals present. Palpable femoral pulse.   Right foot warm with DP, AT, and PT signals.                           10.5   9.2   )-----------( 334      ( 14 Sep 2018 08:57 )             32.3     09-14    133<L>  |  95<L>  |  43<H>  ----------------------------<  152<H>  3.8   |  24  |  1.52<H>    Ca    10.1      14 Sep 2018 08:57    TPro  7.8  /  Alb  4.3  /  TBili  0.4  /  DBili  x   /  AST  21  /  ALT  15  /  AlkPhos  70  09-14    PT/INR - ( 14 Sep 2018 08:57 )   PT: 14.0 sec;   INR: 1.28 ratio         PTT - ( 14 Sep 2018 08:57 )  PTT:29.5 sec      IMAGING STUDIES:

## 2018-09-14 NOTE — ED PROVIDER NOTE - MEDICAL DECISION MAKING DETAILS
67M p/w Left foot pain s/p toe amputation (d/c yesterday). Concern for osteo vs. recurrent gangrene vs. cellulitis vs. normal post op pain. Will get basic labs, xray, ESR, CRP. Call podiatry. If all neg, will d/c home. 67M p/w Left foot pain s/p toe amputation (d/c yesterday). Concern for osteo vs. recurrent gangrene vs. cellulitis vs. normal post op pain. Will get basic labs, xray, ESR, CRP. Call podiatry. If all neg, will d/c home.    Attending MD Johnson: 68 yo male with PMH for DM Type 2 on Insulin, s/p left toe amputation on Tuesday by house Podiatry, complains fo left foot pain.  No relieve with Naproxen.  No fever.  On physical VSS, decreased BS on right with mild wheeze, LLE 1+ pitting edema, anterior erythema from the knee distally, surgical wound healing well, left toe amputation surgical site intact with sutures, no discharge, no redness, no odor, LLE pulse DP palpable, cap refill < 3 seconds, left LLE sensation intact, motor of LLE at ankle decreased.  DDX:  possible cellulitis vs. osteomyelitis vs. post-operative pain.  Plan;  labs, VBG, ESR, CRP, xrays, podiatry evaluation.  Treat pain.

## 2018-09-14 NOTE — ED PROVIDER NOTE - PHYSICAL EXAMINATION
Adia Koch, .:   GENERAL: Patient awake alert NAD.  HEENT: Moist mucous membranes, PERRL, EOMI  LUNGS: CTAB, no wheezes or crackles.   CARDIAC: RRR, r/g.    ABDOMEN: Soft, NT, ND, No rebound, guarding.   EXT: +LLE erythema knee and below, 1+pulses DP b/l. Area of amputation clean, with stitches no discharge/bleeding.   NEURO: A&Ox3.  SKIN: Warm and dry. No rash. Adia Koch, .:   GENERAL: Patient awake alert NAD.  HEENT: Moist mucous membranes, PERRL, EOMI  LUNGS: CTAB, no wheezes or crackles.   CARDIAC: RRR, r/g.    ABDOMEN: Soft, NT, ND, No rebound, guarding.   EXT: +LLE erythema knee and below, non-palpable pulses DP b/l. Area of amputation clean, with stitches no discharge/bleeding. Full ROM of ankle, decreased strength in LLE.   NEURO: A&Ox3.  SKIN: Warm and dry.

## 2018-09-14 NOTE — ED ADULT NURSE NOTE - NSIMPLEMENTINTERV_GEN_ALL_ED
Implemented All Fall with Harm Risk Interventions:  [place] to call system. Call bell, personal items and telephone within reach. Instruct patient to call for assistance. Room bathroom lighting operational. Non-slip footwear when patient is off stretcher. Physically safe environment: no spills, clutter or unnecessary equipment. Stretcher in lowest position, wheels locked, appropriate side rails in place. Provide visual cue, wrist band, yellow gown, etc. Monitor gait and stability. Monitor for mental status changes and reorient to person, place, and time. Review medications for side effects contributing to fall risk. Reinforce activity limits and safety measures with patient and family. Provide visual clues: red socks.

## 2018-09-14 NOTE — H&P ADULT - NSHPLABSRESULTS_GEN_ALL_CORE
CBC Full  -  ( 14 Sep 2018 08:57 )  WBC Count : 9.2 K/uL  Hemoglobin : 10.5 g/dL  Hematocrit : 32.3 %  Platelet Count - Automated : 334 K/uL  Mean Cell Volume : 85.3 fl  Mean Cell Hemoglobin : 27.6 pg  Mean Cell Hemoglobin Concentration : 32.4 gm/dL  Auto Neutrophil # : 6.1 K/uL  Auto Lymphocyte # : 2.0 K/uL  Auto Monocyte # : 0.8 K/uL  Auto Eosinophil # : 0.2 K/uL  Auto Basophil # : 0.0 K/uL  Auto Neutrophil % : 66.8 %  Auto Lymphocyte % : 21.8 %  Auto Monocyte % : 8.7 %  Auto Eosinophil % : 2.3 %  Auto Basophil % : 0.3 %    09-14    133<L>  |  95<L>  |  43<H>  ----------------------------<  152<H>  3.8   |  24  |  1.52<H>    Ca    10.1      14 Sep 2018 08:57    TPro  7.8  /  Alb  4.3  /  TBili  0.4  /  DBili  x   /  AST  21  /  ALT  15  /  AlkPhos  70  09-14    LIVER FUNCTIONS - ( 14 Sep 2018 08:57 )  Alb: 4.3 g/dL / Pro: 7.8 g/dL / ALK PHOS: 70 U/L / ALT: 15 U/L / AST: 21 U/L / GGT: x           CAPILLARY BLOOD GLUCOSE      POCT Blood Glucose.: 179 mg/dL (14 Sep 2018 13:17)      PT/INR - ( 14 Sep 2018 08:57 )   PT: 14.0 sec;   INR: 1.28 ratio         PTT - ( 14 Sep 2018 08:57 )  PTT:29.5 sec

## 2018-09-14 NOTE — H&P ADULT - ATTENDING COMMENTS
thrombosed lle bypass graft.  plan attempt salvage with percutaneous mechanical thombolysis/thrombectomy, with pre treatment for contrast allergy.  heparin gtt.

## 2018-09-14 NOTE — ED SUB INTERN NOTE - OBJECTIVE STATEMENT FT
This is a 67/M with hx of COPD, PAD, DM who is s/p left great toe amputation, discharged home on Tuesday c/o left foot pain unrelieved by Naprosyn.

## 2018-09-14 NOTE — ED PROVIDER NOTE - NS ED ROS FT
CONST: no fevers, +chills  EYES: no pain, no vision changes  ENT: no sore throat, no ear pain, no change in hearing  CV: no chest pain, no leg swelling  RESP: no shortness of breath, no cough  ABD: no abdominal pain, no nausea, no vomiting, no diarrhea  : no dysuria, no flank pain, no hematuria  MSK: no back pain, +left foot pain  NEURO: no headache or additional neurologic complaints  HEME: no easy bleeding  SKIN:  no rash

## 2018-09-14 NOTE — H&P ADULT - NSHPPHYSICALEXAM_GEN_ALL_CORE
General: WN/WD NAD  Neurology: A&Ox3, nonfocal, TENA x 4  Head:  Normocephalic, atraumatic  ENT:  Mucosa moist, no ulcerations  Neck:  Supple, no sinuses or palpable masses  Lymphatic:  No palpable cervical, supraclavicular, axillary or inguinal adenopathy  Respiratory: CTA B/L  CV: RRR, S1S2, no murmur  Abdominal: Soft, NT, ND no palpable mass  MSK: Left leg erythematous foot warm, motor and sensory intact, with nylons in fresh amputation site. PT signal, no DP, AT or graft signals present. Palpable femoral pulse. Right foot warm with DP, AT, and PT signals.

## 2018-09-14 NOTE — H&P ADULT - HISTORY OF PRESENT ILLNESS
67M with CAD s/p three stents in 2006, CABG x3 in 3/2016, left femoral endarterectomy and fem pop bypass (6/2017), left femoral to peroneal bypass with PTFE (8/7/18) for claudication, now POD 2 s/p left hallux amputation with podiatry. Patient says that he was discharged last evening and that he was able to walk minimally around his house with a cane, but that severe pain at the amputation site kept him awake the entire night. He could no longer tolerate the pain this am, and so decided to present to the ED. No fevers or chills. No rest pain or claudication in the calf that he has noted.  In the ED, vital signs and labs normal. Left leg erythematous foot warm, motor and sensory intact, with nylons in fresh amputation site. PT signal, no DP, AT or graft signals present. Palpable femoral pulse. Right foot warm with DP, AT, and PT signals. Venous duplex showing clotted proximal bypass.

## 2018-09-14 NOTE — ED PROVIDER NOTE - OBJECTIVE STATEMENT
67M h/o PAD, CAD (3x stent, 3V CABG), CHF, COPD, IDDM (c/b neuropathy), HTN, HLD, Afib (on coumadin) recently discharged for left large toe amputation 2/2 gangrene. Coming back for recurrent pain in the area/left foot from last night. Constant, sharp. Took naproxen and Gabapentin for pain, did not help. No fevers, but admits to chills. No SOB, chest pain, N/V/D. 67M h/o PAD, CAD (3x stent, 3V CABG), CHF, COPD, IDDM (c/b neuropathy), HTN, HLD, Afib (on coumadin) recently discharged for left large toe amputation 2/2 gangrene. Coming back for recurrent pain in the area/left foot from last night. Constant, sharp. Took naproxen and Gabapentin for pain, did not help. Takes Minocycline. No fevers, but admits to chills. No SOB, chest pain, N/V/D.

## 2018-09-14 NOTE — ED PROVIDER NOTE - ATTENDING CONTRIBUTION TO CARE
Attending MD Johnson:  I personally have seen and examined this patient.  Resident note reviewed and agree on plan of care and except where noted.  See MDM for details.

## 2018-09-14 NOTE — CHART NOTE - NSCHARTNOTEFT_GEN_A_CORE
VASCULAR SURGERY CHART NOTE    Discussed allergies with patient in detail. Patient allergic to contrast dye and has been premedicated with steroids and benadryl in the past. He denies ever having an allergy to benadryl.   Benadryl removed from list of allergies.    Methylprednisolone sodium succinate 32mg to be administered 12 hours and 2 hours prior to any intervention, as well as benadryl 50mg to be administered 1 hour prior, as per protocol.

## 2018-09-14 NOTE — H&P ADULT - ASSESSMENT
67M with CAD s/p three stents in 2006, CABG x3 in 3/2016, left femoral endarterectomy and fem pop bypass (6/2017), left femoral to peroneal bypass with PTFE (8/7/18) for claudication, now POD 2 s/p left hallux amputation with podiatry presents with incisional pain.  Evidence of clotted bypass on venous duplex.    -admit to vascular surgeryLIN  -begin heparin gtt, therapeutic with bolus  -pain control  -NPO for possible intervention this evening  -discussed with vascular surgery fellow on call

## 2018-09-14 NOTE — ED ADULT NURSE NOTE - OBJECTIVE STATEMENT
67M pt AxOx3 ambulatory to ED c/o worsening L foot pain to surgical site. Pt is s/p L big toe amputation, dc'd yesterday, took Percocet yesterday but felt no relief and did not take anything for pain today. Pt denies CP/SOB/cough/fever/chills. Pt states swelling to LLE has reduced but states redness has not changed. Pt states pain upon ambulating w/ cane. On assessment, diminished lung sounds noted, clear, resp even, unlabored. Afebrile. Redness/swelling noted to LLE below knee. Surgical site to L toe s/ sutures in place, no drainage noted, C/D/I. NAD noted. MD at bedside for eval. Pt given Tylenol PO as per MD. #20G RAC, labs drawn and sent. Safety maintained. Call bell within reach. Will continue to monitor.

## 2018-09-14 NOTE — ED SUB INTERN NOTE - MEDICAL DECISION MAKING DETAILS
67/M with hx of PAD, COPD, DM c/o left foot pain s/p left toe amputation. Will get basic labs, ESR, CRP, X-Ray, lower extremity duplex doppler and pain meds, concern for LLE cellulitis vs. osteomyelitis vs normal post-op pain vs DVT. Will discharge home if workup is negative.

## 2018-09-14 NOTE — ED SUB INTERN NOTE - PHYSICAL EXAMINATION
Left lower extremity erythema, surgical wound from bypass graft healing, dressing removed from amputation site on left great toe, site is intact with sutures, no drainage, no odor, no redness, mild edema. Dorsal pedal pulse palpable, sensation intact, decreased motor strength in LLE.

## 2018-09-14 NOTE — CONSULT NOTE ADULT - SUBJECTIVE AND OBJECTIVE BOX
Podiatry pager #: [phone]/ [phone]    Patient is a 67y old  Male who presents with a chief complaint of left foot pain at the surgical site.     HPI:  67M h/o PAD, CAD (3x stent, 3V CABG), CHF, COPD, IDDM (c/b neuropathy), HTN, HLD, Afib (on coumadin) recently discharged for left hallux toe amputation 2/2 gangrene. Coming back for recurrent pain in the area/left foot from last night. Constant, sharp. Took naproxen and Gabapentin for pain, did not help. Takes Minocycline. No fevers, but admits to chills. No SOB, chest pain, N/V/D.      PAST MEDICAL & SURGICAL HISTORY:  COPD (chronic obstructive pulmonary disease)  Sleep apnea: non-compliant  Anemia  Atherosclerosis of arteries of extremities: left leg  Mild intermittent asthma without complication  Iron deficiency anemia, unspecified iron deficiency anemia type  Prostate cancer  Bronchospasm  Obesity (BMI 30-39.9)  PAD (peripheral artery disease): RLE bypass 11/30/15  CAD (coronary artery disease): 3 cardiac stents placed in 2006 (mid LAD, Prox LAD, Prox to #2), 3V CABG 3/28/16  Hypothyroid  Intermittent claudication  Diabetic neuropathy  DM (diabetes mellitus): type 2 - on insulin pump  CHF (congestive heart failure)  Hypercholesteremia  HTN (hypertension)  History of femoral angiogram: Left femoral endarterectomy Fem-Pop bypass 6/2017  S/P bypass graft of extremity: RLE- 11/30/15  S/P CABG x 3: 3/28/16  Stented coronary artery: x 3 cardiac stents placed in 2006  S/P prostatectomy: 1996      MEDICATIONS  (STANDING):  oxyCODONE    IR 5 milliGRAM(s) Oral Once    MEDICATIONS  (PRN):      Allergies    Benadryl (Rash)  contrast media (iodine-based) (Hypotension)    Intolerances        VITALS:    Vital Signs Last 24 Hrs  T(C): 36.7 (14 Sep 2018 07:50), Max: 36.9 (13 Sep 2018 15:29)  T(F): 98 (14 Sep 2018 07:50), Max: 98.4 (13 Sep 2018 15:29)  HR: 87 (14 Sep 2018 07:50) (72 - 87)  BP: 170/70 (14 Sep 2018 07:50) (119/60 - 170/70)  BP(mean): --  RR: 18 (14 Sep 2018 07:50) (18 - 18)  SpO2: 100% (14 Sep 2018 07:50) (98% - 100%)    LABS:                          10.5   9.2   )-----------( 334      ( 14 Sep 2018 08:57 )             32.3       09-14    133<L>  |  95<L>  |  43<H>  ----------------------------<  152<H>  3.8   |  24  |  1.52<H>    Ca    10.1      14 Sep 2018 08:57    TPro  7.8  /  Alb  4.3  /  TBili  0.4  /  DBili  x   /  AST  21  /  ALT  15  /  AlkPhos  70  09-14      CAPILLARY BLOOD GLUCOSE      POCT Blood Glucose.: 297 mg/dL (13 Sep 2018 12:17)      PT/INR - ( 14 Sep 2018 08:57 )   PT: 14.0 sec;   INR: 1.28 ratio         PTT - ( 14 Sep 2018 08:57 )  PTT:29.5 sec    LOWER EXTREMITY PHYSICAL EXAM:  s/p left hallux amputation, closed (DOS 9/11). Surgical site intact, no signs of dehiscence, no signs of infection, no hematoma appreciated.

## 2018-09-14 NOTE — CONSULT NOTE ADULT - ASSESSMENT
66 yo M s/p left hallux amputation (DOS 9/11) presents to ED complaining about pain  - pt was seen and examined   - surgical site well-coapted, sutures intact, no signs of infection, no hematoma  - WBC 9.2,   - X-ray reviewed: no gas, no OM  - recommend pain management as out patient   - recommend regular follow up with his vascular surgeon   - please follow up with Dr. Gregg as outpatient at [facility] (call: [phone])  - d/w attending

## 2018-09-15 LAB
ANION GAP SERPL CALC-SCNC: 15 MMOL/L — SIGNIFICANT CHANGE UP (ref 5–17)
APTT BLD: 142.8 SEC — CRITICAL HIGH (ref 27.5–37.4)
APTT BLD: 170.6 SEC — CRITICAL HIGH (ref 27.5–37.4)
APTT BLD: 81.4 SEC — HIGH (ref 27.5–37.4)
BUN SERPL-MCNC: 30 MG/DL — HIGH (ref 7–23)
CALCIUM SERPL-MCNC: 9.7 MG/DL — SIGNIFICANT CHANGE UP (ref 8.4–10.5)
CHLORIDE SERPL-SCNC: 97 MMOL/L — SIGNIFICANT CHANGE UP (ref 96–108)
CO2 SERPL-SCNC: 26 MMOL/L — SIGNIFICANT CHANGE UP (ref 22–31)
CREAT SERPL-MCNC: 1.16 MG/DL — SIGNIFICANT CHANGE UP (ref 0.5–1.3)
GLUCOSE SERPL-MCNC: 242 MG/DL — HIGH (ref 70–99)
HCT VFR BLD CALC: 31.4 % — LOW (ref 39–50)
HGB BLD-MCNC: 10.1 G/DL — LOW (ref 13–17)
INR BLD: 1.41 RATIO — HIGH (ref 0.88–1.16)
MAGNESIUM SERPL-MCNC: 1.7 MG/DL — SIGNIFICANT CHANGE UP (ref 1.6–2.6)
MCHC RBC-ENTMCNC: 27.6 PG — SIGNIFICANT CHANGE UP (ref 27–34)
MCHC RBC-ENTMCNC: 32.3 GM/DL — SIGNIFICANT CHANGE UP (ref 32–36)
MCV RBC AUTO: 85.3 FL — SIGNIFICANT CHANGE UP (ref 80–100)
PHOSPHATE SERPL-MCNC: 4 MG/DL — SIGNIFICANT CHANGE UP (ref 2.5–4.5)
PLATELET # BLD AUTO: 344 K/UL — SIGNIFICANT CHANGE UP (ref 150–400)
POTASSIUM SERPL-MCNC: 4.3 MMOL/L — SIGNIFICANT CHANGE UP (ref 3.5–5.3)
POTASSIUM SERPL-SCNC: 4.3 MMOL/L — SIGNIFICANT CHANGE UP (ref 3.5–5.3)
PROTHROM AB SERPL-ACNC: 15.5 SEC — HIGH (ref 9.8–12.7)
RBC # BLD: 3.67 M/UL — LOW (ref 4.2–5.8)
RBC # FLD: 14.9 % — HIGH (ref 10.3–14.5)
SODIUM SERPL-SCNC: 138 MMOL/L — SIGNIFICANT CHANGE UP (ref 135–145)
WBC # BLD: 7.3 K/UL — SIGNIFICANT CHANGE UP (ref 3.8–10.5)
WBC # FLD AUTO: 7.3 K/UL — SIGNIFICANT CHANGE UP (ref 3.8–10.5)

## 2018-09-15 RX ORDER — DEXTROSE 50 % IN WATER 50 %
25 SYRINGE (ML) INTRAVENOUS ONCE
Qty: 0 | Refills: 0 | Status: DISCONTINUED | OUTPATIENT
Start: 2018-09-15 | End: 2018-09-17

## 2018-09-15 RX ORDER — INSULIN LISPRO 100/ML
18 VIAL (ML) SUBCUTANEOUS
Qty: 0 | Refills: 0 | Status: DISCONTINUED | OUTPATIENT
Start: 2018-09-15 | End: 2018-09-16

## 2018-09-15 RX ORDER — SODIUM CHLORIDE 9 MG/ML
1000 INJECTION, SOLUTION INTRAVENOUS
Qty: 0 | Refills: 0 | Status: DISCONTINUED | OUTPATIENT
Start: 2018-09-15 | End: 2018-09-17

## 2018-09-15 RX ORDER — OXYCODONE HYDROCHLORIDE 5 MG/1
10 TABLET ORAL ONCE
Qty: 0 | Refills: 0 | Status: DISCONTINUED | OUTPATIENT
Start: 2018-09-15 | End: 2018-09-15

## 2018-09-15 RX ORDER — MAGNESIUM SULFATE 500 MG/ML
2 VIAL (ML) INJECTION ONCE
Qty: 0 | Refills: 0 | Status: COMPLETED | OUTPATIENT
Start: 2018-09-15 | End: 2018-09-15

## 2018-09-15 RX ORDER — DEXTROSE 50 % IN WATER 50 %
15 SYRINGE (ML) INTRAVENOUS ONCE
Qty: 0 | Refills: 0 | Status: DISCONTINUED | OUTPATIENT
Start: 2018-09-15 | End: 2018-09-17

## 2018-09-15 RX ORDER — OXYCODONE HYDROCHLORIDE 5 MG/1
5 TABLET ORAL EVERY 4 HOURS
Qty: 0 | Refills: 0 | Status: DISCONTINUED | OUTPATIENT
Start: 2018-09-15 | End: 2018-09-17

## 2018-09-15 RX ORDER — DEXTROSE 50 % IN WATER 50 %
12.5 SYRINGE (ML) INTRAVENOUS ONCE
Qty: 0 | Refills: 0 | Status: DISCONTINUED | OUTPATIENT
Start: 2018-09-15 | End: 2018-09-17

## 2018-09-15 RX ORDER — OXYCODONE HYDROCHLORIDE 5 MG/1
5 TABLET ORAL ONCE
Qty: 0 | Refills: 0 | Status: DISCONTINUED | OUTPATIENT
Start: 2018-09-15 | End: 2018-09-15

## 2018-09-15 RX ORDER — INSULIN GLARGINE 100 [IU]/ML
50 INJECTION, SOLUTION SUBCUTANEOUS AT BEDTIME
Qty: 0 | Refills: 0 | Status: DISCONTINUED | OUTPATIENT
Start: 2018-09-15 | End: 2018-09-16

## 2018-09-15 RX ORDER — OXYCODONE HYDROCHLORIDE 5 MG/1
10 TABLET ORAL EVERY 4 HOURS
Qty: 0 | Refills: 0 | Status: DISCONTINUED | OUTPATIENT
Start: 2018-09-15 | End: 2018-09-17

## 2018-09-15 RX ORDER — GLUCAGON INJECTION, SOLUTION 0.5 MG/.1ML
1 INJECTION, SOLUTION SUBCUTANEOUS ONCE
Qty: 0 | Refills: 0 | Status: DISCONTINUED | OUTPATIENT
Start: 2018-09-15 | End: 2018-09-17

## 2018-09-15 RX ADMIN — BUDESONIDE AND FORMOTEROL FUMARATE DIHYDRATE 2 PUFF(S): 160; 4.5 AEROSOL RESPIRATORY (INHALATION) at 17:58

## 2018-09-15 RX ADMIN — Medication 2: at 09:37

## 2018-09-15 RX ADMIN — Medication 650 MILLIGRAM(S): at 13:01

## 2018-09-15 RX ADMIN — PREGABALIN 1000 MICROGRAM(S): 225 CAPSULE ORAL at 12:04

## 2018-09-15 RX ADMIN — OXYCODONE HYDROCHLORIDE 5 MILLIGRAM(S): 5 TABLET ORAL at 13:01

## 2018-09-15 RX ADMIN — Medication 650 MILLIGRAM(S): at 18:50

## 2018-09-15 RX ADMIN — HEPARIN SODIUM 11 UNIT(S)/HR: 5000 INJECTION INTRAVENOUS; SUBCUTANEOUS at 06:47

## 2018-09-15 RX ADMIN — Medication 650 MILLIGRAM(S): at 17:56

## 2018-09-15 RX ADMIN — DICLOFENAC SODIUM 75 MILLIGRAM(S): 75 TABLET, DELAYED RELEASE ORAL at 00:01

## 2018-09-15 RX ADMIN — ATORVASTATIN CALCIUM 80 MILLIGRAM(S): 80 TABLET, FILM COATED ORAL at 21:36

## 2018-09-15 RX ADMIN — OXYCODONE HYDROCHLORIDE 10 MILLIGRAM(S): 5 TABLET ORAL at 20:31

## 2018-09-15 RX ADMIN — HEPARIN SODIUM 8 UNIT(S)/HR: 5000 INJECTION INTRAVENOUS; SUBCUTANEOUS at 12:56

## 2018-09-15 RX ADMIN — LOSARTAN POTASSIUM 100 MILLIGRAM(S): 100 TABLET, FILM COATED ORAL at 06:47

## 2018-09-15 RX ADMIN — OXYCODONE HYDROCHLORIDE 5 MILLIGRAM(S): 5 TABLET ORAL at 06:47

## 2018-09-15 RX ADMIN — Medication 4: at 12:59

## 2018-09-15 RX ADMIN — Medication 18 UNIT(S): at 18:07

## 2018-09-15 RX ADMIN — OXYCODONE HYDROCHLORIDE 10 MILLIGRAM(S): 5 TABLET ORAL at 02:01

## 2018-09-15 RX ADMIN — OXYCODONE HYDROCHLORIDE 10 MILLIGRAM(S): 5 TABLET ORAL at 20:01

## 2018-09-15 RX ADMIN — OXYCODONE HYDROCHLORIDE 10 MILLIGRAM(S): 5 TABLET ORAL at 01:31

## 2018-09-15 RX ADMIN — Medication 650 MILLIGRAM(S): at 07:17

## 2018-09-15 RX ADMIN — BUDESONIDE AND FORMOTEROL FUMARATE DIHYDRATE 2 PUFF(S): 160; 4.5 AEROSOL RESPIRATORY (INHALATION) at 06:47

## 2018-09-15 RX ADMIN — Medication 650 MILLIGRAM(S): at 12:02

## 2018-09-15 RX ADMIN — Medication 3: at 18:07

## 2018-09-15 RX ADMIN — Medication 50 GRAM(S): at 18:35

## 2018-09-15 RX ADMIN — Medication 81 MILLIGRAM(S): at 12:04

## 2018-09-15 RX ADMIN — Medication 40 MILLIGRAM(S): at 06:47

## 2018-09-15 RX ADMIN — OXYCODONE HYDROCHLORIDE 5 MILLIGRAM(S): 5 TABLET ORAL at 12:03

## 2018-09-15 RX ADMIN — HEPARIN SODIUM 8 UNIT(S)/HR: 5000 INJECTION INTRAVENOUS; SUBCUTANEOUS at 19:19

## 2018-09-15 RX ADMIN — Medication 2: at 21:35

## 2018-09-15 RX ADMIN — Medication 650 MILLIGRAM(S): at 06:46

## 2018-09-15 RX ADMIN — SENNA PLUS 2 TABLET(S): 8.6 TABLET ORAL at 21:36

## 2018-09-15 RX ADMIN — OXYCODONE HYDROCHLORIDE 5 MILLIGRAM(S): 5 TABLET ORAL at 07:17

## 2018-09-15 RX ADMIN — Medication 650 MILLIGRAM(S): at 00:01

## 2018-09-15 RX ADMIN — INSULIN GLARGINE 50 UNIT(S): 100 INJECTION, SOLUTION SUBCUTANEOUS at 21:35

## 2018-09-15 RX ADMIN — Medication 25 MILLIGRAM(S): at 06:46

## 2018-09-15 RX ADMIN — Medication 150 MICROGRAM(S): at 06:47

## 2018-09-15 RX ADMIN — Medication 25 MILLIGRAM(S): at 17:58

## 2018-09-15 NOTE — PROGRESS NOTE ADULT - ASSESSMENT
67 M with CAD s/p 3 stents 2006, CABG x 3 2016, left femoral endarterectomy and fem pop bypass 17, left femoral to peroneal bypass with PTFE 18 for cludication, now POD3 s/p left hallux amputation with podiatry with LLE pain and found to have clotted left proximal bypass in venous duplex    Plan:  - Angiogram on Monday  - reg diet  - dvt ppx  - hep drip @ 11, f/u PTT  - f/u AM labs 67 M with CAD s/p 3 stents 2006, CABG x 3 2016, left femoral endarterectomy and fem pop bypass 17, left femoral to peroneal bypass with PTFE 18 for cludication, now POD3 s/p left hallux amputation with podiatry with LLE pain and found to have clotted left proximal bypass in venous duplex    Plan:  - Angiogram on Monday  - reg diet  - dvt ppx  - hep drip @ 11, f/u PTT  - f/u AM labs  - pain control

## 2018-09-16 ENCOUNTER — TRANSCRIPTION ENCOUNTER (OUTPATIENT)
Age: 68
End: 2018-09-16

## 2018-09-16 DIAGNOSIS — I10 ESSENTIAL (PRIMARY) HYPERTENSION: ICD-10-CM

## 2018-09-16 DIAGNOSIS — I25.10 ATHEROSCLEROTIC HEART DISEASE OF NATIVE CORONARY ARTERY WITHOUT ANGINA PECTORIS: ICD-10-CM

## 2018-09-16 DIAGNOSIS — E11.9 TYPE 2 DIABETES MELLITUS WITHOUT COMPLICATIONS: ICD-10-CM

## 2018-09-16 DIAGNOSIS — E03.9 HYPOTHYROIDISM, UNSPECIFIED: ICD-10-CM

## 2018-09-16 DIAGNOSIS — M79.672 PAIN IN LEFT FOOT: ICD-10-CM

## 2018-09-16 LAB
ANION GAP SERPL CALC-SCNC: 13 MMOL/L — SIGNIFICANT CHANGE UP (ref 5–17)
APTT BLD: 55.3 SEC — HIGH (ref 27.5–37.4)
APTT BLD: 63 SEC — HIGH (ref 27.5–37.4)
APTT BLD: 70.2 SEC — HIGH (ref 27.5–37.4)
BLD GP AB SCN SERPL QL: NEGATIVE — SIGNIFICANT CHANGE UP
BUN SERPL-MCNC: 38 MG/DL — HIGH (ref 7–23)
CALCIUM SERPL-MCNC: 9.7 MG/DL — SIGNIFICANT CHANGE UP (ref 8.4–10.5)
CHLORIDE SERPL-SCNC: 95 MMOL/L — LOW (ref 96–108)
CO2 SERPL-SCNC: 24 MMOL/L — SIGNIFICANT CHANGE UP (ref 22–31)
CREAT SERPL-MCNC: 1.73 MG/DL — HIGH (ref 0.5–1.3)
GLUCOSE SERPL-MCNC: 238 MG/DL — HIGH (ref 70–99)
HCT VFR BLD CALC: 30.4 % — LOW (ref 39–50)
HCT VFR BLD CALC: 32.3 % — LOW (ref 39–50)
HGB BLD-MCNC: 10 G/DL — LOW (ref 13–17)
HGB BLD-MCNC: 10.5 G/DL — LOW (ref 13–17)
INR BLD: 1.4 RATIO — HIGH (ref 0.88–1.16)
MAGNESIUM SERPL-MCNC: 2.1 MG/DL — SIGNIFICANT CHANGE UP (ref 1.6–2.6)
MCHC RBC-ENTMCNC: 27.9 PG — SIGNIFICANT CHANGE UP (ref 27–34)
MCHC RBC-ENTMCNC: 28.2 PG — SIGNIFICANT CHANGE UP (ref 27–34)
MCHC RBC-ENTMCNC: 32.4 GM/DL — SIGNIFICANT CHANGE UP (ref 32–36)
MCHC RBC-ENTMCNC: 32.7 GM/DL — SIGNIFICANT CHANGE UP (ref 32–36)
MCV RBC AUTO: 86.2 FL — SIGNIFICANT CHANGE UP (ref 80–100)
MCV RBC AUTO: 86.4 FL — SIGNIFICANT CHANGE UP (ref 80–100)
PHOSPHATE SERPL-MCNC: 3.9 MG/DL — SIGNIFICANT CHANGE UP (ref 2.5–4.5)
PLATELET # BLD AUTO: 317 K/UL — SIGNIFICANT CHANGE UP (ref 150–400)
PLATELET # BLD AUTO: 332 K/UL — SIGNIFICANT CHANGE UP (ref 150–400)
POTASSIUM SERPL-MCNC: 4.3 MMOL/L — SIGNIFICANT CHANGE UP (ref 3.5–5.3)
POTASSIUM SERPL-SCNC: 4.3 MMOL/L — SIGNIFICANT CHANGE UP (ref 3.5–5.3)
PROTHROM AB SERPL-ACNC: 15.4 SEC — HIGH (ref 9.8–12.7)
RBC # BLD: 3.52 M/UL — LOW (ref 4.2–5.8)
RBC # BLD: 3.75 M/UL — LOW (ref 4.2–5.8)
RBC # FLD: 15.3 % — HIGH (ref 10.3–14.5)
RBC # FLD: 15.4 % — HIGH (ref 10.3–14.5)
RH IG SCN BLD-IMP: POSITIVE — SIGNIFICANT CHANGE UP
SODIUM SERPL-SCNC: 132 MMOL/L — LOW (ref 135–145)
WBC # BLD: 6.6 K/UL — SIGNIFICANT CHANGE UP (ref 3.8–10.5)
WBC # BLD: 7.2 K/UL — SIGNIFICANT CHANGE UP (ref 3.8–10.5)
WBC # FLD AUTO: 6.6 K/UL — SIGNIFICANT CHANGE UP (ref 3.8–10.5)
WBC # FLD AUTO: 7.2 K/UL — SIGNIFICANT CHANGE UP (ref 3.8–10.5)

## 2018-09-16 RX ORDER — INSULIN GLARGINE 100 [IU]/ML
60 INJECTION, SOLUTION SUBCUTANEOUS AT BEDTIME
Qty: 0 | Refills: 0 | Status: DISCONTINUED | OUTPATIENT
Start: 2018-09-16 | End: 2018-09-16

## 2018-09-16 RX ORDER — INSULIN GLARGINE 100 [IU]/ML
40 INJECTION, SOLUTION SUBCUTANEOUS AT BEDTIME
Qty: 0 | Refills: 0 | Status: DISCONTINUED | OUTPATIENT
Start: 2018-09-16 | End: 2018-09-17

## 2018-09-16 RX ORDER — GABAPENTIN 400 MG/1
300 CAPSULE ORAL DAILY
Qty: 0 | Refills: 0 | Status: DISCONTINUED | OUTPATIENT
Start: 2018-09-16 | End: 2018-09-17

## 2018-09-16 RX ORDER — DIPHENHYDRAMINE HCL 50 MG
50 CAPSULE ORAL ONCE
Qty: 0 | Refills: 0 | Status: DISCONTINUED | OUTPATIENT
Start: 2018-09-17 | End: 2018-09-17

## 2018-09-16 RX ORDER — HEPARIN SODIUM 5000 [USP'U]/ML
900 INJECTION INTRAVENOUS; SUBCUTANEOUS
Qty: 25000 | Refills: 0 | Status: DISCONTINUED | OUTPATIENT
Start: 2018-09-16 | End: 2018-09-17

## 2018-09-16 RX ORDER — SODIUM CHLORIDE 9 MG/ML
1000 INJECTION INTRAMUSCULAR; INTRAVENOUS; SUBCUTANEOUS
Qty: 0 | Refills: 0 | Status: DISCONTINUED | OUTPATIENT
Start: 2018-09-16 | End: 2018-09-17

## 2018-09-16 RX ORDER — INSULIN LISPRO 100/ML
VIAL (ML) SUBCUTANEOUS EVERY 6 HOURS
Qty: 0 | Refills: 0 | Status: DISCONTINUED | OUTPATIENT
Start: 2018-09-16 | End: 2018-09-17

## 2018-09-16 RX ORDER — INSULIN LISPRO 100/ML
22 VIAL (ML) SUBCUTANEOUS
Qty: 0 | Refills: 0 | Status: DISCONTINUED | OUTPATIENT
Start: 2018-09-16 | End: 2018-09-17

## 2018-09-16 RX ADMIN — MORPHINE SULFATE 4 MILLIGRAM(S): 50 CAPSULE, EXTENDED RELEASE ORAL at 01:24

## 2018-09-16 RX ADMIN — HEPARIN SODIUM 900 UNIT(S)/HR: 5000 INJECTION INTRAVENOUS; SUBCUTANEOUS at 02:23

## 2018-09-16 RX ADMIN — MORPHINE SULFATE 4 MILLIGRAM(S): 50 CAPSULE, EXTENDED RELEASE ORAL at 01:39

## 2018-09-16 RX ADMIN — Medication 650 MILLIGRAM(S): at 00:37

## 2018-09-16 RX ADMIN — Medication 25 MILLIGRAM(S): at 17:10

## 2018-09-16 RX ADMIN — OXYCODONE HYDROCHLORIDE 10 MILLIGRAM(S): 5 TABLET ORAL at 20:14

## 2018-09-16 RX ADMIN — PREGABALIN 1000 MICROGRAM(S): 225 CAPSULE ORAL at 11:57

## 2018-09-16 RX ADMIN — Medication 650 MILLIGRAM(S): at 17:09

## 2018-09-16 RX ADMIN — Medication 81 MILLIGRAM(S): at 11:57

## 2018-09-16 RX ADMIN — LOSARTAN POTASSIUM 100 MILLIGRAM(S): 100 TABLET, FILM COATED ORAL at 05:12

## 2018-09-16 RX ADMIN — Medication 40 MILLIGRAM(S): at 05:11

## 2018-09-16 RX ADMIN — OXYCODONE HYDROCHLORIDE 10 MILLIGRAM(S): 5 TABLET ORAL at 11:00

## 2018-09-16 RX ADMIN — Medication 18 UNIT(S): at 08:33

## 2018-09-16 RX ADMIN — OXYCODONE HYDROCHLORIDE 10 MILLIGRAM(S): 5 TABLET ORAL at 05:41

## 2018-09-16 RX ADMIN — Medication 25 MILLIGRAM(S): at 05:13

## 2018-09-16 RX ADMIN — Medication 650 MILLIGRAM(S): at 01:06

## 2018-09-16 RX ADMIN — Medication 650 MILLIGRAM(S): at 11:57

## 2018-09-16 RX ADMIN — Medication 650 MILLIGRAM(S): at 18:00

## 2018-09-16 RX ADMIN — Medication 2: at 08:33

## 2018-09-16 RX ADMIN — Medication 150 MICROGRAM(S): at 05:11

## 2018-09-16 RX ADMIN — Medication 650 MILLIGRAM(S): at 12:50

## 2018-09-16 RX ADMIN — OXYCODONE HYDROCHLORIDE 10 MILLIGRAM(S): 5 TABLET ORAL at 20:44

## 2018-09-16 RX ADMIN — Medication 32 MILLIGRAM(S): at 20:07

## 2018-09-16 RX ADMIN — GABAPENTIN 300 MILLIGRAM(S): 400 CAPSULE ORAL at 11:58

## 2018-09-16 RX ADMIN — ATORVASTATIN CALCIUM 80 MILLIGRAM(S): 80 TABLET, FILM COATED ORAL at 22:56

## 2018-09-16 RX ADMIN — Medication 650 MILLIGRAM(S): at 05:41

## 2018-09-16 RX ADMIN — OXYCODONE HYDROCHLORIDE 10 MILLIGRAM(S): 5 TABLET ORAL at 05:11

## 2018-09-16 RX ADMIN — Medication 3: at 18:54

## 2018-09-16 RX ADMIN — BUDESONIDE AND FORMOTEROL FUMARATE DIHYDRATE 2 PUFF(S): 160; 4.5 AEROSOL RESPIRATORY (INHALATION) at 17:11

## 2018-09-16 RX ADMIN — INSULIN GLARGINE 40 UNIT(S): 100 INJECTION, SOLUTION SUBCUTANEOUS at 22:56

## 2018-09-16 RX ADMIN — HEPARIN SODIUM 900 UNIT(S)/HR: 5000 INJECTION INTRAVENOUS; SUBCUTANEOUS at 15:45

## 2018-09-16 RX ADMIN — Medication 22 UNIT(S): at 18:53

## 2018-09-16 RX ADMIN — Medication 4: at 13:40

## 2018-09-16 RX ADMIN — HEPARIN SODIUM 900 UNIT(S)/HR: 5000 INJECTION INTRAVENOUS; SUBCUTANEOUS at 08:52

## 2018-09-16 RX ADMIN — Medication 650 MILLIGRAM(S): at 05:11

## 2018-09-16 RX ADMIN — Medication 18 UNIT(S): at 13:40

## 2018-09-16 RX ADMIN — OXYCODONE HYDROCHLORIDE 10 MILLIGRAM(S): 5 TABLET ORAL at 10:20

## 2018-09-16 RX ADMIN — OXYCODONE HYDROCHLORIDE 10 MILLIGRAM(S): 5 TABLET ORAL at 01:06

## 2018-09-16 RX ADMIN — SENNA PLUS 2 TABLET(S): 8.6 TABLET ORAL at 22:55

## 2018-09-16 RX ADMIN — BUDESONIDE AND FORMOTEROL FUMARATE DIHYDRATE 2 PUFF(S): 160; 4.5 AEROSOL RESPIRATORY (INHALATION) at 05:12

## 2018-09-16 RX ADMIN — OXYCODONE HYDROCHLORIDE 10 MILLIGRAM(S): 5 TABLET ORAL at 00:36

## 2018-09-16 NOTE — CONSULT NOTE ADULT - SUBJECTIVE AND OBJECTIVE BOX
HPI:  67M with CAD s/p three stents in 2006, CABG x3 in 3/2016, left femoral endarterectomy and fem pop bypass (6/2017), left femoral to peroneal bypass with PTFE (8/7/18) for claudication, now POD 2 s/p left hallux amputation with podiatry. Patient says that he was discharged last evening and that he was able to walk minimally around his house with a cane, but that severe pain at the amputation site kept him awake the entire night. He could no longer tolerate the pain this am, and so decided to present to the ED. No fevers or chills. No rest pain or claudication in the calf that he has noted.  In the ED, vital signs and labs normal. Left leg erythematous foot warm, motor and sensory intact, with nylons in fresh amputation site. PT signal, no DP, AT or graft signals present. Palpable femoral pulse. Right foot warm with DP, AT, and PT signals. Venous duplex showing clotted proximal bypass. (14 Sep 2018 13:45)  Patient has history of diabetes, on insulin at home, no recent hypoglycemic episodes, no polyuria polydipsia. Patient follows up with me for diabetes management.    PAST MEDICAL & SURGICAL HISTORY:  COPD (chronic obstructive pulmonary disease)  Sleep apnea: non-compliant  Anemia  Atherosclerosis of arteries of extremities: left leg  Mild intermittent asthma without complication  Iron deficiency anemia, unspecified iron deficiency anemia type  Prostate cancer  Bronchospasm  Obesity (BMI 30-39.9)  PAD (peripheral artery disease): RLE bypass 11/30/15  CAD (coronary artery disease): 3 cardiac stents placed in 2006 (mid LAD, Prox LAD, Prox to #2), 3V CABG 3/28/16  Hypothyroid  Intermittent claudication  Diabetic neuropathy  DM (diabetes mellitus): type 2 - on insulin pump  CHF (congestive heart failure)  Hypercholesteremia  HTN (hypertension)  History of femoral angiogram: Left femoral endarterectomy Fem-Pop bypass 6/2017  S/P bypass graft of extremity: RLE- 11/30/15  S/P CABG x 3: 3/28/16  Stented coronary artery: x 3 cardiac stents placed in 2006  S/P prostatectomy: 1996      FAMILY HISTORY:  No pertinent family history in first degree relatives      Social History:    Outpatient Medications:    MEDICATIONS  (STANDING):  acetaminophen   Tablet .. 650 milliGRAM(s) Oral every 6 hours  aspirin enteric coated 81 milliGRAM(s) Oral daily  atorvastatin 80 milliGRAM(s) Oral at bedtime  buDESOnide 160 MICROgram(s)/formoterol 4.5 MICROgram(s) Inhaler 2 Puff(s) Inhalation two times a day  cyanocobalamin 1000 MICROGram(s) Oral daily  dextrose 5%. 1000 milliLiter(s) (50 mL/Hr) IV Continuous <Continuous>  dextrose 50% Injectable 12.5 Gram(s) IV Push once  dextrose 50% Injectable 25 Gram(s) IV Push once  dextrose 50% Injectable 25 Gram(s) IV Push once  furosemide    Tablet 40 milliGRAM(s) Oral daily  heparin  Infusion. 900 Unit(s)/Hr (9 mL/Hr) IV Continuous <Continuous>  influenza   Vaccine 0.5 milliLiter(s) IntraMuscular once  insulin glargine Injectable (LANTUS) 60 Unit(s) SubCutaneous at bedtime  insulin lispro (HumaLOG) corrective regimen sliding scale   SubCutaneous three times a day before meals  insulin lispro (HumaLOG) corrective regimen sliding scale   SubCutaneous at bedtime  insulin lispro Injectable (HumaLOG) 22 Unit(s) SubCutaneous three times a day before meals  levothyroxine 150 MICROGram(s) Oral daily  losartan 100 milliGRAM(s) Oral daily  methylPREDNISolone 32 milliGRAM(s) Oral once  metoprolol tartrate 25 milliGRAM(s) Oral two times a day  senna 2 Tablet(s) Oral at bedtime  sodium chloride 0.9%. 1000 milliLiter(s) (25 mL/Hr) IV Continuous <Continuous>    MEDICATIONS  (PRN):  dextrose 40% Gel 15 Gram(s) Oral once PRN Blood Glucose LESS THAN 70 milliGRAM(s)/deciliter  gabapentin 300 milliGRAM(s) Oral daily PRN nerve pain  glucagon  Injectable 1 milliGRAM(s) IntraMuscular once PRN Glucose LESS THAN 70 milligrams/deciliter  morphine  - Injectable 4 milliGRAM(s) IV Push every 4 hours PRN breakthrough pain  oxyCODONE    IR 5 milliGRAM(s) Oral every 4 hours PRN Moderate Pain (4 - 6)  oxyCODONE    IR 10 milliGRAM(s) Oral every 4 hours PRN Severe Pain (7 - 10)      Allergies    contrast media (iodine-based) (Hypotension)    Intolerances      Review of Systems:  Constitutional: No fever, no chills  Eyes: No blurry vision  Neuro: No tremors  HEENT: No pain, no neck swelling  Cardiovascular: No chest pain, no palpitations  Respiratory: Has SOB, no cough  GI: No nausea, vomiting, abdominal pain  : No dysuria  Skin: no rash  MSK: Has leg swelling.  Psych: no depression  Endocrine: no polyuria, polydipsia    ALL OTHER SYSTEMS REVIEWED AND NEGATIVE    UNABLE TO OBTAIN    PHYSICAL EXAM:  VITALS: T(C): 36.4 (09-16-18 @ 17:12)  T(F): 97.6 (09-16-18 @ 17:12), Max: 98.5 (09-15-18 @ 20:53)  HR: 84 (09-16-18 @ 17:12) (58 - 84)  BP: 130/67 (09-16-18 @ 17:12) (92/50 - 149/61)  RR:  (18 - 18)  SpO2:  (97% - 100%)  Wt(kg): --  GENERAL: NAD, well-groomed, well-developed  EYES: No proptosis, no lid lag  HEENT:  Atraumatic, Normocephalic  THYROID: Normal size, no palpable nodules  RESPIRATORY: Clear to auscultation bilaterally; No rales, rhonchi, wheezing  CARDIOVASCULAR: Si S2, No murmurs;  GI: Soft, non distended, normal bowel sounds  SKIN: Dry, intact, No rashes or lesions  MUSCULOSKELETAL: Toe amputation.  NEURO:  no tremor, sensation decreased in feet BL,    POCT Blood Glucose.: 256 mg/dL (09-16-18 @ 18:39)  POCT Blood Glucose.: 348 mg/dL (09-16-18 @ 13:24)  POCT Blood Glucose.: 225 mg/dL (09-16-18 @ 08:27)  POCT Blood Glucose.: 261 mg/dL (09-15-18 @ 21:00)  POCT Blood Glucose.: 280 mg/dL (09-15-18 @ 18:05)  POCT Blood Glucose.: 321 mg/dL (09-15-18 @ 12:58)  POCT Blood Glucose.: 237 mg/dL (09-15-18 @ 09:36)  POCT Blood Glucose.: 242 mg/dL (09-14-18 @ 20:51)  POCT Blood Glucose.: 179 mg/dL (09-14-18 @ 13:17)                            10.5   7.2   )-----------( 317      ( 16 Sep 2018 14:44 )             32.3       09-16    132<L>  |  95<L>  |  38<H>  ----------------------------<  238<H>  4.3   |  24  |  1.73<H>    EGFR if : 46<L>  EGFR if non : 39<L>    Ca    9.7      09-16  Mg     2.1     09-16  Phos  3.9     09-16    TPro  7.8  /  Alb  4.3  /  TBili  0.4  /  DBili  x   /  AST  21  /  ALT  15  /  AlkPhos  70  09-14      Thyroid Function Tests:      Hemoglobin A1C, Whole Blood: 9.1 % <H> [4.0 - 5.6] (07-20-18 @ 05:16)          Radiology:

## 2018-09-16 NOTE — PROGRESS NOTE ADULT - ASSESSMENT
67 M with CAD s/p 3 stents 2006, CABG x 3 2016, left femoral endarterectomy and fem pop bypass 17, left femoral to peroneal bypass with PTFE 18 for cludication, now POD3 s/p left hallux amputation with podiatry with LLE pain and found to have clotted left proximal bypass in venous duplex    Plan:  - Angiogram on Monday  - reg diet, NPO at midnight, IVF while NPO  - dvt ppx  - hep drip @ 9, f/u PTT  - f/u AM labs  - pain control  - f/u endocrine recs

## 2018-09-16 NOTE — CONSULT NOTE ADULT - ASSESSMENT
Assessment  DMT2: 67y Male with DM T2 with hyperglycemia on insulin, blood sugars running high, no hypoglycemic episode,  eating meals,  non compliant with low carb diet.  CAD: On medications, stable, monitored.  Hypothyroidism:  On synthroid 150 mcg po daily, asymptomatic.  HTN: Controlled, On med.  CKD: Monitor labs/BMP,   Foot wound S/P amputation toe.

## 2018-09-16 NOTE — CHART NOTE - NSCHARTNOTEFT_GEN_A_CORE
Surgeon: Dr. Miller  Procedure: left angiogram, possible thrombolysis, possible stent, possible angioplasty    Vital Signs Last 24 Hrs  T(C): 36.4 (16 Sep 2018 10:45), Max: 36.9 (15 Sep 2018 20:53)  T(F): 97.6 (16 Sep 2018 10:45), Max: 98.5 (15 Sep 2018 20:53)  HR: 67 (16 Sep 2018 10:45) (58 - 84)  BP: 92/50 (16 Sep 2018 10:45) (92/50 - 152/72)  BP(mean): --  RR: 18 (16 Sep 2018 10:45) (16 - 18)  SpO2: 97% (16 Sep 2018 10:45) (97% - 100%)                        10.0   6.6   )-----------( 332      ( 16 Sep 2018 08:25 )             30.4     09-16    132<L>  |  95<L>  |  38<H>  ----------------------------<  238<H>  4.3   |  24  |  1.73<H>    Ca    9.7      16 Sep 2018 08:25  Phos  3.9     09-16  Mg     2.1     09-16      PT/INR - ( 16 Sep 2018 08:25 )   PT: 15.4 sec;   INR: 1.40 ratio         PTT - ( 16 Sep 2018 08:25 )  PTT:70.2 sec  Daily     Daily     EKG: complete  CXR: complete  Type and Screen #1: ordered  Type and Screen #2: ordered    - Blood: Not needed.       Plan:  - OR 9/17/18 with Dr. Miller left angiogram, possible thrombolysis, possible stent, possible angioplasty  - NPO after midnight except meds  - IVF while NPO  - Adjust Diabetic orders for NPO period.  - Perioperative antibiotics not needed.  - Morning Labs: CBC, BMP, coags, type & screen  - Medical clearance for OR    - Permits:  > Consent in chart.  > Case scheduled with OR.  > May continue VTE ppx perioperativley

## 2018-09-17 LAB
ANION GAP SERPL CALC-SCNC: 13 MMOL/L — SIGNIFICANT CHANGE UP (ref 5–17)
ANION GAP SERPL CALC-SCNC: 16 MMOL/L — SIGNIFICANT CHANGE UP (ref 5–17)
APPEARANCE UR: CLEAR — SIGNIFICANT CHANGE UP
APTT BLD: 64 SEC — HIGH (ref 27.5–37.4)
BILIRUB UR-MCNC: NEGATIVE — SIGNIFICANT CHANGE UP
BLD GP AB SCN SERPL QL: NEGATIVE — SIGNIFICANT CHANGE UP
BUN SERPL-MCNC: 42 MG/DL — HIGH (ref 7–23)
BUN SERPL-MCNC: 46 MG/DL — HIGH (ref 7–23)
CALCIUM SERPL-MCNC: 10 MG/DL — SIGNIFICANT CHANGE UP (ref 8.4–10.5)
CALCIUM SERPL-MCNC: 9.3 MG/DL — SIGNIFICANT CHANGE UP (ref 8.4–10.5)
CHLORIDE SERPL-SCNC: 93 MMOL/L — LOW (ref 96–108)
CHLORIDE SERPL-SCNC: 96 MMOL/L — SIGNIFICANT CHANGE UP (ref 96–108)
CO2 SERPL-SCNC: 21 MMOL/L — LOW (ref 22–31)
CO2 SERPL-SCNC: 23 MMOL/L — SIGNIFICANT CHANGE UP (ref 22–31)
COLOR SPEC: SIGNIFICANT CHANGE UP
CREAT SERPL-MCNC: 1.16 MG/DL — SIGNIFICANT CHANGE UP (ref 0.5–1.3)
CREAT SERPL-MCNC: 1.44 MG/DL — HIGH (ref 0.5–1.3)
DIFF PNL FLD: NEGATIVE — SIGNIFICANT CHANGE UP
GLUCOSE SERPL-MCNC: 322 MG/DL — HIGH (ref 70–99)
GLUCOSE SERPL-MCNC: 372 MG/DL — HIGH (ref 70–99)
GLUCOSE UR QL: ABNORMAL
HCT VFR BLD CALC: 29.2 % — LOW (ref 39–50)
HCT VFR BLD CALC: 31.3 % — LOW (ref 39–50)
HGB BLD-MCNC: 10.2 G/DL — LOW (ref 13–17)
HGB BLD-MCNC: 9.6 G/DL — LOW (ref 13–17)
KETONES UR-MCNC: NEGATIVE — SIGNIFICANT CHANGE UP
LEUKOCYTE ESTERASE UR-ACNC: NEGATIVE — SIGNIFICANT CHANGE UP
MCHC RBC-ENTMCNC: 28.2 PG — SIGNIFICANT CHANGE UP (ref 27–34)
MCHC RBC-ENTMCNC: 28.3 PG — SIGNIFICANT CHANGE UP (ref 27–34)
MCHC RBC-ENTMCNC: 32.7 GM/DL — SIGNIFICANT CHANGE UP (ref 32–36)
MCHC RBC-ENTMCNC: 33.1 GM/DL — SIGNIFICANT CHANGE UP (ref 32–36)
MCV RBC AUTO: 85.6 FL — SIGNIFICANT CHANGE UP (ref 80–100)
MCV RBC AUTO: 86.3 FL — SIGNIFICANT CHANGE UP (ref 80–100)
NITRITE UR-MCNC: NEGATIVE — SIGNIFICANT CHANGE UP
PH UR: 6.5 — SIGNIFICANT CHANGE UP (ref 5–8)
PLATELET # BLD AUTO: 319 K/UL — SIGNIFICANT CHANGE UP (ref 150–400)
PLATELET # BLD AUTO: 329 K/UL — SIGNIFICANT CHANGE UP (ref 150–400)
POTASSIUM SERPL-MCNC: 4.3 MMOL/L — SIGNIFICANT CHANGE UP (ref 3.5–5.3)
POTASSIUM SERPL-MCNC: 5.5 MMOL/L — HIGH (ref 3.5–5.3)
POTASSIUM SERPL-SCNC: 4.3 MMOL/L — SIGNIFICANT CHANGE UP (ref 3.5–5.3)
POTASSIUM SERPL-SCNC: 5.5 MMOL/L — HIGH (ref 3.5–5.3)
PROT UR-MCNC: ABNORMAL
RBC # BLD: 3.41 M/UL — LOW (ref 4.2–5.8)
RBC # BLD: 3.63 M/UL — LOW (ref 4.2–5.8)
RBC # FLD: 15 % — HIGH (ref 10.3–14.5)
RBC # FLD: 15.2 % — HIGH (ref 10.3–14.5)
RH IG SCN BLD-IMP: POSITIVE — SIGNIFICANT CHANGE UP
SODIUM SERPL-SCNC: 129 MMOL/L — LOW (ref 135–145)
SODIUM SERPL-SCNC: 133 MMOL/L — LOW (ref 135–145)
SP GR SPEC: 1.01 — SIGNIFICANT CHANGE UP (ref 1.01–1.02)
UROBILINOGEN FLD QL: NEGATIVE — SIGNIFICANT CHANGE UP
WBC # BLD: 9.7 K/UL — SIGNIFICANT CHANGE UP (ref 3.8–10.5)
WBC # BLD: 9.8 K/UL — SIGNIFICANT CHANGE UP (ref 3.8–10.5)
WBC # FLD AUTO: 9.7 K/UL — SIGNIFICANT CHANGE UP (ref 3.8–10.5)
WBC # FLD AUTO: 9.8 K/UL — SIGNIFICANT CHANGE UP (ref 3.8–10.5)

## 2018-09-17 PROCEDURE — 75630 X-RAY AORTA LEG ARTERIES: CPT | Mod: 26

## 2018-09-17 PROCEDURE — 76937 US GUIDE VASCULAR ACCESS: CPT | Mod: 26

## 2018-09-17 RX ORDER — LEVOTHYROXINE SODIUM 125 MCG
150 TABLET ORAL DAILY
Qty: 0 | Refills: 0 | Status: DISCONTINUED | OUTPATIENT
Start: 2018-09-17 | End: 2018-09-19

## 2018-09-17 RX ORDER — INSULIN LISPRO 100/ML
VIAL (ML) SUBCUTANEOUS EVERY 6 HOURS
Qty: 0 | Refills: 0 | Status: DISCONTINUED | OUTPATIENT
Start: 2018-09-17 | End: 2018-09-17

## 2018-09-17 RX ORDER — BUDESONIDE AND FORMOTEROL FUMARATE DIHYDRATE 160; 4.5 UG/1; UG/1
2 AEROSOL RESPIRATORY (INHALATION)
Qty: 0 | Refills: 0 | Status: DISCONTINUED | OUTPATIENT
Start: 2018-09-17 | End: 2018-09-19

## 2018-09-17 RX ORDER — GABAPENTIN 400 MG/1
300 CAPSULE ORAL DAILY
Qty: 0 | Refills: 0 | Status: DISCONTINUED | OUTPATIENT
Start: 2018-09-17 | End: 2018-09-19

## 2018-09-17 RX ORDER — INSULIN LISPRO 100/ML
VIAL (ML) SUBCUTANEOUS
Qty: 0 | Refills: 0 | Status: DISCONTINUED | OUTPATIENT
Start: 2018-09-17 | End: 2018-09-19

## 2018-09-17 RX ORDER — INSULIN LISPRO 100/ML
VIAL (ML) SUBCUTANEOUS AT BEDTIME
Qty: 0 | Refills: 0 | Status: DISCONTINUED | OUTPATIENT
Start: 2018-09-17 | End: 2018-09-19

## 2018-09-17 RX ORDER — INSULIN LISPRO 100/ML
22 VIAL (ML) SUBCUTANEOUS
Qty: 0 | Refills: 0 | Status: DISCONTINUED | OUTPATIENT
Start: 2018-09-17 | End: 2018-09-18

## 2018-09-17 RX ORDER — HYDROMORPHONE HYDROCHLORIDE 2 MG/ML
0.25 INJECTION INTRAMUSCULAR; INTRAVENOUS; SUBCUTANEOUS
Qty: 0 | Refills: 0 | Status: DISCONTINUED | OUTPATIENT
Start: 2018-09-17 | End: 2018-09-17

## 2018-09-17 RX ORDER — FUROSEMIDE 40 MG
40 TABLET ORAL DAILY
Qty: 0 | Refills: 0 | Status: DISCONTINUED | OUTPATIENT
Start: 2018-09-17 | End: 2018-09-19

## 2018-09-17 RX ORDER — OXYCODONE HYDROCHLORIDE 5 MG/1
10 TABLET ORAL EVERY 4 HOURS
Qty: 0 | Refills: 0 | Status: DISCONTINUED | OUTPATIENT
Start: 2018-09-17 | End: 2018-09-19

## 2018-09-17 RX ORDER — ACETAMINOPHEN 500 MG
650 TABLET ORAL EVERY 6 HOURS
Qty: 0 | Refills: 0 | Status: DISCONTINUED | OUTPATIENT
Start: 2018-09-17 | End: 2018-09-19

## 2018-09-17 RX ORDER — METOPROLOL TARTRATE 50 MG
25 TABLET ORAL
Qty: 0 | Refills: 0 | Status: DISCONTINUED | OUTPATIENT
Start: 2018-09-17 | End: 2018-09-19

## 2018-09-17 RX ORDER — HEPARIN SODIUM 5000 [USP'U]/ML
5000 INJECTION INTRAVENOUS; SUBCUTANEOUS EVERY 8 HOURS
Qty: 0 | Refills: 0 | Status: DISCONTINUED | OUTPATIENT
Start: 2018-09-17 | End: 2018-09-19

## 2018-09-17 RX ORDER — INSULIN GLARGINE 100 [IU]/ML
40 INJECTION, SOLUTION SUBCUTANEOUS AT BEDTIME
Qty: 0 | Refills: 0 | Status: DISCONTINUED | OUTPATIENT
Start: 2018-09-17 | End: 2018-09-17

## 2018-09-17 RX ORDER — DIPHENHYDRAMINE HCL 50 MG
50 CAPSULE ORAL ONCE
Qty: 0 | Refills: 0 | Status: DISCONTINUED | OUTPATIENT
Start: 2018-09-17 | End: 2018-09-17

## 2018-09-17 RX ORDER — ASPIRIN/CALCIUM CARB/MAGNESIUM 324 MG
81 TABLET ORAL DAILY
Qty: 0 | Refills: 0 | Status: DISCONTINUED | OUTPATIENT
Start: 2018-09-17 | End: 2018-09-19

## 2018-09-17 RX ORDER — ATORVASTATIN CALCIUM 80 MG/1
80 TABLET, FILM COATED ORAL AT BEDTIME
Qty: 0 | Refills: 0 | Status: DISCONTINUED | OUTPATIENT
Start: 2018-09-17 | End: 2018-09-19

## 2018-09-17 RX ORDER — ONDANSETRON 8 MG/1
4 TABLET, FILM COATED ORAL ONCE
Qty: 0 | Refills: 0 | Status: DISCONTINUED | OUTPATIENT
Start: 2018-09-17 | End: 2018-09-17

## 2018-09-17 RX ORDER — INSULIN GLARGINE 100 [IU]/ML
60 INJECTION, SOLUTION SUBCUTANEOUS AT BEDTIME
Qty: 0 | Refills: 0 | Status: DISCONTINUED | OUTPATIENT
Start: 2018-09-17 | End: 2018-09-18

## 2018-09-17 RX ORDER — MORPHINE SULFATE 50 MG/1
4 CAPSULE, EXTENDED RELEASE ORAL EVERY 4 HOURS
Qty: 0 | Refills: 0 | Status: DISCONTINUED | OUTPATIENT
Start: 2018-09-17 | End: 2018-09-19

## 2018-09-17 RX ORDER — ACETAMINOPHEN 500 MG
1000 TABLET ORAL ONCE
Qty: 0 | Refills: 0 | Status: COMPLETED | OUTPATIENT
Start: 2018-09-17 | End: 2018-09-17

## 2018-09-17 RX ORDER — DIPHENHYDRAMINE HCL 50 MG
50 CAPSULE ORAL ONCE
Qty: 0 | Refills: 0 | Status: COMPLETED | OUTPATIENT
Start: 2018-09-17 | End: 2018-09-17

## 2018-09-17 RX ORDER — OXYCODONE HYDROCHLORIDE 5 MG/1
5 TABLET ORAL EVERY 4 HOURS
Qty: 0 | Refills: 0 | Status: DISCONTINUED | OUTPATIENT
Start: 2018-09-17 | End: 2018-09-19

## 2018-09-17 RX ORDER — LOSARTAN POTASSIUM 100 MG/1
100 TABLET, FILM COATED ORAL DAILY
Qty: 0 | Refills: 0 | Status: DISCONTINUED | OUTPATIENT
Start: 2018-09-17 | End: 2018-09-19

## 2018-09-17 RX ADMIN — BUDESONIDE AND FORMOTEROL FUMARATE DIHYDRATE 2 PUFF(S): 160; 4.5 AEROSOL RESPIRATORY (INHALATION) at 17:57

## 2018-09-17 RX ADMIN — LOSARTAN POTASSIUM 100 MILLIGRAM(S): 100 TABLET, FILM COATED ORAL at 06:05

## 2018-09-17 RX ADMIN — ATORVASTATIN CALCIUM 80 MILLIGRAM(S): 80 TABLET, FILM COATED ORAL at 22:44

## 2018-09-17 RX ADMIN — MORPHINE SULFATE 4 MILLIGRAM(S): 50 CAPSULE, EXTENDED RELEASE ORAL at 02:09

## 2018-09-17 RX ADMIN — GABAPENTIN 300 MILLIGRAM(S): 400 CAPSULE ORAL at 22:44

## 2018-09-17 RX ADMIN — MORPHINE SULFATE 4 MILLIGRAM(S): 50 CAPSULE, EXTENDED RELEASE ORAL at 01:54

## 2018-09-17 RX ADMIN — Medication 650 MILLIGRAM(S): at 06:36

## 2018-09-17 RX ADMIN — Medication 4: at 14:25

## 2018-09-17 RX ADMIN — Medication 40 MILLIGRAM(S): at 06:05

## 2018-09-17 RX ADMIN — Medication 5: at 20:06

## 2018-09-17 RX ADMIN — SODIUM CHLORIDE 25 MILLILITER(S): 9 INJECTION INTRAMUSCULAR; INTRAVENOUS; SUBCUTANEOUS at 00:00

## 2018-09-17 RX ADMIN — OXYCODONE HYDROCHLORIDE 10 MILLIGRAM(S): 5 TABLET ORAL at 20:06

## 2018-09-17 RX ADMIN — Medication 6: at 22:43

## 2018-09-17 RX ADMIN — OXYCODONE HYDROCHLORIDE 10 MILLIGRAM(S): 5 TABLET ORAL at 20:36

## 2018-09-17 RX ADMIN — Medication 4: at 06:07

## 2018-09-17 RX ADMIN — INSULIN GLARGINE 60 UNIT(S): 100 INJECTION, SOLUTION SUBCUTANEOUS at 22:43

## 2018-09-17 RX ADMIN — HEPARIN SODIUM 5000 UNIT(S): 5000 INJECTION INTRAVENOUS; SUBCUTANEOUS at 22:44

## 2018-09-17 RX ADMIN — Medication 25 MILLIGRAM(S): at 06:05

## 2018-09-17 RX ADMIN — Medication 81 MILLIGRAM(S): at 16:54

## 2018-09-17 RX ADMIN — Medication 32 MILLIGRAM(S): at 08:45

## 2018-09-17 RX ADMIN — Medication 400 MILLIGRAM(S): at 16:15

## 2018-09-17 RX ADMIN — OXYCODONE HYDROCHLORIDE 5 MILLIGRAM(S): 5 TABLET ORAL at 06:06

## 2018-09-17 RX ADMIN — Medication 22 UNIT(S): at 20:05

## 2018-09-17 RX ADMIN — BUDESONIDE AND FORMOTEROL FUMARATE DIHYDRATE 2 PUFF(S): 160; 4.5 AEROSOL RESPIRATORY (INHALATION) at 06:07

## 2018-09-17 RX ADMIN — HEPARIN SODIUM 900 UNIT(S)/HR: 5000 INJECTION INTRAVENOUS; SUBCUTANEOUS at 07:44

## 2018-09-17 RX ADMIN — Medication 150 MICROGRAM(S): at 06:05

## 2018-09-17 RX ADMIN — HEPARIN SODIUM 5000 UNIT(S): 5000 INJECTION INTRAVENOUS; SUBCUTANEOUS at 14:16

## 2018-09-17 RX ADMIN — Medication 32 MILLIGRAM(S): at 06:05

## 2018-09-17 RX ADMIN — Medication 25 MILLIGRAM(S): at 17:56

## 2018-09-17 RX ADMIN — OXYCODONE HYDROCHLORIDE 5 MILLIGRAM(S): 5 TABLET ORAL at 06:36

## 2018-09-17 RX ADMIN — Medication 650 MILLIGRAM(S): at 06:06

## 2018-09-17 RX ADMIN — Medication 50 MILLIGRAM(S): at 08:45

## 2018-09-17 NOTE — PROGRESS NOTE ADULT - ASSESSMENT
67 M with CAD s/p 3 stents 2006, CABG x 3 2016, left femoral endarterectomy and fem pop bypass 17, left femoral to peroneal bypass with PTFE 18 for cludication, now POD3 s/p left hallux amputation with podiatry with LLE pain and found to have clotted left proximal bypass in venous duplex    Plan:  - stable for OR  - Angiogram today 9/17  - dvt ppx  - hep drip @ 9, f/u PTT  - f/u AM labs  - pain control  - f/u endocrine recs  - postop check

## 2018-09-17 NOTE — BRIEF OPERATIVE NOTE - PRE-OP DX
Thrombosis of femoro-popliteal bypass graft, initial encounter  09/17/2018  thrombosis of the fem-peroneal bypass graft  Active  Mark Humphries

## 2018-09-17 NOTE — PROGRESS NOTE ADULT - ASSESSMENT
Assessment  DMT2: 67y Male with DM T2 with hyperglycemia on insulin, blood sugars running high, missed insulin dose,no hypoglycemic episode,  eating meals,  non compliant with low carb diet.  CAD: On medications, stable, monitored.  Hypothyroidism:  On synthroid 150 mcg po daily, asymptomatic.  HTN: Controlled, On med.  CKD: Monitor labs/BMP,   Foot wound S/P amputation toe.

## 2018-09-17 NOTE — BRIEF OPERATIVE NOTE - OPERATION/FINDINGS
patents aorta, stenosis of the left common iliac, patient got hypotensive after giving IV contrast, procedure aborted

## 2018-09-17 NOTE — CHART NOTE - NSCHARTNOTEFT_GEN_A_CORE
Vascular Surgery Post-Op Note    Pre-Op Dx: Left foot pain  Thrombosis of femoro-popliteal bypass graft, initial encounter      Procedure: Aortogram    Surgeon: Dr. Parnell    Subjective: Patient stable s/p aortogram. Patient was hypotensive to 70s in OR so procedure was aborted. Has been lying flat for 4 hours. Currently feels well. Denies CP and lightheadedness. Patient would like to eat. BPs stable in PACU.     Vital Signs Last 24 Hrs  T(C): 36 (17 Sep 2018 12:30), Max: 37.1 (17 Sep 2018 01:16)  T(F): 96.8 (17 Sep 2018 12:30), Max: 98.7 (17 Sep 2018 01:16)  HR: 88 (17 Sep 2018 16:00) (84 - 95)  BP: 117/55 (17 Sep 2018 12:30) (117/55 - 148/76)  BP(mean): --  RR: 17 (17 Sep 2018 16:00) (16 - 18)  SpO2: 100% (17 Sep 2018 16:00) (96% - 100%)    Physical Exam:  General: NAD, resting comfortably in bed  Pulmonary: Nonlabored breathing, no respiratory distress  Abdominal: soft, NT/ND  Extremities:   RUE  WWP, normal strength  LUE  WWP normal strength  B/L LEs  nonpapable pulses in feet  Right groin:  no hematoma  dressing c/d/i  Neuro: A/O x 3, CNs II-XII grossly intact, normal motor/sensation, no focal deficits    LABS:                        10.2   9.8   )-----------( 329      ( 17 Sep 2018 07:09 )             31.3     -17    129<L>  |  93<L>  |  46<H>  ----------------------------<  322<H>  5.5<H>   |  23  |  1.44<H>    Ca    10.0      17 Sep 2018 07:09  Phos  3.9     09-16  Mg     2.1     09-16      PT/INR - ( 16 Sep 2018 08:25 )   PT: 15.4 sec;   INR: 1.40 ratio         PTT - ( 17 Sep 2018 07:09 )  PTT:64.0 sec  CAPILLARY BLOOD GLUCOSE      POCT Blood Glucose.: 340 mg/dL (17 Sep 2018 14:20)  POCT Blood Glucose.: 309 mg/dL (17 Sep 2018 05:32)  POCT Blood Glucose.: 218 mg/dL (16 Sep 2018 22:21)  POCT Blood Glucose.: 256 mg/dL (16 Sep 2018 18:39)      Urinalysis Basic - ( 17 Sep 2018 07:09 )    Color: Light Yellow / Appearance: Clear / S.012 / pH: x  Gluc: x / Ketone: Negative  / Bili: Negative / Urobili: Negative   Blood: x / Protein: 30 mg/dL / Nitrite: Negative   Leuk Esterase: Negative / RBC: 0 /hpf / WBC 0 /hpf   Sq Epi: x / Non Sq Epi: 0 /hpf / Bacteria: 0.0        Assessment:67y Male s/p aborted aortogram 2/2 hypotension currently stable in PACU. Can be transferred to the floors.     Plan:  Pain/nausea control PRN  Home meds  Incentive spirometer  Remain flat for 4 hrs then resume activity as tolerated  Hold hep drip  Patient can be transferred to the floor

## 2018-09-18 LAB
ANION GAP SERPL CALC-SCNC: 14 MMOL/L — SIGNIFICANT CHANGE UP (ref 5–17)
APTT BLD: 27.9 SEC — SIGNIFICANT CHANGE UP (ref 27.5–37.4)
BUN SERPL-MCNC: 43 MG/DL — HIGH (ref 7–23)
CALCIUM SERPL-MCNC: 8.9 MG/DL — SIGNIFICANT CHANGE UP (ref 8.4–10.5)
CHLORIDE SERPL-SCNC: 98 MMOL/L — SIGNIFICANT CHANGE UP (ref 96–108)
CO2 SERPL-SCNC: 22 MMOL/L — SIGNIFICANT CHANGE UP (ref 22–31)
CREAT SERPL-MCNC: 1.19 MG/DL — SIGNIFICANT CHANGE UP (ref 0.5–1.3)
GLUCOSE SERPL-MCNC: 314 MG/DL — HIGH (ref 70–99)
HCT VFR BLD CALC: 27.2 % — LOW (ref 39–50)
HGB BLD-MCNC: 8.9 G/DL — LOW (ref 13–17)
INR BLD: 1.24 RATIO — HIGH (ref 0.88–1.16)
MAGNESIUM SERPL-MCNC: 2.2 MG/DL — SIGNIFICANT CHANGE UP (ref 1.6–2.6)
MCHC RBC-ENTMCNC: 28.2 PG — SIGNIFICANT CHANGE UP (ref 27–34)
MCHC RBC-ENTMCNC: 32.8 GM/DL — SIGNIFICANT CHANGE UP (ref 32–36)
MCV RBC AUTO: 86.1 FL — SIGNIFICANT CHANGE UP (ref 80–100)
PHOSPHATE SERPL-MCNC: 2.3 MG/DL — LOW (ref 2.5–4.5)
PLATELET # BLD AUTO: 307 K/UL — SIGNIFICANT CHANGE UP (ref 150–400)
POTASSIUM SERPL-MCNC: 4.1 MMOL/L — SIGNIFICANT CHANGE UP (ref 3.5–5.3)
POTASSIUM SERPL-SCNC: 4.1 MMOL/L — SIGNIFICANT CHANGE UP (ref 3.5–5.3)
PROTHROM AB SERPL-ACNC: 13.6 SEC — HIGH (ref 9.8–12.7)
RBC # BLD: 3.17 M/UL — LOW (ref 4.2–5.8)
RBC # FLD: 15.1 % — HIGH (ref 10.3–14.5)
SODIUM SERPL-SCNC: 134 MMOL/L — LOW (ref 135–145)
SURGICAL PATHOLOGY STUDY: SIGNIFICANT CHANGE UP
WBC # BLD: 9.6 K/UL — SIGNIFICANT CHANGE UP (ref 3.8–10.5)
WBC # FLD AUTO: 9.6 K/UL — SIGNIFICANT CHANGE UP (ref 3.8–10.5)

## 2018-09-18 RX ORDER — SODIUM CHLORIDE 9 MG/ML
1000 INJECTION INTRAMUSCULAR; INTRAVENOUS; SUBCUTANEOUS
Qty: 0 | Refills: 0 | Status: DISCONTINUED | OUTPATIENT
Start: 2018-09-18 | End: 2018-09-18

## 2018-09-18 RX ORDER — INSULIN LISPRO 100/ML
24 VIAL (ML) SUBCUTANEOUS
Qty: 0 | Refills: 0 | Status: DISCONTINUED | OUTPATIENT
Start: 2018-09-18 | End: 2018-09-19

## 2018-09-18 RX ORDER — INSULIN GLARGINE 100 [IU]/ML
65 INJECTION, SOLUTION SUBCUTANEOUS AT BEDTIME
Qty: 0 | Refills: 0 | Status: DISCONTINUED | OUTPATIENT
Start: 2018-09-18 | End: 2018-09-19

## 2018-09-18 RX ADMIN — Medication 62.5 MILLIMOLE(S): at 12:00

## 2018-09-18 RX ADMIN — Medication 22 UNIT(S): at 09:21

## 2018-09-18 RX ADMIN — Medication 650 MILLIGRAM(S): at 06:09

## 2018-09-18 RX ADMIN — OXYCODONE HYDROCHLORIDE 10 MILLIGRAM(S): 5 TABLET ORAL at 22:18

## 2018-09-18 RX ADMIN — INSULIN GLARGINE 65 UNIT(S): 100 INJECTION, SOLUTION SUBCUTANEOUS at 21:48

## 2018-09-18 RX ADMIN — Medication 40 MILLIGRAM(S): at 06:08

## 2018-09-18 RX ADMIN — Medication 650 MILLIGRAM(S): at 06:39

## 2018-09-18 RX ADMIN — Medication 650 MILLIGRAM(S): at 13:41

## 2018-09-18 RX ADMIN — Medication 81 MILLIGRAM(S): at 13:42

## 2018-09-18 RX ADMIN — ATORVASTATIN CALCIUM 80 MILLIGRAM(S): 80 TABLET, FILM COATED ORAL at 21:48

## 2018-09-18 RX ADMIN — HEPARIN SODIUM 5000 UNIT(S): 5000 INJECTION INTRAVENOUS; SUBCUTANEOUS at 13:41

## 2018-09-18 RX ADMIN — OXYCODONE HYDROCHLORIDE 5 MILLIGRAM(S): 5 TABLET ORAL at 18:40

## 2018-09-18 RX ADMIN — Medication 4: at 14:55

## 2018-09-18 RX ADMIN — Medication 650 MILLIGRAM(S): at 14:30

## 2018-09-18 RX ADMIN — Medication 3: at 09:21

## 2018-09-18 RX ADMIN — HEPARIN SODIUM 5000 UNIT(S): 5000 INJECTION INTRAVENOUS; SUBCUTANEOUS at 06:08

## 2018-09-18 RX ADMIN — BUDESONIDE AND FORMOTEROL FUMARATE DIHYDRATE 2 PUFF(S): 160; 4.5 AEROSOL RESPIRATORY (INHALATION) at 06:07

## 2018-09-18 RX ADMIN — BUDESONIDE AND FORMOTEROL FUMARATE DIHYDRATE 2 PUFF(S): 160; 4.5 AEROSOL RESPIRATORY (INHALATION) at 17:49

## 2018-09-18 RX ADMIN — Medication 650 MILLIGRAM(S): at 23:44

## 2018-09-18 RX ADMIN — MORPHINE SULFATE 4 MILLIGRAM(S): 50 CAPSULE, EXTENDED RELEASE ORAL at 23:44

## 2018-09-18 RX ADMIN — Medication 650 MILLIGRAM(S): at 17:49

## 2018-09-18 RX ADMIN — Medication 24 UNIT(S): at 18:22

## 2018-09-18 RX ADMIN — HEPARIN SODIUM 5000 UNIT(S): 5000 INJECTION INTRAVENOUS; SUBCUTANEOUS at 21:48

## 2018-09-18 RX ADMIN — Medication 150 MICROGRAM(S): at 06:08

## 2018-09-18 RX ADMIN — Medication 25 MILLIGRAM(S): at 06:08

## 2018-09-18 RX ADMIN — Medication 650 MILLIGRAM(S): at 18:30

## 2018-09-18 RX ADMIN — Medication 22 UNIT(S): at 14:57

## 2018-09-18 RX ADMIN — Medication 25 MILLIGRAM(S): at 17:49

## 2018-09-18 RX ADMIN — Medication 2: at 18:22

## 2018-09-18 RX ADMIN — OXYCODONE HYDROCHLORIDE 10 MILLIGRAM(S): 5 TABLET ORAL at 21:48

## 2018-09-18 RX ADMIN — OXYCODONE HYDROCHLORIDE 5 MILLIGRAM(S): 5 TABLET ORAL at 17:53

## 2018-09-18 RX ADMIN — LOSARTAN POTASSIUM 100 MILLIGRAM(S): 100 TABLET, FILM COATED ORAL at 06:08

## 2018-09-18 NOTE — PROGRESS NOTE ADULT - ASSESSMENT
Assessment  DMT2: 67y Male with DM T2 with hyperglycemia started on basal bolus insulin, dose was adjusted,, blood sugars still running high, no hypoglycemic episode,  eating meals,  non compliant with low carb diet.  CAD: On medications, stable, monitored.  Hypothyroidism:  On synthroid 150 mcg po daily, asymptomatic.  HTN: Controlled, On med.  CKD: Monitor labs/BMP,   Foot wound S/P amputation toe, waiting for vascular work up..

## 2018-09-18 NOTE — PROGRESS NOTE ADULT - ASSESSMENT
67 M with CAD s/p 3 stents 2006, CABG x 3 2016, left femoral endarterectomy and fem pop bypass 17, left femoral to peroneal bypass with PTFE 18 for cludication, s/p left hallux amputation with podiatry with LLE pain and found to have clotted left proximal bypass in venous duplex    Plan:  - Planning for future intervention  - reg diet  - OOB as tolerated  - IS  - dvt ppx: asa, hep subq  - appreciate endocrine recs

## 2018-09-19 ENCOUNTER — TRANSCRIPTION ENCOUNTER (OUTPATIENT)
Age: 68
End: 2018-09-19

## 2018-09-19 VITALS
SYSTOLIC BLOOD PRESSURE: 127 MMHG | OXYGEN SATURATION: 97 % | RESPIRATION RATE: 18 BRPM | TEMPERATURE: 98 F | DIASTOLIC BLOOD PRESSURE: 66 MMHG | HEART RATE: 74 BPM

## 2018-09-19 LAB
ANION GAP SERPL CALC-SCNC: 13 MMOL/L — SIGNIFICANT CHANGE UP (ref 5–17)
APTT BLD: 31.1 SEC — SIGNIFICANT CHANGE UP (ref 27.5–37.4)
BUN SERPL-MCNC: 37 MG/DL — HIGH (ref 7–23)
CALCIUM SERPL-MCNC: 9.7 MG/DL — SIGNIFICANT CHANGE UP (ref 8.4–10.5)
CHLORIDE SERPL-SCNC: 101 MMOL/L — SIGNIFICANT CHANGE UP (ref 96–108)
CO2 SERPL-SCNC: 23 MMOL/L — SIGNIFICANT CHANGE UP (ref 22–31)
CREAT SERPL-MCNC: 1.29 MG/DL — SIGNIFICANT CHANGE UP (ref 0.5–1.3)
GLUCOSE SERPL-MCNC: 81 MG/DL — SIGNIFICANT CHANGE UP (ref 70–99)
HCT VFR BLD CALC: 34.1 % — LOW (ref 39–50)
HGB BLD-MCNC: 10.9 G/DL — LOW (ref 13–17)
INR BLD: 1.11 RATIO — SIGNIFICANT CHANGE UP (ref 0.88–1.16)
MAGNESIUM SERPL-MCNC: 2 MG/DL — SIGNIFICANT CHANGE UP (ref 1.6–2.6)
MCHC RBC-ENTMCNC: 27.2 PG — SIGNIFICANT CHANGE UP (ref 27–34)
MCHC RBC-ENTMCNC: 31.9 GM/DL — LOW (ref 32–36)
MCV RBC AUTO: 85.3 FL — SIGNIFICANT CHANGE UP (ref 80–100)
PHOSPHATE SERPL-MCNC: 2.7 MG/DL — SIGNIFICANT CHANGE UP (ref 2.5–4.5)
PLATELET # BLD AUTO: 359 K/UL — SIGNIFICANT CHANGE UP (ref 150–400)
POTASSIUM SERPL-MCNC: 3.5 MMOL/L — SIGNIFICANT CHANGE UP (ref 3.5–5.3)
POTASSIUM SERPL-SCNC: 3.5 MMOL/L — SIGNIFICANT CHANGE UP (ref 3.5–5.3)
PROTHROM AB SERPL-ACNC: 12.1 SEC — SIGNIFICANT CHANGE UP (ref 9.8–12.7)
RBC # BLD: 4 M/UL — LOW (ref 4.2–5.8)
RBC # FLD: 14.8 % — HIGH (ref 10.3–14.5)
SODIUM SERPL-SCNC: 137 MMOL/L — SIGNIFICANT CHANGE UP (ref 135–145)
WBC # BLD: 7.9 K/UL — SIGNIFICANT CHANGE UP (ref 3.8–10.5)
WBC # FLD AUTO: 7.9 K/UL — SIGNIFICANT CHANGE UP (ref 3.8–10.5)

## 2018-09-19 RX ORDER — INSULIN LISPRO 100/ML
18 VIAL (ML) SUBCUTANEOUS
Qty: 0 | Refills: 0 | Status: DISCONTINUED | OUTPATIENT
Start: 2018-09-19 | End: 2018-09-19

## 2018-09-19 RX ORDER — OXYCODONE HYDROCHLORIDE 5 MG/1
1 TABLET ORAL
Qty: 30 | Refills: 0 | OUTPATIENT
Start: 2018-09-19 | End: 2018-09-25

## 2018-09-19 RX ORDER — POTASSIUM CHLORIDE 20 MEQ
20 PACKET (EA) ORAL ONCE
Qty: 0 | Refills: 0 | Status: COMPLETED | OUTPATIENT
Start: 2018-09-19 | End: 2018-09-19

## 2018-09-19 RX ORDER — ENOXAPARIN SODIUM 100 MG/ML
65 INJECTION SUBCUTANEOUS
Qty: 20 | Refills: 0 | OUTPATIENT
Start: 2018-09-19 | End: 2018-10-18

## 2018-09-19 RX ORDER — WARFARIN SODIUM 2.5 MG/1
1 TABLET ORAL
Qty: 0 | Refills: 0 | COMMUNITY

## 2018-09-19 RX ORDER — INSULIN GLARGINE 100 [IU]/ML
55 INJECTION, SOLUTION SUBCUTANEOUS AT BEDTIME
Qty: 0 | Refills: 0 | Status: DISCONTINUED | OUTPATIENT
Start: 2018-09-19 | End: 2018-09-19

## 2018-09-19 RX ORDER — INSULIN GLARGINE 100 [IU]/ML
55 INJECTION, SOLUTION SUBCUTANEOUS
Qty: 18 | Refills: 0 | OUTPATIENT
Start: 2018-09-19 | End: 2018-10-18

## 2018-09-19 RX ORDER — INSULIN LISPRO 100/ML
24 VIAL (ML) SUBCUTANEOUS
Qty: 22 | Refills: 0 | OUTPATIENT
Start: 2018-09-19 | End: 2018-10-18

## 2018-09-19 RX ORDER — GABAPENTIN 400 MG/1
1 CAPSULE ORAL
Qty: 10 | Refills: 0 | OUTPATIENT
Start: 2018-09-19 | End: 2018-09-28

## 2018-09-19 RX ORDER — INSULIN ASPART 100 [IU]/ML
18 INJECTION, SOLUTION SUBCUTANEOUS
Qty: 18 | Refills: 0 | OUTPATIENT
Start: 2018-09-19 | End: 2018-10-18

## 2018-09-19 RX ORDER — GABAPENTIN 400 MG/1
1 CAPSULE ORAL
Qty: 0 | Refills: 0 | COMMUNITY

## 2018-09-19 RX ORDER — MINOCYCLINE HYDROCHLORIDE 45 MG/1
1 TABLET, EXTENDED RELEASE ORAL
Qty: 0 | Refills: 0 | COMMUNITY
Start: 2018-09-19

## 2018-09-19 RX ADMIN — OXYCODONE HYDROCHLORIDE 5 MILLIGRAM(S): 5 TABLET ORAL at 05:55

## 2018-09-19 RX ADMIN — Medication 650 MILLIGRAM(S): at 12:15

## 2018-09-19 RX ADMIN — HEPARIN SODIUM 5000 UNIT(S): 5000 INJECTION INTRAVENOUS; SUBCUTANEOUS at 05:55

## 2018-09-19 RX ADMIN — OXYCODONE HYDROCHLORIDE 10 MILLIGRAM(S): 5 TABLET ORAL at 02:30

## 2018-09-19 RX ADMIN — Medication 650 MILLIGRAM(S): at 12:42

## 2018-09-19 RX ADMIN — Medication 25 MILLIGRAM(S): at 05:55

## 2018-09-19 RX ADMIN — Medication 150 MICROGRAM(S): at 05:56

## 2018-09-19 RX ADMIN — Medication 81 MILLIGRAM(S): at 11:46

## 2018-09-19 RX ADMIN — OXYCODONE HYDROCHLORIDE 10 MILLIGRAM(S): 5 TABLET ORAL at 02:00

## 2018-09-19 RX ADMIN — BUDESONIDE AND FORMOTEROL FUMARATE DIHYDRATE 2 PUFF(S): 160; 4.5 AEROSOL RESPIRATORY (INHALATION) at 05:56

## 2018-09-19 RX ADMIN — LOSARTAN POTASSIUM 100 MILLIGRAM(S): 100 TABLET, FILM COATED ORAL at 05:55

## 2018-09-19 RX ADMIN — MORPHINE SULFATE 4 MILLIGRAM(S): 50 CAPSULE, EXTENDED RELEASE ORAL at 00:14

## 2018-09-19 RX ADMIN — OXYCODONE HYDROCHLORIDE 5 MILLIGRAM(S): 5 TABLET ORAL at 06:25

## 2018-09-19 RX ADMIN — Medication 20 MILLIEQUIVALENT(S): at 11:46

## 2018-09-19 RX ADMIN — INFLUENZA VIRUS VACCINE 0.5 MILLILITER(S): 15; 15; 15; 15 SUSPENSION INTRAMUSCULAR at 11:46

## 2018-09-19 RX ADMIN — Medication 650 MILLIGRAM(S): at 06:25

## 2018-09-19 RX ADMIN — Medication 650 MILLIGRAM(S): at 00:14

## 2018-09-19 RX ADMIN — OXYCODONE HYDROCHLORIDE 5 MILLIGRAM(S): 5 TABLET ORAL at 12:15

## 2018-09-19 RX ADMIN — Medication 40 MILLIGRAM(S): at 05:55

## 2018-09-19 RX ADMIN — Medication 24 UNIT(S): at 07:54

## 2018-09-19 RX ADMIN — Medication 650 MILLIGRAM(S): at 05:55

## 2018-09-19 RX ADMIN — OXYCODONE HYDROCHLORIDE 5 MILLIGRAM(S): 5 TABLET ORAL at 01:24

## 2018-09-19 NOTE — PROGRESS NOTE ADULT - PROVIDER SPECIALTY LIST ADULT
Endocrinology
Endocrinology
Vascular Surgery
Endocrinology

## 2018-09-19 NOTE — DISCHARGE NOTE ADULT - OTHER SIGNIFICANT FINDINGS
See hospital course.     9/14/18 LLE Duplex: No evidence of left lower extremity deep venous thrombosis.    The left femoral-peroneal bypass graft is occluded with thrombus.

## 2018-09-19 NOTE — PROGRESS NOTE ADULT - SUBJECTIVE AND OBJECTIVE BOX
Chief complaint  Patient is a 67y old  Male who presents with a chief complaint of left lower extremity pain (17 Sep 2018 09:01)   Review of systems  Patient in bed, looks comfortable, no fever, no hypoglycemia.    Labs and Fingersticks  CAPILLARY BLOOD GLUCOSE      POCT Blood Glucose.: 340 mg/dL (17 Sep 2018 14:20)  POCT Blood Glucose.: 309 mg/dL (17 Sep 2018 05:32)  POCT Blood Glucose.: 218 mg/dL (16 Sep 2018 22:21)  POCT Blood Glucose.: 256 mg/dL (16 Sep 2018 18:39)      Anion Gap, Serum: 13 (09-17 @ 07:09)  Anion Gap, Serum: 13 (09-16 @ 08:25)      Calcium, Total Serum: 10.0 (09-17 @ 07:09)  Calcium, Total Serum: 9.7 (09-16 @ 08:25)          09-17    129<L>  |  93<L>  |  46<H>  ----------------------------<  322<H>  5.5<H>   |  23  |  1.44<H>    Ca    10.0      17 Sep 2018 07:09  Phos  3.9     09-16  Mg     2.1     09-16                          10.2   9.8   )-----------( 329      ( 17 Sep 2018 07:09 )             31.3     Medications  MEDICATIONS  (STANDING):  aspirin enteric coated 81 milliGRAM(s) Oral daily  atorvastatin 80 milliGRAM(s) Oral at bedtime  buDESOnide 160 MICROgram(s)/formoterol 4.5 MICROgram(s) Inhaler 2 Puff(s) Inhalation two times a day  furosemide    Tablet 40 milliGRAM(s) Oral daily  heparin  Injectable 5000 Unit(s) SubCutaneous every 8 hours  influenza   Vaccine 0.5 milliLiter(s) IntraMuscular once  insulin glargine Injectable (LANTUS) 40 Unit(s) SubCutaneous at bedtime  insulin lispro (HumaLOG) corrective regimen sliding scale   SubCutaneous every 6 hours  levothyroxine 150 MICROGram(s) Oral daily  losartan 100 milliGRAM(s) Oral daily  metoprolol tartrate 25 milliGRAM(s) Oral two times a day      Physical Exam  General: Patient comfortable in bed  Vital Signs Last 12 Hrs  T(F): 96.8 (09-17-18 @ 12:30), Max: 97.8 (09-17-18 @ 05:34)  HR: 87 (09-17-18 @ 14:00) (87 - 95)  BP: 117/55 (09-17-18 @ 12:30) (117/55 - 148/76)  BP(mean): --  RR: 16 (09-17-18 @ 14:00) (16 - 18)  SpO2: 100% (09-17-18 @ 14:00) (99% - 100%)  Neck: No palpable thyroid nodules.  CVS: S1S2, No murmurs  Respiratory: No wheezing, no crepitations  GI: Abdomen soft, bowel sounds positive  Musculoskeletal:  Toe amputation, edema lower extremities.   Skin: No skin rashes, no ecchymosis    Diagnostics
Chief complaint  Patient is a 67y old  Male who presents with a chief complaint of left lower extremity pain (18 Sep 2018 09:47)   Review of systems  Patient in bed, looks comfortable, no fever, c/o foot pain, no hypoglycemia.    Labs and Fingersticks  CAPILLARY BLOOD GLUCOSE      POCT Blood Glucose.: 218 mg/dL (18 Sep 2018 18:17)  POCT Blood Glucose.: 348 mg/dL (18 Sep 2018 14:39)  POCT Blood Glucose.: 286 mg/dL (18 Sep 2018 09:11)  POCT Blood Glucose.: 398 mg/dL (17 Sep 2018 22:13)  POCT Blood Glucose.: 384 mg/dL (17 Sep 2018 19:57)  POCT Blood Glucose.: 394 mg/dL (17 Sep 2018 19:55)      Anion Gap, Serum: 14 (09-18 @ 07:22)  Anion Gap, Serum: 16 (09-17 @ 18:53)  Anion Gap, Serum: 13 (09-17 @ 07:09)      Calcium, Total Serum: 8.9 (09-18 @ 07:22)  Calcium, Total Serum: 9.3 (09-17 @ 18:53)  Calcium, Total Serum: 10.0 (09-17 @ 07:09)          09-18    134<L>  |  98  |  43<H>  ----------------------------<  314<H>  4.1   |  22  |  1.19    Ca    8.9      18 Sep 2018 07:22  Phos  2.3     09-18  Mg     2.2     09-18                          8.9    9.6   )-----------( 307      ( 18 Sep 2018 07:23 )             27.2     Medications  MEDICATIONS  (STANDING):  acetaminophen   Tablet .. 650 milliGRAM(s) Oral every 6 hours  aspirin enteric coated 81 milliGRAM(s) Oral daily  atorvastatin 80 milliGRAM(s) Oral at bedtime  buDESOnide 160 MICROgram(s)/formoterol 4.5 MICROgram(s) Inhaler 2 Puff(s) Inhalation two times a day  furosemide    Tablet 40 milliGRAM(s) Oral daily  heparin  Injectable 5000 Unit(s) SubCutaneous every 8 hours  influenza   Vaccine 0.5 milliLiter(s) IntraMuscular once  insulin glargine Injectable (LANTUS) 65 Unit(s) SubCutaneous at bedtime  insulin lispro (HumaLOG) corrective regimen sliding scale   SubCutaneous three times a day before meals  insulin lispro (HumaLOG) corrective regimen sliding scale   SubCutaneous at bedtime  insulin lispro Injectable (HumaLOG) 24 Unit(s) SubCutaneous three times a day before meals  levothyroxine 150 MICROGram(s) Oral daily  losartan 100 milliGRAM(s) Oral daily  metoprolol tartrate 25 milliGRAM(s) Oral two times a day      Physical Exam  General: Patient comfortable in bed  Vital Signs Last 12 Hrs  T(F): 97.9 (09-18-18 @ 16:42), Max: 97.9 (09-18-18 @ 14:42)  HR: 83 (09-18-18 @ 16:42) (71 - 83)  BP: 135/74 (09-18-18 @ 16:42) (109/56 - 144/77)  BP(mean): --  RR: 17 (09-18-18 @ 16:42) (17 - 19)  SpO2: 99% (09-18-18 @ 16:42) (97% - 99%)  Neck: No palpable thyroid nodules.  CVS: S1S2, No murmurs  Respiratory: No wheezing, no crepitations  GI: Abdomen soft, bowel sounds positive  Musculoskeletal:  edema lower extremities.   Skin: No skin rashes, no ecchymosis    Diagnostics
Vascular Surgery Progress Note     S: Patient resting comfortably on morning rounds. Pain well-controlled currently.  No new complaints.       Vital Signs Last 24 Hrs  T(C): 36.7 (16 Sep 2018 05:07), Max: 36.9 (15 Sep 2018 20:53)  T(F): 98.1 (16 Sep 2018 05:07), Max: 98.5 (15 Sep 2018 20:53)  HR: 84 (16 Sep 2018 05:07) (58 - 84)  BP: 145/75 (16 Sep 2018 05:07) (114/58 - 152/72)  BP(mean): --  RR: 18 (16 Sep 2018 05:07) (16 - 18)  SpO2: 100% (16 Sep 2018 05:07) (98% - 100%)    I&O's Detail    15 Sep 2018 07:01  -  16 Sep 2018 07:00  --------------------------------------------------------  IN:    heparin  Infusion.: 45 mL    heparin Infusion: 167 mL    IV PiggyBack: 50 mL    Oral Fluid: 1560 mL  Total IN: 1822 mL    OUT:    Voided: 2025 mL  Total OUT: 2025 mL    Total NET: -203 mL        Physical Exam:  General: NAD  Resp: nonlabored  Extr:  LLE:  WWP  dressing c/d/i  nontender to palpation  incisions healing well     LABS:                                   10.0   6.6   )-----------( 332      ( 16 Sep 2018 08:25 )             30.4       09-16    132<L>  |  95<L>  |  38<H>  ----------------------------<  238<H>  4.3   |  24  |  1.73<H>    Ca    9.7      16 Sep 2018 08:25  Phos  3.9     09-16  Mg     2.1     09-16    PT/INR - ( 16 Sep 2018 08:25 )   PT: 15.4 sec;   INR: 1.40 ratio         PTT - ( 16 Sep 2018 08:25 )  PTT:70.2 sec    MEDICATIONS  (STANDING):  acetaminophen   Tablet .. 650 milliGRAM(s) Oral every 6 hours  aspirin enteric coated 81 milliGRAM(s) Oral daily  atorvastatin 80 milliGRAM(s) Oral at bedtime  buDESOnide 160 MICROgram(s)/formoterol 4.5 MICROgram(s) Inhaler 2 Puff(s) Inhalation two times a day  cyanocobalamin 1000 MICROGram(s) Oral daily  dextrose 5%. 1000 milliLiter(s) (50 mL/Hr) IV Continuous <Continuous>  dextrose 50% Injectable 12.5 Gram(s) IV Push once  dextrose 50% Injectable 25 Gram(s) IV Push once  dextrose 50% Injectable 25 Gram(s) IV Push once  furosemide    Tablet 40 milliGRAM(s) Oral daily  heparin  Infusion. 900 Unit(s)/Hr (9 mL/Hr) IV Continuous <Continuous>  influenza   Vaccine 0.5 milliLiter(s) IntraMuscular once  insulin glargine Injectable (LANTUS) 50 Unit(s) SubCutaneous at bedtime  insulin lispro (HumaLOG) corrective regimen sliding scale   SubCutaneous three times a day before meals  insulin lispro (HumaLOG) corrective regimen sliding scale   SubCutaneous at bedtime  insulin lispro Injectable (HumaLOG) 18 Unit(s) SubCutaneous three times a day before meals  levothyroxine 150 MICROGram(s) Oral daily  losartan 100 milliGRAM(s) Oral daily  metoprolol tartrate 25 milliGRAM(s) Oral two times a day  senna 2 Tablet(s) Oral at bedtime    MEDICATIONS  (PRN):  dextrose 40% Gel 15 Gram(s) Oral once PRN Blood Glucose LESS THAN 70 milliGRAM(s)/deciliter  gabapentin 300 milliGRAM(s) Oral daily PRN nerve pain  glucagon  Injectable 1 milliGRAM(s) IntraMuscular once PRN Glucose LESS THAN 70 milligrams/deciliter  morphine  - Injectable 4 milliGRAM(s) IV Push every 4 hours PRN breakthrough pain  oxyCODONE    IR 5 milliGRAM(s) Oral every 4 hours PRN Moderate Pain (4 - 6)  oxyCODONE    IR 10 milliGRAM(s) Oral every 4 hours PRN Severe Pain (7 - 10)
Vascular Surgery Progress Note     S: Patient resting comfortably on morning rounds. Pain well-controlled currently.  No new complaints. OR today      Vital Signs Last 24 Hrs  T(C): 36.6 (17 Sep 2018 05:34), Max: 37.1 (17 Sep 2018 01:16)  T(F): 97.8 (17 Sep 2018 05:34), Max: 98.7 (17 Sep 2018 01:16)  HR: 88 (17 Sep 2018 05:34) (67 - 88)  BP: 148/76 (17 Sep 2018 05:34) (92/50 - 148/76)  BP(mean): --  RR: 18 (17 Sep 2018 05:34) (18 - 18)  SpO2: 99% (17 Sep 2018 05:34) (96% - 100%)    I&O's Detail    16 Sep 2018 07:01  -  17 Sep 2018 07:00  --------------------------------------------------------  IN:    heparin  Infusion.: 216 mL    Oral Fluid: 1100 mL    sodium chloride 0.9%.: 200 mL  Total IN: 1516 mL    OUT:    Voided: 2100 mL  Total OUT: 2100 mL    Total NET: -584 mL    Physical Exam:  General: NAD  Resp: nonlabored  Vascular: stable  MSK: motor and sensation grossly intact    LABS:                                      10.2   9.8   )-----------( 329      ( 17 Sep 2018 07:09 )             31.3   09-17    129<L>  |  93<L>  |  46<H>  ----------------------------<  322<H>  5.5<H>   |  23  |  1.44<H>    Ca    10.0      17 Sep 2018 07:09  Phos  3.9     09-16  Mg     2.1     09-16    PT/INR - ( 16 Sep 2018 08:25 )   PT: 15.4 sec;   INR: 1.40 ratio         PTT - ( 17 Sep 2018 07:09 )  PTT:64.0 sec    MEDICATIONS  (STANDING):  acetaminophen   Tablet .. 650 milliGRAM(s) Oral every 6 hours  aspirin enteric coated 81 milliGRAM(s) Oral daily  atorvastatin 80 milliGRAM(s) Oral at bedtime  buDESOnide 160 MICROgram(s)/formoterol 4.5 MICROgram(s) Inhaler 2 Puff(s) Inhalation two times a day  cyanocobalamin 1000 MICROGram(s) Oral daily  dextrose 5%. 1000 milliLiter(s) (50 mL/Hr) IV Continuous <Continuous>  dextrose 50% Injectable 12.5 Gram(s) IV Push once  dextrose 50% Injectable 25 Gram(s) IV Push once  dextrose 50% Injectable 25 Gram(s) IV Push once  furosemide    Tablet 40 milliGRAM(s) Oral daily  heparin  Infusion. 900 Unit(s)/Hr (9 mL/Hr) IV Continuous <Continuous>  influenza   Vaccine 0.5 milliLiter(s) IntraMuscular once  insulin glargine Injectable (LANTUS) 40 Unit(s) SubCutaneous at bedtime  insulin lispro (HumaLOG) corrective regimen sliding scale   SubCutaneous every 6 hours  insulin lispro Injectable (HumaLOG) 22 Unit(s) SubCutaneous three times a day before meals  levothyroxine 150 MICROGram(s) Oral daily  losartan 100 milliGRAM(s) Oral daily  metoprolol tartrate 25 milliGRAM(s) Oral two times a day  senna 2 Tablet(s) Oral at bedtime  sodium chloride 0.9%. 1000 milliLiter(s) (25 mL/Hr) IV Continuous <Continuous>    MEDICATIONS  (PRN):  dextrose 40% Gel 15 Gram(s) Oral once PRN Blood Glucose LESS THAN 70 milliGRAM(s)/deciliter  gabapentin 300 milliGRAM(s) Oral daily PRN nerve pain  glucagon  Injectable 1 milliGRAM(s) IntraMuscular once PRN Glucose LESS THAN 70 milligrams/deciliter  morphine  - Injectable 4 milliGRAM(s) IV Push every 4 hours PRN breakthrough pain  oxyCODONE    IR 5 milliGRAM(s) Oral every 4 hours PRN Moderate Pain (4 - 6)  oxyCODONE    IR 10 milliGRAM(s) Oral every 4 hours PRN Severe Pain (7 - 10)
Vascular Surgery Progress Note     S: Patient resting comfortably on morning rounds. Pain well-controlled currently.  Patient readmitted last night.       MEDICATIONS  (STANDING):  acetaminophen   Tablet .. 650 milliGRAM(s) Oral every 6 hours  aspirin enteric coated 81 milliGRAM(s) Oral daily  atorvastatin 80 milliGRAM(s) Oral at bedtime  buDESOnide 160 MICROgram(s)/formoterol 4.5 MICROgram(s) Inhaler 2 Puff(s) Inhalation two times a day  cyanocobalamin 1000 MICROGram(s) Oral daily  furosemide    Tablet 40 milliGRAM(s) Oral daily  heparin  Infusion 1600 Unit(s)/Hr (11 mL/Hr) IV Continuous <Continuous>  influenza   Vaccine 0.5 milliLiter(s) IntraMuscular once  insulin lispro (HumaLOG) corrective regimen sliding scale   SubCutaneous three times a day before meals  insulin lispro (HumaLOG) corrective regimen sliding scale   SubCutaneous at bedtime  levothyroxine 150 MICROGram(s) Oral daily  losartan 100 milliGRAM(s) Oral daily  metoprolol tartrate 25 milliGRAM(s) Oral two times a day  minocycline 100 milliGRAM(s) Oral Once  senna 2 Tablet(s) Oral at bedtime    MEDICATIONS  (PRN):  morphine  - Injectable 4 milliGRAM(s) IV Push every 4 hours PRN breakthrough pain    I&O's Detail    14 Sep 2018 07:01  -  15 Sep 2018 07:00  --------------------------------------------------------  IN:    heparin Infusion: 174 mL    Oral Fluid: 1160 mL  Total IN: 1334 mL    OUT:    Voided: 1225 mL  Total OUT: 1225 mL    Total NET: 109 mL      15 Sep 2018 07:01  -  15 Sep 2018 09:25  --------------------------------------------------------  IN:    heparin Infusion: 11 mL  Total IN: 11 mL    OUT:    Voided: 800 mL  Total OUT: 800 mL    Total NET: -789 mL          Physical Exam:  General: NAD  Resp: nonlabored  Extr:  RLE:  WWP  LLE:  WWP  dressing c/d/i    LABS:                        10.1   7.3   )-----------( 344      ( 15 Sep 2018 05:03 )             31.4     09-15    138  |  97  |  30<H>  ----------------------------<  242<H>  4.3   |  26  |  1.16    Ca    9.7      15 Sep 2018 05:03  Phos  4.0     09-15  Mg     1.7     09-15    TPro  7.8  /  Alb  4.3  /  TBili  0.4  /  DBili  x   /  AST  21  /  ALT  15  /  AlkPhos  70  09-14
Vascular Surgery Progress Note     S: Patient resting comfortably on morning rounds. Pain well-controlled currently.  Returned from PACU last night after aborted aortogram 2/2 hypotension intraop. BP at 92/52 overnight so started on iv fluids @75cc/hr    Vital Signs Last 24 Hrs  T(C): 36.2 (18 Sep 2018 05:26), Max: 37.3 (17 Sep 2018 22:19)  T(F): 97.2 (18 Sep 2018 05:26), Max: 99.1 (17 Sep 2018 22:19)  HR: 94 (18 Sep 2018 05:26) (73 - 95)  BP: 123/65 (18 Sep 2018 05:26) (95/52 - 152/72)  BP(mean): --  RR: 18 (18 Sep 2018 05:26) (16 - 18)  SpO2: 98% (18 Sep 2018 05:26) (96% - 100%)    I&O's Detail    17 Sep 2018 07:01  -  18 Sep 2018 07:00  --------------------------------------------------------  IN:    heparin  Infusion.: 18 mL    Oral Fluid: 740 mL    sodium chloride 0.9%: 50 mL    sodium chloride 0.9%.: 450 mL  Total IN: 1258 mL    OUT:    Voided: 2100 mL  Total OUT: 2100 mL    Total NET: -842 mL    Physical Exam:  General: NAD  Resp: nonlabored  Vascular: stable  MSK: motor and sensation grossly intact, left foot dressing c/d/i    LABS:                                      8.9    9.6   )-----------( 307      ( 18 Sep 2018 07:23 )             27.2   09-18    134<L>  |  98  |  43<H>  ----------------------------<  314<H>  4.1   |  22  |  1.19    Ca    8.9      18 Sep 2018 07:22  Phos  2.3     09-18  Mg     2.2     09-18    PT/INR - ( 18 Sep 2018 07:22 )   PT: 13.6 sec;   INR: 1.24 ratio         PTT - ( 18 Sep 2018 07:22 )  PTT:27.9 sec    MEDICATIONS  (STANDING):  acetaminophen   Tablet .. 650 milliGRAM(s) Oral every 6 hours  aspirin enteric coated 81 milliGRAM(s) Oral daily  atorvastatin 80 milliGRAM(s) Oral at bedtime  buDESOnide 160 MICROgram(s)/formoterol 4.5 MICROgram(s) Inhaler 2 Puff(s) Inhalation two times a day  furosemide    Tablet 40 milliGRAM(s) Oral daily  heparin  Injectable 5000 Unit(s) SubCutaneous every 8 hours  influenza   Vaccine 0.5 milliLiter(s) IntraMuscular once  insulin glargine Injectable (LANTUS) 60 Unit(s) SubCutaneous at bedtime  insulin lispro (HumaLOG) corrective regimen sliding scale   SubCutaneous three times a day before meals  insulin lispro (HumaLOG) corrective regimen sliding scale   SubCutaneous at bedtime  insulin lispro Injectable (HumaLOG) 22 Unit(s) SubCutaneous three times a day before meals  levothyroxine 150 MICROGram(s) Oral daily  losartan 100 milliGRAM(s) Oral daily  metoprolol tartrate 25 milliGRAM(s) Oral two times a day  sodium chloride 0.9%. 1000 milliLiter(s) (75 mL/Hr) IV Continuous <Continuous>    MEDICATIONS  (PRN):  gabapentin 300 milliGRAM(s) Oral daily PRN nerve pain  morphine  - Injectable 4 milliGRAM(s) IV Push every 4 hours PRN breakthrough pain  oxyCODONE    IR 5 milliGRAM(s) Oral every 4 hours PRN Moderate Pain (4 - 6)  oxyCODONE    IR 10 milliGRAM(s) Oral every 4 hours PRN Severe Pain (7 - 10)
Chief complaint  Patient is a 67y old  Male who presents with a chief complaint of left lower extremity pain (19 Sep 2018 07:44)   Review of systems  Patient in bed, looks comfortable, no fever, no hypoglycemia.    Labs and Fingersticks  CAPILLARY BLOOD GLUCOSE      POCT Blood Glucose.: 79 mg/dL (19 Sep 2018 07:47)  POCT Blood Glucose.: 155 mg/dL (18 Sep 2018 21:41)  POCT Blood Glucose.: 218 mg/dL (18 Sep 2018 18:17)  POCT Blood Glucose.: 348 mg/dL (18 Sep 2018 14:39)      Anion Gap, Serum: 13 (09-19 @ 07:55)  Anion Gap, Serum: 14 (09-18 @ 07:22)  Anion Gap, Serum: 16 (09-17 @ 18:53)      Calcium, Total Serum: 9.7 (09-19 @ 07:55)  Calcium, Total Serum: 8.9 (09-18 @ 07:22)  Calcium, Total Serum: 9.3 (09-17 @ 18:53)          09-19    137  |  101  |  37<H>  ----------------------------<  81  3.5   |  23  |  1.29    Ca    9.7      19 Sep 2018 07:55  Phos  2.7     09-19  Mg     2.0     09-19                          10.9   7.9   )-----------( 359      ( 19 Sep 2018 07:55 )             34.1     Medications  MEDICATIONS  (STANDING):  acetaminophen   Tablet .. 650 milliGRAM(s) Oral every 6 hours  aspirin enteric coated 81 milliGRAM(s) Oral daily  atorvastatin 80 milliGRAM(s) Oral at bedtime  buDESOnide 160 MICROgram(s)/formoterol 4.5 MICROgram(s) Inhaler 2 Puff(s) Inhalation two times a day  furosemide    Tablet 40 milliGRAM(s) Oral daily  heparin  Injectable 5000 Unit(s) SubCutaneous every 8 hours  insulin glargine Injectable (LANTUS) 55 Unit(s) SubCutaneous at bedtime  insulin lispro (HumaLOG) corrective regimen sliding scale   SubCutaneous three times a day before meals  insulin lispro (HumaLOG) corrective regimen sliding scale   SubCutaneous at bedtime  insulin lispro Injectable (HumaLOG) 18 Unit(s) SubCutaneous three times a day before meals  levothyroxine 150 MICROGram(s) Oral daily  losartan 100 milliGRAM(s) Oral daily  metoprolol tartrate 25 milliGRAM(s) Oral two times a day      Physical Exam  General: Patient comfortable in bed  Vital Signs Last 12 Hrs  T(F): 98 (09-19-18 @ 11:31), Max: 98.3 (09-19-18 @ 05:54)  HR: 74 (09-19-18 @ 11:31) (72 - 77)  BP: 127/66 (09-19-18 @ 11:31) (127/66 - 132/61)  BP(mean): --  RR: 18 (09-19-18 @ 11:31) (17 - 18)  SpO2: 97% (09-19-18 @ 11:31) (96% - 97%)  Neck: No palpable thyroid nodules.  CVS: S1S2, No murmurs  Respiratory: No wheezing, no crepitations  GI: Abdomen soft, bowel sounds positive  Musculoskeletal:  edema lower extremities.   Skin: No skin rashes, no ecchymosis    Diagnostics

## 2018-09-19 NOTE — DISCHARGE NOTE ADULT - HOSPITAL COURSE
67M with CAD s/p three stents in 2006, CABG x3 in 3/2016, left femoral endarterectomy and fem pop bypass (6/2017), left femoral to peroneal bypass with PTFE (8/7/18) for claudication, now POD 2 s/p left hallux amputation with podiatry presents with incisional pain.  Evidence of clotted bypass on venous duplex. This is a 67M with CAD s/p three stents in 2006, CABG x3 in 3/2016, left femoral endarterectomy and fem pop bypass (6/2017), left femoral to peroneal bypass with PTFE (8/7/18) for claudication, now POD 2 s/p left hallux amputation with podiatry presents with incisional pain.  Evidence of clotted proximal bypass on venous duplex. Patient hypotensive in OR angiogram and aborted 9/17.     Endocrine consulted for hyperglycemia. Insulin adjusted. Home meds reviewed with Dr. Dexter. Pain controlled and patient ambulating with cane (baseline). Discharged home  9/19 with outpatient follow up. This is a 67M with CAD s/p three stents in 2006, CABG x3 in 3/2016, left femoral endarterectomy and fem pop bypass (6/2017), left femoral to peroneal bypass with PTFE (8/7/18) for claudication, now POD 2 s/p left hallux amputation with podiatry presents with incisional pain.  Evidence of clotted proximal bypass on venous duplex. Patient hypotensive in OR angiogram and aborted 9/17.     Endocrine consulted for hyperglycemia. Insulin adjusted. Home meds reviewed with Dr. Dexter. Pain controlled and patient ambulating with cane (baseline). Discharged home 9/19 with outpatient follow up.

## 2018-09-19 NOTE — PROGRESS NOTE ADULT - PROBLEM SELECTOR PLAN 1
Will continue current insulin regimen for now. Will continue monitoring FS, log, will Follow up.  Patient counseled for compliance with consistent low carb diet.
Will increase Lantus to 65 units at bed time.  Will increase Humalog to 24 units before each meal in addition to Humalog correction scale coverage.  Patient counseled for compliance with consistent low carb diet.
Will decrease Lantus to 55 units at bed time.  Will increase Humalog to 18 units before each meal in addition to Humalog correction scale coverage.  May get DC home on current insulin regimen, discussed with patient to adjust dose according to FS numbers and cabs. Discussed with Sx team.  Patient counseled for compliance with consistent low carb diet.

## 2018-09-19 NOTE — PROGRESS NOTE ADULT - REASON FOR ADMISSION
left lower extremity pain

## 2018-09-19 NOTE — DISCHARGE NOTE ADULT - MEDICATION SUMMARY - MEDICATIONS TO TAKE
I will START or STAY ON the medications listed below when I get home from the hospital:    Aspir 81 oral delayed release tablet  -- 1 tab(s) by mouth once a day in Am  -- Indication: For Prevent ischemia    diclofenac sodium 75 mg oral delayed release tablet  -- 1 tab(s) by mouth 3 times a day, As Needed  -- Indication: For Pain    acetaminophen 325 mg oral tablet  -- 2 tab(s) by mouth every 6 hours, As needed, Mild Pain (1 - 3)  -- Indication: For Pain    naproxen 250 mg oral tablet  -- 1 tab(s) by mouth once a day, As Needed  for pain   -- Indication: For Pain    oxyCODONE 5 mg oral tablet  -- 1-2 tab(s) by mouth every 4 hours, As needed, Moderate Pain (4 - 6) MDD:6 tabs  -- Indication: For Pain    losartan 100 mg oral tablet  -- 1 tab(s) by mouth once a day in Am  -- Indication: For Hypertension    Coumadin 5 mg oral tablet  -- 1 tab(s) by mouth once a day   -- Indication: For A fib    gabapentin 300 mg oral capsule  -- 1 cap(s) by mouth once a day, As Needed  -- Indication: For Pain    Lantus Solostar Pen 100 units/mL subcutaneous solution  -- 65 unit(s) subcutaneous once a day (at bedtime)   -- Do not drink alcoholic beverages when taking this medication.  It is very important that you take or use this exactly as directed.  Do not skip doses or discontinue unless directed by your doctor.  Keep in refrigerator.  Do not freeze.    -- Indication: For Diabetes    HumaLOG KwikPen 100 units/mL injectable solution  -- 24 unit(s) injectable 3 times a day (before meals)   -- Indication: For Diabetes    atorvastatin 80 mg oral tablet  -- 1 tab(s) by mouth once a day at night  -- Indication: For Hyperlipidemia    metoprolol tartrate 25 mg oral tablet  -- 1 tab(s) by mouth 2 times a day  -- Indication: For Hypertension    Symbicort 160 mcg-4.5 mcg/inh inhalation aerosol  -- 2 puff(s) inhaled 2 times a day  -- Indication: For Asthma    furosemide 40 mg oral tablet  -- 1 tab(s) by mouth once a day in Am  -- Indication: For CHF    ferrous sulfate 325 mg (65 mg elemental iron) oral tablet  -- 1 tab(s) by mouth once a day  -- Indication: For Anemia    senna oral tablet  -- 2 tab(s) by mouth once a day (at bedtime)  -- Indication: For Constipation    polyethylene glycol 3350 oral powder for reconstitution  -- 17 gram(s) by mouth once a day  -- Indication: For Constipation    Colace 100 mg oral capsule  -- orally once a day, As Needed  -- Indication: For Constipation    Minocin 100 mg oral capsule  -- 1 cap(s) by mouth every 12 hours  -- Indication: For Diabetic foot ulcer    levothyroxine 150 mcg (0.15 mg) oral tablet  -- 1 tab(s) by mouth once a day Am  -- Indication: For Hypothyroidism    Vitamin B-12 1000 mcg oral tablet  -- 1 tab(s) by mouth once a day in Am  -- Indication: For nutrition I will START or STAY ON the medications listed below when I get home from the hospital:    Aspir 81 oral delayed release tablet  -- 1 tab(s) by mouth once a day in Am  -- Indication: For Prevent ischemia    diclofenac sodium 75 mg oral delayed release tablet  -- 1 tab(s) by mouth 3 times a day, As Needed  -- Indication: For Pain    acetaminophen 325 mg oral tablet  -- 2 tab(s) by mouth every 6 hours, As needed, Mild Pain (1 - 3)  -- Indication: For Pain    naproxen 250 mg oral tablet  -- 1 tab(s) by mouth once a day, As Needed  for pain   -- Indication: For Pain    oxyCODONE 5 mg oral tablet  -- 1-2 tab(s) by mouth every 4 hours, As needed, Moderate Pain (4 - 6) MDD:6 tabs  -- Indication: For Pain    losartan 100 mg oral tablet  -- 1 tab(s) by mouth once a day in Am  -- Indication: For Hypertension    gabapentin 300 mg oral capsule  -- 1 cap(s) by mouth once a day, As Needed -for moderate pain   -- Indication: For Pain    NovoLOG FlexPen 100 units/mL injectable solution  -- 18 unit(s) injectable 3 times a day (before meals)   -- Check with your doctor before becoming pregnant.  Do not drink alcoholic beverages when taking this medication.  Keep in refrigerator.  Do not freeze.  Obtain medical advice before taking any non-prescription drugs as some may affect the action of this medication.    -- Indication: For Diabetes    Basaglar KwikPen 100 units/mL subcutaneous solution  -- 55 unit(s) subcutaneous once a day (at bedtime)   -- Do not drink alcoholic beverages when taking this medication.  It is very important that you take or use this exactly as directed.  Do not skip doses or discontinue unless directed by your doctor.  Keep in refrigerator.  Do not freeze.    -- Indication: For Diabetes    atorvastatin 80 mg oral tablet  -- 1 tab(s) by mouth once a day at night  -- Indication: For Hyperlipidemia    metoprolol tartrate 25 mg oral tablet  -- 1 tab(s) by mouth 2 times a day  -- Indication: For Hypertension    Symbicort 160 mcg-4.5 mcg/inh inhalation aerosol  -- 2 puff(s) inhaled 2 times a day  -- Indication: For Asthma    furosemide 40 mg oral tablet  -- 1 tab(s) by mouth once a day in Am  -- Indication: For CHF    ferrous sulfate 325 mg (65 mg elemental iron) oral tablet  -- 1 tab(s) by mouth once a day  -- Indication: For Anemia    senna oral tablet  -- 2 tab(s) by mouth once a day (at bedtime)  -- Indication: For Constipation    polyethylene glycol 3350 oral powder for reconstitution  -- 17 gram(s) by mouth once a day  -- Indication: For Constipation    Colace 100 mg oral capsule  -- orally once a day, As Needed  -- Indication: For Constipation    Minocin 100 mg oral capsule  -- 1 cap(s) by mouth every 12 hours  -- Indication: For Diabetic foot ulcer    levothyroxine 150 mcg (0.15 mg) oral tablet  -- 1 tab(s) by mouth once a day Am  -- Indication: For Hypothyroidism    Vitamin B-12 1000 mcg oral tablet  -- 1 tab(s) by mouth once a day in Am  -- Indication: For nutrition

## 2018-09-19 NOTE — DISCHARGE NOTE ADULT - INSTRUCTIONS
You may resume a regular diet. You may resume a regular diet.  Activity as tolerated.  Gauze and tape to prior toe amputation site.  Complete your Antibiotic course you were prescribed upon discharge 9/14/18 (2 week total course of Minocycline 100mg BID)  Please do not drive until your pain is well controlled, and you do not need to take pain medications. These medications may make you constipated. If so, please take over the counter stool softeners for symptoms.   Please take naproxen and diclofenac with food!

## 2018-09-19 NOTE — DISCHARGE NOTE ADULT - CARE PROVIDERS DIRECT ADDRESSES
,DirectAddress_Unknown,DirectAddress_Unknown,DirectAddress_Unknown ,DirectAddress_Unknown,DirectAddress_Unknown,DirectAddress_Unknown,[email or web address]

## 2018-09-19 NOTE — PROGRESS NOTE ADULT - PROBLEM SELECTOR PLAN 2
Continue current Synthroid dose, will FU

## 2018-09-19 NOTE — DISCHARGE NOTE ADULT - PATIENT PORTAL LINK FT
You can access the [facility] Patient Portal, offered by [facility], by registering with the following website: [email or web address]

## 2018-09-19 NOTE — PROGRESS NOTE ADULT - PROBLEM SELECTOR PLAN 4
On medications, monitored and followed up by primary/cardiology team

## 2018-09-19 NOTE — DISCHARGE NOTE ADULT - CARE PROVIDER_API CALL
Patti Dexter), EndocrinologyMetabDiabetes; Internal Medicine  [address] [place] [address]  Phone: [phone]  Fax: [phone]    Dereck Parnell (MD), Surgery  Vascular  [address] [address]  Phone: [phone]  Fax: [phone]    Roxann Tomlin (NITO), Surgery  Dept Director  [address] [place] [address]  Phone: [phone]  Fax: [phone] Patti Dexter (MD), EndocrinologyMetabDiabetes; Internal Medicine  [address] [place] [address]  Phone: [phone]  Fax: [phone]    Dereck Parnell (MD), Surgery  Vascular  [address] [address]  Phone: [phone]  Fax: [phone]    Roxann Tomlin (DPMARK), Surgery  Dept Director  [address] [place] [address]  Phone: [phone]  Fax: [phone]    René De Leon (DO), Internal Medicine; Nuclear Cardiology  [address] [place] [address]  Phone: [phone]  Fax: [phone]

## 2018-09-19 NOTE — DISCHARGE NOTE ADULT - PROVIDER TOKENS
TOKEN:'[id number]:'[id number]:'[id number]' TOKEN:'[id number]:'[id number]:'[id number]:'[id number]'

## 2018-09-19 NOTE — DISCHARGE NOTE ADULT - APPOINTMENTS. PLEASE FOLLOW UP WITH YOUR DOCTOR WITHIN 3 DAYS OF DISCHARGE TO SCHEDULE YOUR NEXT BLOOD TEST.
Subjective:      Patient ID: Codie Dorsey is a 77 y.o. female.    Chief Complaint: Post-op Evaluation of the Right Ankle (DOS: 7-19-17/Total ankle replacement )    Doing very well today s/p right total ankle replacement. She rates her pain as 4/10 today.   Social History     Occupational History    Not on file.     Social History Main Topics    Smoking status: Former Smoker     Packs/day: 1.00     Types: Cigarettes     Quit date: 1/12/2008    Smokeless tobacco: Never Used    Alcohol use 4.8 oz/week     2 Glasses of wine, 2 Shots of liquor, 4 Standard drinks or equivalent per week      Comment: 1 Drink every day    Drug use: No    Sexual activity: Not on file            Objective:    Ortho Exam     RLE: Neurovascularly intact, incisions well healed, moderate swelling.  Incisions now well healed. Improving range of motion.      XRAYS: 3 views of right ankle obtained and reviewed today reveal  Good alignment, Hardware is intact. Osteotomy fibula healing well.   Assessment:       1. Arthritis of ankle, right          Plan:       Follow total ankle protocol. PT for range of motion and strengthening of the right ankle.  F/u 3 weeks with xray of the right ankle.        Statement Selected

## 2018-09-19 NOTE — DISCHARGE NOTE ADULT - CARE PLAN
Principal Discharge DX:	Atherosclerosis of arteries of extremities  Goal:	Outpatient follow up.  Assessment and plan of treatment:	Pain control.   Please follow up in the office in 1-2 weeks with Dr. Parnell. Contact info below.  Please follow up with your podiatrist in 1-2 weeks -Dr. Gregg. Contact info below.  Secondary Diagnosis:	DM (diabetes mellitus)  Goal:	Outpatient follow up.  Assessment and plan of treatment:	Please follow up with Dr. Dexter in 1-2 weeks. Document your glucose levels three times daily. Principal Discharge DX:	Atherosclerosis of arteries of extremities  Goal:	Outpatient follow up.  Assessment and plan of treatment:	Pain control.   Please follow up in the office in 1-2 weeks with Dr. Parnell. Contact info below.  Please follow up with your podiatrist in 1-2 weeks -Dr. Gregg. Contact info below.  Secondary Diagnosis:	DM (diabetes mellitus)  Goal:	Type II. Outpatient follow up.  Assessment and plan of treatment:	Please follow up with Dr. Dexter in 1-2 weeks. Document your glucose levels three times daily.  Secondary Diagnosis:	CAD (coronary artery disease)  Goal:	Please follow up with Dr. Siu your cardiologist in 2 weeks.

## 2018-09-19 NOTE — PROGRESS NOTE ADULT - ASSESSMENT
Assessment  DMT2: 67y Male with DM T2 with hyperglycemia started on basal bolus insulin, dose was adjusted,, blood sugars trending down, no hypoglycemic episode,  eating meals,  non compliant with low carb diet.  CAD: On medications, stable, monitored.  Hypothyroidism:  On synthroid 150 mcg po daily, asymptomatic.  HTN: Controlled, On med.  CKD: Monitor labs/BMP,   Foot wound S/P amputation toe, waiting for vascular work up. may be DC today to FU with his Vaskarri ROBLES..

## 2018-09-26 ENCOUNTER — APPOINTMENT (OUTPATIENT)
Dept: PODIATRY | Facility: HOSPITAL | Age: 68
End: 2018-09-26

## 2018-09-26 ENCOUNTER — OUTPATIENT (OUTPATIENT)
Dept: OUTPATIENT SERVICES | Facility: HOSPITAL | Age: 68
LOS: 1 days | End: 2018-09-26
Payer: COMMERCIAL

## 2018-09-26 VITALS
DIASTOLIC BLOOD PRESSURE: 73 MMHG | SYSTOLIC BLOOD PRESSURE: 147 MMHG | BODY MASS INDEX: 30.37 KG/M2 | HEIGHT: 66 IN | WEIGHT: 189 LBS | HEART RATE: 73 BPM

## 2018-09-26 DIAGNOSIS — M79.643 PAIN IN UNSPECIFIED HAND: ICD-10-CM

## 2018-09-26 DIAGNOSIS — Z95.1 PRESENCE OF AORTOCORONARY BYPASS GRAFT: Chronic | ICD-10-CM

## 2018-09-26 DIAGNOSIS — E10.9 TYPE 1 DIABETES MELLITUS WITHOUT COMPLICATIONS: ICD-10-CM

## 2018-09-26 DIAGNOSIS — Z95.5 PRESENCE OF CORONARY ANGIOPLASTY IMPLANT AND GRAFT: Chronic | ICD-10-CM

## 2018-09-26 DIAGNOSIS — I70.219 ATHEROSCLEROSIS OF NATIVE ARTERIES OF EXTREMITIES WITH INTERMITTENT CLAUDICATION, UNSPECIFIED EXTREMITY: ICD-10-CM

## 2018-09-26 DIAGNOSIS — Z86.79 PERSONAL HISTORY OF OTHER DISEASES OF THE CIRCULATORY SYSTEM: Chronic | ICD-10-CM

## 2018-09-26 DIAGNOSIS — Z89.421 ACQUIRED ABSENCE OF OTHER RIGHT TOE(S): ICD-10-CM

## 2018-09-26 DIAGNOSIS — Z95.828 PRESENCE OF OTHER VASCULAR IMPLANTS AND GRAFTS: Chronic | ICD-10-CM

## 2018-09-26 PROCEDURE — G0463: CPT

## 2018-10-03 ENCOUNTER — OUTPATIENT (OUTPATIENT)
Dept: OUTPATIENT SERVICES | Facility: HOSPITAL | Age: 68
LOS: 1 days | End: 2018-10-03
Payer: COMMERCIAL

## 2018-10-03 ENCOUNTER — APPOINTMENT (OUTPATIENT)
Dept: PODIATRY | Facility: HOSPITAL | Age: 68
End: 2018-10-03

## 2018-10-03 VITALS
WEIGHT: 189 LBS | SYSTOLIC BLOOD PRESSURE: 145 MMHG | BODY MASS INDEX: 31.49 KG/M2 | DIASTOLIC BLOOD PRESSURE: 62 MMHG | HEART RATE: 70 BPM | HEIGHT: 65 IN

## 2018-10-03 DIAGNOSIS — M79.609 PAIN IN UNSPECIFIED LIMB: ICD-10-CM

## 2018-10-03 DIAGNOSIS — I73.9 PERIPHERAL VASCULAR DISEASE, UNSPECIFIED: ICD-10-CM

## 2018-10-03 DIAGNOSIS — Z95.1 PRESENCE OF AORTOCORONARY BYPASS GRAFT: Chronic | ICD-10-CM

## 2018-10-03 DIAGNOSIS — L97.529 NON-PRESSURE CHRONIC ULCER OF OTHER PART OF LEFT FOOT WITH UNSPECIFIED SEVERITY: ICD-10-CM

## 2018-10-03 DIAGNOSIS — Z86.79 PERSONAL HISTORY OF OTHER DISEASES OF THE CIRCULATORY SYSTEM: Chronic | ICD-10-CM

## 2018-10-03 DIAGNOSIS — Z89.412 ACQUIRED ABSENCE OF LEFT GREAT TOE: ICD-10-CM

## 2018-10-03 DIAGNOSIS — Z95.828 PRESENCE OF OTHER VASCULAR IMPLANTS AND GRAFTS: Chronic | ICD-10-CM

## 2018-10-03 DIAGNOSIS — Z95.5 PRESENCE OF CORONARY ANGIOPLASTY IMPLANT AND GRAFT: Chronic | ICD-10-CM

## 2018-10-03 PROCEDURE — G0463: CPT

## 2018-10-03 RX ORDER — LEVOFLOXACIN 500 MG/1
500 TABLET, FILM COATED ORAL
Refills: 0 | Status: ACTIVE | COMMUNITY

## 2018-10-03 RX ORDER — SENNOSIDES 8.6 MG TABLETS 8.6 MG/1
8.6 TABLET ORAL
Refills: 0 | Status: ACTIVE | COMMUNITY

## 2018-10-03 RX ORDER — INSULIN GLARGINE 100 [IU]/ML
100 INJECTION, SOLUTION SUBCUTANEOUS
Refills: 0 | Status: ACTIVE | COMMUNITY

## 2018-10-10 ENCOUNTER — APPOINTMENT (OUTPATIENT)
Dept: PODIATRY | Facility: HOSPITAL | Age: 68
End: 2018-10-10

## 2018-10-10 ENCOUNTER — INPATIENT (INPATIENT)
Facility: HOSPITAL | Age: 68
LOS: 6 days | DRG: 475 | End: 2018-10-17
Attending: STUDENT IN AN ORGANIZED HEALTH CARE EDUCATION/TRAINING PROGRAM | Admitting: STUDENT IN AN ORGANIZED HEALTH CARE EDUCATION/TRAINING PROGRAM
Payer: COMMERCIAL

## 2018-10-10 ENCOUNTER — OUTPATIENT (OUTPATIENT)
Dept: OUTPATIENT SERVICES | Facility: HOSPITAL | Age: 68
LOS: 1 days | End: 2018-10-10
Payer: COMMERCIAL

## 2018-10-10 VITALS
WEIGHT: 189 LBS | BODY MASS INDEX: 31.49 KG/M2 | DIASTOLIC BLOOD PRESSURE: 70 MMHG | SYSTOLIC BLOOD PRESSURE: 148 MMHG | HEIGHT: 65 IN | HEART RATE: 80 BPM

## 2018-10-10 VITALS
RESPIRATION RATE: 20 BRPM | HEART RATE: 76 BPM | DIASTOLIC BLOOD PRESSURE: 64 MMHG | HEIGHT: 65 IN | WEIGHT: 179.02 LBS | TEMPERATURE: 98 F | SYSTOLIC BLOOD PRESSURE: 132 MMHG

## 2018-10-10 DIAGNOSIS — I10 ESSENTIAL (PRIMARY) HYPERTENSION: ICD-10-CM

## 2018-10-10 DIAGNOSIS — Z86.79 PERSONAL HISTORY OF OTHER DISEASES OF THE CIRCULATORY SYSTEM: Chronic | ICD-10-CM

## 2018-10-10 DIAGNOSIS — I73.9 PERIPHERAL VASCULAR DISEASE, UNSPECIFIED: ICD-10-CM

## 2018-10-10 DIAGNOSIS — E11.9 TYPE 2 DIABETES MELLITUS WITHOUT COMPLICATIONS: ICD-10-CM

## 2018-10-10 DIAGNOSIS — L08.9 LOCAL INFECTION OF THE SKIN AND SUBCUTANEOUS TISSUE, UNSPECIFIED: ICD-10-CM

## 2018-10-10 DIAGNOSIS — Z95.1 PRESENCE OF AORTOCORONARY BYPASS GRAFT: Chronic | ICD-10-CM

## 2018-10-10 DIAGNOSIS — Z95.5 PRESENCE OF CORONARY ANGIOPLASTY IMPLANT AND GRAFT: Chronic | ICD-10-CM

## 2018-10-10 DIAGNOSIS — I99.8 OTHER DISORDER OF CIRCULATORY SYSTEM: ICD-10-CM

## 2018-10-10 DIAGNOSIS — Z95.828 PRESENCE OF OTHER VASCULAR IMPLANTS AND GRAFTS: Chronic | ICD-10-CM

## 2018-10-10 DIAGNOSIS — L97.529 NON-PRESSURE CHRONIC ULCER OF OTHER PART OF LEFT FOOT WITH UNSPECIFIED SEVERITY: ICD-10-CM

## 2018-10-10 DIAGNOSIS — I70.219 ATHEROSCLEROSIS OF NATIVE ARTERIES OF EXTREMITIES WITH INTERMITTENT CLAUDICATION, UNSPECIFIED EXTREMITY: ICD-10-CM

## 2018-10-10 LAB
ALBUMIN SERPL ELPH-MCNC: 4.2 G/DL — SIGNIFICANT CHANGE UP (ref 3.3–5)
ALP SERPL-CCNC: 106 U/L — SIGNIFICANT CHANGE UP (ref 40–120)
ALT FLD-CCNC: 9 U/L — LOW (ref 10–45)
ANION GAP SERPL CALC-SCNC: 16 MMOL/L — SIGNIFICANT CHANGE UP (ref 5–17)
ANION GAP SERPL CALC-SCNC: 19 MMOL/L — HIGH (ref 5–17)
APPEARANCE UR: CLEAR — SIGNIFICANT CHANGE UP
APTT BLD: 31.6 SEC — SIGNIFICANT CHANGE UP (ref 27.5–37.4)
AST SERPL-CCNC: 11 U/L — SIGNIFICANT CHANGE UP (ref 10–40)
BASOPHILS # BLD AUTO: 0 K/UL — SIGNIFICANT CHANGE UP (ref 0–0.2)
BASOPHILS NFR BLD AUTO: 0.3 % — SIGNIFICANT CHANGE UP (ref 0–2)
BILIRUB SERPL-MCNC: 0.3 MG/DL — SIGNIFICANT CHANGE UP (ref 0.2–1.2)
BILIRUB UR-MCNC: NEGATIVE — SIGNIFICANT CHANGE UP
BLD GP AB SCN SERPL QL: NEGATIVE — SIGNIFICANT CHANGE UP
BUN SERPL-MCNC: 69 MG/DL — HIGH (ref 7–23)
BUN SERPL-MCNC: 72 MG/DL — HIGH (ref 7–23)
CALCIUM SERPL-MCNC: 9.2 MG/DL — SIGNIFICANT CHANGE UP (ref 8.4–10.5)
CALCIUM SERPL-MCNC: 9.8 MG/DL — SIGNIFICANT CHANGE UP (ref 8.4–10.5)
CHLORIDE SERPL-SCNC: 89 MMOL/L — LOW (ref 96–108)
CHLORIDE SERPL-SCNC: 93 MMOL/L — LOW (ref 96–108)
CO2 SERPL-SCNC: 19 MMOL/L — LOW (ref 22–31)
CO2 SERPL-SCNC: 22 MMOL/L — SIGNIFICANT CHANGE UP (ref 22–31)
COLOR SPEC: SIGNIFICANT CHANGE UP
CREAT SERPL-MCNC: 1.62 MG/DL — HIGH (ref 0.5–1.3)
CREAT SERPL-MCNC: 1.63 MG/DL — HIGH (ref 0.5–1.3)
DIFF PNL FLD: NEGATIVE — SIGNIFICANT CHANGE UP
EOSINOPHIL # BLD AUTO: 0.3 K/UL — SIGNIFICANT CHANGE UP (ref 0–0.5)
EOSINOPHIL NFR BLD AUTO: 4.2 % — SIGNIFICANT CHANGE UP (ref 0–6)
ERYTHROCYTE [SEDIMENTATION RATE] IN BLOOD: 94 MM/HR — HIGH (ref 0–20)
GAS PNL BLDV: SIGNIFICANT CHANGE UP
GAS PNL BLDV: SIGNIFICANT CHANGE UP
GLUCOSE SERPL-MCNC: 332 MG/DL — HIGH (ref 70–99)
GLUCOSE SERPL-MCNC: 346 MG/DL — HIGH (ref 70–99)
GLUCOSE UR QL: ABNORMAL
HCT VFR BLD CALC: 35.8 % — LOW (ref 39–50)
HGB BLD-MCNC: 11.5 G/DL — LOW (ref 13–17)
INR BLD: 1.06 RATIO — SIGNIFICANT CHANGE UP (ref 0.88–1.16)
KETONES UR-MCNC: NEGATIVE — SIGNIFICANT CHANGE UP
LACTATE SERPL-SCNC: 2.1 MMOL/L — HIGH (ref 0.7–2)
LEUKOCYTE ESTERASE UR-ACNC: NEGATIVE — SIGNIFICANT CHANGE UP
LYMPHOCYTES # BLD AUTO: 1.8 K/UL — SIGNIFICANT CHANGE UP (ref 1–3.3)
LYMPHOCYTES # BLD AUTO: 21.2 % — SIGNIFICANT CHANGE UP (ref 13–44)
MAGNESIUM SERPL-MCNC: 2.2 MG/DL — SIGNIFICANT CHANGE UP (ref 1.6–2.6)
MCHC RBC-ENTMCNC: 26.9 PG — LOW (ref 27–34)
MCHC RBC-ENTMCNC: 32.2 GM/DL — SIGNIFICANT CHANGE UP (ref 32–36)
MCV RBC AUTO: 83.8 FL — SIGNIFICANT CHANGE UP (ref 80–100)
MONOCYTES # BLD AUTO: 0.6 K/UL — SIGNIFICANT CHANGE UP (ref 0–0.9)
MONOCYTES NFR BLD AUTO: 7.2 % — SIGNIFICANT CHANGE UP (ref 2–14)
NEUTROPHILS # BLD AUTO: 5.5 K/UL — SIGNIFICANT CHANGE UP (ref 1.8–7.4)
NEUTROPHILS NFR BLD AUTO: 67 % — SIGNIFICANT CHANGE UP (ref 43–77)
NITRITE UR-MCNC: NEGATIVE — SIGNIFICANT CHANGE UP
PH UR: 6 — SIGNIFICANT CHANGE UP (ref 5–8)
PHOSPHATE SERPL-MCNC: 4.2 MG/DL — SIGNIFICANT CHANGE UP (ref 2.5–4.5)
PLATELET # BLD AUTO: 374 K/UL — SIGNIFICANT CHANGE UP (ref 150–400)
POTASSIUM SERPL-MCNC: 4.4 MMOL/L — SIGNIFICANT CHANGE UP (ref 3.5–5.3)
POTASSIUM SERPL-MCNC: 4.4 MMOL/L — SIGNIFICANT CHANGE UP (ref 3.5–5.3)
POTASSIUM SERPL-SCNC: 4.4 MMOL/L — SIGNIFICANT CHANGE UP (ref 3.5–5.3)
POTASSIUM SERPL-SCNC: 4.4 MMOL/L — SIGNIFICANT CHANGE UP (ref 3.5–5.3)
PROT SERPL-MCNC: 8.6 G/DL — HIGH (ref 6–8.3)
PROT UR-MCNC: NEGATIVE — SIGNIFICANT CHANGE UP
PROTHROM AB SERPL-ACNC: 11.6 SEC — SIGNIFICANT CHANGE UP (ref 9.8–12.7)
RBC # BLD: 4.27 M/UL — SIGNIFICANT CHANGE UP (ref 4.2–5.8)
RBC # FLD: 16.1 % — HIGH (ref 10.3–14.5)
RH IG SCN BLD-IMP: POSITIVE — SIGNIFICANT CHANGE UP
SODIUM SERPL-SCNC: 127 MMOL/L — LOW (ref 135–145)
SODIUM SERPL-SCNC: 131 MMOL/L — LOW (ref 135–145)
SP GR SPEC: 1.01 — SIGNIFICANT CHANGE UP (ref 1.01–1.02)
UROBILINOGEN FLD QL: NEGATIVE — SIGNIFICANT CHANGE UP
WBC # BLD: 8.3 K/UL — SIGNIFICANT CHANGE UP (ref 3.8–10.5)
WBC # FLD AUTO: 8.3 K/UL — SIGNIFICANT CHANGE UP (ref 3.8–10.5)

## 2018-10-10 PROCEDURE — 93010 ELECTROCARDIOGRAM REPORT: CPT

## 2018-10-10 PROCEDURE — 71045 X-RAY EXAM CHEST 1 VIEW: CPT | Mod: 26

## 2018-10-10 PROCEDURE — 99285 EMERGENCY DEPT VISIT HI MDM: CPT | Mod: 25

## 2018-10-10 PROCEDURE — G0463: CPT

## 2018-10-10 PROCEDURE — 99223 1ST HOSP IP/OBS HIGH 75: CPT | Mod: 24

## 2018-10-10 PROCEDURE — 73630 X-RAY EXAM OF FOOT: CPT | Mod: 26,LT

## 2018-10-10 RX ORDER — MORPHINE SULFATE 50 MG/1
4 CAPSULE, EXTENDED RELEASE ORAL ONCE
Qty: 0 | Refills: 0 | Status: DISCONTINUED | OUTPATIENT
Start: 2018-10-10 | End: 2018-10-10

## 2018-10-10 RX ORDER — DEXTROSE 50 % IN WATER 50 %
25 SYRINGE (ML) INTRAVENOUS ONCE
Qty: 0 | Refills: 0 | Status: DISCONTINUED | OUTPATIENT
Start: 2018-10-10 | End: 2018-10-16

## 2018-10-10 RX ORDER — INSULIN LISPRO 100/ML
VIAL (ML) SUBCUTANEOUS
Qty: 0 | Refills: 0 | Status: DISCONTINUED | OUTPATIENT
Start: 2018-10-10 | End: 2018-10-15

## 2018-10-10 RX ORDER — SODIUM CHLORIDE 9 MG/ML
1000 INJECTION, SOLUTION INTRAVENOUS ONCE
Qty: 0 | Refills: 0 | Status: DISCONTINUED | OUTPATIENT
Start: 2018-10-10 | End: 2018-10-10

## 2018-10-10 RX ORDER — SODIUM CHLORIDE 9 MG/ML
1000 INJECTION INTRAMUSCULAR; INTRAVENOUS; SUBCUTANEOUS
Qty: 0 | Refills: 0 | Status: DISCONTINUED | OUTPATIENT
Start: 2018-10-10 | End: 2018-10-11

## 2018-10-10 RX ORDER — INSULIN LISPRO 100/ML
18 VIAL (ML) SUBCUTANEOUS
Qty: 0 | Refills: 0 | Status: DISCONTINUED | OUTPATIENT
Start: 2018-10-10 | End: 2018-10-11

## 2018-10-10 RX ORDER — SODIUM CHLORIDE 9 MG/ML
1000 INJECTION INTRAMUSCULAR; INTRAVENOUS; SUBCUTANEOUS ONCE
Qty: 0 | Refills: 0 | Status: COMPLETED | OUTPATIENT
Start: 2018-10-10 | End: 2018-10-10

## 2018-10-10 RX ORDER — VANCOMYCIN HCL 1 G
1000 VIAL (EA) INTRAVENOUS ONCE
Qty: 0 | Refills: 0 | Status: COMPLETED | OUTPATIENT
Start: 2018-10-10 | End: 2018-10-10

## 2018-10-10 RX ORDER — INSULIN GLARGINE 100 [IU]/ML
55 INJECTION, SOLUTION SUBCUTANEOUS AT BEDTIME
Qty: 0 | Refills: 0 | Status: DISCONTINUED | OUTPATIENT
Start: 2018-10-10 | End: 2018-10-16

## 2018-10-10 RX ORDER — ACETAMINOPHEN 500 MG
1000 TABLET ORAL ONCE
Qty: 0 | Refills: 0 | Status: COMPLETED | OUTPATIENT
Start: 2018-10-10 | End: 2018-10-10

## 2018-10-10 RX ORDER — LEVOTHYROXINE SODIUM 125 MCG
150 TABLET ORAL DAILY
Qty: 0 | Refills: 0 | Status: DISCONTINUED | OUTPATIENT
Start: 2018-10-10 | End: 2018-10-16

## 2018-10-10 RX ORDER — GLUCAGON INJECTION, SOLUTION 0.5 MG/.1ML
1 INJECTION, SOLUTION SUBCUTANEOUS ONCE
Qty: 0 | Refills: 0 | Status: DISCONTINUED | OUTPATIENT
Start: 2018-10-10 | End: 2018-10-16

## 2018-10-10 RX ORDER — GABAPENTIN 400 MG/1
300 CAPSULE ORAL DAILY
Qty: 0 | Refills: 0 | Status: DISCONTINUED | OUTPATIENT
Start: 2018-10-10 | End: 2018-10-16

## 2018-10-10 RX ORDER — INSULIN LISPRO 100/ML
VIAL (ML) SUBCUTANEOUS
Qty: 0 | Refills: 0 | Status: DISCONTINUED | OUTPATIENT
Start: 2018-10-10 | End: 2018-10-10

## 2018-10-10 RX ORDER — HYDROMORPHONE HYDROCHLORIDE 2 MG/ML
0.5 INJECTION INTRAMUSCULAR; INTRAVENOUS; SUBCUTANEOUS ONCE
Qty: 0 | Refills: 0 | Status: DISCONTINUED | OUTPATIENT
Start: 2018-10-10 | End: 2018-10-10

## 2018-10-10 RX ORDER — DEXTROSE 50 % IN WATER 50 %
12.5 SYRINGE (ML) INTRAVENOUS ONCE
Qty: 0 | Refills: 0 | Status: DISCONTINUED | OUTPATIENT
Start: 2018-10-10 | End: 2018-10-16

## 2018-10-10 RX ORDER — HEPARIN SODIUM 5000 [USP'U]/ML
5000 INJECTION INTRAVENOUS; SUBCUTANEOUS EVERY 8 HOURS
Qty: 0 | Refills: 0 | Status: DISCONTINUED | OUTPATIENT
Start: 2018-10-10 | End: 2018-10-16

## 2018-10-10 RX ORDER — OXYCODONE HYDROCHLORIDE 5 MG/1
5 TABLET ORAL EVERY 4 HOURS
Qty: 0 | Refills: 0 | Status: DISCONTINUED | OUTPATIENT
Start: 2018-10-10 | End: 2018-10-11

## 2018-10-10 RX ORDER — DEXTROSE 50 % IN WATER 50 %
15 SYRINGE (ML) INTRAVENOUS ONCE
Qty: 0 | Refills: 0 | Status: DISCONTINUED | OUTPATIENT
Start: 2018-10-10 | End: 2018-10-16

## 2018-10-10 RX ORDER — ISOPROPYL ALCOHOL 0.75 G/1
SWAB TOPICAL
Qty: 200 | Refills: 0 | Status: ACTIVE | COMMUNITY
Start: 2018-08-01

## 2018-10-10 RX ORDER — SODIUM CHLORIDE 9 MG/ML
1000 INJECTION, SOLUTION INTRAVENOUS
Qty: 0 | Refills: 0 | Status: DISCONTINUED | OUTPATIENT
Start: 2018-10-10 | End: 2018-10-16

## 2018-10-10 RX ORDER — ATORVASTATIN CALCIUM 80 MG/1
80 TABLET, FILM COATED ORAL AT BEDTIME
Qty: 0 | Refills: 0 | Status: DISCONTINUED | OUTPATIENT
Start: 2018-10-10 | End: 2018-10-16

## 2018-10-10 RX ORDER — PIPERACILLIN AND TAZOBACTAM 4; .5 G/20ML; G/20ML
3.38 INJECTION, POWDER, LYOPHILIZED, FOR SOLUTION INTRAVENOUS ONCE
Qty: 0 | Refills: 0 | Status: COMPLETED | OUTPATIENT
Start: 2018-10-10 | End: 2018-10-10

## 2018-10-10 RX ORDER — ASPIRIN/CALCIUM CARB/MAGNESIUM 324 MG
81 TABLET ORAL DAILY
Qty: 0 | Refills: 0 | Status: DISCONTINUED | OUTPATIENT
Start: 2018-10-10 | End: 2018-10-16

## 2018-10-10 RX ORDER — METOPROLOL TARTRATE 50 MG
25 TABLET ORAL
Qty: 0 | Refills: 0 | Status: DISCONTINUED | OUTPATIENT
Start: 2018-10-10 | End: 2018-10-16

## 2018-10-10 RX ORDER — BUDESONIDE AND FORMOTEROL FUMARATE DIHYDRATE 160; 4.5 UG/1; UG/1
2 AEROSOL RESPIRATORY (INHALATION)
Qty: 0 | Refills: 0 | Status: DISCONTINUED | OUTPATIENT
Start: 2018-10-10 | End: 2018-10-16

## 2018-10-10 RX ADMIN — HEPARIN SODIUM 5000 UNIT(S): 5000 INJECTION INTRAVENOUS; SUBCUTANEOUS at 21:58

## 2018-10-10 RX ADMIN — PIPERACILLIN AND TAZOBACTAM 200 GRAM(S): 4; .5 INJECTION, POWDER, LYOPHILIZED, FOR SOLUTION INTRAVENOUS at 15:45

## 2018-10-10 RX ADMIN — GABAPENTIN 300 MILLIGRAM(S): 400 CAPSULE ORAL at 21:58

## 2018-10-10 RX ADMIN — Medication 5: at 20:20

## 2018-10-10 RX ADMIN — OXYCODONE HYDROCHLORIDE 5 MILLIGRAM(S): 5 TABLET ORAL at 21:58

## 2018-10-10 RX ADMIN — HYDROMORPHONE HYDROCHLORIDE 0.5 MILLIGRAM(S): 2 INJECTION INTRAMUSCULAR; INTRAVENOUS; SUBCUTANEOUS at 23:05

## 2018-10-10 RX ADMIN — INSULIN GLARGINE 55 UNIT(S): 100 INJECTION, SOLUTION SUBCUTANEOUS at 21:58

## 2018-10-10 RX ADMIN — OXYCODONE HYDROCHLORIDE 5 MILLIGRAM(S): 5 TABLET ORAL at 22:28

## 2018-10-10 RX ADMIN — HYDROMORPHONE HYDROCHLORIDE 0.5 MILLIGRAM(S): 2 INJECTION INTRAMUSCULAR; INTRAVENOUS; SUBCUTANEOUS at 22:50

## 2018-10-10 RX ADMIN — SODIUM CHLORIDE 2000 MILLILITER(S): 9 INJECTION INTRAMUSCULAR; INTRAVENOUS; SUBCUTANEOUS at 19:43

## 2018-10-10 RX ADMIN — Medication 81 MILLIGRAM(S): at 18:24

## 2018-10-10 RX ADMIN — Medication 25 MILLIGRAM(S): at 21:58

## 2018-10-10 RX ADMIN — Medication 250 MILLIGRAM(S): at 16:20

## 2018-10-10 RX ADMIN — Medication 400 MILLIGRAM(S): at 15:32

## 2018-10-10 RX ADMIN — MORPHINE SULFATE 4 MILLIGRAM(S): 50 CAPSULE, EXTENDED RELEASE ORAL at 15:32

## 2018-10-10 RX ADMIN — BUDESONIDE AND FORMOTEROL FUMARATE DIHYDRATE 2 PUFF(S): 160; 4.5 AEROSOL RESPIRATORY (INHALATION) at 21:58

## 2018-10-10 RX ADMIN — Medication 1000 MILLIGRAM(S): at 15:32

## 2018-10-10 RX ADMIN — Medication 4: at 21:58

## 2018-10-10 RX ADMIN — ATORVASTATIN CALCIUM 80 MILLIGRAM(S): 80 TABLET, FILM COATED ORAL at 21:58

## 2018-10-10 NOTE — ED PROVIDER NOTE - OBJECTIVE STATEMENT
68M with ho PVD sp recent fem-pop bypass LLE c/b thrombosis of graft, great toe amp, CAD, HTN, DM presenting with worsening left foot pain. Pain has been present since discharge from vascular by worsening. No new numbness/tingling or weakness of the extremity. Patient was seen in podiatry clinic and referred to ED for vascular evaluation. Patient thinks he may be on an antibiotic, maybe minocycline.

## 2018-10-10 NOTE — CONSULT NOTE ADULT - SUBJECTIVE AND OBJECTIVE BOX
Podiatry pager #: [phone]/ [phone]    Patient is a 68y old  Male who presents with a chief complaint of  ischemic foot pain with wound dehiscence s/p P1RR.    HPI: 68M with ho PVD sp recent fem-pop bypass LLE c/b thrombosis of graft, great toe amp, CAD, HTN, DM presenting with worsening left foot pain. Pain has been present since discharge from vascular by worsening. No new numbness/tingling or weakness of the extremity. Patient was seen in podiatry clinic and referred to ED for vascular evaluation. Patient thinks he may be on an antibiotic, maybe minocycline.      PAST MEDICAL & SURGICAL HISTORY:  COPD (chronic obstructive pulmonary disease)  Sleep apnea: non-compliant  Anemia  Atherosclerosis of arteries of extremities: left leg  Mild intermittent asthma without complication  Iron deficiency anemia, unspecified iron deficiency anemia type  Prostate cancer  Bronchospasm  Obesity (BMI 30-39.9)  PAD (peripheral artery disease): RLE bypass 11/30/15  CAD (coronary artery disease): 3 cardiac stents placed in 2006 (mid LAD, Prox LAD, Prox to #2), 3V CABG 3/28/16  Hypothyroid  Intermittent claudication  Diabetic neuropathy  DM (diabetes mellitus): type 2 - on insulin pump  CHF (congestive heart failure)  Hypercholesteremia  HTN (hypertension)  History of femoral angiogram: Left femoral endarterectomy Fem-Pop bypass 6/2017  S/P bypass graft of extremity: RLE- 11/30/15  S/P CABG x 3: 3/28/16  Stented coronary artery: x 3 cardiac stents placed in 2006  S/P prostatectomy: 1996      MEDICATIONS  (STANDING):    MEDICATIONS  (PRN):      Allergies    contrast media (iodine-based) (Hypotension)    Intolerances        VITALS:    Vital Signs Last 24 Hrs  T(C): 36.8 (10 Oct 2018 14:18), Max: 36.8 (10 Oct 2018 14:18)  T(F): 98.3 (10 Oct 2018 14:18), Max: 98.3 (10 Oct 2018 14:18)  HR: 76 (10 Oct 2018 14:18) (76 - 76)  BP: 132/64 (10 Oct 2018 14:18) (132/64 - 132/64)  BP(mean): --  RR: 20 (10 Oct 2018 14:18) (20 - 20)  SpO2: --    LABS:                          11.5   8.3   )-----------( 374      ( 10 Oct 2018 15:30 )             35.8               CAPILLARY BLOOD GLUCOSE              LOWER EXTREMITY PHYSICAL EXAM:    Vasular: DP/PT 0/4, B/L, CFT delayed to all toes. Temperature gradient : Left foot cold to touch. LLE dependent rubor.    Neuro: Epicritic sensation diminished  Musculoskeletal/Ortho: s/p LF P1RR  Skin:  Wound #1:   Location:  s/p P1RR   Size: linear surgical wound approx 4 cm   Depth: probes to bone  Wound bed: fibrous with necrotic skin edges   Drainage: serosanginous  Odor: no malodor   Periwound: mild erythema   Etiology: surgical/ ischemic     RADIOLOGY & ADDITIONAL STUDIES:  X-Rays pending.

## 2018-10-10 NOTE — ED PROVIDER NOTE - MEDICAL DECISION MAKING DETAILS
Attending MD Barba: 68M with PVD sp bypass LLE c/b thrombosis of fem-pop graft, recent great toe amputation presenting as referral  from podiatry clinic for worsening pain of left foot, left foot is warm and grossly NV intact but rest pain a concern for worsening acute on chronic limb ischemia. Plan for vascular surgery consult, podiatry consult, IV abx, XR to eval osteo, admission

## 2018-10-10 NOTE — PROGRESS NOTE ADULT - SUBJECTIVE AND OBJECTIVE BOX
Transfer note:     Patient seen and evaluated on floor once transferred from emergency department. The patient reports that he was having pain in his L LE however is improved after IV pain medications. He denies any chest pain, SOB, fevers/chills. Patient was sleeping comfortably when entering room.     Physical Exam:   NAD, awake and alert  No jaundice or scleral icterus  Bilateral femoral pulses   Respirations nonlabored  Abdomen  nondistended  Musculoskeletal: L LE with kerliz dressing over s/p first digit amputation; there is a approximately 3cm X 1cm wound with pink skin edges to dorsum of foot over first metatarsal. There is no active bleeding or drainage.       Plan:     > DVT ppx   > IV fluid  > Trend lactate   > Repeat BMP  > Plan for possible AKA to L LE   > Pain control  > Monitor glucose

## 2018-10-10 NOTE — H&P ADULT - ASSESSMENT
67M with history of left femoral endarterectomy and fem pop bypass (6/2017) and subsequent left femoral to peroneal bypass with PTFE (8/7/18) for claudication with known occlusion of bypass now with increased LLE pain.      - Admit to vascular surgery (Soheila)  - Plan to repeat MEENA/PVR to assess for possibility of BKA for LLE  - Regular diet   - IVF resuscitation with NS bolus followed by NS @100 cc/hr for hyperglycemia, hyponatremia and JOSE LUIS with elevated lactate. Will repeat labs at 7pm  - Continue home insulin regimen, ISS and possible endocrine consult if hyperglycemia persists  - Continue home medications, ASA  - Hold nephrotoxic medications (losartan, lasix), renal consult   - Plan for medical and cardiology consults for risk assessment and optimization for eventual amputation    Discussed with Dr. Parnell    [place] [phone]

## 2018-10-10 NOTE — ED ADULT NURSE NOTE - OBJECTIVE STATEMENT
Patient presents to Ed with hx of diabetic neuropathies with recent Lt great toe amputation 3 weeks ago here c/o   pain to Lt toe. Patient A&Ox3, denies chest pain or sob, no nausea vomiting fever chills headache or dizziness. Patient presents to Ed with hx of diabetic neuropathies with recent Lt great toe amputation 3 weeks ago here c/o   pain to Lt toe. Patient A&Ox3, denies chest pain or sob, no nausea vomiting fever chills headache or dizziness.  RAC iv lock placed, labs drawn and sent.

## 2018-10-10 NOTE — ED PROVIDER NOTE - CARE PLAN
Principal Discharge DX:	Ischemia of foot  Secondary Diagnosis:	Acute kidney injury  Secondary Diagnosis:	Hyponatremia

## 2018-10-10 NOTE — ED ADULT NURSE REASSESSMENT NOTE - NS ED NURSE REASSESS COMMENT FT1
blood glucose measured 362, insulin administered as per MD order, cosigned with a second ED RN. PT transported to floor

## 2018-10-10 NOTE — H&P ADULT - ATTENDING COMMENTS
severe rest pain.  I advised pt that he is not a candidate for further surgical revascularization, and I recommended amputation.  He has not yet decided.  Will obtain deejay/pvr to help determine level of amputation if pt is agreeable

## 2018-10-10 NOTE — ED PROVIDER NOTE - CONSTITUTIONAL, MLM
normal... Well appearing, uncomfortable appearing, well nourished, awake, alert, oriented to person, place, time/situation

## 2018-10-10 NOTE — CONSULT NOTE ADULT - ASSESSMENT
68 year old male  s/p LLE fem pop bypass and LF P1RR w/ wound dehiscence and ischemic limb pain.   - Pt seen and eval  - Pt is afebrile, no leukocytosis .   - X-rays pending  - Vasc work up needed to evaluate bypass- concerned for ischemic limb  -  Broad spec IV abx  - Pain meds PRN  - Pod plan pending vascular work up   - local wound care for now  - Will monitor  - d/w attending.

## 2018-10-10 NOTE — ED PROVIDER NOTE - PHYSICAL EXAMINATION
LLE: warm, grossly motor and sensory intact, amputation site great toe with slight dehiscence, very faint erythema surrounding, no crepitus, no palpable pulses b/l feet but warm to the touch with normal coloration

## 2018-10-10 NOTE — H&P ADULT - HISTORY OF PRESENT ILLNESS
GENERAL SURGERY CONSULT NOTE  Attending: Soheila	  Service: Vascular surgery  Contact: [phone]    HPI  67M with CAD s/p three stents in , CABG x3 in 3/2016, left femoral endarterectomy and fem pop bypass (2017), left femoral to peroneal bypass with PTFE (18) for claudication, s/p left hallux amputation with podiatry with recent admission for LLE pain found to have occluded left fem to peroneal bypass.  Patient had undergone a diagnostic angiogram but it had to be aborted due to an anaphylactic reaction to IV contrast.  Patient was counseled that further revascularization would likely not be an option as the patient did not have good blood flow below the knee.  He presents today with increased LLE pain.  He has not seen Dr. Parnell recently.  His left hallux amputation has dehisced for which he has followed with Dr. Gregg.  He denies any associated symptoms such as paresthesias, numbness in the LLE.  No fevers, chills, SOB, chest pain, N/V/D.      In the ER he was hemodynamically stable and afebrile.  His labs revealed a normal WBC of 8.3, hyponatremia to 127 and hyperglycemia to 347.  He also has a new JOSE LUIS with a creatinine of 1.63.  His lactate was elevated at 3.1.  On exam he had bilateral palpable femoral pulses, signals in the RLE (DP/PT).  On the left he did not have any distal signals in the PT/DP/ or in the bypass.  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  The left foot is warm to touch and he does not report decreased sensation.        PMH/PSH  COPD (chronic obstructive pulmonary disease)  Sleep apnea  Anemia  Atherosclerosis of arteries of extremities  Mild intermittent asthma without complication  Iron deficiency anemia, unspecified iron deficiency anemia type  Prostate cancer  Bronchospasm  Obesity (BMI 30-39.9)  PAD (peripheral artery disease)  CAD (coronary artery disease)  S/P prostatectomy  Hypothyroid  Intermittent claudication  Diabetic neuropathy  DM (diabetes mellitus)  CHF (congestive heart failure)  Hypercholesteremia  HTN (hypertension)    History of femoral angiogram  S/P bypass graft of extremity  S/P CABG x 3  Stented coronary artery  S/P prostatectomy      MEDICATIONS  aspirin enteric coated 81 milliGRAM(s) Oral daily  atorvastatin 80 milliGRAM(s) Oral at bedtime  buDESOnide 160 MICROgram(s)/formoterol 4.5 MICROgram(s) Inhaler 2 Puff(s) Inhalation two times a day  dextrose 40% Gel 15 Gram(s) Oral once PRN  dextrose 5%. 1000 milliLiter(s) IV Continuous <Continuous>  dextrose 50% Injectable 12.5 Gram(s) IV Push once  dextrose 50% Injectable 25 Gram(s) IV Push once  dextrose 50% Injectable 25 Gram(s) IV Push once  gabapentin 300 milliGRAM(s) Oral daily PRN  glucagon  Injectable 1 milliGRAM(s) IntraMuscular once PRN  heparin  Injectable 5000 Unit(s) SubCutaneous every 8 hours  insulin glargine Injectable (LANTUS) 55 Unit(s) SubCutaneous at bedtime  insulin lispro (HumaLOG) corrective regimen sliding scale   SubCutaneous three times a day before meals  insulin lispro Injectable (HumaLOG) 18 Unit(s) SubCutaneous three times a day before meals  levothyroxine 150 MICROGram(s) Oral daily  metoprolol tartrate 25 milliGRAM(s) Oral two times a day  oxyCODONE    IR 5 milliGRAM(s) Oral every 4 hours PRN  sodium chloride 0.9% Bolus 1000 milliLiter(s) IV Bolus once  sodium chloride 0.9%. 1000 milliLiter(s) IV Continuous <Continuous>      Allergies    contrast media (iodine-based) (Hypotension)    Intolerances        Social    Physical Exam  T(C): 36.7 (10-10-18 @ 15:32), Max: 36.8 (10-10-18 @ 14:18)  HR: 71 (10-10-18 @ 15:32) (71 - 76)  BP: 160/75 (10-10-18 @ 15:32) (132/64 - 160/75)  RR: 18 (10-10-18 @ 15:32) (18 - 20)  SpO2: 100% (10-10-18 @ 15:32) (100% - 100%)  Wt(kg): --  Tmax: T(C): , Max: 36.8 (10-10-18 @ 14:18)  Wt(kg): --      Physical exam:  See HPI       LABS                        11.5   8.3   )-----------( 374      ( 10 Oct 2018 15:30 )             35.8     10-10    127<L>  |  89<L>  |  72<H>  ----------------------------<  346<H>  4.4   |  19<L>  |  1.63<H>    Ca    9.8      10 Oct 2018 15:30    TPro  8.6<H>  /  Alb  4.2  /  TBili  0.3  /  DBili  x   /  AST  11  /  ALT  9<L>  /  AlkPhos  106  10-10    PT/INR - ( 10 Oct 2018 15:30 )   PT: 11.6 sec;   INR: 1.06 ratio         PTT - ( 10 Oct 2018 15:30 )  PTT:31.6 sec  Urinalysis Basic - ( 10 Oct 2018 16:59 )    Color: Light Yellow / Appearance: Clear / S.012 / pH: x  Gluc: x / Ketone: Negative  / Bili: Negative / Urobili: Negative   Blood: x / Protein: Negative / Nitrite: Negative   Leuk Esterase: Negative / RBC: x / WBC x   Sq Epi: x / Non Sq Epi: x / Bacteria: x

## 2018-10-11 LAB
ANION GAP SERPL CALC-SCNC: 14 MMOL/L — SIGNIFICANT CHANGE UP (ref 5–17)
ANION GAP SERPL CALC-SCNC: 15 MMOL/L — SIGNIFICANT CHANGE UP (ref 5–17)
BUN SERPL-MCNC: 54 MG/DL — HIGH (ref 7–23)
BUN SERPL-MCNC: 62 MG/DL — HIGH (ref 7–23)
CALCIUM SERPL-MCNC: 9.1 MG/DL — SIGNIFICANT CHANGE UP (ref 8.4–10.5)
CALCIUM SERPL-MCNC: 9.6 MG/DL — SIGNIFICANT CHANGE UP (ref 8.4–10.5)
CHLORIDE SERPL-SCNC: 96 MMOL/L — SIGNIFICANT CHANGE UP (ref 96–108)
CHLORIDE SERPL-SCNC: 99 MMOL/L — SIGNIFICANT CHANGE UP (ref 96–108)
CO2 SERPL-SCNC: 21 MMOL/L — LOW (ref 22–31)
CO2 SERPL-SCNC: 22 MMOL/L — SIGNIFICANT CHANGE UP (ref 22–31)
CREAT SERPL-MCNC: 1.32 MG/DL — HIGH (ref 0.5–1.3)
CREAT SERPL-MCNC: 1.43 MG/DL — HIGH (ref 0.5–1.3)
GLUCOSE SERPL-MCNC: 102 MG/DL — HIGH (ref 70–99)
GLUCOSE SERPL-MCNC: 190 MG/DL — HIGH (ref 70–99)
HBA1C BLD-MCNC: 8 % — HIGH (ref 4–5.6)
HCT VFR BLD CALC: 33.5 % — LOW (ref 39–50)
HGB BLD-MCNC: 10.7 G/DL — LOW (ref 13–17)
LACTATE SERPL-SCNC: 1.5 MMOL/L — SIGNIFICANT CHANGE UP (ref 0.7–2)
MAGNESIUM SERPL-MCNC: 2.2 MG/DL — SIGNIFICANT CHANGE UP (ref 1.6–2.6)
MCHC RBC-ENTMCNC: 27 PG — SIGNIFICANT CHANGE UP (ref 27–34)
MCHC RBC-ENTMCNC: 31.9 GM/DL — LOW (ref 32–36)
MCV RBC AUTO: 84.4 FL — SIGNIFICANT CHANGE UP (ref 80–100)
PHOSPHATE SERPL-MCNC: 3.6 MG/DL — SIGNIFICANT CHANGE UP (ref 2.5–4.5)
PLATELET # BLD AUTO: 379 K/UL — SIGNIFICANT CHANGE UP (ref 150–400)
POTASSIUM SERPL-MCNC: 3.7 MMOL/L — SIGNIFICANT CHANGE UP (ref 3.5–5.3)
POTASSIUM SERPL-MCNC: 3.9 MMOL/L — SIGNIFICANT CHANGE UP (ref 3.5–5.3)
POTASSIUM SERPL-SCNC: 3.7 MMOL/L — SIGNIFICANT CHANGE UP (ref 3.5–5.3)
POTASSIUM SERPL-SCNC: 3.9 MMOL/L — SIGNIFICANT CHANGE UP (ref 3.5–5.3)
RBC # BLD: 3.98 M/UL — LOW (ref 4.2–5.8)
RBC # FLD: 15.5 % — HIGH (ref 10.3–14.5)
SODIUM SERPL-SCNC: 132 MMOL/L — LOW (ref 135–145)
SODIUM SERPL-SCNC: 135 MMOL/L — SIGNIFICANT CHANGE UP (ref 135–145)
WBC # BLD: 6 K/UL — SIGNIFICANT CHANGE UP (ref 3.8–10.5)
WBC # FLD AUTO: 6 K/UL — SIGNIFICANT CHANGE UP (ref 3.8–10.5)

## 2018-10-11 PROCEDURE — 93923 UPR/LXTR ART STDY 3+ LVLS: CPT | Mod: 26

## 2018-10-11 RX ORDER — HYDROMORPHONE HYDROCHLORIDE 2 MG/ML
0.5 INJECTION INTRAMUSCULAR; INTRAVENOUS; SUBCUTANEOUS ONCE
Qty: 0 | Refills: 0 | Status: DISCONTINUED | OUTPATIENT
Start: 2018-10-11 | End: 2018-10-11

## 2018-10-11 RX ORDER — INSULIN LISPRO 100/ML
14 VIAL (ML) SUBCUTANEOUS
Qty: 0 | Refills: 0 | Status: DISCONTINUED | OUTPATIENT
Start: 2018-10-11 | End: 2018-10-13

## 2018-10-11 RX ORDER — OXYCODONE HYDROCHLORIDE 5 MG/1
10 TABLET ORAL EVERY 4 HOURS
Qty: 0 | Refills: 0 | Status: DISCONTINUED | OUTPATIENT
Start: 2018-10-11 | End: 2018-10-11

## 2018-10-11 RX ORDER — SODIUM CHLORIDE 9 MG/ML
1000 INJECTION INTRAMUSCULAR; INTRAVENOUS; SUBCUTANEOUS
Qty: 0 | Refills: 0 | Status: DISCONTINUED | OUTPATIENT
Start: 2018-10-11 | End: 2018-10-13

## 2018-10-11 RX ORDER — OXYCODONE AND ACETAMINOPHEN 5; 325 MG/1; MG/1
1 TABLET ORAL EVERY 4 HOURS
Qty: 0 | Refills: 0 | Status: DISCONTINUED | OUTPATIENT
Start: 2018-10-11 | End: 2018-10-11

## 2018-10-11 RX ORDER — OXYCODONE AND ACETAMINOPHEN 5; 325 MG/1; MG/1
1 TABLET ORAL ONCE
Qty: 0 | Refills: 0 | Status: DISCONTINUED | OUTPATIENT
Start: 2018-10-11 | End: 2018-10-11

## 2018-10-11 RX ORDER — OXYCODONE AND ACETAMINOPHEN 5; 325 MG/1; MG/1
1 TABLET ORAL EVERY 4 HOURS
Qty: 0 | Refills: 0 | Status: DISCONTINUED | OUTPATIENT
Start: 2018-10-11 | End: 2018-10-14

## 2018-10-11 RX ORDER — OXYCODONE AND ACETAMINOPHEN 5; 325 MG/1; MG/1
2 TABLET ORAL EVERY 4 HOURS
Qty: 0 | Refills: 0 | Status: DISCONTINUED | OUTPATIENT
Start: 2018-10-11 | End: 2018-10-16

## 2018-10-11 RX ADMIN — HYDROMORPHONE HYDROCHLORIDE 0.5 MILLIGRAM(S): 2 INJECTION INTRAMUSCULAR; INTRAVENOUS; SUBCUTANEOUS at 10:33

## 2018-10-11 RX ADMIN — OXYCODONE AND ACETAMINOPHEN 1 TABLET(S): 5; 325 TABLET ORAL at 13:05

## 2018-10-11 RX ADMIN — OXYCODONE HYDROCHLORIDE 5 MILLIGRAM(S): 5 TABLET ORAL at 05:10

## 2018-10-11 RX ADMIN — INSULIN GLARGINE 55 UNIT(S): 100 INJECTION, SOLUTION SUBCUTANEOUS at 22:14

## 2018-10-11 RX ADMIN — Medication 25 MILLIGRAM(S): at 05:10

## 2018-10-11 RX ADMIN — HYDROMORPHONE HYDROCHLORIDE 0.5 MILLIGRAM(S): 2 INJECTION INTRAMUSCULAR; INTRAVENOUS; SUBCUTANEOUS at 03:28

## 2018-10-11 RX ADMIN — OXYCODONE AND ACETAMINOPHEN 1 TABLET(S): 5; 325 TABLET ORAL at 14:19

## 2018-10-11 RX ADMIN — Medication 2: at 22:14

## 2018-10-11 RX ADMIN — HYDROMORPHONE HYDROCHLORIDE 0.5 MILLIGRAM(S): 2 INJECTION INTRAMUSCULAR; INTRAVENOUS; SUBCUTANEOUS at 09:15

## 2018-10-11 RX ADMIN — ATORVASTATIN CALCIUM 80 MILLIGRAM(S): 80 TABLET, FILM COATED ORAL at 22:13

## 2018-10-11 RX ADMIN — OXYCODONE HYDROCHLORIDE 5 MILLIGRAM(S): 5 TABLET ORAL at 10:43

## 2018-10-11 RX ADMIN — Medication 150 MICROGRAM(S): at 05:10

## 2018-10-11 RX ADMIN — OXYCODONE HYDROCHLORIDE 10 MILLIGRAM(S): 5 TABLET ORAL at 02:02

## 2018-10-11 RX ADMIN — HYDROMORPHONE HYDROCHLORIDE 0.5 MILLIGRAM(S): 2 INJECTION INTRAMUSCULAR; INTRAVENOUS; SUBCUTANEOUS at 17:00

## 2018-10-11 RX ADMIN — BUDESONIDE AND FORMOTEROL FUMARATE DIHYDRATE 2 PUFF(S): 160; 4.5 AEROSOL RESPIRATORY (INHALATION) at 18:35

## 2018-10-11 RX ADMIN — OXYCODONE AND ACETAMINOPHEN 1 TABLET(S): 5; 325 TABLET ORAL at 13:59

## 2018-10-11 RX ADMIN — HEPARIN SODIUM 5000 UNIT(S): 5000 INJECTION INTRAVENOUS; SUBCUTANEOUS at 13:04

## 2018-10-11 RX ADMIN — Medication 81 MILLIGRAM(S): at 13:05

## 2018-10-11 RX ADMIN — Medication 1: at 18:32

## 2018-10-11 RX ADMIN — BUDESONIDE AND FORMOTEROL FUMARATE DIHYDRATE 2 PUFF(S): 160; 4.5 AEROSOL RESPIRATORY (INHALATION) at 05:09

## 2018-10-11 RX ADMIN — OXYCODONE HYDROCHLORIDE 5 MILLIGRAM(S): 5 TABLET ORAL at 05:40

## 2018-10-11 RX ADMIN — HYDROMORPHONE HYDROCHLORIDE 0.5 MILLIGRAM(S): 2 INJECTION INTRAMUSCULAR; INTRAVENOUS; SUBCUTANEOUS at 16:26

## 2018-10-11 RX ADMIN — HEPARIN SODIUM 5000 UNIT(S): 5000 INJECTION INTRAVENOUS; SUBCUTANEOUS at 22:14

## 2018-10-11 RX ADMIN — OXYCODONE AND ACETAMINOPHEN 2 TABLET(S): 5; 325 TABLET ORAL at 23:35

## 2018-10-11 RX ADMIN — OXYCODONE HYDROCHLORIDE 10 MILLIGRAM(S): 5 TABLET ORAL at 01:32

## 2018-10-11 RX ADMIN — HYDROMORPHONE HYDROCHLORIDE 0.5 MILLIGRAM(S): 2 INJECTION INTRAMUSCULAR; INTRAVENOUS; SUBCUTANEOUS at 03:42

## 2018-10-11 RX ADMIN — Medication 14 UNIT(S): at 18:32

## 2018-10-11 RX ADMIN — Medication 14 UNIT(S): at 08:56

## 2018-10-11 RX ADMIN — OXYCODONE HYDROCHLORIDE 5 MILLIGRAM(S): 5 TABLET ORAL at 11:20

## 2018-10-11 RX ADMIN — OXYCODONE HYDROCHLORIDE 10 MILLIGRAM(S): 5 TABLET ORAL at 09:00

## 2018-10-11 RX ADMIN — OXYCODONE HYDROCHLORIDE 10 MILLIGRAM(S): 5 TABLET ORAL at 08:09

## 2018-10-11 RX ADMIN — HEPARIN SODIUM 5000 UNIT(S): 5000 INJECTION INTRAVENOUS; SUBCUTANEOUS at 05:10

## 2018-10-11 NOTE — PROGRESS NOTE ADULT - SUBJECTIVE AND OBJECTIVE BOX
SUBJECTIVE: Pt seen, reports pain ib Left foot yesterday with little relief with pain meds.    Vital Signs Last 24 Hrs  T(C): 36.5 (11 Oct 2018 05:12), Max: 36.8 (10 Oct 2018 14:18)  T(F): 97.7 (11 Oct 2018 05:12), Max: 98.3 (10 Oct 2018 14:18)  HR: 73 (11 Oct 2018 05:12) (69 - 78)  BP: 130/70 (11 Oct 2018 05:12) (113/65 - 160/75)  BP(mean): --  RR: 18 (11 Oct 2018 05:12) (18 - 20)  SpO2: 100% (11 Oct 2018 05:12) (97% - 100%)    General Appearance: Appears well, NAD  Neck: Supple  Chest: Equal expansion bilaterally  Extrem: bilateral palpable femoral pulses, signals in the RLE (DP/PT).  On the left he did not have any distal signals in the PT/DP/ or in the bypass.  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  The left foot is warm to touch and he does not report decreased sensation.        I&O's Summary    10 Oct 2018 07:  -  11 Oct 2018 07:00  --------------------------------------------------------  IN: 2180 mL / OUT: 1050 mL / NET: 1130 mL      I&O's Detail    10 Oct 2018 07:  -  11 Oct 2018 07:00  --------------------------------------------------------  IN:    Oral Fluid: 1080 mL    sodium chloride 0.9%.: 1100 mL  Total IN: 2180 mL    OUT:    Voided: 1050 mL  Total OUT: 1050 mL    Total NET: 1130 mL          MEDICATIONS  (STANDING):  aspirin enteric coated 81 milliGRAM(s) Oral daily  atorvastatin 80 milliGRAM(s) Oral at bedtime  buDESOnide 160 MICROgram(s)/formoterol 4.5 MICROgram(s) Inhaler 2 Puff(s) Inhalation two times a day  dextrose 5%. 1000 milliLiter(s) (50 mL/Hr) IV Continuous <Continuous>  dextrose 50% Injectable 12.5 Gram(s) IV Push once  dextrose 50% Injectable 25 Gram(s) IV Push once  dextrose 50% Injectable 25 Gram(s) IV Push once  heparin  Injectable 5000 Unit(s) SubCutaneous every 8 hours  insulin glargine Injectable (LANTUS) 55 Unit(s) SubCutaneous at bedtime  insulin lispro (HumaLOG) corrective regimen sliding scale   SubCutaneous Before meals and at bedtime  insulin lispro Injectable (HumaLOG) 18 Unit(s) SubCutaneous three times a day before meals  levothyroxine 150 MICROGram(s) Oral daily  metoprolol tartrate 25 milliGRAM(s) Oral two times a day  sodium chloride 0.9%. 1000 milliLiter(s) (100 mL/Hr) IV Continuous <Continuous>    MEDICATIONS  (PRN):  dextrose 40% Gel 15 Gram(s) Oral once PRN Blood Glucose LESS THAN 70 milliGRAM(s)/deciliter  gabapentin 300 milliGRAM(s) Oral daily PRN moderate pain  glucagon  Injectable 1 milliGRAM(s) IntraMuscular once PRN Glucose LESS THAN 70 milligrams/deciliter  oxyCODONE    IR 5 milliGRAM(s) Oral every 4 hours PRN Moderate Pain (4 - 6)  oxyCODONE    IR 10 milliGRAM(s) Oral every 4 hours PRN Severe Pain (7 - 10)      LABS:                        10.7   6.0   )-----------( 379      ( 11 Oct 2018 06:22 )             33.5     10-11    135  |  99  |  54<H>  ----------------------------<  102<H>  3.9   |  21<L>  |  1.32<H>    Ca    9.6      11 Oct 2018 06:22  Phos  3.6     10-11  Mg     2.2     10-11    TPro  8.6<H>  /  Alb  4.2  /  TBili  0.3  /  DBili  x   /  AST  11  /  ALT  9<L>  /  AlkPhos  106  10-10    PT/INR - ( 10 Oct 2018 15:30 )   PT: 11.6 sec;   INR: 1.06 ratio         PTT - ( 10 Oct 2018 15:30 )  PTT:31.6 sec  Urinalysis Basic - ( 10 Oct 2018 16:59 )    Color: Light Yellow / Appearance: Clear / S.012 / pH: x  Gluc: x / Ketone: Negative  / Bili: Negative / Urobili: Negative   Blood: x / Protein: Negative / Nitrite: Negative   Leuk Esterase: Negative / RBC: x / WBC x   Sq Epi: x / Non Sq Epi: x / Bacteria: x

## 2018-10-11 NOTE — PROGRESS NOTE ADULT - ASSESSMENT
67M with history of left femoral endarterectomy and fem pop bypass (6/2017) and subsequent left femoral to peroneal bypass with PTFE (8/7/18) for claudication with known occlusion of bypass now with increased LLE pain.      --MEENA/PVR to assess for possibility of BKA for LLE  - Regular diet   - IVF resuscitation with NS bolus followed by NS @100 cc/hr for hyperglycemia, hyponatremia and JOSE LUIS with elevated lactate. Will repeat labs at 7pm  - Continue home insulin regimen, ISS and possible endocrine consult if hyperglycemia persists  - Continue home medications, ASA  - Hold nephrotoxic medications (losartan, lasix), renal consult   - medical and cardiology consults for risk assessment and optimization for eventual amputation  - Pain control, Neurontin retarted, NSAIDs on hold due to elev creat.    [id number]

## 2018-10-11 NOTE — PROGRESS NOTE ADULT - SUBJECTIVE AND OBJECTIVE BOX
Patient is a 68y old  Male who presents with a chief complaint of      INTERVAL HPI/OVERNIGHT EVENTS:  Patient seen and evaluated at bedside.  Pt is resting comfortable in NAD. Denies N/V/F/C.  Pain rated at X/10    Allergies    contrast media (iodine-based) (Hypotension)    Intolerances        Vital Signs Last 24 Hrs  T(C): 37.4 (11 Oct 2018 10:24), Max: 37.4 (11 Oct 2018 10:24)  T(F): 99.4 (11 Oct 2018 10:24), Max: 99.4 (11 Oct 2018 10:24)  HR: 80 (11 Oct 2018 10:24) (69 - 80)  BP: 151/76 (11 Oct 2018 10:24) (113/65 - 160/75)  BP(mean): --  RR: 18 (11 Oct 2018 10:24) (18 - 20)  SpO2: 99% (11 Oct 2018 10:24) (97% - 100%)    LABS:                        10.7   6.0   )-----------( 379      ( 11 Oct 2018 06:22 )             33.5     10-11    135  |  99  |  54<H>  ----------------------------<  102<H>  3.9   |  21<L>  |  1.32<H>    Ca    9.6      11 Oct 2018 06:22  Phos  3.6     10-11  Mg     2.2     10-11    TPro  8.6<H>  /  Alb  4.2  /  TBili  0.3  /  DBili  x   /  AST  11  /  ALT  9<L>  /  AlkPhos  106  10-10    PT/INR - ( 10 Oct 2018 15:30 )   PT: 11.6 sec;   INR: 1.06 ratio         PTT - ( 10 Oct 2018 15:30 )  PTT:31.6 sec  Urinalysis Basic - ( 10 Oct 2018 16:59 )    Color: Light Yellow / Appearance: Clear / S.012 / pH: x  Gluc: x / Ketone: Negative  / Bili: Negative / Urobili: Negative   Blood: x / Protein: Negative / Nitrite: Negative   Leuk Esterase: Negative / RBC: x / WBC x   Sq Epi: x / Non Sq Epi: x / Bacteria: x      CAPILLARY BLOOD GLUCOSE      POCT Blood Glucose.: 82 mg/dL (11 Oct 2018 07:59)  POCT Blood Glucose.: 194 mg/dL (11 Oct 2018 01:23)  POCT Blood Glucose.: 332 mg/dL (10 Oct 2018 21:14)  POCT Blood Glucose.: 362 mg/dL (10 Oct 2018 20:19)  POCT Blood Glucose.: 338 mg/dL (10 Oct 2018 20:08)      Lower Extremity Physical Exam:  Vasular: DP/PT 0/4, B/L, CFT delayed to all toes. Temperature gradient : Left foot cold to touch. LLE dependent rubor.    Neuro: Epicritic sensation diminished  Musculoskeletal/Ortho: s/p LF P1RR  Skin:  Wound #1:   Location: LF s/p P1RR   Size: linear surgical wound approx 4 cm   Depth: probes to bone  Wound bed: fibrous with necrotic skin edges   Drainage: serosanginous  Odor: no malodor   Periwound: mild erythema   Etiology: surgical/ ischemic     RADIOLOGY & ADDITIONAL TESTS:

## 2018-10-12 ENCOUNTER — APPOINTMENT (OUTPATIENT)
Dept: VASCULAR SURGERY | Facility: CLINIC | Age: 68
End: 2018-10-12

## 2018-10-12 LAB
ANION GAP SERPL CALC-SCNC: 16 MMOL/L — SIGNIFICANT CHANGE UP (ref 5–17)
BUN SERPL-MCNC: 36 MG/DL — HIGH (ref 7–23)
CALCIUM SERPL-MCNC: 9.5 MG/DL — SIGNIFICANT CHANGE UP (ref 8.4–10.5)
CHLORIDE SERPL-SCNC: 100 MMOL/L — SIGNIFICANT CHANGE UP (ref 96–108)
CO2 SERPL-SCNC: 20 MMOL/L — LOW (ref 22–31)
CREAT SERPL-MCNC: 1.15 MG/DL — SIGNIFICANT CHANGE UP (ref 0.5–1.3)
GLUCOSE SERPL-MCNC: 146 MG/DL — HIGH (ref 70–99)
HCT VFR BLD CALC: 31.5 % — LOW (ref 39–50)
HGB BLD-MCNC: 10.2 G/DL — LOW (ref 13–17)
MAGNESIUM SERPL-MCNC: 2 MG/DL — SIGNIFICANT CHANGE UP (ref 1.6–2.6)
MCHC RBC-ENTMCNC: 27.4 PG — SIGNIFICANT CHANGE UP (ref 27–34)
MCHC RBC-ENTMCNC: 32.4 GM/DL — SIGNIFICANT CHANGE UP (ref 32–36)
MCV RBC AUTO: 84.6 FL — SIGNIFICANT CHANGE UP (ref 80–100)
PHOSPHATE SERPL-MCNC: 3.5 MG/DL — SIGNIFICANT CHANGE UP (ref 2.5–4.5)
PLATELET # BLD AUTO: 351 K/UL — SIGNIFICANT CHANGE UP (ref 150–400)
POTASSIUM SERPL-MCNC: 3.9 MMOL/L — SIGNIFICANT CHANGE UP (ref 3.5–5.3)
POTASSIUM SERPL-SCNC: 3.9 MMOL/L — SIGNIFICANT CHANGE UP (ref 3.5–5.3)
RBC # BLD: 3.72 M/UL — LOW (ref 4.2–5.8)
RBC # FLD: 15.3 % — HIGH (ref 10.3–14.5)
SODIUM SERPL-SCNC: 136 MMOL/L — SIGNIFICANT CHANGE UP (ref 135–145)
WBC # BLD: 5.5 K/UL — SIGNIFICANT CHANGE UP (ref 3.8–10.5)
WBC # FLD AUTO: 5.5 K/UL — SIGNIFICANT CHANGE UP (ref 3.8–10.5)

## 2018-10-12 RX ORDER — HYDROMORPHONE HYDROCHLORIDE 2 MG/ML
0.5 INJECTION INTRAMUSCULAR; INTRAVENOUS; SUBCUTANEOUS ONCE
Qty: 0 | Refills: 0 | Status: DISCONTINUED | OUTPATIENT
Start: 2018-10-12 | End: 2018-10-12

## 2018-10-12 RX ORDER — MORPHINE SULFATE 50 MG/1
1 CAPSULE, EXTENDED RELEASE ORAL EVERY 4 HOURS
Qty: 0 | Refills: 0 | Status: DISCONTINUED | OUTPATIENT
Start: 2018-10-12 | End: 2018-10-13

## 2018-10-12 RX ORDER — COLLAGENASE CLOSTRIDIUM HIST. 250 UNIT/G
1 OINTMENT (GRAM) TOPICAL DAILY
Qty: 0 | Refills: 0 | Status: DISCONTINUED | OUTPATIENT
Start: 2018-10-12 | End: 2018-10-16

## 2018-10-12 RX ORDER — POTASSIUM PHOSPHATE, MONOBASIC POTASSIUM PHOSPHATE, DIBASIC 236; 224 MG/ML; MG/ML
15 INJECTION, SOLUTION INTRAVENOUS ONCE
Qty: 0 | Refills: 0 | Status: COMPLETED | OUTPATIENT
Start: 2018-10-12 | End: 2018-10-12

## 2018-10-12 RX ORDER — POLYETHYLENE GLYCOL 3350 17 G/17G
17 POWDER, FOR SOLUTION ORAL DAILY
Qty: 0 | Refills: 0 | Status: DISCONTINUED | OUTPATIENT
Start: 2018-10-12 | End: 2018-10-16

## 2018-10-12 RX ADMIN — ATORVASTATIN CALCIUM 80 MILLIGRAM(S): 80 TABLET, FILM COATED ORAL at 21:44

## 2018-10-12 RX ADMIN — HEPARIN SODIUM 5000 UNIT(S): 5000 INJECTION INTRAVENOUS; SUBCUTANEOUS at 21:44

## 2018-10-12 RX ADMIN — OXYCODONE AND ACETAMINOPHEN 2 TABLET(S): 5; 325 TABLET ORAL at 15:59

## 2018-10-12 RX ADMIN — Medication 1: at 13:25

## 2018-10-12 RX ADMIN — HEPARIN SODIUM 5000 UNIT(S): 5000 INJECTION INTRAVENOUS; SUBCUTANEOUS at 13:26

## 2018-10-12 RX ADMIN — OXYCODONE AND ACETAMINOPHEN 2 TABLET(S): 5; 325 TABLET ORAL at 12:03

## 2018-10-12 RX ADMIN — OXYCODONE AND ACETAMINOPHEN 2 TABLET(S): 5; 325 TABLET ORAL at 23:36

## 2018-10-12 RX ADMIN — OXYCODONE AND ACETAMINOPHEN 2 TABLET(S): 5; 325 TABLET ORAL at 20:05

## 2018-10-12 RX ADMIN — Medication 1 APPLICATION(S): at 11:55

## 2018-10-12 RX ADMIN — BUDESONIDE AND FORMOTEROL FUMARATE DIHYDRATE 2 PUFF(S): 160; 4.5 AEROSOL RESPIRATORY (INHALATION) at 17:01

## 2018-10-12 RX ADMIN — Medication 1: at 09:21

## 2018-10-12 RX ADMIN — MORPHINE SULFATE 1 MILLIGRAM(S): 50 CAPSULE, EXTENDED RELEASE ORAL at 22:03

## 2018-10-12 RX ADMIN — BUDESONIDE AND FORMOTEROL FUMARATE DIHYDRATE 2 PUFF(S): 160; 4.5 AEROSOL RESPIRATORY (INHALATION) at 05:34

## 2018-10-12 RX ADMIN — HEPARIN SODIUM 5000 UNIT(S): 5000 INJECTION INTRAVENOUS; SUBCUTANEOUS at 05:32

## 2018-10-12 RX ADMIN — Medication 150 MICROGRAM(S): at 05:32

## 2018-10-12 RX ADMIN — Medication 14 UNIT(S): at 17:19

## 2018-10-12 RX ADMIN — OXYCODONE AND ACETAMINOPHEN 2 TABLET(S): 5; 325 TABLET ORAL at 00:05

## 2018-10-12 RX ADMIN — Medication 2: at 21:43

## 2018-10-12 RX ADMIN — Medication 25 MILLIGRAM(S): at 17:01

## 2018-10-12 RX ADMIN — Medication 2: at 17:19

## 2018-10-12 RX ADMIN — OXYCODONE AND ACETAMINOPHEN 2 TABLET(S): 5; 325 TABLET ORAL at 08:41

## 2018-10-12 RX ADMIN — OXYCODONE AND ACETAMINOPHEN 2 TABLET(S): 5; 325 TABLET ORAL at 04:16

## 2018-10-12 RX ADMIN — OXYCODONE AND ACETAMINOPHEN 2 TABLET(S): 5; 325 TABLET ORAL at 01:23

## 2018-10-12 RX ADMIN — OXYCODONE AND ACETAMINOPHEN 2 TABLET(S): 5; 325 TABLET ORAL at 08:11

## 2018-10-12 RX ADMIN — Medication 81 MILLIGRAM(S): at 11:55

## 2018-10-12 RX ADMIN — Medication 25 MILLIGRAM(S): at 05:32

## 2018-10-12 RX ADMIN — GABAPENTIN 300 MILLIGRAM(S): 400 CAPSULE ORAL at 17:01

## 2018-10-12 RX ADMIN — OXYCODONE AND ACETAMINOPHEN 2 TABLET(S): 5; 325 TABLET ORAL at 20:35

## 2018-10-12 RX ADMIN — POLYETHYLENE GLYCOL 3350 17 GRAM(S): 17 POWDER, FOR SOLUTION ORAL at 21:44

## 2018-10-12 RX ADMIN — POTASSIUM PHOSPHATE, MONOBASIC POTASSIUM PHOSPHATE, DIBASIC 62.5 MILLIMOLE(S): 236; 224 INJECTION, SOLUTION INTRAVENOUS at 11:55

## 2018-10-12 RX ADMIN — HYDROMORPHONE HYDROCHLORIDE 0.5 MILLIGRAM(S): 2 INJECTION INTRAMUSCULAR; INTRAVENOUS; SUBCUTANEOUS at 00:31

## 2018-10-12 RX ADMIN — MORPHINE SULFATE 1 MILLIGRAM(S): 50 CAPSULE, EXTENDED RELEASE ORAL at 22:33

## 2018-10-12 RX ADMIN — INSULIN GLARGINE 55 UNIT(S): 100 INJECTION, SOLUTION SUBCUTANEOUS at 21:44

## 2018-10-12 RX ADMIN — Medication 14 UNIT(S): at 13:25

## 2018-10-12 RX ADMIN — HYDROMORPHONE HYDROCHLORIDE 0.5 MILLIGRAM(S): 2 INJECTION INTRAMUSCULAR; INTRAVENOUS; SUBCUTANEOUS at 01:01

## 2018-10-12 RX ADMIN — OXYCODONE AND ACETAMINOPHEN 2 TABLET(S): 5; 325 TABLET ORAL at 15:29

## 2018-10-12 RX ADMIN — Medication 14 UNIT(S): at 09:20

## 2018-10-12 NOTE — PROGRESS NOTE ADULT - ASSESSMENT
68 year old male  s/p LLE fem pop bypass and LF P1RR w/ wound dehiscence and ischemic limb pain.   - Pt seen and eval  - occlusion of bypass graft  - likely going to get BKA, still awaiting pt decision   - Pain meds PRN  - no plans for podiatric surgical intervention   - local wound care  - d/w attending.

## 2018-10-12 NOTE — PROGRESS NOTE ADULT - SUBJECTIVE AND OBJECTIVE BOX
MARK CASTAÑEDA  [id number]    Subjective:    Patient seen and evaluated by vascular team, no acute events overnight.   Patient still c/o pain to LE, improves with pain medication.   Unable to tolerate MEENA/PVR yesterday secondary to pain. No other complaints.     Objective:  T(C): 36.7 (10-12-18 @ 05:16), Max: 37.4 (10-11-18 @ 10:24)  HR: 93 (10-12-18 @ 05:16) (75 - 98)  BP: 147/68 (10-12-18 @ 05:16) (107/62 - 151/76)  RR: 18 (10-12-18 @ 05:16) (18 - 18)  SpO2: 99% (10-12-18 @ 05:16) (97% - 99%)  Wt(kg): --   10-12    136  |  100  |  36<H>  ----------------------------<  146<H>  3.9   |  20<L>  |  1.15    Ca    9.5      12 Oct 2018 06:23  Phos  3.5     10-12  Mg     2.0     10-12    TPro  8.6<H>  /  Alb  4.2  /  TBili  0.3  /  DBili  x   /  AST  11  /  ALT  9<L>  /  AlkPhos  106  10-10                        10.2   5.5   )-----------( 351      ( 12 Oct 2018 06:23 )             31.5       10-11 @ 07:01  -  10-12 @ 07:00  --------------------------------------------------------  IN: 2260 mL / OUT: 2050 mL / NET: 210 mL      PHYSICAL EXAM:    General Appearance: Appears well, NAD  Neck: Supple  Chest: Equal expansion bilaterally  Extrem: bilateral palpable femoral pulses, signals in the RLE (DP/PT).  On the left he did not have any distal signals in the PT/DP/ or in the bypass.  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  The left foot is warm to touch and he does not report decreased sensation.              MEDICATIONS  (STANDING):  aspirin enteric coated 81 milliGRAM(s) Oral daily  atorvastatin 80 milliGRAM(s) Oral at bedtime  buDESOnide 160 MICROgram(s)/formoterol 4.5 MICROgram(s) Inhaler 2 Puff(s) Inhalation two times a day  collagenase Ointment 1 Application(s) Topical daily  dextrose 5%. 1000 milliLiter(s) (50 mL/Hr) IV Continuous <Continuous>  dextrose 50% Injectable 12.5 Gram(s) IV Push once  dextrose 50% Injectable 25 Gram(s) IV Push once  dextrose 50% Injectable 25 Gram(s) IV Push once  heparin  Injectable 5000 Unit(s) SubCutaneous every 8 hours  insulin glargine Injectable (LANTUS) 55 Unit(s) SubCutaneous at bedtime  insulin lispro (HumaLOG) corrective regimen sliding scale   SubCutaneous Before meals and at bedtime  insulin lispro Injectable (HumaLOG) 14 Unit(s) SubCutaneous three times a day before meals  insulin lispro Injectable (HumaLOG) 14 Unit(s) SubCutaneous before breakfast  levothyroxine 150 MICROGram(s) Oral daily  metoprolol tartrate 25 milliGRAM(s) Oral two times a day  sodium chloride 0.9%. 1000 milliLiter(s) (50 mL/Hr) IV Continuous <Continuous>    MEDICATIONS  (PRN):  dextrose 40% Gel 15 Gram(s) Oral once PRN Blood Glucose LESS THAN 70 milliGRAM(s)/deciliter  gabapentin 300 milliGRAM(s) Oral daily PRN moderate pain  glucagon  Injectable 1 milliGRAM(s) IntraMuscular once PRN Glucose LESS THAN 70 milligrams/deciliter  oxyCODONE    5 mG/acetaminophen 325 mG 1 Tablet(s) Oral every 4 hours PRN Moderate Pain (4 - 6)  oxyCODONE    5 mG/acetaminophen 325 mG 2 Tablet(s) Oral every 4 hours PRN Severe Pain (7 - 10)

## 2018-10-12 NOTE — PROGRESS NOTE ADULT - ASSESSMENT
ASSESMENT:     67M with history of left femoral endarterectomy and fem pop bypass (6/2017) and subsequent left femoral to peroneal bypass with PTFE (8/7/18) for claudication with known occlusion of bypass now with increased LLE pain.      --MEENA/PVR yesterday inconclusive, patient unable to tolerate bc of pain  - Regular diet   - Continue home insulin regimen, ISS and possible endocrine consult if hyperglycemia persists  - Continue home medications, ASA  - Hold nephrotoxic medications (losartan, lasix), renal consult   - medical and cardiology consults for risk assessment and optimization for eventual amputation  - Pain control, Neurontin retarted, NSAIDs on hold due to elev creat.  - Plan for BKA after optimization     [id number]

## 2018-10-12 NOTE — PROGRESS NOTE ADULT - SUBJECTIVE AND OBJECTIVE BOX
Patient is a 68y old  Male who presents with a chief complaint of      INTERVAL HPI/OVERNIGHT EVENTS:  Patient seen and evaluated at bedside.  Pt is resting comfortable in NAD. Denies N/V/F/C.     Allergies    contrast media (iodine-based) (Hypotension)    Intolerances        Vital Signs Last 24 Hrs  T(C): 36.7 (12 Oct 2018 05:16), Max: 37.4 (11 Oct 2018 10:24)  T(F): 98.1 (12 Oct 2018 05:16), Max: 99.4 (11 Oct 2018 10:24)  HR: 93 (12 Oct 2018 05:16) (75 - 98)  BP: 147/68 (12 Oct 2018 05:16) (107/62 - 151/76)  BP(mean): --  RR: 18 (12 Oct 2018 05:16) (18 - 18)  SpO2: 99% (12 Oct 2018 05:16) (97% - 99%)    LABS:                        10.2   5.5   )-----------( 351      ( 12 Oct 2018 06:23 )             31.5     10-12    136  |  100  |  36<H>  ----------------------------<  146<H>  3.9   |  20<L>  |  1.15    Ca    9.5      12 Oct 2018 06:23  Phos  3.5     10-12  Mg     2.0     10-12    TPro  8.6<H>  /  Alb  4.2  /  TBili  0.3  /  DBili  x   /  AST  11  /  ALT  9<L>  /  AlkPhos  106  10-10    PT/INR - ( 10 Oct 2018 15:30 )   PT: 11.6 sec;   INR: 1.06 ratio         PTT - ( 10 Oct 2018 15:30 )  PTT:31.6 sec  Urinalysis Basic - ( 10 Oct 2018 16:59 )    Color: Light Yellow / Appearance: Clear / S.012 / pH: x  Gluc: x / Ketone: Negative  / Bili: Negative / Urobili: Negative   Blood: x / Protein: Negative / Nitrite: Negative   Leuk Esterase: Negative / RBC: x / WBC x   Sq Epi: x / Non Sq Epi: x / Bacteria: x      CAPILLARY BLOOD GLUCOSE      POCT Blood Glucose.: 233 mg/dL (11 Oct 2018 21:59)  POCT Blood Glucose.: 174 mg/dL (11 Oct 2018 18:24)  POCT Blood Glucose.: 122 mg/dL (11 Oct 2018 12:19)      Lower Extremity Physical Exam:  Vasular: DP/PT 0/4, B/L, CFT delayed to all toes. Temperature gradient : Left foot cold to touch. LLE dependent rubor.    Neuro: Epicritic sensation diminished  Musculoskeletal/Ortho: s/p LF P1RR  Skin:  Wound #1:   Location: LF s/p P1RR   Size: linear surgical wound approx 4 cm   Depth: probes to bone  Wound bed: fibrous with necrotic skin edges   Drainage: serosanginous  Odor: no malodor   Periwound: mild erythema   Etiology: surgical/ ischemic    RADIOLOGY & ADDITIONAL TESTS:

## 2018-10-13 DIAGNOSIS — I10 ESSENTIAL (PRIMARY) HYPERTENSION: ICD-10-CM

## 2018-10-13 DIAGNOSIS — I99.8 OTHER DISORDER OF CIRCULATORY SYSTEM: ICD-10-CM

## 2018-10-13 DIAGNOSIS — I25.10 ATHEROSCLEROTIC HEART DISEASE OF NATIVE CORONARY ARTERY WITHOUT ANGINA PECTORIS: ICD-10-CM

## 2018-10-13 DIAGNOSIS — E11.9 TYPE 2 DIABETES MELLITUS WITHOUT COMPLICATIONS: ICD-10-CM

## 2018-10-13 LAB
ANION GAP SERPL CALC-SCNC: 13 MMOL/L — SIGNIFICANT CHANGE UP (ref 5–17)
BUN SERPL-MCNC: 25 MG/DL — HIGH (ref 7–23)
CALCIUM SERPL-MCNC: 9.6 MG/DL — SIGNIFICANT CHANGE UP (ref 8.4–10.5)
CHLORIDE SERPL-SCNC: 101 MMOL/L — SIGNIFICANT CHANGE UP (ref 96–108)
CO2 SERPL-SCNC: 22 MMOL/L — SIGNIFICANT CHANGE UP (ref 22–31)
CREAT SERPL-MCNC: 1.18 MG/DL — SIGNIFICANT CHANGE UP (ref 0.5–1.3)
GLUCOSE SERPL-MCNC: 101 MG/DL — HIGH (ref 70–99)
HCT VFR BLD CALC: 32.6 % — LOW (ref 39–50)
HGB BLD-MCNC: 10.6 G/DL — LOW (ref 13–17)
MAGNESIUM SERPL-MCNC: 2 MG/DL — SIGNIFICANT CHANGE UP (ref 1.6–2.6)
MCHC RBC-ENTMCNC: 27.6 PG — SIGNIFICANT CHANGE UP (ref 27–34)
MCHC RBC-ENTMCNC: 32.4 GM/DL — SIGNIFICANT CHANGE UP (ref 32–36)
MCV RBC AUTO: 85.3 FL — SIGNIFICANT CHANGE UP (ref 80–100)
PHOSPHATE SERPL-MCNC: 3.7 MG/DL — SIGNIFICANT CHANGE UP (ref 2.5–4.5)
PLATELET # BLD AUTO: 359 K/UL — SIGNIFICANT CHANGE UP (ref 150–400)
POTASSIUM SERPL-MCNC: 4.6 MMOL/L — SIGNIFICANT CHANGE UP (ref 3.5–5.3)
POTASSIUM SERPL-SCNC: 4.6 MMOL/L — SIGNIFICANT CHANGE UP (ref 3.5–5.3)
RBC # BLD: 3.82 M/UL — LOW (ref 4.2–5.8)
RBC # FLD: 15.2 % — HIGH (ref 10.3–14.5)
SODIUM SERPL-SCNC: 136 MMOL/L — SIGNIFICANT CHANGE UP (ref 135–145)
WBC # BLD: 5.7 K/UL — SIGNIFICANT CHANGE UP (ref 3.8–10.5)
WBC # FLD AUTO: 5.7 K/UL — SIGNIFICANT CHANGE UP (ref 3.8–10.5)

## 2018-10-13 PROCEDURE — 99231 SBSQ HOSP IP/OBS SF/LOW 25: CPT | Mod: 24

## 2018-10-13 PROCEDURE — 93010 ELECTROCARDIOGRAM REPORT: CPT

## 2018-10-13 PROCEDURE — 71045 X-RAY EXAM CHEST 1 VIEW: CPT | Mod: 26

## 2018-10-13 PROCEDURE — 99223 1ST HOSP IP/OBS HIGH 75: CPT | Mod: GC

## 2018-10-13 RX ORDER — FAMOTIDINE 10 MG/ML
20 INJECTION INTRAVENOUS
Qty: 0 | Refills: 0 | Status: DISCONTINUED | OUTPATIENT
Start: 2018-10-13 | End: 2018-10-16

## 2018-10-13 RX ORDER — CEFEPIME 1 G/1
1000 INJECTION, POWDER, FOR SOLUTION INTRAMUSCULAR; INTRAVENOUS ONCE
Qty: 0 | Refills: 0 | Status: COMPLETED | OUTPATIENT
Start: 2018-10-13 | End: 2018-10-13

## 2018-10-13 RX ORDER — CEFEPIME 1 G/1
INJECTION, POWDER, FOR SOLUTION INTRAMUSCULAR; INTRAVENOUS
Qty: 0 | Refills: 0 | Status: DISCONTINUED | OUTPATIENT
Start: 2018-10-13 | End: 2018-10-13

## 2018-10-13 RX ORDER — INSULIN LISPRO 100/ML
16 VIAL (ML) SUBCUTANEOUS
Qty: 0 | Refills: 0 | Status: DISCONTINUED | OUTPATIENT
Start: 2018-10-13 | End: 2018-10-13

## 2018-10-13 RX ORDER — INSULIN LISPRO 100/ML
16 VIAL (ML) SUBCUTANEOUS
Qty: 0 | Refills: 0 | Status: DISCONTINUED | OUTPATIENT
Start: 2018-10-13 | End: 2018-10-16

## 2018-10-13 RX ORDER — CEFEPIME 1 G/1
1000 INJECTION, POWDER, FOR SOLUTION INTRAMUSCULAR; INTRAVENOUS EVERY 8 HOURS
Qty: 0 | Refills: 0 | Status: DISCONTINUED | OUTPATIENT
Start: 2018-10-13 | End: 2018-10-16

## 2018-10-13 RX ORDER — HYDROMORPHONE HYDROCHLORIDE 2 MG/ML
1 INJECTION INTRAMUSCULAR; INTRAVENOUS; SUBCUTANEOUS EVERY 4 HOURS
Qty: 0 | Refills: 0 | Status: DISCONTINUED | OUTPATIENT
Start: 2018-10-13 | End: 2018-10-14

## 2018-10-13 RX ORDER — CEFEPIME 1 G/1
INJECTION, POWDER, FOR SOLUTION INTRAMUSCULAR; INTRAVENOUS
Qty: 0 | Refills: 0 | Status: DISCONTINUED | OUTPATIENT
Start: 2018-10-13 | End: 2018-10-16

## 2018-10-13 RX ADMIN — Medication 1 APPLICATION(S): at 11:11

## 2018-10-13 RX ADMIN — OXYCODONE AND ACETAMINOPHEN 2 TABLET(S): 5; 325 TABLET ORAL at 11:39

## 2018-10-13 RX ADMIN — Medication 1: at 12:23

## 2018-10-13 RX ADMIN — HYDROMORPHONE HYDROCHLORIDE 1 MILLIGRAM(S): 2 INJECTION INTRAMUSCULAR; INTRAVENOUS; SUBCUTANEOUS at 12:16

## 2018-10-13 RX ADMIN — OXYCODONE AND ACETAMINOPHEN 2 TABLET(S): 5; 325 TABLET ORAL at 11:09

## 2018-10-13 RX ADMIN — ATORVASTATIN CALCIUM 80 MILLIGRAM(S): 80 TABLET, FILM COATED ORAL at 22:21

## 2018-10-13 RX ADMIN — MORPHINE SULFATE 1 MILLIGRAM(S): 50 CAPSULE, EXTENDED RELEASE ORAL at 08:18

## 2018-10-13 RX ADMIN — OXYCODONE AND ACETAMINOPHEN 2 TABLET(S): 5; 325 TABLET ORAL at 00:06

## 2018-10-13 RX ADMIN — CEFEPIME 100 MILLIGRAM(S): 1 INJECTION, POWDER, FOR SOLUTION INTRAMUSCULAR; INTRAVENOUS at 13:16

## 2018-10-13 RX ADMIN — FAMOTIDINE 20 MILLIGRAM(S): 10 INJECTION INTRAVENOUS at 13:16

## 2018-10-13 RX ADMIN — Medication 150 MICROGRAM(S): at 05:14

## 2018-10-13 RX ADMIN — HEPARIN SODIUM 5000 UNIT(S): 5000 INJECTION INTRAVENOUS; SUBCUTANEOUS at 05:14

## 2018-10-13 RX ADMIN — BUDESONIDE AND FORMOTEROL FUMARATE DIHYDRATE 2 PUFF(S): 160; 4.5 AEROSOL RESPIRATORY (INHALATION) at 06:51

## 2018-10-13 RX ADMIN — Medication 81 MILLIGRAM(S): at 11:11

## 2018-10-13 RX ADMIN — OXYCODONE AND ACETAMINOPHEN 2 TABLET(S): 5; 325 TABLET ORAL at 14:53

## 2018-10-13 RX ADMIN — HYDROMORPHONE HYDROCHLORIDE 1 MILLIGRAM(S): 2 INJECTION INTRAMUSCULAR; INTRAVENOUS; SUBCUTANEOUS at 11:46

## 2018-10-13 RX ADMIN — INSULIN GLARGINE 55 UNIT(S): 100 INJECTION, SOLUTION SUBCUTANEOUS at 22:21

## 2018-10-13 RX ADMIN — Medication 16 UNIT(S): at 12:23

## 2018-10-13 RX ADMIN — CEFEPIME 100 MILLIGRAM(S): 1 INJECTION, POWDER, FOR SOLUTION INTRAMUSCULAR; INTRAVENOUS at 22:21

## 2018-10-13 RX ADMIN — OXYCODONE AND ACETAMINOPHEN 2 TABLET(S): 5; 325 TABLET ORAL at 05:16

## 2018-10-13 RX ADMIN — OXYCODONE AND ACETAMINOPHEN 2 TABLET(S): 5; 325 TABLET ORAL at 05:46

## 2018-10-13 RX ADMIN — Medication 25 MILLIGRAM(S): at 17:19

## 2018-10-13 RX ADMIN — BUDESONIDE AND FORMOTEROL FUMARATE DIHYDRATE 2 PUFF(S): 160; 4.5 AEROSOL RESPIRATORY (INHALATION) at 17:19

## 2018-10-13 RX ADMIN — MORPHINE SULFATE 1 MILLIGRAM(S): 50 CAPSULE, EXTENDED RELEASE ORAL at 08:48

## 2018-10-13 RX ADMIN — OXYCODONE AND ACETAMINOPHEN 2 TABLET(S): 5; 325 TABLET ORAL at 15:23

## 2018-10-13 RX ADMIN — HEPARIN SODIUM 5000 UNIT(S): 5000 INJECTION INTRAVENOUS; SUBCUTANEOUS at 13:16

## 2018-10-13 RX ADMIN — Medication 2: at 17:18

## 2018-10-13 RX ADMIN — CEFEPIME 100 MILLIGRAM(S): 1 INJECTION, POWDER, FOR SOLUTION INTRAMUSCULAR; INTRAVENOUS at 08:33

## 2018-10-13 RX ADMIN — Medication 16 UNIT(S): at 17:18

## 2018-10-13 RX ADMIN — HEPARIN SODIUM 5000 UNIT(S): 5000 INJECTION INTRAVENOUS; SUBCUTANEOUS at 22:21

## 2018-10-13 RX ADMIN — Medication 25 MILLIGRAM(S): at 05:14

## 2018-10-13 RX ADMIN — OXYCODONE AND ACETAMINOPHEN 2 TABLET(S): 5; 325 TABLET ORAL at 21:10

## 2018-10-13 RX ADMIN — OXYCODONE AND ACETAMINOPHEN 2 TABLET(S): 5; 325 TABLET ORAL at 22:00

## 2018-10-13 RX ADMIN — Medication 16 UNIT(S): at 08:17

## 2018-10-13 NOTE — PROGRESS NOTE ADULT - ASSESSMENT
ASSESMENT:     67M with history of left femoral endarterectomy and fem pop bypass (6/2017) and subsequent left femoral to peroneal bypass with PTFE (8/7/18) for claudication with known occlusion of bypass now with increased LLE pain.        - Regular diet   - Increased insulin for hyperglycemia, endocrine consult  - Continue home medications, ASA  - Start Cefepime for L LE cellulitis  - Hold nephrotoxic medications (losartan, lasix), renal consult   - medical and cardiology consults for risk assessment and optimization for eventual amputation  - Pain control, Neurontin retarted, NSAIDs on hold due to elev creat.  - Plan for BKA after optimization   -Foot dressing per podiatry     [id number]

## 2018-10-13 NOTE — PROGRESS NOTE ADULT - ATTENDING COMMENTS
severe rest pain.  extensive discussion with the patient.  he is agreeable to below knee amputation.  Needs clearance prior to surgery.  Will increase IV breakthrough pain regimen.

## 2018-10-13 NOTE — CONSULT NOTE ADULT - SUBJECTIVE AND OBJECTIVE BOX
HPI:  GENERAL SURGERY CONSULT NOTE  Attending: Soheila	  Service: Vascular surgery  Contact: [phone]    HPI  67M with CAD s/p three stents in , CABG x3 in 3/2016, left femoral endarterectomy and fem pop bypass (2017), left femoral to peroneal bypass with PTFE (18) for claudication, s/p left hallux amputation with podiatry with recent admission for LLE pain found to have occluded left fem to peroneal bypass.  Patient had undergone a diagnostic angiogram but it had to be aborted due to an anaphylactic reaction to IV contrast.  Patient was counseled that further revascularization would likely not be an option as the patient did not have good blood flow below the knee.  He presents today with increased LLE pain.  He has not seen Dr. Parnell recently.  His left hallux amputation has dehisced for which he has followed with Dr. Gregg.  He denies any associated symptoms such as paresthesias, numbness in the LLE.  No fevers, chills, SOB, chest pain, N/V/D.      In the ER he was hemodynamically stable and afebrile.  His labs revealed a normal WBC of 8.3, hyponatremia to 127 and hyperglycemia to 347.  He also has a new JOSE LUIS with a creatinine of 1.63.  His lactate was elevated at 3.1.  On exam he had bilateral palpable femoral pulses, signals in the RLE (DP/PT).  On the left he did not have any distal signals in the PT/DP/ or in the bypass.  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  The left foot is warm to touch and he does not report decreased sensation.    Patient has history of diabetes, on  basal bolus insulin at home, no recent hypoglycemic episodes, no polyuria polydipsia. Patient follows up with me for diabetes management.    PMH/PSH  COPD (chronic obstructive pulmonary disease)  Sleep apnea  Anemia  Atherosclerosis of arteries of extremities  Mild intermittent asthma without complication  Iron deficiency anemia, unspecified iron deficiency anemia type  Prostate cancer  Bronchospasm  Obesity (BMI 30-39.9)  PAD (peripheral artery disease)  CAD (coronary artery disease)  S/P prostatectomy  Hypothyroid  Intermittent claudication  Diabetic neuropathy  DM (diabetes mellitus)  CHF (congestive heart failure)  Hypercholesteremia  HTN (hypertension)    History of femoral angiogram  S/P bypass graft of extremity  S/P CABG x 3  Stented coronary artery  S/P prostatectomy      MEDICATIONS  aspirin enteric coated 81 milliGRAM(s) Oral daily  atorvastatin 80 milliGRAM(s) Oral at bedtime  buDESOnide 160 MICROgram(s)/formoterol 4.5 MICROgram(s) Inhaler 2 Puff(s) Inhalation two times a day  dextrose 40% Gel 15 Gram(s) Oral once PRN  dextrose 5%. 1000 milliLiter(s) IV Continuous <Continuous>  dextrose 50% Injectable 12.5 Gram(s) IV Push once  dextrose 50% Injectable 25 Gram(s) IV Push once  dextrose 50% Injectable 25 Gram(s) IV Push once  gabapentin 300 milliGRAM(s) Oral daily PRN  glucagon  Injectable 1 milliGRAM(s) IntraMuscular once PRN  heparin  Injectable 5000 Unit(s) SubCutaneous every 8 hours  insulin glargine Injectable (LANTUS) 55 Unit(s) SubCutaneous at bedtime  insulin lispro (HumaLOG) corrective regimen sliding scale   SubCutaneous three times a day before meals  insulin lispro Injectable (HumaLOG) 18 Unit(s) SubCutaneous three times a day before meals  levothyroxine 150 MICROGram(s) Oral daily  metoprolol tartrate 25 milliGRAM(s) Oral two times a day  oxyCODONE    IR 5 milliGRAM(s) Oral every 4 hours PRN  sodium chloride 0.9% Bolus 1000 milliLiter(s) IV Bolus once  sodium chloride 0.9%. 1000 milliLiter(s) IV Continuous <Continuous>      Allergies    contrast media (iodine-based) (Hypotension)    Intolerances        Social    Physical Exam  T(C): 36.7 (10-10-18 @ 15:32), Max: 36.8 (10-10-18 @ 14:18)  HR: 71 (10-10-18 @ 15:32) (71 - 76)  BP: 160/75 (10-10-18 @ 15:32) (132/64 - 160/75)  RR: 18 (10-10-18 @ 15:32) (18 - 20)  SpO2: 100% (10-10-18 @ 15:32) (100% - 100%)  Wt(kg): --  Tmax: T(C): , Max: 36.8 (10-10-18 @ 14:18)  Wt(kg): --      Physical exam:  See HPI       LABS                        11.5   8.3   )-----------( 374      ( 10 Oct 2018 15:30 )             35.8     10-10    127<L>  |  89<L>  |  72<H>  ----------------------------<  346<H>  4.4   |  19<L>  |  1.63<H>    Ca    9.8      10 Oct 2018 15:30    TPro  8.6<H>  /  Alb  4.2  /  TBili  0.3  /  DBili  x   /  AST  11  /  ALT  9<L>  /  AlkPhos  106  10-10    PT/INR - ( 10 Oct 2018 15:30 )   PT: 11.6 sec;   INR: 1.06 ratio         PTT - ( 10 Oct 2018 15:30 )  PTT:31.6 sec  Urinalysis Basic - ( 10 Oct 2018 16:59 )    Color: Light Yellow / Appearance: Clear / S.012 / pH: x  Gluc: x / Ketone: Negative  / Bili: Negative / Urobili: Negative   Blood: x / Protein: Negative / Nitrite: Negative   Leuk Esterase: Negative / RBC: x / WBC x   Sq Epi: x / Non Sq Epi: x / Bacteria: x (10 Oct 2018 17:29)      PAST MEDICAL & SURGICAL HISTORY:  COPD (chronic obstructive pulmonary disease)  Sleep apnea: non-compliant  Anemia  Atherosclerosis of arteries of extremities: left leg  Mild intermittent asthma without complication  Iron deficiency anemia, unspecified iron deficiency anemia type  Prostate cancer  Bronchospasm  Obesity (BMI 30-39.9)  PAD (peripheral artery disease): RLE bypass 11/30/15  CAD (coronary artery disease): 3 cardiac stents placed in  (mid LAD, Prox LAD, Prox to #2), 3V CABG 3/28/16  Hypothyroid  Intermittent claudication  Diabetic neuropathy  DM (diabetes mellitus): type 2 - on insulin pump  CHF (congestive heart failure)  Hypercholesteremia  HTN (hypertension)  History of femoral angiogram: Left femoral endarterectomy Fem-Pop bypass 2017  S/P bypass graft of extremity: RLE- 11/30/15  S/P CABG x 3: 3/28/16  Stented coronary artery: x 3 cardiac stents placed in   S/P prostatectomy:       FAMILY HISTORY:  No pertinent family history in first degree relatives      Social History:    Outpatient Medications:    MEDICATIONS  (STANDING):  aspirin enteric coated 81 milliGRAM(s) Oral daily  atorvastatin 80 milliGRAM(s) Oral at bedtime  buDESOnide 160 MICROgram(s)/formoterol 4.5 MICROgram(s) Inhaler 2 Puff(s) Inhalation two times a day  cefepime   IVPB 1000 milliGRAM(s) IV Intermittent every 8 hours  cefepime   IVPB      collagenase Ointment 1 Application(s) Topical daily  dextrose 5%. 1000 milliLiter(s) (50 mL/Hr) IV Continuous <Continuous>  dextrose 50% Injectable 12.5 Gram(s) IV Push once  dextrose 50% Injectable 25 Gram(s) IV Push once  dextrose 50% Injectable 25 Gram(s) IV Push once  famotidine    Tablet 20 milliGRAM(s) Oral two times a day  heparin  Injectable 5000 Unit(s) SubCutaneous every 8 hours  insulin glargine Injectable (LANTUS) 55 Unit(s) SubCutaneous at bedtime  insulin lispro (HumaLOG) corrective regimen sliding scale   SubCutaneous Before meals and at bedtime  insulin lispro Injectable (HumaLOG) 16 Unit(s) SubCutaneous three times a day before meals  levothyroxine 150 MICROGram(s) Oral daily  metoprolol tartrate 25 milliGRAM(s) Oral two times a day    MEDICATIONS  (PRN):  dextrose 40% Gel 15 Gram(s) Oral once PRN Blood Glucose LESS THAN 70 milliGRAM(s)/deciliter  gabapentin 300 milliGRAM(s) Oral daily PRN moderate pain  glucagon  Injectable 1 milliGRAM(s) IntraMuscular once PRN Glucose LESS THAN 70 milligrams/deciliter  HYDROmorphone  Injectable 1 milliGRAM(s) IV Push every 4 hours PRN Severe Pain (7 - 10)  oxyCODONE    5 mG/acetaminophen 325 mG 1 Tablet(s) Oral every 4 hours PRN Moderate Pain (4 - 6)  oxyCODONE    5 mG/acetaminophen 325 mG 2 Tablet(s) Oral every 4 hours PRN Severe Pain (7 - 10)  polyethylene glycol 3350 17 Gram(s) Oral daily PRN Constipation      Allergies    contrast media (iodine-based) (Hypotension)    Intolerances      Review of Systems:  Constitutional: No fever, no chills  Eyes: No blurry vision  Neuro: No tremors  HEENT: No pain, no neck swelling  Cardiovascular: No chest pain, no palpitations  Respiratory: Has SOB, no cough  GI: No nausea, vomiting, abdominal pain  : No dysuria  Skin: no rash  MSK: Has leg swelling.  Psych: no depression  Endocrine: no polyuria, polydipsia    ALL OTHER SYSTEMS REVIEWED AND NEGATIVE    UNABLE TO OBTAIN    PHYSICAL EXAM:  VITALS: T(C): 36.4 (10-13-18 @ 17:13)  T(F): 97.6 (10-13-18 @ 17:13), Max: 98.5 (10-12-18 @ 21:20)  HR: 88 (10-13-18 @ 17:13) (65 - 88)  BP: 149/64 (10-13-18 @ 17:13) (108/63 - 159/68)  RR:  (18 - 18)  SpO2:  (97% - 99%)  Wt(kg): --  GENERAL: NAD, well-groomed, well-developed  EYES: No proptosis, no lid lag  HEENT:  Atraumatic, Normocephalic  THYROID: Normal size, no palpable nodules  RESPIRATORY: Clear to auscultation bilaterally; No rales, rhonchi, wheezing  CARDIOVASCULAR: Si S2, No murmurs;  GI: Soft, non distended, normal bowel sounds  SKIN: Dry, intact, No rashes or lesions  MUSCULOSKELETAL: Has BL lower extremity edema.  NEURO:  no tremor, sensation decreased in feet BL,    POCT Blood Glucose.: 156 mg/dL (10-13-18 @ 12:21)  POCT Blood Glucose.: 90 mg/dL (10-13-18 @ 08:06)  POCT Blood Glucose.: 235 mg/dL (10-12-18 @ 21:17)  POCT Blood Glucose.: 244 mg/dL (10-12-18 @ 17:13)  POCT Blood Glucose.: 167 mg/dL (10-12-18 @ 13:24)  POCT Blood Glucose.: 159 mg/dL (10-12-18 @ 09:20)  POCT Blood Glucose.: 233 mg/dL (10-11-18 @ 21:59)  POCT Blood Glucose.: 174 mg/dL (10-11-18 @ 18:24)  POCT Blood Glucose.: 122 mg/dL (10-11-18 @ 12:19)  POCT Blood Glucose.: 82 mg/dL (10-11-18 @ 07:59)  POCT Blood Glucose.: 194 mg/dL (10-11-18 @ 01:23)  POCT Blood Glucose.: 332 mg/dL (10-10-18 @ 21:14)  POCT Blood Glucose.: 362 mg/dL (10-10-18 @ 20:19)  POCT Blood Glucose.: 338 mg/dL (10-10-18 @ 20:08)                            10.6   5.7   )-----------( 359      ( 13 Oct 2018 06:39 )             32.6       10-13    136  |  101  |  25<H>  ----------------------------<  101<H>  4.6   |  22  |  1.18    EGFR if : 72  EGFR if non : 62    Ca    9.6      10-13  Mg     2.0     10-13  Phos  3.7     10-13        Thyroid Function Tests:      Hemoglobin A1C, Whole Blood: 8.0 % <H> [4.0 - 5.6] (10-11-18 @ 08:10)  Hemoglobin A1C, Whole Blood: 9.1 % <H> [4.0 - 5.6] (18 @ 05:16)          Radiology:

## 2018-10-13 NOTE — CONSULT NOTE ADULT - ASSESSMENT
Assessment  DMT2: 68y Male with DM T2 with hyperglycemia on insulin basal bolus blood sugars fluctuating, no hypoglycemic episode,  eating meals,  non compliant with low carb diet.  CAD: On medications, stable, monitored.  Hypothyroidism:  On synthroid 150 mcg po daily, asymptomatic.  HTN: Controlled, On med.  Foot Wound: Planing amputation foot, Sx team FU

## 2018-10-13 NOTE — PROGRESS NOTE ADULT - SUBJECTIVE AND OBJECTIVE BOX
MARK CASTAÑEDA  [id number]    Subjective:    Patient seen and evaluated by vascular team, no acute events overnight.   Patients pain improved with pain medications, no other complaints.       Objective:  T(C): 36.6 (10-13-18 @ 04:58), Max: 36.9 (10-12-18 @ 21:20)  HR: 75 (10-13-18 @ 04:58) (65 - 82)  BP: 159/68 (10-13-18 @ 04:58) (116/64 - 159/68)  RR: 18 (10-13-18 @ 04:58) (17 - 18)  SpO2: 99% (10-13-18 @ 04:58) (98% - 99%)  Wt(kg): --   10-13    136  |  101  |  25<H>  ----------------------------<  101<H>  4.6   |  22  |  1.18    Ca    9.6      13 Oct 2018 06:39  Phos  3.7     10-13  Mg     2.0     10-13                          10.6   5.7   )-----------( 359      ( 13 Oct 2018 06:39 )             32.6       10-12 @ 07:01  -  10-13 @ 07:00  --------------------------------------------------------  IN: 1970 mL / OUT: 1900 mL / NET: 70 mL      PHYSICAL EXAM:    >> General: Well-developed. Well-nourished. No acute distress.  >> Neck: Supple.  >> Abdomen: Soft. Non-tender. Non-distended.  >> Extremities: L LE with erythema from foot to below knee, warm to touch. No tenderness. Dressing over L foot in place, no strikethrough, replaced by podiatry.             MEDICATIONS  (STANDING):  aspirin enteric coated 81 milliGRAM(s) Oral daily  atorvastatin 80 milliGRAM(s) Oral at bedtime  buDESOnide 160 MICROgram(s)/formoterol 4.5 MICROgram(s) Inhaler 2 Puff(s) Inhalation two times a day  cefepime   IVPB 1000 milliGRAM(s) IV Intermittent once  cefepime   IVPB 1000 milliGRAM(s) IV Intermittent every 8 hours  cefepime   IVPB      collagenase Ointment 1 Application(s) Topical daily  dextrose 5%. 1000 milliLiter(s) (50 mL/Hr) IV Continuous <Continuous>  dextrose 50% Injectable 12.5 Gram(s) IV Push once  dextrose 50% Injectable 25 Gram(s) IV Push once  dextrose 50% Injectable 25 Gram(s) IV Push once  heparin  Injectable 5000 Unit(s) SubCutaneous every 8 hours  insulin glargine Injectable (LANTUS) 55 Unit(s) SubCutaneous at bedtime  insulin lispro (HumaLOG) corrective regimen sliding scale   SubCutaneous Before meals and at bedtime  insulin lispro Injectable (HumaLOG) 16 Unit(s) SubCutaneous three times a day before meals  levothyroxine 150 MICROGram(s) Oral daily  metoprolol tartrate 25 milliGRAM(s) Oral two times a day    MEDICATIONS  (PRN):  dextrose 40% Gel 15 Gram(s) Oral once PRN Blood Glucose LESS THAN 70 milliGRAM(s)/deciliter  gabapentin 300 milliGRAM(s) Oral daily PRN moderate pain  glucagon  Injectable 1 milliGRAM(s) IntraMuscular once PRN Glucose LESS THAN 70 milligrams/deciliter  morphine  - Injectable 1 milliGRAM(s) IV Push every 4 hours PRN Severe Pain (7 - 10)  oxyCODONE    5 mG/acetaminophen 325 mG 1 Tablet(s) Oral every 4 hours PRN Moderate Pain (4 - 6)  oxyCODONE    5 mG/acetaminophen 325 mG 2 Tablet(s) Oral every 4 hours PRN Severe Pain (7 - 10)  polyethylene glycol 3350 17 Gram(s) Oral daily PRN Constipation

## 2018-10-13 NOTE — CONSULT NOTE ADULT - ASSESSMENT
68M PMH CHF (TTE 5/2017 EF 50%, TTE 3/2016 Stage I diastolic dysfunction), HTN, DM2 (on insulin, A1c 8% on 10/11), hypothyroidism, CAD (s/p three stents in 2006, CABG x3 in 3/2016), PAD (s/p L femoral endarterectomy and fem pop bypass in 6/2017, L femoral to peroneal bypass with PTFE in 8/7/18, L hallux amputation) and recent admission for LLE pain in the setting of an occluded L fem to peroneal bypass c/b anaphylaxis 2/2 IV contrast, who presented for this admission on 10/10/18 with increased LLE pain, found to have flow-limiting disease and cellulitis with plan for BKA 68M PMH CHF (TTE 5/2017 EF 50%, TTE 3/2016 Stage I diastolic dysfunction), HTN, DM2 (on insulin, A1c 8% on 10/11), hypothyroidism, CAD (s/p three stents in 2006, CABG x3 in 3/2016), PAD (s/p L femoral endarterectomy and fem pop bypass in 6/2017, L femoral to peroneal bypass with PTFE in 8/7/18, L hallux amputation) and recent admission for LLE pain in the setting of an occluded L fem to peroneal bypass c/b anaphylaxis 2/2 IV contrast, who presented for this admission on 10/10/18 with increased LLE pain, found to have flow-limiting disease and cellulitis with plan for BKA by vascular surgery. Patient is high risk for likely high risk procedure. Follows with Dr. Chico Ferro for cardiology, will need cardiology optimization/evaluation before procedure. RCRI score tentatively around 4. Difficulty assessing exertional status given lower extremity issues.  -Service Cardiology consult for evaluation 68M PMH CHF (TTE 5/2017 EF 50%, TTE 3/2016 Stage I diastolic dysfunction), HTN, DM2 (on insulin, A1c 8% on 10/11), hypothyroidism, CAD (s/p three stents in 2006, CABG x3 in 3/2016), PAD (s/p L femoral endarterectomy and fem pop bypass in 6/2017, L femoral to peroneal bypass with PTFE in 8/7/18, L hallux amputation) and recent admission for LLE pain in the setting of an occluded L fem to peroneal bypass c/b anaphylaxis 2/2 IV contrast, who presented for this admission on 10/10/18 with increased LLE pain, found to have flow-limiting disease and cellulitis with plan for BKA by vascular surgery. Patient is high risk for likely high risk procedure. Follows with Dr. Chico Ferro for cardiology, will need cardiology optimization/evaluation before procedure. RCRI score tentatively around 4. Difficulty assessing exertional status given lower extremity issues. JOSE LUIS appears to be improving. Of note, pt states he was placed on coumadin for 3 months after a procedure with Dr. Loya but is now off coumadin.  -Service Cardiology consult for clearance  -Appreciate endocrine recommendations, cont. current insulin regimen for now, F/u further endocrine recommendations  -Cont. Cefepime  -Cont. other home meds including BB  -DVT PPx, Hep subq    I have seen and examined the patient. I agree with the above note and have edited it as appropriate.

## 2018-10-13 NOTE — CONSULT NOTE ADULT - SUBJECTIVE AND OBJECTIVE BOX
68M PMH HTN, DM2 (on insulin, A1c 8% on 10/11), hypothyroidism, CAD (s/p three stents in 2006, CABG x3 in 3/2016), PAD (s/p L femoral endarterectomy and fem pop bypass in 6/2017, L femoral to peroneal bypass with PTFE in 8/7/18, L hallux amputation) and recent admission for LLE pain in the setting of an occluded L fem to peroneal bypass c/b anaphylaxis 2/2 IV contrast, who presented for this admission on 10/10/18 with increased LLE pain. He denies any associated symptoms such as paresthesias, numbness in the LLE.  No fevers, chills, SOB, chest pain, N/V/D.    In the ED he was HDS and afebrile with normal WBC and labs remarkable for hyponatremia Na 127, serum glucose 347, Cr 1.63 (baseline ~1.2), and lactate 3.1. He was found not to have distal doppler signals on L PT/DP or in the bypass and noted to have dry gangrene over the hallux amputation site.    TTE 5/2017   The apex is  hypokinetic. Grossly borderline   LV systolic function, EF  by visual estimate about  50% Normal left ventricular  internal dimensions and wall thicknesses. Reversal of the      TTE 3/2016  EF=50-55%. The apex is hypokinetic. Mild concentric left  ventricular hypertrophy. Mild diastolic dysfunction (Stage  I).    A1c 8% 10/11    Cr at baseline, eGFR 62, clearance 66.8  BCx neg  MEENA/PVR 10/11  Impression: Interval progression of flow-limiting disease in both lower   extremities, localizing to the femoropopliteal and infrapopliteal levels   bilaterally. Suggest duplex evaluation to evaluate for patency of lower   extremity bypass grafts.    X-ray foot  IMPRESSION:  Redemonstrated amputation of the first ray at the distal metatarsal with   ossific densities adjacent to the site of amputation, stable since the   prior exam. Sharp surgical margins without any cortical erosions.  No radiopaque foreign bodies, or gross radiographic evidence for   osteomyelitis.  Plantar and superior-posterior calcaneal enthesophyte. Vascular   calcifications.     EKG NSR w/1st degree AV block     Vasc  - Regular diet   - Increased insulin for hyperglycemia, endocrine consult  - Continue home medications, ASA  - Start Cefepime for L LE cellulitis  - Hold nephrotoxic medications (losartan, lasix), renal consult   - medical and cardiology consults for risk assessment and optimization for eventual amputation  - Pain control, Neurontin retarted, NSAIDs on hold due to elev creat.  - Plan for BKA after optimization   -Foot dressing per podiatry     Pod  - Pt seen and eval  - occlusion of bypass graft  - likely going to get BKA, still awaiting pt decision   - Pain meds PRN  - no plans for podiatric surgical intervention   - local wound care  - d/w attending.     RCRI 3-4 68M PMH HTN, DM2 (on insulin, A1c 8% on 10/11), hypothyroidism, CAD (s/p three stents in 2006, CABG x3 in 3/2016), PAD (s/p L femoral endarterectomy and fem pop bypass in 6/2017, L femoral to peroneal bypass with PTFE in 8/7/18, L hallux amputation) and recent admission for LLE pain in the setting of an occluded L fem to peroneal bypass c/b anaphylaxis 2/2 IV contrast, who presented for this admission on 10/10/18 with increased LLE pain. He denies any associated symptoms such as paresthesias, numbness in the LLE.  No fevers, chills, SOB, chest pain, N/V/D.    In the ED he was HDS and afebrile with normal WBC and labs remarkable for hyponatremia Na 127, serum glucose 347, Cr 1.63 (baseline ~1.2), and lactate 3.1. He was found not to have distal doppler signals on L PT/DP or in the bypass and noted to have dry gangrene over the hallux amputation site. MARK CASTAÑEDA  68y  Male      Patient is a 68y old  Male who presents with a chief complaint of Foot gangrene (13 Oct 2018 19:46)    68M PMH CHF (TTE 5/2017 EF 50%, TTE 3/2016 Stage I diastolic dysfunction), HTN, DM2 (on insulin, A1c 8% on 10/11), hypothyroidism, CAD (s/p three stents in 2006, CABG x3 in 3/2016), PAD (s/p L femoral endarterectomy and fem pop bypass in 6/2017, L femoral to peroneal bypass with PTFE in 8/7/18, L hallux amputation) and recent admission for LLE pain in the setting of an occluded L fem to peroneal bypass c/b anaphylaxis 2/2 IV contrast, who presented for this admission on 10/10/18 with increased LLE pain. He denies any associated symptoms such as paresthesias, numbness in the LLE.  No fevers, chills, SOB, chest pain, N/V/D.    In the ED he was HDS and afebrile with normal WBC and labs remarkable for hyponatremia Na 127, serum glucose 347, Cr 1.63 (baseline ~1.2), and lactate 3.1. He was found not to have distal doppler signals on L PT/DP or in the bypass and noted to have dry gangrene over the hallux amputation site.  His hospital course has been complicated by JOSE LUIS now resolved (Cr at baseline, Cr Clearance 67), blood cultures negative w/o fevers. MEENA/PVR on 10/11 that demonstrated interval progression of flow-limiting disease in both lower   extremities, localizing to the femoropopliteal and infrapopliteal levels bilaterally. X-ray of the foot demonstrated no evidence of osteomyelitis. Patient on Cefepime for cellulitis.  EKG with NSR w/1st degree HB (). Endocrinology following for diabetic control. Patient planned for L BKA and medicine called for evaluation.    Patient reports that he does not have any chest pain or shortness of breath at rest. He is able to walk 2-4 blocks at which point he is limited by shortness of breath and chest pressure.       PAST MEDICAL/SURGICAL HISTORY  PAST MEDICAL & SURGICAL HISTORY:  COPD (chronic obstructive pulmonary disease)  Sleep apnea: non-compliant  Anemia  Atherosclerosis of arteries of extremities: left leg  Mild intermittent asthma without complication  Iron deficiency anemia, unspecified iron deficiency anemia type  Prostate cancer  Bronchospasm  Obesity (BMI 30-39.9)  PAD (peripheral artery disease): RLE bypass 11/30/15  CAD (coronary artery disease): 3 cardiac stents placed in 2006 (mid LAD, Prox LAD, Prox to #2), 3V CABG 3/28/16  Hypothyroid  Intermittent claudication  Diabetic neuropathy  DM (diabetes mellitus): type 2 - on insulin pump  CHF (congestive heart failure)  Hypercholesteremia  HTN (hypertension)  History of femoral angiogram: Left femoral endarterectomy Fem-Pop bypass 6/2017  S/P bypass graft of extremity: RLE- 11/30/15  S/P CABG x 3: 3/28/16  Stented coronary artery: x 3 cardiac stents placed in 2006  S/P prostatectomy: 1996      REVIEW OF SYSTEMS:  CONSTITUTIONAL: No fever, weight loss, or fatigue  EYES: No eye pain, visual disturbances, or discharge  ENMT:  No difficulty hearing, tinnitus, vertigo; No sinus or throat pain  NECK: No pain or stiffness  BREASTS: No pain, masses, or nipple discharge  RESPIRATORY: No cough, wheezing, chills or hemoptysis; No shortness of breath  CARDIOVASCULAR: No chest pain, palpitations, dizziness, or leg swelling  GASTROINTESTINAL: No abdominal or epigastric pain. No nausea, vomiting, or hematemesis; No diarrhea or constipation. No melena or hematochezia.  GENITOURINARY: No dysuria, frequency, hematuria, or incontinence  NEUROLOGICAL: No headaches, memory loss, loss of strength, numbness, or tremors  SKIN: No itching, burning, rashes, or lesions   LYMPH NODES: No enlarged glands  ENDOCRINE: No heat or cold intolerance; No hair loss  MUSCULOSKELETAL: No joint pain or swelling; No muscle, back, or extremity pain  PSYCHIATRIC: No depression, anxiety, mood swings, or difficulty sleeping  HEME/LYMPH: No easy bruising, or bleeding gums  ALLERY AND IMMUNOLOGIC: No hives or eczema    T(C): 36.4 (10-13-18 @ 17:13), Max: 36.9 (10-12-18 @ 21:20)  HR: 88 (10-13-18 @ 17:13) (65 - 88)  BP: 149/64 (10-13-18 @ 17:13) (108/63 - 159/68)  RR: 18 (10-13-18 @ 17:13) (18 - 18)  SpO2: 98% (10-13-18 @ 17:13) (97% - 99%)  Wt(kg): --Vital Signs Last 24 Hrs  T(C): 36.4 (13 Oct 2018 17:13), Max: 36.9 (12 Oct 2018 21:20)  T(F): 97.6 (13 Oct 2018 17:13), Max: 98.5 (12 Oct 2018 21:20)  HR: 88 (13 Oct 2018 17:13) (65 - 88)  BP: 149/64 (13 Oct 2018 17:13) (108/63 - 159/68)  BP(mean): --  RR: 18 (13 Oct 2018 17:13) (18 - 18)  SpO2: 98% (13 Oct 2018 17:13) (97% - 99%)    PHYSICAL EXAM:  GENERAL: NAD, well-groomed, well-developed  HEAD:  Atraumatic, Normocephalic  EYES: EOMI, PERRLA, conjunctiva and sclera clear  ENMT: No tonsillar erythema, exudates, or enlargement; Moist mucous membranes, Good dentition, No lesions  NECK: Supple, No JVD, Normal thyroid  NERVOUS SYSTEM:  Alert & Oriented X3, Good concentration; Motor Strength 5/5 B/L upper and lower extremities; DTRs 2+ intact and symmetric  CHEST/LUNG: Clear to percussion bilaterally; No rales, rhonchi, wheezing, or rubs  HEART: Regular rate and rhythm; No murmurs, rubs, or gallops  ABDOMEN: Soft, Nontender, Nondistended; Bowel sounds present  EXTREMITIES:  2+ Peripheral Pulses, No clubbing, cyanosis, or edema  LYMPH: No lymphadenopathy noted  SKIN: No rashes or lesions    Consultant(s) Notes Reviewed:  [x ] YES  [ ] NO  Care Discussed with Consultants/Other Providers [ x] YES  [ ] NO    LABS:  CBC   10-13-18 @ 06:39  Hematcorit 32.6  Hemoglobin 10.6  Mean Cell Hemoglobin 27.6  Platelet Count-Automated 359  RBC Count 3.82  Red Cell Distrib Width 15.2  Wbc Count 5.7      BMP  10-13-18 @ 06:39  Anion Gap. Serum 13  Blood Urea Nitrogen,Serm 25  Calcium, Total Serum 9.6  Carbon Dioxide, Serum 22  Chloride, Serum 101  Creatinine, Serum 1.18  eGFR in  72  eGFR in Non Afican American 62  Gloucose, serum 101  Potassium, Serum 4.6  Sodium, Serum 136            CMP  10-13-18 @ 06:39  Minnie Aminotransferase(ALT/SGPT)--  Albumin, Serum --  Alkaline Phosphatase, Serum --  Anion Gap, Serum 13  Aspartate Aminotransferase (AST/SGOT)--  Bilirubin Total, Serum --  Blood Urea Nitrogen, Serum 25  Calcium,Total Serum 9.6  Carbon Dioxide, Serum 22  Chloride, Serum 101  Creatinine, Serum 1.18  eGFR if  72  eGFR if Non African American 62  Glucose, Serum 101  Potassium, Serum 4.6  Protein Total, Serum --  Sodium, Serum 136                    PT/INR      Amylase/Lipase            RADIOLOGY & ADDITIONAL TESTS:    Imaging Personally Reviewed:  [ ] YES  [ ] NO MARK CASTAÑEDA  68y  Male      Patient is a 68y old  Male who presents with a chief complaint of Foot gangrene (13 Oct 2018 19:46)    68M PMH CHF (TTE 5/2017 EF 50%, TTE 3/2016 Stage I diastolic dysfunction), HTN, DM2 (on insulin, A1c 8% on 10/11), hypothyroidism, CAD (s/p three stents in 2006, CABG x3 in 3/2016), PAD (s/p L femoral endarterectomy and fem pop bypass in 6/2017, L femoral to peroneal bypass with PTFE in 8/7/18, L hallux amputation) and recent admission for LLE pain in the setting of an occluded L fem to peroneal bypass c/b anaphylaxis 2/2 IV contrast, who presented for this admission on 10/10/18 with increased LLE pain. He denies any associated symptoms such as paresthesias, numbness in the LLE.  No fevers, chills, SOB, chest pain, N/V/D.    In the ED he was HDS and afebrile with normal WBC and labs remarkable for hyponatremia Na 127, serum glucose 347, Cr 1.63 (baseline ~1.2), and lactate 3.1. He was found not to have distal doppler signals on L PT/DP or in the bypass and noted to have dry gangrene over the hallux amputation site.  His hospital course has been complicated by JOSE LUIS now resolved (Cr at baseline, Cr Clearance 67), blood cultures negative w/o fevers. MEENA/PVR on 10/11 that demonstrated interval progression of flow-limiting disease in both lower   extremities, localizing to the femoropopliteal and infrapopliteal levels bilaterally. X-ray of the foot demonstrated no evidence of osteomyelitis. Patient on Cefepime for cellulitis.  EKG with NSR w/1st degree HB (). Endocrinology following for diabetic control. Patient planned for L BKA and medicine called for evaluation.    Patient reports that he does not have any chest pain or shortness of breath at rest. He is able to walk 2-4 blocks at which point he is limited by shortness of breath and chest pressure.       PAST MEDICAL/SURGICAL HISTORY  PAST MEDICAL & SURGICAL HISTORY:  COPD (chronic obstructive pulmonary disease)  Sleep apnea: non-compliant  Anemia  Atherosclerosis of arteries of extremities: left leg  Mild intermittent asthma without complication  Iron deficiency anemia, unspecified iron deficiency anemia type  Prostate cancer  Bronchospasm  Obesity (BMI 30-39.9)  PAD (peripheral artery disease): RLE bypass 11/30/15  CAD (coronary artery disease): 3 cardiac stents placed in 2006 (mid LAD, Prox LAD, Prox to #2), 3V CABG 3/28/16  Hypothyroid  Intermittent claudication  Diabetic neuropathy  DM (diabetes mellitus): type 2 - on insulin pump  CHF (congestive heart failure)  Hypercholesteremia  HTN (hypertension)  History of femoral angiogram: Left femoral endarterectomy Fem-Pop bypass 6/2017  S/P bypass graft of extremity: RLE- 11/30/15  S/P CABG x 3: 3/28/16  Stented coronary artery: x 3 cardiac stents placed in 2006  S/P prostatectomy: 1996      REVIEW OF SYSTEMS:  CONSTITUTIONAL: No fever or chills  EYES: No eye pain, visual disturbances, or discharge  ENMT: No sinus or throat pain  NECK: No pain or stiffness  RESPIRATORY: No cough, wheezing, chills or hemoptysis; No shortness of breath  CARDIOVASCULAR: Chronic leg swelling, +GARCIA, no SOB/CP/palpitations  GASTROINTESTINAL: No abdominal or epigastric pain. No nausea, vomiting, or hematemesis; No diarrhea or constipation. No melena or hematochezia.  GENITOURINARY: No dysuria, frequency, hematuria, or incontinence  NEUROLOGICAL: No headaches, memory loss, loss of strength, numbness, or tremors  SKIN: No itching, burning, rashes, or lesions   ENDOCRINE: No heat or cold intolerance; No hair loss  MUSCULOSKELETAL: + L foot pain  HEME/LYMPH: No easy bruising, or bleeding gums  ALLERY AND IMMUNOLOGIC: No hives or eczema    T(C): 36.4 (10-13-18 @ 17:13), Max: 36.9 (10-12-18 @ 21:20)  HR: 88 (10-13-18 @ 17:13) (65 - 88)  BP: 149/64 (10-13-18 @ 17:13) (108/63 - 159/68)  RR: 18 (10-13-18 @ 17:13) (18 - 18)  SpO2: 98% (10-13-18 @ 17:13) (97% - 99%)  Wt(kg): --Vital Signs Last 24 Hrs  T(C): 36.4 (13 Oct 2018 17:13), Max: 36.9 (12 Oct 2018 21:20)  T(F): 97.6 (13 Oct 2018 17:13), Max: 98.5 (12 Oct 2018 21:20)  HR: 88 (13 Oct 2018 17:13) (65 - 88)  BP: 149/64 (13 Oct 2018 17:13) (108/63 - 159/68)  BP(mean): --  RR: 18 (13 Oct 2018 17:13) (18 - 18)  SpO2: 98% (13 Oct 2018 17:13) (97% - 99%)    PHYSICAL EXAM:  GENERAL: NAD, A&Ox4  HEAD:  Atraumatic, Normocephalic  EYES: EOMI, PERRLA, conjunctiva and sclera clear  ENMT: Moist mucous membranes  NERVOUS SYSTEM:  Alert & Oriented X3, Good concentration;   CHEST/LUNG: Clear to percussion bilaterally; No rales, rhonchi, wheezing, or rubs  HEART: Regular rate and rhythm; No murmurs, rubs, or gallops  ABDOMEN: Soft, Nontender, Nondistended; Bowel sounds present  EXTREMITIES:  2+ Peripheral Pulses, No clubbing, cyanosis, or edema  LYMPH: No lymphadenopathy noted  SKIN: No rashes or lesions    Consultant(s) Notes Reviewed:  [x ] YES  [ ] NO  Care Discussed with Consultants/Other Providers [ x] YES  [ ] NO    LABS:  CBC   10-13-18 @ 06:39  Hematcorit 32.6  Hemoglobin 10.6  Mean Cell Hemoglobin 27.6  Platelet Count-Automated 359  RBC Count 3.82  Red Cell Distrib Width 15.2  Wbc Count 5.7      BMP  10-13-18 @ 06:39  Anion Gap. Serum 13  Blood Urea Nitrogen,Serm 25  Calcium, Total Serum 9.6  Carbon Dioxide, Serum 22  Chloride, Serum 101  Creatinine, Serum 1.18  eGFR in  72  eGFR in Non Afican American 62  Gloucose, serum 101  Potassium, Serum 4.6  Sodium, Serum 136            CMP  10-13-18 @ 06:39  Minnie Aminotransferase(ALT/SGPT)--  Albumin, Serum --  Alkaline Phosphatase, Serum --  Anion Gap, Serum 13  Aspartate Aminotransferase (AST/SGOT)--  Bilirubin Total, Serum --  Blood Urea Nitrogen, Serum 25  Calcium,Total Serum 9.6  Carbon Dioxide, Serum 22  Chloride, Serum 101  Creatinine, Serum 1.18  eGFR if  72  eGFR if Non African American 62  Glucose, Serum 101  Potassium, Serum 4.6  Protein Total, Serum --  Sodium, Serum 136                    PT/INR      Amylase/Lipase            RADIOLOGY & ADDITIONAL TESTS:    Imaging Personally Reviewed:  [ ] YES  [ ] NO Patient is a 68y old  Male who presents with a chief complaint of Foot gangrene    68M PMH CHF (TTE 5/2017 EF 50%, TTE 3/2016 Stage I diastolic dysfunction), HTN, DM2 (on insulin, A1c 8% on 10/11), hypothyroidism, CAD (s/p three stents in 2006, CABG x3 in 3/2016), PAD (s/p L femoral endarterectomy and fem pop bypass in 6/2017, L femoral to peroneal bypass with PTFE in 8/7/18, L hallux amputation) and recent admission for LLE pain in the setting of an occluded L fem to peroneal bypass c/b anaphylaxis 2/2 IV contrast, who presented for this admission on 10/10/18 with increased LLE pain. He denies any associated symptoms such as paresthesias, numbness in the LLE.  No fevers, chills, SOB, chest pain, N/V/D.    In the ED he was HDS and afebrile with normal WBC and labs remarkable for hyponatremia Na 127, serum glucose 347, Cr 1.63 (baseline ~1.2), and lactate 3.1. He was found not to have distal doppler signals on L PT/DP or in the bypass and noted to have dry gangrene over the hallux amputation site.  His hospital course has been complicated by JOSE LUIS now resolved (Cr at baseline, Cr Clearance 67), blood cultures negative w/o fevers. MEENA/PVR on 10/11 that demonstrated interval progression of flow-limiting disease in both lower   extremities, localizing to the femoropopliteal and infrapopliteal levels bilaterally. X-ray of the foot demonstrated no evidence of osteomyelitis. Patient on Cefepime for cellulitis.  EKG with NSR w/1st degree HB (). Endocrinology following for diabetic control. Patient planned for L BKA and medicine called for evaluation.    Patient reports that he does not have any chest pain or shortness of breath at rest. He is able to walk 2-4 blocks at which point he is limited by shortness of breath and chest pressure.     Allergy: IV contrast    PAST MEDICAL/SURGICAL HISTORY  PAST MEDICAL & SURGICAL HISTORY:  COPD (chronic obstructive pulmonary disease)  Sleep apnea: non-compliant  Anemia  Atherosclerosis of arteries of extremities: left leg  Mild intermittent asthma without complication  Iron deficiency anemia, unspecified iron deficiency anemia type  Prostate cancer  Bronchospasm  Obesity (BMI 30-39.9)  PAD (peripheral artery disease): RLE bypass 11/30/15  CAD (coronary artery disease): 3 cardiac stents placed in 2006 (mid LAD, Prox LAD, Prox to #2), 3V CABG 3/28/16  Hypothyroid  Intermittent claudication  Diabetic neuropathy  DM (diabetes mellitus): type 2 - on insulin pump  CHF (congestive heart failure)  Hypercholesteremia  HTN (hypertension)  History of femoral angiogram: Left femoral endarterectomy Fem-Pop bypass 6/2017  S/P bypass graft of extremity: RLE- 11/30/15  S/P CABG x 3: 3/28/16  Stented coronary artery: x 3 cardiac stents placed in 2006  S/P prostatectomy: 1996    Social Hx: Former smoker    Family History: No relevant family hx of need for BKA    REVIEW OF SYSTEMS:  CONSTITUTIONAL: No fever or chills  EYES: No eye pain, visual disturbances, or discharge  ENMT: No sinus or throat pain  NECK: No pain or stiffness  RESPIRATORY: No cough, wheezing, chills or hemoptysis; No shortness of breath  CARDIOVASCULAR: Chronic leg swelling, +GARCIA, no SOB/CP/palpitations  GASTROINTESTINAL: No abdominal or epigastric pain. No nausea, vomiting, or hematemesis; No diarrhea or constipation. No melena or hematochezia.  GENITOURINARY: No dysuria, frequency, hematuria, or incontinence  NEUROLOGICAL: No headaches, memory loss, loss of strength, numbness, or tremors  SKIN: No itching, burning, rashes, or lesions   ENDOCRINE: No heat or cold intolerance; No hair loss  MUSCULOSKELETAL: + L foot pain  HEME/LYMPH: No easy bruising, or bleeding gums  ALLERY AND IMMUNOLOGIC: No hives or eczema    T(C): 36.4 (10-13-18 @ 17:13), Max: 36.9 (10-12-18 @ 21:20)  HR: 88 (10-13-18 @ 17:13) (65 - 88)  BP: 149/64 (10-13-18 @ 17:13) (108/63 - 159/68)  RR: 18 (10-13-18 @ 17:13) (18 - 18)  SpO2: 98% (10-13-18 @ 17:13) (97% - 99%)  Wt(kg): --Vital Signs Last 24 Hrs  T(C): 36.4 (13 Oct 2018 17:13), Max: 36.9 (12 Oct 2018 21:20)  T(F): 97.6 (13 Oct 2018 17:13), Max: 98.5 (12 Oct 2018 21:20)  HR: 88 (13 Oct 2018 17:13) (65 - 88)  BP: 149/64 (13 Oct 2018 17:13) (108/63 - 159/68)  BP(mean): --  RR: 18 (13 Oct 2018 17:13) (18 - 18)  SpO2: 98% (13 Oct 2018 17:13) (97% - 99%)    PHYSICAL EXAM:  GENERAL: NAD, A&Ox4, somewhat uncomfortable appearing.  HEAD:  Atraumatic, Normocephalic  EYES: EOMI, PERRLA, conjunctiva and sclera clear  ENMT: Moist mucous membranes  NERVOUS SYSTEM:  Alert & Oriented X3, Good concentration;   CHEST/LUNG: Clear to percussion bilaterally; No rales, rhonchi, wheezing, or rubs  HEART: Regular rate and rhythm; faint systolic murmur,   ABDOMEN: Soft, Nontender, Nondistended; Bowel sounds present  EXTREMITIES: L foot well bandaged. No pitting edema in bilateral LE. warm to touch  LYMPH: No lymphadenopathy noted in neck  SKIN: Well bandaged LLE, warm to touch.    Consultant(s) Notes Reviewed:  [x ] YES  [ ] NO  Care Discussed with Consultants/Other Providers [ x] YES  [ ] NO    LABS:  CBC   10-13-18 @ 06:39  Hematcorit 32.6  Hemoglobin 10.6  Mean Cell Hemoglobin 27.6  Platelet Count-Automated 359  RBC Count 3.82  Red Cell Distrib Width 15.2  Wbc Count 5.7    BMP  10-13-18 @ 06:39  Anion Gap. Serum 13  Blood Urea Nitrogen,Serm 25  Calcium, Total Serum 9.6  Carbon Dioxide, Serum 22  Chloride, Serum 101  Creatinine, Serum 1.18  eGFR in  72  eGFR in Non Afican American 62  Gloucose, serum 101  Potassium, Serum 4.6  Sodium, Serum 136    CMP  10-13-18 @ 06:39  Minnie Aminotransferase(ALT/SGPT)--  Albumin, Serum --  Alkaline Phosphatase, Serum --  Anion Gap, Serum 13  Aspartate Aminotransferase (AST/SGOT)--  Bilirubin Total, Serum --  Blood Urea Nitrogen, Serum 25  Calcium,Total Serum 9.6  Carbon Dioxide, Serum 22  Chloride, Serum 101  Creatinine, Serum 1.18  eGFR if  72  eGFR if Non African American 62  Glucose, Serum 101  Potassium, Serum 4.6  Protein Total, Serum --  Sodium, Serum 136    I have reviewed the labs, plain film imaging and ekg. Patient is a 68y old  Male who presents with a chief complaint of Foot gangrene    68M PMH CHF (TTE 5/2017 EF 50%, TTE 3/2016 Stage I diastolic dysfunction), HTN, DM2 (on insulin, A1c 8% on 10/11), hypothyroidism, CAD (s/p three stents in 2006, CABG x3 in 3/2016), PAD (s/p L femoral endarterectomy and fem pop bypass in 6/2017, L femoral to peroneal bypass with PTFE in 8/7/18, L hallux amputation) and recent admission for LLE pain in the setting of an occluded L fem to peroneal bypass c/b anaphylaxis 2/2 IV contrast, who presented for this admission on 10/10/18 with increased LLE pain. He denies any associated symptoms such as paresthesias, numbness in the LLE.  No fevers, chills, SOB, chest pain, N/V/D.    In the ED he was HDS and afebrile with normal WBC and labs remarkable for hyponatremia Na 127, serum glucose 347, Cr 1.63 (baseline ~1.2), and lactate 3.1. He was found not to have distal doppler signals on L PT/DP or in the bypass and noted to have dry gangrene over the hallux amputation site.  His hospital course has been complicated by JOSE LUIS now resolved (Cr at baseline, Cr Clearance 67), blood cultures negative w/o fevers. MEENA/PVR on 10/11 that demonstrated interval progression of flow-limiting disease in both lower   extremities, localizing to the femoropopliteal and infrapopliteal levels bilaterally. X-ray of the foot demonstrated no evidence of osteomyelitis. Patient on Cefepime for cellulitis.  EKG with NSR w/1st degree HB (). Endocrinology following for diabetic control. Patient planned for L BKA and medicine called for evaluation.    Patient reports that he does not have any chest pain or shortness of breath at rest. He is able to walk 2-4 blocks at which point he is limited by shortness of breath and chest pressure.     Allergy: IV contrast    PAST MEDICAL/SURGICAL HISTORY  PAST MEDICAL & SURGICAL HISTORY:  COPD (chronic obstructive pulmonary disease)  Sleep apnea: non-compliant  Anemia  Atherosclerosis of arteries of extremities: left leg  Mild intermittent asthma without complication  Iron deficiency anemia, unspecified iron deficiency anemia type  Prostate cancer  Bronchospasm  Obesity (BMI 30-39.9)  PAD (peripheral artery disease): RLE bypass 11/30/15  CAD (coronary artery disease): 3 cardiac stents placed in 2006 (mid LAD, Prox LAD, Prox to #2), 3V CABG 3/28/16  Hypothyroid  Intermittent claudication  Diabetic neuropathy  DM (diabetes mellitus): type 2 - on insulin pump  CHF (congestive heart failure)  Hypercholesteremia  HTN (hypertension)  History of femoral angiogram: Left femoral endarterectomy Fem-Pop bypass 6/2017  S/P bypass graft of extremity: RLE- 11/30/15  S/P CABG x 3: 3/28/16  Stented coronary artery: x 3 cardiac stents placed in 2006  S/P prostatectomy: 1996    Social Hx: Former smoker    Family History: No relevant family hx of need for BKA    REVIEW OF SYSTEMS:  CONSTITUTIONAL: No fever or chills  EYES: No eye pain, visual disturbances, or discharge  ENMT: No sinus or throat pain  NECK: No pain or stiffness  RESPIRATORY: No cough, wheezing, chills or hemoptysis; No shortness of breath  CARDIOVASCULAR: Chronic leg swelling, +AGRCIA, no SOB/CP/palpitations  GASTROINTESTINAL: No abdominal or epigastric pain. No nausea, vomiting, or hematemesis; No diarrhea or constipation. No melena or hematochezia.  GENITOURINARY: No dysuria, frequency, hematuria, or incontinence  NEUROLOGICAL: No headaches, memory loss, loss of strength, numbness, or tremors  SKIN: No new itching, burning, rashes, or lesions   ENDOCRINE: No heat or cold intolerance; No hair loss  MUSCULOSKELETAL: + L foot pain  ALLERY AND IMMUNOLOGIC: anaphylaxis to contrast    T(C): 36.4 (10-13-18 @ 17:13), Max: 36.9 (10-12-18 @ 21:20)  HR: 88 (10-13-18 @ 17:13) (65 - 88)  BP: 149/64 (10-13-18 @ 17:13) (108/63 - 159/68)  RR: 18 (10-13-18 @ 17:13) (18 - 18)  SpO2: 98% (10-13-18 @ 17:13) (97% - 99%)  Wt(kg): --Vital Signs Last 24 Hrs  T(C): 36.4 (13 Oct 2018 17:13), Max: 36.9 (12 Oct 2018 21:20)  T(F): 97.6 (13 Oct 2018 17:13), Max: 98.5 (12 Oct 2018 21:20)  HR: 88 (13 Oct 2018 17:13) (65 - 88)  BP: 149/64 (13 Oct 2018 17:13) (108/63 - 159/68)  BP(mean): --  RR: 18 (13 Oct 2018 17:13) (18 - 18)  SpO2: 98% (13 Oct 2018 17:13) (97% - 99%)    PHYSICAL EXAM:  GENERAL: NAD, A&Ox4, somewhat uncomfortable appearing.  HEAD:  Atraumatic, Normocephalic  EYES: EOMI, PERRLA, conjunctiva and sclera clear  ENMT: Moist mucous membranes  NERVOUS SYSTEM:  Alert & Oriented X3, Good concentration;   CHEST/LUNG: Clear to percussion bilaterally; No rales, rhonchi, wheezing, or rubs  HEART: Regular rate and rhythm; faint systolic murmur,   ABDOMEN: Soft, Nontender, Nondistended; Bowel sounds present  EXTREMITIES: L foot well bandaged. No pitting edema in bilateral LE. warm to touch  LYMPH: No lymphadenopathy noted in neck  SKIN: Well bandaged LLE, warm to touch.    Consultant(s) Notes Reviewed:  [x ] YES  [ ] NO  Care Discussed with Consultants/Other Providers [ x] YES  [ ] NO    LABS:  CBC   10-13-18 @ 06:39  Hematcorit 32.6  Hemoglobin 10.6  Mean Cell Hemoglobin 27.6  Platelet Count-Automated 359  RBC Count 3.82  Red Cell Distrib Width 15.2  Wbc Count 5.7    BMP  10-13-18 @ 06:39  Anion Gap. Serum 13  Blood Urea Nitrogen,Serm 25  Calcium, Total Serum 9.6  Carbon Dioxide, Serum 22  Chloride, Serum 101  Creatinine, Serum 1.18  eGFR in  72  eGFR in Non Afican American 62  Gloucose, serum 101  Potassium, Serum 4.6  Sodium, Serum 136    CMP  10-13-18 @ 06:39  Minnie Aminotransferase(ALT/SGPT)--  Albumin, Serum --  Alkaline Phosphatase, Serum --  Anion Gap, Serum 13  Aspartate Aminotransferase (AST/SGOT)--  Bilirubin Total, Serum --  Blood Urea Nitrogen, Serum 25  Calcium,Total Serum 9.6  Carbon Dioxide, Serum 22  Chloride, Serum 101  Creatinine, Serum 1.18  eGFR if  72  eGFR if Non African American 62  Glucose, Serum 101  Potassium, Serum 4.6  Protein Total, Serum --  Sodium, Serum 136    I have reviewed the labs, plain film imaging and ekg.

## 2018-10-14 DIAGNOSIS — J44.9 CHRONIC OBSTRUCTIVE PULMONARY DISEASE, UNSPECIFIED: ICD-10-CM

## 2018-10-14 DIAGNOSIS — E03.9 HYPOTHYROIDISM, UNSPECIFIED: ICD-10-CM

## 2018-10-14 LAB
ANION GAP SERPL CALC-SCNC: 13 MMOL/L — SIGNIFICANT CHANGE UP (ref 5–17)
BLD GP AB SCN SERPL QL: NEGATIVE — SIGNIFICANT CHANGE UP
BUN SERPL-MCNC: 26 MG/DL — HIGH (ref 7–23)
CALCIUM SERPL-MCNC: 9.7 MG/DL — SIGNIFICANT CHANGE UP (ref 8.4–10.5)
CHLORIDE SERPL-SCNC: 101 MMOL/L — SIGNIFICANT CHANGE UP (ref 96–108)
CO2 SERPL-SCNC: 21 MMOL/L — LOW (ref 22–31)
CREAT SERPL-MCNC: 1.15 MG/DL — SIGNIFICANT CHANGE UP (ref 0.5–1.3)
GLUCOSE SERPL-MCNC: 120 MG/DL — HIGH (ref 70–99)
HCT VFR BLD CALC: 30.9 % — LOW (ref 39–50)
HGB BLD-MCNC: 9.4 G/DL — LOW (ref 13–17)
INR BLD: 1.08 RATIO — SIGNIFICANT CHANGE UP (ref 0.88–1.16)
MAGNESIUM SERPL-MCNC: 1.8 MG/DL — SIGNIFICANT CHANGE UP (ref 1.6–2.6)
MCHC RBC-ENTMCNC: 25.9 PG — LOW (ref 27–34)
MCHC RBC-ENTMCNC: 30.4 GM/DL — LOW (ref 32–36)
MCV RBC AUTO: 85.4 FL — SIGNIFICANT CHANGE UP (ref 80–100)
PHOSPHATE SERPL-MCNC: 3.4 MG/DL — SIGNIFICANT CHANGE UP (ref 2.5–4.5)
PLATELET # BLD AUTO: 358 K/UL — SIGNIFICANT CHANGE UP (ref 150–400)
POTASSIUM SERPL-MCNC: 4.5 MMOL/L — SIGNIFICANT CHANGE UP (ref 3.5–5.3)
POTASSIUM SERPL-SCNC: 4.5 MMOL/L — SIGNIFICANT CHANGE UP (ref 3.5–5.3)
PROTHROM AB SERPL-ACNC: 11.7 SEC — SIGNIFICANT CHANGE UP (ref 9.8–12.7)
RBC # BLD: 3.62 M/UL — LOW (ref 4.2–5.8)
RBC # FLD: 15.2 % — HIGH (ref 10.3–14.5)
RH IG SCN BLD-IMP: POSITIVE — SIGNIFICANT CHANGE UP
SODIUM SERPL-SCNC: 135 MMOL/L — SIGNIFICANT CHANGE UP (ref 135–145)
WBC # BLD: 5.6 K/UL — SIGNIFICANT CHANGE UP (ref 3.8–10.5)
WBC # FLD AUTO: 5.6 K/UL — SIGNIFICANT CHANGE UP (ref 3.8–10.5)

## 2018-10-14 PROCEDURE — 99223 1ST HOSP IP/OBS HIGH 75: CPT

## 2018-10-14 PROCEDURE — 99233 SBSQ HOSP IP/OBS HIGH 50: CPT

## 2018-10-14 RX ORDER — HYDROMORPHONE HYDROCHLORIDE 2 MG/ML
1 INJECTION INTRAMUSCULAR; INTRAVENOUS; SUBCUTANEOUS ONCE
Qty: 0 | Refills: 0 | Status: DISCONTINUED | OUTPATIENT
Start: 2018-10-14 | End: 2018-10-14

## 2018-10-14 RX ORDER — MAGNESIUM SULFATE 500 MG/ML
2 VIAL (ML) INJECTION ONCE
Qty: 0 | Refills: 0 | Status: COMPLETED | OUTPATIENT
Start: 2018-10-14 | End: 2018-10-14

## 2018-10-14 RX ORDER — HYDROMORPHONE HYDROCHLORIDE 2 MG/ML
2 INJECTION INTRAMUSCULAR; INTRAVENOUS; SUBCUTANEOUS EVERY 4 HOURS
Qty: 0 | Refills: 0 | Status: DISCONTINUED | OUTPATIENT
Start: 2018-10-14 | End: 2018-10-16

## 2018-10-14 RX ORDER — HYDROMORPHONE HYDROCHLORIDE 2 MG/ML
2 INJECTION INTRAMUSCULAR; INTRAVENOUS; SUBCUTANEOUS EVERY 4 HOURS
Qty: 0 | Refills: 0 | Status: DISCONTINUED | OUTPATIENT
Start: 2018-10-14 | End: 2018-10-14

## 2018-10-14 RX ADMIN — HEPARIN SODIUM 5000 UNIT(S): 5000 INJECTION INTRAVENOUS; SUBCUTANEOUS at 21:38

## 2018-10-14 RX ADMIN — HYDROMORPHONE HYDROCHLORIDE 1 MILLIGRAM(S): 2 INJECTION INTRAMUSCULAR; INTRAVENOUS; SUBCUTANEOUS at 17:33

## 2018-10-14 RX ADMIN — Medication 25 MILLIGRAM(S): at 17:56

## 2018-10-14 RX ADMIN — CEFEPIME 100 MILLIGRAM(S): 1 INJECTION, POWDER, FOR SOLUTION INTRAMUSCULAR; INTRAVENOUS at 21:38

## 2018-10-14 RX ADMIN — HYDROMORPHONE HYDROCHLORIDE 1 MILLIGRAM(S): 2 INJECTION INTRAMUSCULAR; INTRAVENOUS; SUBCUTANEOUS at 00:44

## 2018-10-14 RX ADMIN — HYDROMORPHONE HYDROCHLORIDE 1 MILLIGRAM(S): 2 INJECTION INTRAMUSCULAR; INTRAVENOUS; SUBCUTANEOUS at 05:16

## 2018-10-14 RX ADMIN — Medication 150 MICROGRAM(S): at 05:18

## 2018-10-14 RX ADMIN — BUDESONIDE AND FORMOTEROL FUMARATE DIHYDRATE 2 PUFF(S): 160; 4.5 AEROSOL RESPIRATORY (INHALATION) at 05:18

## 2018-10-14 RX ADMIN — CEFEPIME 100 MILLIGRAM(S): 1 INJECTION, POWDER, FOR SOLUTION INTRAMUSCULAR; INTRAVENOUS at 06:42

## 2018-10-14 RX ADMIN — HYDROMORPHONE HYDROCHLORIDE 1 MILLIGRAM(S): 2 INJECTION INTRAMUSCULAR; INTRAVENOUS; SUBCUTANEOUS at 17:56

## 2018-10-14 RX ADMIN — OXYCODONE AND ACETAMINOPHEN 2 TABLET(S): 5; 325 TABLET ORAL at 09:20

## 2018-10-14 RX ADMIN — FAMOTIDINE 20 MILLIGRAM(S): 10 INJECTION INTRAVENOUS at 17:56

## 2018-10-14 RX ADMIN — HYDROMORPHONE HYDROCHLORIDE 1 MILLIGRAM(S): 2 INJECTION INTRAMUSCULAR; INTRAVENOUS; SUBCUTANEOUS at 00:06

## 2018-10-14 RX ADMIN — BUDESONIDE AND FORMOTEROL FUMARATE DIHYDRATE 2 PUFF(S): 160; 4.5 AEROSOL RESPIRATORY (INHALATION) at 17:56

## 2018-10-14 RX ADMIN — Medication 1 APPLICATION(S): at 13:33

## 2018-10-14 RX ADMIN — HYDROMORPHONE HYDROCHLORIDE 1 MILLIGRAM(S): 2 INJECTION INTRAMUSCULAR; INTRAVENOUS; SUBCUTANEOUS at 17:03

## 2018-10-14 RX ADMIN — ATORVASTATIN CALCIUM 80 MILLIGRAM(S): 80 TABLET, FILM COATED ORAL at 21:38

## 2018-10-14 RX ADMIN — HEPARIN SODIUM 5000 UNIT(S): 5000 INJECTION INTRAVENOUS; SUBCUTANEOUS at 14:07

## 2018-10-14 RX ADMIN — INSULIN GLARGINE 55 UNIT(S): 100 INJECTION, SOLUTION SUBCUTANEOUS at 22:35

## 2018-10-14 RX ADMIN — HYDROMORPHONE HYDROCHLORIDE 1 MILLIGRAM(S): 2 INJECTION INTRAMUSCULAR; INTRAVENOUS; SUBCUTANEOUS at 11:18

## 2018-10-14 RX ADMIN — OXYCODONE AND ACETAMINOPHEN 2 TABLET(S): 5; 325 TABLET ORAL at 02:44

## 2018-10-14 RX ADMIN — OXYCODONE AND ACETAMINOPHEN 2 TABLET(S): 5; 325 TABLET ORAL at 03:20

## 2018-10-14 RX ADMIN — HYDROMORPHONE HYDROCHLORIDE 1 MILLIGRAM(S): 2 INJECTION INTRAMUSCULAR; INTRAVENOUS; SUBCUTANEOUS at 10:48

## 2018-10-14 RX ADMIN — HEPARIN SODIUM 5000 UNIT(S): 5000 INJECTION INTRAVENOUS; SUBCUTANEOUS at 05:18

## 2018-10-14 RX ADMIN — Medication 81 MILLIGRAM(S): at 14:07

## 2018-10-14 RX ADMIN — CEFEPIME 100 MILLIGRAM(S): 1 INJECTION, POWDER, FOR SOLUTION INTRAMUSCULAR; INTRAVENOUS at 14:07

## 2018-10-14 RX ADMIN — OXYCODONE AND ACETAMINOPHEN 2 TABLET(S): 5; 325 TABLET ORAL at 15:56

## 2018-10-14 RX ADMIN — Medication 50 GRAM(S): at 09:18

## 2018-10-14 RX ADMIN — Medication 16 UNIT(S): at 09:18

## 2018-10-14 RX ADMIN — HYDROMORPHONE HYDROCHLORIDE 1 MILLIGRAM(S): 2 INJECTION INTRAMUSCULAR; INTRAVENOUS; SUBCUTANEOUS at 18:26

## 2018-10-14 RX ADMIN — OXYCODONE AND ACETAMINOPHEN 2 TABLET(S): 5; 325 TABLET ORAL at 15:26

## 2018-10-14 RX ADMIN — Medication 16 UNIT(S): at 15:25

## 2018-10-14 RX ADMIN — OXYCODONE AND ACETAMINOPHEN 2 TABLET(S): 5; 325 TABLET ORAL at 08:50

## 2018-10-14 RX ADMIN — Medication 25 MILLIGRAM(S): at 05:18

## 2018-10-14 RX ADMIN — Medication 16 UNIT(S): at 18:51

## 2018-10-14 RX ADMIN — FAMOTIDINE 20 MILLIGRAM(S): 10 INJECTION INTRAVENOUS at 05:18

## 2018-10-14 RX ADMIN — HYDROMORPHONE HYDROCHLORIDE 1 MILLIGRAM(S): 2 INJECTION INTRAMUSCULAR; INTRAVENOUS; SUBCUTANEOUS at 05:46

## 2018-10-14 NOTE — PROGRESS NOTE ADULT - SUBJECTIVE AND OBJECTIVE BOX
MARK CASTAÑEDA  [id number]    Subjective:    Patient seen and examined by vascular team, no acute changes overnight.   Pain improved with pain medication. Afebrile, ambulating as tolerated.   Awaiting CRDs clearance for BKA.        Objective:  T(C): 37.2 (10-14-18 @ 05:14), Max: 37.2 (10-14-18 @ 05:14)  HR: 85 (10-14-18 @ 05:14) (68 - 88)  BP: 155/69 (10-14-18 @ 05:14) (100/62 - 155/69)  RR: 18 (10-14-18 @ 05:14) (18 - 20)  SpO2: 98% (10-14-18 @ 05:14) (95% - 98%)  Wt(kg): --   10-14    135  |  101  |  26<H>  ----------------------------<  120<H>  4.5   |  21<L>  |  1.15    Ca    9.7      14 Oct 2018 07:10  Phos  3.4     10-14  Mg     1.8     10-14                          9.4    5.6   )-----------( 358      ( 14 Oct 2018 07:12 )             30.9       10-13 @ 07:01  -  10-14 @ 07:00  --------------------------------------------------------  IN: 1760 mL / OUT: 1200 mL / NET: 560 mL      PHYSICAL EXAM:     >> General: Well-developed. Well-nourished. No acute distress.  >> Neck: Supple.  >> Abdomen: Soft. Non-tender. Non-distended.  >> Extremities: R LE s/p first digit amputation, non healing. Dressing in place per podiatry, no active strikethrough. Erythema from dorsum of R foot to mid shin, less warmth to tough as compared to yesterday.               MEDICATIONS  (STANDING):  aspirin enteric coated 81 milliGRAM(s) Oral daily  atorvastatin 80 milliGRAM(s) Oral at bedtime  buDESOnide 160 MICROgram(s)/formoterol 4.5 MICROgram(s) Inhaler 2 Puff(s) Inhalation two times a day  cefepime   IVPB 1000 milliGRAM(s) IV Intermittent every 8 hours  cefepime   IVPB      collagenase Ointment 1 Application(s) Topical daily  dextrose 5%. 1000 milliLiter(s) (50 mL/Hr) IV Continuous <Continuous>  dextrose 50% Injectable 12.5 Gram(s) IV Push once  dextrose 50% Injectable 25 Gram(s) IV Push once  dextrose 50% Injectable 25 Gram(s) IV Push once  famotidine    Tablet 20 milliGRAM(s) Oral two times a day  heparin  Injectable 5000 Unit(s) SubCutaneous every 8 hours  insulin glargine Injectable (LANTUS) 55 Unit(s) SubCutaneous at bedtime  insulin lispro (HumaLOG) corrective regimen sliding scale   SubCutaneous Before meals and at bedtime  insulin lispro Injectable (HumaLOG) 16 Unit(s) SubCutaneous three times a day before meals  levothyroxine 150 MICROGram(s) Oral daily  metoprolol tartrate 25 milliGRAM(s) Oral two times a day    MEDICATIONS  (PRN):  dextrose 40% Gel 15 Gram(s) Oral once PRN Blood Glucose LESS THAN 70 milliGRAM(s)/deciliter  gabapentin 300 milliGRAM(s) Oral daily PRN moderate pain  glucagon  Injectable 1 milliGRAM(s) IntraMuscular once PRN Glucose LESS THAN 70 milligrams/deciliter  HYDROmorphone  Injectable 1 milliGRAM(s) IV Push every 4 hours PRN Severe Pain (7 - 10)  oxyCODONE    5 mG/acetaminophen 325 mG 1 Tablet(s) Oral every 4 hours PRN Moderate Pain (4 - 6)  oxyCODONE    5 mG/acetaminophen 325 mG 2 Tablet(s) Oral every 4 hours PRN Severe Pain (7 - 10)  polyethylene glycol 3350 17 Gram(s) Oral daily PRN Constipation

## 2018-10-14 NOTE — CONSULT NOTE ADULT - SUBJECTIVE AND OBJECTIVE BOX
Patient seen and evaluated @ 8:59 AM  Chief Complaint: Dry gangrene left foot  Denies chest pains, shortness of breath,or palpitations  HPI:  GENERAL SURGERY CONSULT NOTE  Attending: Soheila	  Service: Vascular surgery  Contact: [phone]    HPI  67M with CAD s/p three stents in , CABG x3 in 3/2016, left femoral endarterectomy and fem pop bypass (2017), left femoral to peroneal bypass with PTFE (18) for claudication, s/p left hallux amputation with podiatry with recent admission for LLE pain found to have occluded left fem to peroneal bypass.  Patient had undergone a diagnostic angiogram but it had to be aborted due to an anaphylactic reaction to IV contrast.  Patient was counseled that further revascularization would likely not be an option as the patient did not have good blood flow below the knee.  He presents today with increased LLE pain.  He has not seen Dr. Parnell recently.  His left hallux amputation has dehisced for which he has followed with Dr. Gregg.  He denies any associated symptoms such as paresthesias, numbness in the LLE.  No fevers, chills, SOB, chest pain, N/V/D.      In the ER he was hemodynamically stable and afebrile.  His labs revealed a normal WBC of 8.3, hyponatremia to 127 and hyperglycemia to 347.  He also has a new JOSE LUIS with a creatinine of 1.63.  His lactate was elevated at 3.1.  On exam he had bilateral palpable femoral pulses, signals in the RLE (DP/PT).  On the left he did not have any distal signals in the PT/DP/ or in the bypass.  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  The left foot is warm to touch and he does not report decreased sensation.        PMH/PSH  COPD (chronic obstructive pulmonary disease)  Sleep apnea  Anemia  Atherosclerosis of arteries of extremities  Mild intermittent asthma without complication  Iron deficiency anemia, unspecified iron deficiency anemia type  Prostate cancer  Bronchospasm  Obesity (BMI 30-39.9)  PAD (peripheral artery disease)  CAD (coronary artery disease)  S/P prostatectomy  Hypothyroid  Intermittent claudication  Diabetic neuropathy  DM (diabetes mellitus)  CHF (congestive heart failure)  Hypercholesteremia  HTN (hypertension)    History of femoral angiogram  S/P bypass graft of extremity  S/P CABG x 3  Stented coronary artery  S/P prostatectomy      MEDICATIONS  aspirin enteric coated 81 milliGRAM(s) Oral daily  atorvastatin 80 milliGRAM(s) Oral at bedtime  buDESOnide 160 MICROgram(s)/formoterol 4.5 MICROgram(s) Inhaler 2 Puff(s) Inhalation two times a day  dextrose 40% Gel 15 Gram(s) Oral once PRN  dextrose 5%. 1000 milliLiter(s) IV Continuous <Continuous>  dextrose 50% Injectable 12.5 Gram(s) IV Push once  dextrose 50% Injectable 25 Gram(s) IV Push once  dextrose 50% Injectable 25 Gram(s) IV Push once  gabapentin 300 milliGRAM(s) Oral daily PRN  glucagon  Injectable 1 milliGRAM(s) IntraMuscular once PRN  heparin  Injectable 5000 Unit(s) SubCutaneous every 8 hours  insulin glargine Injectable (LANTUS) 55 Unit(s) SubCutaneous at bedtime  insulin lispro (HumaLOG) corrective regimen sliding scale   SubCutaneous three times a day before meals  insulin lispro Injectable (HumaLOG) 18 Unit(s) SubCutaneous three times a day before meals  levothyroxine 150 MICROGram(s) Oral daily  metoprolol tartrate 25 milliGRAM(s) Oral two times a day  oxyCODONE    IR 5 milliGRAM(s) Oral every 4 hours PRN  sodium chloride 0.9% Bolus 1000 milliLiter(s) IV Bolus once  sodium chloride 0.9%. 1000 milliLiter(s) IV Continuous <Continuous>      Allergies    contrast media (iodine-based) (Hypotension)    Intolerances        Social    Physical Exam  T(C): 36.7 (10-10-18 @ 15:32), Max: 36.8 (10-10-18 @ 14:18)  HR: 71 (10-10-18 @ 15:32) (71 - 76)  BP: 160/75 (10-10-18 @ 15:32) (132/64 - 160/75)  RR: 18 (10-10-18 @ 15:32) (18 - 20)  SpO2: 100% (10-10-18 @ 15:32) (100% - 100%)  Wt(kg): --  Tmax: T(C): , Max: 36.8 (10-10-18 @ 14:18)        LABS                        11.5   8.3   )-----------( 374      ( 10 Oct 2018 15:30 )             35.8     10-10    127<L>  |  89<L>  |  72<H>  ----------------------------<  346<H>  4.4   |  19<L>  |  1.63<H>    Ca    9.8      10 Oct 2018 15:30    TPro  8.6<H>  /  Alb  4.2  /  TBili  0.3  /  DBili  x   /  AST  11  /  ALT  9<L>  /  AlkPhos  106  10-10    PT/INR - ( 10 Oct 2018 15:30 )   PT: 11.6 sec;   INR: 1.06 ratio         PTT - ( 10 Oct 2018 15:30 )  PTT:31.6 sec  Urinalysis Basic - ( 10 Oct 2018 16:59 )    Color: Light Yellow / Appearance: Clear / S.012 / pH: x  Gluc: x / Ketone: Negative  / Bili: Negative / Urobili: Negative   Blood: x / Protein: Negative / Nitrite: Negative   Leuk Esterase: Negative / RBC: x / WBC x   Sq Epi: x / Non Sq Epi: x / Bacteria: x (10 Oct 2018 17:29)    PMH:   COPD (chronic obstructive pulmonary disease)  Sleep apnea  Anemia  Atherosclerosis of arteries of extremities  Mild intermittent asthma without complication  Iron deficiency anemia, unspecified iron deficiency anemia type  Prostate cancer  Bronchospasm  Obesity (BMI 30-39.9)  PAD (peripheral artery disease)  CAD (coronary artery disease)  S/P prostatectomy  Hypothyroid  Intermittent claudication  Diabetic neuropathy  DM (diabetes mellitus)  CHF (congestive heart failure)  Hypercholesteremia  HTN (hypertension)    PSH:   History of femoral angiogram  S/P bypass graft of extremity  S/P CABG x 3  Stented coronary artery  S/P prostatectomy    Medications:   aspirin enteric coated 81 milliGRAM(s) Oral daily  atorvastatin 80 milliGRAM(s) Oral at bedtime  buDESOnide 160 MICROgram(s)/formoterol 4.5 MICROgram(s) Inhaler 2 Puff(s) Inhalation two times a day  cefepime   IVPB 1000 milliGRAM(s) IV Intermittent every 8 hours  cefepime   IVPB      collagenase Ointment 1 Application(s) Topical daily  dextrose 40% Gel 15 Gram(s) Oral once PRN  dextrose 5%. 1000 milliLiter(s) IV Continuous <Continuous>  dextrose 50% Injectable 12.5 Gram(s) IV Push once  dextrose 50% Injectable 25 Gram(s) IV Push once  dextrose 50% Injectable 25 Gram(s) IV Push once  famotidine    Tablet 20 milliGRAM(s) Oral two times a day  gabapentin 300 milliGRAM(s) Oral daily PRN  glucagon  Injectable 1 milliGRAM(s) IntraMuscular once PRN  heparin  Injectable 5000 Unit(s) SubCutaneous every 8 hours  HYDROmorphone  Injectable 1 milliGRAM(s) IV Push every 4 hours PRN  insulin glargine Injectable (LANTUS) 55 Unit(s) SubCutaneous at bedtime  insulin lispro (HumaLOG) corrective regimen sliding scale   SubCutaneous Before meals and at bedtime  insulin lispro Injectable (HumaLOG) 16 Unit(s) SubCutaneous three times a day before meals  levothyroxine 150 MICROGram(s) Oral daily  magnesium sulfate  IVPB 2 Gram(s) IV Intermittent once  metoprolol tartrate 25 milliGRAM(s) Oral two times a day  oxyCODONE    5 mG/acetaminophen 325 mG 1 Tablet(s) Oral every 4 hours PRN  oxyCODONE    5 mG/acetaminophen 325 mG 2 Tablet(s) Oral every 4 hours PRN  polyethylene glycol 3350 17 Gram(s) Oral daily PRN    Allergies:  contrast media (iodine-based) (Hypotension)    FAMILY HISTORY:  No pertinent family history in first degree relatives    Social History:  Smoking:  Alcohol:  Drugs:    Review of Systems:  [ ]Unable to obtain  Constitutional: No fever, chills, fatigue, or changes in weight  HEENT: No blurry vision, eye pain, headache, runny nose, or sore throat  Respiratory: No shortness of breath, cough, or wheezing  Cardiovascular: No chest pain or palpitations  Gastrointestinal: No nausea, vomiting, diarrhea, or abdominal pain  Genitourinary: No dysuria or incontinence  Extremities: No lower extremity swelling  Neurologic: No focal findings  Lymphatic: No lymphadenopathy   Skin: No rash  Psychiatry: No anxiety or depression  10 point review of systems is otherwise negative except as mentioned above            Physical Exam:  T(C): 36.5 (10-14-18 @ 08:47), Max: 37.2 (10-14-18 @ 05:14)  HR: 68 (10-14-18 @ 08:47) (68 - 88)  BP: 137/68 (10-14-18 @ 08:47) (100/62 - 155/69)  RR: 18 (10-14-18 @ 08:47) (18 - 20)  SpO2: 100% (10-14-18 @ 08:47) (95% - 100%)  Wt(kg): --    10-13 @ 07:01  -  10-14 @ 07:00  --------------------------------------------------------  IN: 1760 mL / OUT: 1200 mL / NET: 560 mL      Appearance: Normal, well groomed, NAD  Eyes: PERRLA, EOMI, pink conjunctiva, no scleral icterus   HENT: Normal oral mucosa  Cardiovascular: RRR, S1, S2, no murmur, rub, or gallop; no edema; no JVD  Respiratory: Clear to auscultation bilaterally  Gastrointestinal: Soft, non-tender, non-distended, BS+  Musculoskeletal: No clubbing or joint deformity  ;Dry gangrene left foot  Neurologic: No focal weakness  Lymphatic: No lymphadenopathy  Psychiatry: AAOx3 with appropriate mood and affect  Skin: No rashes, ecchymoses, or cyanosis    Cardiovascular Diagnostic Testing:  ECG: NSR WNL    Echo:    Stress Testing:    Cath:    Interpretation of Telemetry:    Imaging:    Labs:                        9.4    5.6   )-----------( 358      ( 14 Oct 2018 07:12 )             30.9     10-14    135  |  101  |  26<H>  ----------------------------<  120<H>  4.5   |  21<L>  |  1.15    Ca    9.7      14 Oct 2018 07:10  Phos  3.4     10-14  Mg     1.8     10-14      PT/INR - ( 14 Oct 2018 07:12 )   PT: 11.7 sec;   INR: 1.08 ratio                   Hemoglobin A1C, Whole Blood: 8.0 % (10-11 @ 08:10)

## 2018-10-14 NOTE — PROGRESS NOTE ADULT - ASSESSMENT
Assessment  DMT2: 68y Male with DM T2 with hyperglycemia on insulin basal bolus blood sugars improving, no hypoglycemic episode,  eating meals,  non compliant with low carb diet.  CAD: On medications, stable, monitored.  Hypothyroidism:  On synthroid 150 mcg po daily, asymptomatic.  HTN: Controlled, On med.  Foot Wound: Planing amputation foot, Sx team FU

## 2018-10-14 NOTE — PROGRESS NOTE ADULT - SUBJECTIVE AND OBJECTIVE BOX
Pt seen and examined at bedside by Dr. Heike Green -- Pager [phone]    Patient is a 68y old  Male who presents with a chief complaint of PAD (14 Oct 2018 08:51)      SUBJECTIVE / OVERNIGHT EVENTS:  Pt is doing well. Has radiating shooting type pain in his left lower extremity.  Is able to ambulate, but pain starts after a short time.  No chest pain.  No SOB.  No dizziness or palpitations.     REVIEW OF SYSTEMS:    CONSTITUTIONAL: No weakness, fevers or chills  EYES/ENT: No visual changes;  No vertigo or throat pain   NECK: No pain or stiffness  RESPIRATORY: No cough, wheezing, hemoptysis; No shortness of breath  CARDIOVASCULAR: No chest pain or palpitations  GASTROINTESTINAL: No abdominal or epigastric pain. No nausea, vomiting, or hematemesis; No diarrhea or constipation. No melena or hematochezia.  GENITOURINARY: No dysuria, frequency or hematuria  NEUROLOGICAL: No numbness or weakness  SKIN: No itching, rashes  EXT: No edema, deformities, or weakness; LLE pain, no drainage  HEME/ONC: No bleeding, no weight loss, no lymphadenopathy, no night sweats.     MEDICATIONS  (STANDING):  aspirin enteric coated 81 milliGRAM(s) Oral daily  atorvastatin 80 milliGRAM(s) Oral at bedtime  buDESOnide 160 MICROgram(s)/formoterol 4.5 MICROgram(s) Inhaler 2 Puff(s) Inhalation two times a day  cefepime   IVPB 1000 milliGRAM(s) IV Intermittent every 8 hours  cefepime   IVPB      collagenase Ointment 1 Application(s) Topical daily  dextrose 5%. 1000 milliLiter(s) (50 mL/Hr) IV Continuous <Continuous>  dextrose 50% Injectable 12.5 Gram(s) IV Push once  dextrose 50% Injectable 25 Gram(s) IV Push once  dextrose 50% Injectable 25 Gram(s) IV Push once  famotidine    Tablet 20 milliGRAM(s) Oral two times a day  heparin  Injectable 5000 Unit(s) SubCutaneous every 8 hours  insulin glargine Injectable (LANTUS) 55 Unit(s) SubCutaneous at bedtime  insulin lispro (HumaLOG) corrective regimen sliding scale   SubCutaneous Before meals and at bedtime  insulin lispro Injectable (HumaLOG) 16 Unit(s) SubCutaneous three times a day before meals  levothyroxine 150 MICROGram(s) Oral daily  metoprolol tartrate 25 milliGRAM(s) Oral two times a day    MEDICATIONS  (PRN):  dextrose 40% Gel 15 Gram(s) Oral once PRN Blood Glucose LESS THAN 70 milliGRAM(s)/deciliter  gabapentin 300 milliGRAM(s) Oral daily PRN moderate pain  glucagon  Injectable 1 milliGRAM(s) IntraMuscular once PRN Glucose LESS THAN 70 milligrams/deciliter  HYDROmorphone  Injectable 1 milliGRAM(s) IV Push every 4 hours PRN Severe Pain (7 - 10)  oxyCODONE    5 mG/acetaminophen 325 mG 1 Tablet(s) Oral every 4 hours PRN Moderate Pain (4 - 6)  oxyCODONE    5 mG/acetaminophen 325 mG 2 Tablet(s) Oral every 4 hours PRN Severe Pain (7 - 10)  polyethylene glycol 3350 17 Gram(s) Oral daily PRN Constipation      Vital Signs Last 24 Hrs  T(C): 36.5 (14 Oct 2018 08:47), Max: 37.2 (14 Oct 2018 05:14)  T(F): 97.7 (14 Oct 2018 08:47), Max: 99 (14 Oct 2018 05:14)  HR: 68 (14 Oct 2018 08:47) (68 - 88)  BP: 137/68 (14 Oct 2018 08:47) (100/62 - 155/69)  BP(mean): --  RR: 18 (14 Oct 2018 08:47) (18 - 20)  SpO2: 100% (14 Oct 2018 08:47) (95% - 100%)  CAPILLARY BLOOD GLUCOSE      POCT Blood Glucose.: 131 mg/dL (14 Oct 2018 09:04)  POCT Blood Glucose.: 124 mg/dL (13 Oct 2018 22:10)  POCT Blood Glucose.: 223 mg/dL (13 Oct 2018 17:14)  POCT Blood Glucose.: 156 mg/dL (13 Oct 2018 12:21)    I&O's Summary    13 Oct 2018 07:01  -  14 Oct 2018 07:00  --------------------------------------------------------  IN: 1760 mL / OUT: 1200 mL / NET: 560 mL          PHYSICAL EXAM  GENERAL: NAD, well-developed  HEAD:  Atraumatic, Normocephalic  EYES: EOMI, PERRLA, conjunctiva and sclera clear  NECK: Supple, No JVD  CHEST/LUNG: Clear to auscultation bilaterally; No wheeze  HEART: Regular rate and rhythm; No murmurs, rubs, or gallops  ABDOMEN: Soft, Nontender, Nondistended; Bowel sounds present  EXTREMITIES:  2+ Peripheral Pulses, No clubbing, cyanosis; No edema; LLE wrapped, dressing done by podiatry.  No drainage  PSYCH: AAOx3  SKIN: No rashes or lesions  NEURO: 5/5 Muscle strength x4.  Sensation intact. CN intact.  No gait disturbance.     LABS:                        9.4    5.6   )-----------( 358      ( 14 Oct 2018 07:12 )             30.9     10-14    135  |  101  |  26<H>  ----------------------------<  120<H>  4.5   |  21<L>  |  1.15    Ca    9.7      14 Oct 2018 07:10  Phos  3.4     10-14  Mg     1.8     10-14      PT/INR - ( 14 Oct 2018 07:12 )   PT: 11.7 sec;   INR: 1.08 ratio                   RADIOLOGY & ADDITIONAL TESTS:    Imaging Personally Reviewed:  < from: Xray Chest 1 View- PORTABLE-Urgent (10.13.18 @ 13:36) >  IMPRESSION: Clear lungs.    < end of copied text >  < from: Xray Foot AP + Lateral + Oblique, Left (10.10.18 @ 17:07) >    IMPRESSION:  Redemonstrated amputation of the first ray at the distal metatarsal with   ossific densities adjacent to the site of amputation, stable since the   prior exam. Sharp surgical margins without any cortical erosions.  No radiopaque foreign bodies, or gross radiographic evidence for   osteomyelitis.  Plantar and superior-posterior calcaneal enthesophyte. Vascular   calcifications.     < end of copied text >    Consultant(s) Notes Reviewed:  Cards, Vascular  Care Discussed with Consultants/Other Providers:

## 2018-10-14 NOTE — PROGRESS NOTE ADULT - SUBJECTIVE AND OBJECTIVE BOX
Chief complaint  Patient is a 68y old  Male who presents with a chief complaint of PAD (14 Oct 2018 11:37)   Review of systems  Patient in bed, looks comfortable, no fever, no hypoglycemia.    Labs and Fingersticks  CAPILLARY BLOOD GLUCOSE      POCT Blood Glucose.: 110 mg/dL (14 Oct 2018 14:59)  POCT Blood Glucose.: 131 mg/dL (14 Oct 2018 09:04)  POCT Blood Glucose.: 124 mg/dL (13 Oct 2018 22:10)      Anion Gap, Serum: 13 (10-14 @ 07:10)  Anion Gap, Serum: 13 (10-13 @ 06:39)      Calcium, Total Serum: 9.7 (10-14 @ 07:10)  Calcium, Total Serum: 9.6 (10-13 @ 06:39)          10-14    135  |  101  |  26<H>  ----------------------------<  120<H>  4.5   |  21<L>  |  1.15    Ca    9.7      14 Oct 2018 07:10  Phos  3.4     10-14  Mg     1.8     10-14                          9.4    5.6   )-----------( 358      ( 14 Oct 2018 07:12 )             30.9     Medications  MEDICATIONS  (STANDING):  aspirin enteric coated 81 milliGRAM(s) Oral daily  atorvastatin 80 milliGRAM(s) Oral at bedtime  buDESOnide 160 MICROgram(s)/formoterol 4.5 MICROgram(s) Inhaler 2 Puff(s) Inhalation two times a day  cefepime   IVPB 1000 milliGRAM(s) IV Intermittent every 8 hours  cefepime   IVPB      collagenase Ointment 1 Application(s) Topical daily  dextrose 5%. 1000 milliLiter(s) (50 mL/Hr) IV Continuous <Continuous>  dextrose 50% Injectable 12.5 Gram(s) IV Push once  dextrose 50% Injectable 25 Gram(s) IV Push once  dextrose 50% Injectable 25 Gram(s) IV Push once  famotidine    Tablet 20 milliGRAM(s) Oral two times a day  heparin  Injectable 5000 Unit(s) SubCutaneous every 8 hours  insulin glargine Injectable (LANTUS) 55 Unit(s) SubCutaneous at bedtime  insulin lispro (HumaLOG) corrective regimen sliding scale   SubCutaneous Before meals and at bedtime  insulin lispro Injectable (HumaLOG) 16 Unit(s) SubCutaneous three times a day before meals  levothyroxine 150 MICROGram(s) Oral daily  metoprolol tartrate 25 milliGRAM(s) Oral two times a day      Physical Exam  General: Patient comfortable in bed  Vital Signs Last 12 Hrs  T(F): 98.3 (10-14-18 @ 13:41), Max: 98.3 (10-14-18 @ 13:41)  HR: 80 (10-14-18 @ 13:41) (68 - 80)  BP: 145/78 (10-14-18 @ 13:41) (137/68 - 145/78)  BP(mean): --  RR: 18 (10-14-18 @ 13:41) (18 - 18)  SpO2: 99% (10-14-18 @ 13:41) (99% - 100%)  Neck: No palpable thyroid nodules.  CVS: S1S2, No murmurs  Respiratory: No wheezing, no crepitations  GI: Abdomen soft, bowel sounds positive  Musculoskeletal:  edema lower extremities.   Skin: No skin rashes, no ecchymosis    Diagnostics

## 2018-10-14 NOTE — CONSULT NOTE ADULT - ASSESSMENT
Dry gangrene  PAD  CAD -S/P stents-stable  Anemia  Medically and Cardiologically cleared for surgery

## 2018-10-14 NOTE — PROGRESS NOTE ADULT - ASSESSMENT
68M PMH CHF (TTE 5/2017 EF 50%, TTE 3/2016 Stage I diastolic dysfunction), HTN, DM2 (on insulin, A1c 8% on 10/11), hypothyroidism, CAD (s/p three stents in 2006, CABG x3 in 3/2016), PAD (s/p L femoral endarterectomy and fem pop bypass in 6/2017, L femoral to peroneal bypass with PTFE in 8/7/18, L hallux amputation) and recent admission for LLE pain in the setting of an occluded L fem to peroneal bypass c/b anaphylaxis 2/2 IV contrast, who presented for this admission on 10/10/18 with increased LLE pain, found to have flow-limiting disease and cellulitis with plan for BKA by vascular surgery. Patient is high risk for likely high risk procedure.

## 2018-10-14 NOTE — PROGRESS NOTE ADULT - ASSESSMENT
ASSESMENT:     67M with history of left femoral endarterectomy and fem pop bypass (6/2017) and subsequent left femoral to peroneal bypass with PTFE (8/7/18) for claudication with known occlusion of bypass now with increased LLE pain; consented to R BKA following medical/CRD clearance;       - Regular diet   - Continue current insulin regimen for DM per endo, appreciate recs  - Continue home medications, ASA  - Continue Cefepime for L LE cellulitis  - Hold nephrotoxic medications (losartan, lasix), renal consult   - Cardiology consults for risk assessment and optimization for eventual amputation  - Pain control, Neurontin retarted, NSAIDs on hold due to elev creat.  - Plan for BKA after optimization   -Foot dressing per podiatry   - Pain control   - DVT PPx ASSESMENT:     67M with history of left femoral endarterectomy and fem pop bypass (6/2017) and subsequent left femoral to peroneal bypass with PTFE (8/7/18) for claudication with known occlusion of bypass now with increased LLE pain; consented to L BKA following medical/CRD clearance;       - Regular diet   - Continue current insulin regimen for DM per endo, appreciate recs  - Continue home medications, ASA  - Continue Cefepime for L LE cellulitis  - Hold nephrotoxic medications (losartan, lasix), renal consult   - Cardiology consults for risk assessment and optimization for eventual amputation  - Pain control, Neurontin retarted, NSAIDs on hold due to elev creat.  - Plan for BKA after optimization   -Foot dressing per podiatry   - Pain control   - DVT PPx

## 2018-10-15 ENCOUNTER — TRANSCRIPTION ENCOUNTER (OUTPATIENT)
Age: 68
End: 2018-10-15

## 2018-10-15 LAB
ANION GAP SERPL CALC-SCNC: 12 MMOL/L — SIGNIFICANT CHANGE UP (ref 5–17)
BUN SERPL-MCNC: 26 MG/DL — HIGH (ref 7–23)
CALCIUM SERPL-MCNC: 9.3 MG/DL — SIGNIFICANT CHANGE UP (ref 8.4–10.5)
CHLORIDE SERPL-SCNC: 101 MMOL/L — SIGNIFICANT CHANGE UP (ref 96–108)
CO2 SERPL-SCNC: 20 MMOL/L — LOW (ref 22–31)
CREAT SERPL-MCNC: 1.15 MG/DL — SIGNIFICANT CHANGE UP (ref 0.5–1.3)
CULTURE RESULTS: SIGNIFICANT CHANGE UP
GLUCOSE SERPL-MCNC: 173 MG/DL — HIGH (ref 70–99)
HCT VFR BLD CALC: 30.1 % — LOW (ref 39–50)
HGB BLD-MCNC: 9.6 G/DL — LOW (ref 13–17)
MAGNESIUM SERPL-MCNC: 2.4 MG/DL — SIGNIFICANT CHANGE UP (ref 1.6–2.6)
MCHC RBC-ENTMCNC: 27.3 PG — SIGNIFICANT CHANGE UP (ref 27–34)
MCHC RBC-ENTMCNC: 31.8 GM/DL — LOW (ref 32–36)
MCV RBC AUTO: 85.8 FL — SIGNIFICANT CHANGE UP (ref 80–100)
PHOSPHATE SERPL-MCNC: 3.1 MG/DL — SIGNIFICANT CHANGE UP (ref 2.5–4.5)
PLATELET # BLD AUTO: 342 K/UL — SIGNIFICANT CHANGE UP (ref 150–400)
POTASSIUM SERPL-MCNC: 4.7 MMOL/L — SIGNIFICANT CHANGE UP (ref 3.5–5.3)
POTASSIUM SERPL-SCNC: 4.7 MMOL/L — SIGNIFICANT CHANGE UP (ref 3.5–5.3)
RBC # BLD: 3.51 M/UL — LOW (ref 4.2–5.8)
RBC # FLD: 15.6 % — HIGH (ref 10.3–14.5)
SODIUM SERPL-SCNC: 133 MMOL/L — LOW (ref 135–145)
SPECIMEN SOURCE: SIGNIFICANT CHANGE UP
WBC # BLD: 5.7 K/UL — SIGNIFICANT CHANGE UP (ref 3.8–10.5)
WBC # FLD AUTO: 5.7 K/UL — SIGNIFICANT CHANGE UP (ref 3.8–10.5)

## 2018-10-15 PROCEDURE — 99233 SBSQ HOSP IP/OBS HIGH 50: CPT

## 2018-10-15 PROCEDURE — 99231 SBSQ HOSP IP/OBS SF/LOW 25: CPT | Mod: 57

## 2018-10-15 RX ORDER — INSULIN LISPRO 100/ML
VIAL (ML) SUBCUTANEOUS EVERY 6 HOURS
Qty: 0 | Refills: 0 | Status: DISCONTINUED | OUTPATIENT
Start: 2018-10-15 | End: 2018-10-16

## 2018-10-15 RX ORDER — SODIUM CHLORIDE 9 MG/ML
1000 INJECTION INTRAMUSCULAR; INTRAVENOUS; SUBCUTANEOUS
Qty: 0 | Refills: 0 | Status: DISCONTINUED | OUTPATIENT
Start: 2018-10-15 | End: 2018-10-16

## 2018-10-15 RX ADMIN — HYDROMORPHONE HYDROCHLORIDE 2 MILLIGRAM(S): 2 INJECTION INTRAMUSCULAR; INTRAVENOUS; SUBCUTANEOUS at 23:30

## 2018-10-15 RX ADMIN — OXYCODONE AND ACETAMINOPHEN 2 TABLET(S): 5; 325 TABLET ORAL at 21:24

## 2018-10-15 RX ADMIN — Medication 16 UNIT(S): at 17:59

## 2018-10-15 RX ADMIN — HYDROMORPHONE HYDROCHLORIDE 2 MILLIGRAM(S): 2 INJECTION INTRAMUSCULAR; INTRAVENOUS; SUBCUTANEOUS at 15:11

## 2018-10-15 RX ADMIN — OXYCODONE AND ACETAMINOPHEN 2 TABLET(S): 5; 325 TABLET ORAL at 13:53

## 2018-10-15 RX ADMIN — Medication 1: at 09:53

## 2018-10-15 RX ADMIN — Medication 1: at 13:32

## 2018-10-15 RX ADMIN — BUDESONIDE AND FORMOTEROL FUMARATE DIHYDRATE 2 PUFF(S): 160; 4.5 AEROSOL RESPIRATORY (INHALATION) at 18:49

## 2018-10-15 RX ADMIN — HEPARIN SODIUM 5000 UNIT(S): 5000 INJECTION INTRAVENOUS; SUBCUTANEOUS at 12:16

## 2018-10-15 RX ADMIN — CEFEPIME 100 MILLIGRAM(S): 1 INJECTION, POWDER, FOR SOLUTION INTRAMUSCULAR; INTRAVENOUS at 12:16

## 2018-10-15 RX ADMIN — Medication 16 UNIT(S): at 09:54

## 2018-10-15 RX ADMIN — HYDROMORPHONE HYDROCHLORIDE 2 MILLIGRAM(S): 2 INJECTION INTRAMUSCULAR; INTRAVENOUS; SUBCUTANEOUS at 01:15

## 2018-10-15 RX ADMIN — HYDROMORPHONE HYDROCHLORIDE 2 MILLIGRAM(S): 2 INJECTION INTRAMUSCULAR; INTRAVENOUS; SUBCUTANEOUS at 14:41

## 2018-10-15 RX ADMIN — OXYCODONE AND ACETAMINOPHEN 2 TABLET(S): 5; 325 TABLET ORAL at 05:43

## 2018-10-15 RX ADMIN — FAMOTIDINE 20 MILLIGRAM(S): 10 INJECTION INTRAVENOUS at 17:59

## 2018-10-15 RX ADMIN — Medication 25 MILLIGRAM(S): at 17:59

## 2018-10-15 RX ADMIN — OXYCODONE AND ACETAMINOPHEN 2 TABLET(S): 5; 325 TABLET ORAL at 10:24

## 2018-10-15 RX ADMIN — HYDROMORPHONE HYDROCHLORIDE 2 MILLIGRAM(S): 2 INJECTION INTRAMUSCULAR; INTRAVENOUS; SUBCUTANEOUS at 06:20

## 2018-10-15 RX ADMIN — INSULIN GLARGINE 55 UNIT(S): 100 INJECTION, SOLUTION SUBCUTANEOUS at 21:56

## 2018-10-15 RX ADMIN — CEFEPIME 100 MILLIGRAM(S): 1 INJECTION, POWDER, FOR SOLUTION INTRAMUSCULAR; INTRAVENOUS at 21:25

## 2018-10-15 RX ADMIN — ATORVASTATIN CALCIUM 80 MILLIGRAM(S): 80 TABLET, FILM COATED ORAL at 21:25

## 2018-10-15 RX ADMIN — OXYCODONE AND ACETAMINOPHEN 2 TABLET(S): 5; 325 TABLET ORAL at 21:56

## 2018-10-15 RX ADMIN — HYDROMORPHONE HYDROCHLORIDE 2 MILLIGRAM(S): 2 INJECTION INTRAMUSCULAR; INTRAVENOUS; SUBCUTANEOUS at 19:29

## 2018-10-15 RX ADMIN — Medication 1 APPLICATION(S): at 12:16

## 2018-10-15 RX ADMIN — HYDROMORPHONE HYDROCHLORIDE 2 MILLIGRAM(S): 2 INJECTION INTRAMUSCULAR; INTRAVENOUS; SUBCUTANEOUS at 19:59

## 2018-10-15 RX ADMIN — HEPARIN SODIUM 5000 UNIT(S): 5000 INJECTION INTRAVENOUS; SUBCUTANEOUS at 05:40

## 2018-10-15 RX ADMIN — BUDESONIDE AND FORMOTEROL FUMARATE DIHYDRATE 2 PUFF(S): 160; 4.5 AEROSOL RESPIRATORY (INHALATION) at 05:39

## 2018-10-15 RX ADMIN — Medication 150 MICROGRAM(S): at 05:40

## 2018-10-15 RX ADMIN — HYDROMORPHONE HYDROCHLORIDE 2 MILLIGRAM(S): 2 INJECTION INTRAMUSCULAR; INTRAVENOUS; SUBCUTANEOUS at 06:50

## 2018-10-15 RX ADMIN — HYDROMORPHONE HYDROCHLORIDE 2 MILLIGRAM(S): 2 INJECTION INTRAMUSCULAR; INTRAVENOUS; SUBCUTANEOUS at 01:45

## 2018-10-15 RX ADMIN — Medication 81 MILLIGRAM(S): at 12:16

## 2018-10-15 RX ADMIN — HEPARIN SODIUM 5000 UNIT(S): 5000 INJECTION INTRAVENOUS; SUBCUTANEOUS at 21:25

## 2018-10-15 RX ADMIN — Medication 1: at 21:57

## 2018-10-15 RX ADMIN — Medication 25 MILLIGRAM(S): at 05:40

## 2018-10-15 RX ADMIN — OXYCODONE AND ACETAMINOPHEN 2 TABLET(S): 5; 325 TABLET ORAL at 14:23

## 2018-10-15 RX ADMIN — FAMOTIDINE 20 MILLIGRAM(S): 10 INJECTION INTRAVENOUS at 05:40

## 2018-10-15 RX ADMIN — OXYCODONE AND ACETAMINOPHEN 2 TABLET(S): 5; 325 TABLET ORAL at 09:54

## 2018-10-15 RX ADMIN — CEFEPIME 100 MILLIGRAM(S): 1 INJECTION, POWDER, FOR SOLUTION INTRAMUSCULAR; INTRAVENOUS at 05:39

## 2018-10-15 RX ADMIN — OXYCODONE AND ACETAMINOPHEN 2 TABLET(S): 5; 325 TABLET ORAL at 06:17

## 2018-10-15 RX ADMIN — Medication 16 UNIT(S): at 13:32

## 2018-10-15 NOTE — PROGRESS NOTE ADULT - ASSESSMENT
Assessment  DMT2: 68y Male with DM T2 with hyperglycemia on insulin basal bolus blood sugars improving, no hypoglycemic episode,  eating meals,  non compliant with low carb diet.  CAD: On medications, stable, monitored.  Hypothyroidism:  On synthroid 150 mcg po daily, asymptomatic.  HTN: Controlled, On med.  Foot Wound: Planing amputation foot tomorrow, Sx team FU

## 2018-10-15 NOTE — PROGRESS NOTE ADULT - ASSESSMENT
Patient is a high risk cardiovascular patient on the basis of his known ischemic heart disease, PAD, history of CHF, and insulin dependent diabetes. RCRI score 3, consistent with 11% risk of cardiac event.  Patient is capable of > 4 METS at baseline.  He underwent higher risk vascular surgery one month ago without cardiac event.    He is currently without recent ACS, decompensated heart failure, dangerous arrhythmia, or critical valvular stenosis.  EKG reviewed and is unchanged from outpatient records.  No further cardiovascular work-up is necessary prior to his planned procedure.    Continue ASA and high intensity statin.  Continue beta-blocker perioperatively.

## 2018-10-15 NOTE — PROGRESS NOTE ADULT - SUBJECTIVE AND OBJECTIVE BOX
HPI:  No events overnight.  Patient seen ambulating in his room without chest pain or shortness of breath - he has left leg/foot discomfort with ambulation.    Review Of Systems:           Respiratory: No shortness of breath, cough, or wheezing  Cardiovascular: No chest pain or palpitations  10 point review of systems is otherwise negative except as mentioned above        Medications:  aspirin enteric coated 81 milliGRAM(s) Oral daily  atorvastatin 80 milliGRAM(s) Oral at bedtime  buDESOnide 160 MICROgram(s)/formoterol 4.5 MICROgram(s) Inhaler 2 Puff(s) Inhalation two times a day  cefepime   IVPB 1000 milliGRAM(s) IV Intermittent every 8 hours  cefepime   IVPB      collagenase Ointment 1 Application(s) Topical daily  dextrose 40% Gel 15 Gram(s) Oral once PRN  dextrose 5%. 1000 milliLiter(s) IV Continuous <Continuous>  dextrose 50% Injectable 12.5 Gram(s) IV Push once  dextrose 50% Injectable 25 Gram(s) IV Push once  dextrose 50% Injectable 25 Gram(s) IV Push once  famotidine    Tablet 20 milliGRAM(s) Oral two times a day  gabapentin 300 milliGRAM(s) Oral daily PRN  glucagon  Injectable 1 milliGRAM(s) IntraMuscular once PRN  heparin  Injectable 5000 Unit(s) SubCutaneous every 8 hours  HYDROmorphone  Injectable 2 milliGRAM(s) IV Push every 4 hours PRN  insulin glargine Injectable (LANTUS) 55 Unit(s) SubCutaneous at bedtime  insulin lispro (HumaLOG) corrective regimen sliding scale   SubCutaneous Before meals and at bedtime  insulin lispro Injectable (HumaLOG) 16 Unit(s) SubCutaneous three times a day before meals  levothyroxine 150 MICROGram(s) Oral daily  metoprolol tartrate 25 milliGRAM(s) Oral two times a day  oxyCODONE    5 mG/acetaminophen 325 mG 2 Tablet(s) Oral every 4 hours PRN  polyethylene glycol 3350 17 Gram(s) Oral daily PRN  sodium chloride 0.9%. 1000 milliLiter(s) IV Continuous <Continuous>    PAST MEDICAL & SURGICAL HISTORY:  COPD (chronic obstructive pulmonary disease)  Sleep apnea: non-compliant  Anemia  Atherosclerosis of arteries of extremities: left leg  Mild intermittent asthma without complication  Iron deficiency anemia, unspecified iron deficiency anemia type  Prostate cancer  Bronchospasm  Obesity (BMI 30-39.9)  PAD (peripheral artery disease): RLE bypass 11/30/15  CAD (coronary artery disease): 3 cardiac stents placed in 2006 (mid LAD, Prox LAD, Prox to #2), 3V CABG 3/28/16  Hypothyroid  Intermittent claudication  Diabetic neuropathy  DM (diabetes mellitus): type 2 - on insulin pump  CHF (congestive heart failure)  Hypercholesteremia  HTN (hypertension)  History of femoral angiogram: Left femoral endarterectomy Fem-Pop bypass 6/2017  S/P bypass graft of extremity: RLE- 11/30/15  S/P CABG x 3: 3/28/16  Stented coronary artery: x 3 cardiac stents placed in 2006  S/P prostatectomy: 1996    Vitals:  T(C): 36.5 (10-15-18 @ 09:04), Max: 36.9 (10-14-18 @ 21:30)  HR: 78 (10-15-18 @ 09:04) (58 - 89)  BP: 135/52 (10-15-18 @ 09:04) (120/60 - 146/78)  BP(mean): --  RR: 20 (10-15-18 @ 09:04) (18 - 24)  SpO2: 98% (10-15-18 @ 09:04) (96% - 100%)  Wt(kg): --  Daily     Daily   I&O's Summary    14 Oct 2018 07:01  -  15 Oct 2018 07:00  --------------------------------------------------------  IN: 1300 mL / OUT: 900 mL / NET: 400 mL        Physical Exam:  Appearance: No acute distress; well appearing  Eyes: PERRL, EOMI, pink conjunctiva  HENT: Normal oral mucosa  Cardiovascular: RRR, S1, S2, no murmurs; LLE CLI noted  Respiratory: Clear to auscultation bilaterally  Gastrointestinal: soft, non-tender, non-distended with normal bowel sounds  Musculoskeletal: No clubbing; no joint deformity   Neurologic: Non-focal  Lymphatic: No lymphadenopathy  Psychiatry: AAOx3, mood & affect appropriate  Skin: No rashes, ecchymoses, or cyanosis                          9.6    5.7   )-----------( 342      ( 15 Oct 2018 07:02 )             30.1     10-15    133<L>  |  101  |  26<H>  ----------------------------<  173<H>  4.7   |  20<L>  |  1.15    Ca    9.3      15 Oct 2018 07:02  Phos  3.1     10-15  Mg     2.4     10-15      PT/INR - ( 14 Oct 2018 07:12 )   PT: 11.7 sec;   INR: 1.08 ratio           ECG: sinus 93 bpm with first degree AV delay, normal axis, old inferior infarct, and poor R-wave progression    Echo: < from: TTE with Doppler (w/Cont) (05.08.17 @ 13:04) >  ------------------------------------------------------------------------  Conclusions:  1. Aortic valve not well visualized; appears calcified.  2. Normal left ventricular internal dimensions and wall  thicknesses.  3. Technically difficult study, endocardial visualization  enhanced with intravenous injection of echo contrast  (Definity). Paradoxical septal wall motion consistent with  prior open heart surgery. The apex is hypokinetic. Grossly  borderline   LV systolic function, EF by visual estimate  about  50%  4. Normal tricuspid valve. Minimal tricuspid regurgitation.  5. Estimated pulmonary artery systolic pressure equals 19  mm Hg, assuming right atrial pressure equals 8  mm Hg,  consistent with normal pulmonary pressures.  *** Compared with echocardiogram of 3/26/2016, no  significant changes noted.  ------------------------------------------------------------------------  Confirmed on  5/8/2017 - 16:43:46 by Leonor Steward M.D.  ------------------------------------------------------------------------    < end of copied text >    Stress Testing:  < from: Nuclear Stress Test-Pharmacologic (02.24.16 @ 16:07) >  GATED ANALYSIS:  Post-stress resting myocardial perfusion gated SPECT  imaging was performed (LVEF = 45 %;LVEDV = 120 ml.),  revealing mild segmental LV dysfunction.  ------------------------------------------------------------------------    IMPRESSIONS:Abnormal Study  * There are medium sized, moderate defects in the anterior  and apical walls that are fixed, suggestive of infarction  with moderate shahzad-infarct ischemia.  There is a medium  sized, moderate defect in inferior wall that is fixed  consistent with infarction with mild shahzad-infarction  ischemia.  * Post-stress resting myocardial perfusion gated SPECT  imaging was performed (LVEF = 45 %;LVEDV = 120 ml.),  revealing mild segmental LV dysfunction.      Confirmed on  2/24/2016 - 16:52:30 at [facility] by Guillermo Dudley MD  ------------------------------------------------------------------------    < end of copied text >      Cath: < from: Cardiac Cath Lab - Adult (03.25.16 @ 10:55) >  CORONARY VESSELS: The coronary circulation is left dominant.  LM:   --  Distal left main: There was a 50 % stenosis.  LAD:   --  Ostial LAD: There was a 80 % stenosis.  CX:   --  Ostial circumflex: There was a 60 % stenosis.  RCA:   --  RCA: Angiography showed minor luminal irregularities with no  flow limiting lesions.  COMPLICATIONS: There were no complications.  DIAGNOSTIC RECOMMENDATIONS: Consultation with Barbara will be obtained for  coronary artery bypass grafting.  Prepared and signed by  Chico Ferro M.D.  Signed 03/25/2016 13:05:14    < end of copied text >

## 2018-10-15 NOTE — PROGRESS NOTE ADULT - SUBJECTIVE AND OBJECTIVE BOX
Pre-operative Note    - Pre-operative Diagnosis: Fail fem-pop L LE bypass graft     - Procedure: L BKA     - Labs:                        9.6    5.7   )-----------( 342      ( 15 Oct 2018 07:02 )             30.1     10-15    133<L>  |  101  |  26<H>  ----------------------------<  173<H>  4.7   |  20<L>  |  1.15    Ca    9.3      15 Oct 2018 07:02  Phos  3.1     10-15  Mg     2.4     10-15      PT/INR - ( 14 Oct 2018 07:12 )   PT: 11.7 sec;   INR: 1.08 ratio           Type & Screen #1: 10/10/18  Type & Screen #2: 10/14/18    - CXR: 10/13/18 - clear lungs     - EKG: 10/13/18    - AICD/Pacemaker Interrogation Date: NA    - Blood: Not needed.     - Pregnancy Test: NA    - Orders:  > NPO at midnight  > IVF at midnight  > Perioperative antibiotics not needed.  > Morning Labs: CBC, BMP, coags, type & screen  > Diabetic orders adjusted for NPO period.    - Permits:  > Consent in chart.  > Case scheduled with OR.

## 2018-10-15 NOTE — PROGRESS NOTE ADULT - SUBJECTIVE AND OBJECTIVE BOX
Chief complaint  Patient is a 68y old  Male who presents with a chief complaint of Critical limb ischemia/gangrene (15 Oct 2018 09:09)   Review of systems  Patient in bed, looks comfortable, no fever, no hypoglycemia.    Labs and Fingersticks  CAPILLARY BLOOD GLUCOSE      POCT Blood Glucose.: 174 mg/dL (15 Oct 2018 13:08)  POCT Blood Glucose.: 170 mg/dL (15 Oct 2018 09:38)  POCT Blood Glucose.: 111 mg/dL (14 Oct 2018 22:32)  POCT Blood Glucose.: 95 mg/dL (14 Oct 2018 22:12)  POCT Blood Glucose.: 75 mg/dL (14 Oct 2018 21:54)  POCT Blood Glucose.: 64 mg/dL (14 Oct 2018 21:33)  POCT Blood Glucose.: 99 mg/dL (14 Oct 2018 18:38)      Anion Gap, Serum: 12 (10-15 @ 07:02)  Anion Gap, Serum: 13 (10-14 @ 07:10)      Calcium, Total Serum: 9.3 (10-15 @ 07:02)  Calcium, Total Serum: 9.7 (10-14 @ 07:10)          10-15    133<L>  |  101  |  26<H>  ----------------------------<  173<H>  4.7   |  20<L>  |  1.15    Ca    9.3      15 Oct 2018 07:02  Phos  3.1     10-15  Mg     2.4     10-15                          9.6    5.7   )-----------( 342      ( 15 Oct 2018 07:02 )             30.1     Medications  MEDICATIONS  (STANDING):  aspirin enteric coated 81 milliGRAM(s) Oral daily  atorvastatin 80 milliGRAM(s) Oral at bedtime  buDESOnide 160 MICROgram(s)/formoterol 4.5 MICROgram(s) Inhaler 2 Puff(s) Inhalation two times a day  cefepime   IVPB 1000 milliGRAM(s) IV Intermittent every 8 hours  cefepime   IVPB      collagenase Ointment 1 Application(s) Topical daily  dextrose 5%. 1000 milliLiter(s) (50 mL/Hr) IV Continuous <Continuous>  dextrose 50% Injectable 12.5 Gram(s) IV Push once  dextrose 50% Injectable 25 Gram(s) IV Push once  dextrose 50% Injectable 25 Gram(s) IV Push once  famotidine    Tablet 20 milliGRAM(s) Oral two times a day  heparin  Injectable 5000 Unit(s) SubCutaneous every 8 hours  insulin glargine Injectable (LANTUS) 55 Unit(s) SubCutaneous at bedtime  insulin lispro (HumaLOG) corrective regimen sliding scale   SubCutaneous Before meals and at bedtime  insulin lispro Injectable (HumaLOG) 16 Unit(s) SubCutaneous three times a day before meals  levothyroxine 150 MICROGram(s) Oral daily  metoprolol tartrate 25 milliGRAM(s) Oral two times a day  sodium chloride 0.9%. 1000 milliLiter(s) (75 mL/Hr) IV Continuous <Continuous>      Physical Exam  General: Patient comfortable in bed  Vital Signs Last 12 Hrs  T(F): 97.9 (10-15-18 @ 13:41), Max: 98.5 (10-15-18 @ 05:12)  HR: 68 (10-15-18 @ 13:41) (68 - 80)  BP: 117/62 (10-15-18 @ 13:41) (117/62 - 142/61)  BP(mean): --  RR: 18 (10-15-18 @ 13:41) (18 - 20)  SpO2: 100% (10-15-18 @ 13:41) (98% - 100%)  Neck: No palpable thyroid nodules.  CVS: S1S2, No murmurs  Respiratory: No wheezing, no crepitations  GI: Abdomen soft, bowel sounds positive  Musculoskeletal:  Toe amput, foot wound, edema lower extremities.   Skin: No skin rashes, no ecchymosis    Diagnostics

## 2018-10-15 NOTE — PROGRESS NOTE ADULT - SUBJECTIVE AND OBJECTIVE BOX
MARK CASTAÑEDA  [id number]    Subjective:    Patient seen and examined by vascular team, no acute events overnight.   Patient c/o pain, improves with IV pain medication.   No fevers, no other complaints.        Objective:  T(C): 36.9 (10-15-18 @ 05:12), Max: 36.9 (10-14-18 @ 21:30)  HR: 80 (10-15-18 @ 05:12) (58 - 89)  BP: 142/61 (10-15-18 @ 05:12) (120/60 - 146/78)  RR: 20 (10-15-18 @ 05:12) (18 - 24)  SpO2: 100% (10-15-18 @ 05:12) (96% - 100%)  Wt(kg): --   10-15    133<L>  |  101  |  26<H>  ----------------------------<  173<H>  4.7   |  20<L>  |  1.15    Ca    9.3      15 Oct 2018 07:02  Phos  3.1     10-15  Mg     2.4     10-15                          9.6    5.7   )-----------( 342      ( 15 Oct 2018 07:02 )             30.1       10-14 @ 07:01  -  10-15 @ 07:00  --------------------------------------------------------  IN: 1300 mL / OUT: 900 mL / NET: 400 mL      PHYSICAL EXAM:    >> General: Well-developed. Well-nourished. No acute distress.  >> Neck: Supple.  >> Abdomen: Soft. Non-tender. Non-distended.  >> Extremities: L LE with no palpable DP/PT or pop pulses, s/p mallux amputation with a non healing wound. Dressing in place with no strikethrough. There is mild erythema to dorsum of foot upwards to midshin, minimal warmth to touch, improved since yesterday.               MEDICATIONS  (STANDING):  aspirin enteric coated 81 milliGRAM(s) Oral daily  atorvastatin 80 milliGRAM(s) Oral at bedtime  buDESOnide 160 MICROgram(s)/formoterol 4.5 MICROgram(s) Inhaler 2 Puff(s) Inhalation two times a day  cefepime   IVPB 1000 milliGRAM(s) IV Intermittent every 8 hours  cefepime   IVPB      collagenase Ointment 1 Application(s) Topical daily  dextrose 5%. 1000 milliLiter(s) (50 mL/Hr) IV Continuous <Continuous>  dextrose 50% Injectable 12.5 Gram(s) IV Push once  dextrose 50% Injectable 25 Gram(s) IV Push once  dextrose 50% Injectable 25 Gram(s) IV Push once  famotidine    Tablet 20 milliGRAM(s) Oral two times a day  heparin  Injectable 5000 Unit(s) SubCutaneous every 8 hours  insulin glargine Injectable (LANTUS) 55 Unit(s) SubCutaneous at bedtime  insulin lispro (HumaLOG) corrective regimen sliding scale   SubCutaneous Before meals and at bedtime  insulin lispro Injectable (HumaLOG) 16 Unit(s) SubCutaneous three times a day before meals  levothyroxine 150 MICROGram(s) Oral daily  metoprolol tartrate 25 milliGRAM(s) Oral two times a day    MEDICATIONS  (PRN):  dextrose 40% Gel 15 Gram(s) Oral once PRN Blood Glucose LESS THAN 70 milliGRAM(s)/deciliter  gabapentin 300 milliGRAM(s) Oral daily PRN moderate pain  glucagon  Injectable 1 milliGRAM(s) IntraMuscular once PRN Glucose LESS THAN 70 milligrams/deciliter  HYDROmorphone  Injectable 2 milliGRAM(s) IV Push every 4 hours PRN Severe Pain (7 - 10)  oxyCODONE    5 mG/acetaminophen 325 mG 2 Tablet(s) Oral every 4 hours PRN Moderate Pain (4 - 6)  polyethylene glycol 3350 17 Gram(s) Oral daily PRN Constipation

## 2018-10-15 NOTE — PROGRESS NOTE ADULT - ASSESSMENT
ASSESMENT:     67M with history of left femoral endarterectomy and fem pop bypass (6/2017) and subsequent left femoral to peroneal bypass with PTFE (8/7/18) for claudication with known occlusion of bypass now with increased LLE pain; consented to L BKA tomorrow;       - Regular diet  - NPO/IVF at midnight  - Continue current insulin regimen for DM per endo, appreciate recs, monitor for hypoglycemia  - Continue home medications, ASA  - Continue Cefepime for L LE cellulitis  - Hold nephrotoxic medications (losartan, lasix), renal consult   - Pain control  - R BKA tomorrow   -Foot dressing per podiatry ASSESMENT:     67M with history of left femoral endarterectomy and fem pop bypass (6/2017) and subsequent left femoral to peroneal bypass with PTFE (8/7/18) for claudication with known occlusion of bypass now with increased LLE pain; consented to L BKA tomorrow;       - Regular diet  - NPO/IVF at midnight  - Continue current insulin regimen for DM per endo, appreciate recs, monitor for hypoglycemia  - Continue home medications, ASA  - Continue Cefepime for L LE cellulitis  - Hold nephrotoxic medications (losartan, lasix), renal consult   - Pain control  - L BKA tomorrow   -Foot dressing per podiatry

## 2018-10-16 ENCOUNTER — RESULT REVIEW (OUTPATIENT)
Age: 68
End: 2018-10-16

## 2018-10-16 LAB
ANION GAP SERPL CALC-SCNC: 15 MMOL/L — SIGNIFICANT CHANGE UP (ref 5–17)
BLD GP AB SCN SERPL QL: NEGATIVE — SIGNIFICANT CHANGE UP
BUN SERPL-MCNC: 27 MG/DL — HIGH (ref 7–23)
CALCIUM SERPL-MCNC: 9.6 MG/DL — SIGNIFICANT CHANGE UP (ref 8.4–10.5)
CHLORIDE SERPL-SCNC: 101 MMOL/L — SIGNIFICANT CHANGE UP (ref 96–108)
CO2 SERPL-SCNC: 17 MMOL/L — LOW (ref 22–31)
CREAT SERPL-MCNC: 1.22 MG/DL — SIGNIFICANT CHANGE UP (ref 0.5–1.3)
CULTURE RESULTS: SIGNIFICANT CHANGE UP
GLUCOSE SERPL-MCNC: 150 MG/DL — HIGH (ref 70–99)
HCT VFR BLD CALC: 32.6 % — LOW (ref 39–50)
HGB BLD-MCNC: 10.3 G/DL — LOW (ref 13–17)
INR BLD: 1.01 RATIO — SIGNIFICANT CHANGE UP (ref 0.88–1.16)
MAGNESIUM SERPL-MCNC: 2.3 MG/DL — SIGNIFICANT CHANGE UP (ref 1.6–2.6)
MCHC RBC-ENTMCNC: 27.8 PG — SIGNIFICANT CHANGE UP (ref 27–34)
MCHC RBC-ENTMCNC: 31.6 GM/DL — LOW (ref 32–36)
MCV RBC AUTO: 87.8 FL — SIGNIFICANT CHANGE UP (ref 80–100)
PHOSPHATE SERPL-MCNC: 3.3 MG/DL — SIGNIFICANT CHANGE UP (ref 2.5–4.5)
PLATELET # BLD AUTO: 261 K/UL — SIGNIFICANT CHANGE UP (ref 150–400)
POTASSIUM SERPL-MCNC: 4.9 MMOL/L — SIGNIFICANT CHANGE UP (ref 3.5–5.3)
POTASSIUM SERPL-SCNC: 4.9 MMOL/L — SIGNIFICANT CHANGE UP (ref 3.5–5.3)
PROTHROM AB SERPL-ACNC: 10.9 SEC — SIGNIFICANT CHANGE UP (ref 9.8–12.7)
RBC # BLD: 3.72 M/UL — LOW (ref 4.2–5.8)
RBC # FLD: 15.3 % — HIGH (ref 10.3–14.5)
RH IG SCN BLD-IMP: POSITIVE — SIGNIFICANT CHANGE UP
SODIUM SERPL-SCNC: 133 MMOL/L — LOW (ref 135–145)
SPECIMEN SOURCE: SIGNIFICANT CHANGE UP
WBC # BLD: 6.4 K/UL — SIGNIFICANT CHANGE UP (ref 3.8–10.5)
WBC # FLD AUTO: 6.4 K/UL — SIGNIFICANT CHANGE UP (ref 3.8–10.5)

## 2018-10-16 PROCEDURE — 88307 TISSUE EXAM BY PATHOLOGIST: CPT | Mod: 26

## 2018-10-16 PROCEDURE — 27880 AMPUTATION OF LOWER LEG: CPT | Mod: 78

## 2018-10-16 RX ORDER — BUDESONIDE AND FORMOTEROL FUMARATE DIHYDRATE 160; 4.5 UG/1; UG/1
2 AEROSOL RESPIRATORY (INHALATION)
Qty: 0 | Refills: 0 | Status: DISCONTINUED | OUTPATIENT
Start: 2018-10-16 | End: 2018-10-17

## 2018-10-16 RX ORDER — OXYCODONE AND ACETAMINOPHEN 5; 325 MG/1; MG/1
2 TABLET ORAL EVERY 4 HOURS
Qty: 0 | Refills: 0 | Status: DISCONTINUED | OUTPATIENT
Start: 2018-10-16 | End: 2018-10-17

## 2018-10-16 RX ORDER — METOPROLOL TARTRATE 50 MG
25 TABLET ORAL
Qty: 0 | Refills: 0 | Status: DISCONTINUED | OUTPATIENT
Start: 2018-10-16 | End: 2018-10-17

## 2018-10-16 RX ORDER — INSULIN GLARGINE 100 [IU]/ML
55 INJECTION, SOLUTION SUBCUTANEOUS AT BEDTIME
Qty: 0 | Refills: 0 | Status: DISCONTINUED | OUTPATIENT
Start: 2018-10-16 | End: 2018-10-17

## 2018-10-16 RX ORDER — POLYETHYLENE GLYCOL 3350 17 G/17G
17 POWDER, FOR SOLUTION ORAL DAILY
Qty: 0 | Refills: 0 | Status: DISCONTINUED | OUTPATIENT
Start: 2018-10-16 | End: 2018-10-17

## 2018-10-16 RX ORDER — HYDROMORPHONE HYDROCHLORIDE 2 MG/ML
0.5 INJECTION INTRAMUSCULAR; INTRAVENOUS; SUBCUTANEOUS
Qty: 0 | Refills: 0 | Status: DISCONTINUED | OUTPATIENT
Start: 2018-10-16 | End: 2018-10-16

## 2018-10-16 RX ORDER — CEFEPIME 1 G/1
1000 INJECTION, POWDER, FOR SOLUTION INTRAMUSCULAR; INTRAVENOUS EVERY 8 HOURS
Qty: 0 | Refills: 0 | Status: DISCONTINUED | OUTPATIENT
Start: 2018-10-16 | End: 2018-10-17

## 2018-10-16 RX ORDER — HYDROMORPHONE HYDROCHLORIDE 2 MG/ML
0.5 INJECTION INTRAMUSCULAR; INTRAVENOUS; SUBCUTANEOUS ONCE
Qty: 0 | Refills: 0 | Status: DISCONTINUED | OUTPATIENT
Start: 2018-10-16 | End: 2018-10-16

## 2018-10-16 RX ORDER — SODIUM CHLORIDE 9 MG/ML
1000 INJECTION INTRAMUSCULAR; INTRAVENOUS; SUBCUTANEOUS
Qty: 0 | Refills: 0 | Status: DISCONTINUED | OUTPATIENT
Start: 2018-10-16 | End: 2018-10-17

## 2018-10-16 RX ORDER — FAMOTIDINE 10 MG/ML
20 INJECTION INTRAVENOUS
Qty: 0 | Refills: 0 | Status: DISCONTINUED | OUTPATIENT
Start: 2018-10-16 | End: 2018-10-17

## 2018-10-16 RX ORDER — HYDROMORPHONE HYDROCHLORIDE 2 MG/ML
2 INJECTION INTRAMUSCULAR; INTRAVENOUS; SUBCUTANEOUS EVERY 4 HOURS
Qty: 0 | Refills: 0 | Status: DISCONTINUED | OUTPATIENT
Start: 2018-10-16 | End: 2018-10-17

## 2018-10-16 RX ORDER — GABAPENTIN 400 MG/1
300 CAPSULE ORAL DAILY
Qty: 0 | Refills: 0 | Status: DISCONTINUED | OUTPATIENT
Start: 2018-10-16 | End: 2018-10-17

## 2018-10-16 RX ORDER — INSULIN LISPRO 100/ML
VIAL (ML) SUBCUTANEOUS AT BEDTIME
Qty: 0 | Refills: 0 | Status: DISCONTINUED | OUTPATIENT
Start: 2018-10-16 | End: 2018-10-17

## 2018-10-16 RX ORDER — INSULIN LISPRO 100/ML
16 VIAL (ML) SUBCUTANEOUS
Qty: 0 | Refills: 0 | Status: DISCONTINUED | OUTPATIENT
Start: 2018-10-16 | End: 2018-10-17

## 2018-10-16 RX ORDER — SODIUM CHLORIDE 9 MG/ML
1000 INJECTION, SOLUTION INTRAVENOUS
Qty: 0 | Refills: 0 | Status: DISCONTINUED | OUTPATIENT
Start: 2018-10-16 | End: 2018-10-17

## 2018-10-16 RX ORDER — DEXTROSE 50 % IN WATER 50 %
25 SYRINGE (ML) INTRAVENOUS ONCE
Qty: 0 | Refills: 0 | Status: DISCONTINUED | OUTPATIENT
Start: 2018-10-16 | End: 2018-10-17

## 2018-10-16 RX ORDER — ONDANSETRON 8 MG/1
4 TABLET, FILM COATED ORAL ONCE
Qty: 0 | Refills: 0 | Status: DISCONTINUED | OUTPATIENT
Start: 2018-10-16 | End: 2018-10-16

## 2018-10-16 RX ORDER — ATORVASTATIN CALCIUM 80 MG/1
80 TABLET, FILM COATED ORAL AT BEDTIME
Qty: 0 | Refills: 0 | Status: DISCONTINUED | OUTPATIENT
Start: 2018-10-16 | End: 2018-10-17

## 2018-10-16 RX ORDER — INSULIN LISPRO 100/ML
VIAL (ML) SUBCUTANEOUS
Qty: 0 | Refills: 0 | Status: DISCONTINUED | OUTPATIENT
Start: 2018-10-16 | End: 2018-10-17

## 2018-10-16 RX ORDER — COLLAGENASE CLOSTRIDIUM HIST. 250 UNIT/G
1 OINTMENT (GRAM) TOPICAL DAILY
Qty: 0 | Refills: 0 | Status: DISCONTINUED | OUTPATIENT
Start: 2018-10-16 | End: 2018-10-17

## 2018-10-16 RX ORDER — LEVOTHYROXINE SODIUM 125 MCG
150 TABLET ORAL DAILY
Qty: 0 | Refills: 0 | Status: DISCONTINUED | OUTPATIENT
Start: 2018-10-16 | End: 2018-10-17

## 2018-10-16 RX ORDER — DEXTROSE 50 % IN WATER 50 %
12.5 SYRINGE (ML) INTRAVENOUS ONCE
Qty: 0 | Refills: 0 | Status: DISCONTINUED | OUTPATIENT
Start: 2018-10-16 | End: 2018-10-17

## 2018-10-16 RX ORDER — DEXTROSE 50 % IN WATER 50 %
15 SYRINGE (ML) INTRAVENOUS ONCE
Qty: 0 | Refills: 0 | Status: DISCONTINUED | OUTPATIENT
Start: 2018-10-16 | End: 2018-10-17

## 2018-10-16 RX ORDER — ACETAMINOPHEN 500 MG
1000 TABLET ORAL ONCE
Qty: 0 | Refills: 0 | Status: COMPLETED | OUTPATIENT
Start: 2018-10-16 | End: 2018-10-16

## 2018-10-16 RX ORDER — INSULIN LISPRO 100/ML
VIAL (ML) SUBCUTANEOUS EVERY 6 HOURS
Qty: 0 | Refills: 0 | Status: DISCONTINUED | OUTPATIENT
Start: 2018-10-16 | End: 2018-10-16

## 2018-10-16 RX ORDER — HEPARIN SODIUM 5000 [USP'U]/ML
5000 INJECTION INTRAVENOUS; SUBCUTANEOUS EVERY 8 HOURS
Qty: 0 | Refills: 0 | Status: DISCONTINUED | OUTPATIENT
Start: 2018-10-16 | End: 2018-10-17

## 2018-10-16 RX ORDER — ASPIRIN/CALCIUM CARB/MAGNESIUM 324 MG
81 TABLET ORAL DAILY
Qty: 0 | Refills: 0 | Status: DISCONTINUED | OUTPATIENT
Start: 2018-10-16 | End: 2018-10-17

## 2018-10-16 RX ADMIN — Medication 1 APPLICATION(S): at 21:51

## 2018-10-16 RX ADMIN — CEFEPIME 100 MILLIGRAM(S): 1 INJECTION, POWDER, FOR SOLUTION INTRAMUSCULAR; INTRAVENOUS at 21:51

## 2018-10-16 RX ADMIN — BUDESONIDE AND FORMOTEROL FUMARATE DIHYDRATE 2 PUFF(S): 160; 4.5 AEROSOL RESPIRATORY (INHALATION) at 05:10

## 2018-10-16 RX ADMIN — HYDROMORPHONE HYDROCHLORIDE 0.5 MILLIGRAM(S): 2 INJECTION INTRAMUSCULAR; INTRAVENOUS; SUBCUTANEOUS at 01:28

## 2018-10-16 RX ADMIN — HYDROMORPHONE HYDROCHLORIDE 2 MILLIGRAM(S): 2 INJECTION INTRAMUSCULAR; INTRAVENOUS; SUBCUTANEOUS at 05:09

## 2018-10-16 RX ADMIN — OXYCODONE AND ACETAMINOPHEN 2 TABLET(S): 5; 325 TABLET ORAL at 17:33

## 2018-10-16 RX ADMIN — HYDROMORPHONE HYDROCHLORIDE 2 MILLIGRAM(S): 2 INJECTION INTRAMUSCULAR; INTRAVENOUS; SUBCUTANEOUS at 16:00

## 2018-10-16 RX ADMIN — HYDROMORPHONE HYDROCHLORIDE 2 MILLIGRAM(S): 2 INJECTION INTRAMUSCULAR; INTRAVENOUS; SUBCUTANEOUS at 20:07

## 2018-10-16 RX ADMIN — INSULIN GLARGINE 55 UNIT(S): 100 INJECTION, SOLUTION SUBCUTANEOUS at 21:52

## 2018-10-16 RX ADMIN — Medication 25 MILLIGRAM(S): at 17:33

## 2018-10-16 RX ADMIN — Medication 150 MICROGRAM(S): at 21:52

## 2018-10-16 RX ADMIN — HYDROMORPHONE HYDROCHLORIDE 2 MILLIGRAM(S): 2 INJECTION INTRAMUSCULAR; INTRAVENOUS; SUBCUTANEOUS at 05:39

## 2018-10-16 RX ADMIN — FAMOTIDINE 20 MILLIGRAM(S): 10 INJECTION INTRAVENOUS at 17:33

## 2018-10-16 RX ADMIN — Medication 400 MILLIGRAM(S): at 23:32

## 2018-10-16 RX ADMIN — HYDROMORPHONE HYDROCHLORIDE 2 MILLIGRAM(S): 2 INJECTION INTRAMUSCULAR; INTRAVENOUS; SUBCUTANEOUS at 10:20

## 2018-10-16 RX ADMIN — Medication 81 MILLIGRAM(S): at 21:52

## 2018-10-16 RX ADMIN — HEPARIN SODIUM 5000 UNIT(S): 5000 INJECTION INTRAVENOUS; SUBCUTANEOUS at 21:50

## 2018-10-16 RX ADMIN — HEPARIN SODIUM 5000 UNIT(S): 5000 INJECTION INTRAVENOUS; SUBCUTANEOUS at 05:08

## 2018-10-16 RX ADMIN — HYDROMORPHONE HYDROCHLORIDE 0.5 MILLIGRAM(S): 2 INJECTION INTRAMUSCULAR; INTRAVENOUS; SUBCUTANEOUS at 01:05

## 2018-10-16 RX ADMIN — FAMOTIDINE 20 MILLIGRAM(S): 10 INJECTION INTRAVENOUS at 05:08

## 2018-10-16 RX ADMIN — Medication 25 MILLIGRAM(S): at 05:08

## 2018-10-16 RX ADMIN — HYDROMORPHONE HYDROCHLORIDE 2 MILLIGRAM(S): 2 INJECTION INTRAMUSCULAR; INTRAVENOUS; SUBCUTANEOUS at 16:15

## 2018-10-16 RX ADMIN — BUDESONIDE AND FORMOTEROL FUMARATE DIHYDRATE 2 PUFF(S): 160; 4.5 AEROSOL RESPIRATORY (INHALATION) at 17:40

## 2018-10-16 RX ADMIN — ATORVASTATIN CALCIUM 80 MILLIGRAM(S): 80 TABLET, FILM COATED ORAL at 21:50

## 2018-10-16 RX ADMIN — SODIUM CHLORIDE 75 MILLILITER(S): 9 INJECTION INTRAMUSCULAR; INTRAVENOUS; SUBCUTANEOUS at 05:08

## 2018-10-16 RX ADMIN — SODIUM CHLORIDE 75 MILLILITER(S): 9 INJECTION INTRAMUSCULAR; INTRAVENOUS; SUBCUTANEOUS at 00:00

## 2018-10-16 RX ADMIN — Medication 150 MICROGRAM(S): at 05:08

## 2018-10-16 RX ADMIN — OXYCODONE AND ACETAMINOPHEN 2 TABLET(S): 5; 325 TABLET ORAL at 18:03

## 2018-10-16 RX ADMIN — HYDROMORPHONE HYDROCHLORIDE 2 MILLIGRAM(S): 2 INJECTION INTRAMUSCULAR; INTRAVENOUS; SUBCUTANEOUS at 19:37

## 2018-10-16 RX ADMIN — CEFEPIME 100 MILLIGRAM(S): 1 INJECTION, POWDER, FOR SOLUTION INTRAMUSCULAR; INTRAVENOUS at 05:08

## 2018-10-16 RX ADMIN — HYDROMORPHONE HYDROCHLORIDE 2 MILLIGRAM(S): 2 INJECTION INTRAMUSCULAR; INTRAVENOUS; SUBCUTANEOUS at 00:00

## 2018-10-16 RX ADMIN — HYDROMORPHONE HYDROCHLORIDE 2 MILLIGRAM(S): 2 INJECTION INTRAMUSCULAR; INTRAVENOUS; SUBCUTANEOUS at 09:44

## 2018-10-16 RX ADMIN — Medication 16 UNIT(S): at 18:13

## 2018-10-16 NOTE — PROGRESS NOTE ADULT - PROVIDER SPECIALTY LIST ADULT
Cardiology
Endocrinology
Endocrinology
Hospitalist
Podiatry
Vascular Surgery
Endocrinology

## 2018-10-16 NOTE — PROGRESS NOTE ADULT - SUBJECTIVE AND OBJECTIVE BOX
Vascular Surgery Progress Note        Subjective:  Flatus: [ ] YES [ ] NO             Bowel Movement: [ ] YES [ ] NO  Pain (0-10):            Pain Control Adequate: [ ] YES [ ] NO  Nausea: [ ] YES [x] NO            Vomiting: [ ] YES [x] NO  Diarrhea: [ ] YES [ ] NO         Constipation: [ ] YES [ ] NO     Chest Pain: [ ] YES [x] NO    SOB:  [ ] YES [x] NO    MEDICATIONS  (STANDING):  aspirin enteric coated 81 milliGRAM(s) Oral daily  atorvastatin 80 milliGRAM(s) Oral at bedtime  buDESOnide 160 MICROgram(s)/formoterol 4.5 MICROgram(s) Inhaler 2 Puff(s) Inhalation two times a day  cefepime   IVPB 1000 milliGRAM(s) IV Intermittent every 8 hours  cefepime   IVPB      collagenase Ointment 1 Application(s) Topical daily  dextrose 5%. 1000 milliLiter(s) (50 mL/Hr) IV Continuous <Continuous>  dextrose 50% Injectable 12.5 Gram(s) IV Push once  dextrose 50% Injectable 25 Gram(s) IV Push once  dextrose 50% Injectable 25 Gram(s) IV Push once  famotidine    Tablet 20 milliGRAM(s) Oral two times a day  heparin  Injectable 5000 Unit(s) SubCutaneous every 8 hours  insulin glargine Injectable (LANTUS) 55 Unit(s) SubCutaneous at bedtime  insulin lispro (HumaLOG) corrective regimen sliding scale   SubCutaneous every 6 hours  insulin lispro Injectable (HumaLOG) 16 Unit(s) SubCutaneous three times a day before meals  levothyroxine 150 MICROGram(s) Oral daily  metoprolol tartrate 25 milliGRAM(s) Oral two times a day  sodium chloride 0.9%. 1000 milliLiter(s) (75 mL/Hr) IV Continuous <Continuous>    MEDICATIONS  (PRN):  dextrose 40% Gel 15 Gram(s) Oral once PRN Blood Glucose LESS THAN 70 milliGRAM(s)/deciliter  gabapentin 300 milliGRAM(s) Oral daily PRN moderate pain  glucagon  Injectable 1 milliGRAM(s) IntraMuscular once PRN Glucose LESS THAN 70 milligrams/deciliter  HYDROmorphone  Injectable 2 milliGRAM(s) IV Push every 4 hours PRN Severe Pain (7 - 10)  oxyCODONE    5 mG/acetaminophen 325 mG 2 Tablet(s) Oral every 4 hours PRN Moderate Pain (4 - 6)  polyethylene glycol 3350 17 Gram(s) Oral daily PRN Constipation          Vital Signs Last 24 Hrs  T(C): 36.9 (16 Oct 2018 05:01), Max: 37.1 (15 Oct 2018 21:16)  T(F): 98.5 (16 Oct 2018 05:01), Max: 98.8 (15 Oct 2018 21:16)  HR: 86 (16 Oct 2018 05:01) (68 - 87)  BP: 165/71 (16 Oct 2018 05:01) (117/62 - 165/71)  BP(mean): --  RR: 18 (16 Oct 2018 05:01) (18 - 19)  SpO2: 99% (16 Oct 2018 05:01) (97% - 100%)    LABS:                        10.3   6.4   )-----------( 261      ( 16 Oct 2018 06:34 )             32.6     10-16    133<L>  |  101  |  27<H>  ----------------------------<  150<H>  4.9   |  17<L>  |  1.22    Ca    9.6      16 Oct 2018 06:34  Phos  3.3     10-16  Mg     2.3     10-16      PT/INR - ( 16 Oct 2018 06:33 )   PT: 10.9 sec;   INR: 1.01 ratio               CAPILLARY BLOOD GLUCOSE      POCT Blood Glucose.: 137 mg/dL (16 Oct 2018 06:11)  POCT Blood Glucose.: 172 mg/dL (15 Oct 2018 21:48)  POCT Blood Glucose.: 87 mg/dL (15 Oct 2018 17:21)  POCT Blood Glucose.: 174 mg/dL (15 Oct 2018 13:08)  POCT Blood Glucose.: 170 mg/dL (15 Oct 2018 09:38)    I&O's Summary    15 Oct 2018 07:01  -  16 Oct 2018 07:00  --------------------------------------------------------  IN: 1610 mL / OUT: 0 mL / NET: 1610 mL            Physical Exam:  General: NAD, resting comfortably  Pulmonary: normal resp effort  Cardiovascular:   Abdominal: soft, NT/ND  Extremities: WWP, normal strength  Neuro: A/O x 3, no focal deficits  Pulses:   Right:                                                                          Left:  FEM [ ]2+ [ ]1+ [ ]doppler                                             FEM [ ]2+ [ ]1+ [ ]doppler    POP [ ]2+ [ ]1+ [ ]doppler                                             POP [ ]2+ [ ]1+ [ ]doppler    DP [ ]2+ [ ]1+ [ ]doppler                                                DP [ ]2+ [ ]1+ [ ]doppler  PT[ ]2+ [ ]1+ [ ]doppler                                                  PT [ ]2+ [ ]1+ [ ]doppler    Lines/drains/tubes:    Assessment: 66yo m      Plan:               (e) [id number] Vascular Surgery Progress Note        Subjective:  Flatus: [ ] YES [ ] NO             Bowel Movement: [ ] YES [ ] NO  Pain (0-10):            Pain Control Adequate: [ ] YES [ ] NO  Nausea: [ ] YES [x] NO            Vomiting: [ ] YES [x] NO  Diarrhea: [ ] YES [ ] NO         Constipation: [ ] YES [ ] NO     Chest Pain: [ ] YES [x] NO    SOB:  [ ] YES [x] NO    MEDICATIONS  (STANDING):  aspirin enteric coated 81 milliGRAM(s) Oral daily  atorvastatin 80 milliGRAM(s) Oral at bedtime  buDESOnide 160 MICROgram(s)/formoterol 4.5 MICROgram(s) Inhaler 2 Puff(s) Inhalation two times a day  cefepime   IVPB 1000 milliGRAM(s) IV Intermittent every 8 hours  cefepime   IVPB      collagenase Ointment 1 Application(s) Topical daily  dextrose 5%. 1000 milliLiter(s) (50 mL/Hr) IV Continuous <Continuous>  dextrose 50% Injectable 12.5 Gram(s) IV Push once  dextrose 50% Injectable 25 Gram(s) IV Push once  dextrose 50% Injectable 25 Gram(s) IV Push once  famotidine    Tablet 20 milliGRAM(s) Oral two times a day  heparin  Injectable 5000 Unit(s) SubCutaneous every 8 hours  insulin glargine Injectable (LANTUS) 55 Unit(s) SubCutaneous at bedtime  insulin lispro (HumaLOG) corrective regimen sliding scale   SubCutaneous every 6 hours  insulin lispro Injectable (HumaLOG) 16 Unit(s) SubCutaneous three times a day before meals  levothyroxine 150 MICROGram(s) Oral daily  metoprolol tartrate 25 milliGRAM(s) Oral two times a day  sodium chloride 0.9%. 1000 milliLiter(s) (75 mL/Hr) IV Continuous <Continuous>    MEDICATIONS  (PRN):  dextrose 40% Gel 15 Gram(s) Oral once PRN Blood Glucose LESS THAN 70 milliGRAM(s)/deciliter  gabapentin 300 milliGRAM(s) Oral daily PRN moderate pain  glucagon  Injectable 1 milliGRAM(s) IntraMuscular once PRN Glucose LESS THAN 70 milligrams/deciliter  HYDROmorphone  Injectable 2 milliGRAM(s) IV Push every 4 hours PRN Severe Pain (7 - 10)  oxyCODONE    5 mG/acetaminophen 325 mG 2 Tablet(s) Oral every 4 hours PRN Moderate Pain (4 - 6)  polyethylene glycol 3350 17 Gram(s) Oral daily PRN Constipation          Vital Signs Last 24 Hrs  T(C): 36.9 (16 Oct 2018 05:01), Max: 37.1 (15 Oct 2018 21:16)  T(F): 98.5 (16 Oct 2018 05:01), Max: 98.8 (15 Oct 2018 21:16)  HR: 86 (16 Oct 2018 05:01) (68 - 87)  BP: 165/71 (16 Oct 2018 05:01) (117/62 - 165/71)  BP(mean): --  RR: 18 (16 Oct 2018 05:01) (18 - 19)  SpO2: 99% (16 Oct 2018 05:01) (97% - 100%)    LABS:                        10.3   6.4   )-----------( 261      ( 16 Oct 2018 06:34 )             32.6     10-16    133<L>  |  101  |  27<H>  ----------------------------<  150<H>  4.9   |  17<L>  |  1.22    Ca    9.6      16 Oct 2018 06:34  Phos  3.3     10-16  Mg     2.3     10-16      PT/INR - ( 16 Oct 2018 06:33 )   PT: 10.9 sec;   INR: 1.01 ratio               CAPILLARY BLOOD GLUCOSE      POCT Blood Glucose.: 137 mg/dL (16 Oct 2018 06:11)  POCT Blood Glucose.: 172 mg/dL (15 Oct 2018 21:48)  POCT Blood Glucose.: 87 mg/dL (15 Oct 2018 17:21)  POCT Blood Glucose.: 174 mg/dL (15 Oct 2018 13:08)  POCT Blood Glucose.: 170 mg/dL (15 Oct 2018 09:38)    I&O's Summary    15 Oct 2018 07:01  -  16 Oct 2018 07:00  --------------------------------------------------------  IN: 1610 mL / OUT: 0 mL / NET: 1610 mL            Physical Exam:  General: NAD, resting comfortably  Pulmonary: normal resp effort  Cardiovascular:   Abdominal: soft, NT/ND  Extremities: Neuro: A/O x 3, no focal deficits  Pulses:   Right:                                                                          Left:  FEM [ ]2+ [ ]1+ [ ]doppler                                             FEM [ ]2+ [ ]1+ [ ]doppler    POP [ ]2+ [ ]1+ [ ]doppler                                             POP [ ]2+ [ ]1+ [ ]doppler    DP [ ]2+ [ ]1+ [ ]doppler                                                DP [ ]2+ [ ]1+ [ ]doppler  PT[ ]2+ [ ]1+ [ ]doppler                                                  PT [ ]2+ [ ]1+ [ ]doppler    Lines/drains/tubes:

## 2018-10-16 NOTE — PROGRESS NOTE ADULT - SUBJECTIVE AND OBJECTIVE BOX
Patient is a 68y old  Male who presents with a chief complaint of increased LLE pain s/p occlusion of left fem-peroneal bypass for claudication (16 Oct 2018 09:14)       INTERVAL HPI/OVERNIGHT EVENTS:  Patient seen and evaluated at bedside.  Pt is resting comfortable in NAD. Denies N/V/F/C.    Allergies    contrast media (iodine-based) (Hypotension)    Intolerances        Vital Signs Last 24 Hrs  T(C): 36.9 (16 Oct 2018 05:01), Max: 37.1 (15 Oct 2018 21:16)  T(F): 98.5 (16 Oct 2018 05:01), Max: 98.8 (15 Oct 2018 21:16)  HR: 86 (16 Oct 2018 05:01) (68 - 87)  BP: 165/71 (16 Oct 2018 05:01) (117/62 - 165/71)  BP(mean): --  RR: 18 (16 Oct 2018 05:01) (18 - 19)  SpO2: 99% (16 Oct 2018 05:01) (97% - 100%)    LABS:                        10.3   6.4   )-----------( 261      ( 16 Oct 2018 06:34 )             32.6     10-16    133<L>  |  101  |  27<H>  ----------------------------<  150<H>  4.9   |  17<L>  |  1.22    Ca    9.6      16 Oct 2018 06:34  Phos  3.3     10-16  Mg     2.3     10-16      PT/INR - ( 16 Oct 2018 06:33 )   PT: 10.9 sec;   INR: 1.01 ratio             CAPILLARY BLOOD GLUCOSE      POCT Blood Glucose.: 137 mg/dL (16 Oct 2018 06:11)  POCT Blood Glucose.: 172 mg/dL (15 Oct 2018 21:48)  POCT Blood Glucose.: 87 mg/dL (15 Oct 2018 17:21)  POCT Blood Glucose.: 174 mg/dL (15 Oct 2018 13:08)      Lower Extremity Physical Exam:  dressing kept clean dry and intact

## 2018-10-16 NOTE — CHART NOTE - NSCHARTNOTEFT_GEN_A_CORE
Vascular Surgery Consult    HPI:  GENERAL SURGERY CONSULT NOTE  Attending: Soheila	  Service: Vascular surgery  Contact: [phone]    HPI  67M with CAD s/p three stents in , CABG x3 in 3/2016, left femoral endarterectomy and fem pop bypass (2017), left femoral to peroneal bypass with PTFE (18) for claudication, s/p left hallux amputation with podiatry with recent admission for LLE pain found to have occluded left fem to peroneal bypass.  Patient had undergone a diagnostic angiogram but it had to be aborted due to an anaphylactic reaction to IV contrast.  Patient was counseled that further revascularization would likely not be an option as the patient did not have good blood flow below the knee.  He presents today with increased LLE pain.  He has not seen Dr. Parnell recently.  His left hallux amputation has dehisced for which he has followed with Dr. Gregg.  He denies any associated symptoms such as paresthesias, numbness in the LLE.  No fevers, chills, SOB, chest pain, N/V/D.      In the ER he was hemodynamically stable and afebrile.  His labs revealed a normal WBC of 8.3, hyponatremia to 127 and hyperglycemia to 347.  He also has a new JOSE LUIS with a creatinine of 1.63.  His lactate was elevated at 3.1.  On exam he had bilateral palpable femoral pulses, signals in the RLE (DP/PT).  On the left he did not have any distal signals in the PT/DP/ or in the bypass.  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  The left foot is warm to touch and he does not report decreased sensation.        PMH/PSH  COPD (chronic obstructive pulmonary disease)  Sleep apnea  Anemia  Atherosclerosis of arteries of extremities  Mild intermittent asthma without complication  Iron deficiency anemia, unspecified iron deficiency anemia type  Prostate cancer  Bronchospasm  Obesity (BMI 30-39.9)  PAD (peripheral artery disease)  CAD (coronary artery disease)  S/P prostatectomy  Hypothyroid  Intermittent claudication  Diabetic neuropathy  DM (diabetes mellitus)  CHF (congestive heart failure)  Hypercholesteremia  HTN (hypertension)    History of femoral angiogram  S/P bypass graft of extremity  S/P CABG x 3  Stented coronary artery  S/P prostatectomy      MEDICATIONS  aspirin enteric coated 81 milliGRAM(s) Oral daily  atorvastatin 80 milliGRAM(s) Oral at bedtime  buDESOnide 160 MICROgram(s)/formoterol 4.5 MICROgram(s) Inhaler 2 Puff(s) Inhalation two times a day  dextrose 40% Gel 15 Gram(s) Oral once PRN  dextrose 5%. 1000 milliLiter(s) IV Continuous <Continuous>  dextrose 50% Injectable 12.5 Gram(s) IV Push once  dextrose 50% Injectable 25 Gram(s) IV Push once  dextrose 50% Injectable 25 Gram(s) IV Push once  gabapentin 300 milliGRAM(s) Oral daily PRN  glucagon  Injectable 1 milliGRAM(s) IntraMuscular once PRN  heparin  Injectable 5000 Unit(s) SubCutaneous every 8 hours  insulin glargine Injectable (LANTUS) 55 Unit(s) SubCutaneous at bedtime  insulin lispro (HumaLOG) corrective regimen sliding scale   SubCutaneous three times a day before meals  insulin lispro Injectable (HumaLOG) 18 Unit(s) SubCutaneous three times a day before meals  levothyroxine 150 MICROGram(s) Oral daily  metoprolol tartrate 25 milliGRAM(s) Oral two times a day  oxyCODONE    IR 5 milliGRAM(s) Oral every 4 hours PRN  sodium chloride 0.9% Bolus 1000 milliLiter(s) IV Bolus once  sodium chloride 0.9%. 1000 milliLiter(s) IV Continuous <Continuous>      Allergies    contrast media (iodine-based) (Hypotension)    Intolerances        Social    Physical Exam  T(C): 36.7 (10-10-18 @ 15:32), Max: 36.8 (10-10-18 @ 14:18)  HR: 71 (10-10-18 @ 15:32) (71 - 76)  BP: 160/75 (10-10-18 @ 15:32) (132/64 - 160/75)  RR: 18 (10-10-18 @ 15:32) (18 - 20)  SpO2: 100% (10-10-18 @ 15:32) (100% - 100%)  Wt(kg): --  Tmax: T(C): , Max: 36.8 (10-10-18 @ 14:18)  Wt(kg): --      Physical exam:  See HPI       LABS                        11.5   8.3   )-----------( 374      ( 10 Oct 2018 15:30 )             35.8     10-10    127<L>  |  89<L>  |  72<H>  ----------------------------<  346<H>  4.4   |  19<L>  |  1.63<H>    Ca    9.8      10 Oct 2018 15:30    TPro  8.6<H>  /  Alb  4.2  /  TBili  0.3  /  DBili  x   /  AST  11  /  ALT  9<L>  /  AlkPhos  106  10-10    PT/INR - ( 10 Oct 2018 15:30 )   PT: 11.6 sec;   INR: 1.06 ratio         PTT - ( 10 Oct 2018 15:30 )  PTT:31.6 sec  Urinalysis Basic - ( 10 Oct 2018 16:59 )    Color: Light Yellow / Appearance: Clear / S.012 / pH: x  Gluc: x / Ketone: Negative  / Bili: Negative / Urobili: Negative   Blood: x / Protein: Negative / Nitrite: Negative   Leuk Esterase: Negative / RBC: x / WBC x   Sq Epi: x / Non Sq Epi: x / Bacteria: x (10 Oct 2018 17:29)      PAST MEDICAL & SURGICAL HISTORY:  COPD (chronic obstructive pulmonary disease)  Sleep apnea: non-compliant  Anemia  Atherosclerosis of arteries of extremities: left leg  Mild intermittent asthma without complication  Iron deficiency anemia, unspecified iron deficiency anemia type  Prostate cancer  Bronchospasm  Obesity (BMI 30-39.9)  PAD (peripheral artery disease): RLE bypass 11/30/15  CAD (coronary artery disease): 3 cardiac stents placed in  (mid LAD, Prox LAD, Prox to #2), 3V CABG 3/28/16  Hypothyroid  Intermittent claudication  Diabetic neuropathy  DM (diabetes mellitus): type 2 - on insulin pump  CHF (congestive heart failure)  Hypercholesteremia  HTN (hypertension)  History of femoral angiogram: Left femoral endarterectomy Fem-Pop bypass 2017  S/P bypass graft of extremity: RLE- 11/30/15  S/P CABG x 3: 3/28/16  Stented coronary artery: x 3 cardiac stents placed in   S/P prostatectomy: 1996      MEDICATIONS  (STANDING):  aspirin enteric coated 81 milliGRAM(s) Oral daily  atorvastatin 80 milliGRAM(s) Oral at bedtime  buDESOnide 160 MICROgram(s)/formoterol 4.5 MICROgram(s) Inhaler 2 Puff(s) Inhalation two times a day  cefepime   IVPB 1000 milliGRAM(s) IV Intermittent every 8 hours  cefepime   IVPB      collagenase Ointment 1 Application(s) Topical daily  dextrose 5%. 1000 milliLiter(s) (50 mL/Hr) IV Continuous <Continuous>  dextrose 50% Injectable 12.5 Gram(s) IV Push once  dextrose 50% Injectable 25 Gram(s) IV Push once  dextrose 50% Injectable 25 Gram(s) IV Push once  famotidine    Tablet 20 milliGRAM(s) Oral two times a day  heparin  Injectable 5000 Unit(s) SubCutaneous every 8 hours  insulin glargine Injectable (LANTUS) 55 Unit(s) SubCutaneous at bedtime  insulin lispro (HumaLOG) corrective regimen sliding scale   SubCutaneous every 6 hours  insulin lispro Injectable (HumaLOG) 16 Unit(s) SubCutaneous three times a day before meals  levothyroxine 150 MICROGram(s) Oral daily  metoprolol tartrate 25 milliGRAM(s) Oral two times a day  sodium chloride 0.9%. 1000 milliLiter(s) (75 mL/Hr) IV Continuous <Continuous>    MEDICATIONS  (PRN):  dextrose 40% Gel 15 Gram(s) Oral once PRN Blood Glucose LESS THAN 70 milliGRAM(s)/deciliter  gabapentin 300 milliGRAM(s) Oral daily PRN moderate pain  glucagon  Injectable 1 milliGRAM(s) IntraMuscular once PRN Glucose LESS THAN 70 milligrams/deciliter  HYDROmorphone  Injectable 2 milliGRAM(s) IV Push every 4 hours PRN Severe Pain (7 - 10)  oxyCODONE    5 mG/acetaminophen 325 mG 2 Tablet(s) Oral every 4 hours PRN Moderate Pain (4 - 6)  polyethylene glycol 3350 17 Gram(s) Oral daily PRN Constipation      Allergies    contrast media (iodine-based) (Hypotension)    Intolerances        Vital Signs Last 24 Hrs  T(C): 36.9 (16 Oct 2018 05:01), Max: 37.1 (15 Oct 2018 21:16)  T(F): 98.5 (16 Oct 2018 05:01), Max: 98.8 (15 Oct 2018 21:16)  HR: 86 (16 Oct 2018 05:01) (68 - 87)  BP: 165/71 (16 Oct 2018 05:01) (117/62 - 165/71)  BP(mean): --  RR: 18 (16 Oct 2018 05:01) (18 - 20)  SpO2: 99% (16 Oct 2018 05:01) (97% - 100%)    I&O's Summary    15 Oct 2018 07:01  -  16 Oct 2018 07:00  --------------------------------------------------------  IN: 1610 mL / OUT: 0 mL / NET: 1610 mL        Physical Exam:  General: NAD  Respiratory: Nonlabored  Cardiovascular: RRR  Abdominal: Soft, nondistended, nontender  Extremities: Warm  Pulse exam:    LABS:                        10.3   6.4   )-----------( 261      ( 16 Oct 2018 06:34 )             32.6     10-16    133<L>  |  101  |  27<H>  ----------------------------<  150<H>  4.9   |  17<L>  |  1.22    Ca    9.6      16 Oct 2018 06:34  Phos  3.3     10-16  Mg     2.3     10-16      PT/INR - ( 16 Oct 2018 06:33 )   PT: 10.9 sec;   INR: 1.01 ratio                 IMAGING:    Assessment/Plan: 68yMale presents with

## 2018-10-16 NOTE — PROGRESS NOTE ADULT - ASSESSMENT
Assessment  DMT2: 68y Male with DM T2 with hyperglycemia on insulin basal bolus blood sugars improving, no hypoglycemic episode,  eating meals,  non compliant with low carb diet.  CAD: On medications, stable, monitored.  Hypothyroidism:  On synthroid 150 mcg po daily, asymptomatic.  HTN: Controlled, On med.  Foot Wound: Planing amputation foot tomorrow, Sx team FU Assessment  DMT2: 68y Male with DM T2 with hyperglycemia on insulin basal bolus blood sugars improving, no hypoglycemic episode,  eating meals,  non compliant with low carb diet.  CAD: On medications, stable, monitored.  Hypothyroidism:  On synthroid 150 mcg po daily, asymptomatic.  HTN: Controlled, On med.  Foot Wound: Amputation foot, Sx team FU

## 2018-10-16 NOTE — PROGRESS NOTE ADULT - REASON FOR ADMISSION
increased LLE pain s/p occlusion of left fem-peroneal bypass for claudication
Critical limb ischemia/gangrene
Foot Infection
PAD

## 2018-10-16 NOTE — PROGRESS NOTE ADULT - PROBLEM SELECTOR PROBLEM 4
HTN (hypertension)
COPD (chronic obstructive pulmonary disease)
HTN (hypertension)
HTN (hypertension)

## 2018-10-16 NOTE — PROGRESS NOTE ADULT - ASSESSMENT
68 year old male  s/p LLE fem pop bypass and LF P1RR w/ wound dehiscence and ischemic limb pain.   - Pt seen and eval  - occlusion of bypass graft  - going for BKA today  - discussed importance of protecting contralateral limb and regular podiatry follow up  - podiatry signing off at this time  - seen w/ attending.

## 2018-10-16 NOTE — PROGRESS NOTE ADULT - PROBLEM SELECTOR PLAN 1
Cardiology cleared pt for surgery, potentially Tuesday   -Cont. Cefepime  -Cont. other home meds including BB  - Pain control per primary team  -DVT PPx, Hep subq
Will continue current insulin regimen for now. Will continue monitoring FS, log, will Follow up.  Patient counseled for compliance with consistent low carb diet.

## 2018-10-16 NOTE — PROGRESS NOTE ADULT - PROBLEM SELECTOR PLAN 2
-Appreciate endocrine recommendations, cont. current insulin regimen for now, F/u further endocrine recommendations  --- Sugars are well controlled.
Vascular podiatry teams FU

## 2018-10-16 NOTE — BRIEF OPERATIVE NOTE - PROCEDURE
<<-----Click on this checkbox to enter Procedure Below knee amputation  10/16/2018    Active  [email or web address]

## 2018-10-16 NOTE — PROGRESS NOTE ADULT - PROBLEM SELECTOR PLAN 4
On meds primary team following up
Continue inhalers. Not in exacerbation currently.
On meds primary team following up
On meds primary team following up

## 2018-10-16 NOTE — CHART NOTE - NSCHARTNOTEFT_GEN_A_CORE
VASCULAR SURGERY POST-OP NOTE    PROCEDURE: LORRI LÓPEZ    SUBJECTIVE/ROS:       MEDICATIONS  (STANDING):  aspirin enteric coated 81 milliGRAM(s) Oral daily  atorvastatin 80 milliGRAM(s) Oral at bedtime  buDESOnide 160 MICROgram(s)/formoterol 4.5 MICROgram(s) Inhaler 2 Puff(s) Inhalation two times a day  cefepime   IVPB 1000 milliGRAM(s) IV Intermittent every 8 hours  collagenase Ointment 1 Application(s) Topical daily  dextrose 5%. 1000 milliLiter(s) (50 mL/Hr) IV Continuous <Continuous>  dextrose 50% Injectable 12.5 Gram(s) IV Push once  dextrose 50% Injectable 25 Gram(s) IV Push once  dextrose 50% Injectable 25 Gram(s) IV Push once  famotidine    Tablet 20 milliGRAM(s) Oral two times a day  heparin  Injectable 5000 Unit(s) SubCutaneous every 8 hours  insulin glargine Injectable (LANTUS) 55 Unit(s) SubCutaneous at bedtime  insulin lispro (HumaLOG) corrective regimen sliding scale   SubCutaneous every 6 hours  insulin lispro Injectable (HumaLOG) 16 Unit(s) SubCutaneous three times a day before meals  levothyroxine 150 MICROGram(s) Oral daily  metoprolol tartrate 25 milliGRAM(s) Oral two times a day  sodium chloride 0.9%. 1000 milliLiter(s) (75 mL/Hr) IV Continuous <Continuous>    MEDICATIONS  (PRN):  dextrose 40% Gel 15 Gram(s) Oral once PRN Blood Glucose LESS THAN 70 milliGRAM(s)/deciliter  gabapentin 300 milliGRAM(s) Oral daily PRN moderate pain  HYDROmorphone  Injectable 2 milliGRAM(s) IV Push every 4 hours PRN Severe Pain (7 - 10)  oxyCODONE    5 mG/acetaminophen 325 mG 2 Tablet(s) Oral every 4 hours PRN Moderate Pain (4 - 6)  polyethylene glycol 3350 17 Gram(s) Oral daily PRN Constipation      OBJECTIVE:    Vital Signs Last 24 Hrs  T(C): 36.6 (16 Oct 2018 18:36), Max: 37.1 (15 Oct 2018 21:16)  T(F): 97.9 (16 Oct 2018 18:36), Max: 98.8 (15 Oct 2018 21:16)  HR: 105 (16 Oct 2018 18:36) (73 - 105)  BP: 171/80 (16 Oct 2018 18:36) (126/67 - 171/80)  BP(mean): 91 (16 Oct 2018 16:15) (86 - 107)  RR: 18 (16 Oct 2018 18:36) (14 - 19)  SpO2: 97% (16 Oct 2018 18:36) (95% - 100%)        I&O's Detail    15 Oct 2018 07:01  -  16 Oct 2018 07:00  --------------------------------------------------------  IN:    IV PiggyBack: 50 mL    Oral Fluid: 960 mL    sodium chloride 0.9%: 600 mL  Total IN: 1610 mL    OUT:  Total OUT: 0 mL    Total NET: 1610 mL          Daily Height in cm: 165.1 (16 Oct 2018 08:28)    Daily     LABS:                        10.3   6.4   )-----------( 261      ( 16 Oct 2018 06:34 )             32.6     10-16    133<L>  |  101  |  27<H>  ----------------------------<  150<H>  4.9   |  17<L>  |  1.22    Ca    9.6      16 Oct 2018 06:34  Phos  3.3     10-16  Mg     2.3     10-16      PT/INR - ( 16 Oct 2018 06:33 )   PT: 10.9 sec;   INR: 1.01 ratio                       PHYSICAL EXAM:                            ASSESSMENT/PLAN  68yo male hx of CAD, PVD, s/p left femoral endarterectomy and fem pop bypass (6/2017) and subsequent left femoral to peroneal bypass with PTFE (8/7/18) for claudication with known occlusion of bypass now with increased LLE pain. Patient is now POD#0 s/p L. BKA.      - Pain control  - Knee immobilizer at all times  - c/w cefepime  - Monitor FS  - DVT ppx: SQH, ASA  - Regular diabetic diet  - f/u AM labs  - PT consult    Deninse Guo PA-C VASCULAR SURGERY POST-OP NOTE    PROCEDURE: RAHATCherelle NILSAFRANCISCA    SUBJECTIVE/ROS: Patient seen and examined at bedside.  Patient states he has a lot of pain and just received some dilaudid with some relief.  He has been tolerating PO.  He denies chest pain, SOB, n/v.      MEDICATIONS  (STANDING):  aspirin enteric coated 81 milliGRAM(s) Oral daily  atorvastatin 80 milliGRAM(s) Oral at bedtime  buDESOnide 160 MICROgram(s)/formoterol 4.5 MICROgram(s) Inhaler 2 Puff(s) Inhalation two times a day  cefepime   IVPB 1000 milliGRAM(s) IV Intermittent every 8 hours  collagenase Ointment 1 Application(s) Topical daily  dextrose 5%. 1000 milliLiter(s) (50 mL/Hr) IV Continuous <Continuous>  dextrose 50% Injectable 12.5 Gram(s) IV Push once  dextrose 50% Injectable 25 Gram(s) IV Push once  dextrose 50% Injectable 25 Gram(s) IV Push once  famotidine    Tablet 20 milliGRAM(s) Oral two times a day  heparin  Injectable 5000 Unit(s) SubCutaneous every 8 hours  insulin glargine Injectable (LANTUS) 55 Unit(s) SubCutaneous at bedtime  insulin lispro (HumaLOG) corrective regimen sliding scale   SubCutaneous every 6 hours  insulin lispro Injectable (HumaLOG) 16 Unit(s) SubCutaneous three times a day before meals  levothyroxine 150 MICROGram(s) Oral daily  metoprolol tartrate 25 milliGRAM(s) Oral two times a day  sodium chloride 0.9%. 1000 milliLiter(s) (75 mL/Hr) IV Continuous <Continuous>    MEDICATIONS  (PRN):  dextrose 40% Gel 15 Gram(s) Oral once PRN Blood Glucose LESS THAN 70 milliGRAM(s)/deciliter  gabapentin 300 milliGRAM(s) Oral daily PRN moderate pain  HYDROmorphone  Injectable 2 milliGRAM(s) IV Push every 4 hours PRN Severe Pain (7 - 10)  oxyCODONE    5 mG/acetaminophen 325 mG 2 Tablet(s) Oral every 4 hours PRN Moderate Pain (4 - 6)  polyethylene glycol 3350 17 Gram(s) Oral daily PRN Constipation      OBJECTIVE:    Vital Signs Last 24 Hrs  T(C): 36.6 (16 Oct 2018 18:36), Max: 37.1 (15 Oct 2018 21:16)  T(F): 97.9 (16 Oct 2018 18:36), Max: 98.8 (15 Oct 2018 21:16)  HR: 105 (16 Oct 2018 18:36) (73 - 105)  BP: 171/80 (16 Oct 2018 18:36) (126/67 - 171/80)  BP(mean): 91 (16 Oct 2018 16:15) (86 - 107)  RR: 18 (16 Oct 2018 18:36) (14 - 19)  SpO2: 97% (16 Oct 2018 18:36) (95% - 100%)        I&O's Detail    15 Oct 2018 07:01  -  16 Oct 2018 07:00  --------------------------------------------------------  IN:    IV PiggyBack: 50 mL    Oral Fluid: 960 mL    sodium chloride 0.9%: 600 mL  Total IN: 1610 mL    OUT:  Total OUT: 0 mL    Total NET: 1610 mL          Daily Height in cm: 165.1 (16 Oct 2018 08:28)    Daily     LABS:                        10.3   6.4   )-----------( 261      ( 16 Oct 2018 06:34 )             32.6     10-16    133<L>  |  101  |  27<H>  ----------------------------<  150<H>  4.9   |  17<L>  |  1.22    Ca    9.6      16 Oct 2018 06:34  Phos  3.3     10-16  Mg     2.3     10-16      PT/INR - ( 16 Oct 2018 06:33 )   PT: 10.9 sec;   INR: 1.01 ratio                       PHYSICAL EXAM:  General: resting in bed, NAD  Respiratory: no increased WOB  Abdomen: Soft, non-tender, non distended  Extremities: LLE: Dressing in place c/d/i, no strikethrough.  +motor +sensation, Knee immobilizer in place  Psych: A&Ox3                          ASSESSMENT/PLAN  68yo male hx of CAD, PVD, s/p left femoral endarterectomy and fem pop bypass (6/2017) and subsequent left femoral to peroneal bypass with PTFE (8/7/18) for claudication with known occlusion of bypass now with increased LLE pain. Patient is now POD#0 s/p L. BKA.      - Pain control  - Knee immobilizer at all times  - c/w cefepime  - Monitor FS  - DVT ppx: SQH, ASA  - Regular diabetic diet  - f/u AM labs  - PT consult    Dennise Guo PA-C

## 2018-10-16 NOTE — PROGRESS NOTE ADULT - SUBJECTIVE AND OBJECTIVE BOX
Chief complaint  Patient is a 68y old  Male who presents with a chief complaint of increased LLE pain s/p occlusion of left fem-peroneal bypass for claudication (16 Oct 2018 09:14)   Review of systems  Patient in bed, looks comfortable, no fever, no hypoglycemia.    Labs and Fingersticks  CAPILLARY BLOOD GLUCOSE      POCT Blood Glucose.: 125 mg/dL (16 Oct 2018 17:56)  POCT Blood Glucose.: 119 mg/dL (16 Oct 2018 16:25)  POCT Blood Glucose.: 137 mg/dL (16 Oct 2018 06:11)  POCT Blood Glucose.: 172 mg/dL (15 Oct 2018 21:48)      Anion Gap, Serum: 15 (10-16 @ 06:34)  Anion Gap, Serum: 12 (10-15 @ 07:02)      Calcium, Total Serum: 9.6 (10-16 @ 06:34)  Calcium, Total Serum: 9.3 (10-15 @ 07:02)          10-16    133<L>  |  101  |  27<H>  ----------------------------<  150<H>  4.9   |  17<L>  |  1.22    Ca    9.6      16 Oct 2018 06:34  Phos  3.3     10-16  Mg     2.3     10-16                          10.3   6.4   )-----------( 261      ( 16 Oct 2018 06:34 )             32.6     Medications  MEDICATIONS  (STANDING):  aspirin enteric coated 81 milliGRAM(s) Oral daily  atorvastatin 80 milliGRAM(s) Oral at bedtime  buDESOnide 160 MICROgram(s)/formoterol 4.5 MICROgram(s) Inhaler 2 Puff(s) Inhalation two times a day  cefepime   IVPB 1000 milliGRAM(s) IV Intermittent every 8 hours  collagenase Ointment 1 Application(s) Topical daily  dextrose 5%. 1000 milliLiter(s) (50 mL/Hr) IV Continuous <Continuous>  dextrose 50% Injectable 12.5 Gram(s) IV Push once  dextrose 50% Injectable 25 Gram(s) IV Push once  dextrose 50% Injectable 25 Gram(s) IV Push once  famotidine    Tablet 20 milliGRAM(s) Oral two times a day  heparin  Injectable 5000 Unit(s) SubCutaneous every 8 hours  insulin glargine Injectable (LANTUS) 55 Unit(s) SubCutaneous at bedtime  insulin lispro (HumaLOG) corrective regimen sliding scale   SubCutaneous every 6 hours  insulin lispro Injectable (HumaLOG) 16 Unit(s) SubCutaneous three times a day before meals  levothyroxine 150 MICROGram(s) Oral daily  metoprolol tartrate 25 milliGRAM(s) Oral two times a day  sodium chloride 0.9%. 1000 milliLiter(s) (75 mL/Hr) IV Continuous <Continuous>      Physical Exam  General: Patient comfortable in bed  Vital Signs Last 12 Hrs  T(F): 97.9 (10-16-18 @ 18:36), Max: 98.3 (10-16-18 @ 10:24)  HR: 105 (10-16-18 @ 18:36) (73 - 105)  BP: 171/80 (10-16-18 @ 18:36) (126/67 - 171/80)  BP(mean): 91 (10-16-18 @ 16:15) (86 - 107)  RR: 18 (10-16-18 @ 18:36) (14 - 18)  SpO2: 97% (10-16-18 @ 18:36) (95% - 100%)  Neck: No palpable thyroid nodules.  CVS: S1S2, No murmurs  Respiratory: No wheezing, no crepitations  GI: Abdomen soft, bowel sounds positive  Musculoskeletal:  edema lower extremities.   Skin: No skin rashes, no ecchymosis    Diagnostics

## 2018-10-17 VITALS
TEMPERATURE: 98 F | RESPIRATION RATE: 18 BRPM | SYSTOLIC BLOOD PRESSURE: 138 MMHG | DIASTOLIC BLOOD PRESSURE: 67 MMHG | OXYGEN SATURATION: 97 % | HEART RATE: 100 BPM

## 2018-10-17 RX ADMIN — HYDROMORPHONE HYDROCHLORIDE 2 MILLIGRAM(S): 2 INJECTION INTRAMUSCULAR; INTRAVENOUS; SUBCUTANEOUS at 00:31

## 2018-10-17 RX ADMIN — OXYCODONE AND ACETAMINOPHEN 2 TABLET(S): 5; 325 TABLET ORAL at 02:30

## 2018-10-17 RX ADMIN — HYDROMORPHONE HYDROCHLORIDE 2 MILLIGRAM(S): 2 INJECTION INTRAMUSCULAR; INTRAVENOUS; SUBCUTANEOUS at 01:01

## 2018-10-17 RX ADMIN — OXYCODONE AND ACETAMINOPHEN 2 TABLET(S): 5; 325 TABLET ORAL at 02:00

## 2018-10-17 RX ADMIN — Medication 1000 MILLIGRAM(S): at 00:02

## 2018-10-17 NOTE — DISCHARGE NOTE FOR THE EXPIRED PATIENT - HOSPITAL COURSE
Mr Booker came into the hospital for worsening pain in his LLE. He was evaluated in the ED and got admitted to the vascular service for further workup. MEENA/PVRs were obtained on the patient which were low. He continued to have pain in the left leg and the decision was made to proceed with a below knee amputation of the leg.     He underwent the below knee amputation. There were no intraoperative complications and the patient returned back to the floor. Overnight, the patient was experiencing postoperative pain. In the morning during the vitals check, he was found to be pulseless. A code blue was called and the code was initiated. CPR was performed for approximately 40-45 minutes. He was unable to be resuscitated.

## 2018-10-17 NOTE — CHART NOTE - NSCHARTNOTEFT_GEN_A_CORE
Code Blue called for no pulse    68yo male hx of CAD, PVD, s/p left femoral endarterectomy and fem pop bypass (6/2017) and subsequent left femoral to peroneal bypass with PTFE (8/7/18) for claudication with known occlusion of bypass s/p left BKA 10/16.     Code Blue called. Upon arrival, patient had no pulse, compressions were started, patient was noted to be in PEA, epi was given.  Patient also noted to be hypoglycemic, given D50 x 3. Please refer to code sheet for further details. Patient received five rounds of epi, w/ no pulse, patient continued to be in PEA. SICU Attending took over the code, after 16 minutes into code, and assumed responsibility for rest of the code.     Kacy Eliozndo  [id number]  MAR

## 2018-10-17 NOTE — AIRWAY PLACEMENT NOTE ADULT - POST AIRWAY PLACEMENT ASSESSMENT:
breath sounds equal/breath sounds bilateral/positive end tidal CO2 noted/CXR pending/chest excursion noted

## 2018-10-17 NOTE — PROVIDER CONTACT NOTE (MEDICATION) - ACTION/TREATMENT ORDERED:
PA came to see pt at bedside; neuro check performed- no overt deficits. Continue to monitor and reorient patient as needed.

## 2018-10-17 NOTE — CHART NOTE - NSCHARTNOTEFT_GEN_A_CORE
Called to bedside after code blue called. CPR in progress, ACLS protocol being followed. Pt was intubated by Anesthesiology staff with good ETCO2 return. Differential included massive MI, PE, CVA, hypoxia related to narcotic administration, hyperkalemia. Narcan given. Calcium, bicarb given (please see code sheet). Pt's daughter arrived and I explained what was happening to her. She noted that her sister was in the parking lot, and CPR was continued to allow pt's family to be present at time of death. After 30 minutes, no medications were given due to futility. Pt's second daughter arrived, I discussed with her the events of the morning, and pt's daughters were present and in agreement when CPR was stopped. Pt's daughters want an autopsy performed.

## 2018-10-17 NOTE — PROVIDER CONTACT NOTE (MEDICATION) - ASSESSMENT
Pt is disoriented to location and talking to himself. Pt is moaning/crying and screaming out; therefore disturbing neighbor. Pt has been receiving pain medication around the clock tonight.

## 2018-10-17 NOTE — PROVIDER CONTACT NOTE (MEDICATION) - SITUATION
Pt anox4 but has been becoming disoriented throughout the night. Pt moaning, crying, screaming out throughout night and disturbing neighbor. Pt is receiving pain medications around the clock.

## 2018-10-25 PROCEDURE — 85730 THROMBOPLASTIN TIME PARTIAL: CPT

## 2018-10-25 PROCEDURE — 93971 EXTREMITY STUDY: CPT

## 2018-10-25 PROCEDURE — C1894: CPT

## 2018-10-25 PROCEDURE — 84132 ASSAY OF SERUM POTASSIUM: CPT

## 2018-10-25 PROCEDURE — 86901 BLOOD TYPING SEROLOGIC RH(D): CPT

## 2018-10-25 PROCEDURE — 82803 BLOOD GASES ANY COMBINATION: CPT

## 2018-10-25 PROCEDURE — 85014 HEMATOCRIT: CPT

## 2018-10-25 PROCEDURE — 90686 IIV4 VACC NO PRSV 0.5 ML IM: CPT

## 2018-10-25 PROCEDURE — 85027 COMPLETE CBC AUTOMATED: CPT

## 2018-10-25 PROCEDURE — 86900 BLOOD TYPING SEROLOGIC ABO: CPT

## 2018-10-25 PROCEDURE — 84100 ASSAY OF PHOSPHORUS: CPT

## 2018-10-25 PROCEDURE — 71046 X-RAY EXAM CHEST 2 VIEWS: CPT

## 2018-10-25 PROCEDURE — 83605 ASSAY OF LACTIC ACID: CPT

## 2018-10-25 PROCEDURE — 83735 ASSAY OF MAGNESIUM: CPT

## 2018-10-25 PROCEDURE — 76000 FLUOROSCOPY <1 HR PHYS/QHP: CPT

## 2018-10-25 PROCEDURE — 93005 ELECTROCARDIOGRAM TRACING: CPT

## 2018-10-25 PROCEDURE — 82962 GLUCOSE BLOOD TEST: CPT

## 2018-10-25 PROCEDURE — 94640 AIRWAY INHALATION TREATMENT: CPT

## 2018-10-25 PROCEDURE — 82435 ASSAY OF BLOOD CHLORIDE: CPT

## 2018-10-25 PROCEDURE — C1887: CPT

## 2018-10-25 PROCEDURE — 85610 PROTHROMBIN TIME: CPT

## 2018-10-25 PROCEDURE — 81001 URINALYSIS AUTO W/SCOPE: CPT

## 2018-10-25 PROCEDURE — C1760: CPT

## 2018-10-25 PROCEDURE — C1769: CPT

## 2018-10-25 PROCEDURE — 82947 ASSAY GLUCOSE BLOOD QUANT: CPT

## 2018-10-25 PROCEDURE — 82330 ASSAY OF CALCIUM: CPT

## 2018-10-25 PROCEDURE — 86140 C-REACTIVE PROTEIN: CPT

## 2018-10-25 PROCEDURE — 84295 ASSAY OF SERUM SODIUM: CPT

## 2018-10-25 PROCEDURE — 80053 COMPREHEN METABOLIC PANEL: CPT

## 2018-10-25 PROCEDURE — 85652 RBC SED RATE AUTOMATED: CPT

## 2018-10-25 PROCEDURE — 73630 X-RAY EXAM OF FOOT: CPT

## 2018-10-25 PROCEDURE — 99285 EMERGENCY DEPT VISIT HI MDM: CPT

## 2018-10-25 PROCEDURE — 80048 BASIC METABOLIC PNL TOTAL CA: CPT

## 2018-10-25 PROCEDURE — 86850 RBC ANTIBODY SCREEN: CPT

## 2018-11-11 PROCEDURE — 80048 BASIC METABOLIC PNL TOTAL CA: CPT

## 2018-11-11 PROCEDURE — 83605 ASSAY OF LACTIC ACID: CPT

## 2018-11-11 PROCEDURE — 83735 ASSAY OF MAGNESIUM: CPT

## 2018-11-11 PROCEDURE — 83036 HEMOGLOBIN GLYCOSYLATED A1C: CPT

## 2018-11-11 PROCEDURE — 82330 ASSAY OF CALCIUM: CPT

## 2018-11-11 PROCEDURE — 84100 ASSAY OF PHOSPHORUS: CPT

## 2018-11-11 PROCEDURE — 85014 HEMATOCRIT: CPT

## 2018-11-11 PROCEDURE — 73630 X-RAY EXAM OF FOOT: CPT

## 2018-11-11 PROCEDURE — 94640 AIRWAY INHALATION TREATMENT: CPT

## 2018-11-11 PROCEDURE — 93923 UPR/LXTR ART STDY 3+ LVLS: CPT

## 2018-11-11 PROCEDURE — 93005 ELECTROCARDIOGRAM TRACING: CPT

## 2018-11-11 PROCEDURE — 86850 RBC ANTIBODY SCREEN: CPT

## 2018-11-11 PROCEDURE — 96375 TX/PRO/DX INJ NEW DRUG ADDON: CPT

## 2018-11-11 PROCEDURE — 80053 COMPREHEN METABOLIC PANEL: CPT

## 2018-11-11 PROCEDURE — 82803 BLOOD GASES ANY COMBINATION: CPT

## 2018-11-11 PROCEDURE — 87040 BLOOD CULTURE FOR BACTERIA: CPT

## 2018-11-11 PROCEDURE — 81003 URINALYSIS AUTO W/O SCOPE: CPT

## 2018-11-11 PROCEDURE — 85652 RBC SED RATE AUTOMATED: CPT

## 2018-11-11 PROCEDURE — 86901 BLOOD TYPING SEROLOGIC RH(D): CPT

## 2018-11-11 PROCEDURE — 82962 GLUCOSE BLOOD TEST: CPT

## 2018-11-11 PROCEDURE — 85027 COMPLETE CBC AUTOMATED: CPT

## 2018-11-11 PROCEDURE — 84295 ASSAY OF SERUM SODIUM: CPT

## 2018-11-11 PROCEDURE — 82947 ASSAY GLUCOSE BLOOD QUANT: CPT

## 2018-11-11 PROCEDURE — 84132 ASSAY OF SERUM POTASSIUM: CPT

## 2018-11-11 PROCEDURE — 85610 PROTHROMBIN TIME: CPT

## 2018-11-11 PROCEDURE — 85730 THROMBOPLASTIN TIME PARTIAL: CPT

## 2018-11-11 PROCEDURE — 71045 X-RAY EXAM CHEST 1 VIEW: CPT

## 2018-11-11 PROCEDURE — 96374 THER/PROPH/DIAG INJ IV PUSH: CPT

## 2018-11-11 PROCEDURE — 86900 BLOOD TYPING SEROLOGIC ABO: CPT

## 2018-11-11 PROCEDURE — 82435 ASSAY OF BLOOD CHLORIDE: CPT

## 2018-11-11 PROCEDURE — 88307 TISSUE EXAM BY PATHOLOGIST: CPT

## 2018-11-11 PROCEDURE — 99285 EMERGENCY DEPT VISIT HI MDM: CPT | Mod: 25

## 2018-11-18 NOTE — CONSULT NOTE ADULT - PROBLEM SELECTOR RECOMMENDATION 4
Will continue current Synthroid dose, FU
33 y/o  now POD#3 s/p  section. Patient transferred to post partum unit, uncomplicated hospital course. At the time of discharge patient was tolerating regular diet PO, ambulating, voiding, and having bowel movements and flatus. Pain well controlled with pain medications PRN.

## 2019-04-07 NOTE — ASU PATIENT PROFILE, ADULT - NS PRO PT RIGHT SUPPORT PERSON
Respiratory Complaint HPI





- HPI Summary


HPI Summary: 





52 [place]; Breast 

Reduction, 2000, [place]; C-Sections, 1996 1991, [place] and [place]





Faby Patel PA [Primary Care Provider] - 1 Week (if not better)


Additional Instructions: 


saline nasal spray 2 sprays each nostril twice daily


use flonase about 5 minutes after saline nasal spray





- Billing Disposition and Condition


Condition: STABLE


Disposition: Home
same name as above

## 2019-04-16 NOTE — ASU PREOP CHECKLIST - IDENTIFICATION BAND VERIFIED
[facility] 122 05/26/2015        Gout  No recent flares. On allopurinol and tolerating it well. Review of Systems   Constitutional: Positive for fatigue. Negative for fever and unexpected weight change. HENT: Negative for congestion, ear pain, hearing loss, rhinorrhea, sinus pressure, sore throat and trouble swallowing. Eyes: Negative for pain, discharge, redness and visual disturbance. Respiratory: Negative for cough, chest tightness, shortness of breath and wheezing. Cardiovascular: Negative for chest pain, palpitations and leg swelling. Gastrointestinal: Negative for abdominal pain, constipation, diarrhea, nausea and vomiting. Endocrine: Negative for cold intolerance, heat intolerance, polydipsia, polyphagia and polyuria. Genitourinary: Negative for dysuria, flank pain, menstrual problem, pelvic pain and vaginal discharge. Musculoskeletal: Positive for back pain. Negative for arthralgias, gait problem, myalgias and neck pain. Skin: Negative for rash and wound. Allergic/Immunologic: Negative for environmental allergies, food allergies and immunocompromised state. Neurological: Negative for dizziness, seizures, syncope, weakness, numbness and headaches. LE radiculopathy   Hematological: Negative for adenopathy. Psychiatric/Behavioral: Negative for agitation, confusion, dysphoric mood and sleep disturbance. The patient is not nervous/anxious. Physical Exam  Nursing note and vitals reviewed. Vitals:    04/16/19 1048   BP: 118/68   Site: Right Upper Arm   Position: Sitting   Cuff Size: Medium Adult   Pulse: 74   SpO2: 98%   Weight: 173 lb (78.5 kg)     Wt Readings from Last 3 Encounters:   04/16/19 173 lb (78.5 kg)   02/12/19 177 lb (80.3 kg)   07/01/18 180 lb (81.6 kg)     BP Readings from Last 3 Encounters:   04/16/19 118/68   02/12/19 (!) 160/64   07/01/18 132/60     Body mass index is 31.64 kg/m². Constitutional: Patient appears well-developed and well-nourished.  No distress. Head: Normocephalic and atraumatic. Neck: Normal range of motion. Neck supple. No thyroidmegaly. Cardiovascular: Normal rate,regular rhythm, normal heart sounds and intact distal pulses. Pulmonary/Chest: Effort normal and breath sounds normal. No stridor. No respiratory distress. No wheezes and no rales. Abdominal: Soft. Bowel sounds arenormal. No distension and no mass. No tenderness. No rebound and no guarding. Musculoskeletal: No edema and no tenderness. Skin: No rash or erythema. Psychiatric: Normal mood and affect. Behavior is normal.        Assessment   Diagnosis Orders   1. Diabetes mellitus, type 2, without complication, without long-term current use of insulin ([facility])  Hemoglobin A1C    TSH with Reflex    Comprehensive Metabolic Panel    Microalbumin / Creatinine Urine Ratio   2. Hypertension, essential  CBC Auto Differential    Comprehensive Metabolic Panel   3. Hyperlipidemia, mixed  Comprehensive Metabolic Panel    Lipid Panel   4. Gout, acute idiopathic, of right hand  Uric Acid   5. Vitamin D deficiency  Vitamin D 25 Hydroxy   6. Fatigue, unspecified type  Hemoglobin A1C    TSH with Reflex    CBC Auto Differential    Comprehensive Metabolic Panel    Lipid Panel    Vitamin D 25 Hydroxy    Vitamin B12    Folate    Microalbumin / Creatinine Urine Ratio    Uric Acid         Silverio Nunez was seen today for diabetes, hypertension, hyperlipidemia and gout. Diagnoses and all orders for this visit:    Diabetes mellitus, type 2, without complication, without long-term current use of insulin ([facility])  -     Hemoglobin A1C; Future  -     TSH with Reflex; Future  -     Comprehensive Metabolic Panel; Future  -     Microalbumin / Creatinine Urine Ratio    Hypertension, essential  -     CBC Auto Differential; Future  -     Comprehensive Metabolic Panel; Future    Hyperlipidemia, mixed  -     Comprehensive Metabolic Panel; Future  -     Lipid Panel;  Future    Gout, acute idiopathic, of right hand  -     Uric Acid; Future    Vitamin D deficiency  -     Vitamin D 25 Hydroxy; Future    Fatigue, unspecified type  -     Hemoglobin A1C; Future  -     TSH with Reflex; Future  -     CBC Auto Differential; Future  -     Comprehensive Metabolic Panel; Future  -     Lipid Panel; Future  -     Vitamin D 25 Hydroxy; Future  -     Vitamin B12; Future  -     Folate; Future  -     Microalbumin / Creatinine Urine Ratio  -     Uric Acid; Future      Patient Instructions   Get fasting labs drawn soon. Schedule appointment with Dr. Nell Doan for another injection at [facility] - [phone]   Continue current medicines. Continue healthy lifestyle changes (diet/exercise/attempt weight loss). Return in about 6 months (around 10/16/2019) for Fasting recheck DM, BP, lipids. done

## 2019-08-28 NOTE — CONSULT NOTE ADULT - CONSULT REQUESTED DATE/TIME
08-Jun-2017 12:58
Chief complaint  Patient is a 69y old  Female who presents with a chief complaint of Hypoxia, SOB (28 Aug 2019 10:39)   Review of systems  Patient in bed, looks comfortable, no fever, no hypoglycemia.    Labs and Fingersticks  CAPILLARY BLOOD GLUCOSE      POCT Blood Glucose.: 306 mg/dL (28 Aug 2019 11:36)  POCT Blood Glucose.: 297 mg/dL (28 Aug 2019 07:35)  POCT Blood Glucose.: 236 mg/dL (28 Aug 2019 04:37)  POCT Blood Glucose.: 214 mg/dL (28 Aug 2019 00:22)  POCT Blood Glucose.: 246 mg/dL (27 Aug 2019 21:22)  POCT Blood Glucose.: 275 mg/dL (27 Aug 2019 16:30)      Anion Gap, Serum: 19 <H> (08-28 @ 06:18)  Anion Gap, Serum: 18 <H> (08-27 @ 05:37)      Calcium, Total Serum: 9.8 (08-28 @ 06:18)  Calcium, Total Serum: 9.6 (08-27 @ 05:37)          08-28    137  |  99  |  102<H>  ----------------------------<  239<H>  4.1   |  19<L>  |  2.09<H>    Ca    9.8      28 Aug 2019 06:18                          8.9    8.20  )-----------( 360      ( 28 Aug 2019 09:15 )             27.3     Medications  MEDICATIONS  (STANDING):  acetaminophen  IVPB .. 1000 milliGRAM(s) IV Intermittent once  aspirin enteric coated 81 milliGRAM(s) Oral daily  atorvastatin 40 milliGRAM(s) Oral at bedtime  buMETAnide 3 milliGRAM(s) Oral every 12 hours  chlorhexidine 2% Cloths 1 Application(s) Topical daily  clopidogrel Tablet 75 milliGRAM(s) Oral daily  colchicine 0.6 milliGRAM(s) Oral daily  dextrose 5%. 1000 milliLiter(s) (50 mL/Hr) IV Continuous <Continuous>  dextrose 50% Injectable 12.5 Gram(s) IV Push once  dextrose 50% Injectable 25 Gram(s) IV Push once  dextrose 50% Injectable 25 Gram(s) IV Push once  docusate sodium 100 milliGRAM(s) Oral two times a day  heparin  Injectable 5000 Unit(s) SubCutaneous every 12 hours  hydrALAZINE 50 milliGRAM(s) Oral every 8 hours  insulin lispro (HumaLOG) corrective regimen sliding scale   SubCutaneous three times a day before meals  insulin lispro (HumaLOG) corrective regimen sliding scale   SubCutaneous at bedtime  insulin lispro Injectable (HumaLOG) 40 Unit(s) SubCutaneous three times a day before meals  insulin NPH/regular 70/30 Injectable 85 Unit(s) SubCutaneous before breakfast  insulin NPH/regular 70/30 Injectable 75 Unit(s) SubCutaneous before dinner  isosorbide   dinitrate Tablet (ISORDIL) 30 milliGRAM(s) Oral three times a day  lactated ringers. 1000 milliLiter(s) (75 mL/Hr) IV Continuous <Continuous>  levothyroxine 50 MICROGram(s) Oral daily  lidocaine   Patch 1 Patch Transdermal daily  melatonin 3 milliGRAM(s) Oral at bedtime  metoprolol succinate ER 25 milliGRAM(s) Oral daily  montelukast 10 milliGRAM(s) Oral daily  Nephro-enrico 1 Tablet(s) Oral daily  pantoprazole    Tablet 40 milliGRAM(s) Oral before breakfast  polyethylene glycol 3350 17 Gram(s) Oral daily  repaglinide 1 milliGRAM(s) Oral three times a day  senna 2 Tablet(s) Oral at bedtime  sodium bicarbonate 650 milliGRAM(s) Oral two times a day  spironolactone 25 milliGRAM(s) Oral daily      Physical Exam  General: Patient comfortable in bed  Vital Signs Last 12 Hrs  T(F): 97.9 (08-28-19 @ 11:51), Max: 98.6 (08-28-19 @ 04:37)  HR: 84 (08-28-19 @ 14:15) (83 - 89)  BP: 112/64 (08-28-19 @ 14:15) (101/56 - 114/60)  BP(mean): --  RR: 18 (08-28-19 @ 11:51) (18 - 18)  SpO2: 98% (08-28-19 @ 14:15) (98% - 100%)  Neck: No palpable thyroid nodules.  CVS: S1S2, No murmurs  Respiratory: No wheezing, no crepitations  GI: Abdomen soft, bowel sounds positive  Musculoskeletal:  edema lower extremities.   Skin: No skin rashes, no ecchymosis    Diagnostics
08-Jun-2017 09:49
Chief complaint  Patient is a 69y old  Female who presents with a chief complaint of Hypoxia, SOB (28 Aug 2019 10:39)   Review of systems  Patient in bed, looks comfortable, no fever, no hypoglycemia, she is feeling better.    Labs and Fingersticks  CAPILLARY BLOOD GLUCOSE      POCT Blood Glucose.: 306 mg/dL (28 Aug 2019 11:36)  POCT Blood Glucose.: 297 mg/dL (28 Aug 2019 07:35)  POCT Blood Glucose.: 236 mg/dL (28 Aug 2019 04:37)  POCT Blood Glucose.: 214 mg/dL (28 Aug 2019 00:22)  POCT Blood Glucose.: 246 mg/dL (27 Aug 2019 21:22)  POCT Blood Glucose.: 275 mg/dL (27 Aug 2019 16:30)      Anion Gap, Serum: 19 <H> (08-28 @ 06:18)  Anion Gap, Serum: 18 <H> (08-27 @ 05:37)      Calcium, Total Serum: 9.8 (08-28 @ 06:18)  Calcium, Total Serum: 9.6 (08-27 @ 05:37)          08-28    137  |  99  |  102<H>  ----------------------------<  239<H>  4.1   |  19<L>  |  2.09<H>    Ca    9.8      28 Aug 2019 06:18                          8.9    8.20  )-----------( 360      ( 28 Aug 2019 09:15 )             27.3     Medications  MEDICATIONS  (STANDING):  acetaminophen  IVPB .. 1000 milliGRAM(s) IV Intermittent once  aspirin enteric coated 81 milliGRAM(s) Oral daily  atorvastatin 40 milliGRAM(s) Oral at bedtime  buMETAnide 3 milliGRAM(s) Oral every 12 hours  chlorhexidine 2% Cloths 1 Application(s) Topical daily  clopidogrel Tablet 75 milliGRAM(s) Oral daily  colchicine 0.6 milliGRAM(s) Oral daily  dextrose 5%. 1000 milliLiter(s) (50 mL/Hr) IV Continuous <Continuous>  dextrose 50% Injectable 12.5 Gram(s) IV Push once  dextrose 50% Injectable 25 Gram(s) IV Push once  dextrose 50% Injectable 25 Gram(s) IV Push once  docusate sodium 100 milliGRAM(s) Oral two times a day  heparin  Injectable 5000 Unit(s) SubCutaneous every 12 hours  hydrALAZINE 50 milliGRAM(s) Oral every 8 hours  insulin lispro (HumaLOG) corrective regimen sliding scale   SubCutaneous three times a day before meals  insulin lispro (HumaLOG) corrective regimen sliding scale   SubCutaneous at bedtime  insulin lispro Injectable (HumaLOG) 40 Unit(s) SubCutaneous three times a day before meals  insulin NPH/regular 70/30 Injectable 85 Unit(s) SubCutaneous before breakfast  insulin NPH/regular 70/30 Injectable 75 Unit(s) SubCutaneous before dinner  isosorbide   dinitrate Tablet (ISORDIL) 30 milliGRAM(s) Oral three times a day  lactated ringers. 1000 milliLiter(s) (75 mL/Hr) IV Continuous <Continuous>  levothyroxine 50 MICROGram(s) Oral daily  lidocaine   Patch 1 Patch Transdermal daily  melatonin 3 milliGRAM(s) Oral at bedtime  metoprolol succinate ER 25 milliGRAM(s) Oral daily  montelukast 10 milliGRAM(s) Oral daily  Nephro-enrico 1 Tablet(s) Oral daily  pantoprazole    Tablet 40 milliGRAM(s) Oral before breakfast  polyethylene glycol 3350 17 Gram(s) Oral daily  repaglinide 1 milliGRAM(s) Oral three times a day  senna 2 Tablet(s) Oral at bedtime  sodium bicarbonate 650 milliGRAM(s) Oral two times a day  spironolactone 25 milliGRAM(s) Oral daily      Physical Exam  General: Patient comfortable in bed  Vital Signs Last 12 Hrs  T(F): 97.9 (08-28-19 @ 11:51), Max: 98.6 (08-28-19 @ 04:37)  HR: 84 (08-28-19 @ 14:15) (83 - 89)  BP: 112/64 (08-28-19 @ 14:15) (101/56 - 114/60)  BP(mean): --  RR: 18 (08-28-19 @ 11:51) (18 - 18)  SpO2: 98% (08-28-19 @ 14:15) (98% - 100%)  Neck: No palpable thyroid nodules.  CVS: S1S2, No murmurs  Respiratory: No wheezing, no crepitations  GI: Abdomen soft, bowel sounds positive  Musculoskeletal:  edema lower extremities.   Skin: No skin rashes, no ecchymosis    Diagnostics

## 2019-09-23 NOTE — PROVIDER CONTACT NOTE (OTHER) - SITUATION
Viral Gastroenteritis in Children  Viral gastroenteritis is often called stomach flu. But it is not really related to the flu or influenza. It is irritation of the stomach and intestines due to infection with a virus. Most children with viral gastroenteritis get better in a few days without a healthcare providers treatment. Because a child with gastroenteritis may have trouble keeping fluids down, he or she is at risk for fluid loss (dehydration) and should be watched closely.     Handwashing is the best way to prevent the spread of viruses that cause stomach flu.   Symptoms of viral gastroenteritis  Symptoms of gastroenteritis include loose, watery stools (diarrhea), sometimes with nausea and vomiting. The child may have cramps or pain in the stomach area. A fever or headache may also be present. Symptoms usually last for about 2 days, but may take as long as 7 days to go away.  How is viral gastroenteritis spread?  Viral gastroenteritis is highly contagious. The viruses that cause the infection are often passed from person to person by unwashed hands. Children can get the viruses from food, eating utensils, or toys. People who have had the infection can be contagious even after they feel better. And some people are infected but never have symptoms. Because of this, outbreaks of gastroenteritis are common in childcare and other group settings.  Treatment  Most cases of viral gastroenteritis get better without treatment. (Antibiotics are not helpful against viral infections.) The goal of treatment is to make your child comfortable and to prevent dehydration. These tips can help:  · Be sure your child gets plenty of rest.  · To prevent dehydration:  ¨ Give your child plenty of liquids such as water. You can also give your child an oral rehydration solution, which you can buy at the grocery store or pharmacy. Ask your child's healthcare provider which types of solutions are best for your child. Have your child take  small sips of fluid at first to avoid nausea. Dont dilute juice or give other drinks with sugar in them (such as sports drinks) as this may worsen the diarrhea.  ¨ If your older child seems dehydrated, give 1 to 2 teaspoons of an oral rehydration solution. Do this every 10 minutes until vomiting stops and your child is able to keep down larger amounts of liquid.  ¨ If your baby is bottle fed, you can give an oral rehydration solution for 4 to 6 hours and then resume formula. You may need to feed your baby more often to ensure he or she gets enough fluids. You can also give an oral rehydration solution if your baby is urinating less often or the urine is dark in color.  ¨ If your baby is breastfeeding, you may need to feed your baby more often. You can also give an oral rehydration solution if your baby is urinating less often or the urine is dark in color.   · When your child is able to eat again:  ¨ Feed your child regular foods. Returning to a regular diet quickly has been shown to reduce the length of symptoms of gastroenteritis.  ¨ Ask your childs healthcare provider if there are any foods to avoid while your child is recovering from gastroenteritis.  · Dont give your child any medicines unless they have been recommended by your child's healthcare provider.  · Some children may develop a short-term (temporary) intolerance to dairy products after a diarrheal illness. If dairy items seem to make your child's symptoms worse, you may need to avoid them temporarily.  Preventing viral gastroenteritis  These steps may help lessen the chances that you or your child will get or pass on viral gastroenteritis:  · Wash your hands with warm water and soap often, especially after going to the bathroom, diapering your child, and before preparing, serving, or eating food.  · Have your child wash his or her hands frequently.  · Keep food preparation areas clean.  · Wash soiled clothing promptly.  · Use diapers with waterproof  outer covers or use plastic pants.  · Prevent contact between your child and those who are sick.  · Keep your sick child home from school or childcare.  · Ask your childs healthcare provider if your child should receive the rotavirus vaccine. This vaccine protects infants and young children against rotavirus infection, one cause of viral gastroenteritis.  When to call the healthcare provider  Call your childs healthcare provider right away if your child:  · Has a fever (see fever and children section below)  · Has had a seizure caused by the fever  · Has been vomiting and having diarrhea for more than 6 hours  · Has blood in vomit or bloody diarrhea  · Is lethargic  · Has severe stomach pain  · Cant keep even small amounts of liquid down  · Shows signs of dehydration, such as very dark or very little urine, excessive thirst, dry mouth, or dizziness  · Is a baby and does not urinate for 8 hours or more  Fever and children  Always use a digital thermometer to check your childs temperature. Never use a mercury thermometer.  For infants and toddlers, be sure to use a rectal thermometer correctly. A rectal thermometer may accidentally poke a hole in (perforate) the rectum. It may also pass on germs from the stool. Always follow the product makers directions for proper use. If you dont feel comfortable taking a rectal temperature, use another method. When you talk to your childs healthcare provider, tell him or her which method you used to take your childs temperature.  Here are guidelines for fever temperature. Ear temperatures arent accurate before 6 months of age. Dont take an oral temperature until your child is at least 4 years old.  Infant under 3 months old:  · Ask your childs healthcare provider how you should take the temperature.  · Rectal or forehead (temporal artery) temperature of 100.4°F (38°C) or higher, or as directed by the provider  · Armpit temperature of 99°F (37.2°C) or higher, or as  Pt found unresponsive and no pulses found while attempting to do vitals. directed by the provider  Child age 3 to 36 months:  · Rectal, forehead, or ear temperature of 102°F (38.9°C) or higher, or as directed by the provider  · Armpit (axillary) temperature of 101°F (38.3°C) or higher, or as directed by the provider  Child of any age:  · Repeated temperature of 104°F (40°C) or higher, or as directed by the provider  · Fever that lasts more than 24 hours in a child under 2 years old. Or a fever that lasts for 3 days in a child 2 years or older.   Date Last Reviewed: 1/1/2017  © 1676-9673 [employer]. [address] [place] [address], [place] [address]. All rights reserved. This information is not intended as a substitute for professional medical care. Always follow your healthcare professional's instructions.

## 2019-11-13 NOTE — PROGRESS NOTE ADULT - PROBLEM SELECTOR PLAN 3
On  medications, monitored and followed up by primary/cardiology team
Follows with Dr. Chico Ferro for cardiology, will need cardiology optimization/evaluation before procedure. RCRI score tentatively around 4. Difficulty assessing exertional status given lower extremity issues. JOSE LUIS appears to be improving. Of note, pt states he was placed on coumadin for 3 months after a procedure with Dr. Loya but is now off coumadin.  -follow up Cardiology recs for clearance - pt cleared by them today.
On  medications, monitored and followed up by primary/cardiology team
On  medications, monitored and followed up by primary/cardiology team
Patient expressed no known problems or needs

## 2019-11-27 NOTE — ASU PREOP CHECKLIST - NS PREOP CHK HIBICLENS NA
N/A occasional vaginal bleeding secondary to paravaginal repair 10/2019 hematuria/occasional followed by gyn

## 2020-08-28 NOTE — H&P PST ADULT - LAB RESULTS AND INTERPRETATION
CONSTITUTIONAL: (-) fevers, (-) chills, (-) malaise  ENT: see HPI, (-) facial swelling, (-) difficulty swallowing  NECK: (-) neck pain, (-) neck stiffness  PULM: (-) cough, (-) shortness of breath, (-) stridor  GI: (-) nausea, (-) vomiting    *all other systems negative except as documented above and in the HPI*
labs in chart from PMD 6/22/18. BMP repeated.

## 2020-10-06 NOTE — PACU DISCHARGE NOTE - PAIN:
-referred for symptom management by PCP related to advanced COPD and osteoarthritis  -pt lives in supportive living  -GOC is stabalization to prolong life, but DNR/DNI with selective treatment, but does not want hospitalization during pandemic  -pt moved to son's home from assisted living environment due to pandemic  -pt denies spiritual issues  -palliative to follow     Controlled with current regime

## 2020-12-15 NOTE — ED PROVIDER NOTE - NSTIMEPROVIDERCAREINITIATE_GEN_ER
14-Sep-2018 07:37 Detail Level: Detailed Photo Preface (Leave Blank If You Do Not Want): Photographs were obtained today Details (Free Text): Verbal consent obtained from patient today

## 2020-12-26 NOTE — PATIENT PROFILE ADULT. - TEACHING/LEARNING DEVELOPMENTAL CONSIDERATIONS OTHER
Internal Medicine Resident Progress Note     Patient: Baron Nicole Date: 12/26/2020   YOB: 1973 Admission Date: 12/25/2020   MRN: [id number] Attending: Sonja Kang MD     Chief Complaint   Patient presents with   • Abdominal Pain     Patient Active Problem List   Diagnosis   • Gastrointestinal hemorrhage associated with duodenal ulcer   • Portal hypertensive gastropathy (CMS/HCC)   • Polysubstance abuse (CMS/HCC)     Baron Nicole is a 47 year old male PMHx significant for duodenal ulcer with H Pylori + requiring transfusions, erosive esophagitis, portal gastropathy, gastric outlet obstruction, COPD who was admitted on 12/25/2020 with abdominal pain and emesis. Patient was previously admitted to [facility] in September of this year for duodenal ulcer with bleeding requiring transfusion but ultimately left AMA without further intervention.  Since then patient has been admitted to [facility] again on 12/20, again leaving AMA on 12/22 to go feed his dogs.  During that admission patient had NG tube placed and removed several times and in attempt to alleviate gastric distension however patient demanded removal.       SUBJECTIVE  Patient reports persistent pain in the RUQ and upper abdomen. He states he is tired of having to deal with the same pain and eating difficulties and would like to take care of himself. He is agreeable to discussing options with surgeries and recalls conversation with Dr. Reyes on last admission.     OBJECTIVE  Scheduled Medications dicyclomine, 20 mg, 4x Daily AC & HS  pantoprazole, 40 mg, 2 times per day  sodium chloride (PF), 2 mL, 2 times per day  Potassium Standard Replacement Protocol, , See Admin Instructions  Magnesium Standard Replacement Protocol, , See Admin Instructions  Phosphorus Standard Replacement Protocol, , See Admin Instructions      PRN Medications morphine injection, 2 mg, Q6H Resp PRN  sodium chloride, 25 mL, PRN  sodium chloride, 25 mL,  PRN  acetaminophen, 650 mg, Q4H PRN  polyethylene glycol, 17 g, Daily PRN  docusate sodium-sennosides, 2 tablet, Daily PRN  bisacodyl, 10 mg, Daily PRN  aluminum-magnesium hydroxide-simethicone, 30 mL, Q4H PRN  trimethobenzamide, 200 mg, Q6H PRN  trimethobenzamide, 300 mg, Q6H PRN      Continuous Infusions   • lactated ringers infusion 100 mL/hr at 12/26/20 1024     Vital Last Value 24 Hour Range   Temperature 97.6 °F (36.4 °C) (12/26/20 0719) Temp  Min: 97.2 °F (36.2 °C)  Max: 98 °F (36.7 °C)   Pulse 60 (12/26/20 0719) Pulse  Min: 44  Max: 73   Respiratory 14 (12/26/20 0807) Resp  Min: 12  Max: 19   Non-Invasive  Blood Pressure 100/62 (12/26/20 0719) BP  Min: 98/71  Max: 136/65   Arterial   Blood Pressure   No data recorded   Pulse Oximetry 97 % (12/26/20 0719) SpO2  Min: 97 %  Max: 100 %     Height/Weight Today Admitted   Weight 65.9 kg (12/25/20 2247) Weight: 65.8 kg (12/25/20 2006)   Height N/A Height: 6' 2\" (188 cm) (12/25/20 2247)   BMI N/A BMI (Calculated): 18.65 (12/25/20 2247)     Intake/Output  Last Stool Occurrence:      Intake/Output Summary (Last 24 hours) at 12/26/2020 1251  Last data filed at 12/26/2020 1100  Gross per 24 hour   Intake 1143.95 ml   Output 3965 ml   Net -2821.05 ml       Active Lines and Nursing Skin Assessments  Peripheral IV 12/25/20 Left Antecubital 20 (Active)   Site Assessment WDL 12/26/20 0825   Dressing Type Transparent 12/26/20 0825   Dressing Activity Other (Comment) 12/26/20 0825   Line Status Infusing 12/26/20 0825   Interventions Labs drawn;Capped IV 12/25/20 2056       GI Tube 12/26/20 Nasogastric Right Nare (Active)   Tube Status Low intermittent suction 12/26/20 0825   Site Assessment WDL 12/26/20 0825   Securement Tape 12/26/20 0825   Site Care Dressing changed 12/26/20 0201   GI Tube Output (mL) 650 ml 12/26/20 0255   Drainage Thin;Brown;Blood streaked 12/26/20 0825       PHYSICAL EXAM  Vital Signs:    Visit Vitals  /62 (BP Location: RUE - Right upper extremity,  Patient Position: Semi-Lopez's)   Pulse 60   Temp 97.6 °F (36.4 °C) (Axillary)   Resp 14   Ht 6' 2\" (1.88 m)   Wt 65.9 kg   SpO2 97%   BMI 18.65 kg/m²     General: Alert, cooperative, conversive in no acute distress. Thin male, muscle wasting. Lying bed, slightly uncomfortable.   Head:  Normocephalic atraumatic.     Eyes:  Normal conjunctivae and sclerae anicteric   ENT:  Oral mcous membranes are dry. NGT in place.   Cardiovascular:  Regular rate and rhythm without murmur.  Respiratory:  Normal respiratory effort.  Clear to auscultation bilaterally.   Gastrointestinal:  Soft non distended. Mild discomfort with palpation of upper quadrant and right side.    Neurologic:  Alert, Oriented x3.  No focal deficits.  Psychiatric:  Cooperative.  Appropriate mood and affect.    LABORATORY RESULTS (24 hour)  Recent Results (from the past 24 hour(s))   Comprehensive Metabolic Panel    Collection Time: 12/25/20  8:56 PM   Result Value Ref Range    Fasting Status      Sodium 134 (L) 135 - 145 mmol/L    Potassium 2.1 (LL) 3.4 - 5.1 mmol/L    Chloride 84 (L) 98 - 107 mmol/L    Carbon Dioxide 58 (HH) 21 - 32 mmol/L    Anion Gap <2 (L) 10 - 20 mmol/L    Glucose 89 65 - 99 mg/dL    BUN 52 (H) 6 - 20 mg/dL    Creatinine 1.28 (H) 0.67 - 1.17 mg/dL    Glomerular Filtration Rate 75 (L) >90 mL/min/1.73m2    BUN/ Creatinine Ratio 41 (H) 7 - 25    Calcium 9.9 8.4 - 10.2 mg/dL    Bilirubin, Total 0.6 0.2 - 1.0 mg/dL    GOT/AST 36 <=37 Units/L    GPT/ALT 35 <64 Units/L    Alkaline Phosphatase 61 45 - 117 Units/L    Albumin 3.2 (L) 3.6 - 5.1 g/dL    Protein, Total 7.6 6.4 - 8.2 g/dL    Globulin 4.4 (H) 2.0 - 4.0 g/dL    A/G Ratio 0.7 (L) 1.0 - 2.4   Lipase    Collection Time: 12/25/20  8:56 PM   Result Value Ref Range    Lipase 195 73 - 393 Units/L   CBC with Automated Differential (performable only)    Collection Time: 12/25/20  8:56 PM   Result Value Ref Range    WBC 4.2 4.2 - 11.0 K/mcL    RBC 4.77 4.50 - 5.90 mil/mcL    HGB 12.1 (L) 13.0 -  17.0 g/dL    HCT 38.1 (L) 39.0 - 51.0 %    MCV 79.9 78.0 - 100.0 fl    MCH 25.4 (L) 26.0 - 34.0 pg    MCHC 31.8 (L) 32.0 - 36.5 g/dL    RDW-CV 18.1 (H) 11.0 - 15.0 %     140 - 450 K/mcL    NRBC 0 <=0 /100 WBC    Neutrophil, Percent 65 %    Lymphocytes, Percent 28 %    Mono, Percent 7 %    Eosinophils, Percent 0 %    Basophils, Percent 0 %    Immature Granulocytes 0 %    Absolute Neutrophils 2.7 1.8 - 7.7 K/mcL    Absolute Lymphocytes 1.2 1.0 - 4.8 K/mcL    Absolute Monocytes 0.3 0.3 - 0.9 K/mcL    Absolute Eosinophils  0.0 0.0 - 0.5 K/mcL    Absolute Basophils 0.0 0.0 - 0.3 K/mcL    Absolute Immmature Granulocytes 0.0 0.0 - 0.2 K/mcL    RDW-SD 53.1 (H) 39.0 - 50.0 fL   Light Green Top    Collection Time: 12/25/20  8:56 PM   Result Value Ref Range    Extra Tube Hold for Add Ons    Lavender Top    Collection Time: 12/25/20  8:56 PM   Result Value Ref Range    Extra Tube Hold for Add Ons    Gold Top    Collection Time: 12/25/20  8:56 PM   Result Value Ref Range    Extra Tube Hold for Add Ons    Magnesium    Collection Time: 12/25/20  8:56 PM   Result Value Ref Range    Magnesium 2.1 1.7 - 2.4 mg/dL   Rapid SARS-CoV-2 by PCR    Collection Time: 12/25/20  9:00 PM    Specimen: Nasopharyngeal; Swab   Result Value Ref Range    Rapid SARS-COV-2 by PCR Not Detected Not Detected / Detected / Presumptive Positive / Inhibitors present    Isolation Guidelines      Procedural Comment     Blood Gas, Venous    Collection Time: 12/25/20  9:55 PM   Result Value Ref Range    pH, Venous 7.52 (H) 7.35 - 7.45 Units    pCO2, Venous 68 (H) 41 - 54 mm Hg    pO2, Venous <25 (L) 35 - 42 mm Hg    HCO3, Venous 55 (HH) 22 - 28 mmol/L    Base Excess/ Deficit, Venous 27 (H) -2 - 2 mmol/L    O2 Saturation, Venous 16 (L) 60 - 80 %    Oxyhemoglobin, Venous 15 (L) 60 - 80 %    Hemoglobin, Blood Gas 10.9 (L) 13.0 - 17.0 g/dL   Basic Metabolic Panel    Collection Time: 12/26/20  5:09 AM   Result Value Ref Range    Fasting Status      Sodium 130  (L) 135 - 145 mmol/L    Potassium 2.7 (LL) 3.4 - 5.1 mmol/L    Chloride 86 (L) 98 - 107 mmol/L    Carbon Dioxide 42 (HH) 21 - 32 mmol/L    Anion Gap 5 (L) 10 - 20 mmol/L    Glucose 77 65 - 99 mg/dL    BUN 48 (H) 6 - 20 mg/dL    Creatinine 1.25 (H) 0.67 - 1.17 mg/dL    Glomerular Filtration Rate 78 (L) >90 mL/min/1.73m2    BUN/ Creatinine Ratio 38 (H) 7 - 25    Calcium 9.4 8.4 - 10.2 mg/dL   CBC No Differential    Collection Time: 12/26/20  5:09 AM   Result Value Ref Range    WBC 3.8 (L) 4.2 - 11.0 K/mcL    RBC 3.85 (L) 4.50 - 5.90 mil/mcL    HGB 10.0 (L) 13.0 - 17.0 g/dL    HCT 30.7 (L) 39.0 - 51.0 %    MCV 79.7 78.0 - 100.0 fl    MCH 26.0 26.0 - 34.0 pg    MCHC 32.6 32.0 - 36.5 g/dL     140 - 450 K/mcL    RDW-SD 53.4 (H) 39.0 - 50.0 fL    RDW-CV 18.3 (H) 11.0 - 15.0 %    NRBC 0 <=0 /100 WBC   Phosphorus    Collection Time: 12/26/20  5:09 AM   Result Value Ref Range    Phosphorus 3.2 2.4 - 4.7 mg/dL       EKG (Most recent)  Results for orders placed or performed during the hospital encounter of 12/20/20   Electrocardiogram 12-Lead   Result Value Ref Range    Ventricular Rate EKG/Min (BPM) 60     Atrial Rate (BPM) 60     LA-Interval (MSEC) 200     QRS-Interval (MSEC) 100     QT-Interval (MSEC) 470     QTc 470     R Axis (Degrees) 98     T Axis (Degrees) 104     REPORT TEXT       V2 not recorded  Normal sinus rhythm  Rightward axis  T wave abnormality, consider anterior ischemia  When compared with ECG of  20-DEC-2020 11:10,  no significant change  Confirmed by CHARLEY GALVEZ MD ([id number]) on 12/21/2020 7:36:13 AM         IMAGING  XR TUBE CHECK   Final Result   IMPRESSION:      Enteric tube tip in the body of the stomach.      XR Abdomen Series   Final Result   IMPRESSION:   1.  Nonobstructive bowel gas pattern.        Quality Indicators   AMI With Heart Failure with Reduced LVEF (<40%)? no  Heart failure?  No  Stroke measures indicated? no  AMI? No  DVT/VTE Prophylaxis:  VTE Pharmacologic Prophylaxis: Yes  VTE  Mechanical Prophylaxis: Yes  Severe sepsis with septic shock?  No    ASSESSMENT AND PLAN  Baron Nicole is a 47 year old male who was admitted on 12/25/2020 with gastric outlet obstruction from untreated duodenal ulcer.     # Duodenal ulcer: History of H pylori infection, non compliant with treatment  # Gastric outlet obstruction 2/2 to above: Patient was recently seen requiring NG tube and EGD done on 12/22 confirmed duodenal ulcer with obstruction.   - Bentyl 20 mg 4 times daily IM  - famotidine 20 mg b.i.d. IV  - morphine 2 mg q.6 hours p.r.n. -> will likely try to need to limit use to help promote gastric motility  - Daily Protonix IV BID  - Consult to general surgery regarding surgical intervention  - NG tube to low intermittent suction  - NPO with exceptions of meds and sips of water  - Tigan PRN for nausea     # Hypokalemia:   # Metabolic alkalosis:  Likely in relation to frequent emesis and inability to maintain p.o. intake  - K of 2.7, today   - potassium replacement protocol  - Monitor on tele  -  cc/hour    # Prolonged QTc  Secondary to emesis related hypokalemia  - Telemetry, supplement K to nml range     # chronic normocytic anemia:  Likely related to chronic blood loss but may also be related to iron deficiency/anemia  - daily CBCs  - transfuse if less than 7 or less than 8 if actively bleeding     # severe protein calorie malnutrition:  Patient states he has not been able to tolerate oral intake for 8 days  - nutrition consult     [facility]    Fluids: LR @ 100 cc   Isolation: none  DM: no DM, daily check  Electrolyte PRN's: yes  Nutrition: NPO except Meds, Ice chips, Sips of water  Pain: Morphine PRN  N/V: Tigan  PT/OT: when appropriate  GI Prophylaxis: Pantoprazole IV      Disposition: continue    Signed,  Faith Mckeon DO  12/26/2020 12:51 PM  Pager: [phone]    The patient's history and physical were discussed with Sonja Kang MD, who upon seeing the patient agreed with the above  assessment and plan.    Please contact the cross cover pager ([place] - ) for questions between:  Mon-Fri: 7p-7a  Sat - Sun: 11a-7a     none

## 2020-12-28 NOTE — H&P PST ADULT - ABILITY TO HEAR (WITH HEARING AID OR HEARING APPLIANCE IF NORMALLY USED):
Victorino, PGY2 - 53F PMH DM p/w L epistaxis x 30min PTA. Also with asymptomatic HTN. Bleeding improved s/p afrin-soaked gauze application. Will monitor and reeval. Low suspicion for hypertensive emergency. Adequate: hears normal conversation without difficulty

## 2021-02-10 NOTE — ED ADULT TRIAGE NOTE - BP NONINVASIVE SYSTOLIC (MM HG)
Airway  Performed by: Scotty Sosa MD  Authorized by: Scotty Sosa MD     Final Airway Type:  Supraglottic airway  Final Supraglottic Airway:  IGel  SGA Size*:  4  Attempts*:  1  Ventilation Between Attempts:  Bag valve   Patient Identified, Procedure confirmed, Emergency equipment available and Safety protocols followed  Location:  OR  Urgency:  Elective  Difficult Airway: No    Indications for Airway Management:  Anesthesia  Sedation Level:  Deep  Mask Difficulty Assessment:  1 - vent by mask  Performed By:  Anesthesiologist  Anesthesiologist:  Scotty Sosa MD         [phone]

## 2021-02-18 NOTE — DISCHARGE NOTE ADULT - NS AS DC PROVIDER CONTACT Y/N MULTI
Informed Consent: Risks, complications and alternatives were discussed at length with the pateint who verbalized understanding.  Risks reviewed including but not limited to crusting, scabbing, blistering, scarring, darker or lighter pigmentary change, incomplete improvement of dyschromia, wrinkles, prolonged erythema and facial swelling, infection and bleeding.  In addition, risk of cold sores was explained as well as need for further treatment. Expectations on up to two weeks of down time were discussed at length with the patient who verbailized understanding. Explained in detail that no guarantees can be made on treatment results or down time.  Patient verbalizes understanding that this is a cosmetic procedure and that full reimbursement is expected for each treatment session. Yes

## 2021-07-29 NOTE — CONSULT NOTE ADULT - PROBLEM SELECTOR RECOMMENDATION 9
Will increase Lantus to 60 units at bed time.  Will increase Humalog to 22 units before each meal in addition to Humalog correction scale coverage.  Patient counseled for compliance with consistent low carb diet. (1) Other Diagnosis

## 2021-09-27 NOTE — ED PROVIDER NOTE - RESPIRATORY, MLM
Chief Complaint   Patient presents with   • Cough   • Nasal Congestion       Real Villasenor male 10 y.o. 8 m.o.    History was provided by the mother.    Cough and congestion since yesterday  No fever  No sore throat  Stopped allergy meds a few days ago  Sent home from school    Cough  This is a new problem. The current episode started in the past 7 days. The problem has been unchanged. The cough is productive of sputum. Associated symptoms include nasal congestion and rhinorrhea. Pertinent negatives include no chest pain, ear congestion, ear pain, eye redness, fever, headaches, myalgias, rash, sore throat, shortness of breath or wheezing. He has tried nothing for the symptoms.         The following portions of the patient's history were reviewed and updated as appropriate: allergies, current medications, past family history, past medical history, past social history, past surgical history and problem list.    Current Outpatient Medications   Medication Sig Dispense Refill   • cetirizine (zyrTEC) 10 MG tablet Take 1 tablet by mouth Daily. 30 tablet 3   • fluticasone (Flonase) 50 MCG/ACT nasal spray 1 spray into the nostril(s) as directed by provider Daily. 11.1 mL 2     No current facility-administered medications for this visit.       No Known Allergies        Review of Systems   Constitutional: Negative for activity change, appetite change, fatigue and fever.   HENT: Positive for congestion and rhinorrhea. Negative for ear discharge, ear pain, hearing loss and sore throat.    Eyes: Negative for pain, discharge, redness and visual disturbance.   Respiratory: Positive for cough. Negative for shortness of breath, wheezing and stridor.    Cardiovascular: Negative for chest pain and palpitations.   Gastrointestinal: Negative for abdominal pain, constipation, diarrhea, nausea, vomiting and GERD.   Genitourinary: Negative for dysuria, enuresis and frequency.   Musculoskeletal: Negative for arthralgias and myalgias.    Skin: Negative for rash.   Neurological: Negative for headache.   Hematological: Negative for adenopathy.   Psychiatric/Behavioral: Negative for behavioral problems.              Temp 97 °F (36.1 °C) (Temporal)   Wt 103 kg (227 lb)     Physical Exam  Vitals and nursing note reviewed.   Constitutional:       General: He is active. He is not in acute distress.     Appearance: Normal appearance. He is well-developed and normal weight.   HENT:      Right Ear: Tympanic membrane normal.      Left Ear: Tympanic membrane normal.      Nose: Congestion present.      Mouth/Throat:      Mouth: Mucous membranes are moist.      Pharynx: Oropharynx is clear.      Tonsils: No tonsillar exudate.   Eyes:      General:         Right eye: No discharge.         Left eye: No discharge.      Conjunctiva/sclera: Conjunctivae normal.   Cardiovascular:      Rate and Rhythm: Normal rate and regular rhythm.      Heart sounds: Normal heart sounds, S1 normal and S2 normal. No murmur heard.     Pulmonary:      Effort: Pulmonary effort is normal. No respiratory distress or retractions.      Breath sounds: Normal breath sounds. No stridor. No wheezing, rhonchi or rales.   Abdominal:      General: Bowel sounds are normal. There is no distension.      Palpations: Abdomen is soft.      Tenderness: There is no abdominal tenderness. There is no guarding or rebound.   Musculoskeletal:         General: Normal range of motion.      Cervical back: Normal range of motion and neck supple. No rigidity.   Lymphadenopathy:      Cervical: No cervical adenopathy.   Skin:     General: Skin is warm and dry.      Findings: No rash.   Neurological:      Mental Status: He is alert and oriented for age.   Psychiatric:         Mood and Affect: Mood normal.         Behavior: Behavior normal.           Assessment/Plan     Diagnoses and all orders for this visit:    1. Seasonal allergies (Primary)  -     cetirizine (zyrTEC) 10 MG tablet; Take 1 tablet by mouth Daily.   Dispense: 30 tablet; Refill: 3  -     fluticasone (Flonase) 50 MCG/ACT nasal spray; 1 spray into the nostril(s) as directed by provider Daily.  Dispense: 11.1 mL; Refill: 2    2. Cough  -     COVID PRE-OP / PRE-PROCEDURE SCREENING ORDER (NO ISOLATION) - Swab, Nasopharynx; Future  -     COVID PRE-OP / PRE-PROCEDURE SCREENING ORDER (NO ISOLATION) - Swab, Nasopharynx          Return if symptoms worsen or fail to improve.                     Breath sounds clear and equal bilaterally.

## 2021-10-08 NOTE — H&P PST ADULT - NEUROLOGICAL
You can access the FollowMyHealth Patient Portal offered by [facility] by registering at the following website: [email or web address]. By joining [facility]’s FollowMyHealth portal, you will also be able to view your health information using other applications (apps) compatible with our system. details… detailed exam

## 2022-01-07 NOTE — ASU PREOP CHECKLIST - RESPIRATORY RATE (BREATHS/MIN)
Spoke to Ms Uriarte's mother regarding referral to Hematology appt scheduled time and location confirmed she verbalized understanding   Oncology Navigation   Intake  Cancer Type: Benign hem (WADE)  Internal / External Referral: Internal  Referral Source: Dr. Jose De Jesus Curran ([facility])  Date of Referral: 1/7/2022  Initial Nurse Navigator Contact: 1/7/2022  Referral to Initial Contact Timeline (days): 0  Date Worked: 1/7/2022  First Appointment Available: 1/10/2022 (patient asked for 1/21/2022)  Appointment Date: 1/21/2022 (Dr. Khoobehi)  First Available Date vs. Scheduled Date (days): 11     Treatment                              Acuity      Follow Up  No follow-ups on file.      18

## 2022-02-23 NOTE — H&P PST ADULT - CENTRAL VENOUS CATHETER
Message received for med refill request   Upon reviewing chart, rx was already sent by MA from office  Confirmed receipt of rx by pharmacy  no

## 2022-05-04 NOTE — H&P PST ADULT - OTHER CARE PROVIDERS
No acute changes overnight. Voiding via purewick. Refusing scheduled tylenol. Tmax 100.2F overnight. HR continues afib rhythm in 100-115s. Pt asymptomatic. Continuing to trend labs & temps.      Problem: Patient Centered Care  Goal: Patient preferences are identified and integrated in the patient's plan of care  Description: Interventions:  - What would you like us to know as we care for you?   - Provide timely, complete, and accurate information to patient/family  - Incorporate patient and family knowledge, values, beliefs, and cultural backgrounds into the planning and delivery of care  - Encourage patient/family to participate in care and decision-making at the level they choose  - Honor patient and family perspectives and choices  Outcome: Progressing     Problem: Patient/Family Goals  Goal: Patient/Family Long Term Goal  Description: Patient's Long Term Goal: Discharged back to [employer]    Interventions:  - Monitor vital signs  - Administer abx  - Advance activity as tolerated  - Treat pain  - Follow MD orders  - Monitor labs  - See additional Care Plan goals for specific interventions  Outcome: Progressing  Goal: Patient/Family Short Term Goal  Description: Patient's Short Term Goal: Control HR + BP    Interventions:   - Monitor vital signs  - Administer medications as ordered  - Ivfluids  - Monitor labs  - Monitor notifications from tele  - See additional Care Plan goals for specific interventions  Outcome: Progressing     Problem: PAIN - ADULT  Goal: Verbalizes/displays adequate comfort level or patient's stated pain goal  Description: INTERVENTIONS:  - Encourage pt to monitor pain and request assistance  - Assess pain using appropriate pain scale  - Administer analgesics based on type and severity of pain and evaluate response  - Implement non-pharmacological measures as appropriate and evaluate response  - Consider cultural and social influences on pain and pain management  - Manage/alleviate anxiety  - Utilize distraction and/or relaxation techniques  - Monitor for opioid side effects  - Notify MD/LIP if interventions unsuccessful or patient reports new pain  - Anticipate increased pain with activity and pre-medicate as appropriate  Outcome: Progressing     Problem: RISK FOR INFECTION - ADULT  Goal: Absence of fever/infection during anticipated neutropenic period  Description: INTERVENTIONS  - Monitor WBC  - Administer growth factors as ordered  - Implement neutropenic guidelines  Outcome: Progressing     Problem: SAFETY ADULT - FALL  Goal: Free from fall injury  Description: INTERVENTIONS:  - Assess pt frequently for physical needs  - Identify cognitive and physical deficits and behaviors that affect risk of falls.   - [place] fall precautions as indicated by assessment.  - Educate pt/family on patient safety including physical limitations  - Instruct pt to call for assistance with activity based on assessment  - Modify environment to reduce risk of injury  - Provide assistive devices as appropriate  - Consider OT/PT consult to assist with strengthening/mobility  - Encourage toileting schedule  Outcome: Progressing     Problem: DISCHARGE PLANNING  Goal: Discharge to home or other facility with appropriate resources  Description: INTERVENTIONS:  - Identify barriers to discharge w/pt and caregiver  - Include patient/family/discharge partner in discharge planning  - Arrange for needed discharge resources and transportation as appropriate  - Identify discharge learning needs (meds, wound care, etc)  - Arrange for interpreters to assist at discharge as needed  - Consider post-discharge preferences of patient/family/discharge partner  - Complete POLST form as appropriate  - Assess patient's ability to be responsible for managing their own health  - Refer to Case Management Department for coordinating discharge planning if the patient needs post-hospital services based on physician/LIP order or complex needs related to functional status, cognitive ability or social support system  Outcome: Progressing Cardiology [place]  fax  Endocrinology Dr Dexter  fax

## 2022-07-14 NOTE — PACU DISCHARGE NOTE - HYDRATION STATUS:
Satisfactory Estlander Flap (Upper To Lower Lip) Text: The defect of the lower lip was assessed and measured.  Given the location and size of the defect, an Estlander flap was deemed most appropriate.  Using a sterile surgical marker, an appropriate Estlander flap was measured and drawn on the upper lip. Local anesthesia was then infiltrated. A scalpel was then used to incise the lateral aspect of the flap, through skin, muscle and mucosa, leaving the flap pedicled medially.  The flap was then rotated and positioned to fill the lower lip defect.  The flap was then sutured into place with a three layer technique, closing the orbicularis oris muscle layer with subcutaneous buried sutures, followed by a mucosal layer and an epidermal layer.

## 2022-10-25 NOTE — H&P PST ADULT - BREASTS
Problem: Adult Inpatient Plan of Care  Goal: Plan of Care Review  Outcome: Ongoing, Progressing  Goal: Patient-Specific Goal (Individualized)  Outcome: Ongoing, Progressing  Goal: Absence of Hospital-Acquired Illness or Injury  Outcome: Ongoing, Progressing  Goal: Optimal Comfort and Wellbeing  Outcome: Ongoing, Progressing  Goal: Readiness for Transition of Care  Outcome: Ongoing, Progressing     Problem: Infection  Goal: Absence of Infection Signs and Symptoms  Outcome: Ongoing, Progressing     Problem: Skin Injury Risk Increased  Goal: Skin Health and Integrity  Outcome: Ongoing, Progressing     Problem: Fall Injury Risk  Goal: Absence of Fall and Fall-Related Injury  Outcome: Ongoing, Progressing     Problem: Communication Impairment (Mechanical Ventilation, Invasive)  Goal: Effective Communication  Outcome: Ongoing, Progressing     Problem: Nutrition Impairment (Mechanical Ventilation, Invasive)  Goal: Optimal Nutrition Delivery  Outcome: Ongoing, Progressing     Problem: Skin and Tissue Injury (Mechanical Ventilation, Invasive)  Goal: Absence of Device-Related Skin and Tissue Injury  Outcome: Ongoing, Progressing     Problem: Ventilator-Induced Lung Injury (Mechanical Ventilation, Invasive)  Goal: Absence of Ventilator-Induced Lung Injury  Outcome: Ongoing, Progressing     Problem: Skin and Tissue Injury (Artificial Airway)  Goal: Absence of Device-Related Skin or Tissue Injury  Outcome: Ongoing, Progressing     Problem: Device-Related Complication Risk (CRRT (Continuous Renal Replacement Therapy))  Goal: Safe, Effective Therapy Delivery  Outcome: Ongoing, Progressing     Problem: Hypothermia (CRRT (Continuous Renal Replacement Therapy))  Goal: Body Temperature Maintained in Desired Range  Outcome: Ongoing, Progressing     Problem: Infection (CRRT (Continuous Renal Replacement Therapy))  Goal: Absence of Infection Signs and Symptoms  Outcome: Ongoing, Progressing     Problem: Device-Related Complication Risk  (Hemodialysis)  Goal: Safe, Effective Therapy Delivery  Outcome: Ongoing, Progressing     Problem: Hemodynamic Instability (Hemodialysis)  Goal: Effective Tissue Perfusion  Outcome: Ongoing, Progressing     Problem: Infection (Hemodialysis)  Goal: Absence of Infection Signs and Symptoms  Outcome: Ongoing, Progressing     Problem: Hyperglycemia  Goal: Blood Glucose Level Within Targeted Range  Outcome: Ongoing, Progressing     Problem: Impaired Wound Healing  Goal: Optimal Wound Healing  Outcome: Ongoing, Progressing      No masses; no nipple discharge

## 2022-11-03 NOTE — ED ADULT NURSE NOTE - PRO INTERPRETER NEED 2
English Eucrisa Counseling: Patient may experience a mild burning sensation during topical application. Eucrisa is not approved in children less than 3 months of age.

## 2022-11-18 NOTE — ED PROVIDER NOTE - EYES NEGATIVE STATEMENT, MLM
no discharge, no irritation, no pain, no redness, and no visual changes.
BMP/CBC/PT/PTT/INR/Results in MD note/COVID-19

## 2023-01-31 NOTE — DISCHARGE NOTE ADULT - PATIENT PORTAL LINK FT
“You can access the [facility] Patient Portal, offered by [facility], by registering with the following website: [email or web address]”
no

## 2023-02-07 NOTE — CONSULT NOTE ADULT - CONSULT REASON
High Blood Sugars/DMT2 Erythromycin Counseling:  I discussed with the patient the risks of erythromycin including but not limited to GI upset, allergic reaction, drug rash, diarrhea, increase in liver enzymes, and yeast infections.

## 2023-03-02 NOTE — PHYSICAL THERAPY INITIAL EVALUATION ADULT - LONG TERM MEMORY, REHAB EVAL
intact Suturegard Body: The suture ends were repeatedly re-tightened and re-clamped to achieve the desired tissue expansion.

## 2023-03-15 NOTE — ED PROVIDER NOTE - PHYSICAL EXAMINATION
GEN: distress 2/2 pain, A & O x 4  HEAD/EYES: NCAT, PERRL, EOMI, anicteric sclerae, no conjunctival pallor  ENT: mucus membranes moist  RESP: lungs CTA with equal breath sounds bilaterally,  CV: heart with reg rhythm S1, S2, DP pulses pulses intact and symmetric bilaterally, b/l pitting edema to mid calf with L calf size>R  ABDOMEN: soft, nondistended, nontender  MSK: L great toe swollen ttp and tender with flexion  SKIN: warm, dry, no bruising, no cyanosis. color appropriate for ethnicity, swelling to L great toe with fluctuant blister that covers the medial and superior aspect of toe.  NEURO: alert, mentating appropriately, no facial asymmetry. gross sensation, motor, coordination are intact  PSYCH: Affect appropriate
no

## 2023-05-23 NOTE — ASU PATIENT PROFILE, ADULT - MEDICATIONS TO HOLD
per past Advancement Flap (Double) Text: Due to geometric and functional constraints, a flap reconstruction was performed to reconstruct the defect.  To that end, adjacent tissue was incised and carried over to close the defect in the following manner: The defect edges were debeveled with a #15 scalpel blade.  Given the location of the defect and the proximity to free margins a double advancement flap was deemed most appropriate.  Using a sterile surgical marker, the appropriate advancement flaps were drawn incorporating the defect and placing the expected incisions within the relaxed skin tension lines where possible.    The area thus outlined was incised deep to adipose tissue with a #15 scalpel blade.  The skin margins were undermined to an appropriate distance in all directions utilizing iris scissors.

## 2023-10-18 NOTE — H&P PST ADULT - NEGATIVE IMMUNOLOGICAL SYMPTOMS
no recurring infections/no persistent infections Calcipotriene Counseling:  I discussed with the patient the risks of calcipotriene including but not limited to erythema, scaling, itching, and irritation.

## 2024-01-08 NOTE — PATIENT PROFILE ADULT - NSTRANSFERBELONGINGSDISPO_GEN_A_NUR
with patient examination today revealed no dominant masses, no skin or nipple changes and no axillary adenopathy.  No suspicious abnormality to suggest recurrent breast cancer.  Fibrocystic changes noted throughout both breasts.  Left breast surgically smaller than right.  Clip/marker noted left upper outer area.    -Continue tamoxifen 20 mg daily, anticipate through January 2025: Continue to decrease risk of recurrence of breast cancer  -Encourage self breast exams    2. Adverse effect of tamoxifen, subsequent encounter  Reports tolerating without difficulty, specifically denies any hot flashes vaginal dryness or other complaints    3. Vitamin B 12 deficiency, B12 of 431 and Folate >20 on 02/13/2023  -Obtain B12 level today    4.Iron deficiency without anemia.  Reports severe constipation with iron supplement.  Currently not taking any supplement.  Iron substrates on 2/13/2023 within acceptable limits. Hemoglobin 11.7 with an MCV of 96.2 today    -CBC today  -Repeat iron substrates today    5.  At risk for osteoporosis with osteopenia: 10/10/2023 Bone density- showed osteopenia; left femoral neck T score -1.0, lumbar spine T score 0.2.  Reports not taking calcium supplement    Recommend calcium 1200 mg with vitamin D 3 1000 units daily, electronic prescription sent    I discussed all of the above findings included in the assessment and plan with the patient and the patient is in agreement to move forward with current recommendations/treatment.  I have addressed all of their questions and concerns that were verbalized.      FOLLOW UP:  Follow-up appointment given for 3 months, sooner if needed for breast cancer and iron deficiency anemia  Continue to follow with other medical providers as recommended  Labs at next visit: CBC    EMR Dragon/Transcription disclaimer:   Much of this encounter note is an electronic transcription/translation of spoken language to printed text. The electronic translation of spoken language may permit

## 2024-01-24 NOTE — H&P ADULT - PROBLEM SELECTOR PROBLEM 2
[facility] Call Center    Phone Message    May a detailed message be left on voicemail: yes     Reason for Call: Other: Pt calling back regarding phone call with Hilda Becker yesterday, regrading MRI and Hearing eval. Pt would like more clarification on what is to be done. Wants to know if she is to wait to have the MRI done until after steroids as well. Pt was also concerned about the scheduling for the hearing eval, soonest available is May.. Please  call to discuss. Thanks.    Action Taken: Other: ENT    Travel Screening: Not Applicable                                                                    DM (diabetes mellitus)

## 2024-06-07 NOTE — ASU PREOP CHECKLIST - AICD PRESENT
RBB out of office.   LT TKA: 5/15/24  Last fill: 5/22/24  Last OV: 5/28/24  Next OV: 7/9/24  Pharmacy confirmed: [facility]   no

## 2024-09-19 NOTE — DISCHARGE NOTE ADULT - PROCEDURE 1
"Mom reports that this has been going on this week.  She gets so worked when she has to go to school and she reports that her stomach hurts and states that \"I need to stay home\". She is in  and is the first year she is away from her sister. She is shy, but does have 2 friends in class. She is not with mom at this time. She does not have a fever and she is not vomiting. Goes to  with her sister. Today she had one bite of Vietnamese toast and started gagging trying to swallow it.  When she gets home from school she is so happy and states she can't wait to go school. Eats dinner well. Teacher writes little note Monday and Wednesday that she went to the nurses office. Moses reported that she vomited in the bathroom one day. She went to the teacher and told she didn't feel well and the teacher told her she was sad and misses her family. She did tell mom today her throat hurt.       Nursing advice:  Patient should be assessed in clinic for possible physical reasons for her symptoms, if none mom will discuss behavior with Nikolas. She was assisted in making hte appointment below.  Mom was advised to reach out to the school to see if they have support for Moses. Patient verbalized good understanding, agrees with plan and needs no further support.  Thank you. Mary Ann Harris R.N.      Next 5 appointments (look out 90 days)      Sep 19, 2024 11:40 AM  (Arrive by 11:20 AM)  Provider Visit with Sameera Brennan PA-C  [facility] [address] [facility] [address] [phone]            " Angiogram

## 2024-11-26 NOTE — DISCHARGE NOTE ADULT - SECONDARY DIAGNOSIS.
Wound bilateral upper extremity ROM was WFL (within functional limits)/bilateral lower extremity ROM was WFL (within functional limits)

## 2025-01-27 NOTE — H&P PST ADULT - FALLEN IN THE PAST
Radiation consent form completed by Lisa DILLARD with patient.    yes/4/15/17 tripped and fell, evaluated in the ED at [facility], CXR CT done,, no head injury, had stiches placed above left eye.

## 2025-03-26 NOTE — ASU PATIENT PROFILE, ADULT - TEACHING/LEARNING CULTURAL CONSIDERATIONS
Problem: Adult Inpatient Plan of Care  Goal: Plan of Care Review  Outcome: Progressing  Goal: Patient-Specific Goal (Individualized)  Outcome: Progressing  Goal: Absence of Hospital-Acquired Illness or Injury  Outcome: Progressing  Goal: Optimal Comfort and Wellbeing  Outcome: Progressing  Goal: Readiness for Transition of Care  Outcome: Progressing     Problem: Wound  Goal: Optimal Coping  Outcome: Progressing  Goal: Optimal Functional Ability  Outcome: Progressing  Goal: Absence of Infection Signs and Symptoms  Outcome: Progressing  Goal: Improved Oral Intake  Outcome: Progressing  Goal: Optimal Pain Control and Function  Outcome: Progressing  Goal: Skin Health and Integrity  Outcome: Progressing  Goal: Optimal Wound Healing  Outcome: Progressing      none

## 2025-05-10 NOTE — PRE-OP CHECKLIST - SITE MARKED BY SURGEON
Lovenox ppx  full code  PT rec TK  Will need transfer to [facility] for psychiatric optimization    Ambulatory with PT. Eating consistently now. 2PC per psychiatry. yes